# Patient Record
Sex: MALE | Race: WHITE | NOT HISPANIC OR LATINO | Employment: OTHER | URBAN - METROPOLITAN AREA
[De-identification: names, ages, dates, MRNs, and addresses within clinical notes are randomized per-mention and may not be internally consistent; named-entity substitution may affect disease eponyms.]

---

## 2017-01-03 ENCOUNTER — APPOINTMENT (OUTPATIENT)
Dept: LAB | Facility: CLINIC | Age: 72
End: 2017-01-03
Payer: MEDICARE

## 2017-01-03 DIAGNOSIS — Z79.01 LONG TERM (CURRENT) USE OF ANTICOAGULANTS: ICD-10-CM

## 2017-01-03 LAB
INR PPP: 2.4 (ref 0.86–1.16)
PROTHROMBIN TIME: 25.8 SECONDS (ref 12–14.3)

## 2017-01-03 PROCEDURE — 85610 PROTHROMBIN TIME: CPT

## 2017-01-03 PROCEDURE — 36415 COLL VENOUS BLD VENIPUNCTURE: CPT

## 2017-01-17 ENCOUNTER — APPOINTMENT (OUTPATIENT)
Dept: LAB | Facility: CLINIC | Age: 72
End: 2017-01-17
Payer: MEDICARE

## 2017-01-17 DIAGNOSIS — Z79.01 LONG TERM (CURRENT) USE OF ANTICOAGULANTS: ICD-10-CM

## 2017-01-17 LAB
INR PPP: 2.13 (ref 0.86–1.16)
PROTHROMBIN TIME: 23.6 SECONDS (ref 12–14.3)

## 2017-01-17 PROCEDURE — 36415 COLL VENOUS BLD VENIPUNCTURE: CPT

## 2017-01-17 PROCEDURE — 85610 PROTHROMBIN TIME: CPT

## 2017-01-31 ENCOUNTER — APPOINTMENT (OUTPATIENT)
Dept: LAB | Facility: CLINIC | Age: 72
End: 2017-01-31
Payer: MEDICARE

## 2017-01-31 DIAGNOSIS — Z79.01 LONG TERM (CURRENT) USE OF ANTICOAGULANTS: ICD-10-CM

## 2017-01-31 LAB
INR PPP: 1.91 (ref 0.86–1.16)
PROTHROMBIN TIME: 21.7 SECONDS (ref 12–14.3)

## 2017-01-31 PROCEDURE — 85610 PROTHROMBIN TIME: CPT

## 2017-01-31 PROCEDURE — 36415 COLL VENOUS BLD VENIPUNCTURE: CPT

## 2017-02-07 ENCOUNTER — APPOINTMENT (OUTPATIENT)
Dept: LAB | Facility: CLINIC | Age: 72
End: 2017-02-07
Payer: MEDICARE

## 2017-02-07 DIAGNOSIS — Z79.01 LONG TERM (CURRENT) USE OF ANTICOAGULANTS: ICD-10-CM

## 2017-02-07 LAB
INR PPP: 2.12 (ref 0.86–1.16)
PROTHROMBIN TIME: 23.5 SECONDS (ref 12–14.3)

## 2017-02-07 PROCEDURE — 36415 COLL VENOUS BLD VENIPUNCTURE: CPT

## 2017-02-07 PROCEDURE — 85610 PROTHROMBIN TIME: CPT

## 2017-02-14 ENCOUNTER — APPOINTMENT (OUTPATIENT)
Dept: LAB | Facility: CLINIC | Age: 72
End: 2017-02-14
Payer: MEDICARE

## 2017-02-14 ENCOUNTER — TRANSCRIBE ORDERS (OUTPATIENT)
Dept: LAB | Facility: CLINIC | Age: 72
End: 2017-02-14

## 2017-02-14 DIAGNOSIS — Z79.01 LONG TERM (CURRENT) USE OF ANTICOAGULANTS: ICD-10-CM

## 2017-02-14 LAB
INR PPP: 2.03 (ref 0.86–1.16)
PROTHROMBIN TIME: 22.7 SECONDS (ref 12–14.3)

## 2017-02-14 PROCEDURE — 36415 COLL VENOUS BLD VENIPUNCTURE: CPT

## 2017-02-14 PROCEDURE — 85610 PROTHROMBIN TIME: CPT

## 2017-02-28 ENCOUNTER — APPOINTMENT (OUTPATIENT)
Dept: LAB | Facility: CLINIC | Age: 72
End: 2017-02-28
Payer: MEDICARE

## 2017-02-28 DIAGNOSIS — Z79.01 LONG TERM (CURRENT) USE OF ANTICOAGULANTS: ICD-10-CM

## 2017-02-28 LAB
INR PPP: 1.86 (ref 0.86–1.16)
PROTHROMBIN TIME: 21.3 SECONDS (ref 12–14.3)

## 2017-02-28 PROCEDURE — 85610 PROTHROMBIN TIME: CPT

## 2017-02-28 PROCEDURE — 36415 COLL VENOUS BLD VENIPUNCTURE: CPT

## 2017-03-07 ENCOUNTER — APPOINTMENT (OUTPATIENT)
Dept: LAB | Facility: CLINIC | Age: 72
End: 2017-03-07
Payer: MEDICARE

## 2017-03-07 DIAGNOSIS — Z79.01 LONG TERM (CURRENT) USE OF ANTICOAGULANTS: ICD-10-CM

## 2017-03-07 LAB
INR PPP: 2.4 (ref 0.86–1.16)
PROTHROMBIN TIME: 25.8 SECONDS (ref 12–14.3)

## 2017-03-07 PROCEDURE — 85610 PROTHROMBIN TIME: CPT

## 2017-03-07 PROCEDURE — 36415 COLL VENOUS BLD VENIPUNCTURE: CPT

## 2017-03-09 ENCOUNTER — ALLSCRIPTS OFFICE VISIT (OUTPATIENT)
Dept: OTHER | Facility: OTHER | Age: 72
End: 2017-03-09

## 2017-03-09 DIAGNOSIS — Z12.5 ENCOUNTER FOR SCREENING FOR MALIGNANT NEOPLASM OF PROSTATE: ICD-10-CM

## 2017-03-09 DIAGNOSIS — R05.9 COUGH: ICD-10-CM

## 2017-03-09 DIAGNOSIS — Z79.01 LONG TERM CURRENT USE OF ANTICOAGULANT: ICD-10-CM

## 2017-03-09 DIAGNOSIS — Z79.899 OTHER LONG TERM (CURRENT) DRUG THERAPY: ICD-10-CM

## 2017-03-09 DIAGNOSIS — R35.1 NOCTURIA: ICD-10-CM

## 2017-03-09 DIAGNOSIS — R07.89 OTHER CHEST PAIN: ICD-10-CM

## 2017-03-09 DIAGNOSIS — I82.409 ACUTE EMBOLISM AND THROMBOSIS OF DEEP VEIN OF LOWER EXTREMITY (HCC): ICD-10-CM

## 2017-03-09 DIAGNOSIS — I10 ESSENTIAL (PRIMARY) HYPERTENSION: ICD-10-CM

## 2017-03-21 ENCOUNTER — TRANSCRIBE ORDERS (OUTPATIENT)
Dept: ADMINISTRATIVE | Facility: HOSPITAL | Age: 72
End: 2017-03-21

## 2017-03-21 ENCOUNTER — APPOINTMENT (OUTPATIENT)
Dept: LAB | Facility: CLINIC | Age: 72
End: 2017-03-21
Payer: MEDICARE

## 2017-03-21 ENCOUNTER — HOSPITAL ENCOUNTER (OUTPATIENT)
Dept: RADIOLOGY | Facility: HOSPITAL | Age: 72
Discharge: HOME | End: 2017-03-21
Payer: MEDICARE

## 2017-03-21 ENCOUNTER — GENERIC CONVERSION - ENCOUNTER (OUTPATIENT)
Dept: OTHER | Facility: OTHER | Age: 72
End: 2017-03-21

## 2017-03-21 DIAGNOSIS — R05.9 COUGH: ICD-10-CM

## 2017-03-21 DIAGNOSIS — R35.1 NOCTURIA: ICD-10-CM

## 2017-03-21 DIAGNOSIS — R07.89 OTHER CHEST PAIN: ICD-10-CM

## 2017-03-21 DIAGNOSIS — Z12.5 ENCOUNTER FOR SCREENING FOR MALIGNANT NEOPLASM OF PROSTATE: ICD-10-CM

## 2017-03-21 DIAGNOSIS — Z79.899 OTHER LONG TERM (CURRENT) DRUG THERAPY: ICD-10-CM

## 2017-03-21 DIAGNOSIS — I10 ESSENTIAL (PRIMARY) HYPERTENSION: ICD-10-CM

## 2017-03-21 DIAGNOSIS — Z79.01 LONG TERM (CURRENT) USE OF ANTICOAGULANTS: ICD-10-CM

## 2017-03-21 LAB
ALBUMIN SERPL BCP-MCNC: 3.5 G/DL (ref 3.5–5)
ALP SERPL-CCNC: 77 U/L (ref 46–116)
ALT SERPL W P-5'-P-CCNC: 25 U/L (ref 12–78)
ANION GAP SERPL CALCULATED.3IONS-SCNC: 8 MMOL/L (ref 4–13)
AST SERPL W P-5'-P-CCNC: 19 U/L (ref 5–45)
BILIRUB SERPL-MCNC: 1.04 MG/DL (ref 0.2–1)
BUN SERPL-MCNC: 10 MG/DL (ref 5–25)
CALCIUM SERPL-MCNC: 8.9 MG/DL (ref 8.3–10.1)
CHLORIDE SERPL-SCNC: 107 MMOL/L (ref 100–108)
CHOLEST SERPL-MCNC: 214 MG/DL (ref 50–200)
CO2 SERPL-SCNC: 25 MMOL/L (ref 21–32)
CREAT SERPL-MCNC: 0.95 MG/DL (ref 0.6–1.3)
EST. AVERAGE GLUCOSE BLD GHB EST-MCNC: 111 MG/DL
GFR SERPL CREATININE-BSD FRML MDRD: >60 ML/MIN/1.73SQ M
GLUCOSE P FAST SERPL-MCNC: 93 MG/DL (ref 65–99)
HBA1C MFR BLD: 5.5 % (ref 4.2–6.3)
HDLC SERPL-MCNC: 42 MG/DL (ref 40–60)
INR PPP: 2.7 (ref 0.86–1.16)
LDLC SERPL CALC-MCNC: 120 MG/DL (ref 0–100)
POTASSIUM SERPL-SCNC: 3.9 MMOL/L (ref 3.5–5.3)
PROT SERPL-MCNC: 6.9 G/DL (ref 6.4–8.2)
PROTHROMBIN TIME: 28.2 SECONDS (ref 12–14.3)
PSA SERPL-MCNC: 1.9 NG/ML (ref 0–4)
SODIUM SERPL-SCNC: 140 MMOL/L (ref 136–145)
TRIGL SERPL-MCNC: 262 MG/DL

## 2017-03-21 PROCEDURE — 85610 PROTHROMBIN TIME: CPT

## 2017-03-21 PROCEDURE — 36415 COLL VENOUS BLD VENIPUNCTURE: CPT

## 2017-03-21 PROCEDURE — 80061 LIPID PANEL: CPT

## 2017-03-21 PROCEDURE — 71110 X-RAY EXAM RIBS BIL 3 VIEWS: CPT

## 2017-03-21 PROCEDURE — G0103 PSA SCREENING: HCPCS

## 2017-03-21 PROCEDURE — 80053 COMPREHEN METABOLIC PANEL: CPT

## 2017-03-21 PROCEDURE — 71020 HB CHEST X-RAY 2VW FRONTAL&LATL: CPT

## 2017-03-21 PROCEDURE — 83036 HEMOGLOBIN GLYCOSYLATED A1C: CPT

## 2017-03-22 ENCOUNTER — ALLSCRIPTS OFFICE VISIT (OUTPATIENT)
Dept: OTHER | Facility: OTHER | Age: 72
End: 2017-03-22

## 2017-03-23 ENCOUNTER — GENERIC CONVERSION - ENCOUNTER (OUTPATIENT)
Dept: OTHER | Facility: OTHER | Age: 72
End: 2017-03-23

## 2017-04-01 ENCOUNTER — APPOINTMENT (EMERGENCY)
Dept: RADIOLOGY | Facility: HOSPITAL | Age: 72
DRG: 683 | End: 2017-04-01
Payer: MEDICARE

## 2017-04-01 ENCOUNTER — HOSPITAL ENCOUNTER (INPATIENT)
Facility: HOSPITAL | Age: 72
LOS: 6 days | Discharge: NURSING, REHAB OR LONG-TERM CARE | DRG: 683 | End: 2017-04-07
Attending: EMERGENCY MEDICINE | Admitting: INTERNAL MEDICINE
Payer: MEDICARE

## 2017-04-01 ENCOUNTER — APPOINTMENT (EMERGENCY)
Dept: CT IMAGING | Facility: HOSPITAL | Age: 72
DRG: 683 | End: 2017-04-01
Payer: MEDICARE

## 2017-04-01 DIAGNOSIS — R11.2 NAUSEA AND VOMITING: Primary | ICD-10-CM

## 2017-04-01 DIAGNOSIS — R10.9 ABDOMINAL PAIN: ICD-10-CM

## 2017-04-01 DIAGNOSIS — F32.A DEPRESSION: Chronic | ICD-10-CM

## 2017-04-01 DIAGNOSIS — E87.2 LACTIC ACIDOSIS: ICD-10-CM

## 2017-04-01 DIAGNOSIS — R11.2 NAUSEA AND VOMITING: ICD-10-CM

## 2017-04-01 DIAGNOSIS — R33.8 ACUTE URINARY RETENTION: ICD-10-CM

## 2017-04-01 PROBLEM — J30.9 ALLERGIC RHINITIS: Status: ACTIVE | Noted: 2017-04-01

## 2017-04-01 PROBLEM — I10 HTN (HYPERTENSION): Chronic | Status: ACTIVE | Noted: 2017-04-01

## 2017-04-01 PROBLEM — K50.90 CROHN'S DISEASE (HCC): Chronic | Status: ACTIVE | Noted: 2017-04-01

## 2017-04-01 PROBLEM — Z86.718 HISTORY OF DVT (DEEP VEIN THROMBOSIS): Chronic | Status: ACTIVE | Noted: 2017-04-01

## 2017-04-01 PROBLEM — D72.829 LEUCOCYTOSIS: Status: ACTIVE | Noted: 2017-04-01

## 2017-04-01 PROBLEM — E87.20 LACTIC ACIDOSIS: Status: ACTIVE | Noted: 2017-04-01

## 2017-04-01 PROBLEM — F41.9 ANXIETY: Status: ACTIVE | Noted: 2017-04-01

## 2017-04-01 LAB
ALBUMIN SERPL BCP-MCNC: 3.9 G/DL (ref 3.5–5)
ALP SERPL-CCNC: 94 U/L (ref 46–116)
ALT SERPL W P-5'-P-CCNC: 25 U/L (ref 12–78)
ANION GAP SERPL CALCULATED.3IONS-SCNC: 15 MMOL/L (ref 4–13)
AST SERPL W P-5'-P-CCNC: 23 U/L (ref 5–45)
BASOPHILS # BLD MANUAL: 0 THOUSAND/UL (ref 0–0.1)
BASOPHILS NFR MAR MANUAL: 0 % (ref 0–1)
BILIRUB SERPL-MCNC: 1.5 MG/DL (ref 0.2–1)
BUN SERPL-MCNC: 12 MG/DL (ref 5–25)
CALCIUM SERPL-MCNC: 8.8 MG/DL (ref 8.3–10.1)
CHLORIDE SERPL-SCNC: 102 MMOL/L (ref 100–108)
CK SERPL-CCNC: 35 U/L (ref 39–308)
CLARITY, POC: CLEAR
CO2 SERPL-SCNC: 23 MMOL/L (ref 21–32)
COLOR, POC: YELLOW
CREAT SERPL-MCNC: 0.99 MG/DL (ref 0.6–1.3)
EOSINOPHIL # BLD MANUAL: 0 THOUSAND/UL (ref 0–0.4)
EOSINOPHIL NFR BLD MANUAL: 0 % (ref 0–6)
ERYTHROCYTE [DISTWIDTH] IN BLOOD BY AUTOMATED COUNT: 13.3 % (ref 11.6–15.1)
EXT BILIRUBIN, UA: NORMAL
EXT BLOOD URINE: NORMAL
EXT GLUCOSE, UA: NORMAL
EXT KETONES: NORMAL
EXT NITRITE, UA: NORMAL
EXT PH, UA: 5
EXT PROTEIN, UA: NORMAL
EXT SPECIFIC GRAVITY, UA: 1.01
EXT UROBILINOGEN: 0.2
FLUAV AG SPEC QL IA: NEGATIVE
FLUBV AG SPEC QL IA: NEGATIVE
GFR SERPL CREATININE-BSD FRML MDRD: >60 ML/MIN/1.73SQ M
GLUCOSE SERPL-MCNC: 163 MG/DL (ref 65–140)
HCT VFR BLD AUTO: 47.4 % (ref 36.5–49.3)
HGB BLD-MCNC: 16.6 G/DL (ref 12–17)
HOLD SPECIMEN: NORMAL
HOLD SPECIMEN: NORMAL
INR PPP: 3.14 (ref 0.86–1.16)
LACTATE SERPL-SCNC: 1.6 MMOL/L (ref 0.5–2)
LACTATE SERPL-SCNC: 3.5 MMOL/L (ref 0.5–2)
LACTATE SERPL-SCNC: 3.7 MMOL/L (ref 0.5–2)
LIPASE SERPL-CCNC: 86 U/L (ref 73–393)
LYMPHOCYTES # BLD AUTO: 0.27 THOUSAND/UL (ref 0.6–4.47)
LYMPHOCYTES # BLD AUTO: 2 % (ref 14–44)
MAGNESIUM SERPL-MCNC: 1.7 MG/DL (ref 1.6–2.6)
MCH RBC QN AUTO: 29.2 PG (ref 26.8–34.3)
MCHC RBC AUTO-ENTMCNC: 35 G/DL (ref 31.4–37.4)
MCV RBC AUTO: 83 FL (ref 82–98)
MONOCYTES # BLD AUTO: 0.69 THOUSAND/UL (ref 0–1.22)
MONOCYTES NFR BLD: 5 % (ref 4–12)
NEUTROPHILS # BLD MANUAL: 12.33 THOUSAND/UL (ref 1.85–7.62)
NEUTS SEG NFR BLD AUTO: 90 % (ref 43–75)
NT-PROBNP SERPL-MCNC: 212 PG/ML
PLATELET # BLD AUTO: 222 THOUSANDS/UL (ref 149–390)
PLATELET BLD QL SMEAR: ADEQUATE
PMV BLD AUTO: 9.8 FL (ref 8.9–12.7)
POTASSIUM SERPL-SCNC: 3.3 MMOL/L (ref 3.5–5.3)
PROT SERPL-MCNC: 7.5 G/DL (ref 6.4–8.2)
PROTHROMBIN TIME: 30.9 SECONDS (ref 12–14.3)
RBC # BLD AUTO: 5.69 MILLION/UL (ref 3.88–5.62)
SODIUM SERPL-SCNC: 140 MMOL/L (ref 136–145)
TOTAL CELLS COUNTED SPEC: 100
TROPONIN I SERPL-MCNC: <0.02 NG/ML
TROPONIN I SERPL-MCNC: <0.02 NG/ML
VARIANT LYMPHS # BLD AUTO: 3 %
WBC # BLD AUTO: 13.7 THOUSAND/UL (ref 4.31–10.16)
WBC # BLD EST: NORMAL 10*3/UL

## 2017-04-01 PROCEDURE — 96365 THER/PROPH/DIAG IV INF INIT: CPT

## 2017-04-01 PROCEDURE — 96374 THER/PROPH/DIAG INJ IV PUSH: CPT

## 2017-04-01 PROCEDURE — 87040 BLOOD CULTURE FOR BACTERIA: CPT | Performed by: PHYSICIAN ASSISTANT

## 2017-04-01 PROCEDURE — 96361 HYDRATE IV INFUSION ADD-ON: CPT

## 2017-04-01 PROCEDURE — 80053 COMPREHEN METABOLIC PANEL: CPT | Performed by: PHYSICIAN ASSISTANT

## 2017-04-01 PROCEDURE — 87798 DETECT AGENT NOS DNA AMP: CPT | Performed by: PHYSICIAN ASSISTANT

## 2017-04-01 PROCEDURE — 87400 INFLUENZA A/B EACH AG IA: CPT | Performed by: PHYSICIAN ASSISTANT

## 2017-04-01 PROCEDURE — 74177 CT ABD & PELVIS W/CONTRAST: CPT

## 2017-04-01 PROCEDURE — 96376 TX/PRO/DX INJ SAME DRUG ADON: CPT

## 2017-04-01 PROCEDURE — 84484 ASSAY OF TROPONIN QUANT: CPT | Performed by: PHYSICIAN ASSISTANT

## 2017-04-01 PROCEDURE — 83605 ASSAY OF LACTIC ACID: CPT | Performed by: PHYSICIAN ASSISTANT

## 2017-04-01 PROCEDURE — 71020 HB CHEST X-RAY 2VW FRONTAL&LATL: CPT

## 2017-04-01 PROCEDURE — 36415 COLL VENOUS BLD VENIPUNCTURE: CPT | Performed by: PHYSICIAN ASSISTANT

## 2017-04-01 PROCEDURE — 85610 PROTHROMBIN TIME: CPT | Performed by: PHYSICIAN ASSISTANT

## 2017-04-01 PROCEDURE — 81002 URINALYSIS NONAUTO W/O SCOPE: CPT | Performed by: PHYSICIAN ASSISTANT

## 2017-04-01 PROCEDURE — 99285 EMERGENCY DEPT VISIT HI MDM: CPT

## 2017-04-01 PROCEDURE — 83605 ASSAY OF LACTIC ACID: CPT | Performed by: INTERNAL MEDICINE

## 2017-04-01 PROCEDURE — 83880 ASSAY OF NATRIURETIC PEPTIDE: CPT | Performed by: PHYSICIAN ASSISTANT

## 2017-04-01 PROCEDURE — 83735 ASSAY OF MAGNESIUM: CPT | Performed by: PHYSICIAN ASSISTANT

## 2017-04-01 PROCEDURE — 96375 TX/PRO/DX INJ NEW DRUG ADDON: CPT

## 2017-04-01 PROCEDURE — 93005 ELECTROCARDIOGRAM TRACING: CPT | Performed by: PHYSICIAN ASSISTANT

## 2017-04-01 PROCEDURE — 85027 COMPLETE CBC AUTOMATED: CPT | Performed by: PHYSICIAN ASSISTANT

## 2017-04-01 PROCEDURE — 85007 BL SMEAR W/DIFF WBC COUNT: CPT | Performed by: PHYSICIAN ASSISTANT

## 2017-04-01 PROCEDURE — 83690 ASSAY OF LIPASE: CPT | Performed by: PHYSICIAN ASSISTANT

## 2017-04-01 PROCEDURE — 82550 ASSAY OF CK (CPK): CPT | Performed by: PHYSICIAN ASSISTANT

## 2017-04-01 RX ORDER — FLUTICASONE PROPIONATE 50 MCG
1 SPRAY, SUSPENSION (ML) NASAL DAILY
Status: ON HOLD | COMMUNITY
End: 2018-04-09

## 2017-04-01 RX ORDER — MAGNESIUM SULFATE HEPTAHYDRATE 40 MG/ML
2 INJECTION, SOLUTION INTRAVENOUS ONCE
Status: COMPLETED | OUTPATIENT
Start: 2017-04-01 | End: 2017-04-02

## 2017-04-01 RX ORDER — DEXTROSE AND SODIUM CHLORIDE 5; .9 G/100ML; G/100ML
125 INJECTION, SOLUTION INTRAVENOUS CONTINUOUS
Status: DISCONTINUED | OUTPATIENT
Start: 2017-04-01 | End: 2017-04-04

## 2017-04-01 RX ORDER — WARFARIN SODIUM 2 MG/1
2 TABLET ORAL
COMMUNITY
End: 2018-06-01 | Stop reason: SDUPTHER

## 2017-04-01 RX ORDER — ONDANSETRON 2 MG/ML
4 INJECTION INTRAMUSCULAR; INTRAVENOUS ONCE
Status: COMPLETED | OUTPATIENT
Start: 2017-04-01 | End: 2017-04-01

## 2017-04-01 RX ORDER — METOCLOPRAMIDE HYDROCHLORIDE 5 MG/ML
10 INJECTION INTRAMUSCULAR; INTRAVENOUS ONCE
Status: COMPLETED | OUTPATIENT
Start: 2017-04-01 | End: 2017-04-01

## 2017-04-01 RX ORDER — SODIUM CHLORIDE 9 MG/ML
125 INJECTION, SOLUTION INTRAVENOUS CONTINUOUS
Status: DISCONTINUED | OUTPATIENT
Start: 2017-04-01 | End: 2017-04-04

## 2017-04-01 RX ORDER — LORAZEPAM 2 MG/ML
1 INJECTION INTRAMUSCULAR ONCE
Status: COMPLETED | OUTPATIENT
Start: 2017-04-01 | End: 2017-04-01

## 2017-04-01 RX ORDER — AMLODIPINE BESYLATE 5 MG/1
5 TABLET ORAL DAILY
COMMUNITY
End: 2018-11-20 | Stop reason: SDUPTHER

## 2017-04-01 RX ORDER — POTASSIUM CHLORIDE 20 MEQ/1
20 TABLET, EXTENDED RELEASE ORAL ONCE
Status: COMPLETED | OUTPATIENT
Start: 2017-04-01 | End: 2017-04-01

## 2017-04-01 RX ORDER — WARFARIN SODIUM 2 MG/1
2 TABLET ORAL
Status: DISCONTINUED | OUTPATIENT
Start: 2017-04-01 | End: 2017-04-02

## 2017-04-01 RX ORDER — ONDANSETRON 2 MG/ML
4 INJECTION INTRAMUSCULAR; INTRAVENOUS EVERY 6 HOURS PRN
Status: DISCONTINUED | OUTPATIENT
Start: 2017-04-01 | End: 2017-04-07 | Stop reason: HOSPADM

## 2017-04-01 RX ORDER — ACETAMINOPHEN 325 MG/1
650 TABLET ORAL EVERY 6 HOURS PRN
Status: DISCONTINUED | OUTPATIENT
Start: 2017-04-01 | End: 2017-04-07 | Stop reason: HOSPADM

## 2017-04-01 RX ORDER — FLUTICASONE PROPIONATE 50 MCG
1 SPRAY, SUSPENSION (ML) NASAL DAILY
Status: DISCONTINUED | OUTPATIENT
Start: 2017-04-02 | End: 2017-04-07 | Stop reason: HOSPADM

## 2017-04-01 RX ORDER — MORPHINE SULFATE 4 MG/ML
4 INJECTION, SOLUTION INTRAMUSCULAR; INTRAVENOUS ONCE
Status: COMPLETED | OUTPATIENT
Start: 2017-04-01 | End: 2017-04-01

## 2017-04-01 RX ORDER — AMLODIPINE BESYLATE 5 MG/1
5 TABLET ORAL DAILY
Status: DISCONTINUED | OUTPATIENT
Start: 2017-04-01 | End: 2017-04-07 | Stop reason: HOSPADM

## 2017-04-01 RX ADMIN — SODIUM CHLORIDE 1000 ML: 0.9 INJECTION, SOLUTION INTRAVENOUS at 18:55

## 2017-04-01 RX ADMIN — SODIUM CHLORIDE 1000 ML: 0.9 INJECTION, SOLUTION INTRAVENOUS at 22:16

## 2017-04-01 RX ADMIN — ONDANSETRON 4 MG: 2 INJECTION INTRAMUSCULAR; INTRAVENOUS at 16:28

## 2017-04-01 RX ADMIN — SODIUM CHLORIDE 1000 ML: 0.9 INJECTION, SOLUTION INTRAVENOUS at 15:20

## 2017-04-01 RX ADMIN — METOCLOPRAMIDE 10 MG: 5 INJECTION, SOLUTION INTRAMUSCULAR; INTRAVENOUS at 17:47

## 2017-04-01 RX ADMIN — ONDANSETRON 4 MG: 2 INJECTION INTRAMUSCULAR; INTRAVENOUS at 15:30

## 2017-04-01 RX ADMIN — DEXTROSE AND SODIUM CHLORIDE 125 ML/HR: 5; .9 INJECTION, SOLUTION INTRAVENOUS at 20:26

## 2017-04-01 RX ADMIN — IOHEXOL 100 ML: 350 INJECTION, SOLUTION INTRAVENOUS at 17:00

## 2017-04-01 RX ADMIN — WARFARIN SODIUM 2 MG: 2 TABLET ORAL at 20:23

## 2017-04-01 RX ADMIN — CEFEPIME 2000 MG: 2 INJECTION, POWDER, FOR SOLUTION INTRAMUSCULAR; INTRAVENOUS at 17:23

## 2017-04-01 RX ADMIN — MORPHINE SULFATE 4 MG: 4 INJECTION, SOLUTION INTRAMUSCULAR; INTRAVENOUS at 15:43

## 2017-04-01 RX ADMIN — LORAZEPAM 1 MG: 2 INJECTION, SOLUTION INTRAMUSCULAR; INTRAVENOUS at 19:04

## 2017-04-01 RX ADMIN — POTASSIUM CHLORIDE 20 MEQ: 1500 TABLET, EXTENDED RELEASE ORAL at 20:23

## 2017-04-01 RX ADMIN — AMLODIPINE BESYLATE 5 MG: 5 TABLET ORAL at 20:23

## 2017-04-02 LAB
ANION GAP SERPL CALCULATED.3IONS-SCNC: 4 MMOL/L (ref 4–13)
ANION GAP SERPL CALCULATED.3IONS-SCNC: 8 MMOL/L (ref 4–13)
ATRIAL RATE: 113 BPM
BASOPHILS # BLD AUTO: 0 THOUSANDS/ΜL (ref 0–0.1)
BASOPHILS NFR BLD AUTO: 0 % (ref 0–1)
BUN SERPL-MCNC: 8 MG/DL (ref 5–25)
BUN SERPL-MCNC: 8 MG/DL (ref 5–25)
CALCIUM SERPL-MCNC: 7.5 MG/DL (ref 8.3–10.1)
CALCIUM SERPL-MCNC: 7.6 MG/DL (ref 8.3–10.1)
CHLORIDE SERPL-SCNC: 108 MMOL/L (ref 100–108)
CHLORIDE SERPL-SCNC: 109 MMOL/L (ref 100–108)
CO2 SERPL-SCNC: 23 MMOL/L (ref 21–32)
CO2 SERPL-SCNC: 28 MMOL/L (ref 21–32)
CREAT SERPL-MCNC: 0.7 MG/DL (ref 0.6–1.3)
CREAT SERPL-MCNC: 0.75 MG/DL (ref 0.6–1.3)
CRP SERPL QL: 4.7 MG/L
EOSINOPHIL # BLD AUTO: 0 THOUSAND/ΜL (ref 0–0.61)
EOSINOPHIL NFR BLD AUTO: 0 % (ref 0–6)
ERYTHROCYTE [DISTWIDTH] IN BLOOD BY AUTOMATED COUNT: 13.6 % (ref 11.6–15.1)
ERYTHROCYTE [SEDIMENTATION RATE] IN BLOOD: 6 MM/HOUR (ref 0–10)
FLUAV AG SPEC QL: NORMAL
FLUBV AG SPEC QL: NORMAL
GFR SERPL CREATININE-BSD FRML MDRD: >60 ML/MIN/1.73SQ M
GFR SERPL CREATININE-BSD FRML MDRD: >60 ML/MIN/1.73SQ M
GLUCOSE SERPL-MCNC: 108 MG/DL (ref 65–140)
GLUCOSE SERPL-MCNC: 121 MG/DL (ref 65–140)
GLUCOSE SERPL-MCNC: 128 MG/DL (ref 65–140)
GLUCOSE SERPL-MCNC: 144 MG/DL (ref 65–140)
HCT VFR BLD AUTO: 40.5 % (ref 36.5–49.3)
HGB BLD-MCNC: 14.1 G/DL (ref 12–17)
INR PPP: 4.02 (ref 0.86–1.16)
LACTATE SERPL-SCNC: 1.8 MMOL/L (ref 0.5–2)
LYMPHOCYTES # BLD AUTO: 1.15 THOUSANDS/ΜL (ref 0.6–4.47)
LYMPHOCYTES NFR BLD AUTO: 9 % (ref 14–44)
MAGNESIUM SERPL-MCNC: 2.3 MG/DL (ref 1.6–2.6)
MCH RBC QN AUTO: 29.9 PG (ref 26.8–34.3)
MCHC RBC AUTO-ENTMCNC: 34.8 G/DL (ref 31.4–37.4)
MCV RBC AUTO: 86 FL (ref 82–98)
MONOCYTES # BLD AUTO: 0.97 THOUSAND/ΜL (ref 0.17–1.22)
MONOCYTES NFR BLD AUTO: 7 % (ref 4–12)
NEUTROPHILS # BLD AUTO: 11.42 THOUSANDS/ΜL (ref 1.85–7.62)
NEUTS SEG NFR BLD AUTO: 84 % (ref 43–75)
P AXIS: 45 DEGREES
PLATELET # BLD AUTO: 202 THOUSANDS/UL (ref 149–390)
PMV BLD AUTO: 9.5 FL (ref 8.9–12.7)
POTASSIUM SERPL-SCNC: 4.1 MMOL/L (ref 3.5–5.3)
POTASSIUM SERPL-SCNC: 4.1 MMOL/L (ref 3.5–5.3)
PR INTERVAL: 160 MS
PROTHROMBIN TIME: 37.3 SECONDS (ref 12–14.3)
QRS AXIS: 16 DEGREES
QRSD INTERVAL: 102 MS
QT INTERVAL: 342 MS
QTC INTERVAL: 469 MS
RBC # BLD AUTO: 4.71 MILLION/UL (ref 3.88–5.62)
RSV B RNA SPEC QL NAA+PROBE: NORMAL
SODIUM SERPL-SCNC: 140 MMOL/L (ref 136–145)
SODIUM SERPL-SCNC: 140 MMOL/L (ref 136–145)
T WAVE AXIS: 34 DEGREES
VENTRICULAR RATE: 113 BPM
WBC # BLD AUTO: 13.54 THOUSAND/UL (ref 4.31–10.16)

## 2017-04-02 PROCEDURE — 80048 BASIC METABOLIC PNL TOTAL CA: CPT | Performed by: INTERNAL MEDICINE

## 2017-04-02 PROCEDURE — 85610 PROTHROMBIN TIME: CPT | Performed by: INTERNAL MEDICINE

## 2017-04-02 PROCEDURE — 85652 RBC SED RATE AUTOMATED: CPT | Performed by: INTERNAL MEDICINE

## 2017-04-02 PROCEDURE — 82948 REAGENT STRIP/BLOOD GLUCOSE: CPT

## 2017-04-02 PROCEDURE — 85025 COMPLETE CBC W/AUTO DIFF WBC: CPT | Performed by: INTERNAL MEDICINE

## 2017-04-02 PROCEDURE — 86140 C-REACTIVE PROTEIN: CPT | Performed by: INTERNAL MEDICINE

## 2017-04-02 PROCEDURE — 83735 ASSAY OF MAGNESIUM: CPT | Performed by: INTERNAL MEDICINE

## 2017-04-02 PROCEDURE — 83605 ASSAY OF LACTIC ACID: CPT | Performed by: INTERNAL MEDICINE

## 2017-04-02 RX ORDER — PROMETHAZINE HYDROCHLORIDE 25 MG/ML
25 INJECTION, SOLUTION INTRAMUSCULAR; INTRAVENOUS EVERY 6 HOURS PRN
Status: DISCONTINUED | OUTPATIENT
Start: 2017-04-02 | End: 2017-04-07 | Stop reason: HOSPADM

## 2017-04-02 RX ORDER — MORPHINE SULFATE 4 MG/ML
4 INJECTION, SOLUTION INTRAMUSCULAR; INTRAVENOUS EVERY 6 HOURS PRN
Status: DISCONTINUED | OUTPATIENT
Start: 2017-04-02 | End: 2017-04-02

## 2017-04-02 RX ORDER — LORAZEPAM 2 MG/ML
0.5 INJECTION INTRAMUSCULAR EVERY 4 HOURS PRN
Status: DISCONTINUED | OUTPATIENT
Start: 2017-04-02 | End: 2017-04-06

## 2017-04-02 RX ORDER — LORAZEPAM 2 MG/ML
1 INJECTION INTRAMUSCULAR ONCE
Status: DISCONTINUED | OUTPATIENT
Start: 2017-04-02 | End: 2017-04-07 | Stop reason: HOSPADM

## 2017-04-02 RX ORDER — LORAZEPAM 2 MG/ML
INJECTION INTRAMUSCULAR
Status: COMPLETED
Start: 2017-04-02 | End: 2017-04-02

## 2017-04-02 RX ADMIN — MAGNESIUM SULFATE HEPTAHYDRATE 2 G: 40 INJECTION, SOLUTION INTRAVENOUS at 00:06

## 2017-04-02 RX ADMIN — DEXTROSE AND SODIUM CHLORIDE 125 ML/HR: 5; .9 INJECTION, SOLUTION INTRAVENOUS at 04:26

## 2017-04-02 RX ADMIN — MORPHINE SULFATE 4 MG: 4 INJECTION, SOLUTION INTRAMUSCULAR; INTRAVENOUS at 08:44

## 2017-04-02 RX ADMIN — ACETAMINOPHEN 650 MG: 325 TABLET ORAL at 04:26

## 2017-04-02 RX ADMIN — ONDANSETRON 4 MG: 2 INJECTION INTRAMUSCULAR; INTRAVENOUS at 08:35

## 2017-04-02 RX ADMIN — LORAZEPAM 1 MG: 2 INJECTION, SOLUTION INTRAMUSCULAR; INTRAVENOUS at 12:42

## 2017-04-02 RX ADMIN — LORAZEPAM 0.5 MG: 2 INJECTION, SOLUTION INTRAMUSCULAR; INTRAVENOUS at 17:19

## 2017-04-02 RX ADMIN — ONDANSETRON 4 MG: 2 INJECTION INTRAMUSCULAR; INTRAVENOUS at 04:26

## 2017-04-02 RX ADMIN — MORPHINE SULFATE 4 MG: 4 INJECTION, SOLUTION INTRAMUSCULAR; INTRAVENOUS at 04:04

## 2017-04-02 RX ADMIN — DEXTROSE AND SODIUM CHLORIDE 125 ML/HR: 5; .9 INJECTION, SOLUTION INTRAVENOUS at 12:39

## 2017-04-02 RX ADMIN — AMLODIPINE BESYLATE 5 MG: 5 TABLET ORAL at 09:00

## 2017-04-02 RX ADMIN — LORAZEPAM 0.5 MG: 2 INJECTION, SOLUTION INTRAMUSCULAR; INTRAVENOUS at 23:39

## 2017-04-03 ENCOUNTER — APPOINTMENT (INPATIENT)
Dept: CT IMAGING | Facility: HOSPITAL | Age: 72
DRG: 683 | End: 2017-04-03
Payer: MEDICARE

## 2017-04-03 ENCOUNTER — APPOINTMENT (INPATIENT)
Dept: RADIOLOGY | Facility: HOSPITAL | Age: 72
DRG: 683 | End: 2017-04-03
Payer: MEDICARE

## 2017-04-03 LAB
ALBUMIN SERPL BCP-MCNC: 3 G/DL (ref 3.5–5)
ALP SERPL-CCNC: 64 U/L (ref 46–116)
ALT SERPL W P-5'-P-CCNC: 20 U/L (ref 12–78)
ANION GAP SERPL CALCULATED.3IONS-SCNC: 5 MMOL/L (ref 4–13)
AST SERPL W P-5'-P-CCNC: 20 U/L (ref 5–45)
BILIRUB SERPL-MCNC: 1 MG/DL (ref 0.2–1)
BUN SERPL-MCNC: 6 MG/DL (ref 5–25)
CALCIUM SERPL-MCNC: 7.9 MG/DL (ref 8.3–10.1)
CHLORIDE SERPL-SCNC: 109 MMOL/L (ref 100–108)
CO2 SERPL-SCNC: 29 MMOL/L (ref 21–32)
CREAT SERPL-MCNC: 0.67 MG/DL (ref 0.6–1.3)
ERYTHROCYTE [DISTWIDTH] IN BLOOD BY AUTOMATED COUNT: 13.7 % (ref 11.6–15.1)
GFR SERPL CREATININE-BSD FRML MDRD: >60 ML/MIN/1.73SQ M
GLUCOSE SERPL-MCNC: 107 MG/DL (ref 65–140)
HCT VFR BLD AUTO: 41.5 % (ref 36.5–49.3)
HGB BLD-MCNC: 14 G/DL (ref 12–17)
INR PPP: 4.53 (ref 0.86–1.16)
MCH RBC QN AUTO: 29.7 PG (ref 26.8–34.3)
MCHC RBC AUTO-ENTMCNC: 33.7 G/DL (ref 31.4–37.4)
MCV RBC AUTO: 88 FL (ref 82–98)
PLATELET # BLD AUTO: 187 THOUSANDS/UL (ref 149–390)
PMV BLD AUTO: 9.7 FL (ref 8.9–12.7)
POTASSIUM SERPL-SCNC: 3.5 MMOL/L (ref 3.5–5.3)
PROT SERPL-MCNC: 6.1 G/DL (ref 6.4–8.2)
PROTHROMBIN TIME: 40.8 SECONDS (ref 12–14.3)
RBC # BLD AUTO: 4.71 MILLION/UL (ref 3.88–5.62)
SODIUM SERPL-SCNC: 143 MMOL/L (ref 136–145)
WBC # BLD AUTO: 9.41 THOUSAND/UL (ref 4.31–10.16)

## 2017-04-03 PROCEDURE — 71010 HB CHEST X-RAY 1 VIEW FRONTAL (PORTABLE): CPT

## 2017-04-03 PROCEDURE — 80053 COMPREHEN METABOLIC PANEL: CPT | Performed by: INTERNAL MEDICINE

## 2017-04-03 PROCEDURE — 87040 BLOOD CULTURE FOR BACTERIA: CPT | Performed by: INTERNAL MEDICINE

## 2017-04-03 PROCEDURE — 85610 PROTHROMBIN TIME: CPT | Performed by: INTERNAL MEDICINE

## 2017-04-03 PROCEDURE — 85027 COMPLETE CBC AUTOMATED: CPT | Performed by: INTERNAL MEDICINE

## 2017-04-03 PROCEDURE — 0T9B70Z DRAINAGE OF BLADDER WITH DRAINAGE DEVICE, VIA NATURAL OR ARTIFICIAL OPENING: ICD-10-PCS | Performed by: INTERNAL MEDICINE

## 2017-04-03 PROCEDURE — 74177 CT ABD & PELVIS W/CONTRAST: CPT

## 2017-04-03 PROCEDURE — 70450 CT HEAD/BRAIN W/O DYE: CPT

## 2017-04-03 RX ORDER — MIRTAZAPINE 15 MG/1
15 TABLET, FILM COATED ORAL
Status: DISCONTINUED | OUTPATIENT
Start: 2017-04-03 | End: 2017-04-07 | Stop reason: HOSPADM

## 2017-04-03 RX ORDER — TAMSULOSIN HYDROCHLORIDE 0.4 MG/1
0.4 CAPSULE ORAL
Status: DISCONTINUED | OUTPATIENT
Start: 2017-04-03 | End: 2017-04-07 | Stop reason: HOSPADM

## 2017-04-03 RX ORDER — PANTOPRAZOLE SODIUM 40 MG/1
40 TABLET, DELAYED RELEASE ORAL
Status: DISCONTINUED | OUTPATIENT
Start: 2017-04-03 | End: 2017-04-07 | Stop reason: HOSPADM

## 2017-04-03 RX ORDER — SUCRALFATE ORAL 1 G/10ML
1000 SUSPENSION ORAL EVERY 6 HOURS SCHEDULED
Status: DISCONTINUED | OUTPATIENT
Start: 2017-04-03 | End: 2017-04-07 | Stop reason: HOSPADM

## 2017-04-03 RX ADMIN — FLUTICASONE PROPIONATE 1 SPRAY: 50 SPRAY, METERED NASAL at 10:00

## 2017-04-03 RX ADMIN — DEXTROSE AND SODIUM CHLORIDE 125 ML/HR: 5; .9 INJECTION, SOLUTION INTRAVENOUS at 23:15

## 2017-04-03 RX ADMIN — MIRTAZAPINE 15 MG: 15 TABLET, FILM COATED ORAL at 03:00

## 2017-04-03 RX ADMIN — LORAZEPAM 0.5 MG: 2 INJECTION, SOLUTION INTRAMUSCULAR; INTRAVENOUS at 13:50

## 2017-04-03 RX ADMIN — PANTOPRAZOLE SODIUM 40 MG: 40 TABLET, DELAYED RELEASE ORAL at 16:21

## 2017-04-03 RX ADMIN — SUCRALFATE 1000 MG: 1 SUSPENSION ORAL at 23:04

## 2017-04-03 RX ADMIN — AMLODIPINE BESYLATE 5 MG: 5 TABLET ORAL at 13:28

## 2017-04-03 RX ADMIN — TAMSULOSIN HYDROCHLORIDE 0.4 MG: 0.4 CAPSULE ORAL at 16:21

## 2017-04-03 RX ADMIN — LORAZEPAM 0.5 MG: 2 INJECTION, SOLUTION INTRAMUSCULAR; INTRAVENOUS at 23:05

## 2017-04-03 RX ADMIN — IOHEXOL 100 ML: 350 INJECTION, SOLUTION INTRAVENOUS at 13:20

## 2017-04-03 RX ADMIN — SUCRALFATE 1000 MG: 1 SUSPENSION ORAL at 17:56

## 2017-04-03 RX ADMIN — MIRTAZAPINE 15 MG: 15 TABLET, FILM COATED ORAL at 21:35

## 2017-04-04 ENCOUNTER — APPOINTMENT (INPATIENT)
Dept: ULTRASOUND IMAGING | Facility: HOSPITAL | Age: 72
DRG: 683 | End: 2017-04-04
Payer: MEDICARE

## 2017-04-04 LAB
ANION GAP SERPL CALCULATED.3IONS-SCNC: 7 MMOL/L (ref 4–13)
BUN SERPL-MCNC: 4 MG/DL (ref 5–25)
CALCIUM SERPL-MCNC: 7.4 MG/DL (ref 8.3–10.1)
CHLORIDE SERPL-SCNC: 108 MMOL/L (ref 100–108)
CO2 SERPL-SCNC: 30 MMOL/L (ref 21–32)
CREAT SERPL-MCNC: 0.67 MG/DL (ref 0.6–1.3)
ERYTHROCYTE [DISTWIDTH] IN BLOOD BY AUTOMATED COUNT: 13.1 % (ref 11.6–15.1)
GFR SERPL CREATININE-BSD FRML MDRD: >60 ML/MIN/1.73SQ M
GLUCOSE SERPL-MCNC: 149 MG/DL (ref 65–140)
HCT VFR BLD AUTO: 38.6 % (ref 36.5–49.3)
HGB BLD-MCNC: 13.1 G/DL (ref 12–17)
INR PPP: 3.99 (ref 0.86–1.16)
MCH RBC QN AUTO: 29.6 PG (ref 26.8–34.3)
MCHC RBC AUTO-ENTMCNC: 33.9 G/DL (ref 31.4–37.4)
MCV RBC AUTO: 87 FL (ref 82–98)
PLATELET # BLD AUTO: 161 THOUSANDS/UL (ref 149–390)
PMV BLD AUTO: 9 FL (ref 8.9–12.7)
POTASSIUM SERPL-SCNC: 3.1 MMOL/L (ref 3.5–5.3)
PROTHROMBIN TIME: 37.1 SECONDS (ref 12–14.3)
RBC # BLD AUTO: 4.42 MILLION/UL (ref 3.88–5.62)
SODIUM SERPL-SCNC: 145 MMOL/L (ref 136–145)
WBC # BLD AUTO: 7.14 THOUSAND/UL (ref 4.31–10.16)

## 2017-04-04 PROCEDURE — 76705 ECHO EXAM OF ABDOMEN: CPT

## 2017-04-04 PROCEDURE — 80048 BASIC METABOLIC PNL TOTAL CA: CPT | Performed by: INTERNAL MEDICINE

## 2017-04-04 PROCEDURE — 85027 COMPLETE CBC AUTOMATED: CPT | Performed by: INTERNAL MEDICINE

## 2017-04-04 PROCEDURE — 85610 PROTHROMBIN TIME: CPT | Performed by: INTERNAL MEDICINE

## 2017-04-04 RX ORDER — SODIUM CHLORIDE AND POTASSIUM CHLORIDE .9; .15 G/100ML; G/100ML
75 SOLUTION INTRAVENOUS CONTINUOUS
Status: DISCONTINUED | OUTPATIENT
Start: 2017-04-04 | End: 2017-04-07 | Stop reason: HOSPADM

## 2017-04-04 RX ORDER — POTASSIUM CHLORIDE 20 MEQ/1
40 TABLET, EXTENDED RELEASE ORAL ONCE
Status: COMPLETED | OUTPATIENT
Start: 2017-04-04 | End: 2017-04-04

## 2017-04-04 RX ADMIN — SUCRALFATE 1000 MG: 1 SUSPENSION ORAL at 12:58

## 2017-04-04 RX ADMIN — SUCRALFATE 1000 MG: 1 SUSPENSION ORAL at 17:09

## 2017-04-04 RX ADMIN — SUCRALFATE 1000 MG: 1 SUSPENSION ORAL at 05:48

## 2017-04-04 RX ADMIN — MIRTAZAPINE 15 MG: 15 TABLET, FILM COATED ORAL at 21:25

## 2017-04-04 RX ADMIN — LORAZEPAM 0.5 MG: 2 INJECTION, SOLUTION INTRAMUSCULAR; INTRAVENOUS at 18:05

## 2017-04-04 RX ADMIN — PANTOPRAZOLE SODIUM 40 MG: 40 TABLET, DELAYED RELEASE ORAL at 17:09

## 2017-04-04 RX ADMIN — LORAZEPAM 0.5 MG: 2 INJECTION, SOLUTION INTRAMUSCULAR; INTRAVENOUS at 22:57

## 2017-04-04 RX ADMIN — TAMSULOSIN HYDROCHLORIDE 0.4 MG: 0.4 CAPSULE ORAL at 17:09

## 2017-04-04 RX ADMIN — SODIUM CHLORIDE AND POTASSIUM CHLORIDE 75 ML/HR: .9; .15 SOLUTION INTRAVENOUS at 21:33

## 2017-04-04 RX ADMIN — AMLODIPINE BESYLATE 5 MG: 5 TABLET ORAL at 08:28

## 2017-04-04 RX ADMIN — PANTOPRAZOLE SODIUM 40 MG: 40 TABLET, DELAYED RELEASE ORAL at 07:18

## 2017-04-04 RX ADMIN — SODIUM CHLORIDE AND POTASSIUM CHLORIDE 75 ML/HR: .9; .15 SOLUTION INTRAVENOUS at 08:31

## 2017-04-04 RX ADMIN — POTASSIUM CHLORIDE 40 MEQ: 1500 TABLET, EXTENDED RELEASE ORAL at 05:48

## 2017-04-04 RX ADMIN — FLUTICASONE PROPIONATE 1 SPRAY: 50 SPRAY, METERED NASAL at 08:25

## 2017-04-05 ENCOUNTER — APPOINTMENT (INPATIENT)
Dept: OCCUPATIONAL THERAPY | Facility: HOSPITAL | Age: 72
DRG: 683 | End: 2017-04-05
Payer: MEDICARE

## 2017-04-05 ENCOUNTER — GENERIC CONVERSION - ENCOUNTER (OUTPATIENT)
Dept: OTHER | Facility: OTHER | Age: 72
End: 2017-04-05

## 2017-04-05 ENCOUNTER — ANESTHESIA EVENT (INPATIENT)
Dept: GASTROENTEROLOGY | Facility: HOSPITAL | Age: 72
DRG: 683 | End: 2017-04-05
Payer: MEDICARE

## 2017-04-05 ENCOUNTER — APPOINTMENT (INPATIENT)
Dept: PHYSICAL THERAPY | Facility: HOSPITAL | Age: 72
DRG: 683 | End: 2017-04-05
Payer: MEDICARE

## 2017-04-05 ENCOUNTER — ANESTHESIA (INPATIENT)
Dept: GASTROENTEROLOGY | Facility: HOSPITAL | Age: 72
DRG: 683 | End: 2017-04-05
Payer: MEDICARE

## 2017-04-05 LAB
INR PPP: 2.71 (ref 0.86–1.16)
PROTHROMBIN TIME: 27.7 SECONDS (ref 12–14.3)

## 2017-04-05 PROCEDURE — 0DB68ZX EXCISION OF STOMACH, VIA NATURAL OR ARTIFICIAL OPENING ENDOSCOPIC, DIAGNOSTIC: ICD-10-PCS | Performed by: INTERNAL MEDICINE

## 2017-04-05 PROCEDURE — G8987 SELF CARE CURRENT STATUS: HCPCS

## 2017-04-05 PROCEDURE — 88342 IMHCHEM/IMCYTCHM 1ST ANTB: CPT | Performed by: INTERNAL MEDICINE

## 2017-04-05 PROCEDURE — 88305 TISSUE EXAM BY PATHOLOGIST: CPT | Performed by: INTERNAL MEDICINE

## 2017-04-05 PROCEDURE — 85610 PROTHROMBIN TIME: CPT | Performed by: INTERNAL MEDICINE

## 2017-04-05 PROCEDURE — G8988 SELF CARE GOAL STATUS: HCPCS

## 2017-04-05 PROCEDURE — 97167 OT EVAL HIGH COMPLEX 60 MIN: CPT

## 2017-04-05 RX ORDER — PROPOFOL 10 MG/ML
INJECTION, EMULSION INTRAVENOUS AS NEEDED
Status: DISCONTINUED | OUTPATIENT
Start: 2017-04-05 | End: 2017-04-05 | Stop reason: SURG

## 2017-04-05 RX ORDER — SODIUM CHLORIDE, SODIUM LACTATE, POTASSIUM CHLORIDE, CALCIUM CHLORIDE 600; 310; 30; 20 MG/100ML; MG/100ML; MG/100ML; MG/100ML
INJECTION, SOLUTION INTRAVENOUS CONTINUOUS PRN
Status: DISCONTINUED | OUTPATIENT
Start: 2017-04-05 | End: 2017-04-05 | Stop reason: SURG

## 2017-04-05 RX ADMIN — SUCRALFATE 1000 MG: 1 SUSPENSION ORAL at 00:00

## 2017-04-05 RX ADMIN — TAMSULOSIN HYDROCHLORIDE 0.4 MG: 0.4 CAPSULE ORAL at 17:07

## 2017-04-05 RX ADMIN — PROPOFOL 50 MG: 10 INJECTION, EMULSION INTRAVENOUS at 11:47

## 2017-04-05 RX ADMIN — SODIUM CHLORIDE, SODIUM LACTATE, POTASSIUM CHLORIDE, AND CALCIUM CHLORIDE: .6; .31; .03; .02 INJECTION, SOLUTION INTRAVENOUS at 11:44

## 2017-04-05 RX ADMIN — AMLODIPINE BESYLATE 5 MG: 5 TABLET ORAL at 08:14

## 2017-04-05 RX ADMIN — SUCRALFATE 1000 MG: 1 SUSPENSION ORAL at 17:08

## 2017-04-05 RX ADMIN — MIRTAZAPINE 15 MG: 15 TABLET, FILM COATED ORAL at 21:05

## 2017-04-05 RX ADMIN — PROPOFOL 100 MG: 10 INJECTION, EMULSION INTRAVENOUS at 11:46

## 2017-04-05 RX ADMIN — SUCRALFATE 1000 MG: 1 SUSPENSION ORAL at 06:13

## 2017-04-05 RX ADMIN — SODIUM CHLORIDE AND POTASSIUM CHLORIDE 75 ML/HR: .9; .15 SOLUTION INTRAVENOUS at 17:10

## 2017-04-05 RX ADMIN — FLUTICASONE PROPIONATE 1 SPRAY: 50 SPRAY, METERED NASAL at 08:16

## 2017-04-05 RX ADMIN — PANTOPRAZOLE SODIUM 40 MG: 40 TABLET, DELAYED RELEASE ORAL at 17:08

## 2017-04-05 RX ADMIN — PANTOPRAZOLE SODIUM 40 MG: 40 TABLET, DELAYED RELEASE ORAL at 06:13

## 2017-04-06 ENCOUNTER — APPOINTMENT (INPATIENT)
Dept: OCCUPATIONAL THERAPY | Facility: HOSPITAL | Age: 72
DRG: 683 | End: 2017-04-06
Payer: MEDICARE

## 2017-04-06 LAB
INR PPP: 2.16 (ref 0.86–1.16)
PROTHROMBIN TIME: 23.3 SECONDS (ref 12–14.3)

## 2017-04-06 PROCEDURE — G8978 MOBILITY CURRENT STATUS: HCPCS

## 2017-04-06 PROCEDURE — 97163 PT EVAL HIGH COMPLEX 45 MIN: CPT

## 2017-04-06 PROCEDURE — 85610 PROTHROMBIN TIME: CPT | Performed by: INTERNAL MEDICINE

## 2017-04-06 PROCEDURE — G8979 MOBILITY GOAL STATUS: HCPCS

## 2017-04-06 RX ORDER — TEMAZEPAM 15 MG/1
15 CAPSULE ORAL
Status: DISCONTINUED | OUTPATIENT
Start: 2017-04-06 | End: 2017-04-07 | Stop reason: HOSPADM

## 2017-04-06 RX ORDER — WARFARIN SODIUM 5 MG/1
5 TABLET ORAL
Status: DISCONTINUED | OUTPATIENT
Start: 2017-04-06 | End: 2017-04-07 | Stop reason: HOSPADM

## 2017-04-06 RX ADMIN — SUCRALFATE 1000 MG: 1 SUSPENSION ORAL at 08:33

## 2017-04-06 RX ADMIN — PANTOPRAZOLE SODIUM 40 MG: 40 TABLET, DELAYED RELEASE ORAL at 08:33

## 2017-04-06 RX ADMIN — LORAZEPAM 0.5 MG: 2 INJECTION, SOLUTION INTRAMUSCULAR; INTRAVENOUS at 04:07

## 2017-04-06 RX ADMIN — TAMSULOSIN HYDROCHLORIDE 0.4 MG: 0.4 CAPSULE ORAL at 17:27

## 2017-04-06 RX ADMIN — SUCRALFATE 1000 MG: 1 SUSPENSION ORAL at 13:23

## 2017-04-06 RX ADMIN — AMLODIPINE BESYLATE 5 MG: 5 TABLET ORAL at 08:33

## 2017-04-06 RX ADMIN — WARFARIN SODIUM 5 MG: 5 TABLET ORAL at 17:27

## 2017-04-06 RX ADMIN — SUCRALFATE 1000 MG: 1 SUSPENSION ORAL at 17:27

## 2017-04-06 RX ADMIN — PANTOPRAZOLE SODIUM 40 MG: 40 TABLET, DELAYED RELEASE ORAL at 17:27

## 2017-04-06 RX ADMIN — PROMETHAZINE HYDROCHLORIDE 25 MG: 25 INJECTION INTRAMUSCULAR; INTRAVENOUS at 04:07

## 2017-04-06 RX ADMIN — SUCRALFATE 1000 MG: 1 SUSPENSION ORAL at 00:13

## 2017-04-06 RX ADMIN — SODIUM CHLORIDE AND POTASSIUM CHLORIDE 75 ML/HR: .9; .15 SOLUTION INTRAVENOUS at 08:33

## 2017-04-06 RX ADMIN — TEMAZEPAM 15 MG: 15 CAPSULE ORAL at 21:22

## 2017-04-06 RX ADMIN — MIRTAZAPINE 15 MG: 15 TABLET, FILM COATED ORAL at 21:22

## 2017-04-07 VITALS
BODY MASS INDEX: 35.04 KG/M2 | HEIGHT: 65 IN | DIASTOLIC BLOOD PRESSURE: 96 MMHG | RESPIRATION RATE: 15 BRPM | TEMPERATURE: 98.2 F | WEIGHT: 210.32 LBS | SYSTOLIC BLOOD PRESSURE: 153 MMHG | HEART RATE: 74 BPM | OXYGEN SATURATION: 94 %

## 2017-04-07 PROBLEM — E87.20 LACTIC ACIDOSIS: Status: RESOLVED | Noted: 2017-04-01 | Resolved: 2017-04-07

## 2017-04-07 PROBLEM — R11.2 NAUSEA AND VOMITING: Status: RESOLVED | Noted: 2017-04-01 | Resolved: 2017-04-07

## 2017-04-07 PROBLEM — R10.9 ABDOMINAL PAIN: Status: RESOLVED | Noted: 2017-04-01 | Resolved: 2017-04-07

## 2017-04-07 PROBLEM — E87.2 LACTIC ACIDOSIS: Status: RESOLVED | Noted: 2017-04-01 | Resolved: 2017-04-07

## 2017-04-07 LAB
BACTERIA BLD CULT: NORMAL
BACTERIA BLD CULT: NORMAL
INR PPP: 2.18 (ref 0.86–1.16)
PROTHROMBIN TIME: 23.5 SECONDS (ref 12–14.3)

## 2017-04-07 PROCEDURE — 85610 PROTHROMBIN TIME: CPT | Performed by: INTERNAL MEDICINE

## 2017-04-07 PROCEDURE — 97530 THERAPEUTIC ACTIVITIES: CPT

## 2017-04-07 PROCEDURE — 97535 SELF CARE MNGMENT TRAINING: CPT

## 2017-04-07 RX ORDER — MIRTAZAPINE 15 MG/1
15 TABLET, FILM COATED ORAL
Qty: 30 TABLET | Refills: 0 | Status: SHIPPED | OUTPATIENT
Start: 2017-04-07 | End: 2018-04-05 | Stop reason: SDUPTHER

## 2017-04-07 RX ORDER — TAMSULOSIN HYDROCHLORIDE 0.4 MG/1
0.4 CAPSULE ORAL
Qty: 30 CAPSULE | Refills: 0 | Status: ON HOLD | OUTPATIENT
Start: 2017-04-07 | End: 2018-04-09

## 2017-04-07 RX ORDER — TEMAZEPAM 15 MG/1
15 CAPSULE ORAL
Qty: 10 CAPSULE | Refills: 0 | Status: SHIPPED | OUTPATIENT
Start: 2017-04-07 | End: 2018-01-26 | Stop reason: ALTCHOICE

## 2017-04-07 RX ADMIN — AMLODIPINE BESYLATE 5 MG: 5 TABLET ORAL at 08:12

## 2017-04-07 RX ADMIN — SODIUM CHLORIDE AND POTASSIUM CHLORIDE 75 ML/HR: .9; .15 SOLUTION INTRAVENOUS at 09:47

## 2017-04-07 RX ADMIN — PANTOPRAZOLE SODIUM 40 MG: 40 TABLET, DELAYED RELEASE ORAL at 06:04

## 2017-04-07 RX ADMIN — SUCRALFATE 1000 MG: 1 SUSPENSION ORAL at 06:05

## 2017-04-07 RX ADMIN — SUCRALFATE 1000 MG: 1 SUSPENSION ORAL at 13:21

## 2017-04-08 LAB
BACTERIA BLD CULT: NORMAL
BACTERIA BLD CULT: NORMAL

## 2017-04-09 ENCOUNTER — GENERIC CONVERSION - ENCOUNTER (OUTPATIENT)
Dept: OTHER | Facility: OTHER | Age: 72
End: 2017-04-09

## 2017-04-10 ENCOUNTER — GENERIC CONVERSION - ENCOUNTER (OUTPATIENT)
Dept: OTHER | Facility: OTHER | Age: 72
End: 2017-04-10

## 2017-04-14 ENCOUNTER — GENERIC CONVERSION - ENCOUNTER (OUTPATIENT)
Dept: OTHER | Facility: OTHER | Age: 72
End: 2017-04-14

## 2017-04-17 ENCOUNTER — ALLSCRIPTS OFFICE VISIT (OUTPATIENT)
Dept: OTHER | Facility: OTHER | Age: 72
End: 2017-04-17

## 2017-04-20 ENCOUNTER — APPOINTMENT (OUTPATIENT)
Dept: LAB | Facility: CLINIC | Age: 72
End: 2017-04-20
Payer: MEDICARE

## 2017-04-20 DIAGNOSIS — Z79.01 LONG TERM (CURRENT) USE OF ANTICOAGULANTS: ICD-10-CM

## 2017-04-20 LAB
INR PPP: 3.09 (ref 0.86–1.16)
PROTHROMBIN TIME: 31.3 SECONDS (ref 12–14.3)

## 2017-04-20 PROCEDURE — 36415 COLL VENOUS BLD VENIPUNCTURE: CPT

## 2017-04-20 PROCEDURE — 85610 PROTHROMBIN TIME: CPT

## 2017-04-24 ENCOUNTER — APPOINTMENT (OUTPATIENT)
Dept: LAB | Facility: CLINIC | Age: 72
End: 2017-04-24
Payer: MEDICARE

## 2017-04-24 DIAGNOSIS — Z79.01 LONG TERM (CURRENT) USE OF ANTICOAGULANTS: ICD-10-CM

## 2017-04-24 LAB
INR PPP: 3.6 (ref 0.86–1.16)
PROTHROMBIN TIME: 35.1 SECONDS (ref 12–14.3)

## 2017-04-24 PROCEDURE — 36415 COLL VENOUS BLD VENIPUNCTURE: CPT

## 2017-04-24 PROCEDURE — 85610 PROTHROMBIN TIME: CPT

## 2017-04-28 ENCOUNTER — APPOINTMENT (OUTPATIENT)
Dept: LAB | Facility: CLINIC | Age: 72
End: 2017-04-28
Payer: MEDICARE

## 2017-04-28 DIAGNOSIS — Z79.01 LONG TERM CURRENT USE OF ANTICOAGULANT: ICD-10-CM

## 2017-04-28 LAB
INR PPP: 2.6 (ref 0.86–1.16)
PROTHROMBIN TIME: 28.2 SECONDS (ref 12.1–14.4)

## 2017-04-28 PROCEDURE — 85610 PROTHROMBIN TIME: CPT

## 2017-04-28 PROCEDURE — 36415 COLL VENOUS BLD VENIPUNCTURE: CPT

## 2017-05-05 ENCOUNTER — APPOINTMENT (OUTPATIENT)
Dept: LAB | Facility: CLINIC | Age: 72
End: 2017-05-05
Payer: MEDICARE

## 2017-05-05 DIAGNOSIS — I82.409 ACUTE EMBOLISM AND THROMBOSIS OF DEEP VEIN OF LOWER EXTREMITY (HCC): ICD-10-CM

## 2017-05-05 LAB
INR PPP: 2.27 (ref 0.86–1.16)
PROTHROMBIN TIME: 25.3 SECONDS (ref 12.1–14.4)

## 2017-05-05 PROCEDURE — 36415 COLL VENOUS BLD VENIPUNCTURE: CPT

## 2017-05-05 PROCEDURE — 85610 PROTHROMBIN TIME: CPT

## 2017-05-09 ENCOUNTER — ALLSCRIPTS OFFICE VISIT (OUTPATIENT)
Dept: OTHER | Facility: OTHER | Age: 72
End: 2017-05-09

## 2017-05-19 ENCOUNTER — APPOINTMENT (OUTPATIENT)
Dept: LAB | Facility: CLINIC | Age: 72
End: 2017-05-19
Payer: MEDICARE

## 2017-05-19 ENCOUNTER — TRANSCRIBE ORDERS (OUTPATIENT)
Dept: LAB | Facility: CLINIC | Age: 72
End: 2017-05-19

## 2017-05-19 DIAGNOSIS — Z79.01 LONG TERM (CURRENT) USE OF ANTICOAGULANTS: Primary | ICD-10-CM

## 2017-05-19 LAB
INR PPP: 2.3 (ref 0.86–1.16)
PROTHROMBIN TIME: 25.6 SECONDS (ref 12.1–14.4)

## 2017-05-19 PROCEDURE — 36415 COLL VENOUS BLD VENIPUNCTURE: CPT

## 2017-05-19 PROCEDURE — 85610 PROTHROMBIN TIME: CPT

## 2017-05-23 ENCOUNTER — ALLSCRIPTS OFFICE VISIT (OUTPATIENT)
Dept: OTHER | Facility: OTHER | Age: 72
End: 2017-05-23

## 2017-06-16 ENCOUNTER — TRANSCRIBE ORDERS (OUTPATIENT)
Dept: LAB | Facility: CLINIC | Age: 72
End: 2017-06-16

## 2017-06-16 ENCOUNTER — APPOINTMENT (OUTPATIENT)
Dept: LAB | Facility: CLINIC | Age: 72
End: 2017-06-16
Payer: MEDICARE

## 2017-06-16 DIAGNOSIS — Z79.01 LONG TERM (CURRENT) USE OF ANTICOAGULANTS: Primary | ICD-10-CM

## 2017-06-16 DIAGNOSIS — I82.409 ACUTE EMBOLISM AND THROMBOSIS OF DEEP VEIN OF LOWER EXTREMITY (HCC): ICD-10-CM

## 2017-06-16 LAB
INR PPP: 2.07 (ref 0.86–1.16)
PROTHROMBIN TIME: 23.5 SECONDS (ref 12.1–14.4)

## 2017-06-16 PROCEDURE — 85610 PROTHROMBIN TIME: CPT

## 2017-06-16 PROCEDURE — 36415 COLL VENOUS BLD VENIPUNCTURE: CPT

## 2017-06-19 DIAGNOSIS — K50.90 CROHN'S DISEASE WITHOUT COMPLICATION (HCC): ICD-10-CM

## 2017-06-30 ENCOUNTER — APPOINTMENT (OUTPATIENT)
Dept: LAB | Facility: CLINIC | Age: 72
End: 2017-06-30
Payer: MEDICARE

## 2017-06-30 DIAGNOSIS — I82.409 ACUTE EMBOLISM AND THROMBOSIS OF DEEP VEIN OF LOWER EXTREMITY (HCC): ICD-10-CM

## 2017-06-30 DIAGNOSIS — K50.90 CROHN'S DISEASE WITHOUT COMPLICATION (HCC): ICD-10-CM

## 2017-06-30 LAB
ALBUMIN SERPL BCP-MCNC: 3.7 G/DL (ref 3.5–5)
ALP SERPL-CCNC: 76 U/L (ref 46–116)
ALT SERPL W P-5'-P-CCNC: 21 U/L (ref 12–78)
ANION GAP SERPL CALCULATED.3IONS-SCNC: 5 MMOL/L (ref 4–13)
AST SERPL W P-5'-P-CCNC: 19 U/L (ref 5–45)
BASOPHILS # BLD AUTO: 0.02 THOUSANDS/ΜL (ref 0–0.1)
BASOPHILS NFR BLD AUTO: 1 % (ref 0–1)
BILIRUB SERPL-MCNC: 0.96 MG/DL (ref 0.2–1)
BUN SERPL-MCNC: 13 MG/DL (ref 5–25)
CALCIUM SERPL-MCNC: 8.8 MG/DL (ref 8.3–10.1)
CHLORIDE SERPL-SCNC: 109 MMOL/L (ref 100–108)
CO2 SERPL-SCNC: 29 MMOL/L (ref 21–32)
CREAT SERPL-MCNC: 0.87 MG/DL (ref 0.6–1.3)
CRP SERPL QL: <3 MG/L
EOSINOPHIL # BLD AUTO: 0.16 THOUSAND/ΜL (ref 0–0.61)
EOSINOPHIL NFR BLD AUTO: 4 % (ref 0–6)
ERYTHROCYTE [DISTWIDTH] IN BLOOD BY AUTOMATED COUNT: 14 % (ref 11.6–15.1)
ERYTHROCYTE [SEDIMENTATION RATE] IN BLOOD: 6 MM/HOUR (ref 0–10)
GFR SERPL CREATININE-BSD FRML MDRD: >60 ML/MIN/1.73SQ M
GLUCOSE P FAST SERPL-MCNC: 98 MG/DL (ref 65–99)
HCT VFR BLD AUTO: 41 % (ref 36.5–49.3)
HGB BLD-MCNC: 14.3 G/DL (ref 12–17)
INR PPP: 2.02 (ref 0.86–1.16)
LYMPHOCYTES # BLD AUTO: 1.22 THOUSANDS/ΜL (ref 0.6–4.47)
LYMPHOCYTES NFR BLD AUTO: 30 % (ref 14–44)
MCH RBC QN AUTO: 30.6 PG (ref 26.8–34.3)
MCHC RBC AUTO-ENTMCNC: 34.9 G/DL (ref 31.4–37.4)
MCV RBC AUTO: 88 FL (ref 82–98)
MONOCYTES # BLD AUTO: 0.47 THOUSAND/ΜL (ref 0.17–1.22)
MONOCYTES NFR BLD AUTO: 12 % (ref 4–12)
NEUTROPHILS # BLD AUTO: 2.18 THOUSANDS/ΜL (ref 1.85–7.62)
NEUTS SEG NFR BLD AUTO: 53 % (ref 43–75)
NRBC BLD AUTO-RTO: 0 /100 WBCS
PLATELET # BLD AUTO: 163 THOUSANDS/UL (ref 149–390)
PMV BLD AUTO: 11.3 FL (ref 8.9–12.7)
POTASSIUM SERPL-SCNC: 3.8 MMOL/L (ref 3.5–5.3)
PROT SERPL-MCNC: 6.7 G/DL (ref 6.4–8.2)
PROTHROMBIN TIME: 23.1 SECONDS (ref 12.1–14.4)
RBC # BLD AUTO: 4.68 MILLION/UL (ref 3.88–5.62)
SODIUM SERPL-SCNC: 143 MMOL/L (ref 136–145)
WBC # BLD AUTO: 4.05 THOUSAND/UL (ref 4.31–10.16)

## 2017-06-30 PROCEDURE — 85610 PROTHROMBIN TIME: CPT

## 2017-06-30 PROCEDURE — 86140 C-REACTIVE PROTEIN: CPT

## 2017-06-30 PROCEDURE — 85025 COMPLETE CBC W/AUTO DIFF WBC: CPT

## 2017-06-30 PROCEDURE — 80053 COMPREHEN METABOLIC PANEL: CPT

## 2017-06-30 PROCEDURE — 85652 RBC SED RATE AUTOMATED: CPT

## 2017-07-07 ENCOUNTER — GENERIC CONVERSION - ENCOUNTER (OUTPATIENT)
Dept: OTHER | Facility: OTHER | Age: 72
End: 2017-07-07

## 2017-07-21 ENCOUNTER — TRANSCRIBE ORDERS (OUTPATIENT)
Dept: LAB | Facility: CLINIC | Age: 72
End: 2017-07-21

## 2017-07-21 ENCOUNTER — APPOINTMENT (OUTPATIENT)
Dept: LAB | Facility: CLINIC | Age: 72
End: 2017-07-21
Payer: MEDICARE

## 2017-07-21 DIAGNOSIS — O22.30 DEEP PHLEBOTHROMBOSIS, ANTEPARTUM, WITH DELIVERY (HCC): Primary | ICD-10-CM

## 2017-07-21 DIAGNOSIS — I82.409 DEEP PHLEBOTHROMBOSIS, ANTEPARTUM, WITH DELIVERY (HCC): Primary | ICD-10-CM

## 2017-07-21 LAB
INR PPP: 1.96 (ref 0.86–1.16)
PROTHROMBIN TIME: 22.5 SECONDS (ref 12.1–14.4)

## 2017-07-21 PROCEDURE — 36415 COLL VENOUS BLD VENIPUNCTURE: CPT

## 2017-07-21 PROCEDURE — 85610 PROTHROMBIN TIME: CPT

## 2017-07-28 ENCOUNTER — APPOINTMENT (OUTPATIENT)
Dept: LAB | Facility: CLINIC | Age: 72
End: 2017-07-28
Payer: MEDICARE

## 2017-07-28 LAB
INR PPP: 1.71 (ref 0.86–1.16)
PROTHROMBIN TIME: 20.2 SECONDS (ref 12.1–14.4)

## 2017-07-28 PROCEDURE — 85610 PROTHROMBIN TIME: CPT

## 2017-07-28 PROCEDURE — 36415 COLL VENOUS BLD VENIPUNCTURE: CPT

## 2017-07-29 ENCOUNTER — GENERIC CONVERSION - ENCOUNTER (OUTPATIENT)
Dept: OTHER | Facility: OTHER | Age: 72
End: 2017-07-29

## 2017-08-02 ENCOUNTER — TRANSCRIBE ORDERS (OUTPATIENT)
Dept: ADMINISTRATIVE | Facility: HOSPITAL | Age: 72
End: 2017-08-02

## 2017-08-02 ENCOUNTER — ALLSCRIPTS OFFICE VISIT (OUTPATIENT)
Dept: OTHER | Facility: OTHER | Age: 72
End: 2017-08-02

## 2017-08-02 DIAGNOSIS — K86.89 OTHER SPECIFIED DISEASES OF PANCREAS: ICD-10-CM

## 2017-08-02 DIAGNOSIS — K86.89 SUBCUTANEOUS NODULAR FAT NECROSIS IN PANCREATITIS: ICD-10-CM

## 2017-08-02 DIAGNOSIS — I82.90 DEEP VEIN THROMBOSIS (DVT) OF NON-EXTREMITY VEIN, UNSPECIFIED CHRONICITY: ICD-10-CM

## 2017-08-02 DIAGNOSIS — I82.499 DEEP VEIN THROMBOSIS (DVT) OF OTHER VEIN OF LOWER EXTREMITY: Primary | ICD-10-CM

## 2017-08-04 ENCOUNTER — APPOINTMENT (OUTPATIENT)
Dept: LAB | Facility: CLINIC | Age: 72
End: 2017-08-04
Payer: MEDICARE

## 2017-08-04 LAB
INR PPP: 2.41 (ref 0.86–1.16)
PROTHROMBIN TIME: 26.5 SECONDS (ref 12.1–14.4)

## 2017-08-04 PROCEDURE — 85610 PROTHROMBIN TIME: CPT | Performed by: INTERNAL MEDICINE

## 2017-08-04 PROCEDURE — 36415 COLL VENOUS BLD VENIPUNCTURE: CPT | Performed by: INTERNAL MEDICINE

## 2017-08-11 ENCOUNTER — APPOINTMENT (OUTPATIENT)
Dept: LAB | Facility: CLINIC | Age: 72
End: 2017-08-11
Payer: MEDICARE

## 2017-08-11 LAB
INR PPP: 2.53 (ref 0.86–1.16)
PROTHROMBIN TIME: 27.6 SECONDS (ref 12.1–14.4)

## 2017-08-11 PROCEDURE — 85610 PROTHROMBIN TIME: CPT | Performed by: INTERNAL MEDICINE

## 2017-08-11 PROCEDURE — 36415 COLL VENOUS BLD VENIPUNCTURE: CPT | Performed by: INTERNAL MEDICINE

## 2017-08-17 ENCOUNTER — HOSPITAL ENCOUNTER (OUTPATIENT)
Dept: RADIOLOGY | Facility: HOSPITAL | Age: 72
Discharge: HOME | End: 2017-08-17
Attending: INTERNAL MEDICINE
Payer: MEDICARE

## 2017-08-17 DIAGNOSIS — K86.89 SUBCUTANEOUS NODULAR FAT NECROSIS IN PANCREATITIS: ICD-10-CM

## 2017-08-17 PROCEDURE — 74183 MRI ABD W/O CNTR FLWD CNTR: CPT

## 2017-08-17 PROCEDURE — 76376 3D RENDER W/INTRP POSTPROCES: CPT

## 2017-08-17 PROCEDURE — A9585 GADOBUTROL INJECTION: HCPCS | Performed by: INTERNAL MEDICINE

## 2017-08-17 RX ADMIN — GADOBUTROL 7 ML: 604.72 INJECTION INTRAVENOUS at 20:40

## 2017-08-25 ENCOUNTER — TRANSCRIBE ORDERS (OUTPATIENT)
Dept: LAB | Facility: CLINIC | Age: 72
End: 2017-08-25

## 2017-08-25 ENCOUNTER — APPOINTMENT (OUTPATIENT)
Dept: LAB | Facility: CLINIC | Age: 72
End: 2017-08-25
Payer: MEDICARE

## 2017-08-25 DIAGNOSIS — O22.30 DEEP PHLEBOTHROMBOSIS, ANTEPARTUM, WITH DELIVERY (HCC): ICD-10-CM

## 2017-08-25 DIAGNOSIS — O22.30 DEEP PHLEBOTHROMBOSIS, ANTEPARTUM, WITH DELIVERY (HCC): Primary | ICD-10-CM

## 2017-08-25 DIAGNOSIS — I82.409 DEEP PHLEBOTHROMBOSIS, ANTEPARTUM, WITH DELIVERY (HCC): Primary | ICD-10-CM

## 2017-08-25 DIAGNOSIS — I82.409 DEEP PHLEBOTHROMBOSIS, ANTEPARTUM, WITH DELIVERY (HCC): ICD-10-CM

## 2017-08-25 LAB
INR PPP: 1.85 (ref 0.86–1.16)
PROTHROMBIN TIME: 21.5 SECONDS (ref 12.1–14.4)

## 2017-08-25 PROCEDURE — 85610 PROTHROMBIN TIME: CPT

## 2017-08-25 PROCEDURE — 36415 COLL VENOUS BLD VENIPUNCTURE: CPT

## 2017-08-31 ENCOUNTER — GENERIC CONVERSION - ENCOUNTER (OUTPATIENT)
Dept: OTHER | Facility: OTHER | Age: 72
End: 2017-08-31

## 2017-09-01 ENCOUNTER — APPOINTMENT (OUTPATIENT)
Dept: LAB | Facility: CLINIC | Age: 72
End: 2017-09-01
Payer: MEDICARE

## 2017-09-01 DIAGNOSIS — I82.409 DEEP PHLEBOTHROMBOSIS, ANTEPARTUM, WITH DELIVERY (HCC): ICD-10-CM

## 2017-09-01 DIAGNOSIS — O22.30 DEEP PHLEBOTHROMBOSIS, ANTEPARTUM, WITH DELIVERY (HCC): ICD-10-CM

## 2017-09-01 LAB
INR PPP: 2.27 (ref 0.86–1.16)
PROTHROMBIN TIME: 25.3 SECONDS (ref 12.1–14.4)

## 2017-09-01 PROCEDURE — 85610 PROTHROMBIN TIME: CPT

## 2017-09-01 PROCEDURE — 36415 COLL VENOUS BLD VENIPUNCTURE: CPT

## 2017-09-08 ENCOUNTER — APPOINTMENT (OUTPATIENT)
Dept: LAB | Facility: CLINIC | Age: 72
End: 2017-09-08
Payer: MEDICARE

## 2017-09-08 DIAGNOSIS — I82.409 DEEP PHLEBOTHROMBOSIS, ANTEPARTUM, WITH DELIVERY (HCC): ICD-10-CM

## 2017-09-08 DIAGNOSIS — O22.30 DEEP PHLEBOTHROMBOSIS, ANTEPARTUM, WITH DELIVERY (HCC): ICD-10-CM

## 2017-09-08 LAB
INR PPP: 1.95 (ref 0.86–1.16)
PROTHROMBIN TIME: 22.9 SECONDS (ref 12.1–14.4)

## 2017-09-08 PROCEDURE — 36415 COLL VENOUS BLD VENIPUNCTURE: CPT

## 2017-09-08 PROCEDURE — 85610 PROTHROMBIN TIME: CPT

## 2017-09-12 ENCOUNTER — APPOINTMENT (OUTPATIENT)
Dept: LAB | Facility: CLINIC | Age: 72
End: 2017-09-12
Payer: MEDICARE

## 2017-09-12 DIAGNOSIS — I82.409 ACUTE EMBOLISM AND THROMBOSIS OF DEEP VEIN OF LOWER EXTREMITY (HCC): ICD-10-CM

## 2017-09-12 DIAGNOSIS — O22.30 DEEP PHLEBOTHROMBOSIS, ANTEPARTUM, WITH DELIVERY (HCC): ICD-10-CM

## 2017-09-12 DIAGNOSIS — I82.409 DEEP PHLEBOTHROMBOSIS, ANTEPARTUM, WITH DELIVERY (HCC): ICD-10-CM

## 2017-09-12 LAB
INR PPP: 2.15 (ref 0.86–1.16)
PROTHROMBIN TIME: 24.2 SECONDS (ref 12.1–14.4)

## 2017-09-12 PROCEDURE — 85610 PROTHROMBIN TIME: CPT

## 2017-09-12 PROCEDURE — 36415 COLL VENOUS BLD VENIPUNCTURE: CPT

## 2017-09-18 ENCOUNTER — APPOINTMENT (OUTPATIENT)
Dept: LAB | Facility: CLINIC | Age: 72
End: 2017-09-18
Payer: MEDICARE

## 2017-09-18 DIAGNOSIS — I82.409 DEEP PHLEBOTHROMBOSIS, ANTEPARTUM, WITH DELIVERY (HCC): ICD-10-CM

## 2017-09-18 DIAGNOSIS — O22.30 DEEP PHLEBOTHROMBOSIS, ANTEPARTUM, WITH DELIVERY (HCC): ICD-10-CM

## 2017-09-18 LAB
INR PPP: 2.57 (ref 0.86–1.16)
PROTHROMBIN TIME: 27.9 SECONDS (ref 12.1–14.4)

## 2017-09-18 PROCEDURE — 85610 PROTHROMBIN TIME: CPT

## 2017-09-18 PROCEDURE — 36415 COLL VENOUS BLD VENIPUNCTURE: CPT

## 2017-10-02 ENCOUNTER — APPOINTMENT (OUTPATIENT)
Dept: LAB | Facility: CLINIC | Age: 72
End: 2017-10-02
Payer: MEDICARE

## 2017-10-02 ENCOUNTER — TRANSCRIBE ORDERS (OUTPATIENT)
Dept: LAB | Facility: CLINIC | Age: 72
End: 2017-10-02

## 2017-10-02 DIAGNOSIS — I82.409 DEEP PHLEBOTHROMBOSIS, ANTEPARTUM, WITH DELIVERY (HCC): ICD-10-CM

## 2017-10-02 DIAGNOSIS — O22.30 DEEP PHLEBOTHROMBOSIS, ANTEPARTUM, WITH DELIVERY (HCC): ICD-10-CM

## 2017-10-02 LAB
INR PPP: 2.41 (ref 0.86–1.16)
PROTHROMBIN TIME: 27.1 SECONDS (ref 12.1–14.4)

## 2017-10-02 PROCEDURE — 85610 PROTHROMBIN TIME: CPT

## 2017-10-02 PROCEDURE — 36415 COLL VENOUS BLD VENIPUNCTURE: CPT

## 2017-10-13 ENCOUNTER — APPOINTMENT (OUTPATIENT)
Dept: LAB | Facility: CLINIC | Age: 72
End: 2017-10-13
Payer: MEDICARE

## 2017-10-13 DIAGNOSIS — O22.30 DEEP PHLEBOTHROMBOSIS, ANTEPARTUM, WITH DELIVERY (HCC): ICD-10-CM

## 2017-10-13 DIAGNOSIS — I82.409 DEEP PHLEBOTHROMBOSIS, ANTEPARTUM, WITH DELIVERY (HCC): ICD-10-CM

## 2017-10-13 LAB
INR PPP: 3.08 (ref 0.86–1.16)
PROTHROMBIN TIME: 33 SECONDS (ref 12.1–14.4)

## 2017-10-13 PROCEDURE — 36415 COLL VENOUS BLD VENIPUNCTURE: CPT

## 2017-10-13 PROCEDURE — 85610 PROTHROMBIN TIME: CPT

## 2017-10-20 ENCOUNTER — APPOINTMENT (OUTPATIENT)
Dept: LAB | Facility: CLINIC | Age: 72
End: 2017-10-20
Payer: MEDICARE

## 2017-10-20 DIAGNOSIS — I82.409 ACUTE EMBOLISM AND THROMBOSIS OF DEEP VEIN OF LOWER EXTREMITY (HCC): ICD-10-CM

## 2017-10-20 LAB
INR PPP: 3.43 (ref 0.86–1.16)
PROTHROMBIN TIME: 35.1 SECONDS (ref 12.1–14.4)

## 2017-10-20 PROCEDURE — 85610 PROTHROMBIN TIME: CPT

## 2017-10-20 PROCEDURE — 36415 COLL VENOUS BLD VENIPUNCTURE: CPT

## 2017-10-24 ENCOUNTER — APPOINTMENT (OUTPATIENT)
Dept: LAB | Facility: CLINIC | Age: 72
End: 2017-10-24
Payer: MEDICARE

## 2017-10-24 DIAGNOSIS — O22.30 DEEP PHLEBOTHROMBOSIS, ANTEPARTUM, WITH DELIVERY (HCC): ICD-10-CM

## 2017-10-24 DIAGNOSIS — I82.409 DEEP PHLEBOTHROMBOSIS, ANTEPARTUM, WITH DELIVERY (HCC): ICD-10-CM

## 2017-10-24 LAB
INR PPP: 2 (ref 0.86–1.16)
PROTHROMBIN TIME: 22.9 SECONDS (ref 12.1–14.4)

## 2017-10-24 PROCEDURE — 85610 PROTHROMBIN TIME: CPT

## 2017-10-24 PROCEDURE — 36415 COLL VENOUS BLD VENIPUNCTURE: CPT

## 2017-10-30 ENCOUNTER — APPOINTMENT (OUTPATIENT)
Dept: LAB | Facility: CLINIC | Age: 72
End: 2017-10-30
Payer: MEDICARE

## 2017-10-30 DIAGNOSIS — I82.409 DEEP PHLEBOTHROMBOSIS, ANTEPARTUM, WITH DELIVERY (HCC): ICD-10-CM

## 2017-10-30 DIAGNOSIS — O22.30 DEEP PHLEBOTHROMBOSIS, ANTEPARTUM, WITH DELIVERY (HCC): ICD-10-CM

## 2017-10-30 LAB
INR PPP: 1.78 (ref 0.86–1.16)
PROTHROMBIN TIME: 20.9 SECONDS (ref 12.1–14.4)

## 2017-10-30 PROCEDURE — 36415 COLL VENOUS BLD VENIPUNCTURE: CPT

## 2017-10-30 PROCEDURE — 85610 PROTHROMBIN TIME: CPT

## 2017-11-07 ENCOUNTER — APPOINTMENT (OUTPATIENT)
Dept: LAB | Facility: CLINIC | Age: 72
End: 2017-11-07
Payer: MEDICARE

## 2017-11-07 DIAGNOSIS — O22.30 DEEP PHLEBOTHROMBOSIS, ANTEPARTUM, WITH DELIVERY (HCC): ICD-10-CM

## 2017-11-07 DIAGNOSIS — I82.409 DEEP PHLEBOTHROMBOSIS, ANTEPARTUM, WITH DELIVERY (HCC): ICD-10-CM

## 2017-11-07 LAB
INR PPP: 1.74 (ref 0.86–1.16)
PROTHROMBIN TIME: 20.5 SECONDS (ref 12.1–14.4)

## 2017-11-07 PROCEDURE — 36415 COLL VENOUS BLD VENIPUNCTURE: CPT

## 2017-11-07 PROCEDURE — 85610 PROTHROMBIN TIME: CPT

## 2017-11-13 ENCOUNTER — APPOINTMENT (OUTPATIENT)
Dept: LAB | Facility: CLINIC | Age: 72
End: 2017-11-13
Payer: MEDICARE

## 2017-11-13 DIAGNOSIS — O22.30 DEEP PHLEBOTHROMBOSIS, ANTEPARTUM, WITH DELIVERY (HCC): ICD-10-CM

## 2017-11-13 DIAGNOSIS — I82.409 DEEP PHLEBOTHROMBOSIS, ANTEPARTUM, WITH DELIVERY (HCC): ICD-10-CM

## 2017-11-13 LAB
INR PPP: 2.6 (ref 0.86–1.16)
PROTHROMBIN TIME: 28.2 SECONDS (ref 12.1–14.4)

## 2017-11-13 PROCEDURE — 36415 COLL VENOUS BLD VENIPUNCTURE: CPT

## 2017-11-13 PROCEDURE — 85610 PROTHROMBIN TIME: CPT

## 2017-11-21 ENCOUNTER — APPOINTMENT (OUTPATIENT)
Dept: LAB | Facility: CLINIC | Age: 72
End: 2017-11-21
Payer: MEDICARE

## 2017-11-21 DIAGNOSIS — I10 ESSENTIAL (PRIMARY) HYPERTENSION: ICD-10-CM

## 2017-11-21 DIAGNOSIS — O22.30 DEEP PHLEBOTHROMBOSIS, ANTEPARTUM, WITH DELIVERY (HCC): ICD-10-CM

## 2017-11-21 DIAGNOSIS — I82.409 DEEP PHLEBOTHROMBOSIS, ANTEPARTUM, WITH DELIVERY (HCC): ICD-10-CM

## 2017-11-21 LAB
ALBUMIN SERPL BCP-MCNC: 3.6 G/DL (ref 3.5–5)
ALP SERPL-CCNC: 81 U/L (ref 46–116)
ALT SERPL W P-5'-P-CCNC: 27 U/L (ref 12–78)
ANION GAP SERPL CALCULATED.3IONS-SCNC: 6 MMOL/L (ref 4–13)
AST SERPL W P-5'-P-CCNC: 21 U/L (ref 5–45)
BILIRUB SERPL-MCNC: 0.78 MG/DL (ref 0.2–1)
BUN SERPL-MCNC: 12 MG/DL (ref 5–25)
CALCIUM SERPL-MCNC: 8.6 MG/DL (ref 8.3–10.1)
CHLORIDE SERPL-SCNC: 105 MMOL/L (ref 100–108)
CHOLEST SERPL-MCNC: 158 MG/DL (ref 50–200)
CO2 SERPL-SCNC: 30 MMOL/L (ref 21–32)
CREAT SERPL-MCNC: 0.78 MG/DL (ref 0.6–1.3)
GFR SERPL CREATININE-BSD FRML MDRD: 91 ML/MIN/1.73SQ M
GLUCOSE P FAST SERPL-MCNC: 86 MG/DL (ref 65–99)
HDLC SERPL-MCNC: 51 MG/DL (ref 40–60)
INR PPP: 2.41 (ref 0.86–1.16)
LDLC SERPL CALC-MCNC: 79 MG/DL (ref 0–100)
POTASSIUM SERPL-SCNC: 4.2 MMOL/L (ref 3.5–5.3)
PROT SERPL-MCNC: 7 G/DL (ref 6.4–8.2)
PROTHROMBIN TIME: 26.5 SECONDS (ref 12.1–14.4)
PSA SERPL-MCNC: 1.3 NG/ML (ref 0–4)
SODIUM SERPL-SCNC: 141 MMOL/L (ref 136–145)
TRIGL SERPL-MCNC: 138 MG/DL

## 2017-11-21 PROCEDURE — G0103 PSA SCREENING: HCPCS

## 2017-11-21 PROCEDURE — 85610 PROTHROMBIN TIME: CPT

## 2017-11-21 PROCEDURE — 80061 LIPID PANEL: CPT

## 2017-11-21 PROCEDURE — 80053 COMPREHEN METABOLIC PANEL: CPT

## 2017-11-21 PROCEDURE — 36415 COLL VENOUS BLD VENIPUNCTURE: CPT

## 2017-11-23 DIAGNOSIS — I10 ESSENTIAL (PRIMARY) HYPERTENSION: ICD-10-CM

## 2017-11-27 ENCOUNTER — ALLSCRIPTS OFFICE VISIT (OUTPATIENT)
Dept: OTHER | Facility: OTHER | Age: 72
End: 2017-11-27

## 2017-11-28 NOTE — PROGRESS NOTES
Assessment    1  Medicare annual wellness visit, subsequent (V70 0) (Z00 00)   2  Benign essential hypertension (401 1) (I10)   3  DVT (deep venous thrombosis) (453 40) (I82 409)   4  Hyperlipidemia (272 4) (E78 5)   5  Anxiety (300 00) (F41 9)   6  Depression (311) (F32 9)   7  Atypical mole (216 9) (D22 9)   8  History of Decreased hearing (389 9) (H91 90)   9  Decreased hearing (389 9) (H91 90)  Assessment and plan 1  Benign essential hypertension continue with the amlodipine 5 mg once a day I have counselled the pt to follow a healthy and balanced diet ,and recommend routine exercise  Will continue monitor the progress 2  DVT currently stable continue with Coumadin which is currently stable INR goal 2-3  3  Hyperlipidemia controlled continue with current medical regiment recommend a low-cholesterol diet and recommend routine exercise we will continue to monitor the progress  4   Anxiety/depression no suicidal ideation  he declines counseling he will continue with the Remeron and exercise 5  Atypical mole will have the patient will have the patient see Plastic surgery return to office 6  months  call if any problems   Plan  Advance directive discussed with patient    · We recommend that you create an advance directive ; Status:Complete;   Done: [id number]  Atypical mole    · 1 - Traci JASSO, [facility]  *  Status: Active  Requestedfor: 04FOD6212  Care Summary provided  : Yes  Benign essential hypertension    · AmLODIPine Besylate 5 MG Oral Tablet; TAKE 1 TABLET DAILY  Chronic lower back pain    · Gabapentin 100 MG Oral Capsule (Neurontin); TAKE 1 CAPSULE AT BEDTIME  Decreased hearing    · 1 Mannie Seals MD, [facility]  *  Status: Active  Requested for:97Oyl1400  Care Summary provided  : Yes  Care Summary provided  : Yes   · 1 Mannie Seals MD, [facility]  *  Status: Active  Requested for:27Nov2017  Care Summary provided   Melissa Boyd Yes  Care Summary provided  : Yes  Depression    · Mirtazapine 15 MG Oral Tablet; TAKE 1 TABLET AT BEDTIME  Hyperlipidemia    · (1) COMPREHENSIVE METABOLIC PANEL; Status:Active; Requested for:85Rlm8277;    · (1) LIPID PANEL, FASTING; Status:Active; Requested for:13Bro1393;     Chief Complaint  Chief Complaint Chronic Condition [place] Lacks: Patient is here today for follow up of chronic conditions described in HPI  History of Present Illness  HPI: [age]-year old male coming in for a follow up visit regarding anxiety, depression, DVT, hyperlipidemia, hypertriglyceridemia and hypertension; The patient reports me compliant taking medications without untoward side effects the  The patient is here to review his medical condition, update me on the medical condition and the patient reports me no hospitalizations and no ER visits  Review of laboratories  The patient does reports me symptoms of anxiety and depression no suicidal ideation patient declines counseling at this point time he does feel the Remeron is somewhat helpful  He is happy his INR is now stabilized he is exercising routinely which is helpful for the depression he exercises twice per day he reports me that his diet still needs improvement he is here to review his laboratories and also his annual wellness examination  The patient does report me a skin lesion that he continues to feel off and it continues to grow back  The patient also reports me decreased hearing      Review of Systems  Complete-Male:  Constitutional: No fever or chills, feels well, no tiredness, no recent weight gain or weight loss  Cardiovascular: No complaints of slow heart rate, no fast heart rate, no chest pain, no palpitations, no leg claudication, no lower extremity  Respiratory: No complaints of shortness of breath, no wheezing, no cough, no SOB on exertion, no orthopnea or PND    Gastrointestinal: No complaints of abdominal pain, no constipation, no nausea or vomiting, no diarrhea or bloody stools  Genitourinary: No complaints of dysuria, no incontinence, no hesitancy, no nocturia, no genital lesion, no testicular pain  ROS Reviewed:   ROS reviewed  Active Problems    1  Abdominal pain (789 00) (R10 9)   2  Acute gastritis (535 00) (K29 00)   3  Allergic rhinitis (477 9) (J30 9)   4  Anticoagulant long-term use (V58 61) (Z79 01)   5  Anxiety (300 00) (F41 9)   6  Atypical chest pain (786 59) (R07 89)   7  Benign essential hypertension (401 1) (I10)   8  Chest wall pain (786 52) (R07 89)   9  Chronic cough (786 2) (R05)   10  Chronic lower back pain (724 2,338 29) (M54 5,G89 29)   11  Crohn disease (555 9) (K50 90)   12  Depression (311) (F32 9)   13  Dilated pancreatic duct (577 8) (K86 89)   14  DVT (deep venous thrombosis) (453 40) (I82 409)   15  History of DVT, lower extremity, 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)   16  Encounter for screening colonoscopy (V76 51) (Z12 11)   17  Hyperlipidemia (272 4) (E78 5)   18  Hypertriglyceridemia (272 1) (E78 1)   19  Insomnia (780 52) (G47 00)   20  Intervertebral disc disorders with radiculopathy, lumbar region (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 4) (M51 16)   21  Joint pain, knee (719 46) (M25 569)   22  Left shoulder pain (719 41) (M25 512)   23  Long term use of drug (V58 69) (Z79 899)   24  Need for prophylactic vaccination against Streptococcus pneumoniae (pneumococcus) (V03 82)  (Z23)   25  Need for prophylactic vaccination and inoculation against influenza (V04 81) (Z23)   26  Nocturia (788 43) (R35 1)   27  On warfarin therapy (V58 61) (Z79 01)   28  Primary localized osteoarthritis of right knee (715 16) (M17 11)   29  Right cataract (366 9) (H26 9)   30  Right knee pain (719 46) (M25 561)   31  Right low back pain (724 2) (M54 5)   32  Sacroiliitis (720 2) (M46 1)   33  Screening for genitourinary condition (V81 6) (Z13 89)   34  Special screening examination for neoplasm of prostate (V76 44) (Z12 5)   35  Subclinical hypothyroidism (244 8) (E03 9)   36  History of Ulcerative colitis without complications (638 9) (G20 51)    Past Medical History  1  History of Anxiety (300 00) (F41 9)   2  History of Bleeding disorder (287 9) (D68 9)   3  History of Decreased hearing (389 9) (H91 90)   4  History of DVT, lower extremity, 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)   5  History of arthritis (V13 4) (Z87 39)   6  History of Neck nodule (784 2) (R22 1)   7  Need for prophylactic vaccination and inoculation against influenza (V04 81) (Z23)   8  History of Ulcerative colitis without complications (632 0) (Z19 42)  Active Problems And Past Medical History Reviewed: The active problems and past medical history were reviewed and updated today  Surgical History  1  History of Partial Colectomy With Colostomy   2  History of Venous Ligation With Stripping Saphenous Vein  Surgical History Reviewed: The surgical history was reviewed and updated today  Family History  Brother    1  Family history of myocardial infarction (V17 3) (Z82 49)  Unknown    2  Family history of Back disorder  Family History    3  Family history of cardiac disorder (V17 49) (Z82 49)   4  Family history of hypertension (V17 49) (Z82 49)  Family History Reviewed: The family history was reviewed and updated today  Social History     · Denied: History of Drug Use   · Former smoker (V15 82) (S19 408)   · No alcohol use  Social History Reviewed: The social history was reviewed and updated today  The social history was reviewed and is unchanged  Current Meds   1  AmLODIPine Besylate 5 MG Oral Tablet; TAKE 1 TABLET DAILY; Therapy: 91BDT1879 to (Khari Longoria)  Requested for: 09DIW2567; Last Rx:09Mar2017 Ordered   2  Claritin 10 MG Oral Capsule; Therapy: (Recorded:86Xcb2143) to Recorded   3  Fluticasone Propionate 50 MCG/ACT Nasal Suspension; Use 1 to 2 sprays to both nostrils daily during allergy season  Avoid spraying the nasal septum;  Therapy: 60ZBT9468 to (Evaluate:88Gzg4485)  Requested for: 38VIM2784 Recorded   4  Gabapentin 100 MG Oral Capsule; TAKE 1 CAPSULE AT BEDTIME; Therapy: 85Mia4255 to (Evaluate:31Dec2017)  Requested for: 13ZVN1312; Last Rx:02Oct2017 Ordered   5  Mirtazapine 15 MG Oral Tablet; TAKE 1 TABLET AT BEDTIME; Therapy: 15Lhl3975 to (Evaluate:02Jan2018)  Requested for: 65GGF8528; Last Rx:04Oct2017 Ordered   6  Multi For Him 50+ Oral Tablet; TAKE 1 TABLET DAILY; Therapy: 47Aun2987 to Recorded   7  Omeprazole 20 MG Oral Capsule Delayed Release; TAKE 1 CAPSULE DAILY EVERY MORNING BEFORE BREAKFAST; Therapy: 17Qaa8433 to (Evaluate:31Oct2017)  Requested for: 90Gde6854; Last Rx:02Aug2017 Ordered   8  Warfarin Sodium 2 MG Oral Tablet; TAKE ONE TABLET BY MOUTH ONCE DAILY AS DIRECTED; Therapy: 21Feb2012 to (Soledad Joshi)  Requested for: 73XZD8764; Last Rx:09Mar2017 Ordered  Medication List Reviewed: The medication list was reviewed and updated today  Allergies  1  Cymbalta CPEP  2  Seasonal    Vitals  Vital Signs    Recorded: 64MQB6504 01:08PM   Heart Rate 68   Respiration 16   Systolic 106, LUE, Sitting   Diastolic 80, LUE, Sitting   Height 5 ft 5 in   Weight 165 lb 0 4 oz   BMI Calculated 27 46   BSA Calculated 1 82   O2 Saturation 96       Physical Exam  Scab like lesion  Constitutional  General appearance: No acute distress, well appearing and well nourished  Eyes  Conjunctiva and lids: No swelling, erythema, or discharge  Pupils and irises: Equal, round and reactive to light  Ears, Nose, Mouth, and Throat  External inspection of ears and nose: Normal    Otoscopic examination: Tympanic membrance translucent with normal light reflex  Canals patent without erythema  Nasal mucosa, septum, and turbinates: Normal without edema or erythema  Oropharynx: Normal with no erythema, edema, exudate or lesions  Pulmonary  Respiratory effort: No increased work of breathing or signs of respiratory distress     Auscultation of lungs: Clear to auscultation, equal breath sounds bilaterally, no wheezes, no rales, no rhonci  Cardiovascular  Auscultation of heart: Normal rate and rhythm, normal S1 and S2, without murmurs  Abdomen  Abdomen: Non-tender, no masses  Liver and spleen: No hepatomegaly or splenomegaly  Psychiatric  Mood and affect: Abnormal   Mood and Affect: anxious-- and-- irritable  Results/Data  Falls Risk Assessment (Dx Z13 89 Screen for Neurologic Disorder) 26IXU7937 01:13PM User, [employer] [facility] [employer]     Test Name Result Flag Reference   Falls Risk      No falls in the past year     PHQ-9 Adult Depression Screening 27Nov2017 01:13PM User, [employer] [facility] [employer]     Test Name Result Flag Reference   PHQ-9 Adult Depression Score 0       Over the last two weeks, how often have you been bothered by any of the following problems? Little interest or pleasure in doing things: Not at all - 0 Feeling down, depressed, or hopeless: Not at all - 0 Trouble falling or staying asleep, or sleeping too much: Not at all - 0 Feeling tired or having little energy: Not at all - 0 Poor appetite or over eating: Not at all - 0 Feeling bad about yourself - or that you are a failure or have let yourself or your family down: Not at all - 0 Trouble concentrating on things, such as reading the newspaper or watching television: Not at all - 0 Moving or speaking so slowly that other people could have noticed   Or the opposite -  being so fidgety or restless that you have been moving around a lot more than usual: Not at all - 0 Thoughts that you would be better off dead, or of hurting yourself in some way: Not at all - 0   PHQ-9 Adult Depression Screening Negative     PHQ-9 Difficulty Level Not difficult at all     PHQ-9 Severity No Depression       (1) COMPREHENSIVE METABOLIC PANEL [id number] 69:78RC [place] Order Number: [id number]     Test Name Result Flag Reference   SODIUM 141 mmol/L  136-145   POTASSIUM 4 2 mmol/L  3 5-5 3   CHLORIDE 105 mmol/L  100-108   CARBON DIOXIDE 30 mmol/L  21-32   ANION GAP (CALC) 6 mmol/L  4-13   BLOOD UREA NITROGEN 12 mg/dL  5-25   CREATININE 0 78 mg/dL  0 60-1 30   Standardized to IDMS reference method   CALCIUM 8 6 mg/dL  8 3-10 1   BILI, TOTAL 0 78 mg/dL  0 20-1 00   ALK PHOSPHATAS 81 U/L     ALT (SGPT) 27 U/L  12-78   Specimen collection should occur prior to Sulfasalazine and/or Sulfapyridine administration due to the potential for falsely depressed results  AST(SGOT) 21 U/L  5-45   Specimen collection should occur prior to Sulfasalazine administration due to the potential for falsely depressed results  ALBUMIN 3 6 g/dL  3 5-5 0   TOTAL PROTEIN 7 0 g/dL  6 4-8 2   eGFR 91 ml/min/1 73sq m       National Kidney Disease Education Program recommendations are as follows: GFR calculation is accurate only with a steady state creatinine Chronic Kidney disease less than 60 ml/min/1 73 sq  meters Kidney failure less than 15 ml/min/1 73 sq  meters  GLUCOSE FASTING 86 mg/dL  65-99   Specimen collection should occur prior to Sulfasalazine administration due to the potential for falsely depressed results  Specimen collection should occur prior to Sulfapyridine administration due to the potential for falsely elevated results  (1) LIPID PANEL, FASTING 21Nov2017 07:08AM [facility] Order Number: [id number]     Test Name Result Flag Reference   CHOLESTEROL 158 mg/dL     HDL,DIRECT 51 mg/dL  40-60   Specimen collection should occur prior to Metamizole administration due to the potential for falsley depressed results  LDL CHOLESTEROL CALCULATED 79 mg/dL  0-100     Triglyceride:       Normal <150 mg/dl  Borderline High 150-199 mg/dl  High 200-499 mg/dl  Very High >499 mg/dl   Cholesterol:      Desirable <200 mg/dl   Borderline High 200-239 mg/dl   High >239 mg/dl   HDL Cholesterol:      High>59 mg/dL   Low <41 mg/dL   This screening LDL is a calculated result    It does not have the accuracy of the Direct Measured LDL in the monitoring of patients with hyperlipidemia and/or statin therapy  Direct Measure LDL (TGI659) must be ordered separately in these patients  TRIGLYCERIDES 138 mg/dL  <=150   Specimen collection should occur prior to N-Acetylcysteine or Metamizole administration due to the potential for falsely depressed results  (1) PSA (SCREEN) (Dx V76 44 Screen for Prostate Cancer) 21Nov2017 07:08AM Hermes Alisha Order Number: [id number]     Test Name Result Flag Reference   PROSTATE SPECIFIC ANTIGEN 1 3 ng/mL  0 0-4 0   American Urological Association Guidelines define biochemical recurrence of prostate cancer as a detectable or rising PSA value post-radical prostatectomy that is greater than or equal to 0 2 ng/mL with a second confirmatory level of greater than or equal to 0 2 ng/mL       Future Appointments    Date/Time Provider Specialty Site   06/07/2018 01:00 PM Shawn Asencio DO [facility] [address] [place] [address]       Signatures   Electronically signed by : Sandy Vazquez DO; Nov 27 2017  8:49PM EST                       (Author)

## 2017-12-05 ENCOUNTER — APPOINTMENT (OUTPATIENT)
Dept: LAB | Facility: CLINIC | Age: 72
End: 2017-12-05
Payer: MEDICARE

## 2017-12-05 ENCOUNTER — TRANSCRIBE ORDERS (OUTPATIENT)
Dept: LAB | Facility: CLINIC | Age: 72
End: 2017-12-05

## 2017-12-05 ENCOUNTER — LAB CONVERSION - ENCOUNTER (OUTPATIENT)
Dept: OTHER | Facility: OTHER | Age: 72
End: 2017-12-05

## 2017-12-05 DIAGNOSIS — O22.30 DEEP PHLEBOTHROMBOSIS, ANTEPARTUM, WITH DELIVERY (HCC): ICD-10-CM

## 2017-12-05 DIAGNOSIS — I82.409 DEEP PHLEBOTHROMBOSIS, ANTEPARTUM, WITH DELIVERY (HCC): ICD-10-CM

## 2017-12-05 LAB
INR PPP: 2.72 (ref 0.86–1.16)
PROTHROMBIN TIME: 29.9 SECONDS (ref 12.1–14.4)

## 2017-12-05 PROCEDURE — 36415 COLL VENOUS BLD VENIPUNCTURE: CPT

## 2017-12-05 PROCEDURE — 85610 PROTHROMBIN TIME: CPT

## 2017-12-19 ENCOUNTER — APPOINTMENT (OUTPATIENT)
Dept: LAB | Facility: CLINIC | Age: 72
End: 2017-12-19
Payer: MEDICARE

## 2017-12-19 ENCOUNTER — LAB CONVERSION - ENCOUNTER (OUTPATIENT)
Dept: OTHER | Facility: OTHER | Age: 72
End: 2017-12-19

## 2017-12-19 DIAGNOSIS — I82.409 DEEP PHLEBOTHROMBOSIS, ANTEPARTUM, WITH DELIVERY (HCC): ICD-10-CM

## 2017-12-19 DIAGNOSIS — O22.30 DEEP PHLEBOTHROMBOSIS, ANTEPARTUM, WITH DELIVERY (HCC): ICD-10-CM

## 2017-12-19 LAB
INR PPP: 2.33 (ref 0.86–1.16)
PROTHROMBIN TIME: 25.8 SECONDS (ref 12.1–14.4)

## 2017-12-19 PROCEDURE — 36415 COLL VENOUS BLD VENIPUNCTURE: CPT

## 2017-12-19 PROCEDURE — 85610 PROTHROMBIN TIME: CPT

## 2017-12-26 ENCOUNTER — GENERIC CONVERSION - ENCOUNTER (OUTPATIENT)
Dept: OTHER | Facility: OTHER | Age: 72
End: 2017-12-26

## 2017-12-28 ENCOUNTER — TRANSCRIBE ORDERS (OUTPATIENT)
Dept: LAB | Facility: CLINIC | Age: 72
End: 2017-12-28

## 2018-01-02 ENCOUNTER — APPOINTMENT (OUTPATIENT)
Dept: LAB | Facility: CLINIC | Age: 73
End: 2018-01-02
Payer: MEDICARE

## 2018-01-02 DIAGNOSIS — I82.409 DEEP PHLEBOTHROMBOSIS, ANTEPARTUM, WITH DELIVERY (HCC): ICD-10-CM

## 2018-01-02 DIAGNOSIS — O22.30 DEEP PHLEBOTHROMBOSIS, ANTEPARTUM, WITH DELIVERY (HCC): ICD-10-CM

## 2018-01-02 LAB
INR PPP: 2.58 (ref 0.86–1.16)
PROTHROMBIN TIME: 28 SECONDS (ref 12.1–14.4)

## 2018-01-02 PROCEDURE — 36415 COLL VENOUS BLD VENIPUNCTURE: CPT

## 2018-01-02 PROCEDURE — 85610 PROTHROMBIN TIME: CPT

## 2018-01-03 ENCOUNTER — GENERIC CONVERSION - ENCOUNTER (OUTPATIENT)
Dept: OTHER | Facility: OTHER | Age: 73
End: 2018-01-03

## 2018-01-03 LAB — INR PPP: 2.58 (ref 2–3)

## 2018-01-10 NOTE — RESULT NOTES
Verified Results  (1) PT WITH INR [id number] 11:20AM Ane Stack     Test Name Result Flag Reference   INR 2 86 H 0 86-1 16   PT 27 6 seconds H 11 8-14 1

## 2018-01-10 NOTE — RESULT NOTES
Message   #1  Please place the patient's PT/INR on his yellow Coumadin flow sheet for me to address       Verified Results  (1) PT WITH INR 83Rye1699 04:08PM Denisse Liner     Test Name Result Flag Reference   INR 2 67 H 0 86-1 16   PT 26 2 seconds H 11 8-14 1

## 2018-01-10 NOTE — MISCELLANEOUS
Message  Message Free Text Note Form: Ramesh Navarro,    This is a note to update his care after his recent stay at a nursing facility for rehabilitation after a stay at an acute care hospital from April 1, 2017 until April 7, 2017 for abdominal pain with nausea  #1  Abdominal pain/nausea-evaluation at the hospital revealed mild gastritis/esophagitis on EGD, normal abdominal ultrasound, and an unremarkable abdomen/pelvic CT scan  Symptoms were felt secondary to a side effect from Cymbalta and/or gastroenteritis  Because of continued symptoms, I started him on Protonix 40 mg daily and Zofran when necessary  His symptoms were improving at the time of discharge  He does have a office visit scheduled with GI on May 9, 2017  #2  Hypertension-his blood pressure is stable  He was continued on amlodipine 5 mg tablets one daily  #3  Depression-his Cymbalta was discontinued as a possible cause of his GI upset and complaints  He was restarted on Remeron 15 mg tablets one daily at his request  He will continue with observation  #4  Anxiety-his wife inquired regarding possibility of having as needed Ativan or Xanax  I advised her to discuss this with his PCP at his follow-up office visit on April 17, 2017  #5  Recurrent DVT with hypercoagulable state- Stable  His INR was 3 2 on April 10, 2017  I advised him to hold his Coumadin on April 14, 2017 and to take 2 mg daily on April 15 and April 16  He was advised to check a PT/INR on April 17, 2017  #6  Impaired fasting glucose-he will continue with TLC and laboratory surveillance  #7  BPH with urinary retention requiring a Young catheter during his hospitalization  He reports urinating well with the initiation of Flomax  He will continue with the same medication and follow-up with his PCP  #8  Mild elevation of direct and total bilirubin with normal alkaline phosphatase level-he will follow-up with his PCP    #9  Generalized weakness secondary to acute illness and hospital stay-he did well with therapy during his stay at the nursing facility  He was ready to be discharged on April 14, 2017  He will follow-up with his PCP regarding any further needed therapy  Please call with questions  Bill  Plan    1  Mirtazapine 15 MG Oral Tablet; TAKE 1 TABLET AT BEDTIME    Signatures   Electronically signed by :  Sayda Matos MD; Apr 14 2017 11:50AM EST                       (Author)

## 2018-01-11 ENCOUNTER — GENERIC CONVERSION - ENCOUNTER (OUTPATIENT)
Dept: OTHER | Facility: OTHER | Age: 73
End: 2018-01-11

## 2018-01-11 ENCOUNTER — ALLSCRIPTS OFFICE VISIT (OUTPATIENT)
Dept: OTHER | Facility: OTHER | Age: 73
End: 2018-01-11

## 2018-01-11 PROCEDURE — 88305 TISSUE EXAM BY PATHOLOGIST: CPT | Performed by: SURGERY

## 2018-01-11 PROCEDURE — 88342 IMHCHEM/IMCYTCHM 1ST ANTB: CPT | Performed by: SURGERY

## 2018-01-11 NOTE — RESULT NOTES
Message   Notified the patient normal comprehensive metabolic panel follow up as scheduled        Verified Results  (1) COMPREHENSIVE METABOLIC PANEL [id number] 37:96GT Harry Alonzo Order Number: [id number]     Test Name Result Flag Reference   SODIUM 140 mmol/L  136-145   POTASSIUM 3 9 mmol/L  3 5-5 3   CHLORIDE 107 mmol/L  100-108   CARBON DIOXIDE 25 mmol/L  21-32   ANION GAP (CALC) 8 mmol/L  4-13   BLOOD UREA NITROGEN 10 mg/dL  5-25   CREATININE 0 95 mg/dL  0 60-1 30   Standardized to IDMS reference method   CALCIUM 8 9 mg/dL  8 3-10 1   BILI, TOTAL 1 04 mg/dL H 0 20-1 00   ALK PHOSPHATAS 77 U/L     ALT (SGPT) 25 U/L  12-78   AST(SGOT) 19 U/L  5-45   ALBUMIN 3 5 g/dL  3 5-5 0   TOTAL PROTEIN 6 9 g/dL  6 4-8 2   eGFR Non-African American      >60 0 ml/min/1 73sq m   - Patient Instructions: This is a fasting blood test  Water,black tea or black  coffee only after 9:00pm the night before test Drink 2 glasses of water the morning of test   National Kidney Disease Education Program recommendations are as follows:  GFR calculation is accurate only with a steady state creatinine  Chronic Kidney disease less than 60 ml/min/1 73 sq  meters  Kidney failure less than 15 ml/min/1 73 sq  meters     GLUCOSE FASTING 93 mg/dL  65-99       Signatures   Electronically signed by : Stacey Penaloza DO; Mar 21 2017  5:35PM EST                       (Author)

## 2018-01-11 NOTE — RESULT NOTES
Verified Results  (1) COMPREHENSIVE METABOLIC PANEL [id number] 54:93KV Sujata Treviño Order Number: [id number]     Test Name Result Flag Reference   GLUCOSE,RANDM 86 mg/dL     If the patient is fasting, the ADA then defines impaired fasting glucose as > 100 mg/dL and diabetes as > or equal to 123 mg/dL  SODIUM 140 mmol/L  136-145   POTASSIUM 3 9 mmol/L  3 5-5 3   CHLORIDE 107 mmol/L  100-108   CARBON DIOXIDE 24 mmol/L  21-32   ANION GAP (CALC) 9 mmol/L  4-13   BLOOD UREA NITROGEN 8 mg/dL  5-25   CREATININE 0 84 mg/dL  0 60-1 30   Standardized to IDMS reference method   CALCIUM 8 7 mg/dL  8 3-10 1   BILI, TOTAL 1 02 mg/dL H 0 20-1 00   ALK PHOSPHATAS 87 U/L     ALT (SGPT) 22 U/L  12-78   AST(SGOT) 18 U/L  5-45   ALBUMIN 3 5 g/dL  3 5-5 0   TOTAL PROTEIN 6 5 g/dL  6 4-8 2   eGFR Non-African American      >60 0 ml/min/1 73sq m   - Patient Instructions: This is a fasting blood test  Water, black tea or black coffee only after 9:00pm the night before test Drink 2 glasses of water the morning of test   National Kidney Disease Education Program recommendations are as follows:  GFR calculation is accurate only with a steady state creatinine  Chronic Kidney disease less than 60 ml/min/1 73 sq  meters  Kidney failure less than 15 ml/min/1 73 sq  meters  (1) LIPID PANEL FASTING W DIRECT LDL REFLEX [id number] 11:35AM Sujata Treviño Order Number: [id number]     Test Name Result Flag Reference   CHOLESTEROL 182 mg/dL     LDL CHOLESTEROL CALCULATED 94 mg/dL  0-100   - Patient Instructions: This is a fasting blood test  Water, black tea or black coffee only after 9:00pm the night before test   Drink 2 glasses of water the morning of test     - Patient Instructions:  This is a fasting blood test  Water, black tea or black coffee only after 9:00pm the night before test Drink 2 glasses of water the morning of test   Triglyceride:         Normal              <150 mg/dl Borderline High    150-199 mg/dl       High               200-499 mg/dl       Very High          >499 mg/dl  Cholesterol:         Desirable        <200 mg/dl      Borderline High  200-239 mg/dl      High             >239 mg/dl  HDL Cholesterol:        High    >59 mg/dL      Low     <41 mg/dL  LDL Cholesterol:        Optimal          <100 mg/dl        Near Optimal     100-129 mg/dl        Above Optimal          Borderline High   130-159 mg/dl          High              160-189 mg/dl          Very High        >189 mg/dl  LDL CALCULATED:    This screening LDL is a calculated result  It does not have the accuracy of the Direct Measured LDL in the monitoring of patients with hyperlipidemia and/or statin therapy  Direct Measure LDL (VPM152) must be ordered separately in these patients  TRIGLYCERIDES 247 mg/dL H <=150   Specimen collection should occur prior to N-Acetylcysteine or Metamizole administration due to the potential for falsely depressed results  HDL,DIRECT 39 mg/dL L 40-60   Specimen collection should occur prior to Metamizole administration due to the potential for falsely depressed results  (1) TSH WITH FT4 REFLEX 14Sep2016 11:35AM "[facility]" Order Number: [id number]     Test Name Result Flag Reference   TSH 2 930 uIU/mL  0 358-3 740   - Patient Instructions: This is a fasting blood test  Water, black tea or black coffee only after 9:00pm the night before test Drink 2 glasses of water the morning of test   Patients undergoing fluorescein dye angiography may retain small amounts of fluorescein in the body for 48-72 hours post procedure  Samples containing fluorescein can produce falsely depressed TSH values  If the patient had this procedure,a specimen should be resubmitted post fluorescein clearance       (1) PSA (SCREEN) (Dx V76 44 Screen for Prostate Cancer) 14Sep2016 11:35AM "[facility]" Order Number: [id number]     Test Name Result Flag Reference PROSTATE SPECIFIC ANTIGEN 2 4 ng/mL  0 0-4 0   - Patient Instructions: This test is non-fasting  Please drink two glasses of water morning of bloodwork  - Patient Instructions: This test is non-fasting  Please drink two glasses of water morning of bloodwork       (1) CBC/PLT/DIFF [id number] 11:34AM Freida Howe Order Number: [id number]     Test Name Result Flag Reference   WBC COUNT 5 95 Thousand/uL  4 31-10 16   RBC COUNT 5 09 Million/uL  3 88-5 62   HEMOGLOBIN 15 1 g/dL  12 0-17 0   HEMATOCRIT 43 9 %  36 5-49 3   MCV 86 fL  82-98   MCH 29 7 pg  26 8-34 3   MCHC 34 4 g/dL  31 4-37 4   RDW 13 2 %  11 6-15 1   MPV 10 4 fL  8 9-12 7   PLATELET COUNT 903 Thousands/uL  149-390   nRBC AUTOMATED 0 /100 WBCs     NEUTROPHILS RELATIVE PERCENT 63 %  43-75   LYMPHOCYTES RELATIVE PERCENT 24 %  14-44   MONOCYTES RELATIVE PERCENT 10 %  4-12   EOSINOPHILS RELATIVE PERCENT 3 %  0-6   BASOPHILS RELATIVE PERCENT 0 %  0-1   NEUTROPHILS ABSOLUTE COUNT 3 74 Thousands/?L  1 85-7 62   LYMPHOCYTES ABSOLUTE COUNT 1 42 Thousands/?L  0 60-4 47   MONOCYTES ABSOLUTE COUNT 0 57 Thousand/?L  0 17-1 22   EOSINOPHILS ABSOLUTE COUNT 0 18 Thousand/?L  0 00-0 61   BASOPHILS ABSOLUTE COUNT 0 02 Thousands/?L  0 00-0 10   - Patient Instructions: FAST for 8 to 10 hours prior to doing the lab test     - Patient Instructions: FAST for 8 to 10 hours prior to doing the lab test

## 2018-01-11 NOTE — RESULT NOTES
Message   #1  Please record the patient's PT/INR on his yellow Coumadin flow sheet for me to address       Verified Results  (1) PT WITH INR [id number] 10:43AM Carole Mckeon     Test Name Result Flag Reference   INR 2 26 H 0 86-1 16   PT 24 7 seconds H 12 0-14 3

## 2018-01-11 NOTE — RESULT NOTES
Message   Notify the pt normal chest x-ray no acute cardiopulmonary disease follow up as scheduled        Verified Results  * XR CHEST PA & LATERAL 21Mar2017 10:51AM Malcolmabel Warren Order Number: [id number]     Test Name Result Flag Reference   XR CHEST PA & LATERAL (Report)     CHEST - DUAL ENERGY     INDICATION: 73-year-old male, chest wall pain   COMPARISON: None     VIEWS: PA (including soft tissue/bone algorithms) and lateral projections; 4 images     FINDINGS:     The lungs are clear  No pleural effusions  The cardiomediastinal silhouette is unremarkable  Bony thorax is unremarkable         IMPRESSION:     No acute cardiopulmonary disease       Workstation performed: [id number]     Signed by:   Angus Bass MD   3/23/17       Signatures   Electronically signed by : Zhanna Sandoval DO; Mar 23 2017  7:29PM EST                       (Author)

## 2018-01-11 NOTE — PSYCH
History of Present Illness  Psychotherapy Provided  Luke: Individual Psychotherapy 30minutes minutes provided today  Goals addressed in session:   Patient accompanied by his wife  Had been stable from depression standpoint for many years on Remeron  Over last several weeks, has felt more depressed and having more difficulty sleeping  Wife corroborates same  Has been experiencing increased pain issues from physical health issues  These issues contributing to depression  Patient tearful at times during session  Patient verbal and cooperative  HPI - Psych: Patient had been stable for many years on Remeron  At present, struggling with depressed mood, lack of energy and motivation and an increase in irritability  Sleep has been disrupted a well  Has good support system via his wife and family  Frustrated by fact medication has stopped working  Had seen Gaby Reyes, psychiatrist, in past  None recently  Denies any SI   Note   Note:   Provided supportive therapy  Reviewed coping strategies for mood issues  Will consult with PCP regarding his depression regarding possible med changes  Provided my contact information to patient  Assessment    1   Depression (311) (F32 9)    Signatures   Electronically signed by : Wendy Ramirez LCSW; Mar 22 2017  3:31PM EST                       (Author)

## 2018-01-12 ENCOUNTER — LAB REQUISITION (OUTPATIENT)
Dept: LAB | Facility: HOSPITAL | Age: 73
End: 2018-01-12
Payer: MEDICARE

## 2018-01-12 VITALS
WEIGHT: 190.4 LBS | SYSTOLIC BLOOD PRESSURE: 128 MMHG | DIASTOLIC BLOOD PRESSURE: 68 MMHG | HEART RATE: 86 BPM | BODY MASS INDEX: 31.72 KG/M2 | RESPIRATION RATE: 16 BRPM | TEMPERATURE: 97.8 F | HEIGHT: 65 IN | OXYGEN SATURATION: 98 %

## 2018-01-12 VITALS
OXYGEN SATURATION: 98 % | HEART RATE: 66 BPM | SYSTOLIC BLOOD PRESSURE: 126 MMHG | HEIGHT: 65 IN | WEIGHT: 168.13 LBS | BODY MASS INDEX: 28.01 KG/M2 | DIASTOLIC BLOOD PRESSURE: 82 MMHG

## 2018-01-12 DIAGNOSIS — D22.9 MELANOCYTIC NEVUS: ICD-10-CM

## 2018-01-12 NOTE — RESULT NOTES
Message   Notify the patient PSA is stable 1 9 follow up as scheduled        Verified Results  (1) COMPREHENSIVE METABOLIC PANEL [id number] 71:27VR [facility] Order Number: [id number]     Test Name Result Flag Reference   SODIUM 140 mmol/L  136-145   POTASSIUM 3 9 mmol/L  3 5-5 3   CHLORIDE 107 mmol/L  100-108   CARBON DIOXIDE 25 mmol/L  21-32   ANION GAP (CALC) 8 mmol/L  4-13   BLOOD UREA NITROGEN 10 mg/dL  5-25   CREATININE 0 95 mg/dL  0 60-1 30   Standardized to IDMS reference method   CALCIUM 8 9 mg/dL  8 3-10 1   BILI, TOTAL 1 04 mg/dL H 0 20-1 00   ALK PHOSPHATAS 77 U/L     ALT (SGPT) 25 U/L  12-78   AST(SGOT) 19 U/L  5-45   ALBUMIN 3 5 g/dL  3 5-5 0   TOTAL PROTEIN 6 9 g/dL  6 4-8 2   eGFR Non-African American      >60 0 ml/min/1 73sq m   - Patient Instructions: This is a fasting blood test  Water,black tea or black  coffee only after 9:00pm the night before test Drink 2 glasses of water the morning of test   National Kidney Disease Education Program recommendations are as follows:  GFR calculation is accurate only with a steady state creatinine  Chronic Kidney disease less than 60 ml/min/1 73 sq  meters  Kidney failure less than 15 ml/min/1 73 sq  meters  GLUCOSE FASTING 93 mg/dL  65-99     (1) HEMOGLOBIN A1C 21Mar2017 10:47AM [facility] Order Number: [id number]     Test Name Result Flag Reference   HEMOGLOBIN A1C 5 5 %  4 2-6 3   EST  AVG  GLUCOSE 111 mg/dl       (1) LIPID PANEL, FASTING 21Mar2017 10:47AM Odalys Arango    Order Number: [id number]     Test Name Result Flag Reference   CHOLESTEROL 214 mg/dL H    HDL,DIRECT 42 mg/dL  40-60   Specimen collection should occur prior to Metamizole administration due to the potential for falsely depressed results  LDL CHOLESTEROL CALCULATED 120 mg/dL H 0-100   - Patient Instructions:  This is a fasting blood test  Water,black tea or black  coffee only after 9:00pm the night before test   Drink 2 glasses of water the morning of test     - Patient Instructions: This is a fasting blood test  Water,black tea or black  coffee only after 9:00pm the night before test Drink 2 glasses of water the morning of test   Triglyceride:         Normal              <150 mg/dl       Borderline High    150-199 mg/dl       High               200-499 mg/dl       Very High          >499 mg/dl  Cholesterol:         Desirable        <200 mg/dl      Borderline High  200-239 mg/dl      High             >239 mg/dl  HDL Cholesterol:        High    >59 mg/dL      Low     <41 mg/dL  LDL CALCULATED:    This screening LDL is a calculated result  It does not have the accuracy of the Direct Measured LDL in the monitoring of patients with hyperlipidemia and/or statin therapy  Direct Measure LDL (BVM672) must be ordered separately in these patients  TRIGLYCERIDES 262 mg/dL H <=150   Specimen collection should occur prior to N-Acetylcysteine or Metamizole administration due to the potential for falsely depressed results  (1) PSA (SCREEN) (Dx V76 44 Screen for Prostate Cancer) 21Mar2017 10:47AM Louis Lord Order Number: [id number]     Test Name Result Flag Reference   PROSTATE SPECIFIC ANTIGEN 1 9 ng/mL  0 0-4 0   American U[employer] Association Guidelines define biochemical recurrence of prostate cancer as a detectable or rising PSA value post-radical prostatectomy that is greater than or equal to 0 2 ng/mL with a second confirmatory level of greater than or equal to 0 2 ng/mL  - Patient Instructions: This test is non-fasting  Please drink two glasses of water morning of bloodwork  - Patient Instructions: This test is non-fasting  Please drink two glasses of water morning of bloodwork         Signatures   Electronically signed by : Deric Simpson DO; Mar 21 2017  5:36PM EST                       (Author)

## 2018-01-12 NOTE — PROGRESS NOTES
Assessment    1  Medicare annual wellness visit, subsequent (V70 0) (Z00 00)   2  Benign essential hypertension (401 1) (I10)   3  DVT (deep venous thrombosis) (453 40) (I82 409)   4  Hyperlipidemia (272 4) (E78 5)   5  Anxiety (300 00) (F41 9)   6  Depression (311) (F32 9)   7  Atypical mole (216 9) (D22 9)    Assessment and plan 1  Medicare annual wellness examination completed for the patient I did review the Medicare wellness form with the patient and I have counselled the pt to follow a healthy and balanced diet ,and recommend routine exercise  , he is up-to-date on his colonoscopy  I refilled his medications and I have given him an order request for laboratories  We do recommend the patient obtain a living well  Return to office  6 months  call if any problems     Plan  Advance directive discussed with patient    · We recommend that you create an advance directive ; Status:Complete;   Done:  35ZCN7527  Atypical mole    · 1 - Traci JASSO, [facility]  *  Status: Active   Requested for: 05XUQ5147  Care Summary provided  : Yes  Benign essential hypertension    · AmLODIPine Besylate 5 MG Oral Tablet; TAKE 1 TABLET DAILY  Chronic lower back pain    · Gabapentin 100 MG Oral Capsule (Neurontin); TAKE 1 CAPSULE AT BEDTIME  Decreased hearing    · 1 Black Sanchez MD, [facility] Co-Management  *  Status: Active  Requested for:  03KKI8314  Care Summary provided  : Yes  Care Summary provided  : Yes   · 1 Black Sanchez MD, [facility] Co-Management  *  Status: Active  Requested for:  46LNR8702  Care Summary provided  : Yes  Care Summary provided  : Yes  Depression    · Mirtazapine 15 MG Oral Tablet; TAKE 1 TABLET AT BEDTIME  Hyperlipidemia    · (1) COMPREHENSIVE METABOLIC PANEL; Status:Active; Requested for:16Fci5310;    · (1) LIPID PANEL, FASTING; Status:Active; Requested for:25Jap4839; History of Present Illness  Welcome to Medicare and Wellness Visits:  The patient is being seen for the subsequent annual wellness visit  Medicare Screening and Risk Factors   Hospitalizations: he has been previously hospitalizied  Medicare Screening Tests Risk Questions   Abdominal aortic aneurysm risk assessment: none indicated  Osteoporosis risk assessment: none indicated  HIV risk assessment: none indicated  (6 months)   Drug and Alcohol Use: The patient is a former cigarette smoker and quit 45 yrs ago 15 pack yrs  The patient reports rare alcohol use  He has never used illicit drugs  Diet and Physical Activity: Current diet includes 0 servings of fruit per day and 1 servings of vegetables per day  He exercises infrequently and exercises 7 times per week  Exercise: 3hours per day minutes per day  (too much pretzels)   Mood Disorder and Cognitive Impairment Screening: Geriatric Depression Scale He denies feeling down, depressed, or hopeless over the past two weeks  He denies feeling little interest or pleasure in doing things over the past two weeks  Further Evaluation: no si no hi  Cognitive impairment screening: denies difficulty learning/retaining new information, denies difficulty handling complex tasks, denies difficulty with reasoning, denies difficulty with spatial ability and orientation, denies difficulty with language, denies difficulty with behavior and forgets why he goes to the kitchen  Functional Ability/Level of Safety: Hearing is normal in the right ear and slightly decreased in the left ear  He reports hearing difficulties  He does not use a hearing aid  Activities of daily living details: does not need help using the phone, no transportation help needed, does not need help shopping, no meal preparation help needed, does not need help doing housework, does not need help doing laundry, does not need help managing medications and does not need help managing money  Fall risk factors: The patient fell 0 times in the past 12 months     Home safety risk factors:  no unfamiliar surroundings, no loose rugs, no poor household lighting, no uneven floors, no household clutter, grab bars in the bathroom and handrails on the stairs  Advance Directives: Advance directives: no living will, no durable power of  for health care directives and no advance directives  Co-Managers and Medical Equipment/Suppliers: See Patient Care Team      Patient Care Team    Care Team Member Role Specialty Office Number   Amber Coppola MD Specialist Colon and Rectal Surgery [phone]   Landy OLIVEIRA  Specialist Orthopedic Surgery [phone]   Tram Musa MD  Pain Management [phone]   Kay Cervantes MD Specialist Gastroenterology Adult [phone]     Review of Systems    Psychiatric: irritability and anxiety, but no insomnia  Active Problems    1  Abdominal pain (789 00) (R10 9)   2  Acute gastritis (535 00) (K29 00)   3  Allergic rhinitis (477 9) (J30 9)   4  Anticoagulant long-term use (V58 61) (Z79 01)   5  Anxiety (300 00) (F41 9)   6  Atypical chest pain (786 59) (R07 89)   7  Benign essential hypertension (401 1) (I10)   8  Chest wall pain (786 52) (R07 89)   9  Chronic cough (786 2) (R05)   10  Chronic lower back pain (724 2,338 29) (M54 5,G89 29)   11  Crohn disease (555 9) (K50 90)   12  Decreased hearing (389 9) (H91 90)   13  Depression (311) (F32 9)   14  Dilated pancreatic duct (577 8) (K86 89)   15  DVT (deep venous thrombosis) (453 40) (I82 409)   16  History of DVT, lower extremity, 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)   17  Encounter for screening colonoscopy (V76 51) (Z12 11)   18  Hyperlipidemia (272 4) (E78 5)   19  Hypertriglyceridemia (272 1) (E78 1)   20  Insomnia (780 52) (G47 00)   21  Intervertebral disc disorders with radiculopathy, lumbar region (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 4) (M51 16)   22  Joint pain, knee (719 46) (M25 569)   23  Left shoulder pain (719 41) (M25 512)   24  Long term use of drug (V58 69) (Z79 899)   58   Need for prophylactic vaccination against Streptococcus pneumoniae (pneumococcus)    (V03 82) (Z23)   26  Need for prophylactic vaccination and inoculation against influenza (V04 81) (Z23)   27  Nocturia (788 43) (R35 1)   28  On warfarin therapy (V58 61) (Z79 01)   29  Primary localized osteoarthritis of right knee (715 16) (M17 11)   30  Right cataract (366 9) (H26 9)   31  Right knee pain (719 46) (M25 561)   32  Right low back pain (724 2) (M54 5)   33  Sacroiliitis (720 2) (M46 1)   34  Screening for genitourinary condition (V81 6) (Z13 89)   35  Special screening examination for neoplasm of prostate (V76 44) (Z12 5)   36  Subclinical hypothyroidism (244 8) (E03 9)   37  History of Ulcerative colitis without complications (606 9) (R53 87)    Past Medical History    · History of Anxiety (300 00) (F41 9)   · History of Bleeding disorder (287 9) (D68 9)   · History of DVT, lower extremity, 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)   · History of arthritis (V13 4) (Z87 39)   · History of Neck nodule (784 2) (R22 1)   · Need for prophylactic vaccination and inoculation against influenza (V04 81) (Z23)   · History of Ulcerative colitis without complications (835 1) (J51 93)    The active problems and past medical history were reviewed and updated today  Surgical History    · History of Partial Colectomy With Colostomy   · History of Venous Ligation With Stripping Saphenous Vein    The surgical history was reviewed and updated today  Family History  Brother    · Family history of myocardial infarction (V17 3) (Z82 49)  Unknown    · Family history of Back disorder  Family History    · Family history of cardiac disorder (V17 49) (Z82 49)   · Family history of hypertension (V17 49) (Z82 49)    The family history was reviewed and updated today  Social History    · Denied: History of Drug Use   · Former smoker (V15 82) (C03 347)   · 1 ppd - Quit at the age of 23 - noted 2013     · No alcohol use   · He quit all alcoholic beverages in August 2008  The social history was reviewed and updated today  The social history was reviewed and is unchanged  Current Meds   1  AmLODIPine Besylate 5 MG Oral Tablet; TAKE 1 TABLET DAILY; Therapy: 55PLI7069 to (Claudia Rather)  Requested for: 99ZBI2892; Last   Rx:09Mar2017 Ordered   2  Claritin 10 MG Oral Capsule; Therapy: (Recorded:65Pek4293) to Recorded   3  Fluticasone Propionate 50 MCG/ACT Nasal Suspension; Use 1 to 2 sprays to both   nostrils daily during allergy season  Avoid spraying the nasal septum; Therapy: 24MOJ8512 to (Evaluate:40Omd3258)  Requested for: 27YHX6902 Recorded   4  Gabapentin 100 MG Oral Capsule; TAKE 1 CAPSULE AT BEDTIME; Therapy: 79Yfz3493 to (Evaluate:50Wph2730)  Requested for: 81IHA3572; Last   Rx:02Oct2017 Ordered   5  Mirtazapine 15 MG Oral Tablet; TAKE 1 TABLET AT BEDTIME; Therapy: 71Sme6244 to (Evaluate:02Jan2018)  Requested for: 07TNM6439; Last   Rx:04Oct2017 Ordered   6  Multi For Him 50+ Oral Tablet; TAKE 1 TABLET DAILY; Therapy: 14Nnt6766 to Recorded   7  Omeprazole 20 MG Oral Capsule Delayed Release; TAKE 1 CAPSULE DAILY EVERY   MORNING BEFORE BREAKFAST; Therapy: 36Ikc3351 to (Evaluate:31Oct2017)  Requested for: 26Jll2516; Last   Rx:84Bbk2534 Ordered   8  Warfarin Sodium 2 MG Oral Tablet; TAKE ONE TABLET BY MOUTH ONCE DAILY AS   DIRECTED; Therapy: 73Jfb2900 to (Claudia Rather)  Requested for: 06CWL4330; Last   Rx:09Mar2017 Ordered    The medication list was reviewed and updated today  Allergies    1  Cymbalta CPEP    2  Seasonal    Immunizations   ** Printed in Appendix #1 below  Vitals  Signs    Heart Rate: 68  Respiration: 16  Systolic: 058, LUE, Sitting  Diastolic: 80, LUE, Sitting  Height: 5 ft 5 in  Weight: 165 lb 0 4 oz  BMI Calculated: 27 46  BSA Calculated: 1 82  O2 Saturation: 96    Physical Exam    Constitutional   General appearance: No acute distress, well appearing and well nourished      Head and Face   Head and face: Normal     Eyes   Conjunctiva and lids: No erythema, swelling or discharge  Pupils and irises: Equal, round, reactive to light  Ears, Nose, Mouth, and Throat   External inspection of ears and nose: Normal     Otoscopic examination: Tympanic membranes translucent with normal light reflex  Canals patent without erythema  Hearing: Abnormal     Lips, teeth, and gums: Normal, good dentition  Oropharynx: Normal with no erythema, edema, exudate or lesions  Neck   Neck: Supple, symmetric, trachea midline, no masses  Cardiovascular   Auscultation of heart: Normal rate and rhythm, normal S1 and S2, no murmurs  Examination of extremities for edema and/or varicosities: Normal     Abdomen   Abdomen: Non-tender, no masses  Liver and spleen: No hepatomegaly or splenomegaly  Lymphatic   Palpation of lymph nodes in neck: No lymphadenopathy  Psychiatric   Mood and affect: Normal   Mood and Affect: anxious and not depressed  Results/Data  Falls Risk Assessment (Dx Z13 89 Screen for Neurologic Disorder) 03MNX2168 01:13PM User, [email or web address]     Test Name Result Flag Reference   Falls Risk      No falls in the past year     PHQ-9 Adult Depression Screening 46Zrl3560 01:13PM User, [email or web address]     Test Name Result Flag Reference   PHQ-9 Adult Depression Score 0     Over the last two weeks, how often have you been bothered by any of the following problems? Little interest or pleasure in doing things: Not at all - 0  Feeling down, depressed, or hopeless: Not at all - 0  Trouble falling or staying asleep, or sleeping too much: Not at all - 0  Feeling tired or having little energy: Not at all - 0  Poor appetite or over eating: Not at all - 0  Feeling bad about yourself - or that you are a failure or have let yourself or your family down: Not at all - 0  Trouble concentrating on things, such as reading the newspaper or watching television: Not at all - 0  Moving or speaking so slowly that other people could have noticed   Or the opposite -  being so fidgety or restless that you have been moving around a lot more than usual: Not at all - 0  Thoughts that you would be better off dead, or of hurting yourself in some way: Not at all - 0   PHQ-9 Adult Depression Screening Negative     PHQ-9 Difficulty Level Not difficult at all     PHQ-9 Severity No Depression         Health Management  Health Maintenance   Medicare Annual Wellness Visit; every 1 year; Next Due: 37PND1120;  Overdue    Future Appointments    Date/Time Provider Specialty Site   2018 01:00 PM Bebeto Romero DO Internal Medicine [facility]     Signatures   Electronically signed by : Herson Vallecillo DO; 2017  8:39PM EST                       (Author)    Appendix #1     Patient: Whitney Lagos ; : 1945; MRN: [id number]      1 2 3 4 5 6    Influenza  28-Nov-2012 27-Nov-2013 02-Dec-2014 22-Oct-2015 08-Oct-2016 30-Oct-2017    PCV  22-Oct-2015         PPSV  2007

## 2018-01-13 VITALS
SYSTOLIC BLOOD PRESSURE: 138 MMHG | HEIGHT: 65 IN | HEART RATE: 82 BPM | RESPIRATION RATE: 16 BRPM | WEIGHT: 188.01 LBS | DIASTOLIC BLOOD PRESSURE: 78 MMHG | BODY MASS INDEX: 31.32 KG/M2

## 2018-01-13 NOTE — MISCELLANEOUS
Assessment    1  Anxiety (300 00) (F41 9)   2  Acute gastritis (535 00) (K29 00)   3  Chronic lower back pain (724 2,338 29) (M54 5,G89 29)    Assessment and plan #1 anxiety/depression/no suicidal ideation; the patient is coming in for a transition care management visit today he was recently hospitalized at [facility] for a possible side effect secondary to Cymbalta he has been seen by psychiatry and now back on his usual dose of Remeron  During the hospitalization he did undergo upper endoscopy showing a gastritis, he underwent CAT scan that was they have  He does report to me he is feeling better but not completely improved he does not want to go back to psychiatry; he should and his wife are requesting to try Neurontin 100 mg 1 by mouth daily at bedtime which would help with both his sleep and also his chronic pain issues i e  chronic lower back pain and knee pain it has been recommended in the past they try it by pain management but he never took the medication  I've explained to him the possible side effects and medication and if any problem whatsoever he is to stop the medicine B no #2 acute gastritis patient to follow-up with GI patient would like to DC Protonix and try Zantac 150 mg once per day recommend ulcer free diet ; I did explain to him if his symptoms are not improving he may increase his Zantac to 300 mg once a day  He will be following up with GI  RTO in 2 months call if any problems  Plan  Chronic lower back pain    · Gabapentin 100 MG Oral Capsule (Neurontin); TAKE 1 CAPSULE AT BEDTIME   Rx By: Tiera Friends; Dispense: 30 Days ; #:30 Capsule; Refill: 2; For: Chronic lower back pain; BASSAM = N; Verified Transmission to [facility] 0776; Last Updated By: System, [employer]; 4/17/2017 2:50:46 PM    Discussion/Summary  Medication SE Review and Pt Understands Tx: Possible side effects of new medications were reviewed with the patient/guardian today   The treatment plan was reviewed with the patient/guardian  The patient/guardian understands and agrees with the treatment plan      Chief Complaint  Chief Complaint Free Text Note Form: DAVID      History of Present Illness  TCM Communication [facility]: The patient is being contacted for follow-up after hospitalization  Hospital records were not available  He was hospitalized at [facility]  The date of admission: 04/07/2017, date of discharge: 04/14/2017, 81-year-old male coming in for a transition care management visit Recent hospitalization admission date 4/1/2017 Re: Abdominal pain, nausea and vomiting  Patient reports any started after stop stopping Remeron recently starting Cymbalta  He was found to the and lactic acidosis  Noted to have acute renal failure  He was subsequently admitted to the stepdown unit for close monitoring  He was placed on IV hydration Protonix and his symptoms gradually improved  He was failed by psychiatry Remeron was restarted  He seen by GI and the endoscopy did not show any acute findings CAT scan did not show any acute findings  He was discharged skilled care facility and discharged to home he will follow-up with psychiatry and with GI  The patient does report to me symptoms of anxiety and depression suicidal ideation; the patient does not want to see a psychiatrist  In the past it was recommended to the patient to consider try Neurontin by his pain management doctor but he never filled this prescription at this point in time he is interested in trying it to help him with sleep and also his chronic lower back pain and knee pain he is requesting a refill of this medication  Diagnosis: Nausea, vomitting, abdominal pain  He was discharged to home  Medications were not reviewed today  He scheduled a follow up appointment     Symptoms: weakness, dizziness, fatigue, cough and nausea, but no fever, no headache, no upper abdominal pain, no middle abdominal pain, no lower abdominal pain, no anorexia, no vomiting, no loose stools and no constipation  The patient is currently asymptomatic  rhinorrhea , pnd , cough from tickle on the throat, muscle pull left lateral abdoman from physical therap; 1 meal per day Counseling was provided to patient's caretaker  K[facility] from [facility]  Topics counseled included diagnostic results and instructions for management  Communication performed and completed by [facility]      Review of Systems  Complete-Male:   Psychiatric: anxiety and depression, but not suicidal       Active Problems    1  Allergic rhinitis (477 9) (J30 9)   2  Anticoagulant long-term use (V58 61) (Z79 01)   3  Atypical chest pain (786 59) (R07 89)   4  Benign essential hypertension (401 1) (I10)   5  Chest wall pain (786 52) (R07 89)   6  Chronic cough (786 2) (R05)   7  Crohn disease (555 9) (K50 90)   8  Decreased hearing (389 9) (H91 90)   9  Depression (311) (F32 9)   10  History of DVT, lower extremity, 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)   11  Hyperlipidemia (272 4) (E78 5)   12  Insomnia (780 52) (G47 00)   13  Intervertebral disc disorders with radiculopathy, lumbar region (724 4) (M51 16)   14  Joint pain, knee (719 46) (M25 569)   15  Left shoulder pain (719 41) (M25 512)   16  Long term use of drug (V58 69) (Z79 899)   17  Need for prophylactic vaccination against Streptococcus pneumoniae (pneumococcus)    (V03 82) (Z23)   18  Need for prophylactic vaccination and inoculation against influenza (V04 81) (Z23)   19  Nocturia (788 43) (R35 1)   20  On warfarin therapy (V58 61) (Z79 01)   21  Primary localized osteoarthritis of right knee (715 16) (M17 11)   22  Right cataract (366 9) (H26 9)   23  Right knee pain (719 46) (M25 561)   24  Right low back pain (724 2) (M54 5)   25  Sacroiliitis (720 2) (M46 1)   26  Screening for genitourinary condition (V81 6) (Z13 89)   27  Special screening examination for neoplasm of prostate (V76 44) (Z12 5)   28  Subclinical hypothyroidism (244 8) (E03 9)   29   History of Ulcerative colitis without complications (085 3) (D16 46)    Past Medical History    1  History of Anxiety (300 00) (F41 9)   2  History of Bleeding disorder (287 9) (D68 9)   3  History of DVT, lower extremity, 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)   4  History of arthritis (V13 4) (Z87 39)   5  History of Neck nodule (784 2) (R22 1)   6  Need for prophylactic vaccination and inoculation against influenza (V04 81) (Z23)   7  History of Ulcerative colitis without complications (845 2) (A67 22)    Surgical History    1  History of Partial Colectomy With Colostomy   2  History of Venous Ligation With Stripping Saphenous Vein  Surgical History Reviewed: The surgical history was reviewed and updated today  Family History  Brother    1  Family history of myocardial infarction (V17 3) (Z82 49)  Unknown    2  Family history of Back disorder  Family History    3  Family history of cardiac disorder (V17 49) (Z82 49)   4  Family history of hypertension (V17 49) (Z82 49)  Family History Reviewed: The family history was reviewed and updated today  Social History    · Denied: History of Drug Use   · Former smoker (V15 82) (C74 595)   · No alcohol use  Social History Reviewed: The social history was reviewed and updated today  The social history was reviewed and is unchanged  Current Meds   1  AmLODIPine Besylate 5 MG Oral Tablet; TAKE 1 TABLET DAILY; Therapy: 88DXM5120 to (Catarino Rubinstein)  Requested for: 93CYA1288; Last   Rx:09Mar2017 Ordered   2  Mirtazapine 15 MG Oral Tablet; TAKE 1 TABLET AT BEDTIME; Therapy: 63Syq2799 to (Evaluate:63Ciq0969)  Requested for: 96Dir2682; Last   Rx:08Sep2016 Ordered   3  Multi For Him 50+ Oral Tablet; TAKE 1 TABLET DAILY; Therapy: 37Jjz1284 to Recorded   4  Warfarin Sodium 2 MG Oral Tablet; TAKE ONE TABLET BY MOUTH ONCE DAILY AS   DIRECTED; Therapy: 24Rzx9764 to (Catarino Rubinstein)  Requested for: 59HJE1607; Last   Rx:09Mar2017 Ordered   5   Zantac 150 MG Oral Tablet (RaNITidine HCl); TAKE 1 TABLET DAILY AS NEEDED; Therapy: 77Nzq2899 to (Evaluate:86Win7132) Recorded  Medication List Reviewed: The medication list was reviewed and updated today  Allergies    1  No Known Drug Allergies    2  Seasonal    Physical Exam    Constitutional   General appearance: No acute distress, well appearing and well nourished  Eyes   Conjunctiva and lids: No swelling, erythema, or discharge  Pupils and irises: Equal, round and reactive to light  Ears, Nose, Mouth, and Throat   External inspection of ears and nose: Normal     Otoscopic examination: Tympanic membrance translucent with normal light reflex  Canals patent without erythema  Nasal mucosa, septum, and turbinates: Normal without edema or erythema  Oropharynx: Normal with no erythema, edema, exudate or lesions  post nasal drip  Pulmonary   Respiratory effort: No increased work of breathing or signs of respiratory distress  Auscultation of lungs: Clear to auscultation, equal breath sounds bilaterally, no wheezes, no rales, no rhonci  Cardiovascular   Auscultation of heart: Normal rate and rhythm, normal S1 and S2, without murmurs  Abdomen   Abdomen: Non-tender, no masses  Liver and spleen: No hepatomegaly or splenomegaly  Psychiatric   Mood and affect: Abnormal   Mood and Affect: anxious and irritable  Health Management  Health Maintenance   Medicare Annual Wellness Visit; every 1 year; Next Due: 74EEO5771;  Overdue    Future Appointments    Date/Time Provider Specialty Site   05/23/2017 01:30 PM Louis Yusuf DO Internal Medicine [facility]   05/09/2017 10:00 AM Joie Mason MD Gastroenterology Adult [address]   04/20/2017 09:00 AM Bertin Bal Urology [facility] [address]     Signatures   Electronically signed by : Lisandra Verma, ; Apr 14 2017 10:03AM EST                       (Author)    Electronically signed by : Gunjan Lima DO;  Apr 17 2017  2:55PM EST                       (Author)

## 2018-01-13 NOTE — RESULT NOTES
Message   I spoke to the patient regarding the INR 1 71- I have recommended that the patient increase the Coumadin to 2 mg alternating with 1 mg every other day he will repeat the PT and INR in one week; Zandra Shields please update the flowsheet and yellow sheet        Verified Results  (1) PT WITH INR [id number] 10:42AM Fina Benítez     Test Name Result Flag Reference   INR 1 71 H 0 86-1 16   PT 20 2 seconds H 12 1-14 4       Signatures   Electronically signed by : Syeda Ramirez DO; Jul 29 2017  7:54PM EST                       (Author)

## 2018-01-14 VITALS
HEART RATE: 87 BPM | HEIGHT: 64 IN | BODY MASS INDEX: 33.98 KG/M2 | DIASTOLIC BLOOD PRESSURE: 78 MMHG | WEIGHT: 199.01 LBS | OXYGEN SATURATION: 95 % | SYSTOLIC BLOOD PRESSURE: 118 MMHG | RESPIRATION RATE: 18 BRPM

## 2018-01-14 VITALS
DIASTOLIC BLOOD PRESSURE: 80 MMHG | RESPIRATION RATE: 16 BRPM | SYSTOLIC BLOOD PRESSURE: 142 MMHG | WEIGHT: 165.03 LBS | BODY MASS INDEX: 27.5 KG/M2 | OXYGEN SATURATION: 96 % | HEART RATE: 68 BPM | HEIGHT: 65 IN

## 2018-01-14 NOTE — RESULT NOTES
Message   Notify the patient the x-rays of ribs no evidence of fracture and no acute cardiopulmonary disease follow up as scheduled        Verified Results  * XR CHEST PA & LATERAL 21Mar2017 10:51AM Ada Askew Order Number: [id number]     Test Name Result Flag Reference   XR CHEST PA & LATERAL (Report)     CHEST - DUAL ENERGY     INDICATION: 25-year-old male, chest wall pain   COMPARISON: None     VIEWS: PA (including soft tissue/bone algorithms) and lateral projections; 4 images     FINDINGS:     The lungs are clear  No pleural effusions  The cardiomediastinal silhouette is unremarkable  Bony thorax is unremarkable  IMPRESSION:     No acute cardiopulmonary disease       Workstation performed: [id number]     Signed by:   Elyse Tian MD   3/23/17     XR RIBS BILATERAL 3 VIEW 21Mar2017 10:51AM Ada Askew Order Number: [id number]     Test Name Result Flag Reference   XR RIBS BILATERAL 3 VW (Report)     BILATERAL RIBS      INDICATION: 25-year-old male, chest wall pain   COMPARISON: None     VIEWS: 3 coned down views of each hemithorax     IMAGES: 5     FINDINGS:     The cardiomediastinal silhouette is unremarkable  The lungs are clear  No pleural effusions  There is no pneumothorax  No rib fractures are identified  IMPRESSION:   No acute cardiopulmonary disease   No evidence of rib fractures         Workstation performed: [id number]     Signed by:   Elyse Tian MD   3/23/17       Signatures   Electronically signed by : Kenney Roberts DO; Mar 23 2017  7:30PM EST                       (Author)

## 2018-01-14 NOTE — RESULT NOTES
Message   Notify the patient elevation of the triglyceride level please have the patient reduce carbohydrates and sweets in the diet follow up as scheduled        Verified Results  (1) COMPREHENSIVE METABOLIC PANEL [id number] 33:18SK Verenice DANIEL Order Number: [id number]     Test Name Result Flag Reference   SODIUM 140 mmol/L  136-145   POTASSIUM 3 9 mmol/L  3 5-5 3   CHLORIDE 107 mmol/L  100-108   CARBON DIOXIDE 25 mmol/L  21-32   ANION GAP (CALC) 8 mmol/L  4-13   BLOOD UREA NITROGEN 10 mg/dL  5-25   CREATININE 0 95 mg/dL  0 60-1 30   Standardized to IDMS reference method   CALCIUM 8 9 mg/dL  8 3-10 1   BILI, TOTAL 1 04 mg/dL H 0 20-1 00   ALK PHOSPHATAS 77 U/L     ALT (SGPT) 25 U/L  12-78   AST(SGOT) 19 U/L  5-45   ALBUMIN 3 5 g/dL  3 5-5 0   TOTAL PROTEIN 6 9 g/dL  6 4-8 2   eGFR Non-African American      >60 0 ml/min/1 73sq m   - Patient Instructions: This is a fasting blood test  Water,black tea or black  coffee only after 9:00pm the night before test Drink 2 glasses of water the morning of test   National Kidney Disease Education Program recommendations are as follows:  GFR calculation is accurate only with a steady state creatinine  Chronic Kidney disease less than 60 ml/min/1 73 sq  meters  Kidney failure less than 15 ml/min/1 73 sq  meters  GLUCOSE FASTING 93 mg/dL  65-99     (1) HEMOGLOBIN A1C 21Mar2017 10:47AM Louis Lord Order Number: [id number]     Test Name Result Flag Reference   HEMOGLOBIN A1C 5 5 %  4 2-6 3   EST  AVG  GLUCOSE 111 mg/dl       (1) LIPID PANEL, FASTING 21Mar2017 10:47AM Verenice Voss    Order Number: [id number]     Test Name Result Flag Reference   CHOLESTEROL 214 mg/dL H    HDL,DIRECT 42 mg/dL  40-60   Specimen collection should occur prior to Metamizole administration due to the potential for falsely depressed results  LDL CHOLESTEROL CALCULATED 120 mg/dL H 0-100   - Patient Instructions:  This is a fasting blood test  Water,black tea or black  coffee only after 9:00pm the night before test   Drink 2 glasses of water the morning of test     - Patient Instructions: This is a fasting blood test  Water,black tea or black  coffee only after 9:00pm the night before test Drink 2 glasses of water the morning of test   Triglyceride:         Normal              <150 mg/dl       Borderline High    150-199 mg/dl       High               200-499 mg/dl       Very High          >499 mg/dl  Cholesterol:         Desirable        <200 mg/dl      Borderline High  200-239 mg/dl      High             >239 mg/dl  HDL Cholesterol:        High    >59 mg/dL      Low     <41 mg/dL  LDL CALCULATED:    This screening LDL is a calculated result  It does not have the accuracy of the Direct Measured LDL in the monitoring of patients with hyperlipidemia and/or statin therapy  Direct Measure LDL (OXQ896) must be ordered separately in these patients  TRIGLYCERIDES 262 mg/dL H <=150   Specimen collection should occur prior to N-Acetylcysteine or Metamizole administration due to the potential for falsely depressed results         Signatures   Electronically signed by : Stiven Miller DO; Mar 21 2017  5:36PM EST                       (Author)

## 2018-01-15 NOTE — MISCELLANEOUS
Message      Recorded as Task   Date: 06/16/2016 01:42 PM, Created By: Lisa Pedroza   Task Name: Care Coordination   Assigned To: Juanita Christina   Regarding Patient: Aditya Gabriel, Status: Active   Comment:    Lisa Pedroza - 16 Jun 2016 1:42 PM     TASK CREATED  Caller: [address]; Care Coordination  Mariusz from [address] [employer] left vm today at 12:51 that she would like to confirm the order for radiology testing that pt wants to have done there  Asking to have script faxed to her at [phone]  If any question c/b # [id number]  S/W Mariusz told her that Dr Clarita Zuniga had orders for B/L Hip xray, lumbar spine xray and MRI Lumbar Spine WO contrast     Mariusz said she believed the pt only called to sched the MRI but I could fax the xray orders and she'll call pt & ask he he wanted those done there also  Upon faxing MRI Lumbar Spine RX to Mariusz I found that pt has already had the xrays done within the [address] [place] [address]'s network  I included a note about that to Mariusz in the comment section when I faxed the MRI order to her  Lumbar MRI order faxed to Mariusz at [phone]  Active Problems    1  Acute medial meniscus tear of right knee, initial encounter (836 0) (S83 241A)   2  Allergic rhinitis (477 9) (J30 9)   3  Anticoagulant long-term use (V58 61) (Z79 01)   4  Benign essential hypertension (401 1) (I10)   5  Decreased hearing (389 9) (H91 90)   6  Depression (311) (F32 9)   7  DVT, lower extremity, 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)   8  Hyperlipidemia (272 4) (E78 5)   9  Insomnia (780 52) (G47 00)   10  Intervertebral disc disorders with radiculopathy, lumbar region (724 4) (M51 16)   11  Joint pain, knee (719 46) (M25 569)   12  Long term use of drug (V58 69) (Z79 899)   13  Neck nodule (784 2) (R22 1)   14  Need for prophylactic vaccination against Streptococcus pneumoniae (pneumococcus)    (V03 82) (Z23)   15   Need for prophylactic vaccination and inoculation against influenza (V04 81) (Z23)   16  On warfarin therapy (V58 61) (Z79 01)   17  Pre-operative cardiovascular examination (V72 81) (Z01 810)   18  Primary localized osteoarthritis of right knee (715 16) (M17 11)   19  Right cataract (366 9) (H26 9)   20  Right knee pain (719 46) (M25 561)   21  Right low back pain (724 2) (M54 5)   22  Sacroiliitis (720 2) (M46 1)   23  Screening for genitourinary condition (V81 6) (Z13 89)   24  Soft tissue mass (729 90) (M79 9)   25  Special screening examination for neoplasm of prostate (V76 44) (Z12 5)   26  Subclinical hypothyroidism (244 8) (E03 9)   27  Ulcerative colitis without complications (008 2) (R30 67)    Current Meds   1  AmLODIPine Besylate 5 MG Oral Tablet; TAKE 1 TABLET DAILY; Therapy: 70CDD0096 to (Evaluate:21Apr2017)  Requested for: 26Apr2016; Last   Rx:26Apr2016 Ordered   2  Gabapentin 300 MG Oral Capsule; TAKE 1 CAPSULE AT BEDTIME NIGHTLY; Therapy: 57VFH4619 to (Evaluate:70Jvl7350)  Requested for: 03ETO0201; Last   Rx:84Nzt4970 Ordered   3  Hydrocodone-Acetaminophen 5-325 MG Oral Tablet; Take 1 to 2 tablets every 8 hours as   needed for pain  No alcohol nor driving with medication use  Do not exceed 6 tablets   in 24 hour period; Therapy: 83Adc6420 to (Evaluate:56Dui0033); Last Rx:70Yxc0282 Ordered   4  MethylPREDNISolone 4 MG Oral Tablet Therapy Pack; Take as directed on packaging   with food; Therapy: 64YFX5119 to (Last Rx:26Apr2016)  Requested for: 28Xkg0382 Ordered   5  Mirtazapine 15 MG Oral Tablet; TAKE 1 TABLET AT BEDTIME; Therapy: 87Xyf9175 to (Evaluate:23Oct2016)  Requested for: 26Apr2016; Last   Rx:66Mug5716 Ordered   6  Multi For Him 50+ Oral Tablet; TAKE 1 TABLET DAILY; Therapy: 57Ero4018 to Recorded   7  Qnasl 80 MCG/ACT Nasal Aerosol Solution; 2 sprays  in each nostril daily; Therapy: 08Hpm7095 to (Last Rx:26Apr2016) Ordered   8  Warfarin Sodium 2 MG Oral Tablet; TAKE ONE TABLET BY MOUTH ONCE DAILY AS   DIRECTED;    Therapy: 52Dzp8577 to (Evaluate:21Apr2017)  Requested for: 26Apr2016; Last   Rx:26Apr2016 Ordered    Allergies    1  No Known Drug Allergies    2   Seasonal    Signatures   Electronically signed by : Svetlana Brumfield, ; Jun 16 2016  1:43PM EST                       (Author)

## 2018-01-15 NOTE — RESULT NOTES
Message   Notify the patient normal hemoglobin A1c follow up as scheduled        Verified Results  (1) COMPREHENSIVE METABOLIC PANEL 74RFR3837 01:20GR Sandy DANIEL Order Number: [id number]     Test Name Result Flag Reference   SODIUM 140 mmol/L  136-145   POTASSIUM 3 9 mmol/L  3 5-5 3   CHLORIDE 107 mmol/L  100-108   CARBON DIOXIDE 25 mmol/L  21-32   ANION GAP (CALC) 8 mmol/L  4-13   BLOOD UREA NITROGEN 10 mg/dL  5-25   CREATININE 0 95 mg/dL  0 60-1 30   Standardized to IDMS reference method   CALCIUM 8 9 mg/dL  8 3-10 1   BILI, TOTAL 1 04 mg/dL H 0 20-1 00   ALK PHOSPHATAS 77 U/L     ALT (SGPT) 25 U/L  12-78   AST(SGOT) 19 U/L  5-45   ALBUMIN 3 5 g/dL  3 5-5 0   TOTAL PROTEIN 6 9 g/dL  6 4-8 2   eGFR Non-African American      >60 0 ml/min/1 73sq m   - Patient Instructions: This is a fasting blood test  Water,black tea or black  coffee only after 9:00pm the night before test Drink 2 glasses of water the morning of test   National Kidney Disease Education Program recommendations are as follows:  GFR calculation is accurate only with a steady state creatinine  Chronic Kidney disease less than 60 ml/min/1 73 sq  meters  Kidney failure less than 15 ml/min/1 73 sq  meters  GLUCOSE FASTING 93 mg/dL  65-99     (1) HEMOGLOBIN A1C 21Mar2017 10:47AM Maru Danielle Order Number: [id number]     Test Name Result Flag Reference   HEMOGLOBIN A1C 5 5 %  4 2-6 3   EST  AVG   GLUCOSE 111 mg/dl         Signatures   Electronically signed by : Sendy Noel DO; Mar 21 2017  5:35PM EST                       (Author)

## 2018-01-15 NOTE — MISCELLANEOUS
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)   11  Hyperlipidemia (272 4) (E78 5)   12  Insomnia (780 52) (G47 00)   13  Intervertebral disc disorders with radiculopathy, lumbar region (724 4) (M51 16)   14  Joint pain, knee (719 46) (M25 569)   15  Left shoulder pain (719 41) (M25 512)   16  Long term use of drug (V58 69) (Z79 899)   17  Need for prophylactic vaccination against Streptococcus pneumoniae (pneumococcus)    (V03 82) (Z23)   18  Need for prophylactic vaccination and inoculation against influenza (V04 81) (Z23)   19  Nocturia (788 43) (R35 1)   20  On warfarin therapy (V58 61) (Z79 01)   21  Primary localized osteoarthritis of right knee (715 16) (M17 11)   22  Right cataract (366 9) (H26 9)   23  Right knee pain (719 46) (M25 561)   24  Right low back pain (724 2) (M54 5)   25  Sacroiliitis (720 2) (M46 1)   26  Screening for genitourinary condition (V81 6) (Z13 89)   27  Special screening examination for neoplasm of prostate (V76 44) (Z12 5)   28  Subclinical hypothyroidism (244 8) (E03 9)   29  History of Ulcerative colitis without complications (208 9) (B37 16)    Past Medical History    1  History of Anxiety (300 00) (F41 9)   2  History of Bleeding disorder (287 9) (D68 9)   3  History of DVT, lower extremity, 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)   4  History of arthritis (V13 4) (Z87 39)   5  History of Neck nodule (784 2) (R22 1)   6   Need for prophylactic vaccination and inoculation against influenza (V04 81) (Z23)   7  History of Ulcerative colitis without complications (230 9) (N56 73)    Surgical History    1  History of Partial Colectomy With Colostomy   2  History of Venous Ligation With Stripping Saphenous Vein    Family History  Brother    1  Family history of myocardial infarction (V17 3) (Z82 49)  Unknown    2  Family history of Back disorder  Family History    3  Family history of cardiac disorder (V17 49) (Z82 49)   4  Family history of hypertension (V17 49) (Z82 49)    Social History    · Denied: History of Drug Use   · Former smoker (V15 82) (I87 989)   · No alcohol use    Current Meds   1  AmLODIPine Besylate 5 MG Oral Tablet; TAKE 1 TABLET DAILY; Therapy: 35BAL8154 to (Macel Razor)  Requested for: 66IBS2865; Last   Rx:09Mar2017 Ordered   2  DULoxetine HCl - 20 MG Oral Capsule Delayed Release Particles; TAKE 1 CAPSULE    DAILY; Therapy: 17SXY2940 to (Evaluate:54Gou1017)  Requested for: 75SZU9034; Last   Rx:24Mar2017 Ordered   3  Mirtazapine 15 MG Oral Tablet; TAKE 1 TABLET AT BEDTIME; Therapy: 49Uic5882 to (Evaluate:26Sqe1904)  Requested for: 43Cit1359; Last   Rx:92Qih5493 Ordered   4  Multi For Him 50+ Oral Tablet; TAKE 1 TABLET DAILY; Therapy: 91Zco0456 to Recorded   5  Warfarin Sodium 2 MG Oral Tablet; TAKE ONE TABLET BY MOUTH ONCE DAILY AS   DIRECTED; Therapy: 66Hkh2056 to (Evaluate:04Mar2018)  Requested for: 64NJK9856; Last   Rx:09Mar2017 Ordered    Allergies    1  No Known Drug Allergies    2  Seasonal    Health Management  Health Maintenance   Medicare Annual Wellness Visit; every 1 year; Next Due: 79IKD9338;  Overdue    Future Appointments    Date/Time Provider Specialty Site   05/23/2017 01:30 PM Mercedez Chopra DO Internal [facility]   05/09/2017 10:00 AM Rajesh Bowman MD Gastroenterology Adult [facility] [address]     Signatures   Electronically signed by : Eugenia Reddy, ; Apr 10 2017  9:33AM EST                       (Author)    Electronically signed by : Stiven Milelr DO;  Apr 11 2017  8:09PM EST                       (Author)

## 2018-01-16 NOTE — RESULT NOTES
Verified Results  (1) PT WITH INR 04Bno7704 08:20AM Caryl Avery   TW Order Number: [id number]  TW Order Number: [id number]  TW Order Number: [id number] Order Number: [id number]     Test Name Result Flag Reference   INR 2 36 H 0 86-1 16   PT 25 5 seconds H 12 0-14 3       Plan  DVT, lower extremity, recurrent    · Coumadin Flow Sheet; Status:Complete;   Done: 21WHU4663 12:00AM

## 2018-01-16 NOTE — RESULT NOTES
Verified Results  * MRI ABDOMEN W WO CONTRAST AND MRCP 35Tgh1802 07:38PM Thelma Guerrero     Test Name Result Flag Reference   MRI ABDOMEN W WO CONTRAST AND MRCP (Report)     MRI OF THE ABDOMEN (PANCREAS) WITH AND WITHOUT CONTRAST WITH MRCP     INDICATION: History of Pancreatic duct dilatation  COMPARISON: CT dated April 3, 2017     TECHNIQUE: The following pulse sequences were obtained on a 1 5 T scanner: Coronal and axial T2 with TE of 90 and 180 respectively, axial T2 with fat saturation, axial FIESTA fat-sat, axial T1-weighted in-and-out-of phase, axial DWI/ADC, pre-contrast    axial T1 with fat saturation, post-contrast dynamic axial T1 with fat saturation at 20, 70, and 180 seconds, followed by coronal T1 with fat saturation and 7-minute delayed axial T1 with fat saturation  3D MRCP images were obtained with radial thick    slabs and projections  3D rendering was performed from the acquisition scanner  IV Contrast: 7 mL of Gadobutrol injection (SINGLE-DOSE)      FINDINGS:     PANCREAS:    General: Mildly atrophic pancreatic parenchyma diffusely  Lesions: No cystic or solid pancreatic mass identified  Duct: ] Duct measures approximately 3 mm  No dilatation is seen  BILARY DUCTS:    No intra-hepatic biliary ductal dilatation  Common bile duct is normal in caliber  No evidence of bile duct stricture or choledocholithiasis  No mass identified  LIVER: Normal       GALLBLADDER: Tiny stones or sludge layering dependently  ADRENAL GLANDS: Normal      SPLEEN: Normal      KIDNEYS: Right upper pole renal cyst measuring 1 2 cm  ABDOMINAL CAVITY: No lymphadenopathy or mass  No Ascites  BOWEL: Unremarkable MRI appearance  OSSEOUS STRUCTURES: Bone hemangiomata T11, L3, and L4  VASCULAR STRUCTURES: Visualized vasculature is normal      ABDOMINAL WALL: Normal      LOWER CHEST: Unremarkable MRI appearance  IMPRESSION:   1  No pancreatic mass identified     2  Normal intrahepatic and extrahepatic biliary ducts  Normal caliber pancreatic duct  Could not confirm IPMN   3  Tiny gallbladder stones or sludge  4  Right side renal cyst    5  Bone hemangiomata  Workstation performed: [id number]     Signed by:    Jenny Paris DO   8/18/17

## 2018-01-16 NOTE — RESULT NOTES
Verified Results  (1) PT WITH INR [id number] 10:19AM Chelsea Tejada     Test Name Result Flag Reference   INR 2 41 H 0 86-1 16   PT 25 9 seconds H 12 0-14 3

## 2018-01-17 NOTE — RESULT NOTES
Message   Recorded as Task   Date: 05/31/2016 03:28 PM, Created By: Crystal Simon   Task Name: Follow Up   Assigned To: Meera Felicianor   Regarding Patient: Graham Parker, Status: Active   Comment:    Lona Gentile - 31 May 2016 3:28 PM     TASK CREATED  Caller: Self; Other; [phone] (Home)  Received a TC from Mrs Johnny Quinn today stating he was unable to complete the MRI today because of clausterphobia  She is going to talk to MRI and schedule an open MRI as soon as she is able  Just JOSELIN     [place] - 93 May 2016 3:30 PM     TASK REPLIED TO: Previously Assigned To BING Nava md aware        Signatures   Electronically signed by : Lencho Castillo, ; May 31 2016  3:57PM EST                       (Author)

## 2018-01-17 NOTE — RESULT NOTES
Verified Results  (1) PT WITH INR [id number] 12:29PM Tahir Conteh     Test Name Result Flag Reference   INR 2 58 H 0 86-1 16   PT 26 4 seconds H 11 8-14 1

## 2018-01-17 NOTE — RESULT NOTES
Verified Results  (1) C-REACTIVE PROTEIN 30Jun2017 09:47AM Koko Morales    Order Number: [id number]     Test Name Result Flag Reference   C-REACT PROTEIN <3 0 mg/L  <3 0     (1) SED RATE [id number] 09:47AM Koko Morales    Order Number: [id number]     Test Name Result Flag Reference   SED RATE 6 mm/hour  0-10     (1) CBC/PLT/DIFF [id number] 09:47AM Koko Morales    Order Number: [id number]     Test Name Result Flag Reference   WBC COUNT 4 05 Thousand/uL L 4 31-10 16   RBC COUNT 4 68 Million/uL  3 88-5 62   HEMOGLOBIN 14 3 g/dL  12 0-17 0   HEMATOCRIT 41 0 %  36 5-49 3   MCV 88 fL  82-98   MCH 30 6 pg  26 8-34 3   MCHC 34 9 g/dL  31 4-37 4   RDW 14 0 %  11 6-15 1   MPV 11 3 fL  8 9-12 7   PLATELET COUNT 700 Thousands/uL  149-390   nRBC AUTOMATED 0 /100 WBCs     NEUTROPHILS RELATIVE PERCENT 53 %  43-75   LYMPHOCYTES RELATIVE PERCENT 30 %  14-44   MONOCYTES RELATIVE PERCENT 12 %  4-12   EOSINOPHILS RELATIVE PERCENT 4 %  0-6   BASOPHILS RELATIVE PERCENT 1 %  0-1   NEUTROPHILS ABSOLUTE COUNT 2 18 Thousands/? ??L  1 85-7 62   LYMPHOCYTES ABSOLUTE COUNT 1 22 Thousands/? ??L  0 60-4 47   MONOCYTES ABSOLUTE COUNT 0 47 Thousand/? ??L  0 17-1 22   EOSINOPHILS ABSOLUTE COUNT 0 16 Thousand/? ??L  0 00-0 61   BASOPHILS ABSOLUTE COUNT 0 02 Thousands/? ??L  0 00-0 10     (1) COMPREHENSIVE METABOLIC PANEL [id number] 20:23OP Koko Morales    Order Number: [id number]     Test Name Result Flag Reference   SODIUM 143 mmol/L  136-145   POTASSIUM 3 8 mmol/L  3 5-5 3   CHLORIDE 109 mmol/L H 100-108   CARBON DIOXIDE 29 mmol/L  21-32   ANION GAP (CALC) 5 mmol/L  4-13   BLOOD UREA NITROGEN 13 mg/dL  5-25   CREATININE 0 87 mg/dL  0 60-1 30   Standardized to IDMS reference method   CALCIUM 8 8 mg/dL  8 3-10 1   BILI, TOTAL 0 96 mg/dL  0 20-1 00   ALK PHOSPHATAS 76 U/L     ALT (SGPT) 21 U/L  12-78   AST(SGOT) 19 U/L  5-45   ALBUMIN 3 7 g/dL  3 5-5 0   TOTAL PROTEIN 6 7 g/dL  6 4-8 2   eGFR Non- American      >60 0 ml/min/1 73sq [facility] [employer] Disease Education Program recommendations are as follows:  GFR calculation is accurate only with a steady state creatinine  Chronic Kidney disease less than 60 ml/min/1 73 sq  meters  Kidney failure less than 15 ml/min/1 73 sq  meters     GLUCOSE FASTING 98 mg/dL  65-99

## 2018-01-17 NOTE — RESULT NOTES
Verified Results  (1) PT WITH INR [id number] 08:42AM Rosy Rise     Test Name Result Flag Reference   INR 3 04 H 0 86-1 16   PT 30 9 seconds H 12 0-14 3       Plan  DVT, lower extremity, recurrent    · (1) PT WITH INR; Status:Active;  Requested UGB:14DMB0041;

## 2018-01-18 NOTE — MISCELLANEOUS
Message   Recorded as Task   Date: 06/28/2016 12:44 PM, Created By: Vivien Dominguez   Task Name: Follow Up   Assigned To: Viviana Kelley   Regarding Patient: Alyssa Estes, Status: In Progress   Radha Interiano - 28 Jun 2016 12:44 PM     TASK CREATED  left VM to go over MRI results   Anisha Rocha - 28 Jun 2016 2:14 PM     TASK REPLIED TO: Previously Assigned To [facility]  Received a c/b from pt's wife, Jeremias Yeboah  Maria Victoria Macias states that pt  received a message from Dr Sharron Lyn to go over MRI results  Maria Victoria Macias was advised that I will notify Dr Sharron Lyn, so he can c/b w/ the results  Maria Victoria Macias states pt  should be home the rest of the evening, and that Dr Sharron Lyn can reach pt  at [phone]  Vivien Dominguez - 28 Jun 2016 2:58 PM     TASK REPLIED TO: Previously Assigned To Vivien Dominguez  discussed MRI findings    please schedule right SIJ injection   Anisha Rocha - 28 Jun 2016 3:27 PM     TASK REPLIED TO: Previously Assigned To [facility]  S/w pt  and advised of above  Pt  verbalized understanding  Pt  states he does take Coumadin, but denies ASA or NSAIDs  Pt  was advised for this procedure, he does not need to hold Coumadin  Pt  verbalized understanding  Pt  was scheduled for R SIJ INJ w/ Dr Sharron Lyn on 7/19/16 at 215 pm w/ pt  arriving at 2 pm  Pt  then asked me to review everything else w/ his wife  S/w pt's wife, Maria Victoria Macias, and advised of above  Pre-procedure instructions reviewed w/ Maria Victoria Macias: NPO 1 hr prior, needs , if pt  develops infection or is put on abx call our office to reschedule  Maria Victoria Macias verbalized understanding  Checked w/ Dr Sharron Lyn regarding PT/INR, per Dr Sharron Lyn no PT/INR needed prior to SIJ inj, and pt  should not stop Coumadin  Maria Victoria Macias was advised of the same  Maria Victoria Macias verbalized understanding and was appreciative  Anisha Rocha - 28 Jun 2016 3:27 PM     TASK IN PROGRESS        Active Problems    1   Acute medial meniscus tear of right knee, initial encounter (836 0) (S83 241A)   2  Allergic rhinitis (477 9) (J30 9)   3  Anticoagulant long-term use (V58 61) (Z79 01)   4  Benign essential hypertension (401 1) (I10)   5  Decreased hearing (389 9) (H91 90)   6  Depression (311) (F32 9)   7  DVT, lower extremity, 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)   8  Hyperlipidemia (272 4) (E78 5)   9  Insomnia (780 52) (G47 00)   10  Intervertebral disc disorders with radiculopathy, lumbar region (724 4) (M51 16)   11  Joint pain, knee (719 46) (M25 569)   12  Long term use of drug (V58 69) (Z79 899)   13  Neck nodule (784 2) (R22 1)   14  Need for prophylactic vaccination against Streptococcus pneumoniae (pneumococcus)    (V03 82) (Z23)   15  Need for prophylactic vaccination and inoculation against influenza (V04 81) (Z23)   16  On warfarin therapy (V58 61) (Z79 01)   17  Pre-operative cardiovascular examination (V72 81) (Z01 810)   18  Primary localized osteoarthritis of right knee (715 16) (M17 11)   19  Right cataract (366 9) (H26 9)   20  Right knee pain (719 46) (M25 561)   21  Right low back pain (724 2) (M54 5)   22  Sacroiliitis (720 2) (M46 1)   23  Screening for genitourinary condition (V81 6) (Z13 89)   24  Soft tissue mass (729 90) (M79 9)   25  Special screening examination for neoplasm of prostate (V76 44) (Z12 5)   26  Subclinical hypothyroidism (244 8) (E03 9)   27  Ulcerative colitis without complications (554 1) (N75 86)    Current Meds   1  AmLODIPine Besylate 5 MG Oral Tablet; TAKE 1 TABLET DAILY; Therapy: 98NZQ8768 to (Evaluate:54Gec3270)  Requested for: 14Pks0100; Last   Rx:13Oxc7457 Ordered   2  Gabapentin 300 MG Oral Capsule; TAKE 1 CAPSULE AT BEDTIME NIGHTLY; Therapy: 70UGD9443 to (Evaluate:08Rdy5426)  Requested for: 30LJW8761; Last   Rx:38Fbu6110 Ordered   3  Hydrocodone-Acetaminophen 5-325 MG Oral Tablet; Take 1 to 2 tablets every 8 hours as   needed for pain  No alcohol nor driving with medication use   Do not exceed 6 tablets   in 24 hour period; Therapy: 26Apr2016 to (Evaluate:52Txq8526); Last Rx:26Apr2016 Ordered   4  MethylPREDNISolone 4 MG Oral Tablet Therapy Pack; Take as directed on packaging   with food; Therapy: 25PAU2629 to (Last Rx:26Apr2016)  Requested for: 26Apr2016 Ordered   5  Mirtazapine 15 MG Oral Tablet; TAKE 1 TABLET AT BEDTIME; Therapy: 35Ljd4000 to (Evaluate:23Oct2016)  Requested for: 26Apr2016; Last   Rx:26Apr2016 Ordered   6  Multi For Him 50+ Oral Tablet; TAKE 1 TABLET DAILY; Therapy: 90Kff5747 to Recorded   7  Qnasl 80 MCG/ACT Nasal Aerosol Solution; 2 sprays  in each nostril daily; Therapy: 26Apr2016 to (Last Rx:26Apr2016) Ordered   8  Warfarin Sodium 2 MG Oral Tablet; TAKE ONE TABLET BY MOUTH ONCE DAILY AS   DIRECTED; Therapy: 55Kfk1234 to (Evaluate:21Apr2017)  Requested for: 26Apr2016; Last   Rx:26Apr2016 Ordered    Allergies    1  No Known Drug Allergies    2   Seasonal    Signatures   Electronically signed by : Tere Dugan RN; Jun 28 2016  3:27PM EST                       (Author)

## 2018-01-18 NOTE — RESULT NOTES
Verified Results  (1) PT WITH INR 64Coq0579 11:36AM Bita Chan     Test Name Result Flag Reference   INR 2 32 H 0 86-1 16   PT 25 2 seconds H 12 0-14 3

## 2018-01-22 ENCOUNTER — GENERIC CONVERSION - ENCOUNTER (OUTPATIENT)
Dept: OTHER | Facility: OTHER | Age: 73
End: 2018-01-22

## 2018-01-22 ENCOUNTER — ANTICOAG VISIT (OUTPATIENT)
Dept: INTERNAL MEDICINE CLINIC | Facility: CLINIC | Age: 73
End: 2018-01-22

## 2018-01-22 VITALS
HEIGHT: 64 IN | TEMPERATURE: 97.8 F | RESPIRATION RATE: 18 BRPM | HEART RATE: 107 BPM | WEIGHT: 213 LBS | SYSTOLIC BLOOD PRESSURE: 154 MMHG | BODY MASS INDEX: 36.37 KG/M2 | DIASTOLIC BLOOD PRESSURE: 96 MMHG | OXYGEN SATURATION: 95 %

## 2018-01-23 NOTE — PROGRESS NOTES
Assessment   1  Depression (311) (F32 9)  2  Allergic rhinitis (477 9) (J30 9)  3  Anticoagulant long-term use (V58 61) (Z79 01)  4  Benign essential hypertension (401 1) (I10)  5  Crohn disease (555 9) (K50 90)  6  Nocturia (788 43) (R35 1)  7  Chronic cough (786 2) (R05)  8  Chest wall pain (786 52) (R07 89)  9  Atypical chest pain (786 59) (R07 89)     Assessment and plan #1 depression/anxiety the patient does report to me symptoms of both irritability, frustration and impulsivity he has been on Remeron for many years and feels it is not working as well as it had in the past no suicidal ideation I will have the patient; he does have chronic back pain and also knee pain that he has seen orthopedics in the past for; I think he may be a simple candidate for Cymbalta which would also help with anxiety depression and chronic pain I would like to get the opinion of counsellor Estela Mcwilliams and make a decision next visit if to change this medication  #2 allergic rhinitis I recommended patient use Flonase 2 sprays each nostril once a day as needed #3 benign essential hypertension with the patient start monitoring his blood pressure routinely reduce his weight reduce sodium and reduce stress we will continue to monitor #4 Crohn's disease status post total colectomy will have the patient see GI admitting #5 nocturia we'll check hemoglobin A1c, fasting blood sugar and PSA and #6 and a cough/anterior rib cage pain check x-ray of the chest and also rib #7 atypical chest pain likely musculoskeletal or check EKG sinus rhythm no acute changes and we'll check a stress test;The patient does have multiple risk factors family history, hypertension, hyperlipidemia RTO in 2 months call if any problems          Plan  Atypical chest pain    · ECHO STRESS TEST W CONTRAST IF INDICATED; Status:Hold For - Scheduling; Requested  for:09Mar2017;   Benign essential hypertension    · (1) COMPREHENSIVE METABOLIC PANEL; Status:Active; Requested for:09Mar2017;    · (1) LIPID PANEL, FASTING; Status:Active; Requested for:09Mar2017;   Benign essential hypertension, Long term use of drug    · (1) HEMOGLOBIN A1C; Status:Active; Requested for:09Mar2017;   Chest wall pain    · XR RIBS BILATERAL 3 VIEW; Status:Active; Requested for:09Mar2017;   Chronic cough    · * XR CHEST PA & LATERAL; Status:Active; Requested for:09Mar2017;   Crohn disease    · 1 - Paris Calderon MD  (Gastroenterology) Physician Referral  Consult Only: the expectation is that the  referring provider will communicate back to the patient on treatment options  Evaluation and  Treatment: the expectation is that the referred to provider will communicate back to the patient  on treatment options  Status: Active  Requested for: 02KKU0709  () Care Summary provided  : Yes  Depression    · 1 - Sung Hardin (Licensed Social Worker) Physician Referral  Consult Only: the expectation  is that the referring provider will communicate back to the patient on treatment options  Evaluation  and Treatment: the expectation is that the referred to provider will communicate back to the patient  on treatment options  Status: Hold For - Scheduling  Requested for: 24CHS0963  () Care Summary provided  : Yes  Nocturia, Special screening examination for neoplasm of prostate    · (1) PSA (SCREEN) (Dx V76 44 Screen for Prostate Cancer); Status:Active; Requested  SEE:86KAX9709;     Chief Complaint  Chief Complaint Free Text Note Form: The patient has insomnia and ongoing back pain  Chief Complaint Chronic Condition [facility]: Patient is here today for follow up of chronic conditions described in HPI        History of Present Illness  HPI: 73-year-old male coming in for a follow-up examination benign essential hypertension, depression, hyperlipidemia, subclinical hypothyroidism; he is here to review his laboratories and updated on his medical condition; reports to me that the pt report allergie rhinorrhe , with the winter months as reported to me in the past he was prescribed Flonase and this was very helpful; the pt is on remeron was seeing Dr Jay Burk; bad temper , cough deep in lungs , was a walker , , varicose veins he had a shot in the back but no relief   eats junk , sweets joanne; because of the back and knee pain chronic , on disibility 1997 no etoh      Review of Systems  Complete-Male:   Constitutional: No fever or chills, feels well, no tiredness, no recent weight gain or weight loss  Cardiovascular: chest pain and continuous; with deep breath, 4-6 month, but the heart rate was not slow, no intermittent leg claudication, the heart rate was not fast, no palpitations and no extremity edema  Respiratory: cough, but no shortness of breath, no wheezing and no shortness of breath during exertion  Gastrointestinal: No complaints of abdominal pain, no constipation, no nausea or vomiting, no diarrhea or bloody stools  Genitourinary: nocturia  Psychiatric: anxiety, sleep disturbances and depression, but not suicidal    ROS Reviewed:   ROS reviewed  Active Problems   1  Allergic rhinitis (477 9) (J30 9)  2  Anticoagulant long-term use (V58 61) (Z79 01)  3  Benign essential hypertension (401 1) (I10)  4  Decreased hearing (389 9) (H91 90)  5  Depression (311) (F32 9)  6  History of DVT, lower extremity, 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)  7  Hyperlipidemia (272 4) (E78 5)  8  Insomnia (780 52) (G47 00)  9  Intervertebral disc disorders with radiculopathy, lumbar region (724 4) (M51 16)  10  Joint pain, knee (719 46) (M25 569)  11  Long term use of drug (V58 69) (Z79 899)  12  Need for prophylactic vaccination against Streptococcus pneumoniae (pneumococcus) (V03 82)    (Z23)  13  Need for prophylactic vaccination and inoculation against influenza (V04 81) (Z23)  14  On warfarin therapy (V58 61) (Z79 01)  15  Primary localized osteoarthritis of right knee (715 16) (M17 11)  16  Right cataract (366 9) (H26 9)  17  Right knee pain (719 46) (M25 561)  18  Right low back pain (724 2) (M54 5)  19  Sacroiliitis (720 2) (M46 1)  20  Screening for genitourinary condition (V81 6) (Z13 89)  21  Special screening examination for neoplasm of prostate (V76 44) (Z12 5)  22  Subclinical hypothyroidism (244 8) (E03 9)  23  History of Ulcerative colitis without complications (121 9) (E94 09)    Past Medical History   1  History of Anxiety (300 00) (F41 9)  2  History of Bleeding disorder (287 9) (D68 9)  3  History of DVT, lower extremity, 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)  4  History of arthritis (V13 4) (Z87 39)  5  History of Neck nodule (784 2) (R22 1)  6  Need for prophylactic vaccination and inoculation against influenza (V04 81) (Z23)  7  History of Ulcerative colitis without complications (990 3) (M30 12)  Active Problems And Past Medical History Reviewed: The active problems and past medical history were reviewed and updated today  Surgical History   1  History of Partial Colectomy With Colostomy  2  History of Venous Ligation With Stripping Saphenous Vein  Surgical History Reviewed: The surgical history was reviewed and updated today  Family History  Brother   1  Family history of myocardial infarction (V17 3) (Z82 49)  Unknown   2  Family history of Back disorder  Family History   3  Family history of cardiac disorder (V17 49) (Z82 49)  4  Family history of hypertension (V17 49) (Z82 49)  Family History Reviewed: The family history was reviewed and updated today  Social History    · Denied: History of Drug Use   · Former smoker (V15 82) (B97 928)   · No alcohol use  Social History Reviewed: The social history was reviewed and updated today  The social history was reviewed and is unchanged  Current Meds  1  AmLODIPine Besylate 5 MG Oral Tablet; TAKE 1 TABLET DAILY; Therapy: 09LFL2187 to (Evaluate:83Aau4617)  Requested for: 08Sep2016; Last Rx:41Rnq7222   Ordered  2   Mirtazapine 15 MG Oral Tablet; TAKE 1 TABLET AT BEDTIME; Therapy: 51Olo4863 to (Evaluate:14Jkf7085)  Requested for: 70Vfo0133; Last Rx:93Xrn6610   Ordered  3  Multi For Him 50+ Oral Tablet; TAKE 1 TABLET DAILY; Therapy: 96Yah5115 to Recorded  4  Warfarin Sodium 2 MG Oral Tablet; TAKE ONE TABLET BY MOUTH ONCE DAILY AS DIRECTED; Therapy: 01Ozr8517 to (Evaluate:63Lua1935)  Requested for: 78Ygm5152; Last Rx:87Lga7312   Ordered  Medication List Reviewed: The medication list was reviewed and updated today  Allergies   1  No Known Drug Allergies   2  Seasonal    Vitals  Vital Signs    Recorded: 81HFS0171 01:35PM   Temperature 97 8 F   Heart Rate 107   Respiration 18   Systolic 997, RUE, Sitting   Diastolic 96, RUE, Sitting   BP CUFF SIZE Large   Height 5 ft 4 in   Weight 213 lb    BMI Calculated 36 56   BSA Calculated 2 01   O2 Saturation 95     Physical Exam    Constitutional   General appearance: No acute distress, well appearing and well nourished  Eyes   Conjunctiva and lids: No swelling, erythema, or discharge  Pupils and irises: Equal, round and reactive to light  Ears, Nose, Mouth, and Throat   External inspection of ears and nose: Normal     Otoscopic examination: Tympanic membrance translucent with normal light reflex  Canals patent without erythema  Nasal mucosa, septum, and turbinates: Normal without edema or erythema  Oropharynx: Normal with no erythema, edema, exudate or lesions  post nasal drip  Pulmonary   Respiratory effort: No increased work of breathing or signs of respiratory distress  Auscultation of lungs: Clear to auscultation, equal breath sounds bilaterally, no wheezes, no rales, no rhonci  Cardiovascular   Auscultation of heart: Normal rate and rhythm, normal S1 and S2, without murmurs  Abdomen   Abdomen: Non-tender, no masses  Liver and spleen: No hepatomegaly or splenomegaly  Psychiatric   Mood and affect: Abnormal   Mood and Affect: anxious and irritable          Results/Data  ECG: A 12 lead ECG was performed and was normal 1   No acute ischemia1   Rhythm and rate:1  normal sinus rhythm1   P-waves:1  the P wave is normal1   QRS:1  the QRS is normal1   ST segment:1  the ST segments are normal1         1 Amended By: Becka Mann; Mar 09 2017 2:43 PM EST    Health Management  Health Maintenance   Medicare Annual Wellness Visit; every 1 year; Next Due: 57OVG2275;  Overdue    Signatures   Electronically signed by : Nikki Rice DO; Mar  9 2017  2:27PM EST                       (Author)    Electronically signed by : Nikki Rice DO; Mar  9 2017  2:41PM EST                       (Author)    Electronically signed by : Nikki Rice DO; Mar  9 2017  2:44PM EST                       (Author)

## 2018-01-23 NOTE — MISCELLANEOUS
Message  GI Reminder Recall [facility]:   Date: 12/26/2017   Dear Kwesi Juarez:     Review of our records shows you are due for the following: Follow Up Visit  Please call the following office to schedule your appointment:   [address] [place] [address], [address] [place] [address] [phone]  We look forward to hearing from you! Sincerely,     [facility]      Signatures   Electronically signed by :  Danette Smallwood, ; Dec 26 2017  1:17PM EST                       (Author)

## 2018-01-24 VITALS — WEIGHT: 160 LBS | BODY MASS INDEX: 26.66 KG/M2 | HEIGHT: 65 IN

## 2018-01-24 NOTE — CONSULTS
History of Present Illness  61-year-old male with a suspicious lesion of his chest which presented 2 months ago  He states that it has grown in size slightly  There is concern for basal cell carcinoma  He presents today for evaluation  Discussion/Summary    61-year-old male with a suspicious lesion of his chest and a right neck mass  I discussed with him the possibility that the chest lesion could be a basal cell carcinoma  I obtained a 3 mm punch biopsy and will sent to pathology for further evaluation  We should have that result in the next 10-14 days  He will follow up around that time to discuss further treatment options  Regarding the right neck mass  This has been fine needle aspirated by ENT physician the past and found to be benign   I recommended further follow up with the Ear Nose and Throat specialist to discuss removal       Signatures   Electronically signed by : MATHEW Shah ; Jan 21 2018  2:21PM EST                       (Author)

## 2018-01-26 ENCOUNTER — OFFICE VISIT (OUTPATIENT)
Dept: PLASTIC SURGERY | Facility: CLINIC | Age: 73
End: 2018-01-26

## 2018-01-26 VITALS — HEIGHT: 65 IN | WEIGHT: 158 LBS | BODY MASS INDEX: 26.33 KG/M2

## 2018-01-26 DIAGNOSIS — L82.1 SEBORRHEIC KERATOSIS: ICD-10-CM

## 2018-01-26 DIAGNOSIS — L81.4 SOLAR LENTIGO: Primary | ICD-10-CM

## 2018-01-26 PROCEDURE — 99024 POSTOP FOLLOW-UP VISIT: CPT | Performed by: SURGERY

## 2018-01-26 RX ORDER — LORATADINE 10 MG/1
CAPSULE, LIQUID FILLED ORAL DAILY PRN
COMMUNITY
End: 2020-02-04 | Stop reason: ALTCHOICE

## 2018-01-26 RX ORDER — PHENOL 1.4 %
AEROSOL, SPRAY (ML) MUCOUS MEMBRANE
COMMUNITY
End: 2022-03-28 | Stop reason: HOSPADM

## 2018-01-26 RX ORDER — OMEPRAZOLE 20 MG/1
1 CAPSULE, DELAYED RELEASE ORAL DAILY
Status: ON HOLD | COMMUNITY
Start: 2017-08-02 | End: 2018-04-09

## 2018-01-26 RX ORDER — GABAPENTIN 100 MG/1
CAPSULE ORAL
COMMUNITY
Start: 2018-01-08 | End: 2018-04-05 | Stop reason: SDUPTHER

## 2018-01-26 RX ORDER — MIRTAZAPINE 15 MG/1
TABLET, FILM COATED ORAL
COMMUNITY
Start: 2017-12-19 | End: 2018-03-09

## 2018-01-26 NOTE — ASSESSMENT & PLAN NOTE
60-year-old male status post excisional biopsy of right chest wall lesion  Pathology revealed a solar lentigo/seborrheic keratosis  No further surgical intervention is necessary  Sutures were removed  His incision is clean dry and intact  He will follow up as needed

## 2018-01-26 NOTE — PROGRESS NOTES
Solar lentigo  76-year-old male status post excisional biopsy of right chest wall lesion  Pathology revealed a solar lentigo/seborrheic keratosis  No further surgical intervention is necessary  Sutures were removed  His incision is clean dry and intact  He will follow up as needed

## 2018-02-02 ENCOUNTER — APPOINTMENT (OUTPATIENT)
Dept: LAB | Facility: CLINIC | Age: 73
End: 2018-02-02
Payer: MEDICARE

## 2018-02-02 LAB — INR PPP: 2.33 (ref 0.86–1.16)

## 2018-02-02 PROCEDURE — 85610 PROTHROMBIN TIME: CPT

## 2018-02-02 PROCEDURE — 36415 COLL VENOUS BLD VENIPUNCTURE: CPT

## 2018-02-05 ENCOUNTER — ANTICOAG VISIT (OUTPATIENT)
Dept: INTERNAL MEDICINE CLINIC | Facility: CLINIC | Age: 73
End: 2018-02-05

## 2018-02-19 DIAGNOSIS — I82.90 DEEP VEIN THROMBOSIS (DVT) OF NON-EXTREMITY VEIN, UNSPECIFIED CHRONICITY: Primary | ICD-10-CM

## 2018-03-01 ENCOUNTER — APPOINTMENT (OUTPATIENT)
Dept: LAB | Facility: CLINIC | Age: 73
End: 2018-03-01
Payer: MEDICARE

## 2018-03-01 ENCOUNTER — ANTICOAG VISIT (OUTPATIENT)
Dept: INTERNAL MEDICINE CLINIC | Facility: CLINIC | Age: 73
End: 2018-03-01

## 2018-03-01 DIAGNOSIS — O22.30 DEEP PHLEBOTHROMBOSIS, ANTEPARTUM, WITH DELIVERY (HCC): ICD-10-CM

## 2018-03-01 DIAGNOSIS — I82.409 DEEP PHLEBOTHROMBOSIS, ANTEPARTUM, WITH DELIVERY (HCC): ICD-10-CM

## 2018-03-01 LAB
INR PPP: 1.84 (ref 0.86–1.16)
INR PPP: 1.84 (ref 0.86–1.16)
PROTHROMBIN TIME: 21.4 SECONDS (ref 12.1–14.4)

## 2018-03-01 PROCEDURE — 36415 COLL VENOUS BLD VENIPUNCTURE: CPT

## 2018-03-01 PROCEDURE — 85610 PROTHROMBIN TIME: CPT

## 2018-03-01 NOTE — PROGRESS NOTES
Increase to 2mg, 2mg, 2mg, 1mg and recheck in 1 week  Spoke to patients wife and advised instructions

## 2018-03-08 ENCOUNTER — TELEPHONE (OUTPATIENT)
Dept: INTERNAL MEDICINE CLINIC | Facility: CLINIC | Age: 73
End: 2018-03-08

## 2018-03-08 ENCOUNTER — ANTICOAG VISIT (OUTPATIENT)
Dept: INTERNAL MEDICINE CLINIC | Facility: CLINIC | Age: 73
End: 2018-03-08

## 2018-03-08 ENCOUNTER — APPOINTMENT (OUTPATIENT)
Dept: LAB | Facility: CLINIC | Age: 73
End: 2018-03-08
Payer: MEDICARE

## 2018-03-08 DIAGNOSIS — I82.4Z9 DEEP VEIN THROMBOSIS (DVT) OF DISTAL VEIN OF LOWER EXTREMITY, UNSPECIFIED CHRONICITY, UNSPECIFIED LATERALITY (HCC): Primary | ICD-10-CM

## 2018-03-08 DIAGNOSIS — O22.30 DEEP PHLEBOTHROMBOSIS, ANTEPARTUM, WITH DELIVERY (HCC): ICD-10-CM

## 2018-03-08 DIAGNOSIS — I82.409 DEEP PHLEBOTHROMBOSIS, ANTEPARTUM, WITH DELIVERY (HCC): ICD-10-CM

## 2018-03-08 LAB
INR PPP: 2.02 (ref 0.86–1.16)
PROTHROMBIN TIME: 23.1 SECONDS (ref 12.1–14.4)

## 2018-03-08 PROCEDURE — 36415 COLL VENOUS BLD VENIPUNCTURE: CPT

## 2018-03-08 PROCEDURE — 85610 PROTHROMBIN TIME: CPT

## 2018-03-09 ENCOUNTER — OFFICE VISIT (OUTPATIENT)
Dept: INTERNAL MEDICINE CLINIC | Facility: CLINIC | Age: 73
End: 2018-03-09
Payer: MEDICARE

## 2018-03-09 ENCOUNTER — ANTICOAG VISIT (OUTPATIENT)
Dept: INTERNAL MEDICINE CLINIC | Facility: CLINIC | Age: 73
End: 2018-03-09

## 2018-03-09 VITALS
HEART RATE: 79 BPM | DIASTOLIC BLOOD PRESSURE: 78 MMHG | BODY MASS INDEX: 27.26 KG/M2 | OXYGEN SATURATION: 96 % | RESPIRATION RATE: 16 BRPM | WEIGHT: 163.6 LBS | SYSTOLIC BLOOD PRESSURE: 126 MMHG | HEIGHT: 65 IN

## 2018-03-09 DIAGNOSIS — R22.42 ANKLE MASS, LEFT: Primary | ICD-10-CM

## 2018-03-09 DIAGNOSIS — F33.1 MODERATE EPISODE OF RECURRENT MAJOR DEPRESSIVE DISORDER (HCC): Chronic | ICD-10-CM

## 2018-03-09 DIAGNOSIS — I82.4Y9 DEEP VEIN THROMBOSIS (DVT) OF PROXIMAL LOWER EXTREMITY, UNSPECIFIED CHRONICITY, UNSPECIFIED LATERALITY (HCC): ICD-10-CM

## 2018-03-09 DIAGNOSIS — F41.9 ANXIETY: ICD-10-CM

## 2018-03-09 DIAGNOSIS — R42 VERTIGO: ICD-10-CM

## 2018-03-09 PROCEDURE — 99214 OFFICE O/P EST MOD 30 MIN: CPT | Performed by: NURSE PRACTITIONER

## 2018-03-09 RX ORDER — MECLIZINE HYDROCHLORIDE 25 MG/1
25 TABLET ORAL 3 TIMES DAILY PRN
Qty: 30 TABLET | Refills: 2 | Status: SHIPPED | OUTPATIENT
Start: 2018-03-09 | End: 2019-12-17 | Stop reason: ALTCHOICE

## 2018-03-09 NOTE — PROGRESS NOTES
Assessment/Plan:       Diagnoses and all orders for this visit:    Ankle mass, left  -     VAS lower limb venous duplex study, unilateral/limited; Future    Vertigo  -     meclizine (ANTIVERT) 25 mg tablet; Take 1 tablet (25 mg total) by mouth 3 (three) times a day as needed for dizziness    Deep vein thrombosis (DVT) of proximal lower extremity, unspecified chronicity, unspecified laterality ([facility] )  -     Cancel: Protime-INR; Standing  -     Protime-INR    Moderate episode of recurrent major depressive disorder ([facility] )  Comments:  recommended psychiatry and therapy, pt declined    Anxiety        Hydrate well and eat more vegetables and fruits  Doppler of left leg    Subjective:      Patient ID: Deja Tijerina is a 71 y o  male  Patient is here for a mass on his left leg for 1 week    He has hx of multiple dvt before he was put on coumadin  Concerned about clot  No pain no warmth    Co depression, had multiple deaths in the family      Dizziness   This is a chronic problem  The current episode started more than 1 month ago  The problem occurs 2 to 4 times per day  The problem has been unchanged  It is a spinning sensation, patient has problems with ears, had hx of ear infections    The following portions of the patient's history were reviewed and updated as appropriate: allergies, current medications, past family history, past medical history, past social history, past surgical history and problem list     Review of Systems   Constitutional: Negative  HENT: Negative  Eyes: Negative  Respiratory: Negative  Cardiovascular: Negative  Gastrointestinal: Negative  Musculoskeletal: Negative  Neurological: Positive for dizziness           Objective:      /78 (BP Location: Left arm, Patient Position: Sitting, Cuff Size: Standard)   Pulse 79   Resp 16   Ht 5' 5" (1 651 m)   Wt 74 2 kg (163 lb 9 6 oz)   SpO2 96%   BMI 27 22 kg/m²          Physical Exam   Constitutional: He is oriented to person, place, and time  He appears well-developed and well-nourished  HENT:   Head: Normocephalic and atraumatic  Eyes: Conjunctivae are normal  Pupils are equal, round, and reactive to light  Neck: Normal range of motion  Neck supple  Cardiovascular: Normal rate and regular rhythm  Pulmonary/Chest: Effort normal and breath sounds normal    Abdominal: Soft  Bowel sounds are normal    Musculoskeletal: Normal range of motion  Neurological: He is alert and oriented to person, place, and time  Skin: Skin is warm and dry

## 2018-03-09 NOTE — PATIENT INSTRUCTIONS
Vitamin K in Foods   WHAT YOU NEED TO KNOW:   What do I need to know about warfarin and vitamin K?   · Warfarin is a type of medicine called a blood thinner  It makes your blood clot more slowly  This can help prevent dangerous problems, such as a stroke (a blood clot in the brain)  Vitamin K helps your blood to clot (thicken to stop bleeding)  Warfarin works by making it harder for your body to use vitamin K to clot blood  Changes in the amount of vitamin K that you normally eat can affect how warfarin works  · Your healthcare provider can tell how well warfarin is working from a blood test that you will have regularly  This test is called an international normalized ratio (INR)  It shows how quickly your blood clots  To keep your INR at a healthy level, you need to manage how much vitamin K you eat  How much vitamin K should I eat while I take warfarin? Eat the same amount of vitamin K each day  Do not change the amount of vitamin K you normally have from foods or supplements  This helps keep your INR at the same healthy level  · A big increase in vitamin K can lower your INR  This can cause dangerous clotting in your blood  · A big decrease in vitamin K can raise your INR  This can make it harder for your blood to clot  It can cause you to bleed too much  Do not avoid foods that contain vitamin K  Which foods have vitamin K? Dark green leafy vegetables have the highest amounts of vitamin K   Foods that contain vitamin K include the following:  · Foods with more than 100 mcg per serving:      ¨ ½ cup of cooked kale (531 mcg)    ¨ ½ cup of cooked spinach (444 mcg)    ¨ ½ cup of cooked sandy greens (418 mcg)    ¨ 1 cup of cooked broccoli (220 mcg)    ¨ 1 cup of cooked brussels sprouts (219 mcg)    ¨ 1 cup of raw sandy greens (184 mcg)    ¨ 1 cup of raw spinach (145 mcg)    ¨ 1 cup of raw endive (116 mcg)    · Foods with 50 to 100 mcg per serving:      ¨ 1 cup of raw broccoli (89 mcg)    ¨ ½ cup of cooked cabbage (82 mcg)    ¨ 1 cup of green leaf lettuce (71 mcg)    ¨ 1 cup of mary lettuce (57 mcg)    · Foods with 15 to 50 mcg per serving:      ¨ 4 anton of asparagus (48 mcg)    ¨ 1 medium kiwi fruit (31 mcg)    ¨ 1 cup of raw blackberries or blueberries (29 mcg)    ¨ 1 cup of red or green grapes (23 mcg)    ¨ ½ cup of cooked peas (19 mcg)  What are other sources of vitamin K? Multivitamins and other supplements may contain 10 to 80 mcg of vitamin K  These amounts can cause changes in your INR  Read the labels of any supplements you take  Do not take more than 1 supplement that contains vitamin K  Talk to your healthcare provider about the supplements you are taking  When should I contact my healthcare provider? · You have a poor appetite  · You have an upset stomach  · You have hair loss  · You bruise more easily than normal      · You have questions about your medicines, supplements, or the amount of vitamin K you eat  When should I seek immediate care? · You cough up blood  · You have red or black bowel movements  · Your urine is red or dark brown  · You have bleeding from your gums or nose  · You have heavy bleeding from a wound that does not stop, or unusually heavy monthly periods  · You have a severe headache  CARE AGREEMENT:   You have the right to help plan your care  Learn about your health condition and how it may be treated  Discuss treatment options with your caregivers to decide what care you want to receive  You always have the right to refuse treatment  The above information is an  only  It is not intended as medical advice for individual conditions or treatments  Talk to your doctor, nurse or pharmacist before following any medical regimen to see if it is safe and effective for you  © 2017 [employer] Information is for End User's use only and may not be sold, redistributed or otherwise used for commercial purposes  All illustrations and images included in CareNotes® are the copyrighted property of A D A M , Inc  or Obed Mckeon  Vegetables Low in Vitamin K  Food Serving Size Vitamin K Content   Turnips (Raw or Cooked) 1 cup 0 2?g (0% DV)   Beets (Raw or Cooked) 1 cup 0 3?g (0% DV)   Sweet Corn (Raw or Canned) 1 cup 0 5?g (1% DV)   Onions (Raw or Cooked) 1 medium (331g) 1?g (1% DV)   Rutabagas (Raw or Cooked) 1 cup 0 5?g (1% DV)   Pumpkin (Cooked) 1 cup 2?g (2% DV)   Winter Squash, [place]/Spaghetti (Cooked) 1 cup 2?g (2% DV)   Summer Squash (Cooked) 1 cup 3?g (4% DV)   Potatoes (Cooked) 1 cup 3?g (4% DV)   Sweet Potatoes (Cooked) 1 cup 7?g (9% DV)   Eggplants (Cooked) 1 cup 3?g (4% DV)   Bamboo shoots (Raw or Canned) 1 cup 0?g (0% DV)   Portabella Mushrooms (Raw or Cooked) 1 cup 0?g (0% DV)   White Mushrooms (Raw or Cooked) 1 cup 0?g (0% DV)   Shiitake Mushrooms (Cooked) 1 cup 0?g (0% DV)   Tomatoes (Raw) 1 cup 14?g (18% DV)   Tomatoes (Cooked) 1 cup 7?g (8% DV)   Cucumbers (Raw) 1 cup 17?g (21% DV)   Iceberg Lettuce (Raw) 1 cup shredded 17 4?g (22% DV)   Artichokes 1 medium 17 8?g (22% DV)   For more foods please use the nutrient ranking tool, or check the nutriton facts for individual foods  Low vitamin K does not necessarily guarantee safe administration of Warfarin (Coumadin)  Fruits Low in Vitamin K  Food Serving Size Vitamin K Content   Oranges 1 medium (140g) 0?g (0% DV)   Watermelon 1 cup diced 0 2?g (0% DV)   Litchis 1 cup 0 8?g (1% DV)   Bananas 1 medium (105g) 0 6?g (1% DV)   Pineapple 1 cup 1 2?g (1% DV)   Apples 1 medium (182g) 4?g (5% DV)   Nectarines 1 medium (142g) 3?g (4% DV)   Strawberries  1 cup 3?g (4% DV)   Peaches 1 medium (150g) 4?g (5% DV)   Avoid grapefruit and cranberries  For more foods please use the nutrient ranking tool, or check the nutriton facts for individual foods  Low vitamin K does not necessarily guarantee safe administration of Warfarin (Coumadin)  Grains/Starches Low in Vitamin K  All grain products are low in vitamin K   Here are some examples:   Food Serving Size Vitamin K Content   White Rice 1 cup 0?g (0% DV)   Brown Rice 1 cup 1 2?g (1% DV)   Couscous 1 cup 0 2?g (0% DV)   Cornmeal (White or Yellow) 1 cup 0 4?g (0% DV)   Bulgur 1 cup 0 9?g (1% DV)   Pearled Barley 1 cup 1 3?g (2% DV)   Pasta (Plain) 1 cup 0?g (0% DV)   Whole Wheat Bread 1 Slice 0?g (0% DV)   Buckwheat 1 cup 3 2?g (4% DV)   Millet 1 cup 0 5?g (1% DV)   Quinoa 1 cup 0 0?g (0% DV)

## 2018-03-12 ENCOUNTER — HOSPITAL ENCOUNTER (OUTPATIENT)
Dept: NON INVASIVE DIAGNOSTICS | Facility: CLINIC | Age: 73
Discharge: HOME | End: 2018-03-12
Payer: MEDICARE

## 2018-03-12 DIAGNOSIS — R22.42 ANKLE MASS, LEFT: ICD-10-CM

## 2018-03-12 DIAGNOSIS — I80.02 SUPERFICIAL PHLEBITIS AND THROMBOPHLEBITIS OF LEFT LEG: Primary | ICD-10-CM

## 2018-03-12 PROCEDURE — 93970 EXTREMITY STUDY: CPT | Performed by: SURGERY

## 2018-03-12 PROCEDURE — 93970 EXTREMITY STUDY: CPT

## 2018-03-23 ENCOUNTER — APPOINTMENT (OUTPATIENT)
Dept: LAB | Facility: CLINIC | Age: 73
End: 2018-03-23
Payer: MEDICARE

## 2018-03-23 ENCOUNTER — ANTICOAG VISIT (OUTPATIENT)
Dept: INTERNAL MEDICINE CLINIC | Facility: CLINIC | Age: 73
End: 2018-03-23

## 2018-03-23 DIAGNOSIS — O22.30 DEEP PHLEBOTHROMBOSIS, ANTEPARTUM, WITH DELIVERY (HCC): ICD-10-CM

## 2018-03-23 DIAGNOSIS — I82.409 DEEP PHLEBOTHROMBOSIS, ANTEPARTUM, WITH DELIVERY (HCC): ICD-10-CM

## 2018-03-23 LAB
INR PPP: 2.36 (ref 0.86–1.16)
PROTHROMBIN TIME: 26.1 SECONDS (ref 12.1–14.4)

## 2018-03-23 PROCEDURE — 85610 PROTHROMBIN TIME: CPT

## 2018-03-23 PROCEDURE — 36415 COLL VENOUS BLD VENIPUNCTURE: CPT

## 2018-03-26 ENCOUNTER — APPOINTMENT (OUTPATIENT)
Dept: LAB | Facility: CLINIC | Age: 73
End: 2018-03-26
Payer: MEDICARE

## 2018-03-26 ENCOUNTER — OFFICE VISIT (OUTPATIENT)
Dept: GASTROENTEROLOGY | Facility: CLINIC | Age: 73
End: 2018-03-26
Payer: MEDICARE

## 2018-03-26 VITALS
TEMPERATURE: 98.5 F | WEIGHT: 161.8 LBS | HEIGHT: 65 IN | SYSTOLIC BLOOD PRESSURE: 125 MMHG | DIASTOLIC BLOOD PRESSURE: 81 MMHG | BODY MASS INDEX: 26.96 KG/M2 | HEART RATE: 73 BPM

## 2018-03-26 DIAGNOSIS — R63.4 WEIGHT LOSS: ICD-10-CM

## 2018-03-26 DIAGNOSIS — K86.89 DILATED PANCREATIC DUCT: ICD-10-CM

## 2018-03-26 DIAGNOSIS — R11.0 NAUSEA: ICD-10-CM

## 2018-03-26 DIAGNOSIS — R63.4 WEIGHT LOSS: Primary | ICD-10-CM

## 2018-03-26 DIAGNOSIS — K50.119 CROHN'S DISEASE OF LARGE INTESTINE WITH COMPLICATION (HCC): Chronic | ICD-10-CM

## 2018-03-26 LAB
ALBUMIN SERPL BCP-MCNC: 4 G/DL (ref 3.5–5)
ALP SERPL-CCNC: 70 U/L (ref 46–116)
ALT SERPL W P-5'-P-CCNC: 26 U/L (ref 12–78)
ANION GAP SERPL CALCULATED.3IONS-SCNC: 6 MMOL/L (ref 4–13)
AST SERPL W P-5'-P-CCNC: 23 U/L (ref 5–45)
BASOPHILS # BLD AUTO: 0.01 THOUSANDS/ΜL (ref 0–0.1)
BASOPHILS NFR BLD AUTO: 0 % (ref 0–1)
BILIRUB SERPL-MCNC: 1.2 MG/DL (ref 0.2–1)
BUN SERPL-MCNC: 12 MG/DL (ref 5–25)
CALCIUM SERPL-MCNC: 9.4 MG/DL (ref 8.3–10.1)
CHLORIDE SERPL-SCNC: 106 MMOL/L (ref 100–108)
CO2 SERPL-SCNC: 30 MMOL/L (ref 21–32)
CREAT SERPL-MCNC: 0.92 MG/DL (ref 0.6–1.3)
EOSINOPHIL # BLD AUTO: 0.04 THOUSAND/ΜL (ref 0–0.61)
EOSINOPHIL NFR BLD AUTO: 1 % (ref 0–6)
ERYTHROCYTE [DISTWIDTH] IN BLOOD BY AUTOMATED COUNT: 13.3 % (ref 11.6–15.1)
GFR SERPL CREATININE-BSD FRML MDRD: 83 ML/MIN/1.73SQ M
GLUCOSE SERPL-MCNC: 90 MG/DL (ref 65–140)
HCT VFR BLD AUTO: 42.8 % (ref 36.5–49.3)
HGB BLD-MCNC: 14.6 G/DL (ref 12–17)
LIPASE SERPL-CCNC: 91 U/L (ref 73–393)
LYMPHOCYTES # BLD AUTO: 1.19 THOUSANDS/ΜL (ref 0.6–4.47)
LYMPHOCYTES NFR BLD AUTO: 15 % (ref 14–44)
MCH RBC QN AUTO: 30 PG (ref 26.8–34.3)
MCHC RBC AUTO-ENTMCNC: 34.1 G/DL (ref 31.4–37.4)
MCV RBC AUTO: 88 FL (ref 82–98)
MONOCYTES # BLD AUTO: 0.4 THOUSAND/ΜL (ref 0.17–1.22)
MONOCYTES NFR BLD AUTO: 5 % (ref 4–12)
NEUTROPHILS # BLD AUTO: 6.24 THOUSANDS/ΜL (ref 1.85–7.62)
NEUTS SEG NFR BLD AUTO: 79 % (ref 43–75)
PLATELET # BLD AUTO: 171 THOUSANDS/UL (ref 149–390)
PMV BLD AUTO: 10.4 FL (ref 8.9–12.7)
POTASSIUM SERPL-SCNC: 4.8 MMOL/L (ref 3.5–5.3)
PROT SERPL-MCNC: 6.9 G/DL (ref 6.4–8.2)
RBC # BLD AUTO: 4.86 MILLION/UL (ref 3.88–5.62)
SODIUM SERPL-SCNC: 142 MMOL/L (ref 136–145)
WBC # BLD AUTO: 7.88 THOUSAND/UL (ref 4.31–10.16)

## 2018-03-26 PROCEDURE — 80053 COMPREHEN METABOLIC PANEL: CPT | Performed by: PHYSICIAN ASSISTANT

## 2018-03-26 PROCEDURE — 85025 COMPLETE CBC W/AUTO DIFF WBC: CPT | Performed by: PHYSICIAN ASSISTANT

## 2018-03-26 PROCEDURE — 99213 OFFICE O/P EST LOW 20 MIN: CPT | Performed by: PHYSICIAN ASSISTANT

## 2018-03-26 PROCEDURE — 83690 ASSAY OF LIPASE: CPT

## 2018-03-26 PROCEDURE — 36415 COLL VENOUS BLD VENIPUNCTURE: CPT | Performed by: PHYSICIAN ASSISTANT

## 2018-03-26 RX ORDER — ONDANSETRON 4 MG/1
4 TABLET, FILM COATED ORAL EVERY 8 HOURS PRN
Qty: 20 TABLET | Refills: 0 | Status: SHIPPED | OUTPATIENT
Start: 2018-03-26 | End: 2019-12-17 | Stop reason: ALTCHOICE

## 2018-03-26 NOTE — LETTER
March 26, 2018     Reece Kirkland  [address] [place] [address] [phone]    Patient: Uche Acevedo   YOB: 1945   Date of Visit: 3/26/2018       Dear Dr Danielle Holloway: Thank you for referring Franco Burton to me for evaluation  Below are my notes for this consultation  If you have questions, please do not hesitate to call me  I look forward to following your patient along with you           Sincerely,        Andie Lloyd PA-C        CC: No Recipients

## 2018-03-26 NOTE — PATIENT INSTRUCTIONS
EUS scheduled with Dr Jaffe at [place] on 4/9/18  Walt Peters gave pt verbal instructions/paper work given  Medication clearance faxed to [facility]

## 2018-03-26 NOTE — PROGRESS NOTES
[facility] - Outpatient Follow-up Note  Karime France 71 y o  male MRN: [id number]  Encounter: [id number]          ASSESSMENT AND PLAN:      1  Weight loss and nausea with hx of Dilated pancreatic duct: CT in 4/1/2017 with 7mm dilation of the pancreatic duct associated with intentional weight loss  Repeat CT 4/3/2017 revealed resolution of his dilated pancreatic duct  MRI 8/2017 without pancreatic mass, normal intrahepatic and extrahepatic biliary ducts and normal caliber pancreatic duct, could not confirm IPMN  He states that for the past 2 months he has not felt himself and has been experiencing nausea and abdominal discomfort  He has also had 8lb weight loss since last visit in 8/2017  Given his continued nausea, weight loss and abdominal discomfort with hx of dilated pancreatic duct, will plan for EUS for further evaluation    -zofran for nausea   -check cbc,cmp, lipase  -cardiology/pcp clearance to stop warfarin  - Case request operating room: LINEAR ENDOSCOPIC U/S    2  GERD/gastritis: EGD 4/2017 revealed gastritis and esophagitis, he was taking omeprazole 20mg daily but states he is asymptomatic and has discontinued this medication  -he may take zantac OTC as needed if symptoms return     ______________________________________________________________________    SUBJECTIVE:  Ranjeet Carter is a 70 yo male with pmh crohn's disease s/p total colectomy with ileostomy in 1999  He previously had subtotal colectomy with ileocecal anastomosis but due to ongoing symptoms, he had to have total colectomy  He has been seen in the past for GERD, abdominal pain, dilated pancreatic duct and an increased output from his ileostomy  CT abdomen in April 2017 revealed increased distention of the pancreatic duct measuring up to 7 mm, without obstructing lesions seen  He had repeat CT scan 4/3/2017 that showed resolution in this ductal dilatation    MRI with MRCP in August 2017 revealed no pancreatic mass, and was normal intra hepatic and extrahepatic biliary ducts with normal caliber pancreatic duct, could not confirm IPMN  Today he complains of nausea and "just not feeling right" for the past 2 months  He states he is seeing his PCP for dizziness and just does not feel himself  He admits to nausea without vomiting and denies abdominal pain, increased ileostomy output, melena, hematochezia, jaundice or scleral icterus  He has lost another 8 lbs since his last visit in 8/2017  He admits he is working out twice a day and lifting weights  REVIEW OF SYSTEMS IS OTHERWISE NEGATIVE  Historical Information   Past Medical History:   Diagnosis Date    [facility]     [facility]     Hypertension     Psychiatric disorder     depression, anger     Past Surgical History:   Procedure Laterality Date    COLON SURGERY      Partial colectomy and colostomy    ESOPHAGOGASTRODUODENOSCOPY N/A 4/5/2017    Procedure: ESOPHAGOGASTRODUODENOSCOPY (EGD); Surgeon: Herson Serrano MD;  [facility];   Service:     EYE SURGERY      Cataract    KNEE SURGERY      VEIN LIGATION AND STRIPPING       Social History   History   Alcohol Use No     Comment: hx etoh abuse pt states he quit 9 years ago     History   Drug Use No     History   Smoking Status    Former Smoker    Packs/day: 1 00    Years: 10 00    Quit date: 6/9/1981   Smokeless Tobacco    Never Used     Comment: 50 years ago     Family History   Problem Relation Age of Onset    Heart disease Brother     Diverticulitis Mother        Meds/Allergies       Current Outpatient Prescriptions:     amLODIPine (NORVASC) 5 mg tablet    fluticasone (FLONASE) 50 mcg/act nasal spray    gabapentin (NEURONTIN) 100 mg capsule    Loratadine (CLARITIN) 10 MG CAPS    meclizine (ANTIVERT) 25 mg tablet    Melatonin 10 MG TABS    Multiple Vitamins-Minerals (MULTI FOR HIM 50+ PO)    omeprazole (PriLOSEC) 20 mg delayed release capsule    warfarin (COUMADIN) 2 mg tablet    mirtazapine (REMERON) 15 mg tablet    ondansetron (ZOFRAN) 4 mg tablet    tamsulosin (FLOMAX) 0 4 mg    Allergies   Allergen Reactions    Duloxetine     Other      Seasonal           Objective     Blood pressure 125/81, pulse 73, temperature 98 5 °F (36 9 °C), temperature source Tympanic, height 5' 5" (1 651 m), weight 73 4 kg (161 lb 12 8 oz)  PHYSICAL EXAM:      General Appearance:   Alert, cooperative, no distress   HEENT:   Normocephalic, atraumatic, anicteric      Neck:  Supple, symmetrical, trachea midline   Lungs:   Clear to auscultation bilaterally; no rales, rhonchi or wheezing; respirations unlabored    Heart[de-identified]   Regular rate and rhythm; no murmur, rub, or gallop  Abdomen:   Soft, epigastric tenderness, non-distended; normal bowel sounds; no masses, no organomegaly    Genitalia:   Deferred    Rectal:   Deferred    Extremities:  No cyanosis, clubbing or edema        Skin:  No jaundice, rashes, or lesions    Lymph nodes:  No palpable cervical lymphadenopathy        Lab Results:   No visits with results within 1 Day(s) from this visit  Latest known visit with results is:   Appointment on 03/23/2018   Component Date Value    Protime 03/23/2018 26 1*    INR 03/23/2018 2 36*         Radiology Results:   Vas Lower Limb Venous Duplex Study, Complete Bilateral    Result Date: 3/12/2018  Narrative:  [facility]: Indications: Patient presents in his left ankle region where there is a healed venous stasis ulceration and pain in his right mid-thigh  Patient is on coumadin  History of bilateral great saphenous vein stripping and deep vein thrombosis  FINDINGS:  Segment    Right   Left                  Valve   Impression                           Valve   CFV                                                     Reflux  FV Prox                                                 Reflux  Popliteal          E1  Non Occlusive Thrombus (Chronic) Reflux  Peroneal   Reflux                                                  CONCLUSION:  Impression: RIGHT LOWER LIMB: No evidence of acute or chronic deep vein thrombosis  Acute superficial thrombophlebitis noted in a varicose vein at the knee  Doppler evaluation shows deep venous incompetence  Popliteal, posterior tibial and anterior tibial arterial Doppler waveforms are triphasic  LEFT LOWER LIMB: Chronic deep vein thrombosis is noted in the popliteal vein  No evidence of superficial thrombophlebitis noted  Doppler evaluation shows deep venous incompetence  Popliteal, posterior tibial and anterior tibial arterial Doppler waveforms are triphasic  Technical findings were given to Raven

## 2018-04-02 ENCOUNTER — TELEPHONE (OUTPATIENT)
Dept: INTERNAL MEDICINE CLINIC | Facility: CLINIC | Age: 73
End: 2018-04-02

## 2018-04-04 NOTE — TELEPHONE ENCOUNTER
Spoke to Karina Lira and advised  They would like us to send this authorization via fax so I will write up a letter with directions but just to clarify we are going to have him hold his coumadin for 5 days prior to the procedure and then once surgeon gives the ok to resume the coumadin, he is to check his PT/INR 3 days later? Please advise  Thank you

## 2018-04-05 DIAGNOSIS — G89.29 CHRONIC LOW BACK PAIN, UNSPECIFIED BACK PAIN LATERALITY, WITH SCIATICA PRESENCE UNSPECIFIED: Primary | ICD-10-CM

## 2018-04-05 DIAGNOSIS — M54.5 CHRONIC LOW BACK PAIN, UNSPECIFIED BACK PAIN LATERALITY, WITH SCIATICA PRESENCE UNSPECIFIED: Primary | ICD-10-CM

## 2018-04-05 DIAGNOSIS — F32.A DEPRESSION, UNSPECIFIED DEPRESSION TYPE: ICD-10-CM

## 2018-04-05 RX ORDER — GABAPENTIN 100 MG/1
100 CAPSULE ORAL DAILY
Qty: 30 CAPSULE | Refills: 1 | Status: SHIPPED | OUTPATIENT
Start: 2018-04-05 | End: 2018-06-04 | Stop reason: SDUPTHER

## 2018-04-05 RX ORDER — MIRTAZAPINE 15 MG/1
15 TABLET, FILM COATED ORAL
Qty: 90 TABLET | Refills: 1 | Status: SHIPPED | OUTPATIENT
Start: 2018-04-05 | End: 2018-04-06 | Stop reason: SDUPTHER

## 2018-04-06 ENCOUNTER — ANTICOAG VISIT (OUTPATIENT)
Dept: INTERNAL MEDICINE CLINIC | Facility: CLINIC | Age: 73
End: 2018-04-06

## 2018-04-06 DIAGNOSIS — F32.A DEPRESSION, UNSPECIFIED DEPRESSION TYPE: ICD-10-CM

## 2018-04-08 RX ORDER — MIRTAZAPINE 15 MG/1
TABLET, FILM COATED ORAL
Qty: 90 TABLET | Refills: 0 | Status: SHIPPED | OUTPATIENT
Start: 2018-04-08 | End: 2018-06-30 | Stop reason: SDUPTHER

## 2018-04-09 ENCOUNTER — ANESTHESIA EVENT (OUTPATIENT)
Dept: GASTROENTEROLOGY | Facility: HOSPITAL | Age: 73
End: 2018-04-09
Payer: MEDICARE

## 2018-04-09 ENCOUNTER — HOSPITAL ENCOUNTER (OUTPATIENT)
Facility: HOSPITAL | Age: 73
Setting detail: OUTPATIENT SURGERY
Discharge: HOME | End: 2018-04-09
Attending: INTERNAL MEDICINE | Admitting: INTERNAL MEDICINE
Payer: MEDICARE

## 2018-04-09 ENCOUNTER — ANESTHESIA (OUTPATIENT)
Dept: GASTROENTEROLOGY | Facility: HOSPITAL | Age: 73
End: 2018-04-09
Payer: MEDICARE

## 2018-04-09 VITALS
SYSTOLIC BLOOD PRESSURE: 127 MMHG | RESPIRATION RATE: 20 BRPM | BODY MASS INDEX: 25.83 KG/M2 | TEMPERATURE: 100 F | HEIGHT: 65 IN | HEART RATE: 74 BPM | DIASTOLIC BLOOD PRESSURE: 75 MMHG | WEIGHT: 155 LBS | OXYGEN SATURATION: 98 %

## 2018-04-09 PROCEDURE — 43238 EGD US FINE NEEDLE BX/ASPIR: CPT | Performed by: INTERNAL MEDICINE

## 2018-04-09 RX ORDER — PROPOFOL 10 MG/ML
INJECTION, EMULSION INTRAVENOUS CONTINUOUS PRN
Status: DISCONTINUED | OUTPATIENT
Start: 2018-04-09 | End: 2018-04-09 | Stop reason: SURG

## 2018-04-09 RX ORDER — SODIUM CHLORIDE 9 MG/ML
100 INJECTION, SOLUTION INTRAVENOUS CONTINUOUS
Status: CANCELLED | OUTPATIENT
Start: 2018-04-09

## 2018-04-09 RX ORDER — SODIUM CHLORIDE 9 MG/ML
INJECTION, SOLUTION INTRAVENOUS CONTINUOUS PRN
Status: DISCONTINUED | OUTPATIENT
Start: 2018-04-09 | End: 2018-04-09 | Stop reason: SURG

## 2018-04-09 RX ORDER — PROPOFOL 10 MG/ML
INJECTION, EMULSION INTRAVENOUS AS NEEDED
Status: DISCONTINUED | OUTPATIENT
Start: 2018-04-09 | End: 2018-04-09 | Stop reason: SURG

## 2018-04-09 RX ADMIN — PROPOFOL 30 MG: 10 INJECTION, EMULSION INTRAVENOUS at 13:33

## 2018-04-09 RX ADMIN — PROPOFOL 150 MG: 10 INJECTION, EMULSION INTRAVENOUS at 13:30

## 2018-04-09 RX ADMIN — SODIUM CHLORIDE: 0.9 INJECTION, SOLUTION INTRAVENOUS at 13:23

## 2018-04-09 RX ADMIN — PROPOFOL 120 MCG/KG/MIN: 10 INJECTION, EMULSION INTRAVENOUS at 13:30

## 2018-04-09 NOTE — ANESTHESIA POSTPROCEDURE EVALUATION
Post-Op Assessment Note      CV Status:  Stable    Mental Status:  Lethargic    Hydration Status:  Stable    PONV Controlled:  None    Airway Patency:  Patent    Post Op Vitals Reviewed: Yes          Staff: Anesthesiologist, CRNA           BP   158/84   Temp      Pulse  80   Resp   16   SpO2   94

## 2018-04-09 NOTE — H&P (VIEW-ONLY)
[facility] - Outpatient Follow-up Note  Gera Scott 71 y o  male MRN: [id number]  Encounter: [id number]          ASSESSMENT AND PLAN:      1  Weight loss and nausea with hx of Dilated pancreatic duct: CT in 4/1/2017 with 7mm dilation of the pancreatic duct associated with intentional weight loss  Repeat CT 4/3/2017 revealed resolution of his dilated pancreatic duct  MRI 8/2017 without pancreatic mass, normal intrahepatic and extrahepatic biliary ducts and normal caliber pancreatic duct, could not confirm IPMN  He states that for the past 2 months he has not felt himself and has been experiencing nausea and abdominal discomfort  He has also had 8lb weight loss since last visit in 8/2017  Given his continued nausea, weight loss and abdominal discomfort with hx of dilated pancreatic duct, will plan for EUS for further evaluation    -zofran for nausea   -check cbc,cmp, lipase  -cardiology/pcp clearance to stop warfarin  - Case request operating room: LINEAR ENDOSCOPIC U/S    2  GERD/gastritis: EGD 4/2017 revealed gastritis and esophagitis, he was taking omeprazole 20mg daily but states he is asymptomatic and has discontinued this medication  -he may take zantac OTC as needed if symptoms return     ______________________________________________________________________    SUBJECTIVE:  Job Cherry is a 70 yo male with pmh crohn's disease s/p total colectomy with ileostomy in 1999  He previously had subtotal colectomy with ileocecal anastomosis but due to ongoing symptoms, he had to have total colectomy  He has been seen in the past for GERD, abdominal pain, dilated pancreatic duct and an increased output from his ileostomy  CT abdomen in April 2017 revealed increased distention of the pancreatic duct measuring up to 7 mm, without obstructing lesions seen  He had repeat CT scan 4/3/2017 that showed resolution in this ductal dilatation    MRI with MRCP in August 2017 revealed no pancreatic mass, and was normal intra hepatic and extrahepatic biliary ducts with normal caliber pancreatic duct, could not confirm IPMN  Today he complains of nausea and "just not feeling right" for the past 2 months  He states he is seeing his PCP for dizziness and just does not feel himself  He admits to nausea without vomiting and denies abdominal pain, increased ileostomy output, melena, hematochezia, jaundice or scleral icterus  He has lost another 8 lbs since his last visit in 8/2017  He admits he is working out twice a day and lifting weights  REVIEW OF SYSTEMS IS OTHERWISE NEGATIVE  Historical Information   Past Medical History:   Diagnosis Date    [facility] disease ([facility] [address] [facility] )     [facility]     Hypertension     Psychiatric disorder     depression, anger     Past Surgical History:   Procedure Laterality Date    COLON SURGERY      Partial colectomy and colostomy    ESOPHAGOGASTRODUODENOSCOPY N/A 4/5/2017    Procedure: ESOPHAGOGASTRODUODENOSCOPY (EGD); Surgeon: Fran Vázquez MD;  [facility];   Service:     EYE SURGERY      Cataract    KNEE SURGERY      VEIN LIGATION AND STRIPPING       Social History   History   Alcohol Use No     Comment: hx etoh abuse pt states he quit 9 years ago     History   Drug Use No     History   Smoking Status    Former Smoker    Packs/day: 1 00    Years: 10 00    Quit date: 6/9/1981   Smokeless Tobacco    Never Used     Comment: 50 years ago     Family History   Problem Relation Age of Onset    Heart disease Brother     Diverticulitis Mother        Meds/Allergies       Current Outpatient Prescriptions:     amLODIPine (NORVASC) 5 mg tablet    fluticasone (FLONASE) 50 mcg/act nasal spray    gabapentin (NEURONTIN) 100 mg capsule    Loratadine (CLARITIN) 10 MG CAPS    meclizine (ANTIVERT) 25 mg tablet    Melatonin 10 MG TABS    Multiple Vitamins-Minerals (MULTI FOR HIM 50+ PO)    omeprazole (PriLOSEC) 20 mg delayed release capsule    warfarin (COUMADIN) 2 mg tablet    mirtazapine (REMERON) 15 mg tablet    ondansetron (ZOFRAN) 4 mg tablet    tamsulosin (FLOMAX) 0 4 mg    Allergies   Allergen Reactions    Duloxetine     Other      Seasonal           Objective     Blood pressure 125/81, pulse 73, temperature 98 5 °F (36 9 °C), temperature source Tympanic, height 5' 5" (1 651 m), weight 73 4 kg (161 lb 12 8 oz)  PHYSICAL EXAM:      General Appearance:   Alert, cooperative, no distress   HEENT:   Normocephalic, atraumatic, anicteric      Neck:  Supple, symmetrical, trachea midline   Lungs:   Clear to auscultation bilaterally; no rales, rhonchi or wheezing; respirations unlabored    Heart[de-identified]   Regular rate and rhythm; no murmur, rub, or gallop  Abdomen:   Soft, epigastric tenderness, non-distended; normal bowel sounds; no masses, no organomegaly    Genitalia:   Deferred    Rectal:   Deferred    Extremities:  No cyanosis, clubbing or edema        Skin:  No jaundice, rashes, or lesions    Lymph nodes:  No palpable cervical lymphadenopathy        Lab Results:   No visits with results within 1 Day(s) from this visit  Latest known visit with results is:   Appointment on 03/23/2018   Component Date Value    Protime 03/23/2018 26 1*    INR 03/23/2018 2 36*         Radiology Results:   Vas Lower Limb Venous Duplex Study, Complete Bilateral    Result Date: 3/12/2018  Narrative:  [facility]PORT [facility]IN[facility]: Indications: Patient presents in his left ankle region where there is a healed venous stasis ulceration and pain in his right mid-thigh  Patient is on coumadin  History of bilateral great saphenous vein stripping and deep vein thrombosis  FINDINGS:  Segment    Right   Left                  Valve   Impression                           Valve   CFV                                                     Reflux  FV Prox                                                 Reflux  Popliteal          E1  Non Occlusive Thrombus (Chronic) Reflux  Peroneal   Reflux                                                  CONCLUSION:  Impression: RIGHT LOWER LIMB: No evidence of acute or chronic deep vein thrombosis  Acute superficial thrombophlebitis noted in a varicose vein at the knee  Doppler evaluation shows deep venous incompetence  Popliteal, posterior tibial and anterior tibial arterial Doppler waveforms are triphasic  LEFT LOWER LIMB: Chronic deep vein thrombosis is noted in the popliteal vein  No evidence of superficial thrombophlebitis noted  Doppler evaluation shows deep venous incompetence  Popliteal, posterior tibial and anterior tibial arterial Doppler waveforms are triphasic  Technical findings were given to Raven

## 2018-04-09 NOTE — ANESTHESIA PREPROCEDURE EVALUATION
Review of Systems/Medical History  Patient summary reviewed        Cardiovascular  Hypertension , DVT   Pulmonary       GI/Hepatic      Comment: Crohns    Dilated pancreatic duct          Endo/Other     GYN       Hematology   Musculoskeletal       Neurology   Psychology                Anesthesia Plan  ASA Score- 2     Anesthesia Type- IV sedation with anesthesia with ASA Monitors  Additional Monitors:   Airway Plan:         Plan Factors-    Induction- intravenous  Postoperative Plan-     Informed Consent- Anesthetic plan and risks discussed with patient

## 2018-04-09 NOTE — OP NOTE
**** GI/ENDOSCOPY REPORT ****     PATIENT NAME: Darryl Covert - VISIT ID:  Patient ID: [id number]   YOB: 1945     INTRODUCTION: Upper Endoscopic [ULTRASOUND] - A 71 male patient presents   for an outpatient Upper Endoscopic [ULTRASOUND] at [facility]  INDICATIONS: Dilated PD in past but most recent PD showed normal PD  He   also has nausea and weight loss  CONSENT: The benefits, risks, and alternatives to the procedure were   discussed and informed consent was obtained from the patient  PREPARATION:  EKG, pulse, pulse oximetry and blood pressure were monitored   throughout the procedure  ASA Classification: Class 3 - Patient has severe   systemic disturbance that may or may not be related to the disorder   requiring surgery  MEDICATIONS: Anesthesia-check records     PROCEDURE:  The ultrasound gastroscope was passed with ease through the   mouth under direct visualization and advanced to the 3rd portion of the   duodenum  The scope was withdrawn and the mucosa was carefully examined  The views were good  The patient's toleration of the procedure was good  FINDINGS: Visual Exam: Limited endoscopic exam with oblique viewing   echoendoscope was notable for a hiatal hernia, but was otherwise   unremarkable  EUS EXAM: There was some heterogeneity of the pancreatic   parenchyma in the head of the pancreas, but no discrete mass lesion  The   CBD and PD were not dilated  There were no stones in the gallbladder and   no lesions in the examined part of the liver  There were no enlarged nodes   in the celiac area  COMPLICATIONS: There were no complications  IMPRESSIONS: PD not dilated on this exam  Heterogenous parenchyma in the   head of the pancreas but no discrete mass lesion  RECOMMENDATIONS: MRI with and without contrast and MRCP in 6 months to   assess for change in the pancreas or recurrent PD dilation   Follow-up   appointment with endoscopist in 2-3 months for further evaluation and   management of his nausea and weight loss  ESTIMATED BLOOD LOSS:     PATHOLOGY SPECIMENS:     PROCEDURE CODES:     ICD-9 Codes:     ICD-10 Codes:     PERFORMED BY: MATHEW Bryan  on 04/09/2018  Version 1, electronically signed by MATHEW Hogan  on 04/09/2018   at 15:04

## 2018-04-12 ENCOUNTER — APPOINTMENT (OUTPATIENT)
Dept: LAB | Facility: CLINIC | Age: 73
End: 2018-04-12
Payer: MEDICARE

## 2018-04-12 ENCOUNTER — ANTICOAG VISIT (OUTPATIENT)
Dept: INTERNAL MEDICINE CLINIC | Facility: CLINIC | Age: 73
End: 2018-04-12

## 2018-04-12 DIAGNOSIS — I82.4Z9 DEEP VEIN THROMBOSIS (DVT) OF DISTAL VEIN OF LOWER EXTREMITY, UNSPECIFIED CHRONICITY, UNSPECIFIED LATERALITY (HCC): ICD-10-CM

## 2018-04-12 LAB
INR PPP: 1.38 (ref 0.86–1.16)
PROTHROMBIN TIME: 17 SECONDS (ref 12.1–14.4)

## 2018-04-12 PROCEDURE — 36415 COLL VENOUS BLD VENIPUNCTURE: CPT

## 2018-04-12 PROCEDURE — 85610 PROTHROMBIN TIME: CPT

## 2018-04-16 ENCOUNTER — APPOINTMENT (OUTPATIENT)
Dept: LAB | Facility: CLINIC | Age: 73
End: 2018-04-16
Payer: MEDICARE

## 2018-04-16 ENCOUNTER — TRANSCRIBE ORDERS (OUTPATIENT)
Dept: LAB | Facility: CLINIC | Age: 73
End: 2018-04-16

## 2018-04-16 DIAGNOSIS — I82.4Z9 ACUTE VENOUS EMBOLISM AND THROMBOSIS OF DEEP VESSELS OF DISTAL LOWER EXTREMITY, UNSPECIFIED LATERALITY (HCC): Primary | ICD-10-CM

## 2018-04-16 LAB
INR PPP: 1.8 (ref 0.86–1.16)
PROTHROMBIN TIME: 21 SECONDS (ref 12.1–14.4)

## 2018-04-16 PROCEDURE — 36415 COLL VENOUS BLD VENIPUNCTURE: CPT

## 2018-04-16 PROCEDURE — 85610 PROTHROMBIN TIME: CPT

## 2018-04-17 ENCOUNTER — ANTICOAG VISIT (OUTPATIENT)
Dept: INTERNAL MEDICINE CLINIC | Facility: CLINIC | Age: 73
End: 2018-04-17

## 2018-04-20 ENCOUNTER — TRANSCRIBE ORDERS (OUTPATIENT)
Dept: LAB | Facility: CLINIC | Age: 73
End: 2018-04-20

## 2018-04-20 ENCOUNTER — APPOINTMENT (OUTPATIENT)
Dept: LAB | Facility: CLINIC | Age: 73
End: 2018-04-20
Payer: MEDICARE

## 2018-04-20 ENCOUNTER — ANTICOAG VISIT (OUTPATIENT)
Dept: INTERNAL MEDICINE CLINIC | Facility: CLINIC | Age: 73
End: 2018-04-20

## 2018-04-20 DIAGNOSIS — I82.4Z9 ACUTE VENOUS EMBOLISM AND THROMBOSIS OF DEEP VESSELS OF DISTAL LOWER EXTREMITY, UNSPECIFIED LATERALITY (HCC): Primary | ICD-10-CM

## 2018-04-20 LAB
INR PPP: 2.19 (ref 0.86–1.16)
PROTHROMBIN TIME: 24.6 SECONDS (ref 12.1–14.4)

## 2018-04-20 PROCEDURE — 36415 COLL VENOUS BLD VENIPUNCTURE: CPT

## 2018-04-20 PROCEDURE — 85610 PROTHROMBIN TIME: CPT

## 2018-04-26 ENCOUNTER — ANTICOAG VISIT (OUTPATIENT)
Dept: INTERNAL MEDICINE CLINIC | Facility: CLINIC | Age: 73
End: 2018-04-26

## 2018-04-26 ENCOUNTER — APPOINTMENT (OUTPATIENT)
Dept: LAB | Facility: CLINIC | Age: 73
End: 2018-04-26
Payer: MEDICARE

## 2018-04-26 ENCOUNTER — TRANSCRIBE ORDERS (OUTPATIENT)
Dept: LAB | Facility: CLINIC | Age: 73
End: 2018-04-26

## 2018-04-26 DIAGNOSIS — I82.4Z9 ACUTE VENOUS EMBOLISM AND THROMBOSIS OF DEEP VESSELS OF DISTAL LOWER EXTREMITY, UNSPECIFIED LATERALITY (HCC): ICD-10-CM

## 2018-04-26 DIAGNOSIS — I82.4Z9 ACUTE VENOUS EMBOLISM AND THROMBOSIS OF DEEP VESSELS OF DISTAL LOWER EXTREMITY, UNSPECIFIED LATERALITY (HCC): Primary | ICD-10-CM

## 2018-04-26 DIAGNOSIS — I82.409 DEEP VEIN THROMBOSIS (DVT) OF LOWER EXTREMITY, UNSPECIFIED CHRONICITY, UNSPECIFIED LATERALITY, UNSPECIFIED VEIN (HCC): Primary | ICD-10-CM

## 2018-04-26 LAB
INR PPP: 2.25 (ref 0.86–1.16)
PROTHROMBIN TIME: 25.7 SECONDS (ref 12.1–14.4)

## 2018-04-26 PROCEDURE — 36415 COLL VENOUS BLD VENIPUNCTURE: CPT

## 2018-04-26 PROCEDURE — 85610 PROTHROMBIN TIME: CPT

## 2018-05-03 ENCOUNTER — ANTICOAG VISIT (OUTPATIENT)
Dept: INTERNAL MEDICINE CLINIC | Facility: CLINIC | Age: 73
End: 2018-05-03

## 2018-05-04 ENCOUNTER — ANTICOAG VISIT (OUTPATIENT)
Dept: INTERNAL MEDICINE CLINIC | Facility: CLINIC | Age: 73
End: 2018-05-04

## 2018-05-04 ENCOUNTER — APPOINTMENT (OUTPATIENT)
Dept: LAB | Facility: CLINIC | Age: 73
End: 2018-05-04
Payer: MEDICARE

## 2018-05-04 DIAGNOSIS — I82.4Z9 ACUTE VENOUS EMBOLISM AND THROMBOSIS OF DEEP VESSELS OF DISTAL LOWER EXTREMITY, UNSPECIFIED LATERALITY (HCC): ICD-10-CM

## 2018-05-09 DIAGNOSIS — I82.4Z9 ACUTE VENOUS EMBOLISM AND THROMBOSIS OF DEEP VESSELS OF DISTAL LOWER EXTREMITY, UNSPECIFIED LATERALITY (HCC): Primary | ICD-10-CM

## 2018-05-18 ENCOUNTER — APPOINTMENT (OUTPATIENT)
Dept: LAB | Facility: CLINIC | Age: 73
End: 2018-05-18
Payer: MEDICARE

## 2018-05-18 ENCOUNTER — ANTICOAG VISIT (OUTPATIENT)
Dept: INTERNAL MEDICINE CLINIC | Facility: CLINIC | Age: 73
End: 2018-05-18

## 2018-05-18 DIAGNOSIS — E78.5 HYPERLIPIDEMIA: ICD-10-CM

## 2018-05-18 LAB
ALBUMIN SERPL BCP-MCNC: 3.8 G/DL (ref 3.5–5)
ALP SERPL-CCNC: 68 U/L (ref 46–116)
ALT SERPL W P-5'-P-CCNC: 28 U/L (ref 12–78)
ANION GAP SERPL CALCULATED.3IONS-SCNC: 7 MMOL/L (ref 4–13)
AST SERPL W P-5'-P-CCNC: 21 U/L (ref 5–45)
BILIRUB SERPL-MCNC: 1 MG/DL (ref 0.2–1)
BUN SERPL-MCNC: 10 MG/DL (ref 5–25)
CALCIUM SERPL-MCNC: 8.7 MG/DL (ref 8.3–10.1)
CHLORIDE SERPL-SCNC: 105 MMOL/L (ref 100–108)
CHOLEST SERPL-MCNC: 139 MG/DL (ref 50–200)
CO2 SERPL-SCNC: 30 MMOL/L (ref 21–32)
CREAT SERPL-MCNC: 0.86 MG/DL (ref 0.6–1.3)
GFR SERPL CREATININE-BSD FRML MDRD: 87 ML/MIN/1.73SQ M
GLUCOSE P FAST SERPL-MCNC: 96 MG/DL (ref 65–99)
HDLC SERPL-MCNC: 53 MG/DL (ref 40–60)
LDLC SERPL CALC-MCNC: 71 MG/DL (ref 0–100)
NONHDLC SERPL-MCNC: 86 MG/DL
POTASSIUM SERPL-SCNC: 4.2 MMOL/L (ref 3.5–5.3)
PROT SERPL-MCNC: 6.6 G/DL (ref 6.4–8.2)
SODIUM SERPL-SCNC: 142 MMOL/L (ref 136–145)
TRIGL SERPL-MCNC: 77 MG/DL

## 2018-05-18 PROCEDURE — 80061 LIPID PANEL: CPT

## 2018-05-18 PROCEDURE — 80053 COMPREHEN METABOLIC PANEL: CPT

## 2018-05-27 DIAGNOSIS — E78.5 HYPERLIPIDEMIA: ICD-10-CM

## 2018-05-31 ENCOUNTER — ANTICOAG VISIT (OUTPATIENT)
Dept: INTERNAL MEDICINE CLINIC | Facility: CLINIC | Age: 73
End: 2018-05-31

## 2018-05-31 ENCOUNTER — APPOINTMENT (OUTPATIENT)
Dept: LAB | Facility: CLINIC | Age: 73
End: 2018-05-31
Payer: MEDICARE

## 2018-06-01 DIAGNOSIS — O22.30 DVT (DEEP VEIN THROMBOSIS) IN PREGNANCY: Primary | ICD-10-CM

## 2018-06-01 RX ORDER — WARFARIN SODIUM 2 MG/1
TABLET ORAL
Qty: 90 TABLET | Refills: 3 | Status: SHIPPED | OUTPATIENT
Start: 2018-06-01 | End: 2019-05-05 | Stop reason: SDUPTHER

## 2018-06-04 ENCOUNTER — APPOINTMENT (OUTPATIENT)
Dept: LAB | Facility: CLINIC | Age: 73
End: 2018-06-04
Payer: MEDICARE

## 2018-06-04 ENCOUNTER — ANTICOAG VISIT (OUTPATIENT)
Dept: INTERNAL MEDICINE CLINIC | Facility: CLINIC | Age: 73
End: 2018-06-04

## 2018-06-04 DIAGNOSIS — M54.5 CHRONIC LOW BACK PAIN, UNSPECIFIED BACK PAIN LATERALITY, WITH SCIATICA PRESENCE UNSPECIFIED: ICD-10-CM

## 2018-06-04 DIAGNOSIS — G89.29 CHRONIC LOW BACK PAIN, UNSPECIFIED BACK PAIN LATERALITY, WITH SCIATICA PRESENCE UNSPECIFIED: ICD-10-CM

## 2018-06-04 RX ORDER — GABAPENTIN 100 MG/1
CAPSULE ORAL
Qty: 30 CAPSULE | Refills: 1 | Status: SHIPPED | OUTPATIENT
Start: 2018-06-04 | End: 2018-10-11 | Stop reason: SDUPTHER

## 2018-06-07 ENCOUNTER — OFFICE VISIT (OUTPATIENT)
Dept: INTERNAL MEDICINE CLINIC | Facility: CLINIC | Age: 73
End: 2018-06-07
Payer: MEDICARE

## 2018-06-07 VITALS
HEART RATE: 81 BPM | HEIGHT: 65 IN | WEIGHT: 154 LBS | DIASTOLIC BLOOD PRESSURE: 84 MMHG | SYSTOLIC BLOOD PRESSURE: 110 MMHG | OXYGEN SATURATION: 97 % | BODY MASS INDEX: 25.66 KG/M2

## 2018-06-07 DIAGNOSIS — Z12.11 SCREENING FOR COLON CANCER: ICD-10-CM

## 2018-06-07 DIAGNOSIS — R22.1 NECK MASS: ICD-10-CM

## 2018-06-07 DIAGNOSIS — J30.89 ALLERGIC RHINITIS DUE TO OTHER ALLERGIC TRIGGER, UNSPECIFIED SEASONALITY: ICD-10-CM

## 2018-06-07 DIAGNOSIS — F41.9 ANXIETY: ICD-10-CM

## 2018-06-07 DIAGNOSIS — Q84.6 NAIL ANOMALY: ICD-10-CM

## 2018-06-07 DIAGNOSIS — F33.1 MODERATE EPISODE OF RECURRENT MAJOR DEPRESSIVE DISORDER (HCC): Chronic | ICD-10-CM

## 2018-06-07 DIAGNOSIS — R42 VERTIGO: ICD-10-CM

## 2018-06-07 DIAGNOSIS — Z13.6 SCREENING FOR AAA (ABDOMINAL AORTIC ANEURYSM): ICD-10-CM

## 2018-06-07 DIAGNOSIS — K50.119 CROHN'S DISEASE OF LARGE INTESTINE WITH COMPLICATION (HCC): Chronic | ICD-10-CM

## 2018-06-07 DIAGNOSIS — I10 ESSENTIAL HYPERTENSION: Chronic | ICD-10-CM

## 2018-06-07 DIAGNOSIS — Z86.718 HISTORY OF DVT (DEEP VEIN THROMBOSIS): Chronic | ICD-10-CM

## 2018-06-07 DIAGNOSIS — Z13.5 SCREENING FOR GLAUCOMA: ICD-10-CM

## 2018-06-07 DIAGNOSIS — Z11.59 ENCOUNTER FOR HEPATITIS C SCREENING TEST FOR LOW RISK PATIENT: Primary | ICD-10-CM

## 2018-06-07 PROCEDURE — 99214 OFFICE O/P EST MOD 30 MIN: CPT | Performed by: INTERNAL MEDICINE

## 2018-06-07 NOTE — PROGRESS NOTES
Assessment/Plan:           Problem List Items Addressed This Visit     Crohn's disease (Chronic)     Currently stable doing well continue with current medical regiment will continue monitor patient does have colostomy         Allergic rhinitis    Depression (Chronic)     Currently stable, no suicidal ideation will continue with current dose of Remeron will continue monitor  History of DVT (deep vein thrombosis) (Chronic)     Currently stable continue with Coumadin his INR 7 stable will continue monitor         Anxiety    HTN (hypertension) (Chronic)     Hypertension - controlled, I have counseled patient following healthy balance diet, I would like the patient reduce sodium, exercise routinely, I would like the patient continued the med current medical regiment and we will continue to monitor  Relevant Orders    Comprehensive metabolic panel    Lipid Panel with Direct LDL reflex    Vertigo    Relevant Orders    Ambulatory referral to Physical Therapy    Nail anomaly     Will have the patient see Dermatology for further evaluate         Relevant Orders    Ambulatory referral to Dermatology    Neck mass     I will have the patient see ENT for evaluation         Relevant Orders    Ambulatory Referral to Otolaryngology      Other Visit Diagnoses     Encounter for hepatitis C screening test for low risk patient    -  Primary    Relevant Orders    Hepatitis C antibody    Screening for colon cancer        Screening for AAA (abdominal aortic aneurysm)        Relevant Orders    US abdominal aorta screening aaa    Screening for glaucoma        Relevant Orders    Ambulatory referral to Ophthalmology          Return to office 6  months  call if any problems  Subjective:      Patient ID: Raven Landers is a 71 y o  male  HPI [age]-year old male coming in for a follow up visit regarding room, essential hypertension DVT  has, nail abnormality, anxiety and depression;  The patient reports me compliant taking medications without untoward side effects the  The patient is here to review his medical condition, update me on the medical condition and the patient reports me no hospitalizations and no ER visits  The patient does report me intermittent episodes of vertigo  Also patient has noticed a dark spot of the toenail several months ago which is not progressing and there was no injury prior to the development of this dark area  Does report me chronic depression no suicidal ideation currently on Remeron he does not want counseling he is currently stable  The following portions of the patient's history were reviewed and updated as appropriate: allergies, current medications, past medical history, past social history, past surgical history and problem list   Right tonail dark lesion  Review of Systems   Constitutional: Negative for activity change, appetite change and unexpected weight change  HENT: Negative for dental problem and postnasal drip  Eyes: Negative for visual disturbance  Respiratory: Negative for cough and shortness of breath  Cardiovascular: Negative for chest pain  Gastrointestinal: Negative for abdominal pain, diarrhea, nausea and vomiting  Neurological: Positive for dizziness  Negative for light-headedness and headaches  Hematological: Negative for adenopathy  Psychiatric/Behavioral: Negative for sleep disturbance and suicidal ideas  The patient is nervous/anxious  Objective:                    No Follow-up on file        Allergies   Allergen Reactions    Duloxetine     Other      Seasonal       Past Medical History:   Diagnosis Date    [facility]     [facility] venous thrombosis) ([facility])     Hypertension     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[facility] )     Psychiatric disorder     depression, anger     Past Surgical History:   Procedure Laterality Date    COLON SURGERY      Partial colectomy and colostomy    ESOPHAGOGASTRODUODENOSCOPY N/A 4/5/2017    Procedure: ESOPHAGOGASTRODUODENOSCOPY (EGD); Surgeon: Shefali Machado MD;  [facility]; Service:     EYE SURGERY      Cataract    KNEE SURGERY      NE EDG US EXAM SURGICAL ALTER STOM DUODENUM/JEJUNUM N/A 4/9/2018    Procedure: LINEAR ENDOSCOPIC U/S;  Surgeon: Richard Asif MD;  [facility]; Service: Gastroenterology    VEIN LIGATION AND STRIPPING       Current Outpatient Prescriptions on File Prior to Visit   Medication Sig Dispense Refill    amLODIPine (NORVASC) 5 mg tablet Take 5 mg by mouth daily      gabapentin (NEURONTIN) 100 mg capsule TAKE 1 CAPSULE BY MOUTH ONCE DAILY 30 capsule 1    Loratadine (CLARITIN) 10 MG CAPS Take by mouth daily as needed        meclizine (ANTIVERT) 25 mg tablet Take 1 tablet (25 mg total) by mouth 3 (three) times a day as needed for dizziness 30 tablet 2    Melatonin 10 MG TABS Take by mouth      mirtazapine (REMERON) 15 mg tablet TAKE ONE TABLET BY MOUTH AT BEDTIME 90 tablet 0    Multiple Vitamins-Minerals (MULTI FOR HIM 50+ PO) Take 1 tablet by mouth daily      ondansetron (ZOFRAN) 4 mg tablet Take 1 tablet (4 mg total) by mouth every 8 (eight) hours as needed for nausea or vomiting 20 tablet 0    warfarin (COUMADIN) 2 mg tablet TAKE ONE TABLET BY MOUTH ONCE DAILY 90 tablet 3     No current facility-administered medications on file prior to visit  Family History   Problem Relation Age of Onset    Heart disease Brother     Diverticulitis Mother      Social History     Social History    Marital status: /Civil Union     Spouse name: N/A    Number of children: N/A    Years of education: N/A     Occupational History    Not on file       Social History Main Topics    Smoking status: Former Smoker     Packs/day: 1 00     Years: 10 00     Quit date: 6/9/1981    Smokeless tobacco: Never Used      Comment: 50 years ago    Alcohol use No      Comment: hx etoh abuse pt states he quit 9 years ago    Drug use: No    Sexual activity: Not on file     Other Topics Concern    Not on file     Social History Narrative    No narrative on file     Vitals:    06/07/18 1249   BP: 110/84   BP Location: Right arm   Patient Position: Sitting   Cuff Size: Adult   Pulse: 81   SpO2: 97%   Weight: 69 9 kg (154 lb)   Height: 5' 5" (1 651 m)     Results for orders placed or performed in visit on 06/01/18   Protime-INR   Result Value Ref Range    Protime 26 4 (H) 11 8 - 14 2 seconds    INR 2 52 (H) 0 86 - 1 17     Weight (last 2 days)     None        Body mass index is 25 63 kg/m²  BP      Temp      Pulse     Resp      SpO2        Vitals:    06/07/18 1249   Weight: 69 9 kg (154 lb)     Vitals:    06/07/18 1249   Weight: 69 9 kg (154 lb)       /84 (BP Location: Right arm, Patient Position: Sitting, Cuff Size: Adult)   Pulse 81   Ht 5' 5" (1 651 m)   Wt 69 9 kg (154 lb)   SpO2 97%   BMI 25 63 kg/m²       Brown spot on toenail no injury to explain that   Physical Exam   Constitutional: He appears well-developed and well-nourished  No distress  HENT:   Head: Normocephalic and atraumatic  Right Ear: External ear normal    Left Ear: External ear normal    Mouth/Throat: Oropharynx is clear and moist    Eyes: Conjunctivae are normal  Pupils are equal, round, and reactive to light  Right eye exhibits no discharge  Left eye exhibits no discharge  No scleral icterus  Neck: Neck supple  Cardiovascular: Normal rate, regular rhythm and normal heart sounds  Exam reveals no gallop and no friction rub  No murmur heard  Pulmonary/Chest: No respiratory distress  He has no wheezes  He has no rales  Abdominal: Soft  Bowel sounds are normal  He exhibits no distension and no mass  There is no tenderness  There is no rebound and no guarding  Musculoskeletal: He exhibits no edema or deformity  Lymphadenopathy:     He has no cervical adenopathy  Neurological: He is alert  Skin: He is not diaphoretic  Psychiatric: He has a normal mood and affect

## 2018-06-10 PROBLEM — Q84.6 NAIL ANOMALY: Status: ACTIVE | Noted: 2018-06-10

## 2018-06-10 PROBLEM — R42 VERTIGO: Status: ACTIVE | Noted: 2018-06-10

## 2018-06-10 PROBLEM — R22.1 NECK MASS: Status: ACTIVE | Noted: 2018-06-10

## 2018-06-10 NOTE — ASSESSMENT & PLAN NOTE
Currently stable doing well continue with current medical regiment will continue monitor patient does have colostomy

## 2018-06-10 NOTE — ASSESSMENT & PLAN NOTE
Currently stable, no suicidal ideation will continue with current dose of Remeron will continue monitor

## 2018-06-11 ENCOUNTER — ANTICOAG VISIT (OUTPATIENT)
Dept: INTERNAL MEDICINE CLINIC | Facility: CLINIC | Age: 73
End: 2018-06-11

## 2018-06-18 ENCOUNTER — TRANSCRIBE ORDERS (OUTPATIENT)
Dept: LAB | Facility: CLINIC | Age: 73
End: 2018-06-18

## 2018-06-18 ENCOUNTER — ANTICOAG VISIT (OUTPATIENT)
Dept: INTERNAL MEDICINE CLINIC | Facility: CLINIC | Age: 73
End: 2018-06-18

## 2018-06-18 ENCOUNTER — APPOINTMENT (OUTPATIENT)
Dept: LAB | Facility: CLINIC | Age: 73
End: 2018-06-18
Payer: MEDICARE

## 2018-06-20 ENCOUNTER — EVALUATION (OUTPATIENT)
Dept: PHYSICAL THERAPY | Facility: CLINIC | Age: 73
End: 2018-06-20
Payer: MEDICARE

## 2018-06-20 VITALS — DIASTOLIC BLOOD PRESSURE: 79 MMHG | SYSTOLIC BLOOD PRESSURE: 131 MMHG | HEART RATE: 90 BPM

## 2018-06-20 DIAGNOSIS — R42 VERTIGO: ICD-10-CM

## 2018-06-20 PROCEDURE — 97162 PT EVAL MOD COMPLEX 30 MIN: CPT | Performed by: PHYSICAL THERAPIST

## 2018-06-20 PROCEDURE — G8979 MOBILITY GOAL STATUS: HCPCS | Performed by: PHYSICAL THERAPIST

## 2018-06-20 PROCEDURE — G8978 MOBILITY CURRENT STATUS: HCPCS | Performed by: PHYSICAL THERAPIST

## 2018-06-20 NOTE — PROGRESS NOTES
PT Evaluation     Today's date: 2018  Patient name: Ebony Carmichael  : 1945  MRN: [id number]  Referring provider: Alexandra Vazquez DO  Dx:   Encounter Diagnosis     ICD-10-CM    1  Vertigo R42 Ambulatory referral to Physical Therapy                  Assessment  Impairments: abnormal gait, impaired balance and safety issue  Other impairment: increased dizziness    Assessment details: Pt is a 71year old male referred for vertigo  Tested negative today for BPPV, however will continue to assess  Pt presented today impairments in decreased standing balance specifically with eyes closed, possible abnormal smooth pursuits (limited by increased dizziness/nausea), increased dizziness/nausea and slowed movements w ith VOMs, (+) fall risk per DGI all which limit him functionally with gait and day to day activities  Pt also with hippussing during positional testing, however no nystagmus noted and no increased symptoms  Pt advised to follow-up with physician for possible brain imaging  Pt will benefit from PT plan below needed to address above impairments reducing fall risk and reducing symptoms of dizziness and nausea  Understanding of Dx/Px/POC: good   Prognosis: good    Goals  ST  Pt will improve DGI score by at least 3 points within 4 weeks reducing fall risk  2  Pt will maintain balance on firm surface with eyes closed for 30 sec within 4 weeks showing improved balance  3  Pt will demonstrate independence with HEP with 4 weeks  LT  Pt will score at least a 23/24 on DGI within 8 weeks reducing fall risk  2  Pt will maintain balance on noncompliant surface with eyes closed for 30 sec within 8 weeks showing improved balance  3  Pt will deny dizziness and nausea with oculomotor testing within 8 weeks  4  Pt will report returning to his normal walking and driving within 8 weeks  5  Pt will demonstrate independence with HEP within 8 weeks      Plan  Patient would benefit from: skilled physical therapy  Planned therapy interventions: balance, home exercise program, therapeutic exercise, patient education, neuromuscular re-education and canalith repositioning  Frequency: 2x week  Duration in weeks: 8  Treatment plan discussed with: patient  Plan details: Certification period from today 6/20/18 through 8/15/18  Subjective Evaluation    History of Present Illness  Mechanism of injury: Stands up and get dizzy  Started about 6 months ago  Exercises twice a day, cardio and lifting, started 2 times a day about 6 months ago  Lost 68 pounds in 6 months  Gets nauseous, doesn't feel good when he's dizzy  Has swimmer's ear and ear infections in L ear, has inner ear issues, has ruptured ear drum  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  Comes in waves, unsure what causes it, not constant  Current dizziness 6/10, at worst 10/10, at best 0/10  Walking is affected, afraid to drive, doesn't bother him when he's exercising    Not a recurrent problem   Pain  No pain reported  Progression: worsening    Social Support  Steps to enter house: yes  Stairs in house: yes   Lives in: multiple-level home  Lives with: spouse      Diagnostic Tests  No diagnostic tests performed        Objective  Neuro Exam :     Functional Outcome Measures   DHI:  54  eyes open firm surface:  30  eyes closed firm surface:  8  eyes open foam sirface:  30  eyes closed foam surface:  1  VOMS (Vestibular/Ocular Motor Screen)--no symptoms unless noted      Smooth pursuit Abnormal, pt unable to keep eyes on target, increased dizziness and   nausea, unable to fully complete test     Saccades (horizontal) Normal, slowed with increased dizziness     Saccades (vertical)  Normal, slowed with increased dizziness     Convergence (near point)--NT     VOR (horizontal) Normal, slowed with increased dizziness     VOR (vertical) Normal, slowed with increased dizziness     Visual Motion Sensitivity Normal, slowed with increased dizziness  *after VOMs testing pt with increased twitching and c/o not feeling well, mostly dizziness, some nausea; vertical movements worse with symptoms    Gaze Holding room light: Normal    Ligament Laxity Testing:    Alar Ligament: normal   Transverse Ligament: normal  Cervical ROM: active, normal    Modified VBI: normal    Positional testing: Right Left   [place] pike negative negative   Roll test: negative negative       Neurologic Exam  Flowsheet Rows      Most Recent Value   Dynamic Gait Index   Gait level surface   2   Change in gait speed  3   Gait with horizontal head turns   1   Gait with vertical head turns   2   Gait and pivot turn  3   Step over obstacle  2   Step around obstacle  3   Steps  3   Total score   19        Gait speed:  67 m/s without AD    Precautions: twitches (pt states is chronic); HTN; anxiety    Specialty Daily Treatment Diary     Manual                                                     Exercise Diary                                                                                                                                                                             Modalities

## 2018-06-25 ENCOUNTER — TELEPHONE (OUTPATIENT)
Dept: INTERNAL MEDICINE CLINIC | Facility: CLINIC | Age: 73
End: 2018-06-25

## 2018-06-25 NOTE — TELEPHONE ENCOUNTER
LM with office for [facility] to [facility] regarding patient

## 2018-06-27 ENCOUNTER — OFFICE VISIT (OUTPATIENT)
Dept: PHYSICAL THERAPY | Facility: CLINIC | Age: 73
End: 2018-06-27
Payer: MEDICARE

## 2018-06-27 DIAGNOSIS — R42 VERTIGO: Primary | ICD-10-CM

## 2018-06-27 PROCEDURE — 97112 NEUROMUSCULAR REEDUCATION: CPT | Performed by: PHYSICAL THERAPIST

## 2018-06-27 NOTE — PROGRESS NOTES
Daily Note     Today's date: 2018  Patient name: Joel Thao  : 1945  MRN: [id number]  Referring provider: Sandy Monson DO  Dx:   Encounter Diagnosis     ICD-10-CM    1  Vertigo R42                   Subjective: Saw ENT on Monday, cleaned out his ears which has helped with dizziness  Getting lump in neck removed on Monday  Didn't say anything about therapy  Thinks he's getting a head CT tomorrow  Dizziness upon arrival about a 1/10, at worst dizziness a 4-5/10 during session  No dizziness post-session  Objective: See treatment diary below  BP: (right arm in sitting after exercise) 148/80 HR: 108  Precautions: twitches (pt states is chronic); HTN; anxiety     Specialty Daily Treatment Diary      Manual                                                                                          Exercise Diary   18           VORx1, H/V standing Plain  2x30s ea           Saccades, H/V  Standing Plain  2x30s ea           VORcx, H/V  Standing Plain  2x30s ea           Foam, arms crossed EC FT  3x30s           Foam twists, R/L and up/down  EO 30s ea  EC 30s ea           Ball toss hand to hand Standing  1 min           Walking with HTs, H/V FWD in killian  2x40' ea           Tandem gait, arms crossed EC In killian  2x40'           Walking with 360 turns In killian EO           Rockerboard, M/L and A/P EO  1 min ea                                                                                                                                                          Assessment: Tolerated treatment well as seen by minimal breaks and ability to complete exercises  Had most dizziness with VOR and saccades but with rest subsided  Required occasional Geovanna with foam and tandem gait  Issued and reviewed HEP and pt communicated understanding  Patient would benefit from continued PT      Plan: Increase times with oculomotor exercises as pt able

## 2018-06-28 ENCOUNTER — OFFICE VISIT (OUTPATIENT)
Dept: GASTROENTEROLOGY | Facility: CLINIC | Age: 73
End: 2018-06-28
Payer: MEDICARE

## 2018-06-28 VITALS
SYSTOLIC BLOOD PRESSURE: 118 MMHG | DIASTOLIC BLOOD PRESSURE: 71 MMHG | TEMPERATURE: 97.3 F | HEIGHT: 65 IN | BODY MASS INDEX: 25.06 KG/M2 | WEIGHT: 150.4 LBS | HEART RATE: 68 BPM

## 2018-06-28 DIAGNOSIS — K50.119 CROHN'S DISEASE OF LARGE INTESTINE WITH COMPLICATION (HCC): Chronic | ICD-10-CM

## 2018-06-28 DIAGNOSIS — K86.89 DILATED PANCREATIC DUCT: Primary | ICD-10-CM

## 2018-06-28 DIAGNOSIS — R63.4 WEIGHT LOSS: ICD-10-CM

## 2018-06-28 PROCEDURE — 99214 OFFICE O/P EST MOD 30 MIN: CPT | Performed by: INTERNAL MEDICINE

## 2018-06-28 NOTE — LETTER
June 28, 2018     [address] [facility] [address]    Patient: Gera Scott   YOB: 1945   Date of Visit: 6/28/2018       Dear Dr Bettina Carr: Thank you for referring Job Cherry to me for evaluation  Below are my notes for this consultation  If you have questions, please do not hesitate to call me  I look forward to following your patient along with you  Sincerely,        Clark Box MD        CC: No Recipients  Clark Box MD  6/28/2018 11:56 AM  Sign at close encounter  [facility] - Outpatient Follow-up Note  Gera Scott 71 y o  male MRN: [id number]  Encounter: [id number]          ASSESSMENT AND PLAN:      1  Dilated pancreatic duct    The etiology of his dilated pancreatic duct was unclear and fortunately appears to have resolved on his last endoscopic ultrasound and MRI  He should have a repeat MRI with and without contrast in a few months which should be about one year after his last one  I encouraged him to have surgery for his neck mass and management of his vertigo prior to the repeat MRI and will have a follow-up in the office with me in about three months  2  Crohn's disease of large intestine with complication ([address] )    He has a history of Crohn's disease of his colon and was treated with total colectomy  He has not had any change in the output from his ileostomy  3  Weight loss    His weight loss appears to be intentional   We will continue to monitor his weight and have encouraged him to eat a healthy diet and to continue to exercise well and restrict his caloric intake in a healthy manner     ______________________________________________________________________    SUBJECTIVE:  He presents for follow-up of his history of a dilated pancreatic duct and his history of Crohn's disease and his history of weight loss    His endoscopic ultrasound revealed resolution of his dilated pancreatic duct and some heterogeneity in the pancreas but no discrete mass  His MRI also did not reveal any evidence of dilated pancreatic duct or pancreatic mass in August 2017  He previously had nausea but feels that has resolved  He denies any abdominal pain, vomiting, reflux, difficulty swallowing, change in bowel habits, or bleeding  He has continued to lose weight but he feels it is intentional as he is dramatically decreased his intake and increase his exercise  REVIEW OF SYSTEMS IS OTHERWISE NEGATIVE  Historical Information   Past Medical History:   Diagnosis Date    [facility] disease ([facility] )     DVT (deep venous thrombosis) ([place] [address] )     Hypertension     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[place] [address] )     Psychiatric disorder     depression, anger     Past Surgical History:   Procedure Laterality Date    COLON SURGERY      Partial colectomy and colostomy    ESOPHAGOGASTRODUODENOSCOPY N/A 4/5/2017    Procedure: ESOPHAGOGASTRODUODENOSCOPY (EGD); Surgeon: Mara Sainz MD;  [facility]; Service:     EYE SURGERY      Cataract    KNEE SURGERY      SC EDG US EXAM SURGICAL ALTER STOM DUODENUM/JEJUNUM N/A 4/9/2018    Procedure: LINEAR ENDOSCOPIC U/S;  Surgeon: Kishor Ramirez MD;  [facility];   Service: Gastroenterology    VEIN LIGATION AND STRIPPING       Social History   History   Alcohol Use No     Comment: hx etoh abuse pt states he quit 9 years ago     History   Drug Use No     History   Smoking Status    Former Smoker    Packs/day: 1 00    Years: 10 00    Quit date: 6/9/1981   Smokeless Tobacco    Never Used     Comment: 50 years ago     Family History   Problem Relation Age of Onset    Heart disease Brother     Diverticulitis Mother        Meds/Allergies       Current Outpatient Prescriptions:     amLODIPine (NORVASC) 5 mg tablet    gabapentin (NEURONTIN) 100 mg capsule    Loratadine (CLARITIN) 10 MG CAPS    meclizine (ANTIVERT) 25 mg tablet    Melatonin 10 MG TABS    mirtazapine (REMERON) 15 mg tablet    Multiple Vitamins-Minerals (MULTI FOR HIM 50+ PO)    ondansetron (ZOFRAN) 4 mg tablet    warfarin (COUMADIN) 2 mg tablet    Allergies   Allergen Reactions    Duloxetine     Other      Seasonal           Objective     Blood pressure 118/71, pulse 68, temperature (!) 97 3 °F (36 3 °C), temperature source Tympanic, height 5' 5" (1 651 m), weight 68 2 kg (150 lb 6 4 oz)  Body mass index is 25 03 kg/m²  PHYSICAL EXAM:      General Appearance:   Alert, cooperative, no distress   HEENT:   Normocephalic, atraumatic, anicteric, but has decreased hearing      Neck:  Supple, symmetrical, trachea midline   Lungs:   Clear to auscultation bilaterally; no rales, rhonchi or wheezing; respirations unlabored    Heart[de-identified]   Regular rate and rhythm; no murmur, rub, or gallop  Abdomen:   Soft, mild epigastric tenderness, non-distended; normal bowel sounds; no masses, no organomegaly    Genitalia:   Deferred    Rectal:   Deferred    Extremities:  No cyanosis, clubbing or edema    Pulses:  2+ and symmetric    Skin:  No jaundice, rashes, or lesions    Lymph nodes:  No palpable cervical lymphadenopathy        Lab Results:   No visits with results within 1 Day(s) from this visit  Latest known visit with results is: Ancillary Orders on 06/15/2018   Component Date Value    Protime 06/18/2018 30 1*    INR 06/18/2018 2 98*         Radiology Results:   No results found

## 2018-06-28 NOTE — PROGRESS NOTES
[facility] - Outpatient Follow-up Note  Raven Landers 71 y o  male MRN: [id number]  Encounter: [id number]          ASSESSMENT AND PLAN:      1  Dilated pancreatic duct    The etiology of his dilated pancreatic duct was unclear and fortunately appears to have resolved on his last endoscopic ultrasound and MRI  He should have a repeat MRI with and without contrast in a few months which should be about one year after his last one  I encouraged him to have surgery for his neck mass and management of his vertigo prior to the repeat MRI and will have a follow-up in the office with me in about three months  2  Crohn's disease of large intestine with complication ([address] )    He has a history of Crohn's disease of his colon and was treated with total colectomy  He has not had any change in the output from his ileostomy  3  Weight loss    His weight loss appears to be intentional   We will continue to monitor his weight and have encouraged him to eat a healthy diet and to continue to exercise well and restrict his caloric intake in a healthy manner     ______________________________________________________________________    SUBJECTIVE:  He presents for follow-up of his history of a dilated pancreatic duct and his history of Crohn's disease and his history of weight loss  His endoscopic ultrasound revealed resolution of his dilated pancreatic duct and some heterogeneity in the pancreas but no discrete mass  His MRI also did not reveal any evidence of dilated pancreatic duct or pancreatic mass in August 2017  He previously had nausea but feels that has resolved  He denies any abdominal pain, vomiting, reflux, difficulty swallowing, change in bowel habits, or bleeding  He has continued to lose weight but he feels it is intentional as he is dramatically decreased his intake and increase his exercise  REVIEW OF SYSTEMS IS OTHERWISE NEGATIVE        Historical Information   Past Medical History:   Diagnosis Date    [facility]     [facility]     Hypertension     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[facility] )     Psychiatric disorder     depression, anger     Past Surgical History:   Procedure Laterality Date    COLON SURGERY      Partial colectomy and colostomy    ESOPHAGOGASTRODUODENOSCOPY N/A 4/5/2017    Procedure: ESOPHAGOGASTRODUODENOSCOPY (EGD); Surgeon: Blanca Darby MD;  Location: [facility]; Service:     EYE SURGERY      Cataract    KNEE SURGERY      ME EDG US EXAM SURGICAL ALTER STOM DUODENUM/JEJUNUM N/A 4/9/2018    Procedure: LINEAR ENDOSCOPIC U/S;  Surgeon: Lino Bland MD;  Location: [facility]; Service: Gastroenterology    VEIN LIGATION AND STRIPPING       Social History   History   Alcohol Use No     Comment: hx etoh abuse pt states he quit 9 years ago     History   Drug Use No     History   Smoking Status    Former Smoker    Packs/day: 1 00    Years: 10 00    Quit date: 6/9/1981   Smokeless Tobacco    Never Used     Comment: 50 years ago     Family History   Problem Relation Age of Onset    Heart disease Brother     Diverticulitis Mother        Meds/Allergies       Current Outpatient Prescriptions:     amLODIPine (NORVASC) 5 mg tablet    gabapentin (NEURONTIN) 100 mg capsule    Loratadine (CLARITIN) 10 MG CAPS    meclizine (ANTIVERT) 25 mg tablet    Melatonin 10 MG TABS    mirtazapine (REMERON) 15 mg tablet    Multiple Vitamins-Minerals (MULTI FOR HIM 50+ PO)    ondansetron (ZOFRAN) 4 mg tablet    warfarin (COUMADIN) 2 mg tablet    Allergies   Allergen Reactions    Duloxetine     Other      Seasonal           Objective     Blood pressure 118/71, pulse 68, temperature (!) 97 3 °F (36 3 °C), temperature source Tympanic, height 5' 5" (1 651 m), weight 68 2 kg (150 lb 6 4 oz)  Body mass index is 25 03 kg/m²        PHYSICAL EXAM:      General Appearance:   Alert, cooperative, no distress   HEENT:   Normocephalic, atraumatic, anicteric, but has decreased hearing      Neck:  Supple, symmetrical, trachea midline   Lungs:   Clear to auscultation bilaterally; no rales, rhonchi or wheezing; respirations unlabored    Heart[de-identified]   Regular rate and rhythm; no murmur, rub, or gallop  Abdomen:   Soft, mild epigastric tenderness, non-distended; normal bowel sounds; no masses, no organomegaly    Genitalia:   Deferred    Rectal:   Deferred    Extremities:  No cyanosis, clubbing or edema    Pulses:  2+ and symmetric    Skin:  No jaundice, rashes, or lesions    Lymph nodes:  No palpable cervical lymphadenopathy        Lab Results:   No visits with results within 1 Day(s) from this visit  Latest known visit with results is: Ancillary Orders on 06/15/2018   Component Date Value    Protime 06/18/2018 30 1*    INR 06/18/2018 2 98*         Radiology Results:   No results found

## 2018-06-30 DIAGNOSIS — F32.A DEPRESSION, UNSPECIFIED DEPRESSION TYPE: ICD-10-CM

## 2018-07-01 RX ORDER — MIRTAZAPINE 15 MG/1
TABLET, FILM COATED ORAL
Qty: 90 TABLET | Refills: 0 | Status: SHIPPED | OUTPATIENT
Start: 2018-07-01 | End: 2018-07-27

## 2018-07-02 ENCOUNTER — ANTICOAG VISIT (OUTPATIENT)
Dept: INTERNAL MEDICINE CLINIC | Facility: CLINIC | Age: 73
End: 2018-07-02

## 2018-07-02 ENCOUNTER — OFFICE VISIT (OUTPATIENT)
Dept: PHYSICAL THERAPY | Facility: CLINIC | Age: 73
End: 2018-07-02
Payer: MEDICARE

## 2018-07-02 ENCOUNTER — APPOINTMENT (OUTPATIENT)
Dept: LAB | Facility: CLINIC | Age: 73
End: 2018-07-02
Payer: MEDICARE

## 2018-07-02 DIAGNOSIS — R42 VERTIGO: Primary | ICD-10-CM

## 2018-07-02 PROCEDURE — 97112 NEUROMUSCULAR REEDUCATION: CPT | Performed by: PHYSICAL THERAPIST

## 2018-07-02 NOTE — PROGRESS NOTES
Daily Note     Today's date: 2018  Patient name: Tara Ritchie  : 1945  MRN: [id number]  Referring provider: Mitchell Nathan DO  Dx:   Encounter Diagnosis     ICD-10-CM    1  Vertigo R42                   Subjective: Dizziness 8/10 upon arrival   No HA  Dizziness worse today, not sure why  Does have CT of head scheduled per pt  Has surgery on Monday for lump in his throat  Objective: See treatment diary below  BP: (right arm in sitting after exercise) 148/80 HR: 108  Precautions: twitches (pt states is chronic); HTN; anxiety     Specialty Daily Treatment Diary      Manual                                                                                          Exercise Diary   18         VORx1, H/V standing Plain  2x30s ea  Plain  2x30s ea         Saccades, H/V  Standing Plain  2x30s ea  Plain  2x30s ea         VORcx, H/V  Standing Plain  2x30s ea  Plain  2x30s ea         Foam, arms crossed EC FT  3x30s  FT  3x30s         Foam twists, R/L and up/down  EO 30s ea  EC 30s ea  EO  2x30s ea         Ball toss hand to hand Standing  1 min  Standing  1 min         Walking with HTs, H/V FWD in killian  2x40' ea  FWD in killian  2x40' ea         Tandem gait, arms crossed EC In killian  2x40'  In killian  2x40'         Walking with 360 turns In killian EO  deferred         Rockerboard, M/L and A/P EO  1 min ea  EO  1 min ea                                                                                                                                                        Assessment: Re-assessed BPPV today due to pt's increased dizziness, however negative for [employer] and Roll tests  Limited slightly due to pt's lack of cervical extension  Pt limited during session today due to increased dizziness  Walking with 360 turns deferred due to increased dizziness  Patient would benefit from continued PT      Plan: Resume walking with 360 as pt able

## 2018-07-05 ENCOUNTER — OFFICE VISIT (OUTPATIENT)
Dept: PHYSICAL THERAPY | Facility: CLINIC | Age: 73
End: 2018-07-05
Payer: MEDICARE

## 2018-07-05 DIAGNOSIS — R42 VERTIGO: Primary | ICD-10-CM

## 2018-07-05 PROCEDURE — 97112 NEUROMUSCULAR REEDUCATION: CPT | Performed by: PHYSICAL THERAPIST

## 2018-07-09 ENCOUNTER — ANTICOAG VISIT (OUTPATIENT)
Dept: INTERNAL MEDICINE CLINIC | Facility: CLINIC | Age: 73
End: 2018-07-09

## 2018-07-10 ENCOUNTER — TRANSCRIBE ORDERS (OUTPATIENT)
Dept: LAB | Facility: HOSPITAL | Age: 73
End: 2018-07-10
Payer: MEDICARE

## 2018-07-10 ENCOUNTER — APPOINTMENT (OUTPATIENT)
Dept: LAB | Facility: HOSPITAL | Age: 73
End: 2018-07-10
Attending: OTOLARYNGOLOGY
Payer: MEDICARE

## 2018-07-10 DIAGNOSIS — D37.030 NEOPLASM OF UNCERTAIN BEHAVIOR OF PAROTID GLAND: Primary | ICD-10-CM

## 2018-07-10 PROCEDURE — 88173 CYTOPATH EVAL FNA REPORT: CPT | Performed by: PATHOLOGY

## 2018-07-10 PROCEDURE — 88305 TISSUE EXAM BY PATHOLOGIST: CPT | Performed by: PATHOLOGY

## 2018-07-10 PROCEDURE — 88172 CYTP DX EVAL FNA 1ST EA SITE: CPT | Performed by: PATHOLOGY

## 2018-07-11 ENCOUNTER — OFFICE VISIT (OUTPATIENT)
Dept: PHYSICAL THERAPY | Facility: CLINIC | Age: 73
End: 2018-07-11
Payer: MEDICARE

## 2018-07-11 DIAGNOSIS — R42 VERTIGO: Primary | ICD-10-CM

## 2018-07-11 PROCEDURE — 97112 NEUROMUSCULAR REEDUCATION: CPT | Performed by: PHYSICAL THERAPIST

## 2018-07-11 NOTE — PROGRESS NOTES
Daily Note     Today's date: 2018  Patient name: Howard Wheatley  : 1945  MRN: [id number]  Referring provider: Raymond Maldonado DO  Dx:   Encounter Diagnosis     ICD-10-CM    1  Vertigo R42                   Subjective: Increased dizziness upon arrival, unsure why  Had biopsy on Monday that hurt  Not sure of results yet  Pt needs more appts  Objective: See treatment diary below    Precautions: twitches (pt states is chronic); HTN; anxiety     Specialty Daily Treatment Diary      Manual                                                                                          Exercise Diary   18     VORx1, H/V standing Plain  2x30s ea  Plain  2x30s ea   Plain  2x45s ea    Plain  2x45s ea     Saccades, H/V  Standing Plain  2x30s ea  Plain  2x30s ea   Plain  2x45s ea    Plain  2x45s ea     VORcx, H/V  Standing Plain  2x30s ea  Plain  2x30s ea   Plain  2x45s ea    Plain  2x45s ea     Foam, arms crossed EC FT  3x30s  FT  3x30s semitandem  4x30s  semitandem  4x30s     Foam twists, R/L and up/down  EO 30s ea  EC 30s ea  EO  2x30s ea  EC  2x30s ea  EC  2x30s ea     Ball toss hand to hand Standing  1 min  Standing  1 min  Standing  1 min  walking  2x40'     Walking with HTs, H/V FWD in killian  2x40' ea  FWD in killian  2x40' ea  FWD in killian  2x40' ea  FWD in killian  2x40' ea     Tandem gait, arms crossed [facility] In killian  2x40'  In killian  2x40'   In killian  2x40'   In killian  2x40'     Walking with 360 turns In killian EO  2x40'  deferred  in killian EO  2x40'   in killian EO  2x40'  EC 2x40'     Rockerboard, M/L and A/P EO  1 min ea  EO  1 min ea  EO  1 min ea  EO  1 min ea     Foam beams      side with HTs 3 laps  Tandem EO 3 laps   side with HTs 3 laps  Tandem EO 3 laps                                                                                                                                      Assessment:  Issued HEP with walking balance exercises, reviewed with pt and he communicated understanding  Continues with difficulty with oculomotor exercises as seen by increased dizziness  Patient would benefit from continued PT      Plan: Progress to busy background as pt able in next few sessions

## 2018-07-12 ENCOUNTER — ANTICOAG VISIT (OUTPATIENT)
Dept: INTERNAL MEDICINE CLINIC | Facility: CLINIC | Age: 73
End: 2018-07-12

## 2018-07-12 ENCOUNTER — APPOINTMENT (OUTPATIENT)
Dept: LAB | Facility: CLINIC | Age: 73
End: 2018-07-12
Payer: MEDICARE

## 2018-07-16 ENCOUNTER — APPOINTMENT (OUTPATIENT)
Dept: LAB | Facility: CLINIC | Age: 73
End: 2018-07-16
Payer: MEDICARE

## 2018-07-16 ENCOUNTER — ANTICOAG VISIT (OUTPATIENT)
Dept: INTERNAL MEDICINE CLINIC | Facility: CLINIC | Age: 73
End: 2018-07-16

## 2018-07-18 ENCOUNTER — OFFICE VISIT (OUTPATIENT)
Dept: PHYSICAL THERAPY | Facility: CLINIC | Age: 73
End: 2018-07-18
Payer: MEDICARE

## 2018-07-18 DIAGNOSIS — R42 VERTIGO: Primary | ICD-10-CM

## 2018-07-18 PROCEDURE — 97112 NEUROMUSCULAR REEDUCATION: CPT | Performed by: PHYSICAL THERAPIST

## 2018-07-18 NOTE — PROGRESS NOTES
Daily Note     Today's date: 2018  Patient name: Maggi Smith  : 1945  MRN: [id number]  Referring provider: Mina Chisholm DO  Dx:   Encounter Diagnosis     ICD-10-CM    1  Vertigo R42                   Subjective: With increased dizziness today, unsure why  Not consistently compliant with exercises at home  States he's "been lazy "  Unsure about head imaging, wife may know  Has biopsy results on Friday        Objective: See treatment diary below    Precautions: twitches (pt states is chronic); HTN; anxiety     Specialty Daily Treatment Diary      Manual                                                                                          Exercise Diary   18   VORx1, H/V standing Plain  2x30s ea  Plain  2x30s ea   Plain  2x45s ea    Plain  2x45s ea   Plain  2x45s ea   Saccades, H/V  Standing Plain  2x30s ea  Plain  2x30s ea   Plain  2x45s ea    Plain  2x45s ea   Plain  2x45s ea   VORcx, H/V  Standing Plain  2x30s ea  Plain  2x30s ea   Plain  2x45s ea    Plain  2x45s ea  with gait  2x40'   Foam, arms crossed EC FT  3x30s  FT  3x30s semitandem  4x30s  semitandem  4x30s  semitandem  4x30s   Foam twists, R/L and up/down  EO 30s ea  EC 30s ea  EO  2x30s ea  EC  2x30s ea  EC  2x30s ea  EC  1 min ea   Ball toss hand to hand Standing  1 min  Standing  1 min  Standing  1 min  walking  2x40'  walking  2x40'   Walking with HTs, H/V FWD in killian  2x40' ea  FWD in killian  2x40' ea  FWD in killian  2x40' ea  FWD in killian  2x40' ea   FWD in killian  2x40' ea   Tandem gait, arms crossed [facility] In killian  2x40'  In killian  2x40'   In killian  2x40'   In killian  2x40'    In killian  2x40'   Walking with 360 turns In killian EO  2x40'  deferred  in killian EO  2x40'   in killian EO  2x40'  EC 2x40'  in killian EO  2x40'  EC 2x40'   Rockerboard, M/L and A/P EO  1 min ea  EO  1 min ea  EO  1 min ea  EO  1 min ea  EO  1 min ea   Foam beams      side with HTs 3 laps  Tandem EO 3 laps   side with HTs 3 laps  Tandem EO 3 laps  side with HTs 3 laps  Tandem EO 3 laps                                                                                                                                    Assessment:  Discussed importance of HEP at home and following up with imaging of the head  Continues to be limited by dizziness and decreased balance during session requiring UE support of Geovanna occasionally to maintain balance  Patient would benefit from continued PT      Plan: Progress update NV

## 2018-07-20 ENCOUNTER — APPOINTMENT (OUTPATIENT)
Dept: LAB | Facility: CLINIC | Age: 73
End: 2018-07-20
Payer: MEDICARE

## 2018-07-20 ENCOUNTER — TRANSCRIBE ORDERS (OUTPATIENT)
Dept: ADMINISTRATIVE | Facility: HOSPITAL | Age: 73
End: 2018-07-20

## 2018-07-20 DIAGNOSIS — K11.9 DISEASE OF SALIVARY GLAND: Primary | ICD-10-CM

## 2018-07-20 DIAGNOSIS — K11.9 DISEASE OF SALIVARY GLAND: ICD-10-CM

## 2018-07-20 LAB
BUN SERPL-MCNC: 15 MG/DL (ref 5–25)
CREAT SERPL-MCNC: 0.94 MG/DL (ref 0.6–1.3)
GFR SERPL CREATININE-BSD FRML MDRD: 81 ML/MIN/1.73SQ M

## 2018-07-20 PROCEDURE — 82565 ASSAY OF CREATININE: CPT

## 2018-07-20 PROCEDURE — 36415 COLL VENOUS BLD VENIPUNCTURE: CPT

## 2018-07-20 PROCEDURE — 84520 ASSAY OF UREA NITROGEN: CPT

## 2018-07-23 ENCOUNTER — EVALUATION (OUTPATIENT)
Dept: PHYSICAL THERAPY | Facility: CLINIC | Age: 73
End: 2018-07-23
Payer: MEDICARE

## 2018-07-23 ENCOUNTER — APPOINTMENT (OUTPATIENT)
Dept: LAB | Facility: CLINIC | Age: 73
End: 2018-07-23
Payer: MEDICARE

## 2018-07-23 ENCOUNTER — TRANSCRIBE ORDERS (OUTPATIENT)
Dept: LAB | Facility: CLINIC | Age: 73
End: 2018-07-23

## 2018-07-23 ENCOUNTER — ANTICOAG VISIT (OUTPATIENT)
Dept: INTERNAL MEDICINE CLINIC | Facility: CLINIC | Age: 73
End: 2018-07-23

## 2018-07-23 DIAGNOSIS — R42 VERTIGO: Primary | ICD-10-CM

## 2018-07-23 PROCEDURE — 97112 NEUROMUSCULAR REEDUCATION: CPT | Performed by: PHYSICAL THERAPIST

## 2018-07-23 PROCEDURE — G8978 MOBILITY CURRENT STATUS: HCPCS | Performed by: PHYSICAL THERAPIST

## 2018-07-23 PROCEDURE — G8979 MOBILITY GOAL STATUS: HCPCS | Performed by: PHYSICAL THERAPIST

## 2018-07-23 NOTE — PROGRESS NOTES
Progress update Note     Today's date: 2018  Patient name: Valdo Nichols  : 1945  MRN: [id number]  Referring provider: Mellisa Dai DO  Dx:   Encounter Diagnosis     ICD-10-CM    1  Vertigo R42                   Subjective: Dizziness at best 7/10, at worst 9/10  Today is really dizzy  Got results of biopsy on Friday, has cancer  Getting CT of head and chest later this week  Dizziness has been bad  Pt very depressed about his diagnosis of CA, increased anxiety and irritation today  Discussed progress update  Discussed d/c with 30 day hold until he gets all his CA testing and surgery sorted out  Reports noncompliance with HEP, has had a lot on his mind      Objective: See treatment diary below    FT EC Foam: 30s (meeting goal)  DHI 70/100  DGI:     Precautions: twitches (pt states is chronic); HTN; anxiety     Specialty Daily Treatment Diary      Manual                                                                                          Exercise Diary     VORx1, H/V standing Plain  2x30s ea      Plain  2x45s ea   Plain  2x45s ea   Saccades, H/V  Standing Plain  2x30s ea      Plain  2x45s ea   Plain  2x45s ea   VORcx, H/V  Standing With gait  2x40'      Plain  2x45s ea  with gait  2x40'   Foam, arms crossed EC X30s    semitandem  4x30s  semitandem  4x30s   Foam twists, R/L and up/down EC  x1 min ea    EC  2x30s ea  EC  1 min ea   Ball toss hand to hand     walking  2x40'  walking  2x40'   Walking with HTs, H/V BWD  2x40' ea    FWD in killian  2x40' ea   FWD in killian  2x40' ea   Tandem gait, arms crossed EC 2x40'     In killian  2x40'    In killian  2x40'   Walking with 360 turns EO x40'  EC x40'     in killian EO  2x40'  EC 2x40'  in killian EO  2x40'  EC 2x40'   Rockerboard, M/L and A/P EO  1 min ea    EO  1 min ea  EO  1 min ea   Foam beams Pt declined     side with HTs 3 laps  Tandem EO 3 laps  side with HTs 3 laps  Tandem EO 3 laps                                                                                                                                  Assessment:  Progress update completed  Pt with some progress with balance as seen on foam, however no significant progress on DGI  Dizziness appears to be getting worse per DHI and subjective reports  Pt with new diagnosis of CA per his neck lump biopsy  Will be taking a break from therapy as he goes through treatment for that  Plan: Discharge with 30 day hold  Goals  ST  Pt will improve DGI score by at least 3 points within 4 weeks reducing fall risk  - not met  2  Pt will maintain balance on firm surface with eyes closed for 30 sec within 4 weeks showing improved balance  - met  3  Pt will demonstrate independence with HEP with 4 weeks  - partially met, not consistent  LT  Pt will score at least a 23/24 on DGI within 8 weeks reducing fall risk  2  Pt will maintain balance on noncompliant surface with eyes closed for 30 sec within 8 weeks showing improved balance  3  Pt will deny dizziness and nausea with oculomotor testing within 8 weeks  4  Pt will report returning to his normal walking and driving within 8 weeks  5  Pt will demonstrate independence with HEP within 8 weeks

## 2018-07-24 ENCOUNTER — APPOINTMENT (OUTPATIENT)
Dept: PHYSICAL THERAPY | Facility: CLINIC | Age: 73
End: 2018-07-24
Payer: MEDICARE

## 2018-07-24 RX ORDER — FLUTICASONE PROPIONATE 50 MCG
1 SPRAY, SUSPENSION (ML) NASAL DAILY
COMMUNITY
End: 2020-02-04 | Stop reason: ALTCHOICE

## 2018-07-24 NOTE — PRE-PROCEDURE INSTRUCTIONS
Pre-Surgery Instructions:   Medication Instructions    acetaminophen (TYLENOL) 100 mg/mL solution Instructed patient per Anesthesia Guidelines   amLODIPine (NORVASC) 5 mg tablet Instructed patient per Anesthesia Guidelines   fluticasone (FLONASE) 50 mcg/act nasal spray Instructed patient per Anesthesia Guidelines   gabapentin (NEURONTIN) 100 mg capsule Instructed patient per Anesthesia Guidelines   Loratadine (CLARITIN) 10 MG CAPS Instructed patient per Anesthesia Guidelines   meclizine (ANTIVERT) 25 mg tablet Instructed patient per Anesthesia Guidelines   Melatonin 10 MG TABS Instructed patient per Anesthesia Guidelines   mirtazapine (REMERON) 15 mg tablet Instructed patient per Anesthesia Guidelines   Multiple Vitamins-Minerals (MULTI FOR HIM 50+ PO) Instructed patient per Anesthesia Guidelines   ondansetron (ZOFRAN) 4 mg tablet Instructed patient per Anesthesia Guidelines   warfarin (COUMADIN) 2 mg tablet Patient was instructed to contact Physician for medication instruction  Pre-op and bathing instructions given  Patient has hibiclens

## 2018-07-25 ENCOUNTER — OFFICE VISIT (OUTPATIENT)
Dept: INTERNAL MEDICINE CLINIC | Facility: CLINIC | Age: 73
End: 2018-07-25
Payer: MEDICARE

## 2018-07-25 VITALS
RESPIRATION RATE: 16 BRPM | OXYGEN SATURATION: 98 % | BODY MASS INDEX: 24.59 KG/M2 | SYSTOLIC BLOOD PRESSURE: 122 MMHG | HEART RATE: 76 BPM | DIASTOLIC BLOOD PRESSURE: 72 MMHG | WEIGHT: 147.6 LBS | HEIGHT: 65 IN

## 2018-07-25 DIAGNOSIS — C07 PRIMARY MALIGNANT NEOPLASM OF PAROTID GLAND (HCC): ICD-10-CM

## 2018-07-25 DIAGNOSIS — R42 VERTIGO: Primary | ICD-10-CM

## 2018-07-25 DIAGNOSIS — F41.9 ANXIETY: ICD-10-CM

## 2018-07-25 DIAGNOSIS — Z01.818 PREOP EXAMINATION: ICD-10-CM

## 2018-07-25 PROCEDURE — 99214 OFFICE O/P EST MOD 30 MIN: CPT | Performed by: INTERNAL MEDICINE

## 2018-07-25 NOTE — PROGRESS NOTES
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 and a history of protein S deficiency; current any he is clinically stable and doing well in fact he is in very good functional status because the amount time he has been to the gym  I am recommending the patient be admitted to the hospital 1 day prior to his surgery for heparin bridging  I will put a call in to the ENT  The patient is currently low risk and cleared from a cardiac standpoint  His EKG does show normal sinus rhythm no acute changes  I have given the patient preop labs including CBC, CMP, PT/APTT            Other    Anxiety     Secondary to his upcoming surgery no suicidal ideation I have recommended that he increase the Remeron he will consider and let me know  Vertigo - Primary     And difficulty with balance, the patient has recently been diagnosed with a parotid cancer we will check MRI of the brain to rule out metastatic disease         Relevant Orders    MRI brain wo contrast    Preop examination    Relevant Orders    Comprehensive metabolic panel    CBC (Includes Diff/Plt) (Refl)    Protime-INR    APTT          Return to office 3  months  call if any problems  Subjective:      Patient ID: Gera Scott is a 71 y o  male  HPI  67-year-old male who is accompanied with his wife coming in for a preop evaluation the patient is scheduled for parotidectomy and possible lymph node dissection he has recently had  Fine-needle aspiration biopsy showing neoplasm; he reports me he had noticed it about a year ago    The patient has seen ear nose and throat doctor alana; does not report chest pain short of breath palpitations no exertional chest pain or short of breath   /Blood clot was in [address] job no previous pulmonary embolism; has a known history of protein S deficiency  He is not a blood clot in 20 years  He has been managed well on Coumadin  Does have a history of approximately 4 blood clots in the past   Is planning to have this operation at the [facility]  No allergic reactions to anesthetics  No asthma no lung conditions  No history of sleep apnea  Patient does report me ongoing vertigo/dizziness/imbalance   The following portions of the patient's history were reviewed and updated as appropriate: allergies, current medications, past family history, past medical history, past social history, past surgical history and problem list     Review of Systems   Constitutional: Negative for activity change, appetite change and unexpected weight change  HENT: Negative for congestion and postnasal drip  Eyes: Negative for visual disturbance  Respiratory: Negative for cough and shortness of breath  Cardiovascular: Negative for chest pain  Gastrointestinal: Negative for abdominal pain, diarrhea, nausea and vomiting  Musculoskeletal: Negative for myalgias  Neurological: Positive for dizziness  Negative for light-headedness and headaches  Difficulty with balance   Hematological: Negative for adenopathy  Psychiatric/Behavioral: Negative for suicidal ideas  The patient is nervous/anxious  Objective:                    Return in about 3 months (around 10/25/2018)        Allergies   Allergen Reactions    Duloxetine Other (See Comments)     Panic attacks    Other      Seasonal       Past Medical History:   Diagnosis Date    Anxiety     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[facility])     Crohn's disease ([facility] [address] )     Depression     [facility]     [place] (hard of hearing)     no hearing aids    Hypertension     Mass in neck     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HCC)     Psychiatric disorder     depression, anger     Past Surgical History:   Procedure Laterality Date    COLON SURGERY      Partial colectomy and colostomy    ESOPHAGOGASTRODUODENOSCOPY N/A 4/5/2017    Procedure: ESOPHAGOGASTRODUODENOSCOPY (EGD); Surgeon: Ingrid García MD;  Location: [facility]; Service:     EYE SURGERY Right     Cataract    KNEE SURGERY      ND EDG US EXAM SURGICAL ALTER STOM DUODENUM/JEJUNUM N/A 4/9/2018    Procedure: LINEAR ENDOSCOPIC U/S;  Surgeon: Blanquita Abdi MD;  Location: [facility]; Service: Gastroenterology    VEIN LIGATION AND STRIPPING       Current Outpatient Prescriptions on File Prior to Visit   Medication Sig Dispense Refill    acetaminophen (TYLENOL) 100 mg/mL solution Take 10 mg/kg by mouth every 4 (four) hours as needed for fever      amLODIPine (NORVASC) 5 mg tablet Take 5 mg by mouth daily      fluticasone (FLONASE) 50 mcg/act nasal spray 1 spray into each nostril daily      gabapentin (NEURONTIN) 100 mg capsule TAKE 1 CAPSULE BY MOUTH ONCE DAILY 30 capsule 1    Loratadine (CLARITIN) 10 MG CAPS Take by mouth daily as needed        meclizine (ANTIVERT) 25 mg tablet Take 1 tablet (25 mg total) by mouth 3 (three) times a day as needed for dizziness 30 tablet 2    Melatonin 10 MG TABS Take by mouth      mirtazapine (REMERON) 15 mg tablet TAKE 1 TABLET BY MOUTH AT BEDTIME 90 tablet 0    Multiple Vitamins-Minerals (MULTI FOR HIM 50+ PO) Take 1 tablet by mouth daily      ondansetron (ZOFRAN) 4 mg tablet Take 1 tablet (4 mg total) by mouth every 8 (eight) hours as needed for nausea or vomiting 20 tablet 0    warfarin (COUMADIN) 2 mg tablet TAKE ONE TABLET BY MOUTH ONCE DAILY 90 tablet 3     No current facility-administered medications on file prior to visit  Family History   Problem Relation Age of Onset    Heart disease Brother     Diverticulitis Mother      Social History     Social History    Marital status: /Civil Union     Spouse name: N/A    Number of children: N/A    Years of education: N/A     Occupational History    Not on file       Social History Main Topics    Smoking status: Former Smoker     Packs/day: 1 00     Years: 10 00     Quit date: 6/9/1981    Smokeless tobacco: Never Used      Comment: 50 years ago    Alcohol use No      Comment: hx etoh abuse pt states he quit 9 years ago    Drug use: No    Sexual activity: No     Other Topics Concern    Not on file     Social History Narrative    No narrative on file     Vitals:    07/25/18 1602   BP: 122/72   BP Location: Right arm   Patient Position: Sitting   Cuff Size: Standard   Pulse: 76   Resp: 16   SpO2: 98%   Weight: 67 kg (147 lb 9 6 oz)   Height: 5' 5" (1 651 m)     Results for orders placed or performed in visit on 07/20/18   BUN   Result Value Ref Range    BUN 15 5 - 25 mg/dL   Creatinine, serum   Result Value Ref Range    Creatinine 0 94 0 60 - 1 30 mg/dL    eGFR 81 ml/min/1 73sq m     Weight (last 2 days)     Date/Time   Weight    07/25/18 1602  67 (147 6)            Body mass index is 24 56 kg/m²  BP      Temp      Pulse     Resp      SpO2        Vitals:    07/25/18 1602   Weight: 67 kg (147 lb 9 6 oz)     Vitals:    07/25/18 1602   Weight: 67 kg (147 lb 9 6 oz)       /72 (BP Location: Right arm, Patient Position: Sitting, Cuff Size: Standard)   Pulse 76   Resp 16   Ht 5' 5" (1 651 m)   Wt 67 kg (147 lb 9 6 oz)   SpO2 98%   BMI 24 56 kg/m²       Difficulty with walking heel to toe on a straight line   Physical Exam   Constitutional: He appears well-developed and well-nourished  No distress  HENT:   Head: Normocephalic and atraumatic  Right Ear: External ear normal    Left Ear: External ear normal    Mouth/Throat: Oropharynx is clear and moist    Eyes: Conjunctivae are normal  Pupils are equal, round, and reactive to light  Right eye exhibits no discharge  Left eye exhibits no discharge  No scleral icterus  Neck: Neck supple  Cardiovascular: Normal rate, regular rhythm and normal heart sounds  Exam reveals no gallop and no friction rub      No murmur heard   Pulmonary/Chest: No respiratory distress  He has no wheezes  He has no rales  Abdominal: Soft  Bowel sounds are normal  He exhibits no distension and no mass  There is no tenderness  There is no rebound and no guarding  Musculoskeletal: He exhibits no edema or deformity  Lymphadenopathy:     He has no cervical adenopathy  Neurological: He is alert  Skin: He is not diaphoretic  Psychiatric: He has a normal mood and affect

## 2018-07-26 ENCOUNTER — HOSPITAL ENCOUNTER (OUTPATIENT)
Dept: CT IMAGING | Facility: HOSPITAL | Age: 73
Discharge: HOME | End: 2018-07-26
Attending: OTOLARYNGOLOGY
Payer: MEDICARE

## 2018-07-26 ENCOUNTER — TELEPHONE (OUTPATIENT)
Dept: INTERNAL MEDICINE CLINIC | Facility: CLINIC | Age: 73
End: 2018-07-26

## 2018-07-26 DIAGNOSIS — K11.9 DISEASE OF SALIVARY GLAND: ICD-10-CM

## 2018-07-26 PROCEDURE — 70491 CT SOFT TISSUE NECK W/DYE: CPT

## 2018-07-26 PROCEDURE — 71260 CT THORAX DX C+: CPT

## 2018-07-26 RX ADMIN — IOHEXOL 85 ML: 350 INJECTION, SOLUTION INTRAVENOUS at 19:59

## 2018-07-26 NOTE — ASSESSMENT & PLAN NOTE
Secondary to his upcoming surgery no suicidal ideation I have recommended that he increase the Remeron he will consider and let me know

## 2018-07-26 NOTE — ASSESSMENT & PLAN NOTE
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

## 2018-07-26 NOTE — ASSESSMENT & PLAN NOTE
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

## 2018-07-27 ENCOUNTER — ANTICOAG VISIT (OUTPATIENT)
Dept: INTERNAL MEDICINE CLINIC | Facility: CLINIC | Age: 73
End: 2018-07-27

## 2018-07-27 ENCOUNTER — APPOINTMENT (OUTPATIENT)
Dept: LAB | Facility: CLINIC | Age: 73
End: 2018-07-27
Payer: MEDICARE

## 2018-07-27 DIAGNOSIS — Z01.818 PREOP EXAMINATION: ICD-10-CM

## 2018-07-27 DIAGNOSIS — I10 ESSENTIAL HYPERTENSION: Chronic | ICD-10-CM

## 2018-07-27 LAB
ALBUMIN SERPL BCP-MCNC: 3.6 G/DL (ref 3.5–5)
ALP SERPL-CCNC: 69 U/L (ref 46–116)
ALT SERPL W P-5'-P-CCNC: 29 U/L (ref 12–78)
ANION GAP SERPL CALCULATED.3IONS-SCNC: 7 MMOL/L (ref 4–13)
APTT PPP: 38 SECONDS (ref 24–36)
AST SERPL W P-5'-P-CCNC: 21 U/L (ref 5–45)
BASOPHILS # BLD AUTO: 0.01 THOUSANDS/ΜL (ref 0–0.1)
BASOPHILS NFR BLD AUTO: 0 % (ref 0–1)
BILIRUB SERPL-MCNC: 1 MG/DL (ref 0.2–1)
BUN SERPL-MCNC: 14 MG/DL (ref 5–25)
CALCIUM SERPL-MCNC: 8.6 MG/DL (ref 8.3–10.1)
CHLORIDE SERPL-SCNC: 108 MMOL/L (ref 100–108)
CO2 SERPL-SCNC: 28 MMOL/L (ref 21–32)
CREAT SERPL-MCNC: 0.92 MG/DL (ref 0.6–1.3)
EOSINOPHIL # BLD AUTO: 0.11 THOUSAND/ΜL (ref 0–0.61)
EOSINOPHIL NFR BLD AUTO: 3 % (ref 0–6)
ERYTHROCYTE [DISTWIDTH] IN BLOOD BY AUTOMATED COUNT: 13.4 % (ref 11.6–15.1)
GFR SERPL CREATININE-BSD FRML MDRD: 83 ML/MIN/1.73SQ M
GLUCOSE P FAST SERPL-MCNC: 92 MG/DL (ref 65–99)
HCT VFR BLD AUTO: 41.8 % (ref 36.5–49.3)
HGB BLD-MCNC: 14.4 G/DL (ref 12–17)
INR PPP: 2.67 (ref 0.86–1.17)
LYMPHOCYTES # BLD AUTO: 1.14 THOUSANDS/ΜL (ref 0.6–4.47)
LYMPHOCYTES NFR BLD AUTO: 26 % (ref 14–44)
MCH RBC QN AUTO: 30.8 PG (ref 26.8–34.3)
MCHC RBC AUTO-ENTMCNC: 34.4 G/DL (ref 31.4–37.4)
MCV RBC AUTO: 89 FL (ref 82–98)
MONOCYTES # BLD AUTO: 0.34 THOUSAND/ΜL (ref 0.17–1.22)
MONOCYTES NFR BLD AUTO: 8 % (ref 4–12)
NEUTROPHILS # BLD AUTO: 2.84 THOUSANDS/ΜL (ref 1.85–7.62)
NEUTS SEG NFR BLD AUTO: 64 % (ref 43–75)
PLATELET # BLD AUTO: 154 THOUSANDS/UL (ref 149–390)
PMV BLD AUTO: 10.4 FL (ref 8.9–12.7)
POTASSIUM SERPL-SCNC: 4.1 MMOL/L (ref 3.5–5.3)
PROT SERPL-MCNC: 6.6 G/DL (ref 6.4–8.2)
PROTHROMBIN TIME: 27.6 SECONDS (ref 11.8–14.2)
RBC # BLD AUTO: 4.68 MILLION/UL (ref 3.88–5.62)
SODIUM SERPL-SCNC: 143 MMOL/L (ref 136–145)
WBC # BLD AUTO: 4.44 THOUSAND/UL (ref 4.31–10.16)

## 2018-07-27 PROCEDURE — 85025 COMPLETE CBC W/AUTO DIFF WBC: CPT

## 2018-07-27 PROCEDURE — 85730 THROMBOPLASTIN TIME PARTIAL: CPT

## 2018-07-27 PROCEDURE — 85610 PROTHROMBIN TIME: CPT

## 2018-07-27 PROCEDURE — 36415 COLL VENOUS BLD VENIPUNCTURE: CPT

## 2018-07-27 PROCEDURE — 80053 COMPREHEN METABOLIC PANEL: CPT

## 2018-07-27 NOTE — TELEPHONE ENCOUNTER
Dr Paula Woods spoke with Dr Drummond   Per Dr Paula Woods the patient is to stop the Coumadin the day before the surgery then give himself a shot of Lovenox 70 mg   I will notify Dr Kenia Wolff of the update

## 2018-07-28 NOTE — TELEPHONE ENCOUNTER
Jazmine Espinosa check with the patient if he is currently on Remeron SOL TAB  OR IT IS REGULAR REMERON; please correct and send to me

## 2018-07-30 ENCOUNTER — ANTICOAG VISIT (OUTPATIENT)
Dept: INTERNAL MEDICINE CLINIC | Facility: CLINIC | Age: 73
End: 2018-07-30

## 2018-07-30 DIAGNOSIS — K08.89 TOOTH ACHE: Primary | ICD-10-CM

## 2018-07-30 DIAGNOSIS — F32.A DEPRESSION, UNSPECIFIED DEPRESSION TYPE: Primary | ICD-10-CM

## 2018-07-30 NOTE — TELEPHONE ENCOUNTER
Chris Sheppard I also need a prescription for the Lovenox, and I believe the Remeron is to be for 30 mg 1 tablet per day please adjust this prescription

## 2018-07-31 DIAGNOSIS — I82.4Y9 DEEP VEIN THROMBOSIS (DVT) OF PROXIMAL LOWER EXTREMITY, UNSPECIFIED CHRONICITY, UNSPECIFIED LATERALITY (HCC): Primary | ICD-10-CM

## 2018-07-31 RX ORDER — MIRTAZAPINE 30 MG/1
30 TABLET, FILM COATED ORAL
Qty: 30 TABLET | Refills: 5 | Status: SHIPPED | OUTPATIENT
Start: 2018-07-31 | End: 2019-03-04 | Stop reason: SDUPTHER

## 2018-07-31 RX ORDER — MIRTAZAPINE 30 MG/1
30 TABLET, FILM COATED ORAL
Qty: 30 TABLET | Refills: 5 | Status: CANCELLED | OUTPATIENT
Start: 2018-07-31

## 2018-07-31 NOTE — TELEPHONE ENCOUNTER
I sent to you for authorization however I did not know what dose of the Lovenox you are prescribing for the patient since this was discussed with Beba Donohue  Please advise and correct dose if needed  Thank you

## 2018-08-01 RX ORDER — MIRTAZAPINE 30 MG/1
30 TABLET, FILM COATED ORAL
Qty: 30 TABLET | Refills: 3 | OUTPATIENT
Start: 2018-08-01

## 2018-08-03 ENCOUNTER — ANTICOAG VISIT (OUTPATIENT)
Dept: INTERNAL MEDICINE CLINIC | Facility: CLINIC | Age: 73
End: 2018-08-03

## 2018-08-03 ENCOUNTER — LAB (OUTPATIENT)
Dept: LAB | Facility: CLINIC | Age: 73
End: 2018-08-03
Payer: MEDICARE

## 2018-08-08 ENCOUNTER — HOSPITAL ENCOUNTER (OUTPATIENT)
Facility: HOSPITAL | Age: 73
Setting detail: OUTPATIENT SURGERY
Discharge: HOME | End: 2018-08-09
Attending: OTOLARYNGOLOGY | Admitting: OTOLARYNGOLOGY
Payer: MEDICARE

## 2018-08-08 ENCOUNTER — ANESTHESIA (OUTPATIENT)
Dept: PERIOP | Facility: HOSPITAL | Age: 73
End: 2018-08-08
Payer: MEDICARE

## 2018-08-08 ENCOUNTER — ANESTHESIA EVENT (OUTPATIENT)
Dept: PERIOP | Facility: HOSPITAL | Age: 73
End: 2018-08-08
Payer: MEDICARE

## 2018-08-08 DIAGNOSIS — Z86.718 HISTORY OF DVT (DEEP VEIN THROMBOSIS): Primary | Chronic | ICD-10-CM

## 2018-08-08 DIAGNOSIS — I82.4Y9 DEEP VEIN THROMBOSIS (DVT) OF PROXIMAL LOWER EXTREMITY, UNSPECIFIED CHRONICITY, UNSPECIFIED LATERALITY (HCC): ICD-10-CM

## 2018-08-08 DIAGNOSIS — K11.8 PAROTID MASS: ICD-10-CM

## 2018-08-08 DIAGNOSIS — C07 PRIMARY MALIGNANT NEOPLASM OF PAROTID GLAND (HCC): ICD-10-CM

## 2018-08-08 LAB
ABO GROUP BLD: NORMAL
APTT PPP: 42 SECONDS (ref 24–36)
ATRIAL RATE: 74 BPM
BLD GP AB SCN SERPL QL: NEGATIVE
INR PPP: 2.05 (ref 0.86–1.17)
INR PPP: 2.71 (ref 0.86–1.17)
PLATELET # BLD AUTO: 112 THOUSANDS/UL (ref 149–390)
PMV BLD AUTO: 11.5 FL (ref 8.9–12.7)
PROTHROMBIN TIME: 22.5 SECONDS (ref 11.8–14.2)
PROTHROMBIN TIME: 27.9 SECONDS (ref 11.8–14.2)
QRS AXIS: 65 DEGREES
QRSD INTERVAL: 118 MS
QT INTERVAL: 378 MS
QTC INTERVAL: 411 MS
RH BLD: POSITIVE
SPECIMEN EXPIRATION DATE: NORMAL
T WAVE AXIS: 70 DEGREES
VENTRICULAR RATE: 71 BPM

## 2018-08-08 PROCEDURE — 88341 IMHCHEM/IMCYTCHM EA ADD ANTB: CPT | Performed by: PATHOLOGY

## 2018-08-08 PROCEDURE — 88331 PATH CONSLTJ SURG 1 BLK 1SPC: CPT | Performed by: PATHOLOGY

## 2018-08-08 PROCEDURE — 88307 TISSUE EXAM BY PATHOLOGIST: CPT | Performed by: PATHOLOGY

## 2018-08-08 PROCEDURE — 88332 PATH CONSLTJ SURG EA ADD BLK: CPT | Performed by: PATHOLOGY

## 2018-08-08 PROCEDURE — 86900 BLOOD TYPING SEROLOGIC ABO: CPT | Performed by: OTOLARYNGOLOGY

## 2018-08-08 PROCEDURE — 88342 IMHCHEM/IMCYTCHM 1ST ANTB: CPT | Performed by: PATHOLOGY

## 2018-08-08 PROCEDURE — 85610 PROTHROMBIN TIME: CPT | Performed by: OTOLARYNGOLOGY

## 2018-08-08 PROCEDURE — 86901 BLOOD TYPING SEROLOGIC RH(D): CPT | Performed by: OTOLARYNGOLOGY

## 2018-08-08 PROCEDURE — 93010 ELECTROCARDIOGRAM REPORT: CPT | Performed by: INTERNAL MEDICINE

## 2018-08-08 PROCEDURE — 93005 ELECTROCARDIOGRAM TRACING: CPT

## 2018-08-08 PROCEDURE — 85610 PROTHROMBIN TIME: CPT | Performed by: STUDENT IN AN ORGANIZED HEALTH CARE EDUCATION/TRAINING PROGRAM

## 2018-08-08 PROCEDURE — 88333 PATH CONSLTJ SURG CYTO XM 1: CPT | Performed by: PATHOLOGY

## 2018-08-08 PROCEDURE — P9017 PLASMA 1 DONOR FRZ W/IN 8 HR: HCPCS

## 2018-08-08 PROCEDURE — 86850 RBC ANTIBODY SCREEN: CPT | Performed by: OTOLARYNGOLOGY

## 2018-08-08 PROCEDURE — 88305 TISSUE EXAM BY PATHOLOGIST: CPT | Performed by: PATHOLOGY

## 2018-08-08 PROCEDURE — 85049 AUTOMATED PLATELET COUNT: CPT | Performed by: OTOLARYNGOLOGY

## 2018-08-08 PROCEDURE — 85730 THROMBOPLASTIN TIME PARTIAL: CPT | Performed by: STUDENT IN AN ORGANIZED HEALTH CARE EDUCATION/TRAINING PROGRAM

## 2018-08-08 PROCEDURE — C1765 ADHESION BARRIER: HCPCS | Performed by: OTOLARYNGOLOGY

## 2018-08-08 RX ORDER — MAGNESIUM HYDROXIDE 1200 MG/15ML
LIQUID ORAL AS NEEDED
Status: DISCONTINUED | OUTPATIENT
Start: 2018-08-08 | End: 2018-08-08 | Stop reason: HOSPADM

## 2018-08-08 RX ORDER — MIRTAZAPINE 15 MG/1
30 TABLET, FILM COATED ORAL
Status: DISCONTINUED | OUTPATIENT
Start: 2018-08-08 | End: 2018-08-09 | Stop reason: HOSPADM

## 2018-08-08 RX ORDER — LABETALOL HYDROCHLORIDE 5 MG/ML
INJECTION, SOLUTION INTRAVENOUS AS NEEDED
Status: DISCONTINUED | OUTPATIENT
Start: 2018-08-08 | End: 2018-08-08 | Stop reason: SURG

## 2018-08-08 RX ORDER — PROPOFOL 10 MG/ML
INJECTION, EMULSION INTRAVENOUS AS NEEDED
Status: DISCONTINUED | OUTPATIENT
Start: 2018-08-08 | End: 2018-08-08 | Stop reason: SURG

## 2018-08-08 RX ORDER — FENTANYL CITRATE/PF 50 MCG/ML
50 SYRINGE (ML) INJECTION
Status: DISCONTINUED | OUTPATIENT
Start: 2018-08-08 | End: 2018-08-08 | Stop reason: HOSPADM

## 2018-08-08 RX ORDER — AMLODIPINE BESYLATE 5 MG/1
5 TABLET ORAL DAILY
Status: DISCONTINUED | OUTPATIENT
Start: 2018-08-08 | End: 2018-08-09 | Stop reason: HOSPADM

## 2018-08-08 RX ORDER — ALBUMIN, HUMAN INJ 5% 5 %
12.5 SOLUTION INTRAVENOUS ONCE
Status: COMPLETED | OUTPATIENT
Start: 2018-08-08 | End: 2018-08-08

## 2018-08-08 RX ORDER — SODIUM CHLORIDE 9 MG/ML
INJECTION, SOLUTION INTRAVENOUS CONTINUOUS PRN
Status: DISCONTINUED | OUTPATIENT
Start: 2018-08-08 | End: 2018-08-08 | Stop reason: SURG

## 2018-08-08 RX ORDER — LIDOCAINE HYDROCHLORIDE AND EPINEPHRINE 10; 10 MG/ML; UG/ML
INJECTION, SOLUTION INFILTRATION; PERINEURAL AS NEEDED
Status: DISCONTINUED | OUTPATIENT
Start: 2018-08-08 | End: 2018-08-08 | Stop reason: HOSPADM

## 2018-08-08 RX ORDER — FLUTICASONE PROPIONATE 50 MCG
1 SPRAY, SUSPENSION (ML) NASAL DAILY
Status: DISCONTINUED | OUTPATIENT
Start: 2018-08-08 | End: 2018-08-09 | Stop reason: HOSPADM

## 2018-08-08 RX ORDER — CALCIUM CARBONATE 200(500)MG
1000 TABLET,CHEWABLE ORAL DAILY PRN
Status: DISCONTINUED | OUTPATIENT
Start: 2018-08-08 | End: 2018-08-09 | Stop reason: HOSPADM

## 2018-08-08 RX ORDER — CEFAZOLIN SODIUM 1 G/3ML
INJECTION, POWDER, FOR SOLUTION INTRAMUSCULAR; INTRAVENOUS AS NEEDED
Status: DISCONTINUED | OUTPATIENT
Start: 2018-08-08 | End: 2018-08-08 | Stop reason: SURG

## 2018-08-08 RX ORDER — HYDROCODONE BITARTRATE AND ACETAMINOPHEN 5; 325 MG/1; MG/1
1 TABLET ORAL EVERY 6 HOURS PRN
Status: DISCONTINUED | OUTPATIENT
Start: 2018-08-08 | End: 2018-08-09 | Stop reason: HOSPADM

## 2018-08-08 RX ORDER — GINSENG 100 MG
CAPSULE ORAL AS NEEDED
Status: DISCONTINUED | OUTPATIENT
Start: 2018-08-08 | End: 2018-08-08 | Stop reason: HOSPADM

## 2018-08-08 RX ORDER — MIDAZOLAM HYDROCHLORIDE 1 MG/ML
INJECTION INTRAMUSCULAR; INTRAVENOUS
Status: DISPENSED
Start: 2018-08-08 | End: 2018-08-08

## 2018-08-08 RX ORDER — GINSENG 100 MG
1 CAPSULE ORAL 2 TIMES DAILY
Status: DISCONTINUED | OUTPATIENT
Start: 2018-08-08 | End: 2018-08-09 | Stop reason: HOSPADM

## 2018-08-08 RX ORDER — LORATADINE 10 MG/1
10 TABLET ORAL DAILY
Status: DISCONTINUED | OUTPATIENT
Start: 2018-08-08 | End: 2018-08-09 | Stop reason: HOSPADM

## 2018-08-08 RX ORDER — SUCCINYLCHOLINE CHLORIDE 20 MG/ML
INJECTION INTRAMUSCULAR; INTRAVENOUS AS NEEDED
Status: DISCONTINUED | OUTPATIENT
Start: 2018-08-08 | End: 2018-08-08 | Stop reason: SURG

## 2018-08-08 RX ORDER — IBUPROFEN 600 MG/1
600 TABLET ORAL EVERY 6 HOURS PRN
Status: DISCONTINUED | OUTPATIENT
Start: 2018-08-08 | End: 2018-08-09 | Stop reason: HOSPADM

## 2018-08-08 RX ORDER — ONDANSETRON 2 MG/ML
4 INJECTION INTRAMUSCULAR; INTRAVENOUS ONCE AS NEEDED
Status: DISCONTINUED | OUTPATIENT
Start: 2018-08-08 | End: 2018-08-08 | Stop reason: HOSPADM

## 2018-08-08 RX ORDER — GABAPENTIN 100 MG/1
100 CAPSULE ORAL DAILY
Status: DISCONTINUED | OUTPATIENT
Start: 2018-08-08 | End: 2018-08-09 | Stop reason: HOSPADM

## 2018-08-08 RX ORDER — ONDANSETRON 2 MG/ML
INJECTION INTRAMUSCULAR; INTRAVENOUS AS NEEDED
Status: DISCONTINUED | OUTPATIENT
Start: 2018-08-08 | End: 2018-08-08 | Stop reason: SURG

## 2018-08-08 RX ORDER — FENTANYL CITRATE 50 UG/ML
INJECTION, SOLUTION INTRAMUSCULAR; INTRAVENOUS AS NEEDED
Status: DISCONTINUED | OUTPATIENT
Start: 2018-08-08 | End: 2018-08-08 | Stop reason: SURG

## 2018-08-08 RX ORDER — EPHEDRINE SULFATE 50 MG/ML
INJECTION, SOLUTION INTRAVENOUS AS NEEDED
Status: DISCONTINUED | OUTPATIENT
Start: 2018-08-08 | End: 2018-08-08 | Stop reason: SURG

## 2018-08-08 RX ORDER — ONDANSETRON 2 MG/ML
4 INJECTION INTRAMUSCULAR; INTRAVENOUS EVERY 6 HOURS PRN
Status: DISCONTINUED | OUTPATIENT
Start: 2018-08-08 | End: 2018-08-09 | Stop reason: HOSPADM

## 2018-08-08 RX ORDER — MECLIZINE HYDROCHLORIDE 25 MG/1
25 TABLET ORAL 3 TIMES DAILY PRN
Status: DISCONTINUED | OUTPATIENT
Start: 2018-08-08 | End: 2018-08-09 | Stop reason: HOSPADM

## 2018-08-08 RX ADMIN — MIRTAZAPINE 30 MG: 15 TABLET, FILM COATED ORAL at 21:41

## 2018-08-08 RX ADMIN — PROPOFOL 150 MG: 10 INJECTION, EMULSION INTRAVENOUS at 12:16

## 2018-08-08 RX ADMIN — GABAPENTIN 100 MG: 100 CAPSULE ORAL at 18:21

## 2018-08-08 RX ADMIN — ALBUMIN HUMAN 12.5 G: 0.05 INJECTION, SOLUTION INTRAVENOUS at 16:21

## 2018-08-08 RX ADMIN — EPHEDRINE SULFATE 10 MG: 50 INJECTION, SOLUTION INTRAMUSCULAR; INTRAVENOUS; SUBCUTANEOUS at 12:50

## 2018-08-08 RX ADMIN — ONDANSETRON 4 MG: 2 INJECTION INTRAMUSCULAR; INTRAVENOUS at 14:37

## 2018-08-08 RX ADMIN — SODIUM CHLORIDE: 0.9 INJECTION, SOLUTION INTRAVENOUS at 12:12

## 2018-08-08 RX ADMIN — SUCCINYLCHOLINE CHLORIDE 100 MG: 20 INJECTION, SOLUTION INTRAMUSCULAR; INTRAVENOUS at 12:16

## 2018-08-08 RX ADMIN — EPHEDRINE SULFATE 10 MG: 50 INJECTION, SOLUTION INTRAMUSCULAR; INTRAVENOUS; SUBCUTANEOUS at 12:55

## 2018-08-08 RX ADMIN — LABETALOL HYDROCHLORIDE 5 MG: 5 INJECTION, SOLUTION INTRAVENOUS at 13:32

## 2018-08-08 RX ADMIN — HYDROMORPHONE HYDROCHLORIDE 0.5 MG: 1 INJECTION, SOLUTION INTRAMUSCULAR; INTRAVENOUS; SUBCUTANEOUS at 16:00

## 2018-08-08 RX ADMIN — FENTANYL CITRATE 50 MCG: 50 INJECTION, SOLUTION INTRAMUSCULAR; INTRAVENOUS at 15:43

## 2018-08-08 RX ADMIN — HYDROMORPHONE HYDROCHLORIDE 0.5 MG: 1 INJECTION, SOLUTION INTRAMUSCULAR; INTRAVENOUS; SUBCUTANEOUS at 15:50

## 2018-08-08 RX ADMIN — Medication 1 TABLET: at 18:21

## 2018-08-08 RX ADMIN — DEXAMETHASONE SODIUM PHOSPHATE 8 MG: 10 INJECTION INTRAMUSCULAR; INTRAVENOUS at 12:16

## 2018-08-08 RX ADMIN — CEFAZOLIN SODIUM 1000 MG: 1 INJECTION, POWDER, FOR SOLUTION INTRAMUSCULAR; INTRAVENOUS at 12:23

## 2018-08-08 RX ADMIN — BACITRACIN ZINC 1 LARGE APPLICATION: 500 OINTMENT TOPICAL at 18:22

## 2018-08-08 RX ADMIN — FENTANYL CITRATE 50 MCG: 50 INJECTION, SOLUTION INTRAMUSCULAR; INTRAVENOUS at 15:27

## 2018-08-08 RX ADMIN — FENTANYL CITRATE 100 MCG: 50 INJECTION INTRAMUSCULAR; INTRAVENOUS at 12:39

## 2018-08-08 RX ADMIN — FENTANYL CITRATE 100 MCG: 50 INJECTION INTRAMUSCULAR; INTRAVENOUS at 13:26

## 2018-08-08 RX ADMIN — IBUPROFEN 600 MG: 600 TABLET ORAL at 18:21

## 2018-08-08 RX ADMIN — AMLODIPINE BESYLATE 5 MG: 5 TABLET ORAL at 18:21

## 2018-08-08 RX ADMIN — HYDROMORPHONE HYDROCHLORIDE 0.5 MG: 1 INJECTION, SOLUTION INTRAMUSCULAR; INTRAVENOUS; SUBCUTANEOUS at 16:06

## 2018-08-08 RX ADMIN — SODIUM CHLORIDE: 0.9 INJECTION, SOLUTION INTRAVENOUS at 13:35

## 2018-08-08 RX ADMIN — LORATADINE 10 MG: 10 TABLET ORAL at 18:21

## 2018-08-08 RX ADMIN — PROPOFOL 200 MG: 10 INJECTION, EMULSION INTRAVENOUS at 12:16

## 2018-08-08 RX ADMIN — HYDROMORPHONE HYDROCHLORIDE 0.5 MG: 1 INJECTION, SOLUTION INTRAMUSCULAR; INTRAVENOUS; SUBCUTANEOUS at 16:12

## 2018-08-08 RX ADMIN — ALBUMIN HUMAN 12.5 G: 0.05 INJECTION, SOLUTION INTRAVENOUS at 15:48

## 2018-08-08 NOTE — INTERVAL H&P NOTE
H&P reviewed  After examining the patient I find no changes in the patients condition since the H&P had been written  Plan for R superficial parotid, frozen section of periparotid/level II lymph nodes, possible R SND    Discussed risks of nerve injury - CN 7, 11, 12, 10, lingual

## 2018-08-08 NOTE — INTERVAL H&P NOTE
H&P reviewed  After examining the patient I find no changes in the patients condition since the H&P had been written      Plan: Surgical Extraction of Teeth 15,30 under GA    Primo Dillon DDS

## 2018-08-08 NOTE — OP NOTE
OPERATIVE REPORT  PATIENT NAME: Kel Dillon    :  1945  MRN: [id number]  Pt Location: [facility] OR ROOM 01    SURGERY DATE: 2018    Surgeon(s) and Role:  Panel 1:     * Sherri Dye MD - Primary    Panel 2:     * Delmy Conner DDS - Primary    Preop Diagnosis:  Parotid mass [K11 9]    Post-Op Diagnosis Codes:     * Parotid mass [K11 9]     * Dental caries [521 0]    Procedure(s) (LRB):  PAROTIDECTOMY WITH FACIAL NERVE MONITOR AND FROZEN SECTION (Right)  SELECTIVE NECK DISSECTION (N/A)  EXTRACTION TEETH #15 and #30 (N/A)    Specimen(s):  ID Type Source Tests Collected by Time Destination   1 : Right parotid Tissue Parotid gland TISSUE EXAM Sherri Dye MD 2018 1312    2 : Level 2 lymph node Tissue Lymph Node TISSUE EXAM Sherri Dye MD 2018 1405        Estimated Blood Loss:   Minimal    Drains:  Closed/Suction Drain Right Neck Bulb 15 Fr  (Active)   Number of days: 0       Colostomy (Active)   Number of days:        Anesthesia Type:   General    Operative Indications:  Parotid mass [K11 9]  Dental caries extending into pulp    Operative Findings:  Dental caries extending into pulp    Complications:   None    Procedure and Technique:  Mr Melanie Padilla was greeted at the bedside in the prep and hold area  I reviewed previously signed informed consent  All questions regarding extraction of teeth #15,30 were answered  The patient was brought in the operating room and placed in a supine position on the operating room table  A time out was performed with surgical, nursing, and anesthesia staff verifying patient procedure and laterality  Anesthesia placed appropriate monitors and intubated patient without issue  The patient was draped in the usual sterile fashion  A throat pack was moistened and placed in the oropharynx  5 cc of 1% lidocaine 1:100,000 epinephrine was used to anesthetize the right inferior alveolar nerve, lingual nerve, buccal nerve, left superior alveolar nerve, greater palatine nerve  Attention was first made to the right mandible  The gingival cuff of tooth #30 was reflected  An elevator was used to luxate the tooth and it was extracted with a forceps  Copious normal saline irrigation of the socket was performed  Gel foam was placed and figure of eight 3-0 chromic gut sutures  Positive hemostasis was achieved  Attention was then made to the left maxilla  The gingival cuff of tooth #15 was reflected  An elevator was used to luxate the tooth and it was extracted with a forceps  Copious normal saline irrigation of the socket was performed  Gel foam was placed and figure of eight 3-0 chromic gut sutures  Positive hemostasis was achieved  The throat pack was removed and the oral cavity suctioned  Sterile drapes were removed and the face cleansed with normal saline and dried  All sponge counts were correct with nursing staff  No complications encountered  I was present for the entire procedure    Otolaryngology, Dr Samreen Lama performed right parotidectomy  Please see separate dictation  Patient Disposition:  PACU  and extubated and stable after right parotidectomy procedure      SIGNATURE: Tito Huston DDS  DATE: August 8, 2018  TIME: 3:16 PM

## 2018-08-08 NOTE — ANESTHESIA PREPROCEDURE EVALUATION
Review of Systems/Medical History  Patient summary reviewed  Chart reviewed  No history of anesthetic complications     Cardiovascular  Hypertension , DVT   Pulmonary  Negative pulmonary ROS        GI/Hepatic            Endo/Other    Comment: Parotid Mass    GYN       Hematology      Comment: Protein S Deficiency Musculoskeletal       Neurology   Psychology   Psychiatric history, Anxiety, Depression ,              Physical Exam    Airway    Mallampati score: II  TM Distance: >3 FB  Neck ROM: full     Dental       Cardiovascular      Pulmonary      Other Findings        Anesthesia Plan  ASA Score- 2     Anesthesia Type- general with ASA Monitors  Additional Monitors:   Airway Plan: ETT  Plan Factors-    Induction- intravenous  Postoperative Plan-     Informed Consent- Anesthetic plan and risks discussed with patient  I personally reviewed this patient with the CRNA  Discussed and agreed on the Anesthesia Plan with the CRNA  Lucius Perry

## 2018-08-08 NOTE — OP NOTE
OPERATIVE REPORT  PATIENT NAME: Marva Serrano    :  1945  MRN: [id number]  Pt Location: [facility] ROOM 01    SURGERY DATE: 2018    Surgeon(s) and Role:  Panel 1:     * Brandy Payton MD - Primary    Panel 2:     * Mingo Navarro DDS - Primary    Preop Diagnosis:  Parotid mass [K11 9]    Post-Op Diagnosis Codes:     * Parotid mass [K11 9]     * Dental caries [521 0]    Procedure(s) (LRB):  PAROTIDECTOMY WITH FACIAL NERVE MONITOR AND FROZEN SECTION (Right)  SELECTIVE NECK DISSECTION (N/A)  EXTRACTION TEETH #15 and #30 (N/A)    Specimen(s):  ID Type Source Tests Collected by Time Destination   1 : Right parotid Tissue Parotid gland TISSUE EXAM Brandy Payton MD 2018 1312    2 : Level 2 lymph node Tissue Lymph Node TISSUE EXAM Brandy Payton MD 2018 1405        Estimated Blood Loss:   100 mL    Drains:  Closed/Suction Drain Right Neck Bulb 15 Fr  (Active)   Number of days: 0       Colostomy (Active)   Number of days:        Anesthesia Type:   General    Operative Indications:  Parotid mass [K11 9]  This is a 69-year-old male with a right parotid neoplasm  Fine-needle aspiration was concerning for malignancy  Preoperative radiographic workup including CT neck and chest was unremarkable with the exception of the right parotid neoplasm  Risks benefits alternatives of surgery were discussed in detail including injuries to cranial nerve 7, 10, 11, 12, lingual, numbness of the ear, Freys syndrome, bleeding, hematoma, need for further surgery, cosmetic defect  Operative Findings:  Intact cranial nerve 7 all branches-stimulating at 0 5 milliamps post dissection  Benign level 2 lymph nodes  Well encapsulated right inferior anterior parotid neoplasm    Complications:   None    Procedure and Technique:  The patient was positively identified and transferred onto the operating table in the supine position   Appropriate monitoring devices were put in place, anesthesia was induced and the patient was intubated without difficulty  Dr Krishan Austin then proceeded with the dental extraction  Her portion will be dictated separately  The operating room table was turned 90 degrees, and the patients right neck was examined  Palpation confirmed the presence of a large tumor in the inferior aspect of the parotid gland  An appropriate site for skin incision was demarcated anterior to the right ear, curving beneath and behind the lobule of the ear, down into the neck in curvilinear fashion  Before proceeding further, the timeout process was completed  Local anesthesia in the form of 1% lidocaine with 1/100,000 epinephrine was then injected to the marked site  Electrodes for facial nerve monitoring were put in place, and monitoring was noted to be functioning appropriately  The patients neck was then prepped and draped in the usual sterile fashion  Hash marks were created along the skin to aid in closure  Skin incision was then made at the marked site using a 15 blade, which was used to continue dissection through subcutaneous tissues  A superficial skin flap was elevated using Bovie cautery, and this flap was held in a retracted position using a 2-0 silk stitch  Dissection then continued down to the sternocleidomastoid muscle posteriorly using Bovie cautery  Superiorly dissection continued anterior to auricular cartilage down to the depth of the tympanomastoid suture line using Bovie cautery  The tail of the parotid gland was grasped using Allis forceps, and dissection continued by elevating the gland off the sternocleidomastoid muscle using the bovie electrocautery  A large encapsulated tumor was noted to be present within the superficial lobe of the parotid gland  The parotid was  off the tragal cartilage    Dissection continued down to the digastric muscle, and using the depth of the muscle as a landmark, the tympanomastoid suture line was palpated, dissection continued through parotid tissue using mosquito forceps and bipolar cautery, and the main trunk of the facial nerve was identified  Dissection continued distally along the main trunk and branches using mosquito forceps and the Ligasure device  Dissection continued using mosquito forceps and bipolar cautery, allowing mobilization of inferior facial nerve branches superiorly off the tumor  Dissection continued through parotid tissue using mosquito forceps and the Ligasure device, and the superficial lobe of the parotid gland, with attached tumor from within the superficial lobe and portion of the deep lobe of the parotid gland was removed, and sent to pathology for permanent section evaluation  The surgical site was irrigated with saline, which was suctioned free, and hemomstatis was accomplished using bipolar cautery  The main trunk of the nerve was stimulated and all branches were functioning at 0 5 mA  The posterior belly of the digastric was then skeletonized  The anterior medial border of the sternocleidomastoid muscle was also skeletonized  The accessory nerve was then identified and the overlying fiber fatty tissue divided up to the digastric muscle  The internal jugular vein was then identified high in the neck and skeletonized to level 3  The lymph nodes within level IIa within removed and sent for frozen section analysis which was unremarkable for malignancy  The surgical site was irrigated with saline, and hemostasis was ensured using Bovie and Bipolar cautery  Celeste was placed within the wound  A  15mm Corrinne Burly drain was place through a separate stab incision  The surgical site was then closed in multiple layers  Platysma and tissue at the level of the platysma was reapproximated using 3-0 Vicryl stitches in an interrupted fashion  Skin itself was closed using a 4-0 nylon stitch running in multiple segments, and the same stitch was used to secure the J-P drain  Bacitracin ointment was then applied       Anesthesia was then reversed, and drapes were removed  The patient was awakened, extubated and taken to the recovery room in stable condition  All counts were correct at the end of the case, and no complications were encountered       I was present for the entire procedure    Patient Disposition:  PACU     SIGNATURE: Timoteo Muller MD  DATE: August 8, 2018  TIME: 3:28 PM

## 2018-08-08 NOTE — PERIOPERATIVE NURSING NOTE
Patient complaining headache right temporal area  Dr Jordon Echevarria aware     NO changes in neuro exam

## 2018-08-08 NOTE — PROGRESS NOTES
Oral and Maxillofacial Surgery Note:    72 y/o M POD #0 s/p Surgical Extraction of Teeth #15,30  Hemostatic  No complications  Gel foam and chromic gut sutures placed at each socket  Recs:  -No further OMFS Surgical Intervention Planned   -HOB 30 degrees  -Diet: dental soft  -Moistened gauze with biting pressure, change every 30-45 min prn bleeding  -Warm salt water rinses start day after extractions  -Maintain good oral hygiene, brush teeth BID start day after extractions  -No spitting, no straws, no suction  -Follow up OMS prn  ?   Primo Haas DDS

## 2018-08-09 VITALS
BODY MASS INDEX: 21.99 KG/M2 | DIASTOLIC BLOOD PRESSURE: 58 MMHG | WEIGHT: 132 LBS | HEIGHT: 65 IN | RESPIRATION RATE: 18 BRPM | HEART RATE: 70 BPM | TEMPERATURE: 97.7 F | SYSTOLIC BLOOD PRESSURE: 108 MMHG | OXYGEN SATURATION: 97 %

## 2018-08-09 DIAGNOSIS — M54.5 CHRONIC LOW BACK PAIN, UNSPECIFIED BACK PAIN LATERALITY, WITH SCIATICA PRESENCE UNSPECIFIED: ICD-10-CM

## 2018-08-09 DIAGNOSIS — G89.29 CHRONIC LOW BACK PAIN, UNSPECIFIED BACK PAIN LATERALITY, WITH SCIATICA PRESENCE UNSPECIFIED: ICD-10-CM

## 2018-08-09 LAB
ABO GROUP BLD BPU: NORMAL
ABO GROUP BLD BPU: NORMAL
BPU ID: NORMAL
BPU ID: NORMAL
INR PPP: 2.1 (ref 0.86–1.17)
PROTHROMBIN TIME: 22.9 SECONDS (ref 11.8–14.2)
UNIT DISPENSE STATUS: NORMAL
UNIT DISPENSE STATUS: NORMAL
UNIT PRODUCT CODE: NORMAL
UNIT PRODUCT CODE: NORMAL
UNIT RH: NORMAL
UNIT RH: NORMAL

## 2018-08-09 PROCEDURE — 85610 PROTHROMBIN TIME: CPT | Performed by: PHYSICIAN ASSISTANT

## 2018-08-09 RX ORDER — ALPRAZOLAM 0.5 MG/1
0.5 TABLET ORAL 2 TIMES DAILY PRN
Status: DISCONTINUED | OUTPATIENT
Start: 2018-08-09 | End: 2018-08-09 | Stop reason: HOSPADM

## 2018-08-09 RX ORDER — TAMSULOSIN HYDROCHLORIDE 0.4 MG/1
0.4 CAPSULE ORAL DAILY
Status: DISCONTINUED | OUTPATIENT
Start: 2018-08-09 | End: 2018-08-09 | Stop reason: HOSPADM

## 2018-08-09 RX ORDER — OXYCODONE HYDROCHLORIDE AND ACETAMINOPHEN 5; 325 MG/1; MG/1
1-2 TABLET ORAL EVERY 4 HOURS PRN
Qty: 25 TABLET | Refills: 0 | Status: SHIPPED | OUTPATIENT
Start: 2018-08-09 | End: 2018-08-19

## 2018-08-09 RX ORDER — IBUPROFEN 400 MG/1
TABLET ORAL
Status: DISPENSED
Start: 2018-08-09 | End: 2018-08-09

## 2018-08-09 RX ORDER — GABAPENTIN 100 MG/1
CAPSULE ORAL
Qty: 30 CAPSULE | Refills: 1 | OUTPATIENT
Start: 2018-08-09 | End: 2018-08-09 | Stop reason: HOSPADM

## 2018-08-09 RX ORDER — TAMSULOSIN HYDROCHLORIDE 0.4 MG/1
0.4 CAPSULE ORAL
Status: DISCONTINUED | OUTPATIENT
Start: 2018-08-09 | End: 2018-08-09

## 2018-08-09 RX ADMIN — LORATADINE 10 MG: 10 TABLET ORAL at 08:59

## 2018-08-09 RX ADMIN — Medication 1 TABLET: at 09:00

## 2018-08-09 RX ADMIN — TAMSULOSIN HYDROCHLORIDE 0.4 MG: 0.4 CAPSULE ORAL at 09:04

## 2018-08-09 RX ADMIN — BACITRACIN ZINC 1 LARGE APPLICATION: 500 OINTMENT TOPICAL at 09:01

## 2018-08-09 RX ADMIN — HYDROCODONE BITARTRATE AND ACETAMINOPHEN 1 TABLET: 5; 325 TABLET ORAL at 14:37

## 2018-08-09 RX ADMIN — HYDROCODONE BITARTRATE AND ACETAMINOPHEN 1 TABLET: 5; 325 TABLET ORAL at 02:43

## 2018-08-09 RX ADMIN — ALPRAZOLAM 0.5 MG: 0.5 TABLET ORAL at 09:00

## 2018-08-09 RX ADMIN — FLUTICASONE PROPIONATE 1 SPRAY: 50 SPRAY, METERED NASAL at 09:01

## 2018-08-09 RX ADMIN — IBUPROFEN 600 MG: 600 TABLET ORAL at 01:01

## 2018-08-09 RX ADMIN — GABAPENTIN 100 MG: 100 CAPSULE ORAL at 09:00

## 2018-08-09 RX ADMIN — AMLODIPINE BESYLATE 5 MG: 5 TABLET ORAL at 09:00

## 2018-08-09 NOTE — PLAN OF CARE
Problem: DISCHARGE PLANNING - CARE MANAGEMENT  Goal: Discharge to post-acute care or home with appropriate resources  INTERVENTIONS:  - Conduct assessment to determine patient/family and health care team treatment goals, and need for post-acute services based on payer coverage, community resources, and patient preferences, and barriers to discharge  - Address psychosocial, clinical, and financial barriers to discharge as identified in assessment in conjunction with the patient/family and health care team  - Arrange appropriate level of post-acute services according to patients   needs and preference and payer coverage in collaboration with the physician and health care team  - Communicate with and update the patient/family, physician, and health care team regarding progress on the discharge plan  - Arrange appropriate transportation to post-acute venues  Outcome: Progressing  CM met with Pt at bedside to discuss [facility] for drain care and possible [place]  Pt is agreeable and agreeable to a referral being made to [facility]  CM sent referral  CM spoke with Pt's wife via phone and she is agreeable to [facility] as well

## 2018-08-09 NOTE — PHYSICIAN ADVISOR
Current patient class: Outpatient Surgery  The patient is currently on Hospital Day: 2 at [facility]      The patient was admitted to the hospital at N/A on N/A for the following diagnosis:  Parotid mass [K11 9]     Rationale is as follows: The patient is a 71 yrs old Male who presented to the ED at 8/8/2018  9:57 AM with a chief complaint of elective parotidectomy  The patient underwent an outpatient procedure without complication and without unexpected recovery time  As such the patient was appropriate for outpatient status  The patients vitals on arrival were ED Triage Vitals   Temperature Pulse Respirations Blood Pressure SpO2   08/08/18 1019 08/08/18 1019 08/08/18 1019 08/08/18 1019 08/08/18 1019   98 2 °F (36 8 °C) 72 18 151/74 97 %      Temp Source Heart Rate Source Patient Position - Orthostatic VS BP Location FiO2 (%)   08/08/18 1019 08/08/18 1019 08/08/18 1710 08/08/18 1710 --   Temporal Monitor Lying Left arm       Pain Score       08/08/18 1523       6           Past Medical History:   Diagnosis Date    Anxiety     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[facility])     Crohn's disease ([facility] )     Depression     DVT [facility]deep venous thrombosis) ([facility])     [place] (hard of hearing[place]     no hearing aids    Hypertension     Mass in neck     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[facility] )     Psychiatric disorder     depression, anger     Past Surgical History:   Procedure Laterality Date    COLON SURGERY      Partial colectomy and colostomy    ESOPHAGOGASTRODUODENOSCOPY N/A 4/5/2017    Procedure: ESOPHAGOGASTRODUODENOSCOPY (EGD); Surgeon: Darryl Hearn MD;  [facility]: [facility]; Service:     EYE SURGERY Right     Cataract    KNEE SURGERY      MN EDG US EXAM SURGICAL ALTER STOM DUODENUM/JEJUNUM N/A 4/9/2018    Procedure: LINEAR ENDOSCOPIC U/S;  Surgeon: Tony Harrell MD;  [facility]: [facility];   Service: Gastroenterology    MN EXC PAROTD,LAT LOBE,DISSECT 5TH NERV Right 8/8/2018    Procedure: PAROTIDECTOMY WITH FACIAL NERVE MONITOR AND FROZEN SECTION;  Surgeon: Vinny Kearney MD;  [facility];  Service: ENT    CO IMPACT TOOTH [address] COMP BONY N/A 8/8/2018    Procedure: EXTRACTION TEETH #15 and #30;  Surgeon: Rasta Riojas DDS;  [facility];  Service: Maxillofacial    RADICAL NECK DISSECTION N/A 8/8/2018    Procedure: SELECTIVE NECK DISSECTION;  Surgeon: Vinny Kearney MD;  [facility];  Service: ENT   [address] have been placed to:   None    Vitals:    08/09/18 0235 08/09/18 0732 08/09/18 1100 08/09/18 1516   BP: 145/75 145/74 107/58 108/58   BP Location:  Left arm Right arm Right arm   Pulse: 77 79 71 70   Resp: 18 18 18    Temp: 97 6 °F (36 4 °C) 98 5 °F (36 9 °C) 98 °F (36 7 °C) 97 7 °F (36 5 °C)   TempSrc: Oral Oral Oral Oral   SpO2: 99% 98% 97% 97%   Weight:       Height:           Most recent labs:    Recent Labs      08/08/18   1733   08/09/18   1413   PLT  112*   --    --    INR   --    < >  2 10*    < > = values in this interval not displayed         Scheduled Meds:  Current Facility-Administered Medications:  ALPRAZolam 0 5 mg Oral BID PRN Gerardo Villalpando PA-C   amLODIPine 5 mg Oral Daily Vinny Kearney MD   bacitracin 1 large application Topical BID Vinny Kearney MD   calcium carbonate 1,000 mg Oral Daily PRN Vinny Kearney MD   enoxaparin 60 mg Subcutaneous Once Vinny Kearney MD   fluticasone 1 spray Nasal Daily Vinny Kearney MD   gabapentin 100 mg Oral Daily Vinny Kearney MD   HYDROcodone-acetaminophen 1 tablet Oral Q6H PRN Vinny Kearney MD   HYDROmorphone 0 5 mg Intravenous Q3H PRN Vinny Kearney MD   ibuprofen 600 mg Oral Q6H PRN Vinny Kearney MD   loratadine 10 mg Oral Daily Vinny Kearney MD   meclizine 25 mg Oral TID PRN Vinny Kearney MD   mirtazapine 30 mg Oral HS Vinny Kearney MD   multivitamin-minerals 1 tablet Oral Daily Vinny Kearney MD   ondansetron 4 mg Intravenous Q6H PRN Vinny Kearney MD   tamsulosin 0 4 mg Oral Daily Lesvia Cole MD     Continuous Infusions:   PRN Meds:   ALPRAZolam    calcium carbonate    HYDROcodone-acetaminophen    HYDROmorphone    ibuprofen    meclizine    ondansetron    Surgical procedures (if appropriate):  Procedure(s):  PAROTIDECTOMY WITH FACIAL NERVE MONITOR AND FROZEN SECTION  SELECTIVE NECK DISSECTION  EXTRACTION TEETH #15 and #30

## 2018-08-09 NOTE — PLAN OF CARE
DISCHARGE PLANNING     Discharge to home or other facility with appropriate resources Adequate for Discharge        DISCHARGE PLANNING - CARE MANAGEMENT     Discharge to post-acute care or home with appropriate resources Adequate for Discharge        GENITOURINARY - ADULT     Absence of urinary retention Adequate for Discharge        INFECTION - ADULT     Absence or prevention of progression during hospitalization Adequate for Discharge        Knowledge Deficit     Patient/family/caregiver demonstrates understanding of disease process, treatment plan, medications, and discharge instructions Adequate for Discharge        PAIN - ADULT     Verbalizes/displays adequate comfort level or baseline comfort level Adequate for Discharge        Potential for Falls     Patient will remain free of falls Adequate for Discharge        SKIN/TISSUE INTEGRITY - ADULT     Incision(s), wounds(s) or drain site(s) healing without S/S of infection Adequate for Discharge

## 2018-08-09 NOTE — CASE MANAGEMENT
Initial Clinical Review    Age/Sex: 71 y o  male    Surgery Date: 8/8/2018    Procedure: PAROTIDECTOMY WITH FACIAL NERVE MONITOR AND FROZEN SECTION (Right)  SELECTIVE NECK DISSECTION (N/A)  EXTRACTION TEETH #15 and #30 (N/A)    Anesthesia: general     Admission Orders: Date/Time/Statement:   8/8/2018  1558 Outpatient no charge bed  Start   Ordered   08/08/18 1558  Outpatient No Charge Bed Once     Transfer Service: ENT       Question: Admitting Physician Answer: [place]    08/08/18 1557         Vital Signs: /75   Pulse 77   Temp 97 6 °F (36 4 °C) (Oral)   Resp 18   Ht 5' 5" (1 651 m)   Wt 59 9 kg (132 lb)   SpO2 99%   BMI 21 97 kg/m²     Diet:        Diet Orders            Start     Ordered    08/08/18 462 [facility]  Room Service  Once     Question:  Type of Service  Answer:  Room Service - Appropriate with Assistance    08/08/18 1802    08/08/18 1642  Diet Regular; Regular House  Diet effective now     Question Answer Comment   Diet Type Regular    Regular Regular House    RD to adjust diet per protocol? Yes        08/08/18 1641          Mobility: up as tolerated  DVT Prophylaxis: lovenox 60 mg sq   scds    Pain Control: norco - used  X 1  Motrin - used x 2  Pain Medications             gabapentin (NEURONTIN) 100 mg capsule TAKE 1 CAPSULE BY MOUTH ONCE DAILY    mirtazapine (REMERON) 30 mg tablet Take 1 tablet (30 mg total) by mouth daily at bedtime    acetaminophen (TYLENOL) 100 mg/mL solution Take 10 mg/kg by mouth every 4 (four) hours as needed for fever        Po medication:  Mirtazapine  Gabapentin  Fluticasone  Amlodipine  Loratadine  MVI  Bacitracin topical  Albumin 12 5 g IV - x 2    ALEJANDRA drain to high wall suction

## 2018-08-09 NOTE — SOCIAL WORK
Pt will have drain pulled tomorrow and will not need a ellsworth to go home  CM cancelled referral to [facility]  Pt and wife aware

## 2018-08-09 NOTE — CASE MANAGEMENT
Continued Stay Review    Date/POD#: 8/9/2018  POD #1    CC: Cannot urinate  Straight cath x 2  Some incisional jovanny    Vital Signs: /58 (BP Location: Right arm)   Pulse 71   Temp 98 °F (36 7 °C) (Oral)   Resp 18   Ht 5' 5" (1 651 m)   Wt 59 9 kg (132 lb)   SpO2 97%   BMI 21 97 kg/m²     Medication:   Scheduled Meds:   Current Facility-Administered Medications:  ALPRAZolam 0 5 mg Oral BID PRN   amLODIPine 5 mg Oral Daily   bacitracin 1 large application Topical BID   calcium carbonate 1,000 mg Oral Daily PRN   enoxaparin 60 mg Subcutaneous Once   fluticasone 1 spray Nasal Daily   gabapentin 100 mg Oral Daily   HYDROcodone-acetaminophen 1 tablet Oral Q6H PRN   HYDROmorphone 0 5 mg Intravenous Q3H PRN   ibuprofen 600 mg Oral Q6H PRN   loratadine 10 mg Oral Daily   meclizine 25 mg Oral TID PRN   mirtazapine 30 mg Oral HS   multivitamin-minerals 1 tablet Oral Daily   ondansetron 4 mg Intravenous Q6H PRN   tamsulosin 0 4 mg Oral Daily     Continuous Infusions:    PRN Meds:     HYDROcodone-acetaminophen - used x 2      Ibuprofen - used x 1  Abnormal Labs/Diagnostic Results:   On 8/8-  INR 2 05  Platelets 192       Age/Sex: 71 y o  male     Assessment/Plan: 71year old male s/p Procedure(s):  PAROTIDECTOMY WITH FACIAL NERVE MONITOR AND FROZEN SECTION  SELECTIVE NECK DISSECTION  EXTRACTION TEETH #15 and #30  Plan:  -Flomax  -xanax  -continue ALEJANDRA to suction, monitor output  - if patient fails voiding trails later he will be sent home with ellsworth and urology follow up  -pain control    Discharge Plan: [facility]

## 2018-08-09 NOTE — PLAN OF CARE
GENITOURINARY - ADULT     Absence of urinary retention Not Progressing          DISCHARGE PLANNING     Discharge to home or other facility with appropriate resources Progressing        INFECTION - ADULT     Absence or prevention of progression during hospitalization Progressing        Knowledge Deficit     Patient/family/caregiver demonstrates understanding of disease process, treatment plan, medications, and discharge instructions Progressing        PAIN - ADULT     Verbalizes/displays adequate comfort level or baseline comfort level Progressing        Potential for Falls     Patient will remain free of falls Progressing        SKIN/TISSUE INTEGRITY - ADULT     Incision(s), wounds(s) or drain site(s) healing without S/S of infection Progressing

## 2018-08-09 NOTE — DISCHARGE INSTRUCTIONS
Resume home coumadin tomorrow morning  Follow up with Dr Carmela Westbrook 8/10 to have drain pulled  Do not drive while taking pain medication  Bacitracin to incision 2x a day  Hold off on showering until drain is removed

## 2018-08-09 NOTE — SOCIAL WORK
CM met with Pt at bedside to discuss [facility] for drain care and possible ellsworth  Pt is agreeable and agreeable to a referral being made to [facility]  CM sent referral  CM spoke with Pt's wife via phone and she is agreeable to [facility] as well

## 2018-08-09 NOTE — PROGRESS NOTES
Progress Note -Surgery HAIR Wheatley 71 y o  male MRN: [id number]  Unit/Bed#: -01 Encounter: [id number]      Assessment:  71year old male s/p Procedure(s):  PAROTIDECTOMY WITH FACIAL NERVE MONITOR AND FROZEN SECTION  SELECTIVE NECK DISSECTION  EXTRACTION TEETH #15 and #30  Plan:  -Flomax  -xanax  -continue ALEJANDRA to suction, monitor output  - if patient fails voiding trails later he will be sent home with ellsworth and urology follow up  -pain control        Subjective/Objective     Subjective: Cannot urinate  Straight cath x 2  Some incisional pain  Objective:     /74 (BP Location: Left arm)   Pulse 79   Temp 98 5 °F (36 9 °C) (Oral)   Resp 18   Ht 5' 5" (1 651 m)   Wt 59 9 kg (132 lb)   SpO2 98%   BMI 21 97 kg/m²   I/O last 24 hours: In: 9629 [I V :1000; Blood:328]  Out: 3128 [VSTZT:9640; Drains:97; Blood:100]        Intake/Output Summary (Last 24 hours) at 08/09/18 0822  Last data filed at 08/09/18 0748   Gross per 24 hour   Intake             1328 ml   Output             3204 ml   Net            -1876 ml       Invasive Devices     Peripheral Intravenous Line            Peripheral IV 08/08/18 Left Hand less than 1 day    Peripheral IV 08/08/18 Right Forearm less than 1 day          Drain            Colostomy -- days    Closed/Suction Drain Right Neck Bulb 15 Fr  less than 1 day                Physical Exam:  /74 (BP Location: Left arm)   Pulse 79   Temp 98 5 °F (36 9 °C) (Oral)   Resp 18   Ht 5' 5" (1 651 m)   Wt 59 9 kg (132 lb)   SpO2 98%   BMI 21 97 kg/m²   General appearance: alert and oriented, in no acute distress and alert  Neck: incision healing well  Drain in place and fuctioning, Slight hematoma on zygomatic bone on right side        Current Facility-Administered Medications:     ALPRAZolam (XANAX) tablet 0 5 mg, 0 5 mg, Oral, BID PRN, James Gonzalez PA-C    amLODIPine (NORVASC) tablet 5 mg, 5 mg, Oral, Daily, Jeannette Motley MD, 5 mg at 08/08/18 2446   bacitracin topical ointment 1 large application, 1 large application, Topical, BID, Jeovany Aburto MD, 1 large application at 28/67/20 1822    calcium carbonate (TUMS) chewable tablet 1,000 mg, 1,000 mg, Oral, Daily PRN, Jeovany Aburto MD    enoxaparin (LOVENOX) subcutaneous injection 60 mg, 60 mg, Subcutaneous, Once, Jeovany Aburto MD    fluticasone [facility]) 50 mcg/act nasal spray 1 spray, 1 spray, Nasal, Daily, Jeovany Aburto MD    gabapentin (NEURONTIN) capsule 100 mg, 100 mg, Oral, Daily, Jeovany Aburto MD, 100 mg at 08/08/18 1821    HYDROcodone-acetaminophen ([facility]) 5-325 mg per tablet 1 tablet, 1 tablet, Oral, Q6H PRN, Jeovany Aburto MD, 1 tablet at 08/09/18 0243    HYDROmorphone (DILAUDID) injection 0 5 mg, 0 5 mg, Intravenous, Q3H PRN, Jeovany Aburto MD    ibuprofen (MOTRIN) 400 mg tablet **AcuDose Override Pull**, , , ,     ibuprofen (MOTRIN) tablet 600 mg, 600 mg, Oral, Q6H PRN, Jeovany Aburto MD, 600 mg at 08/09/18 0101    loratadine (CLARITIN) tablet 10 mg, 10 mg, Oral, Daily, Jeovany Aburto MD, 10 mg at 08/08/18 1821    meclizine (ANTIVERT) tablet 25 mg, 25 mg, Oral, TID PRN, Jeovany Aburto MD    mirtazapine (REMERON) tablet 30 mg, 30 mg, Oral, HS, Jeovany Aburto MD, 30 mg at 08/08/18 2141    multivitamin-minerals (CENTRUM) tablet 1 tablet, 1 tablet, Oral, Daily, Jeovany Aburto MD, 1 tablet at 08/08/18 1821    [facility]ron [facility] injection 4 mg, 4 mg, Intravenous, Q6H PRN, Jeovany Aburto MD           Lab, Imaging and other studies:  CBC:   Lab Results   Component Value Date     (L) 08/08/2018    MPV 11 5 08/08/2018   , CMP: No results found for: NA, K, CL, CO2, ANIONGAP, BUN, CREATININE, GLUCOSE, CALCIUM, AST, ALT, ALKPHOS, PROT, ALBUMIN, BILITOT, EGFR      VTE Pharmacologic Prophylaxis: Sequential compression device (Venodyne)   VTE Mechanical Prophylaxis: sequential compression device    Rounds performed with nursing

## 2018-08-17 ENCOUNTER — ANTICOAG VISIT (OUTPATIENT)
Dept: INTERNAL MEDICINE CLINIC | Facility: CLINIC | Age: 73
End: 2018-08-17

## 2018-08-17 ENCOUNTER — APPOINTMENT (OUTPATIENT)
Dept: LAB | Facility: CLINIC | Age: 73
End: 2018-08-17
Payer: MEDICARE

## 2018-08-24 ENCOUNTER — APPOINTMENT (OUTPATIENT)
Dept: LAB | Facility: CLINIC | Age: 73
End: 2018-08-24
Payer: MEDICARE

## 2018-08-24 ENCOUNTER — ANTICOAG VISIT (OUTPATIENT)
Dept: INTERNAL MEDICINE CLINIC | Facility: CLINIC | Age: 73
End: 2018-08-24

## 2018-08-31 ENCOUNTER — APPOINTMENT (OUTPATIENT)
Dept: LAB | Facility: CLINIC | Age: 73
End: 2018-08-31
Payer: MEDICARE

## 2018-09-04 ENCOUNTER — ANTICOAG VISIT (OUTPATIENT)
Dept: INTERNAL MEDICINE CLINIC | Facility: CLINIC | Age: 73
End: 2018-09-04

## 2018-09-07 ENCOUNTER — ANTICOAG VISIT (OUTPATIENT)
Dept: INTERNAL MEDICINE CLINIC | Facility: CLINIC | Age: 73
End: 2018-09-07

## 2018-09-07 ENCOUNTER — APPOINTMENT (OUTPATIENT)
Dept: LAB | Facility: CLINIC | Age: 73
End: 2018-09-07
Payer: MEDICARE

## 2018-09-10 NOTE — PROGRESS NOTES
Pt has not followed up with PT since last visit  Has been longer than 30 days  See last note for outcomes

## 2018-09-21 ENCOUNTER — ANTICOAG VISIT (OUTPATIENT)
Dept: INTERNAL MEDICINE CLINIC | Facility: CLINIC | Age: 73
End: 2018-09-21

## 2018-09-21 ENCOUNTER — APPOINTMENT (OUTPATIENT)
Dept: LAB | Facility: CLINIC | Age: 73
End: 2018-09-21
Payer: MEDICARE

## 2018-09-21 LAB
INR PPP: 2.19 (ref 0.86–1.17)
PROTHROMBIN TIME: 23.7 SECONDS (ref 11.8–14.2)

## 2018-09-21 PROCEDURE — 85610 PROTHROMBIN TIME: CPT | Performed by: NURSE PRACTITIONER

## 2018-09-21 PROCEDURE — 36415 COLL VENOUS BLD VENIPUNCTURE: CPT | Performed by: NURSE PRACTITIONER

## 2018-10-05 ENCOUNTER — APPOINTMENT (OUTPATIENT)
Dept: LAB | Facility: CLINIC | Age: 73
DRG: 880 | End: 2018-10-05
Payer: MEDICARE

## 2018-10-06 ENCOUNTER — APPOINTMENT (EMERGENCY)
Dept: CT IMAGING | Facility: HOSPITAL | Age: 73
DRG: 880 | End: 2018-10-06
Payer: MEDICARE

## 2018-10-06 ENCOUNTER — HOSPITAL ENCOUNTER (INPATIENT)
Facility: HOSPITAL | Age: 73
LOS: 3 days | Discharge: HOME | DRG: 880 | End: 2018-10-09
Attending: EMERGENCY MEDICINE | Admitting: INTERNAL MEDICINE
Payer: MEDICARE

## 2018-10-06 ENCOUNTER — APPOINTMENT (EMERGENCY)
Dept: RADIOLOGY | Facility: HOSPITAL | Age: 73
DRG: 880 | End: 2018-10-06
Payer: MEDICARE

## 2018-10-06 DIAGNOSIS — R42 DIZZINESS: ICD-10-CM

## 2018-10-06 DIAGNOSIS — G93.40 ACUTE ENCEPHALOPATHY: Primary | ICD-10-CM

## 2018-10-06 DIAGNOSIS — F41.9 ANXIETY DISORDER: ICD-10-CM

## 2018-10-06 DIAGNOSIS — F41.9 ANXIETY: ICD-10-CM

## 2018-10-06 LAB
ALBUMIN SERPL BCP-MCNC: 3.5 G/DL (ref 3.5–5)
ALP SERPL-CCNC: 66 U/L (ref 46–116)
ALT SERPL W P-5'-P-CCNC: 26 U/L (ref 12–78)
ANION GAP SERPL CALCULATED.3IONS-SCNC: 9 MMOL/L (ref 4–13)
APTT PPP: 31 SECONDS (ref 24–36)
AST SERPL W P-5'-P-CCNC: 26 U/L (ref 5–45)
BACTERIA UR QL AUTO: ABNORMAL /HPF
BASOPHILS # BLD AUTO: 0.02 THOUSANDS/ΜL (ref 0–0.1)
BASOPHILS NFR BLD AUTO: 0 % (ref 0–1)
BILIRUB SERPL-MCNC: 0.7 MG/DL (ref 0.2–1)
BILIRUB UR QL STRIP: NEGATIVE
BUN SERPL-MCNC: 14 MG/DL (ref 5–25)
CALCIUM SERPL-MCNC: 8.5 MG/DL (ref 8.3–10.1)
CHLORIDE SERPL-SCNC: 105 MMOL/L (ref 100–108)
CLARITY UR: CLEAR
CO2 SERPL-SCNC: 27 MMOL/L (ref 21–32)
COLOR UR: YELLOW
CREAT SERPL-MCNC: 0.8 MG/DL (ref 0.6–1.3)
EOSINOPHIL # BLD AUTO: 0.06 THOUSAND/ΜL (ref 0–0.61)
EOSINOPHIL NFR BLD AUTO: 1 % (ref 0–6)
ERYTHROCYTE [DISTWIDTH] IN BLOOD BY AUTOMATED COUNT: 12.8 % (ref 11.6–15.1)
GFR SERPL CREATININE-BSD FRML MDRD: 89 ML/MIN/1.73SQ M
GLUCOSE SERPL-MCNC: 110 MG/DL (ref 65–140)
GLUCOSE UR STRIP-MCNC: NEGATIVE MG/DL
HCT VFR BLD AUTO: 39.4 % (ref 36.5–49.3)
HGB BLD-MCNC: 13.3 G/DL (ref 12–17)
HGB UR QL STRIP.AUTO: ABNORMAL
IMM GRANULOCYTES # BLD AUTO: 0.01 THOUSAND/UL (ref 0–0.2)
IMM GRANULOCYTES NFR BLD AUTO: 0 % (ref 0–2)
INR PPP: 1.94 (ref 0.86–1.17)
KETONES UR STRIP-MCNC: NEGATIVE MG/DL
LEUKOCYTE ESTERASE UR QL STRIP: NEGATIVE
LYMPHOCYTES # BLD AUTO: 1.02 THOUSANDS/ΜL (ref 0.6–4.47)
LYMPHOCYTES NFR BLD AUTO: 22 % (ref 14–44)
MCH RBC QN AUTO: 30.6 PG (ref 26.8–34.3)
MCHC RBC AUTO-ENTMCNC: 33.8 G/DL (ref 31.4–37.4)
MCV RBC AUTO: 91 FL (ref 82–98)
MONOCYTES # BLD AUTO: 0.32 THOUSAND/ΜL (ref 0.17–1.22)
MONOCYTES NFR BLD AUTO: 7 % (ref 4–12)
NEUTROPHILS # BLD AUTO: 3.12 THOUSANDS/ΜL (ref 1.85–7.62)
NEUTS SEG NFR BLD AUTO: 70 % (ref 43–75)
NITRITE UR QL STRIP: NEGATIVE
NON-SQ EPI CELLS URNS QL MICRO: ABNORMAL /HPF
NRBC BLD AUTO-RTO: 0 /100 WBCS
PH UR STRIP.AUTO: 8.5 [PH] (ref 4.5–8)
PLATELET # BLD AUTO: 148 THOUSANDS/UL (ref 149–390)
PMV BLD AUTO: 10.6 FL (ref 8.9–12.7)
POTASSIUM SERPL-SCNC: 3.8 MMOL/L (ref 3.5–5.3)
PROT SERPL-MCNC: 6.7 G/DL (ref 6.4–8.2)
PROT UR STRIP-MCNC: NEGATIVE MG/DL
PROTHROMBIN TIME: 21.6 SECONDS (ref 11.8–14.2)
RBC # BLD AUTO: 4.35 MILLION/UL (ref 3.88–5.62)
RBC #/AREA URNS AUTO: ABNORMAL /HPF
SODIUM SERPL-SCNC: 141 MMOL/L (ref 136–145)
SP GR UR STRIP.AUTO: 1.01 (ref 1–1.03)
TROPONIN I SERPL-MCNC: <0.02 NG/ML
UROBILINOGEN UR QL STRIP.AUTO: 0.2 E.U./DL
WBC # BLD AUTO: 4.55 THOUSAND/UL (ref 4.31–10.16)
WBC #/AREA URNS AUTO: ABNORMAL /HPF

## 2018-10-06 PROCEDURE — 81001 URINALYSIS AUTO W/SCOPE: CPT | Performed by: EMERGENCY MEDICINE

## 2018-10-06 PROCEDURE — 96374 THER/PROPH/DIAG INJ IV PUSH: CPT

## 2018-10-06 PROCEDURE — 70450 CT HEAD/BRAIN W/O DYE: CPT

## 2018-10-06 PROCEDURE — 99223 1ST HOSP IP/OBS HIGH 75: CPT | Performed by: INTERNAL MEDICINE

## 2018-10-06 PROCEDURE — 85025 COMPLETE CBC W/AUTO DIFF WBC: CPT | Performed by: EMERGENCY MEDICINE

## 2018-10-06 PROCEDURE — 36415 COLL VENOUS BLD VENIPUNCTURE: CPT | Performed by: EMERGENCY MEDICINE

## 2018-10-06 PROCEDURE — 93005 ELECTROCARDIOGRAM TRACING: CPT

## 2018-10-06 PROCEDURE — 85610 PROTHROMBIN TIME: CPT | Performed by: EMERGENCY MEDICINE

## 2018-10-06 PROCEDURE — 71046 X-RAY EXAM CHEST 2 VIEWS: CPT

## 2018-10-06 PROCEDURE — 99285 EMERGENCY DEPT VISIT HI MDM: CPT

## 2018-10-06 PROCEDURE — 90662 IIV NO PRSV INCREASED AG IM: CPT | Performed by: INTERNAL MEDICINE

## 2018-10-06 PROCEDURE — 84484 ASSAY OF TROPONIN QUANT: CPT | Performed by: EMERGENCY MEDICINE

## 2018-10-06 PROCEDURE — 80053 COMPREHEN METABOLIC PANEL: CPT | Performed by: EMERGENCY MEDICINE

## 2018-10-06 PROCEDURE — 1124F ACP DISCUSS-NO DSCNMKR DOCD: CPT | Performed by: INTERNAL MEDICINE

## 2018-10-06 PROCEDURE — G0008 ADMIN INFLUENZA VIRUS VAC: HCPCS | Performed by: INTERNAL MEDICINE

## 2018-10-06 PROCEDURE — 85730 THROMBOPLASTIN TIME PARTIAL: CPT | Performed by: EMERGENCY MEDICINE

## 2018-10-06 RX ORDER — AMLODIPINE BESYLATE 5 MG/1
5 TABLET ORAL DAILY
Status: DISCONTINUED | OUTPATIENT
Start: 2018-10-07 | End: 2018-10-09 | Stop reason: HOSPADM

## 2018-10-06 RX ORDER — SENNOSIDES 8.6 MG
1 TABLET ORAL DAILY
Status: DISCONTINUED | OUTPATIENT
Start: 2018-10-07 | End: 2018-10-09 | Stop reason: HOSPADM

## 2018-10-06 RX ORDER — WARFARIN SODIUM 2 MG/1
2 TABLET ORAL DAILY
Status: DISCONTINUED | OUTPATIENT
Start: 2018-10-07 | End: 2018-10-09 | Stop reason: HOSPADM

## 2018-10-06 RX ORDER — LORAZEPAM 2 MG/ML
0.5 INJECTION INTRAMUSCULAR ONCE
Status: COMPLETED | OUTPATIENT
Start: 2018-10-06 | End: 2018-10-06

## 2018-10-06 RX ORDER — CALCIUM CARBONATE 200(500)MG
1000 TABLET,CHEWABLE ORAL DAILY PRN
Status: DISCONTINUED | OUTPATIENT
Start: 2018-10-06 | End: 2018-10-09 | Stop reason: HOSPADM

## 2018-10-06 RX ORDER — ONDANSETRON 2 MG/ML
4 INJECTION INTRAMUSCULAR; INTRAVENOUS EVERY 6 HOURS PRN
Status: DISCONTINUED | OUTPATIENT
Start: 2018-10-06 | End: 2018-10-09 | Stop reason: HOSPADM

## 2018-10-06 RX ORDER — FLUTICASONE PROPIONATE 50 MCG
1 SPRAY, SUSPENSION (ML) NASAL DAILY
Status: DISCONTINUED | OUTPATIENT
Start: 2018-10-07 | End: 2018-10-09 | Stop reason: HOSPADM

## 2018-10-06 RX ORDER — MIRTAZAPINE 15 MG/1
30 TABLET, FILM COATED ORAL
Status: DISCONTINUED | OUTPATIENT
Start: 2018-10-06 | End: 2018-10-09 | Stop reason: HOSPADM

## 2018-10-06 RX ORDER — LANOLIN ALCOHOL/MO/W.PET/CERES
9 CREAM (GRAM) TOPICAL
Status: DISCONTINUED | OUTPATIENT
Start: 2018-10-06 | End: 2018-10-09 | Stop reason: HOSPADM

## 2018-10-06 RX ORDER — LORATADINE 10 MG/1
10 TABLET ORAL DAILY
Status: DISCONTINUED | OUTPATIENT
Start: 2018-10-07 | End: 2018-10-09 | Stop reason: HOSPADM

## 2018-10-06 RX ORDER — GABAPENTIN 100 MG/1
100 CAPSULE ORAL DAILY
Status: DISCONTINUED | OUTPATIENT
Start: 2018-10-07 | End: 2018-10-09 | Stop reason: HOSPADM

## 2018-10-06 RX ADMIN — INFLUENZA A VIRUS A/MICHIGAN/45/2015 X-275 (H1N1) ANTIGEN (FORMALDEHYDE INACTIVATED), INFLUENZA A VIRUS A/SINGAPORE/INFIMH-16-0019/2016 IVR-186 (H3N2) ANTIGEN (FORMALDEHYDE INACTIVATED), AND INFLUENZA B VIRUS B/MARYLAND/15/2016 BX-69A (A B/COLORADO/6/2017-LIKE VIRUS) ANTIGEN (FORMALDEHYDE INACTIVATED) 0.5 ML: 60; 60; 60 INJECTION, SUSPENSION INTRAMUSCULAR at 21:38

## 2018-10-06 RX ADMIN — LORAZEPAM 0.5 MG: 2 INJECTION INTRAMUSCULAR; INTRAVENOUS at 13:25

## 2018-10-06 RX ADMIN — MELATONIN TAB 3 MG 9 MG: 3 TAB at 21:38

## 2018-10-06 RX ADMIN — MIRTAZAPINE 30 MG: 15 TABLET, FILM COATED ORAL at 21:38

## 2018-10-06 NOTE — ED PROVIDER NOTES
History  Chief Complaint   Patient presents with    Dizziness     sudden onset of dizzy and lightheadedness this am since around 1030, + nausea, hyperventilating, and twitching, which occurs when he gets upset according to his wife but worse     80-year-old male presents the emergency department for evaluation of dizziness and lightheadedness  Patient's history is supplemented by his wife is at the bedside  She states the patient does suffer from severe anxiety and when he gets upset that he often twitches and hyperventilates  Upon my initial assessment the patient is very anxious  He is disoriented  He is hyperventilating  Patient's wife states that despite his anxiety he seemed to be acting abnormal   He is more confused than he normally is with his anxiety episodes  The patient does not normally complain of lightheadedness and did tell his wife that he felt like he was going to pass out  She denies recent fevers or chills  No falls or head injuries  No new medications  No reported cough  No nausea, vomiting, diarrhea  Patient's wife states that they recently had several close friends passed away and that the patient has been very fixated on his health, he believes that he has cancer somewhere in his body  History provided by:  Patient and spouse  Dizziness   Quality:  Lightheadedness  Severity:  Severe  Onset quality:  Unable to specify  Timing:  Unable to specify  Progression:  Unable to specify  Chronicity:  New  Context: standing up    Relieved by:  Nothing  Worsened by:  Nothing  Ineffective treatments:  None tried  Associated symptoms: weakness    Associated symptoms: no diarrhea, no headaches, no hearing loss, no shortness of breath, no syncope, no vision changes and no vomiting    Risk factors: no new medications        Prior to Admission Medications   Prescriptions Last Dose Informant Patient Reported? Taking?    Loratadine (CLARITIN) 10 MG CAPS  Self Yes Yes   Sig: Take by mouth daily as needed     Melatonin 10 MG TABS  Self Yes Yes   Sig: Take by mouth   Multiple Vitamins-Minerals (MULTI FOR HIM 50+ PO)  Self Yes Yes   Sig: Take 1 tablet by mouth daily   amLODIPine (NORVASC) 5 mg tablet  Self Yes Yes   Sig: Take 5 mg by mouth daily   fluticasone (FLONASE) 50 mcg/act nasal spray   Yes Yes   Si spray into each nostril daily   meclizine (ANTIVERT) 25 mg tablet  Self No Yes   Sig: Take 1 tablet (25 mg total) by mouth 3 (three) times a day as needed for dizziness   mirtazapine (REMERON) 30 mg tablet   No Yes   Sig: Take 1 tablet (30 mg total) by mouth daily at bedtime   ondansetron (ZOFRAN) 4 mg tablet  Self No Yes   Sig: Take 1 tablet (4 mg total) by mouth every 8 (eight) hours as needed for nausea or vomiting   warfarin (COUMADIN) 2 mg tablet  Self No Yes   Sig: TAKE ONE TABLET BY MOUTH ONCE DAILY      Facility-Administered Medications: None       Past Medical History:   Diagnosis Date    Anxiety     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)     [facility] [address]     Depression     D[facility]deep venous thrombosis) [facility] [address]     Mi'kmaq (hard of hearing)     no hearing aids    Hypertension     Mass in neck     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     Psychiatric disorder     depression, anger       Past Surgical History:   Procedure Laterality Date    COLON SURGERY      Partial colectomy and colostomy    ESOPHAGOGASTRODUODENOSCOPY N/A 2017    Procedure: ESOPHAGOGASTRODUODENOSCOPY (EGD); Surgeon: Jabier Obando MD;  [facility]; Service:     EYE SURGERY Right     Cataract    KNEE SURGERY      AK EDG US EXAM SURGICAL ALTER STOM DUODENUM/JEJUNUM N/A 2018    Procedure: LINEAR ENDOSCOPIC U/S;  Surgeon: Cinthia Wilcox MD;  [facility];   Service: Gastroenterology    AK EXC PAROTD,LAT LOBE,DISSECT 5TH NERV Right 2018    Procedure: PAROTIDECTOMY WITH FACIAL NERVE MONITOR AND FROZEN SECTION;  Surgeon: Juan Daniel Busby MD;  [facility];  Service: ENT    AK IMPACT TOOTH REMOV COMP BONY N/A 8/8/2018    Procedure: EXTRACTION TEETH #15 and #30;  Surgeon: Heena Lux DDS;  [facility];  Service: Maxillofacial    RADICAL NECK DISSECTION N/A 8/8/2018    Procedure: SELECTIVE NECK DISSECTION;  Surgeon: Jacek Cadet MD;  [facility];  Service: ENT    VEIN LIGATION AND STRIPPING         Family History   Problem Relation Age of Onset    Heart disease Brother     Diverticulitis Mother      I have reviewed and agree with the history as documented  Social History   Substance Use Topics    Smoking status: Former Smoker     Packs/day: 1 00     Years: 10 00     Quit date: 6/9/1981    Smokeless tobacco: Never Used      Comment: 50 years ago    Alcohol use No      Comment: hx etoh abuse pt states he quit 9 years ago        Review of Systems   Unable to perform ROS: Other   HENT: Negative for hearing loss  Respiratory: Negative for shortness of breath  Cardiovascular: Negative for syncope  Gastrointestinal: Negative for diarrhea and vomiting  Neurological: Positive for dizziness and weakness  Negative for headaches  Physical Exam  Physical Exam   Constitutional: He is oriented to person, place, and time  Vital signs are normal  He appears well-developed and well-nourished  HENT:   Head: Normocephalic and atraumatic  Eyes: Pupils are equal, round, and reactive to light  Conjunctivae and EOM are normal    Neck: Normal range of motion  Neck supple  Cardiovascular: Regular rhythm, normal heart sounds and intact distal pulses  Tachycardia present  Pulmonary/Chest: Effort normal and breath sounds normal  No accessory muscle usage  Tachypnea noted  No respiratory distress  He exhibits no tenderness  Hyperventilating   Abdominal: Soft  Normal appearance and bowel sounds are normal  He exhibits no distension  There is no tenderness  There is no rebound and no guarding  Musculoskeletal: Normal range of motion  He exhibits no edema, tenderness or deformity  Lymphadenopathy:     He has no cervical adenopathy  Neurological: He is alert and oriented to person, place, and time  He has normal strength and normal reflexes  He exhibits normal muscle tone  He displays no seizure activity  Coordination normal    Occasional jerking/twitching of the upper extremities   Skin: Skin is warm, dry and intact  No rash noted  He is not diaphoretic  Psychiatric: His behavior is normal  Judgment and thought content normal  His mood appears anxious  Nursing note and vitals reviewed        Vital Signs  ED Triage Vitals   Temperature Pulse Respirations Blood Pressure SpO2   10/06/18 1200 10/06/18 1200 10/06/18 1200 10/06/18 1200 10/06/18 1200   98 5 °F (36 9 °C) (!) 133 (!) 32 166/78 99 %      Temp Source Heart Rate Source Patient Position - Orthostatic VS BP Location FiO2 (%)   10/06/18 1200 10/06/18 1411 10/06/18 1411 10/06/18 1411 --   Oral Monitor Lying Right arm       Pain Score       10/07/18 1900       No Pain           Vitals:    10/08/18 1414 10/08/18 1630 10/08/18 2214 10/09/18 0700   BP: 126/72 133/68 142/77 143/89   Pulse: 102 85 71 80   Patient Position - Orthostatic VS:  Sitting Lying Lying       Visual Acuity  Visual Acuity      Most Recent Value   L Pupil Size (mm)  3   R Pupil Size (mm)  3          ED Medications  Medications   LORazepam (ATIVAN) 2 mg/mL injection 0 5 mg (0 5 mg Intravenous Given 10/6/18 1325)   influenza vaccine, high-dose (FLUZONE HIGH-DOSE) IM injection MATT 0 5 mL (0 5 mL Intramuscular Given 10/6/18 2138)   LORazepam (ATIVAN) 2 mg/mL injection 1 mg (1 mg Intravenous Given 10/8/18 1438)   gadobutrol injection (MULTI-DOSE) SOLN 6 mL (6 mL Intravenous Given 10/8/18 1552)       Diagnostic Studies  Results Reviewed     Procedure Component Value Units Date/Time    Urine Microscopic [[id number]]  (Abnormal) Collected:  10/06/18 1440    Lab Status:  Final result Specimen:  Urine from Urine, Clean Catch Updated:  10/06/18 1458     RBC, UA 2-4 (A) /hpf WBC, UA None Seen /hpf      Epithelial Cells None Seen /hpf      Bacteria, UA None Seen /hpf     UA w Reflex to Microscopic w Reflex to Culture [[id number]]  (Abnormal) Collected:  10/06/18 1440    Lab Status:  Final result Specimen:  Urine from Urine, Clean Catch Updated:  10/06/18 1446     Color, UA Yellow     Clarity, UA Clear     Specific Gravity, UA 1 010     pH, UA 8 5 (H)     Leukocytes, UA Negative     Nitrite, UA Negative     Protein, UA Negative mg/dl      Glucose, UA Negative mg/dl      Ketones, UA Negative mg/dl      Urobilinogen, UA 0 2 E U /dl      Bilirubin, UA Negative     Blood, UA Small (A)    Comprehensive metabolic panel [[id number]] Collected:  10/06/18 1301    Lab Status:  Final result Specimen:  Blood from Arm, Right Updated:  10/06/18 1329     Sodium 141 mmol/L      Potassium 3 8 mmol/L      Chloride 105 mmol/L      CO2 27 mmol/L      ANION GAP 9 mmol/L      BUN 14 mg/dL      Creatinine 0 80 mg/dL      Glucose 110 mg/dL      Calcium 8 5 mg/dL      AST 26 U/L      ALT 26 U/L      Alkaline Phosphatase 66 U/L      Total Protein 6 7 g/dL      Albumin 3 5 g/dL      Total Bilirubin 0 70 mg/dL      eGFR 89 ml/min/1 73sq m     Narrative:         National Kidney Disease Education Program recommendations are as follows:  GFR calculation is accurate only with a steady state creatinine  Chronic Kidney disease less than 60 ml/min/1 73 sq  meters  Kidney failure less than 15 ml/min/1 73 sq  meters      Troponin I [[id number]]  (Normal) Collected:  10/06/18 1301    Lab Status:  Final result Specimen:  Blood from Arm, Right Updated:  10/06/18 1327     Troponin I <0 02 ng/mL     Protime-INR [[id number]]  (Abnormal) Collected:  10/06/18 1301    Lab Status:  Final result Specimen:  Blood from Arm, Right Updated:  10/06/18 1322     Protime 21 6 (H) seconds      INR 1 94 (H)    APTT [[id number]]  (Normal) Collected:  10/06/18 1301    Lab Status:  Final result Specimen:  Blood from Arm, Right Updated:  10/06/18 1322     PTT 31 seconds     CBC and differential [[id number]]  (Abnormal) Collected:  10/06/18 1301    Lab Status:  Final result Specimen:  Blood from Arm, Right Updated:  10/06/18 1307     WBC 4 55 Thousand/uL      RBC 4 35 Million/uL      Hemoglobin 13 3 g/dL      Hematocrit 39 4 %      MCV 91 fL      MCH 30 6 pg      MCHC 33 8 g/dL      RDW 12 8 %      MPV 10 6 fL      Platelets 801 (L) Thousands/uL      nRBC 0 /100 WBCs      Neutrophils Relative 70 %      Immat GRANS % 0 %      Lymphocytes Relative 22 %      Monocytes Relative 7 %      Eosinophils Relative 1 %      Basophils Relative 0 %      Neutrophils Absolute 3 12 Thousands/µL      Immature Grans Absolute 0 01 Thousand/uL      Lymphocytes Absolute 1 02 Thousands/µL      Monocytes Absolute 0 32 Thousand/µL      Eosinophils Absolute 0 06 Thousand/µL      Basophils Absolute 0 02 Thousands/µL                  MRI brain w wo contrast   Final Result by Lionel Richardson DO (10/08 1629)      No acute intracranial pathology  A few punctate white matter lesions are noted suggestive of mild chronic microangiopathy  Bilateral mastoid effusions, right slightly greater than left  Workstation performed: [id number]         X-ray chest 2 views   ED Interpretation by Blu Ríos DO (10/06 1519)   No acute disease      Final Result by Shar Garcia MD (10/06 1525)      No acute cardiopulmonary disease  Workstation performed: [id number]         CT head without contrast   Final Result by Nicholas Braun MD (10/06 1403)      No acute intracranial abnormality  Workstation performed: [id number]                    Procedures  ECG 12 Lead Documentation  Date/Time: 10/6/2018 1:56 PM  Performed by: Pavan Burciaga  Authorized by: CANDIS BLANCO     Indications / Diagnosis:   Anxiety, confusion  ECG reviewed by me, the ED Provider: yes    Patient location:  ED  Previous ECG:     Previous ECG:  Compared to current    Comparison ECG info:  August 8, 2018    Similarity: Changes noted  Interpretation:     Interpretation: non-specific    Rate:     ECG rate:  88    ECG rate assessment: normal    Rhythm:     Rhythm: sinus rhythm    Ectopy:     Ectopy: PVCs      PVCs:  Infrequent  QRS:     QRS axis:  Normal  Conduction:     Conduction: normal    T waves:     T waves: non-specific             Phone Contacts  ED Phone Contact    ED Course                               MDM  Number of Diagnoses or Management Options  Acute encephalopathy: new and requires workup  Anxiety disorder: new and requires workup  Dizziness: new and requires workup     Amount and/or Complexity of Data Reviewed  Clinical lab tests: ordered and reviewed  Tests in the radiology section of CPT®: ordered and reviewed  Tests in the medicine section of CPT®: ordered and reviewed  Decide to obtain previous medical records or to obtain history from someone other than the patient: yes  Discuss the patient with other providers: yes  Independent visualization of images, tracings, or specimens: yes    Patient Progress  Patient progress: stable    CritCare Time    Disposition  Final diagnoses:   Acute encephalopathy   Anxiety disorder   Dizziness     Time reflects when diagnosis was documented in both MDM as applicable and the Disposition within this note     Time User Action Codes Description Comment    10/6/2018  3:17 PM Lilian York Add [G93 40] Acute encephalopathy     10/6/2018  3:17 PM Lilian York Add [F41 9] Anxiety disorder     10/6/2018  3:20 PM Lilian York Add [R42] Dizziness     10/9/2018 12:11 PM Anebarrera KING Add [F41 9] Anxiety       ED Disposition     ED Disposition Condition Comment    Admit  Case was discussed with Dr Negar Cope and the patient's admission status was agreed to be Admission Status: inpatient status to the service of Dr Negar Cope           Follow-up Information     Follow up With Specialties Details Why Contact Info    Amie Fisher MD Neurology Follow up Please call this week after discharge to schedule a 1 month appointment to see our movement disorder specialists at either our [facility] or [facility] offices   [address] [facility] Schedule an appointment as soon as possible for a visit in 1 week(s)  [id number]  [phone]            Discharge Medication List as of 10/9/2018  1:30 PM      START taking these medications    Details   LORazepam (ATIVAN) 0 5 mg tablet Take 0 5 tablets (0 25 mg total) by mouth every 12 (twelve) hours as needed for anxiety, Starting Tue 10/9/2018, Print         CONTINUE these medications which have NOT CHANGED    Details   amLODIPine (NORVASC) 5 mg tablet Take 5 mg by mouth daily, Historical Med      fluticasone (FLONASE) 50 mcg/act nasal spray 1 spray into each nostril daily, Historical Med      Loratadine (CLARITIN) 10 MG CAPS Take by mouth daily as needed  , Historical Med      meclizine (ANTIVERT) 25 mg tablet Take 1 tablet (25 mg total) by mouth 3 (three) times a day as needed for dizziness, Starting Fri 3/9/2018, Normal      Melatonin 10 MG TABS Take by mouth, Historical Med      mirtazapine (REMERON) 30 mg tablet Take 1 tablet (30 mg total) by mouth daily at bedtime, Starting Tue 7/31/2018, Normal      Multiple Vitamins-Minerals (MULTI FOR HIM 50+ PO) Take 1 tablet by mouth daily, Starting Tue 8/21/2012, Historical Med      ondansetron (ZOFRAN) 4 mg tablet Take 1 tablet (4 mg total) by mouth every 8 (eight) hours as needed for nausea or vomiting, Starting Mon 3/26/2018, Normal      warfarin (COUMADIN) 2 mg tablet TAKE ONE TABLET BY MOUTH ONCE DAILY, Normal      gabapentin (NEURONTIN) 100 mg capsule TAKE 1 CAPSULE BY MOUTH ONCE DAILY, Normal             Outpatient Discharge Orders  Discharge Diet     Activity as tolerated         ED Provider  Electronically Signed by           Maureen Ramos DO  10/12/18 8310

## 2018-10-06 NOTE — H&P
H&P- Ivana Porter 1945, 68 y o  male MRN: [id number]    Unit/Bed#: -01 Encounter: [id number]    Primary Care Provider: Petrona Arcos DO   Date and time admitted to hospital: 10/6/2018 11:52 AM        HTN (hypertension)   Assessment & Plan    · BP controlled  · Continue home medications     History of DVT (deep vein thrombosis)   Assessment & Plan    · 2/2 Protein S deficiency   · INR toda 1 94  · Continue home coumadin dose  · Repeat INR in AM     Depression   Assessment & Plan    · Wife reports long-standing hx of this  She states that recently he has been more anxious and angry and has been having more twitching   · Takes remeron  · Appreciate behavioral health input     Crohn's disease   Assessment & Plan    · S/p colostomy in the 90s  · Continue colostomy care     * Encephalopathy acute   Assessment & Plan    · POA  Patient's wife reports he has been very confused today-- repeating himself and asking where he was and why certain family members weren't with them  Also with increased twitching and dizziness  · Lab work is unremarkable thus far  No acute findings on CXR and CT head  · B12, folate, TSH are pending   · Wife reports patient has problems with anxiety/anger/depression  She states that there have been multiple family members who have passed recently  She also states that these twitching episodes have been evaluate by EEG and they are not epileptiform in etiology   · Will consult behavioral health-- appreciate their input          VTE Prophylaxis: Warfarin (Coumadin)  / sequential compression device   Code Status: Level 1-- Full Code  POLST: There is no POLST form on file for this patient (pre-hospital)  Discussion with family: wife at bedside    Anticipated Length of Stay:  Patient will be admitted on an Inpatient basis with an anticipated length of stay of  > 2 midnights     Justification for Hospital Stay: evaluation of encephalopathy     Total Time for Visit, including Counseling / Coordination of Care: 30 minutes  Greater than 50% of this total time spent on direct patient counseling and coordination of care  Chief Complaint:   dizziness    History of Present Illness:    Ty Apodaca is a 68 y o  male who presents with with multiple medical conditions including Crohn's disease, protein S deficiency, hypertension presents the ED for evaluation of dizziness that began at approximately 10:30 a m  Vane Robins Patient's wife reports he often gets dizzy and has twitching episodes when he is anxious or upset or angry  Today, they started at approximately 10:30 a m  However, was different about this time that the patient is very confused  Patient's wife reports he is often asking where he is repeating questions and answers  She states this is not normal for him  There been no fevers or chills at home  No abdominal pain no nausea no vomiting  No chest pains no shortness of breath  Wife reports that they have been many death in the family recently  She also states that the patient does not eat well, stating he often eats dry cereal and Kaleb crackers  She does state he works out very frequently, often 2 times a day  Patient's family reports that his Remeron was changed approximately 3 months ago, but has not had any significant medical changes since then  Review of Systems:    Review of Systems   Constitutional: Negative  HENT: Negative  Eyes: Negative  Respiratory: Negative  Cardiovascular: Negative  Gastrointestinal: Negative  Genitourinary: Negative  Musculoskeletal: Negative  Skin: Negative  Neurological: Positive for dizziness  Psychiatric/Behavioral: Positive for agitation and confusion  The patient is nervous/anxious          Past Medical and Surgical History:     Past Medical History:   Diagnosis Date    Anxiety     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[facility])     [facility]hn's disease ([facility] )     Depression     DVT (deep venous thrombosis) ([facility] )     [place] (hard of hearing)     no hearing aids    Hypertension     Mass in neck     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     Psychiatric disorder     depression, anger       Past Surgical History:   Procedure Laterality Date    COLON SURGERY      Partial colectomy and colostomy    ESOPHAGOGASTRODUODENOSCOPY N/A 4/5/2017    Procedure: ESOPHAGOGASTRODUODENOSCOPY (EGD); Surgeon: Isabell Yung MD;  [facility]; Service:     EYE SURGERY Right     Cataract    KNEE SURGERY      MN EDG US EXAM SURGICAL ALTER STOM DUODENUM/JEJUNUM N/A 4/9/2018    Procedure: LINEAR ENDOSCOPIC U/S;  Surgeon: Guy Sibley MD;  Location: [facility]; Service: Gastroenterology    MN EXC PAROTD,LAT LOBE,DISSECT 5TH NERV Right 8/8/2018    Procedure: PAROTIDECTOMY WITH FACIAL NERVE MONITOR AND FROZEN SECTION;  Surgeon: Aspen Watt MD;  Location[facility];  Service: ENT    MN IMPACT TOOTH [address] COMP BONY N/A 8/8/2018    Procedure: EXTRACTION TEETH #15 and #30;  Surgeon: Elliot Nieto DDS;  [facility];  Service: Maxillofacial    RADICAL NECK DISSECTION N/A 8/8/2018    Procedure: SELECTIVE NECK DISSECTION;  Surgeon: Aspen Watt MD;  [facility];  Service: ENT    VEIN LIGATION AND STRIPPING         Meds/Allergies:    Prior to Admission medications    Medication Sig Start Date End Date Taking?  Authorizing Provider   amLODIPine (NORVASC) 5 mg tablet Take 5 mg by mouth daily   Yes Historical Provider, MD   fluticasone (FLONASE) 50 mcg/act nasal spray 1 spray into each nostril daily   Yes Historical Provider, MD   gabapentin (NEURONTIN) 100 mg capsule TAKE 1 CAPSULE BY MOUTH ONCE DAILY 6/4/18  Yes Elena Saleem DO   Loratadine (CLARITIN) 10 MG CAPS Take by mouth daily as needed     Yes Historical Provider, MD   meclizine (ANTIVERT) 25 mg tablet Take 1 tablet (25 mg total) by mouth 3 (three) times a day as needed for dizziness 3/9/18  Yes BREA Colindres   Melatonin 10 MG TABS Take by mouth   Yes Historical Provider, MD mirtazapine (REMERON) 30 mg tablet Take 1 tablet (30 mg total) by mouth daily at bedtime 7/31/18  Yes Nikkie Santiago DO   Multiple Vitamins-Minerals (MULTI FOR HIM 50+ PO) Take 1 tablet by mouth daily 8/21/12  Yes Historical Provider, MD   ondansetron (ZOFRAN) 4 mg tablet Take 1 tablet (4 mg total) by mouth every 8 (eight) hours as needed for nausea or vomiting 3/26/18  Yes Eulice Ganser, PA-C   warfarin (COUMADIN) 2 mg tablet TAKE ONE TABLET BY MOUTH ONCE DAILY 6/1/18  Yes Nikkie Santiago DO     I have reviewed home medications with patient personally  Allergies: Allergies   Allergen Reactions    Duloxetine Other (See Comments)     Panic attacks    Other      Seasonal       Social History:     Marital Status: /Civil Union   Occupation: retired  Patient Pre-hospital Living Situation: home with family  Patient Pre-hospital Level of Mobility: independent  Patient Pre-hospital Diet Restrictions: none  Substance Use History:   History   Alcohol Use No     Comment: hx etoh abuse pt states he quit 9 years ago     History   Smoking Status    Former Smoker    Packs/day: 1 00    Years: 10 00    Quit date: 6/9/1981   Smokeless Tobacco    Never Used     Comment: 50 years ago     History   Drug Use No       Family History:    non-contributory    Physical Exam:     Vitals:   Blood Pressure: 141/77 (10/06/18 1550)  Pulse: 85 (10/06/18 1550)  Temperature: 98 °F (36 7 °C) (10/06/18 1550)  Temp Source: Oral (10/06/18 1550)  Respirations: 16 (10/06/18 1550)  Height: 5' 5" (165 1 cm) (10/06/18 1550)  Weight - Scale: 67 3 kg (148 lb 4 8 oz) (10/06/18 1550)  SpO2: 95 % (10/06/18 1550)    Physical Exam   Constitutional: He is oriented to person, place, and time  He appears well-developed and well-nourished  No distress  HENT:   Head: Normocephalic and atraumatic  Mouth/Throat: No oropharyngeal exudate  Eyes: Pupils are equal, round, and reactive to light   Conjunctivae and EOM are normal  No scleral icterus  Neck: No JVD present  Cardiovascular: Normal rate and regular rhythm  Exam reveals no gallop and no friction rub  No murmur heard  Pulmonary/Chest: Effort normal and breath sounds normal  No respiratory distress  He has no wheezes  He has no rales  Abdominal: Soft  Bowel sounds are normal  He exhibits no distension  There is no tenderness  There is no rebound  Musculoskeletal: He exhibits no edema or tenderness  Neurological: He is alert and oriented to person, place, and time  He displays normal reflexes  No cranial nerve deficit  He exhibits normal muscle tone  Patient had repetition of thoughts; twitching noted twice during physical exam with each episode lasting about 5 seconds   Skin: Skin is warm and dry  No rash noted  He is not diaphoretic  No erythema  Psychiatric:   Speech is pressured       Additional Data:     Lab Results: I have personally reviewed pertinent reports  Results from last 7 days  Lab Units 10/06/18  1301   WBC Thousand/uL 4 55   HEMOGLOBIN g/dL 13 3   HEMATOCRIT % 39 4   PLATELETS Thousands/uL 148*   NEUTROS ABS Thousands/µL 3 12   NEUTROS PCT % 70   LYMPHS PCT % 22   MONOS PCT % 7   EOS PCT % 1       Results from last 7 days  Lab Units 10/06/18  1301   SODIUM mmol/L 141   POTASSIUM mmol/L 3 8   CHLORIDE mmol/L 105   CO2 mmol/L 27   BUN mg/dL 14   CREATININE mg/dL 0 80   ANION GAP mmol/L 9   CALCIUM mg/dL 8 5   ALBUMIN g/dL 3 5   TOTAL BILIRUBIN mg/dL 0 70   ALK PHOS U/L 66   ALT U/L 26   AST U/L 26       Results from last 7 days  Lab Units 10/06/18  1301   INR  1 94*                   Imaging: I have personally reviewed pertinent reports  X-ray chest 2 views   ED Interpretation by Kandace Christiansen DO (10/06 1519)   No acute disease      Final Result by Reyes Hugh, MD (10/06 1525)      No acute cardiopulmonary disease              Workstation performed: [id number]         CT head without contrast   Final Result by Josh Whitt MD ([id number]/61 [id number]) No acute intracranial abnormality  Workstation performed: [id number]             EKG, Pathology, and Other Studies Reviewed on Admission:   · EKG: sinus tach    Allscripts / Epic Records Reviewed: Yes     ** Please Note: This note has been constructed using a voice recognition system   **

## 2018-10-06 NOTE — ED NOTES
Patient transported to [address] [place] [address] Lukas Morales, [facility]  10/06/18 5791

## 2018-10-07 PROBLEM — D69.6 THROMBOCYTOPENIA (HCC): Status: ACTIVE | Noted: 2018-10-07

## 2018-10-07 LAB
ANION GAP SERPL CALCULATED.3IONS-SCNC: 4 MMOL/L (ref 4–13)
BUN SERPL-MCNC: 11 MG/DL (ref 5–25)
CALCIUM SERPL-MCNC: 8.5 MG/DL (ref 8.3–10.1)
CHLORIDE SERPL-SCNC: 107 MMOL/L (ref 100–108)
CO2 SERPL-SCNC: 30 MMOL/L (ref 21–32)
CREAT SERPL-MCNC: 0.76 MG/DL (ref 0.6–1.3)
ERYTHROCYTE [DISTWIDTH] IN BLOOD BY AUTOMATED COUNT: 13.2 % (ref 11.6–15.1)
FOLATE SERPL-MCNC: >20 NG/ML (ref 3.1–17.5)
GFR SERPL CREATININE-BSD FRML MDRD: 91 ML/MIN/1.73SQ M
GLUCOSE SERPL-MCNC: 88 MG/DL (ref 65–140)
HCT VFR BLD AUTO: 38.8 % (ref 36.5–49.3)
HGB BLD-MCNC: 13 G/DL (ref 12–17)
MAGNESIUM SERPL-MCNC: 2.1 MG/DL (ref 1.6–2.6)
MCH RBC QN AUTO: 30.4 PG (ref 26.8–34.3)
MCHC RBC AUTO-ENTMCNC: 33.5 G/DL (ref 31.4–37.4)
MCV RBC AUTO: 91 FL (ref 82–98)
PLATELET # BLD AUTO: 129 THOUSANDS/UL (ref 149–390)
PMV BLD AUTO: 10.1 FL (ref 8.9–12.7)
POTASSIUM SERPL-SCNC: 4 MMOL/L (ref 3.5–5.3)
RBC # BLD AUTO: 4.27 MILLION/UL (ref 3.88–5.62)
SODIUM SERPL-SCNC: 141 MMOL/L (ref 136–145)
TSH SERPL DL<=0.05 MIU/L-ACNC: 2.11 UIU/ML (ref 0.36–3.74)
VIT B12 SERPL-MCNC: 491 PG/ML (ref 100–900)
WBC # BLD AUTO: 5.75 THOUSAND/UL (ref 4.31–10.16)

## 2018-10-07 PROCEDURE — 99221 1ST HOSP IP/OBS SF/LOW 40: CPT | Performed by: PSYCHIATRY & NEUROLOGY

## 2018-10-07 PROCEDURE — 82746 ASSAY OF FOLIC ACID SERUM: CPT | Performed by: PHYSICIAN ASSISTANT

## 2018-10-07 PROCEDURE — 80048 BASIC METABOLIC PNL TOTAL CA: CPT | Performed by: PHYSICIAN ASSISTANT

## 2018-10-07 PROCEDURE — 82607 VITAMIN B-12: CPT | Performed by: PHYSICIAN ASSISTANT

## 2018-10-07 PROCEDURE — 84443 ASSAY THYROID STIM HORMONE: CPT | Performed by: PHYSICIAN ASSISTANT

## 2018-10-07 PROCEDURE — 99223 1ST HOSP IP/OBS HIGH 75: CPT | Performed by: PSYCHIATRY & NEUROLOGY

## 2018-10-07 PROCEDURE — 85027 COMPLETE CBC AUTOMATED: CPT | Performed by: PHYSICIAN ASSISTANT

## 2018-10-07 PROCEDURE — 99232 SBSQ HOSP IP/OBS MODERATE 35: CPT | Performed by: INTERNAL MEDICINE

## 2018-10-07 PROCEDURE — 83735 ASSAY OF MAGNESIUM: CPT | Performed by: PHYSICIAN ASSISTANT

## 2018-10-07 RX ORDER — SODIUM CHLORIDE 9 MG/ML
100 INJECTION, SOLUTION INTRAVENOUS CONTINUOUS
Status: DISCONTINUED | OUTPATIENT
Start: 2018-10-07 | End: 2018-10-09

## 2018-10-07 RX ORDER — HALOPERIDOL 1 MG/1
0.5 TABLET ORAL 2 TIMES DAILY
Status: DISCONTINUED | OUTPATIENT
Start: 2018-10-07 | End: 2018-10-09

## 2018-10-07 RX ADMIN — Medication 1 TABLET: at 09:05

## 2018-10-07 RX ADMIN — SODIUM CHLORIDE 100 ML/HR: 0.9 INJECTION, SOLUTION INTRAVENOUS at 16:40

## 2018-10-07 RX ADMIN — AMLODIPINE BESYLATE 5 MG: 5 TABLET ORAL at 09:06

## 2018-10-07 RX ADMIN — HALOPERIDOL 0.5 MG: 1 TABLET ORAL at 20:45

## 2018-10-07 RX ADMIN — GABAPENTIN 100 MG: 100 CAPSULE ORAL at 09:06

## 2018-10-07 RX ADMIN — WARFARIN SODIUM 2 MG: 2 TABLET ORAL at 09:06

## 2018-10-07 RX ADMIN — MIRTAZAPINE 30 MG: 15 TABLET, FILM COATED ORAL at 22:07

## 2018-10-07 RX ADMIN — SENNOSIDES 8.6 MG: 8.6 TABLET, FILM COATED ORAL at 09:06

## 2018-10-07 RX ADMIN — LORATADINE 10 MG: 10 TABLET ORAL at 09:05

## 2018-10-07 NOTE — CASE MANAGEMENT
Initial Clinical Review    Admission: Date/Time/Statement: 10/6/18 @ 1520     Orders Placed This Encounter   Procedures    Inpatient Admission (expected length of stay for this patient is greater than two midnights)     Standing Status:   Standing     Number of Occurrences:   1     Order Specific Question:   Admitting Physician     Answer:   Joselin Wahl [[id number]]     Order Specific Question:   Level of Care     Answer:   Med Surg [[id number]]     Order Specific Question:   Estimated length of stay     Answer:   More than 2 Midnights     Order Specific Question:   Certification     Answer:   I certify that inpatient services are medically necessary for this patient for a duration of greater than two midnights  See H&P and MD Progress Notes for additional information about the patient's course of treatment  ED: Date/Time/Mode of Arrival:   ED Arrival Information     Expected Arrival Acuity Means of Arrival Escorted By Service Admission Type    - 10/6/2018 11:49 Urgent Walk-In Family Member General Medicine Urgent    Arrival Complaint    dizziness, memory problems (family concerned of stroke)          Chief Complaint:   Chief Complaint   Patient presents with    Dizziness     sudden onset of dizzy and lightheadedness this am since around 1030, + nausea, hyperventilating, and twitching, which occurs when he gets upset according to his wife but worse       History of Illness: 68 y o  male who presents with with multiple medical conditions including Crohn's disease, protein S deficiency, hypertension presents the ED for evaluation of dizziness that began at approximately 10:30 a m  Osorio Ervin Patient's wife reports he often gets dizzy and has twitching episodes when he is anxious or upset or angry  Today, they started at approximately 10:30 a m  However, was different about this time that the patient is very confused  Patient's wife reports he is often asking where he is repeating questions and answers    She states this is not normal for him  Wife reports that they have been many death in the family recently  She also states that the patient does not eat well, stating he often eats dry cereal and Kaleb crackers  She does state he works out very frequently, often 2 times a day  Patient's family reports that his Remeron was changed approximately 3 months ago, but has not had any significant medical changes since then  ED Vital Signs:   ED Triage Vitals   Temperature Pulse Respirations Blood Pressure SpO2   10/06/18 1200 10/06/18 1200 10/06/18 1200 10/06/18 1200 10/06/18 1200   98 5 °F (36 9 °C) (!) 133 (!) 32 166/78 99 %      Temp Source Heart Rate Source Patient Position - Orthostatic VS BP Location FiO2 (%)   10/06/18 1200 10/06/18 1411 10/06/18 1411 10/06/18 1411 --   Oral Monitor Lying Right arm       Pain Score       --               Wt Readings from Last 1 Encounters:   10/06/18 67 3 kg (148 lb 4 8 oz)       Vital Signs (abnormal): none since ED    Abnormal Labs/Diagnostic Test Results:   UA PH 8 5  Small blood  INR 1 94  Platelets 235    CT head - No acute intracranial abnormality      ED Treatment:   Medication Administration from 10/06/2018 1149 to 10/06/2018 1539       Date/Time Order Dose Route Action Comments     10/06/2018 1325 LORazepam (ATIVAN) 2 mg/mL injection 0 5 mg 0 5 mg Intravenous Given           Past Medical/Surgical History:   Past Medical History:   Diagnosis Date    Anxiety     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[facility] )     Crohn's disease ([facility] )     Depression     [facility]     [place] (hard of hearing)     Hypertension     Mass in neck     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     Psychiatric disorder        Admitting Diagnosis: Dizziness [R42]  Anxiety disorder [F41 9]  Acute encephalopathy [G93 40]    Age/Sex: 68 y o  male    Assessment/Plan:   HTN (hypertension)   Assessment & Plan     · BP controlled  · Continue home medications      History of DVT (deep vein thrombosis)   Assessment & Plan   · 2/2 Protein S deficiency   · INR toda 1 94  · Continue home coumadin dose  · Repeat INR in AM      Depression   Assessment & Plan     · Wife reports long-standing hx of this  She states that recently he has been more anxious and angry and has been having more twitching   · Takes remeron  · Appreciate behavioral health input      Crohn's disease   Assessment & Plan     · S/p colostomy in the 90s  · Continue colostomy care      * Encephalopathy acute   Assessment & Plan     · POA  Patient's wife reports he has been very confused today-- repeating himself and asking where he was and why certain family members weren't with them  Also with increased twitching and dizziness  · Lab work is unremarkable thus far  No acute findings on CXR and CT head  · B12, folate, TSH are pending   · Wife reports patient has problems with anxiety/anger/depression  She states that there have been multiple family members who have passed recently   She also states that these twitching episodes have been evaluate by EEG and they are not epileptiform in etiology   · Will consult behavioral health-- appreciate their input             Admission Orders:  10/6/2018  1520 INPATIENT   Scheduled Meds:   Current Facility-Administered Medications:  amLODIPine 5 mg Oral Daily    calcium carbonate 1,000 mg Oral Daily PRN    fluticasone 1 spray Nasal Daily    gabapentin 100 mg Oral Daily    loratadine 10 mg Oral Daily    melatonin 9 mg Oral HS PRN    mirtazapine 30 mg Oral HS    multivitamin-minerals 1 tablet Oral Daily    ondansetron 4 mg Intravenous Q6H PRN    senna 1 tablet Oral Daily    sodium chloride 100 mL/hr Intravenous Continuous Last Rate: 100 mL/hr (10/07/18 1640)   warfarin 2 mg Oral Daily      Continuous Infusions:   sodium chloride 100 mL/hr Last Rate: 100 mL/hr (10/07/18 1640)     PRN Meds:     Melatonin - used x 1      scds  MRI brain   Consult neurology; psyche  EEG  PT/OT

## 2018-10-07 NOTE — PROGRESS NOTES
Progress Note - Alton Lim 1945, 68 y o  male MRN: [id number]    Unit/Bed#: -01 Encounter: [id number]    Primary Care Provider: Elena Saleem DO   Date and time admitted to hospital: 10/6/2018 11:52 AM        * Encephalopathy acute   Assessment & Plan    · POA  Patient's wife reports he has been very confused today-- repeating himself and asking where he was and why certain family members weren't with them  Also with increased twitching and dizziness  · Lab work is unremarkable thus far  No acute findings on CXR and CT head  · Normal B12, and elevated folate; TSH is normal   · Wife reports patient has problems with anxiety/anger/depression  She states that there have been multiple family members who have passed recently  She also states that these twitching episodes have been evaluated by EEG in the past and they are not epileptiform in etiology   · Will consult behavioral health-- appreciate their input   · Nephrology on board:  Obtain EEG  MRI of the brain with and without contrast   Patient's vertigo likely peripheral, but check MRI for central cause of patient's vertigo  From my discussion with the neurologist, from his assessment, patient's symptoms likely psychogenic, but will do the tests to rule out organic cause 1st   Outpatient movement Disorder doctor follow-up  Thrombocytopenia (HCC)   Assessment & Plan    · Monitor  · For further workup and management if this worsens significantly     HTN (hypertension)   Assessment & Plan    · BP controlled  · Continue home medications     History of DVT (deep vein thrombosis)   Assessment & Plan    · 2/2 Protein S deficiency   · INR 10/6 was 1 94  · Continue home coumadin dose  · Repeat INR in AM     Depression   Assessment & Plan    · Wife reports long-standing hx of this   She states that recently he has been more anxious and angry and has been having more twitching   · Takes remeron  · Appreciate behavioral health input     Crohn's disease   Assessment & Plan    · S/p colostomy in the 90s  · Continue colostomy care         VTE Pharmacologic Prophylaxis:   Pharmacologic: Warfarin (Coumadin)  Mechanical VTE Prophylaxis in Place: Yes    Patient Centered Rounds: I have performed bedside rounds with nursing staff today  Discussions with Specialists or Other Care Team Provider:  Case management  Neurologist     Education and Discussions with Family / Patient:  Patient  I called patient's wife and left a voicemail message for call back  Time Spent for Care: 30 minutes  More than 50% of total time spent on counseling and coordination of care as described above  Current Length of Stay: 1 day(s)    Current Patient Status: Inpatient   Certification Statement: The patient will continue to require additional inpatient hospital stay due to Above findings and plans  Discharge Plan:  None yet  Code Status: Level 1 - Full Code      Subjective: When I saw the patient earlier today, patient was having some twitching every now and then of his head and neck area  Patient also was seen having tremors on his right upper extremity  Patient was confused/disoriented to place and time  Patient denies any pains  No shortness of breath  No dizziness  No other complaints      Objective:     Vitals:   Temp (24hrs), Av 1 °F (36 7 °C), Min:97 7 °F (36 5 °C), Max:98 7 °F (37 1 °C)    HR:  [75-87] 87  Resp:  [16-18] 18  BP: (130-141)/(77-84) 141/84  SpO2:  [95 %-99 %] 99 %  Body mass index is 24 68 kg/m²  Input and Output Summary (last 24 hours):     No intake or output data in the 24 hours ending 10/07/18 1529    Physical Exam:     Physical Exam   Constitutional: No distress  HENT:   Head: Normocephalic and atraumatic  Eyes: Right eye exhibits no discharge  Left eye exhibits no discharge  No scleral icterus  Neck: No JVD present  No tracheal deviation present  Cardiovascular: Normal rate, regular rhythm and normal heart sounds    Exam reveals no gallop and no friction rub  No murmur heard  Pulmonary/Chest: Effort normal and breath sounds normal  No stridor  No respiratory distress  He has no wheezes  He has no rales  Abdominal: Soft  Bowel sounds are normal  He exhibits no distension  There is no tenderness  There is no rebound and no guarding  Musculoskeletal: He exhibits no edema, tenderness or deformity  Neurological: He is alert  No cranial nerve deficit  Oriented to person  Patient knows his full name and knows his wife and son  But does not know the date or where he is right now  He knows he lives in [place]  Several times, patient had twitching of his head and neck area  Patient was also noted to have occasional tremors of the right upper extremity  Skin: Skin is warm  No rash noted  He is not diaphoretic  No erythema  No pallor  Psychiatric:   Anxious  Vitals reviewed  Additional Data:     Labs:      Results from last 7 days  Lab Units 10/07/18  0603 10/06/18  1301   WBC Thousand/uL 5 75 4 55   HEMOGLOBIN g/dL 13 0 13 3   HEMATOCRIT % 38 8 39 4   PLATELETS Thousands/uL 129* 148*   NEUTROS PCT %  --  70   LYMPHS PCT %  --  22   MONOS PCT %  --  7   EOS PCT %  --  1       Results from last 7 days  Lab Units 10/07/18  0603 10/06/18  1301   SODIUM mmol/L 141 141   POTASSIUM mmol/L 4 0 3 8   CHLORIDE mmol/L 107 105   CO2 mmol/L 30 27   BUN mg/dL 11 14   CREATININE mg/dL 0 76 0 80   CALCIUM mg/dL 8 5 8 5   ALK PHOS U/L  --  66   ALT U/L  --  26   AST U/L  --  26       Results from last 7 days  Lab Units 10/06/18  1301   INR  1 94*       * I Have Reviewed All Lab Data Listed Above  * Additional Pertinent Lab Tests Reviewed: [facility] Admission Reviewed    Imaging:    Imaging Reports Reviewed Today Include:  Diagnostic imaging studies that were done on this admission  Imaging Personally Reviewed by Myself Includes:  None      Recent Cultures (last 7 days):           Last 24 Hours Medication List:     Current Facility-Administered Medications:  amLODIPine 5 mg Oral Daily Sofia Tran, BRYSON   calcium carbonate 1,000 mg Oral Daily PRN Sofia Tran, BRYSON   fluticasone 1 spray Nasal Daily Sofia Tran, BRYSON   gabapentin 100 mg Oral Daily Sofia Tran, BRYSON   loratadine 10 mg Oral Daily Sofia Tran, BRYSON   melatonin 9 mg Oral HS PRN Sofia Tran, BRYSON   mirtazapine 30 mg Oral HS Sofia Tran, BRYSON   multivitamin-minerals 1 tablet Oral Daily Sofia Tran, BRYSON   ondansetron 4 mg Intravenous Q6H PRN Sofia Tran, BRYSON   senna 1 tablet Oral Daily Sofia Tran, BRYSON   warfarin 2 mg Oral Daily Sofia Tran, BRYSON        Today, Patient Was Seen By: Courtney Pereira MD    ** Please Note: Dragon 360 Dictation voice to text software may have been used in the creation of this document   **

## 2018-10-07 NOTE — PROGRESS NOTES
PATIENT NAME: Daren Llanes  YOB: 1945   MEDICAL RECORD NUMBER: [id number]  Chief Complaint/Reason for Consult: acute encephalopathy, twitching    HPI: Daren Llnaes is a 68 y o  male with history of Chron's diease, protein S deficiency, DVT on coumadin, with long standing complaints of dizziness and twitchign when he is upseto r angry who came in with significant confusion, repetitiveness  He forgot his wife's names, his address, what he's been up to  He has no other new symptoms  He has a complex psychiatric history  He gets myoclonic like jerking for many years (since 20's at least) and in the past he would have spells which sound like psychogenic spells with eyes rolling up when he was upset and lots of jerking when he had to make decisions or in difficult situations  This is per his wife  The past few days he doesn't recall what he's been doing, has been repetitive, has had RUE tremor which may be new  He denied focal weakness, numbness, diplopia/dysphagia  He had neck surgery a few months ago but the confusion was only yesterday  Has also had vertiginous diziness for almost a year  PAST MEDICAL HISTORY  Past Medical History:   Diagnosis Date    Anxiety     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[facility])     Crohn's disease ([facility] )     Depression     [facility]     [place] (hard of hearing)     no hearing aids    Hypertension     Mass in neck     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[facility] )     Psychiatric disorder     depression, anger        PAST SURGICAL HISTORY  Past Surgical History:   Procedure Laterality Date    COLON SURGERY      Partial colectomy and colostomy    ESOPHAGOGASTRODUODENOSCOPY N/A 4/5/2017    Procedure: ESOPHAGOGASTRODUODENOSCOPY (EGD); Surgeon: Riya Aranda MD;  [facility];   Service:     EYE SURGERY Right     Cataract    KNEE SURGERY      AK EDG US EXAM SURGICAL ALTER STOM DUODENUM/JEJUNUM N/A 4/9/2018    Procedure: LINEAR ENDOSCOPIC U/S;  Surgeon: Lewis Osullivan Aba Steinberg MD;  [facility]; Service: Gastroenterology    SC EXC PAROTD,LAT LOBE,DISSECT 5TH NERV Right 8/8/2018    Procedure: PAROTIDECTOMY WITH FACIAL NERVE MONITOR AND FROZEN SECTION;  Surgeon: Jacek Cadet MD;  [facility];  Service: ENT    SC IMPACT TOOTH [address] COMP BONY N/A 8/8/2018    Procedure: EXTRACTION TEETH #15 and #30;  Surgeon: Heena Lux DDS;  [facility];  Service: Maxillofacial    RADICAL NECK DISSECTION N/A 8/8/2018    Procedure: SELECTIVE NECK DISSECTION;  Surgeon: Jacek Cadet MD;  [facility];  Service: ENT    VEIN LIGATION AND STRIPPING          ALLERGIES: Duloxetine and Other     CURRENT MEDICATIONS  Scheduled Meds:  Current Facility-Administered Medications:  amLODIPine 5 mg Oral Daily Sofia A Marc, PA-C   calcium carbonate 1,000 mg Oral Daily PRN Sofia A Marc, PA-C   fluticasone 1 spray Nasal Daily Sofia A Marc, PA-C   gabapentin 100 mg Oral Daily Sofia A Marc, PA-C   loratadine 10 mg Oral Daily Sofia A Marc, PA-C   melatonin 9 mg Oral HS PRN Sofia A Marc, PA-C   mirtazapine 30 mg Oral HS Sofia A Marc, PA-C   multivitamin-minerals 1 tablet Oral Daily Sofia A Marc, PA-C   ondansetron 4 mg Intravenous Q6H PRN Sofia A Marc, PA-C   senna 1 tablet Oral Daily Sofia A Marc, PA-C   warfarin 2 mg Oral Daily Sofia A Marc, PA-C     Continuous Infusions:   PRN Meds: calcium carbonate    melatonin    ondansetron     SOCIAL HISTORY   reports that he quit smoking about 37 years ago  He has a 10 00 pack-year smoking history  He has never used smokeless tobacco  He reports that he does not drink alcohol or use drugs  FAMILY HISTORY  Family History   Problem Relation Age of Onset    Heart disease Brother     Diverticulitis Mother         REVIEW OF SYSTEMS   Extensive 12 point ROS conducted, negative except for above        PHYSICAL EXAMINATION  Temp:  [97 7 °F (36 5 °C)-98 7 °F (37 1 °C)] 97 7 °F (36 5 °C)  HR:  [75-87] 87  Resp:  [16-18] 18  BP: (130-147)/(72-84) 141/84   General Examination: Extremely anxious  RUE tremor at rest, distractable  Will have body shaking back and forth in the lateral plane at times  Will have jerking  Eyes will roll back at times  No alteration of consciousness at all  Says he "hates when that happens"  Will hyperventilate at times, very upset  HEENT: Normocephalic, Atraumatic  Moist mucus membranes  Anicteric  PPC    CVS: Regular rate and rhythm  S1 S2 noted  No audible murmurs  No carotids bruits  Peripheral pulses palpable throughout   Lungs: Clear to auscultation bilaterally  No rales, rhonchi, wheezing  Abdomen: Bowel sounds positive  Non- tender  Non-distended  No organomegaly  Ext: No edema   Psych: Thought content - No VH/AH  No delusions  Though Process - tangential    Skin - No rash    Neurological Examination:   Mental Status: The patient was awake, alert, attentive, oriented to person, place  Year was 2010  Could     Cranial Nerves:   I: smell Not tested   II: visual fields Full to confrontation  Pupils equal, round, reactive to light with normal accomodation  Fundus:unable to visualize  III,IV,VI: extraocular muscles EOMI, no nystagmus   V: masseter and pterygoid strength full  Sensation in the V1 through V3 distributions intact to pinprick and light touch bilaterally  VII: Face is symmetric with no weakness noted  VIII: Audition intact to finger rub bilaterally  IX/X: Uvula midline  Soft palate elevation symmetric  XI: Trapezius and SCM strength 5/5 B/L  XII: Tongue midline with no atrophy or fasciculations with appropriate movement  Motor Examination:   RUE tremor, suppresses with distraction  Tone in RUE increased initially, feels like he is volitionally not allowing movement, not a cogwheeling or PD like tone  but becomes much more normal when distracting on the left   He does body shaking/movements when doing left open/close manuevers of the hand and the right sided tremor will go away  Reflexes:    1+ throughout  Coordination: Able to perform finger to nose and foot taping  Sensory: Normal sensation to light touch, pin prick and vibratory sensation throughout  Gait:deferred       Labs:  Recent Results (from the past 24 hour(s))   Comprehensive metabolic panel    Collection Time: 10/06/18  1:01 PM   Result Value Ref Range    Sodium 141 136 - 145 mmol/L    Potassium 3 8 3 5 - 5 3 mmol/L    Chloride 105 100 - 108 mmol/L    CO2 27 21 - 32 mmol/L    ANION GAP 9 4 - 13 mmol/L    BUN 14 5 - 25 mg/dL    Creatinine 0 80 0 60 - 1 30 mg/dL    Glucose 110 65 - 140 mg/dL    Calcium 8 5 8 3 - 10 1 mg/dL    AST 26 5 - 45 U/L    ALT 26 12 - 78 U/L    Alkaline Phosphatase 66 46 - 116 U/L    Total Protein 6 7 6 4 - 8 2 g/dL    Albumin 3 5 3 5 - 5 0 g/dL    Total Bilirubin 0 70 0 20 - 1 00 mg/dL    eGFR 89 ml/min/1 73sq m   CBC and differential    Collection Time: 10/06/18  1:01 PM   Result Value Ref Range    WBC 4 55 4 31 - 10 16 Thousand/uL    RBC 4 35 3 88 - 5 62 Million/uL    Hemoglobin 13 3 12 0 - 17 0 g/dL    Hematocrit 39 4 36 5 - 49 3 %    MCV 91 82 - 98 fL    MCH 30 6 26 8 - 34 3 pg    MCHC 33 8 31 4 - 37 4 g/dL    RDW 12 8 11 6 - 15 1 %    MPV 10 6 8 9 - 12 7 fL    Platelets 649 (L) 999 - 390 Thousands/uL    nRBC 0 /100 WBCs    Neutrophils Relative 70 43 - 75 %    Immat GRANS % 0 0 - 2 %    Lymphocytes Relative 22 14 - 44 %    Monocytes Relative 7 4 - 12 %    Eosinophils Relative 1 0 - 6 %    Basophils Relative 0 0 - 1 %    Neutrophils Absolute 3 12 1 85 - 7 62 Thousands/µL    Immature Grans Absolute 0 01 0 00 - 0 20 Thousand/uL    Lymphocytes Absolute 1 02 0 60 - 4 47 Thousands/µL    Monocytes Absolute 0 32 0 17 - 1 22 Thousand/µL    Eosinophils Absolute 0 06 0 00 - 0 61 Thousand/µL    Basophils Absolute 0 02 0 00 - 0 10 Thousands/µL   Troponin I    Collection Time: 10/06/18  1:01 PM   Result Value Ref Range Troponin I <0 02 <=0 04 ng/mL   Protime-INR    Collection Time: 10/06/18  1:01 PM   Result Value Ref Range    Protime 21 6 (H) 11 8 - 14 2 seconds    INR 1 94 (H) 0 86 - 1 17   APTT    Collection Time: 10/06/18  1:01 PM   Result Value Ref Range    PTT 31 24 - 36 seconds   UA w Reflex to Microscopic w Reflex to Culture    Collection Time: 10/06/18  2:40 PM   Result Value Ref Range    Color, UA Yellow     Clarity, UA Clear     Specific Drain, UA 1 010 1 003 - 1 030    pH, UA 8 5 (H) 4 5 - 8 0    Leukocytes, UA Negative Negative    Nitrite, UA Negative Negative    Protein, UA Negative Negative mg/dl    Glucose, UA Negative Negative mg/dl    Ketones, UA Negative Negative mg/dl    Urobilinogen, UA 0 2 0 2, 1 0 E U /dl E U /dl    Bilirubin, UA Negative Negative    Blood, UA Small (A) Negative   Urine Microscopic    Collection Time: 10/06/18  2:40 PM   Result Value Ref Range    RBC, UA 2-4 (A) None Seen, 0-5 /hpf    WBC, UA None Seen None Seen, 0-5, 5-55, 5-65 /hpf    Epithelial Cells None Seen None Seen, Occasional /hpf    Bacteria, UA None Seen None Seen, Occasional /hpf   Vitamin B12    Collection Time: 10/07/18  6:03 AM   Result Value Ref Range    Vitamin B-12 491 100 - 900 pg/mL   Folate    Collection Time: 10/07/18  6:03 AM   Result Value Ref Range    Folate >20 0 (H) 3 1 - 17 5 ng/mL   TSH, 3rd generation    Collection Time: 10/07/18  6:03 AM   Result Value Ref Range    TSH 3RD GENERATON 2 107 0 358 - 3 740 uIU/mL   CBC    Collection Time: 10/07/18  6:03 AM   Result Value Ref Range    WBC 5 75 4 31 - 10 16 Thousand/uL    RBC 4 27 3 88 - 5 62 Million/uL    Hemoglobin 13 0 12 0 - 17 0 g/dL    Hematocrit 38 8 36 5 - 49 3 %    MCV 91 82 - 98 fL    MCH 30 4 26 8 - 34 3 pg    MCHC 33 5 31 4 - 37 4 g/dL    RDW 13 2 11 6 - 15 1 %    Platelets 374 (L) 512 - 390 Thousands/uL    MPV 10 1 8 9 - 12 7 fL   Basic metabolic panel    Collection Time: 10/07/18  6:03 AM   Result Value Ref Range    Sodium 141 136 - 145 mmol/L Potassium 4 0 3 5 - 5 3 mmol/L    Chloride 107 100 - 108 mmol/L    CO2 30 21 - 32 mmol/L    ANION GAP 4 4 - 13 mmol/L    BUN 11 5 - 25 mg/dL    Creatinine 0 76 0 60 - 1 30 mg/dL    Glucose 88 65 - 140 mg/dL    Calcium 8 5 8 3 - 10 1 mg/dL    eGFR 91 ml/min/1 73sq m   Magnesium    Collection Time: 10/07/18  6:03 AM   Result Value Ref Range    Magnesium 2 1 1 6 - 2 6 mg/dL            panel  Results from last 7 days  Lab Units 10/07/18  0603   SODIUM mmol/L 141   POTASSIUM mmol/L 4 0   CHLORIDE mmol/L 107   BUN mg/dL 11   CREATININE mg/dL 0 76      Results from last 7 days  Lab Units 10/07/18  0603 10/06/18  1440   HEMATOCRIT % 38 8  --    HEMOGLOBIN g/dL 13 0  --    MCH pg 30 4  --    MCHC g/dL 33 5  --    MCV fL 91  --    MPV fL 10 1  --    PLATELETS Thousands/uL 129*  --    RDW % 13 2  --    WBC UA /hpf  --  None Seen   WBC Thousand/uL 5 75  --       Results from last 7 days  Lab Units 10/06/18  1301   INR  1 94*   PTT seconds 31      Results from last 7 days  Lab Units 10/07/18  0603   CO2 mmol/L 30   BUN mg/dL 11   CREATININE mg/dL 0 76    Lab Results   Component Value Date    ALT 26 10/06/2018    AST 26 10/06/2018    ALKPHOS 66 10/06/2018    TBILI 0 70 10/06/2018          Invalid input(s): TRIGLYCERIDE Lab Results   Component Value Date    HGBA1C 5 5 03/21/2017    TSH i: No results found for: TSH Imaging: CT head         ASSESSMENT/PLAN  67 yo male with likely quasi neurological symptoms in setting of very long standing psych history with 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

## 2018-10-07 NOTE — PLAN OF CARE
Problem: Potential for Falls  Goal: Patient will remain free of falls  INTERVENTIONS:  - Assess patient frequently for physical needs  -  Identify cognitive and physical deficits and behaviors that affect risk of falls    -  [place] fall precautions as indicated by assessment   - Educate patient/family on patient safety including physical limitations  - Instruct patient to call for assistance with activity based on assessment  - Modify environment to reduce risk of injury  - Consider OT/PT consult to assist with strengthening/mobility    Outcome: Progressing      Problem: PAIN - ADULT  Goal: Verbalizes/displays adequate comfort level or baseline comfort level  Interventions:  - Encourage patient to monitor pain and request assistance  - Assess pain using appropriate pain scale  - Administer analgesics based on type and severity of pain and evaluate response  - Implement non-pharmacological measures as appropriate and evaluate response  - Consider cultural and social influences on pain and pain management  - Notify physician/advanced practitioner if interventions unsuccessful or patient reports new pain   Outcome: Progressing      Problem: INFECTION - ADULT  Goal: Absence or prevention of progression during hospitalization  INTERVENTIONS:  - Assess and monitor for signs and symptoms of infection  - Monitor lab/diagnostic results  - Monitor all insertion sites, i e  indwelling lines, tubes, and drains  - Monitor endotracheal (as able) and nasal secretions for changes in amount and color  - [place] appropriate cooling/warming therapies per order  - Administer medications as ordered  - Instruct and encourage patient and family to use good hand hygiene technique  - Identify and instruct in appropriate isolation precautions for identified infection/condition   Outcome: Progressing    Goal: Absence of fever/infection during neutropenic period  INTERVENTIONS:  - Monitor WBC  - Implement neutropenic guidelines   Outcome: Progressing      Problem: SAFETY ADULT  Goal: Maintain or return to baseline ADL function  INTERVENTIONS:  -  Assess patient's ability to carry out ADLs; assess patient's baseline for ADL function and identify physical deficits which impact ability to perform ADLs (bathing, care of mouth/teeth, toileting, grooming, dressing, etc )  - Assess/evaluate cause of self-care deficits   - Assess range of motion  - Assess patient's mobility; develop plan if impaired  - Assess patient's need for assistive devices and provide as appropriate  - Encourage maximum independence but intervene and supervise when necessary  ¯ Involve family in performance of ADLs  ¯ Assess for home care needs following discharge   ¯ Request OT consult to assist with ADL evaluation and planning for discharge  ¯ Provide patient education as appropriate   Outcome: Progressing    Goal: Maintain or return mobility status to optimal level  INTERVENTIONS:  - Assess patient's baseline mobility status (ambulation, transfers, stairs, etc )    - Identify cognitive and physical deficits and behaviors that affect mobility  - Identify mobility aids required to assist with transfers and/or ambulation (gait belt, sit-to-stand, lift, walker, cane, etc )  - [place] fall precautions as indicated by assessment  - Record patient progress and toleration of activity level on Mobility SBAR; progress patient to next Phase/Stage  - Instruct patient to call for assistance with activity based on assessment  - Request Rehabilitation consult to assist with strengthening/weightbearing, etc    Outcome: Progressing    Goal: Patient will remain free of falls  INTERVENTIONS:  - Assess patient frequently for physical needs  -  Identify cognitive and physical deficits and behaviors that affect risk of falls    -  [place] fall precautions as indicated by assessment   - Educate patient/family on patient safety including physical limitations  - Instruct patient to call for assistance with activity based on assessment  - Modify environment to reduce risk of injury  - Consider OT/PT consult to assist with strengthening/mobility    Outcome: Progressing      Problem: DISCHARGE PLANNING  Goal: Discharge to home or other facility with appropriate resources  INTERVENTIONS:  - Identify barriers to discharge w/patient and caregiver  - Arrange for needed discharge resources and transportation as appropriate  - Identify discharge learning needs (meds, wound care, etc )  - Arrange for interpretive services to assist at discharge as needed  - Refer to Case Management Department for coordinating discharge planning if the patient needs post-hospital services based on physician/advanced practitioner order or complex needs related to functional status, cognitive ability, or social support system   Outcome: Progressing      Problem: Knowledge Deficit  Goal: Patient/family/caregiver demonstrates understanding of disease process, treatment plan, medications, and discharge instructions  Complete learning assessment and assess knowledge base  Interventions:  - Provide teaching at level of understanding  - Provide teaching via preferred learning methods   Outcome: Progressing      Problem: Nutrition/Hydration-ADULT  Goal: Nutrient/Hydration intake appropriate for improving, restoring or maintaining nutritional needs  Monitor and assess patient's nutrition/hydration status for malnutrition (ex- brittle hair, bruises, dry skin, pale skin and conjunctiva, muscle wasting, smooth red tongue, and disorientation)  Collaborate with interdisciplinary team and initiate plan and interventions as ordered  Monitor patient's weight and dietary intake as ordered or per policy  Utilize nutrition screening tool and intervene per policy  Determine patient's food preferences and provide high-protein, high-caloric foods as appropriate       INTERVENTIONS:  - Monitor oral intake, urinary output, labs, and treatment plans  - Assess nutrition and hydration status and recommend course of action  - Evaluate amount of meals eaten  - Assist patient with eating if necessary   - Allow adequate time for meals  - Recommend/ encourage appropriate diets, oral nutritional supplements, and vitamin/mineral supplements  - Order, calculate, and assess calorie counts as needed  - Recommend, monitor, and adjust tube feedings and TPN/PPN based on assessed needs  - Assess need for intravenous fluids  - Provide specific nutrition/hydration education as appropriate  - Include patient/family/caregiver in decisions related to nutrition   Outcome: Progressing      Problem: CONFUSION/THOUGHT DISTURBANCE  Goal: Thought disturbances (confusion, delirium, depression, dementia or psychosis) are managed to maintain or return to baseline mental status and functional level  INTERVENTIONS:  - Assess for possible contributors to  thought disturbance, including but not limited to medications, infection, impaired vision or hearing, underlying metabolic abnormalities, dehydration, respiratory compromise,  psychiatric diagnoses and notify attending PHYSICAN/AP  - Monitor and intervene to maintain adequate nutrition, hydration, elimination, sleep and activity  - Decrease environmental stimuli, including noise as appropriate  - Provide frequent contacts to provide refocusing, direction and reassurance as needed  Approach patient calmly with eye contact and at their level    - [place] high risk fall precautions, aspiration precautions and other safety measures, as indicated  - If delirium suspected, notify physician/AP of change in condition and request immediate in-person evaluation  - Pursue consults as appropriate including Geriatric (campus dependent), OT for cognitive evaluation/activity planning, psychiatric, pastoral care, etc    Outcome: Progressing

## 2018-10-07 NOTE — CONSULTS
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  F95 1 chronic motor tic disorder    PLAN:   1) started patient Haldol 0 5 mg twice daily  Patient would best benefit from hPimozide but it is not available on formulary  2) complete follow-up workup as recommended by neurology  3) complete neuropsychiatric evaluation  4) Communicated with patients' medical team       Chief Complaint: "I have anxiety   "    History of Present Illness: This is a went to a  male with history of Crohn's disease, protein S deficiency, DVT, and long-standing complaint of dizziness and twitching when he is upset and angry  Patient is never diagnosed with a motor take  Patient is currently very anxious and has having severe jerking movement of his face and right shoulder  His wife was also present during time of interview she said that he has had this problem for a long time but was never treated for any take disorder  However he has severe anxiety for that he takes Remeron  Recently he is also facing several losses including his brother and nephew  His sister was diagnosed with cancer  Patient also has trouble sleeping  She also says that patient recently has severe memory deficits in and for last to date he is completely appearing confused and unable to understand or answer any questions straight  When this writer talked to patient patient says that he is anxious and he spontaneously attempting to answer questions  However he is unable to complete a mini-mental State examination he was unable to tell this writer why he is in the hospital   He was unable to tell this writer simple questions like what is the date  Which hospital he was in    This writer feels that current presentation could be because of a vascular dementia or because of pseudo dementia secondary to major depression  PAST PSYCH HISTORY:    Previous history of inpatient psychiatric hospitalization or no previous history of suicide attempts  Patient is treated for anxiety with Remeron  Substance Abuse History:    Not drug or alcohol use  Family Psychiatric History:   No family history  Social History:  Education:  High school  Learning Disabilities: None  Marital history:   50 years has one adult son  Living arrangement, social support:  Patient lives at home with his wife and son  Occupational History:  Patient retired [employer]  Functioning Relationships:  Good support system  Other Pertinent History: None      Traumatic History:   Abuse: None  Multiple recent losses his brother and nephew recently   His sister recently was diagnosed with a cancer  He recently had a surgery and neck surgery and he was told that the mass this history was not malignant however he still feels that this could be a cancer  Past Medical History:   Diagnosis Date    Anxiety     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[facility])     [facility] ([facility] [address] )     Depression     DVT (deep venous thrombosis) ([facility] )     [place] (hard of hearing)     no hearing aids    Hypertension     Mass in neck     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HCC)     Psychiatric disorder     depression, anger       Medical Review Of Systems:  All 12 point review of system is normal except for what is mention in medical hisotry      Scheduled Meds:  Current Facility-Administered Medications:  amLODIPine 5 mg Oral Daily Sofia Tran, PA-C    calcium carbonate 1,000 mg Oral Daily PRN Sofia Tran, PA-C    fluticasone 1 spray Nasal Daily Sofia Tran, PA-C    gabapentin 100 mg Oral Daily Sofia Tran, PA-C    loratadine 10 mg Oral Daily Sofia Tran, PA-C    melatonin 9 mg Oral HS PRN HAIR Khan-C    mirtazapine 30 mg Oral HS Sofia A Marc, PA-C    multivitamin-minerals 1 tablet Oral Daily Sofia A BRYSON Tran    ondansetron 4 mg Intravenous Q6H PRN Sofia Tran PA-C    senna 1 tablet Oral Daily Sofia Tran PA-C    sodium chloride 100 mL/hr Intravenous Continuous Daria Vitale MD Last Rate: 100 mL/hr (10/07/18 1640)   warfarin 2 mg Oral Daily Sofia Tran PA-C      Continuous Infusions:  sodium chloride 100 mL/hr Last Rate: 100 mL/hr (10/07/18 1640)     PRN Meds: calcium carbonate    melatonin    ondansetron     Allergies   Allergen Reactions    Duloxetine Other (See Comments)     Panic attacks    Other      Seasonal       Vitals:    10/07/18 1532   BP: 133/84   Pulse: 78   Resp: 18   Temp: 98 °F (36 7 °C)   SpO2: 97%             Mental Status Evaluation:  Appearance:   appeared of age and had good hygiene , he has visible motor tics  Behavior:  Attempt to cooperate with the interview but could not complete a mini-mental examination  Speech:  Speech is spontaneous and attempting to answer the questions but he is unable to answer beyond many sentences  Mood:  Depressed   Affect Anxious, sad  Language: naming objects and Goal directed  Thought Process:  Illogical doubt   Thought Content:  Confused  Perceptual Disturbances:  denying any auditory and visual hallucinations  Risk Potential: Not suicidal homicidal however unable to take care of himself  Sensorium:  Only oriented to person and place x1 P   Cognition:  Poor cognition poor recent remote memory  Consciousness:  Alert and awake  Attention: The distracted   Intellect: Not assessed   Fund of Knowledge: Unable to answer simple general knowledge questions   Insight:  Poor insight  Judgment: Impaired judgment  Muscle Strength and Tone: Normal    Gait/Station:  gait was not assessed    Motor Activity: Tomas motor tics were noted on jerky movements of her right shoulder and takes on the face  Lab Results: I have personally reviewed pertinent lab results          NOTE:  Total of 40 minutes were spent in talking to patient completing this medical record reviewing medical chart medical decision making    Jordan Russell MD

## 2018-10-08 ENCOUNTER — ANTICOAG VISIT (OUTPATIENT)
Dept: INTERNAL MEDICINE CLINIC | Facility: CLINIC | Age: 73
End: 2018-10-08

## 2018-10-08 ENCOUNTER — APPOINTMENT (INPATIENT)
Dept: NEUROLOGY | Facility: HOSPITAL | Age: 73
DRG: 880 | End: 2018-10-08
Attending: PSYCHIATRY & NEUROLOGY
Payer: MEDICARE

## 2018-10-08 ENCOUNTER — APPOINTMENT (INPATIENT)
Dept: MRI IMAGING | Facility: HOSPITAL | Age: 73
DRG: 880 | End: 2018-10-08
Payer: MEDICARE

## 2018-10-08 LAB
ANION GAP SERPL CALCULATED.3IONS-SCNC: 6 MMOL/L (ref 4–13)
ARTERIAL PATENCY WRIST A: YES
BASE EXCESS BLDA CALC-SCNC: 0.3 MMOL/L
BUN SERPL-MCNC: 10 MG/DL (ref 5–25)
CALCIUM SERPL-MCNC: 8.2 MG/DL (ref 8.3–10.1)
CHLORIDE SERPL-SCNC: 108 MMOL/L (ref 100–108)
CO2 SERPL-SCNC: 28 MMOL/L (ref 21–32)
CREAT SERPL-MCNC: 0.79 MG/DL (ref 0.6–1.3)
GFR SERPL CREATININE-BSD FRML MDRD: 90 ML/MIN/1.73SQ M
GLUCOSE SERPL-MCNC: 82 MG/DL (ref 65–140)
HCO3 BLDA-SCNC: 22.6 MMOL/L (ref 22–28)
INR PPP: 2.3 (ref 0.86–1.17)
NON VENT ROOM AIR: 21 %
O2 CT BLDA-SCNC: 18 ML/DL (ref 16–23)
OXYHGB MFR BLDA: 96.2 % (ref 94–97)
PCO2 BLDA: 29.6 MM HG (ref 36–44)
PH BLDA: 7.5 [PH] (ref 7.35–7.45)
PLATELET # BLD AUTO: 122 THOUSANDS/UL (ref 149–390)
PMV BLD AUTO: 10.1 FL (ref 8.9–12.7)
PO2 BLDA: 101.6 MM HG (ref 75–129)
POTASSIUM SERPL-SCNC: 4.5 MMOL/L (ref 3.5–5.3)
PROTHROMBIN TIME: 24.6 SECONDS (ref 11.8–14.2)
SODIUM SERPL-SCNC: 142 MMOL/L (ref 136–145)
SPECIMEN SOURCE: ABNORMAL

## 2018-10-08 PROCEDURE — 99232 SBSQ HOSP IP/OBS MODERATE 35: CPT | Performed by: PSYCHIATRY & NEUROLOGY

## 2018-10-08 PROCEDURE — 99232 SBSQ HOSP IP/OBS MODERATE 35: CPT | Performed by: INTERNAL MEDICINE

## 2018-10-08 PROCEDURE — 95816 EEG AWAKE AND DROWSY: CPT | Performed by: PSYCHIATRY & NEUROLOGY

## 2018-10-08 PROCEDURE — 85049 AUTOMATED PLATELET COUNT: CPT | Performed by: INTERNAL MEDICINE

## 2018-10-08 PROCEDURE — A9585 GADOBUTROL INJECTION: HCPCS | Performed by: INTERNAL MEDICINE

## 2018-10-08 PROCEDURE — 36600 WITHDRAWAL OF ARTERIAL BLOOD: CPT

## 2018-10-08 PROCEDURE — 99356 PR PROLONGED SVC I/P OR OBS SETTING 1ST HOUR: CPT | Performed by: PSYCHIATRY & NEUROLOGY

## 2018-10-08 PROCEDURE — 80048 BASIC METABOLIC PNL TOTAL CA: CPT | Performed by: INTERNAL MEDICINE

## 2018-10-08 PROCEDURE — 95816 EEG AWAKE AND DROWSY: CPT

## 2018-10-08 PROCEDURE — 82805 BLOOD GASES W/O2 SATURATION: CPT | Performed by: NURSE PRACTITIONER

## 2018-10-08 PROCEDURE — 85610 PROTHROMBIN TIME: CPT | Performed by: INTERNAL MEDICINE

## 2018-10-08 PROCEDURE — 70553 MRI BRAIN STEM W/O & W/DYE: CPT

## 2018-10-08 RX ORDER — LORAZEPAM 2 MG/ML
1 INJECTION INTRAMUSCULAR EVERY 6 HOURS PRN
Status: DISCONTINUED | OUTPATIENT
Start: 2018-10-08 | End: 2018-10-09 | Stop reason: HOSPADM

## 2018-10-08 RX ORDER — LORAZEPAM 2 MG/ML
1 INJECTION INTRAMUSCULAR ONCE
Status: COMPLETED | OUTPATIENT
Start: 2018-10-08 | End: 2018-10-08

## 2018-10-08 RX ADMIN — HALOPERIDOL 0.5 MG: 1 TABLET ORAL at 17:28

## 2018-10-08 RX ADMIN — MIRTAZAPINE 30 MG: 15 TABLET, FILM COATED ORAL at 21:04

## 2018-10-08 RX ADMIN — AMLODIPINE BESYLATE 5 MG: 5 TABLET ORAL at 09:48

## 2018-10-08 RX ADMIN — LORATADINE 10 MG: 10 TABLET ORAL at 09:48

## 2018-10-08 RX ADMIN — GADOBUTROL 6 ML: 604.72 INJECTION INTRAVENOUS at 15:52

## 2018-10-08 RX ADMIN — WARFARIN SODIUM 2 MG: 2 TABLET ORAL at 09:48

## 2018-10-08 RX ADMIN — HALOPERIDOL 0.5 MG: 1 TABLET ORAL at 09:48

## 2018-10-08 RX ADMIN — FLUTICASONE PROPIONATE 1 SPRAY: 50 SPRAY, METERED NASAL at 09:48

## 2018-10-08 RX ADMIN — SODIUM CHLORIDE 100 ML/HR: 0.9 INJECTION, SOLUTION INTRAVENOUS at 17:38

## 2018-10-08 RX ADMIN — SENNOSIDES 8.6 MG: 8.6 TABLET, FILM COATED ORAL at 09:48

## 2018-10-08 RX ADMIN — GABAPENTIN 100 MG: 100 CAPSULE ORAL at 09:47

## 2018-10-08 RX ADMIN — LORAZEPAM 1 MG: 2 INJECTION INTRAMUSCULAR; INTRAVENOUS at 14:38

## 2018-10-08 RX ADMIN — Medication 1 TABLET: at 09:48

## 2018-10-08 NOTE — OCCUPATIONAL THERAPY NOTE
Occupational Therapy Cancellation Note        Patient Name: Dilcia Orozco  DSLPV'N Date: 10/8/2018    OT orders received and chart review completed  Pt currently w/ Shahriar Jennings from Neurology   Will continue to follow pt to complete eval as appropriate and schedule allows    [facility], OT

## 2018-10-08 NOTE — PHYSICIAN ADVISOR
Current patient class: Inpatient  The patient is currently on Hospital Day: 2 at [facility]      The patient was admitted to the hospital at [phone] on 10/6/18 for the following diagnosis:  Dizziness [R42]  Anxiety disorder [F41 9]  Acute encephalopathy [G93 40]       There is documentation in the medical record of an expected length of stay of at least 2 midnights  The patient is therefore expected to satisfy the 2 midnight benchmark and given the 2 midnight presumption is appropriate for INPATIENT ADMISSION  Given this expectation of a satisfying stay, CMS instructs us that the patient is most often appropriate for inpatient admission under part A provided medical necessity is documented in the chart  After review of the relevant documentation, labs, vital signs and test results, the patient is appropriate for INPATIENT ADMISSION  Admission to the hospital as an inpatient is a complex decision making process which requires the practitioner to consider the patients presenting complaint, history and physical examination and all relevant testing  With this in mind, in this case, the patient was deemed appropriate for INPATIENT ADMISSION  After review of the documentation and testing available at the time of the admission I concur with this clinical determination of medical necessity  Rationale is as follows: The patient is a 68 yrs old Male who presented to the ED at 10/6/2018 11:52 AM with a chief complaint of Dizziness (sudden onset of dizzy and lightheadedness this am since around 1030, + nausea, hyperventilating, and twitching, which occurs when he gets upset according to his wife but worse)     Given the need for further hospitalization, and along with the documentation of medical necessity present in the chart, the patient is appropriate for inpatient admission  The patient is expected to satisfy the 2 midnight benchmark, and will require further acute medical care  The patient does have comorbid conditions which increases the risk for significant adverse outcome  Given this the patient is appropriate for inpatient admission  The patients vitals on arrival were ED Triage Vitals   Temperature Pulse Respirations Blood Pressure SpO2   10/06/18 1200 10/06/18 1200 10/06/18 1200 10/06/18 1200 10/06/18 1200   98 5 °F (36 9 °C) (!) 133 (!) 32 166/78 99 %      Temp Source Heart Rate Source Patient Position - Orthostatic VS BP Location FiO2 (%)   10/06/18 1200 10/06/18 1411 10/06/18 1411 10/06/18 1411 --   Oral Monitor Lying Right arm       Pain Score       10/07/18 1900       No Pain           Past Medical History:   Diagnosis Date    Anxiety     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[facility])     Crohn's disease ([facility] [address] )     Depression     D[facility])     [place] (hard of hearing)     no hearing aids    Hypertension     Mass in neck     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[facility] )     Psychiatric disorder     depression, anger     Past Surgical History:   Procedure Laterality Date    COLON SURGERY      Partial colectomy and colostomy    ESOPHAGOGASTRODUODENOSCOPY N/A 4/5/2017    Procedure: ESOPHAGOGASTRODUODENOSCOPY (EGD); Surgeon: Anthony Wolff MD;  [facility]; Service:     EYE SURGERY Right     Cataract    KNEE SURGERY      PA EDG US EXAM SURGICAL ALTER STOM DUODENUM/JEJUNUM N/A 4/9/2018    Procedure: LINEAR ENDOSCOPIC U/S;  Surgeon: Brock Quintana MD;  [facility];   Service: Gastroenterology    PA EXC PAROTD,LAT LOBE,DISSECT 5TH NERV Right 8/8/2018    Procedure: PAROTIDECTOMY WITH FACIAL NERVE MONITOR AND FROZEN SECTION;  Surgeon: Mimi Wright MD;  [facility];  Service: ENT    PA IMPACT TOOTH [address] COMP BONY N/A 8/8/2018    Procedure: EXTRACTION TEETH #15 and #30;  Surgeon: Ingrid Garrison DDS;  [facility];  Service: Maxillofacial    RADICAL NECK DISSECTION N/A 8/8/2018    Procedure: SELECTIVE NECK DISSECTION;  Surgeon: Mimi Wright MD; [facility];  Service: ENT    VEIN LIGATION AND STRIPPING             Consults have been placed to:   IP CONSULT TO PSYCHIATRY  IP CONSULT TO NEUROLOGY    Vitals:    10/06/18 1550 10/06/18 2223 10/07/18 0500 10/07/18 1532   BP: 141/77 130/80 141/84 133/84   BP Location: Left arm Right arm Right arm Right arm   Pulse: 85 75 87 78   Resp: 16 16 18 18   Temp: 98 °F (36 7 °C) 98 7 °F (37 1 °C) 97 7 °F (36 5 °C) 98 °F (36 7 °C)   TempSrc: Oral Oral Oral Oral   SpO2: 95% 96% 99% 97%   Weight: 67 3 kg (148 lb 4 8 oz)      Height: 5' 5" (1 651 m)          Most recent labs:    Recent Labs      10/06/18   1301  10/07/18   0603   WBC  4 55  5 75   HGB  13 3  13 0   HCT  39 4  38 8   PLT  148*  129*   K  3 8  4 0   NA  141  141   CALCIUM  8 5  8 5   BUN  14  11   CREATININE  0 80  0 76   INR  1 94*   --    TROPONINI  <0 02   --    AST  26   --    ALT  26   --    ALKPHOS  66   --        Scheduled Meds:  Current Facility-Administered Medications:  amLODIPine 5 mg Oral Daily Sofia Tran PA-C    calcium carbonate 1,000 mg Oral Daily PRN Sofia Tran PA-C    fluticasone 1 spray Nasal Daily Sfoia Tran PA-C    gabapentin 100 mg Oral Daily Sofia Tran PA-C    haloperidol 0 5 mg Oral BID Louis Haynes MD    loratadine 10 mg Oral Daily Sofia Tran PA-C    melatonin 9 mg Oral HS PRN Sofia Tran PA-C    mirtazapine 30 mg Oral HS Sofia Tran PA-C    multivitamin-minerals 1 tablet Oral Daily Soifa Tran PA-C    ondansetron 4 mg Intravenous Q6H PRN Sofia Tran PA-C    senna 1 tablet Oral Daily Sofia Tran PA-C    sodium chloride 100 mL/hr Intravenous Continuous Daria Vitale MD Last Rate: 100 mL/hr (10/07/18 1640)   warfarin 2 mg Oral Daily Sofia Tran PA-C      Continuous Infusions:  sodium chloride 100 mL/hr Last Rate: 100 mL/hr (10/07/18 1640)     PRN Meds: calcium carbonate    melatonin    ondansetron    Surgical procedures (if appropriate):

## 2018-10-08 NOTE — PLAN OF CARE
Problem: DISCHARGE PLANNING  Goal: Discharge to home or other facility with appropriate resources  INTERVENTIONS:  - Identify barriers to discharge w/patient and caregiver  - Arrange for needed discharge resources and transportation as appropriate  - Identify discharge learning needs (meds, wound care, etc )  - Arrange for interpretive services to assist at discharge as needed  - Refer to Case Management Department for coordinating discharge planning if the patient needs post-hospital services based on physician/advanced practitioner order or complex needs related to functional status, cognitive ability, or social support system   Outcome: Progressing  LOS 2 DAYS  PATIENT IS NOT A BUNDLE  PATIENT IS NOT A READMIT  CM met with patient at bedside  Patient lives with his wife and son in a 2 story house located in [place]  Patient has access to a walker but does not use them  Patient can complete ADLS by himself  Patient has no Hx of VNA but recently finishes his outpatient PT treatment in early September  Patient uses the [address] and has prescription coverage  Patient relies on his wife to drive  CM reviewed d/c planning process including the following: identifying help at home, patient preference for d/c planning needs, Discharge Lounge, [facility] Meds to Bed program, availability of treatment team to discuss questions or concerns patient and/or family may have regarding understanding medications and recognizing signs and symptoms once discharged  CM also encouraged patient to follow up with all recommended appointments after discharge  Patient advised of importance for patient and family to participate in managing patients medical well being  CM dept will continue to follow until d/c

## 2018-10-08 NOTE — PHYSICAL THERAPY NOTE
Physical Therapy Cancellation Note        Per RN, pt is not currently appropriate for evaluation as he was just given Ativan after feeling extremely anxious  Will continue to follow and evaluate as appropriate

## 2018-10-08 NOTE — SOCIAL WORK
LOS 2 DAYS  PATIENT IS NOT A BUNDLE  PATIENT IS NOT A READMIT  CM met with patient at bedside  Patient lives with his wife and son in a 2 story house located in [place]  Patient has access to a walker but does not use them  Patient can complete ADLS by himself  Patient has no Hx of VNA but recently finishes his outpatient PT treatment in early September  Patient uses the [address] and has prescription coverage  Patient relies on his wife to drive  CM reviewed d/c planning process including the following: identifying help at home, patient preference for d/c planning needs, Discharge Lounge, [facility] Meds to Bed program, availability of treatment team to discuss questions or concerns patient and/or family may have regarding understanding medications and recognizing signs and symptoms once discharged  CM also encouraged patient to follow up with all recommended appointments after discharge  Patient advised of importance for patient and family to participate in managing patients medical well being  CM dept will continue to follow until d/c

## 2018-10-08 NOTE — PROGRESS NOTES
Patient's nurse called me that patient is presently confused and having more twitching movements  Thus, I called the advance practitioner for Neurology to evaluate the patient  We saw the patient  Patient indeed was confused, with more twitching movements  Presently, the neurology advance practitioner is evaluating the patient  Vital signs were stable  EEG was done already  Patient still for MRI  I spoke to the patient's wife at bedside

## 2018-10-08 NOTE — PLAN OF CARE
CONFUSION/THOUGHT DISTURBANCE     Thought disturbances (confusion, delirium, depression, dementia or psychosis) are managed to maintain or return to baseline mental status and functional level [address] Discharge to home or other facility with appropriate resources Progressing        INFECTION - ADULT     Absence or prevention of progression during hospitalization Progressing     Absence of fever/infection during neutropenic period Progressing        Knowledge Deficit     Patient/family/caregiver demonstrates understanding of disease process, treatment plan, medications, and discharge instructions Progressing        Nutrition/Hydration-ADULT     Nutrient/Hydration intake appropriate for improving, restoring or maintaining nutritional needs Progressing        PAIN - ADULT     Verbalizes/displays adequate comfort level or baseline comfort level Progressing        Potential for Falls     Patient will remain free of falls Progressing        SAFETY ADULT     Maintain or return to baseline ADL function Progressing     Maintain or return mobility status to optimal level Progressing     Patient will remain free of falls Progressing

## 2018-10-08 NOTE — PROGRESS NOTES
Progress Note - Rahul Perry 1945, 68 y o  male MRN: [id number]    Unit/Bed#: -01 Encounter: [id number]    Primary Care Provider: Anuradha Mccoy DO   Date and time admitted to hospital: 10/6/2018 11:52 AM        * Acute encephalopathy   Assessment & Plan    · POA  Patient's wife reported on admission that patient he had been very confused-- repeating himself and asking where he was and why certain family members weren't with them  Also with increased twitching and dizziness  · Lab work is unremarkable thus far  No acute findings on CXR and CT head  · Normal B12, and elevated folate; TSH is normal   · Wife reports patient has problems with anxiety/anger/depression  She states that there have been multiple family members who have passed away recently  She also states that these twitching episodes have been evaluated by EEG in the past and they are not epileptiform in etiology   · Consulted behavioral health-- appreciate their input   · As per psychiatrist, she prescribe the patient with haloperidol 0 5 mg twice a day, particularly for patient's chronic motor tic disorder  According to her, patient would best benefit from Pimozide comma but it is not available on our formulary  · Neurology on board:  Obtain EEG  MRI of the brain with and without contrast   Patient's vertigo likely peripheral, but check MRI for central cause of patient's vertigo  From my discussion with the neurologist, from his assessment, patient's symptoms likely psychogenic, but will do the tests to rule out organic cause 1st   Outpatient movement Disorder doctor follow-up  Thrombocytopenia (HCC)   Assessment & Plan    · Monitor    · For further workup and management if this worsens significantly     HTN (hypertension)   Assessment & Plan    · BP controlled  · Continue home medications     History of DVT (deep vein thrombosis)   Assessment & Plan    · 2/2 Protein S deficiency   · INR now therapeutic at 2 3   · Continue home coumadin dose  · Monitor INR  Depression   Assessment & Plan    · Wife reports long-standing hx of this  She states that recently he has been more anxious and angry and has been having more twitching   · Takes remeron  · Appreciate behavioral health input  · Outpatient follow-up with psychiatrist     Crohn's disease   Assessment & Plan    · S/p colostomy in the 90s  · Continue colostomy care           VTE Pharmacologic Prophylaxis:   Pharmacologic: Warfarin (Coumadin)  Mechanical VTE Prophylaxis in Place: Yes    Patient Centered Rounds: I have performed bedside rounds with nursing staff today  Discussions with Specialists or Other Care Team Provider:  Case management  Neurologist     Education and Discussions with Family / Patient:  Patient  I called patient's wife, and left a voicemail message for call back  Time Spent for Care: 30 minutes  More than 50% of total time spent on counseling and coordination of care as described above  Current Length of Stay: 2 day(s)    Current Patient Status: Inpatient   Certification Statement: The patient will continue to require additional inpatient hospital stay due to Above findings and plans  Discharge Plan:  None yet  Possible tomorrow  Still for PT/OT evaluation  Still for EEG and MRI  Code Status: Level 1 - Full Code      Subjective:   Patient is presently doing fine  Patient was coherent and oriented when I and the neurologist saw him  However, again patient was noted to have occasional twitching that would start from patient's head and neck and goes to the right upper extremity and at times would be noticed also involving the right lower extremity and left upper and left lower extremities  No pains  No shortness of breath  No other complaints        Objective:     Vitals:   Temp (24hrs), Av 3 °F (36 8 °C), Min:98 °F (36 7 °C), Max:98 6 °F (37 °C)    HR:  [71-78] 71  Resp:  [18] 18  BP: (128-135)/(66-84) 135/75  SpO2:  [96 %-100 %] 100 %  Body mass index is 24 68 kg/m²  Input and Output Summary (last 24 hours): Intake/Output Summary (Last 24 hours) at 10/08/18 1227  Last data filed at 10/07/18 1823   Gross per 24 hour   Intake              560 ml   Output                0 ml   Net              560 ml       Physical Exam:     Physical Exam   Constitutional: No distress  HENT:   Head: Normocephalic and atraumatic  Eyes: Right eye exhibits no discharge  Left eye exhibits no discharge  No scleral icterus  Neck: No JVD present  No tracheal deviation present  Cardiovascular: Normal rate, regular rhythm and normal heart sounds  Exam reveals no gallop and no friction rub  No murmur heard  Pulmonary/Chest: Effort normal and breath sounds normal  No stridor  No respiratory distress  He has no wheezes  He has no rales  Abdominal: Soft  Bowel sounds are normal  He exhibits no distension  There is no tenderness  There is no rebound and no guarding  Musculoskeletal: He exhibits no edema, tenderness or deformity  Neurological: He is alert  No cranial nerve deficit  Patient was able to carry conversation with us  Patient was coherent  Patient was oriented  However, patient was noted again having twitching movements of his head and neck as well as tremors noted particularly on the right upper extremity  At times, patient would also have tremors on the other extremities (left upper, bilateral lower extremities)  Skin: Skin is warm  No rash noted  He is not diaphoretic  No erythema  No pallor  Psychiatric: He has a normal mood and affect  His behavior is normal  Thought content normal    Vitals reviewed             Additional Data:     Labs:      Results from last 7 days  Lab Units 10/08/18  0624 10/07/18  0603 10/06/18  1301   WBC Thousand/uL  --  5 75 4 55   HEMOGLOBIN g/dL  --  13 0 13 3   HEMATOCRIT %  --  38 8 39 4   PLATELETS Thousands/uL 122* 129* 148*   NEUTROS PCT %  --   --  70   LYMPHS PCT %  --   --  22   MONOS PCT % --   --  7   EOS PCT %  --   --  1       Results from last 7 days  Lab Units 10/08/18  0624  10/06/18  1301   SODIUM mmol/L 142  < > 141   POTASSIUM mmol/L 4 5  < > 3 8   CHLORIDE mmol/L 108  < > 105   CO2 mmol/L 28  < > 27   BUN mg/dL 10  < > 14   CREATININE mg/dL 0 79  < > 0 80   CALCIUM mg/dL 8 2*  < > 8 5   ALK PHOS U/L  --   --  66   ALT U/L  --   --  26   AST U/L  --   --  26   < > = values in this interval not displayed  Results from last 7 days  Lab Units 10/08/18  0624   INR  2 30*       * I Have Reviewed All Lab Data Listed Above  * Additional Pertinent Lab Tests Reviewed: [facility] Admission Reviewed    Imaging:    Imaging Reports Reviewed Today Include:  Diagnostic imaging studies that were done on this admission  Imaging Personally Reviewed by Myself Includes:  None      Recent Cultures (last 7 days):           Last 24 Hours Medication List:     Current Facility-Administered Medications:  amLODIPine 5 mg Oral Daily Sofia Tran PA-C    calcium carbonate 1,000 mg Oral Daily PRN Sofia Tran PA-C    fluticasone 1 spray Nasal Daily Sofia Tran PA-C    gabapentin 100 mg Oral Daily Sofia Tran PA-C    haloperidol 0 5 mg Oral BID Yareli Kinney MD    loratadine 10 mg Oral Daily Sofia Tran PA-C    LORazepam 1 mg Intravenous Once Daria Vitale MD    melatonin 9 mg Oral HS PRN Sofia Tran PA-C    mirtazapine 30 mg Oral HS HAIR Khan-DANNA    multivitamin-minerals 1 tablet Oral Daily Sofia Tran PA-C    ondansetron 4 mg Intravenous Q6H PRN Sofia Tran PA-C    senna 1 tablet Oral Daily Sofia Tran PA-C    sodium chloride 100 mL/hr Intravenous Continuous Daria Vitale MD Last Rate: 100 mL/hr (10/07/18 1640)   warfarin 2 mg Oral Daily Sofia Tran PA-C         Today, Patient Was Seen By: Buck Pan MD    ** Please Note: Dragon 360 Dictation voice to text software may have been used in the creation of this document   **

## 2018-10-08 NOTE — PROGRESS NOTES
Neurology Progress Note - Dilcia Winguse 1945, 68 y o  male   MRN: [id number] Unit/Bed#: -01 Encounter: [id number]        * Acute encephalopathy   Assessment & Plan    The episode seen this afternoon appears to have occurred without trigger  Anxiety   Assessment & Plan    He is clearly very anxious some at this time  He is somewhat unable to be reassured  His wife reports he has a longstanding history of anxiety  At this time I have given him 1 mg of Ativan IV and we will see whether not this helps settle what appears to be his anxiety as well as confusion and myoclonus  Subjective/Objective     Subjective:  I want to go home    ROS:  He seems unable to report any reliable information right now in terms of his current state of mind  Reports that he has to pee  The RN reports that he was some approximately an hour to an hour and half ago he was alert in bed speaking with his wife  He appeared comfortable without significant myoclonus or confusion  I was summoned to the room for confusion twitching and restlessness  The wife reports that these episodes have been occurring more often  She reports that clearly he does not move her twitch this much home  Current Facility-Administered Medications:  amLODIPine 5 mg Oral Daily   calcium carbonate 1,000 mg Oral Daily PRN   fluticasone 1 spray Nasal Daily   gabapentin 100 mg Oral Daily   haloperidol 0 5 mg Oral BID   loratadine 10 mg Oral Daily   LORazepam 1 mg Intravenous Q6H PRN   melatonin 9 mg Oral HS PRN   mirtazapine 30 mg Oral HS   multivitamin-minerals 1 tablet Oral Daily   ondansetron 4 mg Intravenous Q6H PRN   senna 1 tablet Oral Daily   sodium chloride 100 mL/hr Intravenous Continuous   warfarin 2 mg Oral Daily     calcium carbonate    LORazepam    melatonin    ondansetron    Vitals: Blood pressure 126/72, pulse 102, temperature 98 6 °F (37 °C), temperature source Oral, resp   rate 20, height 5' 5" (1 651 m), weight 67 3 kg (148 lb 4 8 oz), SpO2 100 %  ,Body mass index is 24 68 kg/m²  Physical Exam:     Nato Bautista seen in:  In bed side rails are up  General appearance:  Vigilant,   Neck, Lungs, Heart, & abdomen: WNL tachycardic  Extremities: atraumatic, no cyanosis or edema    Neurologic:   Mental status:  As noted above he is vigilant he appears to be staring randomly at other parts of the room and then his gaze will break and alternated make eye contact with the examiner  He is able to read and read my name badpranav during this episode  He was initially confused as to where he was but again read my name badpranav for orientation  He was able to follow commands but it generally took a repetition as well as cuing to pay attention  He was able to follow cross body command  He initially reported that it was September  , reports somewhat repeatedly that he wants to go home then he is able to be distracted and focus again  He was able to register 3 items on 2 attempts  He then was able to recall 1 and 1 item separately at 3 in approximately 7 min independently the other 2 items he lost 1 and needed a cue for the other  CN: exam appear to be nonlateralizing  EOM's I, Gaze conjugate  Non lateralizing sensory & motor exam, (PP not tested on face)  Reminder CNVIII-XII normal    Motor: full power age appropriate x 4 limbs to command  In between he is having periods of of thrashing with myoclonus and non rhythmic jerking  He seems to be able to be distracted from these episodes with a verbal command or tactile stimulus to the examiner  They are not rhythmic and repetitious were confined to 1 area of the body  Sensory: grossly intact x4 at this time  Cerebellar:  Was unable to assess at this time however there appears to be no gross cerebellar ataxia with basic movements  Gait:  He was not gaited at this time      DTR's:  +1 Age appropriate,  Plantars:  I was unable to assess at this time      He was given 1 mg of Ativan and there was some calming of his apparent myoclonus  He appeared to be less restless  Lab Results:   I have personally reviewed pertinent reports  , CBC:   Results from last 7 days  Lab Units 10/08/18  0624 10/07/18  0603 10/06/18  1301   WBC Thousand/uL  --  5 75 4 55   RBC Million/uL  --  4 27 4 35   HEMOGLOBIN g/dL  --  13 0 13 3   HEMATOCRIT %  --  38 8 39 4   MCV fL  --  91 91   PLATELETS Thousands/uL 122* 129* 148*   , BMP/CMP:   Results from last 7 days  Lab Units 10/08/18  0624 10/07/18  0603 10/06/18  1301   SODIUM mmol/L 142 141 141   POTASSIUM mmol/L 4 5 4 0 3 8   CHLORIDE mmol/L 108 107 105   CO2 mmol/L 28 30 27   BUN mg/dL 10 11 14   CREATININE mg/dL 0 79 0 76 0 80   CALCIUM mg/dL 8 2* 8 5 8 5   AST U/L  --   --  26   ALT U/L  --   --  26   ALK PHOS U/L  --   --  66   EGFR ml/min/1 73sq m 90 91 89   , Vitamin B12:   Results from last 7 days  Lab Units 10/07/18  0603   VITAMIN B 12 pg/mL 491   , HgBA1C:   , TSH:   Results from last 7 days  Lab Units 10/07/18  0603   TSH 3RD GENERATON uIU/mL 2  107   , Coagulation:   Results from last 7 days  Lab Units 10/08/18  0624   INR  2 30*   , Lipid Profile:   , Ammonia:   , Drug Screen:        Imaging Studies: I have personally reviewed pertinent films in PACS and note no acute pathology  His MRI is pending  EEG, Echo, Pathology, and Other Studies: His EEG is just recently been done and has not been read there is no report fill this time  Counseling / Coordination of Care  Total time spent today 45 minutes  Greater than 50% of total time was spent with the patient and / or family counseling and / or coordination of care  A description of the counseling / coordination of care: All of the above was discussed with the patient's wife at the bedside

## 2018-10-09 ENCOUNTER — TRANSITIONAL CARE MANAGEMENT (OUTPATIENT)
Dept: INTERNAL MEDICINE CLINIC | Facility: CLINIC | Age: 73
End: 2018-10-09

## 2018-10-09 VITALS
TEMPERATURE: 97.3 F | SYSTOLIC BLOOD PRESSURE: 143 MMHG | BODY MASS INDEX: 24.71 KG/M2 | HEART RATE: 80 BPM | OXYGEN SATURATION: 99 % | WEIGHT: 148.3 LBS | RESPIRATION RATE: 18 BRPM | DIASTOLIC BLOOD PRESSURE: 89 MMHG | HEIGHT: 65 IN

## 2018-10-09 PROBLEM — G93.40 ACUTE ENCEPHALOPATHY: Status: RESOLVED | Noted: 2018-10-06 | Resolved: 2018-10-09

## 2018-10-09 LAB
AMMONIA PLAS-SCNC: <10 UMOL/L (ref 11–35)
ATRIAL RATE: 88 BPM
P AXIS: 48 DEGREES
PR INTERVAL: 110 MS
QRS AXIS: 23 DEGREES
QRSD INTERVAL: 102 MS
QT INTERVAL: 388 MS
QTC INTERVAL: 469 MS
T WAVE AXIS: 52 DEGREES
VENTRICULAR RATE: 88 BPM

## 2018-10-09 PROCEDURE — 99239 HOSP IP/OBS DSCHRG MGMT >30: CPT | Performed by: INTERNAL MEDICINE

## 2018-10-09 PROCEDURE — 97163 PT EVAL HIGH COMPLEX 45 MIN: CPT

## 2018-10-09 PROCEDURE — G8978 MOBILITY CURRENT STATUS: HCPCS

## 2018-10-09 PROCEDURE — 82140 ASSAY OF AMMONIA: CPT | Performed by: NURSE PRACTITIONER

## 2018-10-09 PROCEDURE — G8979 MOBILITY GOAL STATUS: HCPCS

## 2018-10-09 PROCEDURE — 93010 ELECTROCARDIOGRAM REPORT: CPT | Performed by: INTERNAL MEDICINE

## 2018-10-09 PROCEDURE — 97530 THERAPEUTIC ACTIVITIES: CPT

## 2018-10-09 RX ORDER — LORAZEPAM 0.5 MG/1
0.25 TABLET ORAL EVERY 12 HOURS PRN
Qty: 30 TABLET | Refills: 0 | Status: SHIPPED | OUTPATIENT
Start: 2018-10-09 | End: 2018-12-06 | Stop reason: SDUPTHER

## 2018-10-09 RX ADMIN — AMLODIPINE BESYLATE 5 MG: 5 TABLET ORAL at 09:22

## 2018-10-09 RX ADMIN — HALOPERIDOL 0.5 MG: 1 TABLET ORAL at 09:22

## 2018-10-09 RX ADMIN — LORATADINE 10 MG: 10 TABLET ORAL at 09:22

## 2018-10-09 RX ADMIN — SENNOSIDES 8.6 MG: 8.6 TABLET, FILM COATED ORAL at 09:22

## 2018-10-09 RX ADMIN — WARFARIN SODIUM 2 MG: 2 TABLET ORAL at 09:22

## 2018-10-09 RX ADMIN — GABAPENTIN 100 MG: 100 CAPSULE ORAL at 09:22

## 2018-10-09 RX ADMIN — SODIUM CHLORIDE 100 ML/HR: 0.9 INJECTION, SOLUTION INTRAVENOUS at 00:32

## 2018-10-09 RX ADMIN — Medication 1 TABLET: at 09:22

## 2018-10-09 NOTE — PLAN OF CARE
Problem: PHYSICAL THERAPY ADULT  Goal: Performs mobility at highest level of function for planned discharge setting  See evaluation for individualized goals  Treatment/Interventions: Functional transfer training, Elevations, Patient/family training, Gait training, Compensatory technique education, Spoke to MD, Spoke to nursing, Spoke to case management  Equipment Recommended: Other (Comment) (None at this time)       See flowsheet documentation for full assessment, interventions and recommendations  Outcome: Progressing  Prognosis: Fair  Problem List: Decreased endurance, Impaired balance, Decreased coordination, Decreased cognition, Impaired judgement, Decreased safety awareness  Assessment: Trisha Hamm seen for first session post evaluation to progress ambulation quality to what has been reported by the floor staff  Unfortunately Trisha Hamm exhibited significant abnormal movement quality while  walking with concern patient was going to fall during gait bouts  BP monitored for orthostasis with none noted (resting BP: 120/68 post ambulation 138/70)  Recommend continued PT while an inpatient and f/u with home PT or rehab consult per family ability to provide the supervision that will be required due to his fluctuating cognitive and mobility status  Recommendation: Home with family support, Home PT, Rehab consult          See flowsheet documentation for full assessment

## 2018-10-09 NOTE — DISCHARGE INSTR - AVS FIRST PAGE
The non rhythmic movements and jerking, with head extension are associated with increased and market anxiety  With distraction such as changing the subject your anxiety levels are likely lowered and your movements improve  We discussed long-term and rather marked anxiety history  Your wife reports a longstanding history of anxiety  YOu report a prior history of Valium addiction  I suggested that he follow-up with Psychiatry and they report they were less happy with their previous psychiatrist who had used Haldol in excess in their opinion     At this time from a neurologic point of view you are stable for discharge  We have discussed that he can see our movement disorder specialist at our [place] office as an outpatient  Please call our office to make an appointment

## 2018-10-09 NOTE — PLAN OF CARE
Problem: DISCHARGE PLANNING - CARE MANAGEMENT  Goal: Discharge to post-acute care or home with appropriate resources  INTERVENTIONS:  - Conduct assessment to determine patient/family and health care team treatment goals, and need for post-acute services based on payer coverage, community resources, and patient preferences, and barriers to discharge  - Address psychosocial, clinical, and financial barriers to discharge as identified in assessment in conjunction with the patient/family and health care team  - Arrange appropriate level of post-acute services according to patients   needs and preference and payer coverage in collaboration with the physician and health care team  - Communicate with and update the patient/family, physician, and health care team regarding progress on the discharge plan  - Arrange appropriate transportation to post-acute venues  Outcome: Completed Date Met: 10/09/18  LOS 3  Not a bundle; Not a readmission  Cm met with pt and wife  CM reviewed discharge planning process including the following: identifying caregivers at home, preference for d/c planning needs, [facility] Meds to [facility] program, availability of treatment team to discuss questions or concerns patient and/or family may have regarding diagnosis, plan of care, old or new medications and discharge planning   CM will continue to follow for care coordination and update assessment as necessary  Cm asked if they felt pt needed therapy at home  Both pt and wife stated they do not believe that is necessary as pt rides his bike 2 x per day and lifts weights at home  Cm reminded him of the symptoms of his tremors and balance  Both wife and pt stated they would monitor symptoms  CM name and role reviewed  Discharge Checklist reviewed and CM will continue to monitor for progress toward discharge goals in nursing and provider rounds  Patient identified as High Risk for Readmission  HRR workflow initiated    Pt ands wife prefer not to have Cm make a PCP appointment mad for them  They stated they will make the appointment themselves when they get home

## 2018-10-09 NOTE — DISCHARGE SUMMARY
Discharge Summary - [address] [facility]    Patient Information: Jaylon Martínez 68 y o  male MRN: [id number]  Unit/Bed#: -01 Encounter: [id number]    Discharging Physician / Practitioner: Elda Mackay MD  PCP: Emory Hinson DO  Admission Date: 10/6/2018  Discharge Date: 10/09/18    Reason for Admission:  Altered mental status    Discharge Diagnoses:     Principal Problem:    Acute encephalopathy  Active Problems:    Crohn's disease    Depression    History of DVT (deep vein thrombosis)    Anxiety    HTN (hypertension)    Thrombocytopenia ([address] )      Consultations During Hospital Stay:  · Neurology  · Behavioral health    Procedures Performed:   · None    Significant findings:  · None    Hospital Course:   Jaylon Martínez is a 68 y o  male patient who originally presented to the hospital on 10/6/2018 due to altered mental status  The patient has a longstanding history of anxiety disorder and presented with dizziness  Over the course of his hospital stay, he was noted with myoclonic jerk like movements and was evaluated by the neurology team   He had MRI of the brain and EEG done both of which were unremarkable for any acute findings  Following evaluation, it was felt that 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  He was initially placed on Haldol b i d  Based on psychiatric I's recommendation, however due to worsening of symptoms, this medication was discontinued prior to discharge  He is recommended to follow up with the [facility] Disorder Clinic in approximately 1 month  He was given a prescription for low-dose Ativan b i d  P r n  To help with anxiety  Patient was cleared for discharge home on 10/9/18  Condition at Discharge: stable     Discharge Day Visit / Exam:     Subjective:  Patient reports anxiety  Noted with intermittent jerky motions of his arms    He is alert and appropriately responsive    Vitals: Blood Pressure: 143/89 (10/09/18 0700)  Pulse: 80 (10/09/18 0700)  Temperature: (!) 97 3 °F (36 3 °C) (10/09/18 0700)  Temp Source: Oral (10/09/18 0700)  Respirations: 18 (10/09/18 0700)  Height: 5' 5" (165 1 cm) (10/06/18 1550)  Weight - Scale: 67 3 kg (148 lb 4 8 oz) (10/06/18 1550)  SpO2: 99 % (10/09/18 0700)    General Appearance:    Alert, cooperative, no distress, appropriately responsive    Head:    Normocephalic, without obvious abnormality, atraumatic, mucous membranes moist    Eyes:    Conjunctiva/corneas clear, EOM's intact   Neck:   Supple   Lungs:     Clear to auscultation bilaterally, respirations unlabored, no crackles or wheeze     Heart:    Regular rate and rhythm, S1 and S2    Abdomen:     Soft, non-tender, bowel sounds active all four quadrants,     no masses, no organomegaly   Extremities:   Extremities normal, atraumatic, no cyanosis or edema   Neurologic:  Alert and oriented x3, moving all extremities, able to ambulate to the bathroom and back independently      Discharge instructions/Information to patient and family:   See after visit summary for information provided to patient and family  Provisions for Follow-Up Care:  See after visit summary for information related to follow-up care and any pertinent home health orders  Disposition: Home     Discussed with patient's spouse at the bedside  All questions answered    Discharge Statement:  I spent >30 minutes discharging the patient  This time was spent on the day of discharge  I had direct contact with the patient on the day of discharge  Greater than 50% of the total time was spent examining patient, answering all patient questions, arranging and discussing plan of care with patient as well as directly providing post-discharge instructions  Additional time then spent on discharge activities  Discharge Medications:  See after visit summary for reconciled discharge medications provided to patient and family        ** Please Note: Dragon 360 Dictation voice to text software may have been used in the creation of this document   **

## 2018-10-09 NOTE — SOCIAL WORK
LOS 3   Not a bundle; Not a readmission  Cm met with pt and wife  CM reviewed discharge planning process including the following: identifying caregivers at home, preference for d/c planning needs, [facility] Meds to [facility] program, availability of treatment team to discuss questions or concerns patient and/or family may have regarding diagnosis, plan of care, old or new medications and discharge planning   CM will continue to follow for care coordination and update assessment as necessary  Cm asked if they felt pt needed therapy at home  Both pt and wife stated they do not believe that is necessary as pt rides his bike 2 x per day and lifts weights at home  Cm reminded him of the symptoms of his tremors and balance  Both wife and pt stated they would monitor symptoms  CM name and role reviewed  Discharge Checklist reviewed and CM will continue to monitor for progress toward discharge goals in nursing and provider rounds  Patient identified as High Risk for Readmission  HRR workflow initiated  Pt ands wife prefer not to have Cm make a PCP appointment mad for them  They stated they will make the appointment themselves when they get home

## 2018-10-09 NOTE — PLAN OF CARE
Problem: PHYSICAL THERAPY ADULT  Goal: Performs mobility at highest level of function for planned discharge setting  See evaluation for individualized goals  Treatment/Interventions: Functional transfer training, Elevations, Patient/family training, Gait training, Compensatory technique education, Spoke to MD, Spoke to nursing, Spoke to case management  Equipment Recommended: Other (Comment) (None at this time)       See flowsheet documentation for full assessment, interventions and recommendations  Prognosis: Fair  Problem List: Decreased endurance, Impaired balance, Decreased coordination, Decreased cognition, Impaired judgement, Decreased safety awareness  Assessment: Pt is a 68 y o  male seen for PT evaluation s/p admit to [facility] on 10/6/2018 w/ Acute encephalopathy  And presented with with multiple medical conditions including Crohn's disease, protein S deficiency, hypertension and dizziness that began at approximately 10:30 a m on 10/6/18     Patient's wife reports he often gets dizzy and has twitching episodes when he is anxious or upset or angry  On the day of admission, they started at approximately 10:30 a m  However, was different about this time that the patient is very confused  Current workup not remarkable for any focal neurologic cause, 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  Order placed for PT  PT assessment reveals a fluctuating picture of function with reports he has been able to ambulate independently to the bathroom, but on PT assessment presented with significant abnormal  movement affecting the ability to safely ambulate beyond 20'  Wife present for ambulation trials and voiced the movement quality of "bouncing in the LE" was not a typical pattern for him additionally his  strength Right 2/5; Left 3-/5  Spoke with nursing and MD to make them aware of PT findings  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    Lauri Hopson has had therapy as an outpt as recently as 6/2018 with [address] [address], unable to attempt this test today  Prior to admission, pt was independent w/ all functional mobility w/ no device  Upon evaluation: Pt requires supervision assistance for bed mobility, transfers, and ambulation with no device  Pt's clinical presentation is currently unstable/unpredictable given the functional mobility deficits above, especially impaired coordination, gait deviations and decreased activity tolerance, coupled with fall risks including impaired coordination, impaired judgement, decreased safety awareness and decreased cognition, and combined with medical complications of hypertension  and anxiety with dizziness  Pt to benefit from continued skilled PT tx while in hospital and upon DC to address deficits as defined above and maximize level of functional mobility  From PT/mobility standpoint, recommendation at time of d/c would be inpatient rehab vs  Home PT and home with family support pending progress in order to maximize pt's functional independence and consistency w/ mobility in order to facilitate return to PLOF  Recommend gait training and high level balance interventions as well as elvations if gait is more stable             Recommendation: Home with family support, Home PT, Rehab consult          See flowsheet documentation for full assessment

## 2018-10-09 NOTE — PHYSICAL THERAPY NOTE
PHYSICAL THERAPY EVALUATION  NAME:  Tomasa Yuan  DATE: 10/09/18    AGE:   68 y o  Mrn:   [id number]  ADMIT DX:  Dizziness [R42]  Anxiety disorder [F41 9]  Acute encephalopathy [G93 40]    Past Medical History:   Diagnosis Date    Anxiety     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[facility])     Cro[facility]'s disease ([facility] )     Depression     DVT (deep venous thrombosis) ([facility] )     [place] (hard of hearing)     no hearing aids    Hypertension     Mass in neck     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[facility] )     Psychiatric disorder     depression, anger     Length Of Stay: 3  Performed at least 2 patient identifiers during session: Name and Birthday    PHYSICAL THERAPY EVALUATION :    10/09/18 0933   Note Type   Note type Eval/Treat   Pain Assessment   Pain Assessment No/denies pain   Pain Score No Pain   Home Living   Type of Home House  ([address])   Home Layout Multi-level;Stairs to enter without rails;Bed/bath upstairs; Other (Comment)  ((2STE, 13 steps to bed / bath))   Bathroom Shower/Tub Tub/shower unit   Bathroom Toilet Standard   Bathroom Equipment Grab bars in shower;Grab bars around toilet   [address] [facility]  (Wife utilizes)   Prior Function   Level of [place] Independent with ADLs and functional mobility; Needs assistance with IADLs   Lives With Spouse;Son  (Son is in his 42's)   [place] Help From Family  (not driving due to dizziness)   ADL Assistance Independent   IADLs Needs assistance   Falls in the last 6 months 0  (Near falls due to dizziness but not hitting ground)   Vocational Retired  (Pt reports retired [employer]))   Restrictions/Precautions   [place] Bearing Precautions Per Order No   General   Additional Pertinent History Recent course of OP PT for dizziness with no significant improvement   Family/Caregiver Present No   Cognition   Overall Cognitive Status Impaired   Arousal/Participation Alert   Orientation Level Oriented to person;Oriented to place;Oriented to situation  (FAST 4)   Memory Decreased short term memory;Decreased long term memory   Following Commands Follows multistep commands with increased time or repetition   Comments Narendra Jimenez displays high degree of anxiety, frequent sighs and fidgiting   RUE Assessment   RUE Assessment WFL   LUE Assessment   LUE Assessment WFL   RLE Assessment   RLE Assessment WFL   LLE Assessment   LLE Assessment WFL   Coordination   Movements are Fluid and Coordinated 0   Coordination and Movement Description movements are jules, and balistic at times in UE and trunk as well as cervical neck   Sensation WFL   Light Touch   RLE Light Touch Grossly intact   LLE Light Touch Grossly intact   Bed Mobility   Rolling R 5  Supervision   Rolling L 5  Supervision   Supine to Sit 5  Supervision   Sit to Supine 5  Supervision   Transfers   Sit to Stand 5  Supervision   Additional items Assist x 1;Other  (abnormal mvmt qulaity and  closing of eyes with controlled k)   Stand to Sit 5  Supervision   Ambulation/Elevation   Gait pattern R Knee Jahaira;L Knee Jahaira;Narrow RIANNA; Decreased foot clearance;Shuffling; Inconsistent antonina; Short stride; Ataxia   Gait Assistance 4  Minimal assist   Additional items Assist x 1   Assistive Device None   Distance 20'   Balance   Static Sitting Normal   Dynamic Sitting Normal   Static Standing Fair   Dynamic Standing [id number]  (Despite abnormal movement, no significnat LOB requiring  A)   Endurance Deficit   Endurance Deficit Yes   Endurance Deficit Description Dizziness score 5-6/10   Activity Tolerance   Activity Tolerance Patient limited by fatigue; Other (Comment)  (and dizziness)   Medical Staff Made Aware Dr Alize Donovan   Assessment   Prognosis Fair   Problem List Decreased endurance; Impaired balance;Decreased coordination;Decreased cognition; Impaired judgement;Decreased safety awareness   Goals   Patient Goals I would like ot go home, I have been here long enough     STG Expiration Date 10/19/18   Treatment Day 0   Plan Treatment/Interventions Functional transfer training;Elevations; Patient/family training;Gait training; Compensatory technique education;Spoke to MD;Spoke to nursing;Spoke to case management   PT Frequency 2-3x/wk   Recommendation   Recommendation Home with family support;Home PT;Rehab consult   Equipment Recommended Other (Comment)  (None at this time)   Barthel Index   Feeding 5   Bathing 0   Grooming Score 0   Dressing Score 5   Bladder Score 10   Bowels Score 10   Toilet Use Score 10   Transfers (Bed/Chair) Score 10   Mobility (Level Surface) Score 0   Stairs Score 0   Barthel Index Score 50       (Please find full objective findings from PT assessment regarding body systems outlined above)  Assessment: Pt is a 68 y o  male seen for PT evaluation s/p admit to [facility] on 10/6/2018 w/ Acute encephalopathy  And presented with with multiple medical conditions including Crohn's disease, protein S deficiency, hypertension and dizziness that began at approximately 10:30 a m on 10/6/18     Patient's wife reports he often gets dizzy and has twitching episodes when he is anxious or upset or angry  On the day of admission, they started at approximately 10:30 a m  However, was different about this time that the patient is very confused  Current workup not remarkable for any focal neurologic cause, 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  Order placed for PT  PT assessment reveals a fluctuating picture of function with reports he has been able to ambulate independently to the bathroom, but on PT assessment presented with significant abnormal  movement affecting the ability to safely ambulate beyond 20'  Wife present for ambulation trials and voiced the movement quality of "bouncing in the LE" was not a typical pattern for him additionally his  strength Right 2/5; Left 3-/5  Spoke with nursing and MD to make them aware of PT findings  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    Bee Rai has had therapy as an outpt as recently as 6/2018 with [address], unable to attempt this test today  Prior to admission, pt was independent w/ all functional mobility w/ no device  Upon evaluation: Pt requires supervision assistance for bed mobility, transfers, and ambulation with no device  Pt's clinical presentation is currently unstable/unpredictable given the functional mobility deficits above, especially impaired coordination, gait deviations and decreased activity tolerance, coupled with fall risks including impaired coordination, impaired judgement, decreased safety awareness and decreased cognition, and combined with medical complications of hypertension  and anxiety with dizziness  Pt to benefit from continued skilled PT tx while in hospital and upon DC to address deficits as defined above and maximize level of functional mobility  From PT/mobility standpoint, recommendation at time of d/c would be inpatient rehab vs  Home PT and home with family support pending progress in order to maximize pt's functional independence and consistency w/ mobility in order to facilitate return to PLOF  Recommend gait training and high level balance interventions as well as elvations if gait is more stable         Goals: Pt will:   Perform transfers to modified  to decrease risk for falls  Perform ambulation with no device for 150' to  independence  to decrease burden of care  Supervision on 13 steps with rail  Increase 30 second STS to 8  The following objective measures were performed on IE: Barthel Index 50/100; 30 second STS with use of UE was 3  Comorbidities affecting pt's physical performance at time of assessment include: HTN, limited cognition and cancer history and/or treatment  Personal factors affecting pt at time of IE include: anxiety, depression, multi-level environment, behavioral pattern, inability to navigate community distances and limited insight into impairments       Manolo Peterson, PT, GCS

## 2018-10-09 NOTE — PHYSICAL THERAPY NOTE
PHYSICAL THERAPY NOTE          Patient Name: Preethi Mccracken  GGFYD'B Date: 10/9/2018        10/09/18 1000   Pain Assessment   Pain Assessment No/denies pain   Pain Score No Pain   Restrictions/Precautions   Weight Bearing Precautions Per Order No   General   Chart Reviewed Yes   Response to Previous Treatment Patient with no complaints from previous session  Family/Caregiver Present No   Cognition   Overall Cognitive Status Impaired   Orientation Level Oriented to person;Oriented to place;Oriented to situation  (FAST 4)   Memory Decreased short term memory;Decreased long term memory   Following Commands Follows multistep commands with increased time or repetition   Subjective   Subjective When can I go home? Transfers   Sit to Stand 5  Supervision   Additional items Assist x 1;Other  (abnormal movement and response to standing)   Stand to Sit 5  Supervision   Additional items Assist x 1;Other  (missing the chair to grab to sit)   Ambulation/Elevation   Gait pattern R Knee Jahaira;L Knee Jahaira;Narrow RIANNA; Decreased foot clearance;Shuffling; Inconsistent antonina; Short stride; Ataxia   Gait Assistance 4  Minimal assist   Additional items Assist x 1   Assistive Device None   Distance 10' x1   Balance   Ambulatory Poor  (Posture weak and question low BP but stable at 138/70)   Endurance Deficit   Endurance Deficit Yes   Activity Tolerance   Activity Tolerance Patient limited by fatigue; Other (Comment)  (and dizziness)   Nurse Made Aware [facility]   Assessment   Prognosis Fair   Problem List Decreased endurance; Impaired balance;Decreased coordination;Decreased cognition; Impaired judgement;Decreased safety awareness   Assessment Nena Corey seen for first session post evaluation to progress ambulation quality to what has been reported by the floor staff   Unfortunately Nenafroilan Corey exhibited significant abnormal movement quality while  walking with concern patient was going to fall during gait bouts  BP monitored for orthostasis with none noted (resting BP: 120/68 post ambulation 138/70)  Recommend continued PT while an inpatient and f/u with home PT or rehab consult per family ability to provide the supervision that will be required due to his fluctuating cognitive and mobility status  Goals   Patient Goals I would like ot go home, I have been here long enough     STG Expiration Date 10/19/18   Plan   Treatment/Interventions Functional transfer training;Patient/family training;Gait training;Spoke to nursing   PT Frequency 2-3x/wk   Recommendation   Recommendation Home with family support;Home PT;Rehab consult     Ulyess Bloch, MPT, GCS

## 2018-10-10 ENCOUNTER — TELEPHONE (OUTPATIENT)
Dept: NEUROLOGY | Facility: CLINIC | Age: 73
End: 2018-10-10

## 2018-10-11 ENCOUNTER — OFFICE VISIT (OUTPATIENT)
Dept: INTERNAL MEDICINE CLINIC | Facility: CLINIC | Age: 73
End: 2018-10-11
Payer: MEDICARE

## 2018-10-11 VITALS
OXYGEN SATURATION: 98 % | DIASTOLIC BLOOD PRESSURE: 80 MMHG | BODY MASS INDEX: 25.12 KG/M2 | HEART RATE: 72 BPM | SYSTOLIC BLOOD PRESSURE: 138 MMHG | WEIGHT: 150.8 LBS | RESPIRATION RATE: 16 BRPM | HEIGHT: 65 IN

## 2018-10-11 DIAGNOSIS — R25.1 TREMOR: ICD-10-CM

## 2018-10-11 DIAGNOSIS — F41.9 ANXIETY: ICD-10-CM

## 2018-10-11 DIAGNOSIS — G89.29 CHRONIC LOW BACK PAIN, UNSPECIFIED BACK PAIN LATERALITY, WITH SCIATICA PRESENCE UNSPECIFIED: ICD-10-CM

## 2018-10-11 DIAGNOSIS — F33.1 MODERATE EPISODE OF RECURRENT MAJOR DEPRESSIVE DISORDER (HCC): Chronic | ICD-10-CM

## 2018-10-11 DIAGNOSIS — G93.40 ACUTE ENCEPHALOPATHY: Primary | ICD-10-CM

## 2018-10-11 DIAGNOSIS — M54.5 CHRONIC LOW BACK PAIN, UNSPECIFIED BACK PAIN LATERALITY, WITH SCIATICA PRESENCE UNSPECIFIED: ICD-10-CM

## 2018-10-11 PROCEDURE — 99496 TRANSJ CARE MGMT HIGH F2F 7D: CPT | Performed by: INTERNAL MEDICINE

## 2018-10-11 RX ORDER — GABAPENTIN 100 MG/1
100 CAPSULE ORAL DAILY
Qty: 90 CAPSULE | Refills: 1 | Status: SHIPPED | OUTPATIENT
Start: 2018-10-11 | End: 2019-04-05 | Stop reason: SDUPTHER

## 2018-10-11 NOTE — PROGRESS NOTES
Assessment/Plan:            Problem List Items Addressed This Visit        Nervous and Auditory    RESOLVED: Acute encephalopathy - Primary     Transition care management visit regarding recent hospitalization for acute confusion state fortunately no major etiology was found during the hospitalization the patient did have tremors he will be following up with the movement [address] [place] [address] Neurology  Other    Depression (Chronic)     No suicidal ideation he will continue with current dose Remeron , he declines counseling         Anxiety     No suicidal ideation the patient does reports me low-dose Ativan 0 5 mg 1 tablet every 12 hours as needed is very helpful he does not want to see Psychiatry he is tolerating the medication very well  Tremor     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 the patient will be seen at the neurology movement Disorder Clinic  He does report me the symptoms are improving somewhat           Other Visit Diagnoses     Chronic low back pain, unspecified back pain laterality, with sciatica presence unspecified        Relevant Medications    gabapentin (NEURONTIN) 100 mg capsule         Return to office 3  months  call if any problems  Today I did review the past, laboratories, discharge summary and hospitalization with the patient and his wife  Subjective:     Patient ID: Tory Boo is a 68 y o  male  HPI 68year old male coming in for a transition care management visit regarding recent hospitalization for acute confusion, tremor, depression; Patient had stuttering and was jumping on Haldol therefore it was discontinued then he was changed to low-dose Ativan 0 5 mg every 12 hr as needed and the stuttering has stopped in that has been helpful  But he continues to have myoclonic jerking will be seeing the neuromuscular specialist movement Disorder Clinic    Myoclonic were but which is of the upper part of the body and also neck are much worse while is in the hospital He is now walking with a cane and reports me that all still has some dizziness  Nausea, the jumping is driving me crazy,  Depressed since finding about the cancer  Always on edge  No si no hi    He reports me he is now able to sleep throughout the night using the Ativan  He also is more common for several hours after taking Ativan he is tolerating the medication very well  Review of Systems   Constitutional: Negative for activity change, appetite change and unexpected weight change  HENT: Negative for congestion  Eyes: Negative for visual disturbance  Respiratory: Negative for cough and shortness of breath  Cardiovascular: Negative for chest pain  Gastrointestinal: Negative for abdominal pain, diarrhea, nausea and vomiting  Musculoskeletal: Positive for gait problem (Using a cane)  Neurological: Positive for tremors  Negative for dizziness, light-headedness and headaches  Hematological: Negative for adenopathy  Psychiatric/Behavioral: Negative for suicidal ideas  Depression     anxiety    Objective:  Tremors of bilateral upper extremity and head and neck? 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   Physical Exam   Constitutional: He appears well-developed and well-nourished  No distress  HENT:   Head: Normocephalic and atraumatic  Right Ear: External ear normal    Left Ear: External ear normal    Mouth/Throat: Oropharynx is clear and moist    Eyes: Pupils are equal, round, and reactive to light  Conjunctivae are normal  Right eye exhibits no discharge  Left eye exhibits no discharge  No scleral icterus  Neck: Neck supple  Cardiovascular: Normal rate, regular rhythm and normal heart sounds  Exam reveals no gallop and no friction rub  No murmur heard  Pulmonary/Chest: No respiratory distress  He has no wheezes  He has no rales  Abdominal: Soft  Bowel sounds are normal  He exhibits no distension and no mass  There is no tenderness  There is no rebound and no guarding  Musculoskeletal: He exhibits no edema or deformity  Lymphadenopathy:     He has no cervical adenopathy  Neurological: He is alert  Skin: He is not diaphoretic  Psychiatric: His mood appears anxious  He exhibits a depressed mood  He expresses no suicidal ideation  No Follow-up on file  Allergies   Allergen Reactions    Duloxetine Other (See Comments)     Panic attacks    Other      Seasonal       Past Medical History:   Diagnosis Date    Anxiety     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[facility])     [facility]'s disease ([facility] )     Depression     DVT (deep venous thrombosis) ([place] [address] )     [place] (hard of hearing)     no hearing aids    Hypertension     Mass in neck     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[place] [address] )     Psychiatric disorder     depression, anger     Past Surgical History:   Procedure Laterality Date    COLON SURGERY      Partial colectomy and colostomy    ESOPHAGOGASTRODUODENOSCOPY N/A 4/5/2017    Procedure: ESOPHAGOGASTRODUODENOSCOPY (EGD); Surgeon: Isabell Yung MD;  [facility]; Service:     EYE SURGERY Right     Cataract    KNEE SURGERY      NJ EDG US EXAM SURGICAL ALTER STOM DUODENUM/JEJUNUM N/A 4/9/2018    Procedure: LINEAR ENDOSCOPIC U/S;  Surgeon: Guy Sibley MD;  [facility];   Service: Gastroenterology    NJ EXC PAROTD,LAT LOBE,DISSECT 5TH NERV Right 8/8/2018    Procedure: PAROTIDECTOMY WITH FACIAL NERVE MONITOR AND FROZEN SECTION;  Surgeon: Aspen Watt MD;  [facility];  Service: ENT    NJ IMPACT TOOTH [address] COMP BONY N/A 8/8/2018    Procedure: EXTRACTION TEETH #15 and #30;  Surgeon: Elliot Nieto DDS;  [facility];  Service: Maxillofacial    RADICAL NECK DISSECTION N/A 8/8/2018    Procedure: SELECTIVE NECK DISSECTION;  Surgeon: Aspen Watt MD;  [facility];  Service: ENT    VEIN LIGATION AND STRIPPING       Current Outpatient Prescriptions on File Prior to Visit   Medication Sig Dispense Refill    amLODIPine (NORVASC) 5 mg tablet Take 5 mg by mouth daily      fluticasone (FLONASE) 50 mcg/act nasal spray 1 spray into each nostril daily      Loratadine (CLARITIN) 10 MG CAPS Take by mouth daily as needed        LORazepam (ATIVAN) 0 5 mg tablet Take 0 5 tablets (0 25 mg total) by mouth every 12 (twelve) hours as needed for anxiety 30 tablet 0    meclizine (ANTIVERT) 25 mg tablet Take 1 tablet (25 mg total) by mouth 3 (three) times a day as needed for dizziness 30 tablet 2    Melatonin 10 MG TABS Take by mouth      mirtazapine (REMERON) 30 mg tablet Take 1 tablet (30 mg total) by mouth daily at bedtime 30 tablet 5    Multiple Vitamins-Minerals (MULTI FOR HIM 50+ PO) Take 1 tablet by mouth daily      ondansetron (ZOFRAN) 4 mg tablet Take 1 tablet (4 mg total) by mouth every 8 (eight) hours as needed for nausea or vomiting 20 tablet 0    warfarin (COUMADIN) 2 mg tablet TAKE ONE TABLET BY MOUTH ONCE DAILY 90 tablet 3    [DISCONTINUED] gabapentin (NEURONTIN) 100 mg capsule TAKE 1 CAPSULE BY MOUTH ONCE DAILY 30 capsule 1     No current facility-administered medications on file prior to visit  Family History   Problem Relation Age of Onset    Heart disease Brother     Diverticulitis Mother      Social History     Social History    Marital status: /Civil Union     Spouse name: N/A    Number of children: N/A    Years of education: N/A     Occupational History    Not on file       Social History Main Topics    Smoking status: Former Smoker     Packs/day: 1 00     Years: 10 00     Quit date: 6/9/1981    Smokeless tobacco: Never Used      Comment: 50 years ago    Alcohol use No      Comment: hx etoh abuse pt states he quit 9 years ago    Drug use: No    Sexual activity: No     Other Topics Concern    Not on file     Social History Narrative    No narrative on file     Vitals:    10/11/18 1126   BP: 138/80   BP Location: Right arm   Patient Position: Sitting   Cuff Size: Standard   Pulse: 72   Resp: 16   SpO2: 98%   Weight: 68 4 kg (150 lb 12 8 oz)   Height: 5' 5" (1 651 m)     Results for orders placed or performed during the hospital encounter of 10/06/18   Comprehensive metabolic panel   Result Value Ref Range    Sodium 141 136 - 145 mmol/L    Potassium 3 8 3 5 - 5 3 mmol/L    Chloride 105 100 - 108 mmol/L    CO2 27 21 - 32 mmol/L    ANION GAP 9 4 - 13 mmol/L    BUN 14 5 - 25 mg/dL    Creatinine 0 80 0 60 - 1 30 mg/dL    Glucose 110 65 - 140 mg/dL    Calcium 8 5 8 3 - 10 1 mg/dL    AST 26 5 - 45 U/L    ALT 26 12 - 78 U/L    Alkaline Phosphatase 66 46 - 116 U/L    Total Protein 6 7 6 4 - 8 2 g/dL    Albumin 3 5 3 5 - 5 0 g/dL    Total Bilirubin 0 70 0 20 - 1 00 mg/dL    eGFR 89 ml/min/1 73sq m   CBC and differential   Result Value Ref Range    WBC 4 55 4 31 - 10 16 Thousand/uL    RBC 4 35 3 88 - 5 62 Million/uL    Hemoglobin 13 3 12 0 - 17 0 g/dL    Hematocrit 39 4 36 5 - 49 3 %    MCV 91 82 - 98 fL    MCH 30 6 26 8 - 34 3 pg    MCHC 33 8 31 4 - 37 4 g/dL    RDW 12 8 11 6 - 15 1 %    MPV 10 6 8 9 - 12 7 fL    Platelets 641 (L) 341 - 390 Thousands/uL    nRBC 0 /100 WBCs    Neutrophils Relative 70 43 - 75 %    Immat GRANS % 0 0 - 2 %    Lymphocytes Relative 22 14 - 44 %    Monocytes Relative 7 4 - 12 %    Eosinophils Relative 1 0 - 6 %    Basophils Relative 0 0 - 1 %    Neutrophils Absolute 3 12 1 85 - 7 62 Thousands/µL    Immature Grans Absolute 0 01 0 00 - 0 20 Thousand/uL    Lymphocytes Absolute 1 02 0 60 - 4 47 Thousands/µL    Monocytes Absolute 0 32 0 17 - 1 22 Thousand/µL    Eosinophils Absolute 0 06 0 00 - 0 61 Thousand/µL    Basophils Absolute 0 02 0 00 - 0 10 Thousands/µL   Troponin I   Result Value Ref Range    Troponin I <0 02 <=0 04 ng/mL   Protime-INR   Result Value Ref Range    Protime 21 6 (H) 11 8 - 14 2 seconds    INR 1 94 (H) 0 86 - 1 17   APTT   Result Value Ref Range    PTT 31 24 - 36 seconds   UA w Reflex to Microscopic w Reflex to Culture   Result Value Ref Range    Color, UA Yellow     Clarity, UA Clear     Specific [place], UA 1 010 1 003 - 1 030    pH, UA 8 5 (H) 4 5 - 8 0    Leukocytes, UA Negative Negative    Nitrite, UA Negative Negative    Protein, UA Negative Negative mg/dl    Glucose, UA Negative Negative mg/dl    Ketones, UA Negative Negative mg/dl    Urobilinogen, UA 0 2 0 2, 1 0 E U /dl E U /dl    Bilirubin, UA Negative Negative    Blood, UA Small (A) Negative   Urine Microscopic   Result Value Ref Range    RBC, UA 2-4 (A) None Seen, 0-5 /hpf    WBC, UA None Seen None Seen, 0-5, 5-55, 5-65 /hpf    Epithelial Cells None Seen None Seen, Occasional /hpf    Bacteria, UA None Seen None Seen, Occasional /hpf   Vitamin B12   Result Value Ref Range    Vitamin B-12 491 100 - 900 pg/mL   Folate   Result Value Ref Range    Folate >20 0 (H) 3 1 - 17 5 ng/mL   TSH, 3rd generation   Result Value Ref Range    TSH 3RD GENERATON 2 107 0 358 - 3 740 uIU/mL   CBC   Result Value Ref Range    WBC 5 75 4 31 - 10 16 Thousand/uL    RBC 4 27 3 88 - 5 62 Million/uL    Hemoglobin 13 0 12 0 - 17 0 g/dL    Hematocrit 38 8 36 5 - 49 3 %    MCV 91 82 - 98 fL    MCH 30 4 26 8 - 34 3 pg    MCHC 33 5 31 4 - 37 4 g/dL    RDW 13 2 11 6 - 15 1 %    Platelets 825 (L) 968 - 390 Thousands/uL    MPV 10 1 8 9 - 12 7 fL   Basic metabolic panel   Result Value Ref Range    Sodium 141 136 - 145 mmol/L    Potassium 4 0 3 5 - 5 3 mmol/L    Chloride 107 100 - 108 mmol/L    CO2 30 21 - 32 mmol/L    ANION GAP 4 4 - 13 mmol/L    BUN 11 5 - 25 mg/dL    Creatinine 0 76 0 60 - 1 30 mg/dL    Glucose 88 65 - 140 mg/dL    Calcium 8 5 8 3 - 10 1 mg/dL    eGFR 91 ml/min/1 73sq m   Magnesium   Result Value Ref Range    Magnesium 2 1 1 6 - 2 6 mg/dL   Platelet count   Result Value Ref Range    Platelets 510 (L) 306 - 390 Thousands/uL    MPV 10 1 8 9 - 12 7 fL   Protime-INR   Result Value Ref Range    Protime 24 6 (H) 11 8 - 14 2 seconds    INR 2 30 (H) 0 86 - 2 09   Basic metabolic panel   Result Value Ref Range    Sodium 142 136 - 145 mmol/L    Potassium 4 5 3 5 - 5 3 mmol/L Chloride 108 100 - 108 mmol/L    CO2 28 21 - 32 mmol/L    ANION GAP 6 4 - 13 mmol/L    BUN 10 5 - 25 mg/dL    Creatinine 0 79 0 60 - 1 30 mg/dL    Glucose 82 65 - 140 mg/dL    Calcium 8 2 (L) 8 3 - 10 1 mg/dL    eGFR 90 ml/min/1 73sq m   Blood gas, arterial   Result Value Ref Range    pH, Arterial 7 500 (H) 7 350 - 7 450    pCO2, Arterial 29 6 (LL) 36 0 - 44 0 mm Hg    pO2, Arterial 101 6 75 0 - 129 0 mm Hg    HCO3, Arterial 22 6 22 0 - 28 0 mmol/L    Base Excess, Arterial 0 3 mmol/L    O2 Content, Arterial 18 0 16 0 - 23 0 mL/dL    O2 HGB,Arterial  96 2 94 0 - 97 0 %    SOURCE Radial, Left     NORAH TEST Yes     ROOM AIR FIO2 21 %   Ammonia   Result Value Ref Range    Ammonia <10 (L) 11 - 35 umol/L   ECG 12 lead   Result Value Ref Range    Ventricular Rate 88 BPM    Atrial Rate 88 BPM    HI Interval 110 ms    QRSD Interval 102 ms    QT Interval 388 ms    QTC Interval 469 ms    P Axis 48 degrees    QRS Axis 23 degrees    T Wave Axis 52 degrees     Weight (last 2 days)     Date/Time   Weight    10/11/18 1126  68 4 (150 8)            Body mass index is 25 09 kg/m²  BP      Temp      Pulse     Resp      SpO2        Vitals:    10/11/18 1126   Weight: 68 4 kg (150 lb 12 8 oz)     Vitals:    10/11/18 1126   Weight: 68 4 kg (150 lb 12 8 oz)     Vitals:    10/11/18 1126   BP: 138/80   BP Location: Right arm   Patient Position: Sitting   Cuff Size: Standard   Pulse: 72   Resp: 16   SpO2: 98%   Weight: 68 4 kg (150 lb 12 8 oz)   Height: 5' 5" (1 651 m)       Transitional Care Management Review:  Carlos Garcia is a 68 y o  male here for TCM follow up       During the TCM phone call patient stated:    Date and time hospital follow up call was made:  10/9/2018  2:38 PM  Hospital care reviewed:  Records reviewed  Patient was hopsitalized at:  [address] [place] [address]  Date of admission:  10/6/18  Date of discharge:  10/9/18  Diagnosis:  Acute encephalopathy  Disposition:  Home  Were the patients medicaitons reviewed and updated:  No  Current symptoms:  Dizziness, Weakness (Comment: Twitching)  Post hospital issues:  Reduced activity  Should patient be enrolled in anticoag monitoring?:  Yes  Scheduled for follow up?:  Yes  Patients specialists:  Neurologist  Neurologist's Name:  Kwadwo Parada  Did you obtain your prescribed medications:  Yes  Do you need help managing your perscriptions or medications:  Yes  I have advised the patient to call PCP with any new or worsening symptoms (please type in name along with any credentials):  Lewis Duke,   Are you recieving outpatient services:  No  Are you recieving home care services:  No  Are you using any community resources:  No  Have you fallen in the last 12 months:  Yes  How many times:  2  Interperter language line required?:  No  Counseling:  Family Petrona Arcos, DO

## 2018-10-12 NOTE — ASSESSMENT & PLAN NOTE
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

## 2018-10-12 NOTE — ASSESSMENT & PLAN NOTE
No suicidal ideation the patient does reports me low-dose Ativan 0 5 mg 1 tablet every 12 hours as needed is very helpful he does not want to see Psychiatry he is tolerating the medication very well

## 2018-10-12 NOTE — ASSESSMENT & PLAN NOTE
Transition care management visit regarding recent hospitalization for acute confusion state fortunately no major etiology was found during the hospitalization the patient did have tremors he will be following up with the movement [address] [place] [address] Neurology

## 2018-10-19 ENCOUNTER — APPOINTMENT (OUTPATIENT)
Dept: LAB | Facility: CLINIC | Age: 73
End: 2018-10-19
Payer: MEDICARE

## 2018-10-19 ENCOUNTER — ANTICOAG VISIT (OUTPATIENT)
Dept: INTERNAL MEDICINE CLINIC | Facility: CLINIC | Age: 73
End: 2018-10-19

## 2018-10-23 ENCOUNTER — ANTICOAG VISIT (OUTPATIENT)
Dept: INTERNAL MEDICINE CLINIC | Facility: CLINIC | Age: 73
End: 2018-10-23

## 2018-10-24 ENCOUNTER — ANTICOAG VISIT (OUTPATIENT)
Dept: INTERNAL MEDICINE CLINIC | Facility: CLINIC | Age: 73
End: 2018-10-24

## 2018-10-24 ENCOUNTER — APPOINTMENT (OUTPATIENT)
Dept: LAB | Facility: CLINIC | Age: 73
End: 2018-10-24
Payer: MEDICARE

## 2018-10-24 DIAGNOSIS — I82.4Z9 ACUTE VENOUS EMBOLISM AND THROMBOSIS OF DEEP VESSELS OF DISTAL LOWER EXTREMITY, UNSPECIFIED LATERALITY (HCC): Primary | ICD-10-CM

## 2018-10-31 ENCOUNTER — ANTICOAG VISIT (OUTPATIENT)
Dept: INTERNAL MEDICINE CLINIC | Facility: CLINIC | Age: 73
End: 2018-10-31

## 2018-10-31 ENCOUNTER — APPOINTMENT (OUTPATIENT)
Dept: LAB | Facility: CLINIC | Age: 73
End: 2018-10-31
Payer: MEDICARE

## 2018-10-31 LAB
INR PPP: 3.65 (ref 0.86–1.17)
PROTHROMBIN TIME: 35.2 SECONDS (ref 11.8–14.2)

## 2018-10-31 PROCEDURE — 85610 PROTHROMBIN TIME: CPT

## 2018-10-31 PROCEDURE — 36415 COLL VENOUS BLD VENIPUNCTURE: CPT

## 2018-11-07 ENCOUNTER — TRANSCRIBE ORDERS (OUTPATIENT)
Dept: LAB | Facility: CLINIC | Age: 73
End: 2018-11-07

## 2018-11-07 ENCOUNTER — APPOINTMENT (OUTPATIENT)
Dept: LAB | Facility: CLINIC | Age: 73
End: 2018-11-07
Payer: MEDICARE

## 2018-11-07 ENCOUNTER — ANTICOAG VISIT (OUTPATIENT)
Dept: INTERNAL MEDICINE CLINIC | Facility: CLINIC | Age: 73
End: 2018-11-07

## 2018-11-07 NOTE — PROGRESS NOTES
Per Dr Mira Longoria pt is to hold Warfarin tonight then 2 mg daily  Recheck PT/INR 11/12/18    I called the pt he takes his Warfarin in the AM   Will hold 11/8/18 then begin 2 mg daily  Recheck 11/12/18   Chela

## 2018-11-08 ENCOUNTER — OFFICE VISIT (OUTPATIENT)
Dept: NEUROLOGY | Facility: CLINIC | Age: 73
End: 2018-11-08
Payer: MEDICARE

## 2018-11-08 VITALS — BODY MASS INDEX: 25.09 KG/M2 | DIASTOLIC BLOOD PRESSURE: 80 MMHG | SYSTOLIC BLOOD PRESSURE: 126 MMHG | HEIGHT: 65 IN

## 2018-11-08 DIAGNOSIS — F44.4 FUNCTIONAL NEUROLOGICAL SYMPTOM DISORDER WITH ABNORMAL MOVEMENT: Primary | ICD-10-CM

## 2018-11-08 PROCEDURE — 99215 OFFICE O/P EST HI 40 MIN: CPT | Performed by: PSYCHIATRY & NEUROLOGY

## 2018-11-08 NOTE — LETTER
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  The patient is a 71-year-old man with past medical history significant for psychiatric disease who presents as a hospital follow-up for an episode of confusion and various abnormal movements  On examination the patient has a variable tremor on the right more so than the left which is completely extinguished by competing motor tasks in the opposite hand  When describing the diagnosis of functional neurologic disorder the patient developed other abnormal movements including myoclonic-like jerks of his trunk  The patient's gait is unremarkable when walking from the examination room to check out however during formal gait testing he staggers but always catches himself  The patient also appeared somewhat somnolent during our examination today for unclear reasons  I do not see any neuroleptics on his medication list or prior medicines to raise concern for a tardive syndrome  I gave the patient and his wife resources about functional neurologic disorders  He will continue follow-up with his primary care provider  There are no diagnoses linked to this encounter  Total time spent today 50 minutes  Greater than 50% of total time was spent with the patient and / or family counseling and / or coordination of care      Subjective:     Patient ID: Alton Lim is a 68 y o  male     I had the pleasure of seeing your patient, Kathy Moreau in the Movement Disorders Clinic at the [facility]  Kathy Moreau is a 68 y o  man with a history of Chron's diease, protein S deficiency, DVT on coumadin, and chronic dizziness who presented to the ED on 10/7 with significant confusion, repeating himself  He reportedly has a complex psychiatric history  He also has a history of possible myoclonic jerks dating back to his 25s and 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 when he gets upset  He underwent and MRI of the brain and EEG, neither of which were revealing  His symptoms were believed to be a functional neurologic disorder and he was scheduled for this visit for a second opinion  He was also seen by psychiatry while in house and started on Haldol BID which reportedly made his symptoms worse and was DCed  Today the history is provided primarily by the patient's wife  The patient appeared somnolent at times during our meeting and would describe his prior seizure-like episodes in response to questioning about unrelated symptoms  The patient's tremor appears to have started about 2 years ago  It is more obvious on the right than the left  It appears to be present when he is at rest and disappears when he is engaged in other activities  He has chronic dizziness  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, dating back to his 25s  Regarding motor symptoms:   Tremor: R>L  More at rest    Slowness: no   Stiffness: no   Changes in facial expression: some damage to CN7 on right  Changes in gait: no issues   Falls: no "I always catch myself"     Regarding non-motor symptoms:   Anosmia: no   Constipation: colostomy bag - Crohn's   Lightheadedness: yes sometimes  Changes in Mood: "terrible" poor temper  No neuroleptics  Trouble with sleep:     REM behavior Disorder: talking, myoclonic jerks  Alcohol: No, prior EtOH about  15-20 years ago  The following portions of the patient's history were reviewed and updated as appropriate: allergies, current medications, past family history, past medical history, past social history, past surgical history and problem list       Objective:      /80 (BP Location: Left arm, Patient Position: Sitting, Cuff Size: Standard)   Ht 5' 5" (1 651 m)   BMI 25 09 kg/m²          Physical Exam    Neurological Exam    GENERAL MEDICAL EXAMINATION:  General appearance:   Occasionally sleepy appearing  Eyes: Sclera are non-injected  Ears, nose, Mouth, Throat: Mucous membranes are moist    Resp: Breathing comfortably on RA   Musculoskeletal: No evidence of deformities  No contractures  No Edema  Skin: No visible rashes  Warm and well perfused  Psych: normal and appropriate affect     NEUROLOGICAL EXAMINATION:  Mental Status: Alert and oriented to person place and time  Able to relate history without difficulty  Attentive: able to name ALEX backward slowly  Language is fluent and appropriate with normal prosody  There were no paraphasic errors  Speech was not dysarthric  There was no pallilalia noted  Able to follow both midline and appendicular commands  Cranial Nerves:  II:  pupils equally round and briskly reactive to light, both directly and consensually  III-IV-VI: Full and conjugate, extra-ocular eye movements in all directions of gaze  Intact smooth pursuit  VII:   Reduced movement in the right face  IX-X: Palate elevates symmetrically  XII: No tongue deviation or fasciculations    Motor: Overall   normal spontaneous movements  Normal muscle bulk throughout  Normal axial and appendicular tone in the arms, paratonia in the legs  No hypomimia was noted  Speech was not hypophonic  There was no vocal tremor or head tremor  Rapid alternating movements revealed no bradykinesia, decrement or hesitations  There were no dyskinesias or dystonia noted  No pronator drift or rebound   No asterixis or myoclonus noted     Variable tremor is noted at rest, with posture and less so with action  When asked to copy various motor tasks with 1 hand the tremor in the opposite hand, both tested at rest and with sustained posture, completely resolves  The tremor in that hand will only reemerge when the patient pauses in the competing motor task  Strength:   Delt Bi Tri We FE FF    C5 C6 C7 C6 C7 C8/T1   R 5 5 5 5 5 5   L 5 5 5 5 5 5      IP Quad [place] TA    L2 L3 L4-S1 L4   R 5 5 5 5   L 5 5 5 5     Coordination: Finger to nose without dysmetria bilaterally  Gait: Able to rise from a chair without the use of arms  Posture was normal  Normal arm swing  During formal gait testing the patient's staggers back and forth across the killian but never falls  When walking to check out his gait is quite normal     Review of Systems   Constitutional: Positive for appetite change (does not eat much)  Negative for fever  HENT: Positive for hearing loss (Left ear)  Negative for tinnitus, trouble swallowing and voice change  Eyes: Positive for visual disturbance (Left eye)  Negative for photophobia and pain  Respiratory: Negative  Negative for shortness of breath  Cardiovascular: Negative  Negative for palpitations  Gastrointestinal: Positive for nausea (with anxiety attacks)  Negative for vomiting  Endocrine: Positive for cold intolerance (Always cold)  Negative for heat intolerance  Genitourinary: Negative  Negative for dysuria, frequency and urgency  Musculoskeletal: Positive for back pain and neck pain (numb on right side)  Negative for myalgias  Skin: Negative  Negative for rash  Neurological: Positive for dizziness, tremors, light-headedness and numbness (neck)  Negative for seizures, syncope, facial asymmetry, speech difficulty, weakness and headaches  Hematological: Bruises/bleeds easily  Psychiatric/Behavioral: Positive for confusion and sleep disturbance (Night terrors)  Negative for hallucinations  The patient is nervous/anxious           Falls

## 2018-11-08 NOTE — PROGRESS NOTES
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  The patient is a 52-year-old man with past medical history significant for psychiatric disease who presents as a hospital follow-up for an episode of confusion and various abnormal movements  On examination the patient has a variable tremor on the right more so than the left which is completely extinguished by competing motor tasks in the opposite hand  When describing the diagnosis of functional neurologic disorder the patient developed other abnormal movements including myoclonic-like jerks of his trunk  The patient's gait is unremarkable when walking from the examination room to check out however during formal gait testing he staggers but always catches himself  The patient also appeared somewhat somnolent during our examination today for unclear reasons  I do not see any neuroleptics on his medication list or prior medicines to raise concern for a tardive syndrome  I gave the patient and his wife resources about functional neurologic disorders  He will continue follow-up with his primary care provider  There are no diagnoses linked to this encounter  Total time spent today 50 minutes  Greater than 50% of total time was spent with the patient and / or family counseling and / or coordination of care      Subjective:     Patient ID: Carlos Garcia is a 68 y o  male  I had the pleasure of seeing your patient, Carlos Garcia in the [facility] at the [address] [place] [address],[address] for Neuroscience  Carlos Garcia is a 68 y o  man with a history of Chron's diease, protein S deficiency, DVT on coumadin, and chronic dizziness who presented to the ED on 10/7 with significant confusion, repeating himself  He reportedly has a complex psychiatric history  He also has a history of possible myoclonic jerks dating back to his 25s and 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 when he gets upset   He underwent and MRI of the brain and EEG, neither of which were revealing  His symptoms were believed to be a functional neurologic disorder and he was scheduled for this visit for a second opinion  He was also seen by psychiatry while in house and started on Haldol BID which reportedly made his symptoms worse and was DCed  Today the history is provided primarily by the patient's wife  The patient appeared somnolent at times during our meeting and would describe his prior seizure-like episodes in response to questioning about unrelated symptoms  The patient's tremor appears to have started about 2 years ago  It is more obvious on the right than the left  It appears to be present when he is at rest and disappears when he is engaged in other activities  He has chronic dizziness  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, dating back to his 25s  Regarding motor symptoms:   Tremor: R>L  More at rest    Slowness: no   Stiffness: no   Changes in facial expression: some damage to CN7 on right  Changes in gait: no issues   Falls: no "I always catch myself"     Regarding non-motor symptoms:   Anosmia: no   Constipation: colostomy bag - Crohn's   Lightheadedness: yes sometimes  Changes in Mood: "terrible" poor temper  No neuroleptics  Trouble with sleep:     REM behavior Disorder: talking, myoclonic jerks  Alcohol: No, prior EtOH about  15-20 years ago  The following portions of the patient's history were reviewed and updated as appropriate: allergies, current medications, past family history, past medical history, past social history, past surgical history and problem list       Objective:      /80 (BP Location: Left arm, Patient Position: Sitting, Cuff Size: Standard)   Ht 5' 5" (1 651 m)   BMI 25 09 kg/m²         Physical Exam    Neurological Exam    GENERAL MEDICAL EXAMINATION:  General appearance:   Occasionally sleepy appearing  Eyes: Sclera are non-injected     Ears, nose, Mouth, Throat: Mucous membranes are moist    Resp: Breathing comfortably on RA   Musculoskeletal: No evidence of deformities  No contractures  No Edema  Skin: No visible rashes  Warm and well perfused  Psych: normal and appropriate affect     NEUROLOGICAL EXAMINATION:  Mental Status: Alert and oriented to person place and time  Able to relate history without difficulty  Attentive: able to name ALEX backward slowly  Language is fluent and appropriate with normal prosody  There were no paraphasic errors  Speech was not dysarthric  There was no pallilalia noted  Able to follow both midline and appendicular commands  Cranial Nerves:  II:  pupils equally round and briskly reactive to light, both directly and consensually  III-IV-VI: Full and conjugate, extra-ocular eye movements in all directions of gaze  Intact smooth pursuit  VII:   Reduced movement in the right face  IX-X: Palate elevates symmetrically  XII: No tongue deviation or fasciculations    Motor: Overall   normal spontaneous movements  Normal muscle bulk throughout  Normal axial and appendicular tone in the arms, paratonia in the legs  No hypomimia was noted  Speech was not hypophonic  There was no vocal tremor or head tremor  Rapid alternating movements revealed no bradykinesia, decrement or hesitations  There were no dyskinesias or dystonia noted  No pronator drift or rebound  No asterixis or myoclonus noted  Variable tremor is noted at rest, with posture and less so with action  When asked to copy various motor tasks with 1 hand the tremor in the opposite hand, both tested at rest and with sustained posture, completely resolves  The tremor in that hand will only reemerge when the patient pauses in the competing motor task      Strength:   Delt Bi Tri We FE FF    C5 C6 C7 C6 C7 C8/T1   R 5 5 5 5 5 5   L 5 5 5 5 5 5      IP Quad Hamst TA    L2 L3 L4-S1 L4   R 5 5 5 5   L 5 5 5 5     Coordination: Finger to nose without dysmetria bilaterally  Gait: Able to rise from a chair without the use of arms  Posture was normal  Normal arm swing  During formal gait testing the patient's staggers back and forth across the killian but never falls  When walking to check out his gait is quite normal     Review of Systems   Constitutional: Positive for appetite change (does not eat much)  Negative for fever  HENT: Positive for hearing loss (Left ear)  Negative for tinnitus, trouble swallowing and voice change  Eyes: Positive for visual disturbance (Left eye)  Negative for photophobia and pain  Respiratory: Negative  Negative for shortness of breath  Cardiovascular: Negative  Negative for palpitations  Gastrointestinal: Positive for nausea (with anxiety attacks)  Negative for vomiting  Endocrine: Positive for cold intolerance (Always cold)  Negative for heat intolerance  Genitourinary: Negative  Negative for dysuria, frequency and urgency  Musculoskeletal: Positive for back pain and neck pain (numb on right side)  Negative for myalgias  Skin: Negative  Negative for rash  Neurological: Positive for dizziness, tremors, light-headedness and numbness (neck)  Negative for seizures, syncope, facial asymmetry, speech difficulty, weakness and headaches  Hematological: Bruises/bleeds easily  Psychiatric/Behavioral: Positive for confusion and sleep disturbance (Night terrors)  Negative for hallucinations  The patient is nervous/anxious           Falls

## 2018-11-08 NOTE — ASSESSMENT & PLAN NOTE
The patient is a 73-year-old man with past medical history significant for psychiatric disease who presents as a hospital follow-up for an episode of confusion and various abnormal movements  On examination the patient has a variable tremor on the right more so than the left which is completely extinguished by competing motor tasks in the opposite hand  When describing the diagnosis of functional neurologic disorder the patient developed other abnormal movements including myoclonic-like jerks of his trunk  The patient's gait is unremarkable when walking from the examination room to check out however during formal gait testing he staggers but always catches himself  The patient also appeared somewhat somnolent during our examination today for unclear reasons  I do not see any neuroleptics on his medication list or prior medicines to raise concern for a tardive syndrome  I gave the patient and his wife resources about functional neurologic disorders  He will continue follow-up with his primary care provider

## 2018-11-08 NOTE — PATIENT INSTRUCTIONS
Functional neurologic disorders (FND) can mimic symptoms of other neurologic diseases, but can be diagnosed based on a typical history and features on examination  When the FND symptoms are abnormal movements we call this a functional movement disorder (FMD)  FND symptoms can be variable over time and are often associated with additional symptoms such as weakness, numbness, fatigue, pain, poor concentration or memory difficulties  Diagnostic studies such as MRIs of the brain and spine, nerve conduction studies, EEGs and laboratory studies are typically normal in do not show brain lesions or other abnormal findings responsible for your disorder  Different parts of the nervous system (brain, spinal cord, nerve roots, peripheral nerves and muscles) are still in working order, but do not function well together  FND can have a chronic course and lead to disability, however it is possible to retrain the nervous system and restore normal function  The sooner the treatments starts after onset of symptoms, the better the prognosis, but even patients who had symptoms for many years have the potential to get better  For some people, psychological stress can manifest as physical symptoms (including an FND) instead of psychological symptoms, however FNDs are not always in the setting of increased stress  The main treatments for FNDs are physical therapy, occupational therapy, speech therapy and psychotherapy - specifically cognitive-behavioral therapy  Helpful websites:  www neurosymptoms  org  www fndhope  org    A helpful book:  Overcoming Functional Neurological Symptoms: A Five Areas Approach   by Susan Murrieta Debbe Lah, Gerardo Mccray     An article about a  with a functional movement disorder who made a full recovery:  His baffling illness solved, Gerardo Crock back on [facility] with 'No limitations' [email or web address]-back-[email or web address] sale to [email or web address] html    Here are a few websites on which you can find certified cognitive behavioral therapists:  Academyofct  org (Academy of cognitive therapists)  Elaina Hilliard (Anxiety and depression association of [facility])  Abct  org (associated for behavioral and cognitive therapy)    Mindfulness based stress reduction (MBSR)

## 2018-11-12 ENCOUNTER — APPOINTMENT (OUTPATIENT)
Dept: LAB | Facility: CLINIC | Age: 73
End: 2018-11-12
Payer: MEDICARE

## 2018-11-12 ENCOUNTER — ANTICOAG VISIT (OUTPATIENT)
Dept: INTERNAL MEDICINE CLINIC | Facility: CLINIC | Age: 73
End: 2018-11-12

## 2018-11-16 ENCOUNTER — ANTICOAG VISIT (OUTPATIENT)
Dept: INTERNAL MEDICINE CLINIC | Facility: CLINIC | Age: 73
End: 2018-11-16

## 2018-11-19 ENCOUNTER — ANTICOAG VISIT (OUTPATIENT)
Dept: INTERNAL MEDICINE CLINIC | Facility: CLINIC | Age: 73
End: 2018-11-19

## 2018-11-19 ENCOUNTER — APPOINTMENT (OUTPATIENT)
Dept: LAB | Facility: CLINIC | Age: 73
End: 2018-11-19
Payer: MEDICARE

## 2018-11-20 DIAGNOSIS — I10 ESSENTIAL HYPERTENSION: Primary | ICD-10-CM

## 2018-11-20 RX ORDER — AMLODIPINE BESYLATE 5 MG/1
TABLET ORAL
Qty: 90 TABLET | Refills: 3 | Status: SHIPPED | OUTPATIENT
Start: 2018-11-20 | End: 2019-11-17 | Stop reason: SDUPTHER

## 2018-11-23 ENCOUNTER — APPOINTMENT (OUTPATIENT)
Dept: LAB | Facility: CLINIC | Age: 73
End: 2018-11-23
Payer: MEDICARE

## 2018-11-23 ENCOUNTER — ANTICOAG VISIT (OUTPATIENT)
Dept: INTERNAL MEDICINE CLINIC | Facility: CLINIC | Age: 73
End: 2018-11-23

## 2018-11-23 LAB
ALBUMIN SERPL BCP-MCNC: 3.9 G/DL (ref 3.5–5)
ALP SERPL-CCNC: 69 U/L (ref 46–116)
ALT SERPL W P-5'-P-CCNC: 25 U/L (ref 12–78)
ANION GAP SERPL CALCULATED.3IONS-SCNC: 10 MMOL/L (ref 4–13)
AST SERPL W P-5'-P-CCNC: 18 U/L (ref 5–45)
BILIRUB SERPL-MCNC: 0.9 MG/DL (ref 0.2–1)
BUN SERPL-MCNC: 11 MG/DL (ref 5–25)
CALCIUM SERPL-MCNC: 9.1 MG/DL (ref 8.3–10.1)
CHLORIDE SERPL-SCNC: 107 MMOL/L (ref 100–108)
CHOLEST SERPL-MCNC: 161 MG/DL (ref 50–200)
CO2 SERPL-SCNC: 28 MMOL/L (ref 21–32)
CREAT SERPL-MCNC: 0.91 MG/DL (ref 0.6–1.3)
GFR SERPL CREATININE-BSD FRML MDRD: 84 ML/MIN/1.73SQ M
GLUCOSE P FAST SERPL-MCNC: 97 MG/DL (ref 65–99)
HDLC SERPL-MCNC: 62 MG/DL (ref 40–60)
LDLC SERPL CALC-MCNC: 81 MG/DL (ref 0–100)
POTASSIUM SERPL-SCNC: 4.5 MMOL/L (ref 3.5–5.3)
PROT SERPL-MCNC: 7.2 G/DL (ref 6.4–8.2)
SODIUM SERPL-SCNC: 145 MMOL/L (ref 136–145)
TRIGL SERPL-MCNC: 91 MG/DL

## 2018-11-23 PROCEDURE — 80061 LIPID PANEL: CPT

## 2018-11-23 PROCEDURE — 80053 COMPREHEN METABOLIC PANEL: CPT

## 2018-11-29 ENCOUNTER — APPOINTMENT (OUTPATIENT)
Dept: LAB | Facility: CLINIC | Age: 73
End: 2018-11-29
Payer: MEDICARE

## 2018-11-29 DIAGNOSIS — I82.4Z9 ACUTE VENOUS EMBOLISM AND THROMBOSIS OF DEEP VESSELS OF DISTAL LOWER EXTREMITY, UNSPECIFIED LATERALITY (HCC): ICD-10-CM

## 2018-11-29 LAB
INR PPP: 2.36 (ref 0.86–1.17)
PROTHROMBIN TIME: 25.1 SECONDS (ref 11.8–14.2)

## 2018-11-29 PROCEDURE — 85610 PROTHROMBIN TIME: CPT

## 2018-11-29 PROCEDURE — 36415 COLL VENOUS BLD VENIPUNCTURE: CPT

## 2018-11-30 ENCOUNTER — ANTICOAG VISIT (OUTPATIENT)
Dept: INTERNAL MEDICINE CLINIC | Facility: CLINIC | Age: 73
End: 2018-11-30

## 2018-12-06 ENCOUNTER — OFFICE VISIT (OUTPATIENT)
Dept: INTERNAL MEDICINE CLINIC | Facility: CLINIC | Age: 73
End: 2018-12-06
Payer: MEDICARE

## 2018-12-06 ENCOUNTER — ANTICOAG VISIT (OUTPATIENT)
Dept: INTERNAL MEDICINE CLINIC | Facility: CLINIC | Age: 73
End: 2018-12-06

## 2018-12-06 ENCOUNTER — APPOINTMENT (OUTPATIENT)
Dept: LAB | Facility: CLINIC | Age: 73
End: 2018-12-06
Payer: MEDICARE

## 2018-12-06 VITALS
TEMPERATURE: 97.7 F | OXYGEN SATURATION: 98 % | RESPIRATION RATE: 16 BRPM | HEART RATE: 66 BPM | DIASTOLIC BLOOD PRESSURE: 78 MMHG | SYSTOLIC BLOOD PRESSURE: 140 MMHG | WEIGHT: 153.6 LBS | BODY MASS INDEX: 25.56 KG/M2

## 2018-12-06 DIAGNOSIS — I82.4Z9 ACUTE VENOUS EMBOLISM AND THROMBOSIS OF DEEP VESSELS OF DISTAL LOWER EXTREMITY, UNSPECIFIED LATERALITY (HCC): ICD-10-CM

## 2018-12-06 DIAGNOSIS — Z11.59 ENCOUNTER FOR HEPATITIS C SCREENING TEST FOR LOW RISK PATIENT: ICD-10-CM

## 2018-12-06 DIAGNOSIS — I10 ESSENTIAL HYPERTENSION: Chronic | ICD-10-CM

## 2018-12-06 DIAGNOSIS — F41.9 ANXIETY: ICD-10-CM

## 2018-12-06 DIAGNOSIS — R25.1 TREMOR: ICD-10-CM

## 2018-12-06 DIAGNOSIS — Z23 NEED FOR PNEUMOCOCCAL VACCINATION: Primary | ICD-10-CM

## 2018-12-06 DIAGNOSIS — I82.4Y9 DEEP VEIN THROMBOSIS (DVT) OF PROXIMAL LOWER EXTREMITY, UNSPECIFIED CHRONICITY, UNSPECIFIED LATERALITY (HCC): ICD-10-CM

## 2018-12-06 LAB
INR PPP: 2.67 (ref 0.86–1.17)
PROTHROMBIN TIME: 27.6 SECONDS (ref 11.8–14.2)

## 2018-12-06 PROCEDURE — 36415 COLL VENOUS BLD VENIPUNCTURE: CPT

## 2018-12-06 PROCEDURE — 99214 OFFICE O/P EST MOD 30 MIN: CPT | Performed by: INTERNAL MEDICINE

## 2018-12-06 PROCEDURE — 90732 PPSV23 VACC 2 YRS+ SUBQ/IM: CPT

## 2018-12-06 PROCEDURE — 86803 HEPATITIS C AB TEST: CPT

## 2018-12-06 PROCEDURE — 85610 PROTHROMBIN TIME: CPT

## 2018-12-06 PROCEDURE — G0009 ADMIN PNEUMOCOCCAL VACCINE: HCPCS

## 2018-12-06 RX ORDER — LORAZEPAM 0.5 MG/1
0.25 TABLET ORAL EVERY 12 HOURS PRN
Qty: 30 TABLET | Refills: 0 | Status: SHIPPED | OUTPATIENT
Start: 2018-12-06 | End: 2019-06-20 | Stop reason: SDUPTHER

## 2018-12-07 LAB — HCV AB SER QL: NORMAL

## 2018-12-09 PROBLEM — I82.4Y9 DEEP VEIN THROMBOSIS (DVT) OF PROXIMAL LOWER EXTREMITY (HCC): Status: ACTIVE | Noted: 2018-12-09

## 2018-12-09 PROBLEM — Z11.59 ENCOUNTER FOR HEPATITIS C SCREENING TEST FOR LOW RISK PATIENT: Status: ACTIVE | Noted: 2018-12-09

## 2018-12-09 PROBLEM — I82.409 DVT (DEEP VENOUS THROMBOSIS) (HCC): Status: ACTIVE | Noted: 2018-12-09

## 2018-12-09 PROBLEM — Z23 NEED FOR PNEUMOCOCCAL VACCINATION: Status: ACTIVE | Noted: 2018-12-09

## 2018-12-09 NOTE — ASSESSMENT & PLAN NOTE
Patient does appear to be anxious today his blood pressure is relatively stable will continue with amlodipine 5 mg once daily

## 2018-12-09 NOTE — PROGRESS NOTES
Assessment/Plan:    HTN (hypertension)  Patient does appear to be anxious today his blood pressure is relatively stable will continue with amlodipine 5 mg once daily    Deep vein thrombosis (DVT) of proximal lower extremity (HCC)  Currently stable he remains on Coumadin anticoagulated with going for routine INRs currently stable    Anxiety  No suicidal ideation he currently is on Remeron 30 mg once daily he has recently been to [facility] and the tremors/body tremors were felt to be secondary to somatic manifestations of his anxiety  It is recommended that the patient undergo counseling regarding this at this point time the patient declines  He will consider and if he were to change his mind he can call back for a referral    Encounter for hepatitis C screening test for low risk patient  Counseled, will check hepatitis C antibody         Problem List Items Addressed This Visit        Cardiovascular and Mediastinum    HTN (hypertension) (Chronic)     Patient does appear to be anxious today his blood pressure is relatively stable will continue with amlodipine 5 mg once daily         Deep vein thrombosis (DVT) of proximal lower extremity (HCC)     Currently stable he remains on Coumadin anticoagulated with going for routine INRs currently stable            Other    Anxiety     No suicidal ideation he currently is on Remeron 30 mg once daily he has recently been to [facility] and the tremors/body tremors were felt to be secondary to somatic manifestations of his anxiety  It is recommended that the patient undergo counseling regarding this at this point time the patient declines    He will consider and if he were to change his mind he can call back for a referral         Relevant Medications    LORazepam (ATIVAN) 0 5 mg tablet    Tremor    Need for pneumococcal vaccination - Primary    Relevant Orders    Pneumococcal polysaccharide vaccine 23-valent greater than or equal to 1yo subcutaneous/IM (Completed)    Encounter for hepatitis C screening test for low risk patient     Counseled, will check hepatitis C antibody         Relevant Orders    Hepatitis C antibody (Completed)          Return to office 6  months  call if any problems  Subjective:      Patient ID: Prateek Xavier is a 68 y o  male  HPI 71-year old male coming in for a follow up visit regarding anxiety, essential hypertension, tremor, DVT; The patient reports me compliant taking medications without untoward side effects the  The patient is here to review his medical condition, update me on the medical condition and the patient reports me no hospitalizations and no ER visits  Patient has been to [facility] and found to have a tremor related to his anxiety  They did recommend that he go to a specialized counselor for anxiety/tremor the patient declines currently  No suicidal ideation  It is felt that his tremor is a some manic manifestation of his anxiety  Patient continues exercise routinely  The following portions of the patient's history were reviewed and updated as appropriate: allergies, current medications, past family history, past medical history, past social history, past surgical history and problem list     Review of Systems   Constitutional: Negative for activity change, appetite change and unexpected weight change  Eyes: Negative for visual disturbance  Respiratory: Negative for cough and shortness of breath  Cardiovascular: Negative for chest pain  Gastrointestinal: Negative for abdominal pain, diarrhea, nausea and vomiting  Neurological: Positive for tremors  Hematological: Negative for adenopathy  Psychiatric/Behavioral: Negative for suicidal ideas  The patient is nervous/anxious  Objective:                    No Follow-up on file        Allergies   Allergen Reactions    Duloxetine Other (See Comments)     Panic attacks    Other      Seasonal Past Medical History:   Diagnosis Date    Anxiety     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[facility])     [facility]'s disease ([facility] [address] )     Depression     DVT [facility]deep venous thrombosis) ([facility] [address] )     [place] (hard of hearing)     no hearing aids    Hypertension     Mass in neck     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[email or web address] )     Psychiatric disorder     depression, anger     Past Surgical History:   Procedure Laterality Date    COLON SURGERY      Partial colectomy and colostomy    ESOPHAGOGASTRODUODENOSCOPY N/A 4/5/2017    Procedure: ESOPHAGOGASTRODUODENOSCOPY (EGD); Surgeon: John Rios MD;  [facility]; Service:     EYE SURGERY Right     Cataract    KNEE SURGERY      RI EDG US EXAM SURGICAL ALTER STOM DUODENUM/JEJUNUM N/A 4/9/2018    Procedure: LINEAR ENDOSCOPIC U/S;  Surgeon: Skip Khoury MD;  [facility];   Service: Gastroenterology    RI EXC PAROTD,LAT LOBE,DISSECT 5TH NERV Right 8/8/2018    Procedure: PAROTIDECTOMY WITH FACIAL NERVE MONITOR AND FROZEN SECTION;  Surgeon: Claude Calkins, MD;  [facility];  Service: ENT    RI IMPACT TOOTH [address] COMP BONY N/A 8/8/2018    Procedure: EXTRACTION TEETH #15 and #30;  Surgeon: Hari South DDS;  [facility];  Service: Maxillofacial    RADICAL NECK DISSECTION N/A 8/8/2018    Procedure: SELECTIVE NECK DISSECTION;  Surgeon: Claude Calkins, MD;  [facility];  Service: ENT   [facility]       Current Outpatient Prescriptions on File Prior to Visit   Medication Sig Dispense Refill    amLODIPine (NORVASC) 5 mg tablet TAKE ONE TABLET BY MOUTH ONCE DAILY 90 tablet 3    fluticasone (FLONASE) 50 mcg/act nasal spray 1 spray into each nostril daily      gabapentin (NEURONTIN) 100 mg capsule Take 1 capsule (100 mg total) by mouth daily 90 capsule 1    Loratadine (CLARITIN) 10 MG CAPS Take by mouth daily as needed        meclizine (ANTIVERT) 25 mg tablet Take 1 tablet (25 mg total) by mouth 3 (three) times a day as needed for dizziness (Patient not taking: Reported on 11/8/2018 ) 30 tablet 2    Melatonin 10 MG TABS Take by mouth      mirtazapine (REMERON) 30 mg tablet Take 1 tablet (30 mg total) by mouth daily at bedtime 30 tablet 5    Multiple Vitamins-Minerals (MULTI FOR HIM 50+ PO) Take 1 tablet by mouth daily      ondansetron (ZOFRAN) 4 mg tablet Take 1 tablet (4 mg total) by mouth every 8 (eight) hours as needed for nausea or vomiting (Patient not taking: Reported on 11/8/2018 ) 20 tablet 0    warfarin (COUMADIN) 2 mg tablet TAKE ONE TABLET BY MOUTH ONCE DAILY 90 tablet 3     No current facility-administered medications on file prior to visit  Family History   Problem Relation Age of Onset    Heart disease Brother     Diverticulitis Mother      Social History     Social History    Marital status: /Civil Union     Spouse name: N/A    Number of children: N/A    Years of education: N/A     Occupational History    Not on file  Social History Main Topics    Smoking status: Former Smoker     Packs/day: 1 00     Years: 10 00     Quit date: 6/9/1981    Smokeless tobacco: Never Used      Comment: 50 years ago    Alcohol use No      Comment: hx etoh abuse pt states he quit 9 years ago    Drug use: No    Sexual activity: No     Other Topics Concern    Not on file     Social History Narrative    No narrative on file     Vitals:    12/06/18 1140   BP: 140/78   BP Location: Right arm   Patient Position: Sitting   Cuff Size: Standard   Pulse: 66   Resp: 16   Temp: 97 7 °F (36 5 °C)   TempSrc: Oral   SpO2: 98%   Weight: 69 7 kg (153 lb 9 6 oz)     Results for orders placed or performed in visit on 11/29/18   Protime-INR   Result Value Ref Range    Protime 25 1 (H) 11 8 - 14 2 seconds    INR 2 36 (H) 0 86 - 1 17     Weight (last 2 days)     None        Body mass index is 25 56 kg/m²    BP      Temp      Pulse     Resp      SpO2        Vitals:    12/06/18 1140   Weight: 69 7 kg (153 lb 9 6 oz)     Vitals:    12/06/18 1140   Weight: 69 7 kg (153 lb 9 6 oz)       /78 (BP Location: Right arm, Patient Position: Sitting, Cuff Size: Standard)   Pulse 66   Temp 97 7 °F (36 5 °C) (Oral)   Resp 16   Wt 69 7 kg (153 lb 9 6 oz)   SpO2 98%   BMI 25 56 kg/m²          Physical Exam   Constitutional: He appears well-developed and well-nourished  No distress  HENT:   Head: Normocephalic and atraumatic  Right Ear: External ear normal    Left Ear: External ear normal    Mouth/Throat: Oropharynx is clear and moist    Eyes: Pupils are equal, round, and reactive to light  Conjunctivae are normal  Right eye exhibits no discharge  Left eye exhibits no discharge  No scleral icterus  Neck: Neck supple  Cardiovascular: Normal rate, regular rhythm and normal heart sounds  Exam reveals no gallop and no friction rub  No murmur heard  Pulmonary/Chest: No respiratory distress  He has no wheezes  He has no rales  Abdominal: Soft  Bowel sounds are normal  He exhibits no distension and no mass  There is no tenderness  There is no rebound and no guarding  Musculoskeletal: He exhibits no edema or deformity  Lymphadenopathy:     He has no cervical adenopathy  Neurological: He is alert  Skin: He is not diaphoretic  Psychiatric: He has a normal mood and affect

## 2018-12-09 NOTE — ASSESSMENT & PLAN NOTE
No suicidal ideation he currently is on Remeron 30 mg once daily he has recently been to [facility] and the tremors/body tremors were felt to be secondary to somatic manifestations of his anxiety  It is recommended that the patient undergo counseling regarding this at this point time the patient declines    He will consider and if he were to change his mind he can call back for a referral

## 2018-12-19 ENCOUNTER — APPOINTMENT (OUTPATIENT)
Dept: LAB | Facility: CLINIC | Age: 73
End: 2018-12-19
Payer: MEDICARE

## 2018-12-19 ENCOUNTER — ANTICOAG VISIT (OUTPATIENT)
Dept: INTERNAL MEDICINE CLINIC | Facility: CLINIC | Age: 73
End: 2018-12-19

## 2018-12-27 ENCOUNTER — TRANSCRIBE ORDERS (OUTPATIENT)
Dept: LAB | Facility: HOSPITAL | Age: 73
End: 2018-12-27

## 2019-01-02 ENCOUNTER — ANTICOAG VISIT (OUTPATIENT)
Dept: INTERNAL MEDICINE CLINIC | Facility: CLINIC | Age: 74
End: 2019-01-02

## 2019-01-03 ENCOUNTER — ANTICOAG VISIT (OUTPATIENT)
Dept: INTERNAL MEDICINE CLINIC | Facility: CLINIC | Age: 74
End: 2019-01-03

## 2019-01-03 ENCOUNTER — APPOINTMENT (OUTPATIENT)
Dept: LAB | Facility: CLINIC | Age: 74
End: 2019-01-03
Payer: MEDICARE

## 2019-01-16 ENCOUNTER — ANTICOAG VISIT (OUTPATIENT)
Dept: INTERNAL MEDICINE CLINIC | Facility: CLINIC | Age: 74
End: 2019-01-16

## 2019-01-16 ENCOUNTER — APPOINTMENT (OUTPATIENT)
Dept: LAB | Facility: CLINIC | Age: 74
End: 2019-01-16
Payer: MEDICARE

## 2019-01-21 ENCOUNTER — ANTICOAG VISIT (OUTPATIENT)
Dept: INTERNAL MEDICINE CLINIC | Facility: CLINIC | Age: 74
End: 2019-01-21

## 2019-01-21 ENCOUNTER — APPOINTMENT (OUTPATIENT)
Dept: LAB | Facility: CLINIC | Age: 74
End: 2019-01-21
Payer: MEDICARE

## 2019-01-23 ENCOUNTER — ANTICOAG VISIT (OUTPATIENT)
Dept: INTERNAL MEDICINE CLINIC | Facility: CLINIC | Age: 74
End: 2019-01-23

## 2019-01-28 ENCOUNTER — APPOINTMENT (OUTPATIENT)
Dept: LAB | Facility: CLINIC | Age: 74
End: 2019-01-28
Payer: MEDICARE

## 2019-01-30 ENCOUNTER — ANTICOAG VISIT (OUTPATIENT)
Dept: INTERNAL MEDICINE CLINIC | Facility: CLINIC | Age: 74
End: 2019-01-30

## 2019-02-11 ENCOUNTER — ANTICOAG VISIT (OUTPATIENT)
Dept: INTERNAL MEDICINE CLINIC | Facility: CLINIC | Age: 74
End: 2019-02-11

## 2019-02-11 ENCOUNTER — APPOINTMENT (OUTPATIENT)
Dept: LAB | Facility: CLINIC | Age: 74
End: 2019-02-11
Payer: MEDICARE

## 2019-02-11 ENCOUNTER — TRANSCRIBE ORDERS (OUTPATIENT)
Dept: LAB | Facility: CLINIC | Age: 74
End: 2019-02-11

## 2019-02-25 ENCOUNTER — ANTICOAG VISIT (OUTPATIENT)
Dept: INTERNAL MEDICINE CLINIC | Facility: CLINIC | Age: 74
End: 2019-02-25

## 2019-02-25 ENCOUNTER — APPOINTMENT (OUTPATIENT)
Dept: LAB | Facility: CLINIC | Age: 74
End: 2019-02-25
Payer: MEDICARE

## 2019-03-04 DIAGNOSIS — F32.A DEPRESSION, UNSPECIFIED DEPRESSION TYPE: ICD-10-CM

## 2019-03-04 RX ORDER — MIRTAZAPINE 30 MG/1
TABLET, FILM COATED ORAL
Qty: 30 TABLET | Refills: 5 | Status: SHIPPED | OUTPATIENT
Start: 2019-03-04 | End: 2019-08-30 | Stop reason: SDUPTHER

## 2019-03-12 DIAGNOSIS — I82.4Z9 ACUTE VENOUS EMBOLISM AND THROMBOSIS OF DEEP VESSELS OF DISTAL LOWER EXTREMITY, UNSPECIFIED LATERALITY (HCC): Primary | ICD-10-CM

## 2019-03-18 ENCOUNTER — ANTICOAG VISIT (OUTPATIENT)
Dept: INTERNAL MEDICINE CLINIC | Facility: CLINIC | Age: 74
End: 2019-03-18

## 2019-03-18 ENCOUNTER — APPOINTMENT (OUTPATIENT)
Dept: LAB | Facility: CLINIC | Age: 74
End: 2019-03-18
Payer: MEDICARE

## 2019-03-18 ENCOUNTER — TRANSCRIBE ORDERS (OUTPATIENT)
Dept: LAB | Facility: CLINIC | Age: 74
End: 2019-03-18

## 2019-03-29 PROBLEM — D11.0 BENIGN PAROTID TUMOR: Status: ACTIVE | Noted: 2018-07-25

## 2019-04-05 DIAGNOSIS — G89.29 CHRONIC LOW BACK PAIN, UNSPECIFIED BACK PAIN LATERALITY, WITH SCIATICA PRESENCE UNSPECIFIED: ICD-10-CM

## 2019-04-05 DIAGNOSIS — M54.5 CHRONIC LOW BACK PAIN, UNSPECIFIED BACK PAIN LATERALITY, WITH SCIATICA PRESENCE UNSPECIFIED: ICD-10-CM

## 2019-04-05 RX ORDER — GABAPENTIN 100 MG/1
CAPSULE ORAL
Qty: 90 CAPSULE | Refills: 1 | Status: SHIPPED | OUTPATIENT
Start: 2019-04-05 | End: 2019-09-25

## 2019-04-15 ENCOUNTER — ANTICOAG VISIT (OUTPATIENT)
Dept: INTERNAL MEDICINE CLINIC | Facility: CLINIC | Age: 74
End: 2019-04-15

## 2019-04-15 ENCOUNTER — APPOINTMENT (OUTPATIENT)
Dept: LAB | Facility: CLINIC | Age: 74
End: 2019-04-15
Payer: MEDICARE

## 2019-04-17 ENCOUNTER — OFFICE VISIT (OUTPATIENT)
Dept: INTERNAL MEDICINE CLINIC | Facility: CLINIC | Age: 74
End: 2019-04-17
Payer: MEDICARE

## 2019-04-17 VITALS
SYSTOLIC BLOOD PRESSURE: 132 MMHG | RESPIRATION RATE: 16 BRPM | HEART RATE: 70 BPM | BODY MASS INDEX: 24.43 KG/M2 | WEIGHT: 146.6 LBS | HEIGHT: 65 IN | OXYGEN SATURATION: 97 % | DIASTOLIC BLOOD PRESSURE: 84 MMHG

## 2019-04-17 DIAGNOSIS — F33.1 MODERATE EPISODE OF RECURRENT MAJOR DEPRESSIVE DISORDER (HCC): ICD-10-CM

## 2019-04-17 DIAGNOSIS — F44.4 FUNCTIONAL NEUROLOGICAL SYMPTOM DISORDER WITH ABNORMAL MOVEMENT: ICD-10-CM

## 2019-04-17 DIAGNOSIS — Z13.1 SCREENING FOR DIABETES MELLITUS: ICD-10-CM

## 2019-04-17 DIAGNOSIS — F41.9 ANXIETY: ICD-10-CM

## 2019-04-17 DIAGNOSIS — R07.89 ATYPICAL CHEST PAIN: ICD-10-CM

## 2019-04-17 DIAGNOSIS — Z13.5 GLAUCOMA SCREENING: ICD-10-CM

## 2019-04-17 DIAGNOSIS — I10 ESSENTIAL HYPERTENSION: Primary | ICD-10-CM

## 2019-04-17 PROCEDURE — 1124F ACP DISCUSS-NO DSCNMKR DOCD: CPT | Performed by: INTERNAL MEDICINE

## 2019-04-17 PROCEDURE — 99214 OFFICE O/P EST MOD 30 MIN: CPT | Performed by: INTERNAL MEDICINE

## 2019-04-21 PROBLEM — Z13.5 GLAUCOMA SCREENING: Status: ACTIVE | Noted: 2019-04-21

## 2019-04-21 PROBLEM — R07.89 ATYPICAL CHEST PAIN: Status: ACTIVE | Noted: 2019-04-21

## 2019-04-21 PROBLEM — Z13.1 SCREENING FOR DIABETES MELLITUS: Status: ACTIVE | Noted: 2018-12-09

## 2019-04-29 ENCOUNTER — HOSPITAL ENCOUNTER (OUTPATIENT)
Dept: NON INVASIVE DIAGNOSTICS | Facility: CLINIC | Age: 74
Discharge: HOME | End: 2019-04-29
Payer: MEDICARE

## 2019-04-29 DIAGNOSIS — R07.89 ATYPICAL CHEST PAIN: ICD-10-CM

## 2019-04-29 PROCEDURE — 93018 CV STRESS TEST I&R ONLY: CPT | Performed by: INTERNAL MEDICINE

## 2019-04-29 PROCEDURE — 93016 CV STRESS TEST SUPVJ ONLY: CPT | Performed by: INTERNAL MEDICINE

## 2019-04-29 PROCEDURE — 93017 CV STRESS TEST TRACING ONLY: CPT

## 2019-05-05 DIAGNOSIS — O22.30 DVT (DEEP VEIN THROMBOSIS) IN PREGNANCY: ICD-10-CM

## 2019-05-06 RX ORDER — WARFARIN SODIUM 2 MG/1
TABLET ORAL
Qty: 90 TABLET | Refills: 3 | Status: SHIPPED | OUTPATIENT
Start: 2019-05-06 | End: 2020-04-20

## 2019-05-13 ENCOUNTER — APPOINTMENT (OUTPATIENT)
Dept: LAB | Facility: CLINIC | Age: 74
End: 2019-05-13
Payer: MEDICARE

## 2019-05-13 ENCOUNTER — TRANSCRIBE ORDERS (OUTPATIENT)
Dept: LAB | Facility: CLINIC | Age: 74
End: 2019-05-13

## 2019-05-13 ENCOUNTER — ANTICOAG VISIT (OUTPATIENT)
Dept: INTERNAL MEDICINE CLINIC | Facility: CLINIC | Age: 74
End: 2019-05-13

## 2019-06-10 ENCOUNTER — ANTICOAG VISIT (OUTPATIENT)
Dept: INTERNAL MEDICINE CLINIC | Facility: CLINIC | Age: 74
End: 2019-06-10

## 2019-06-10 ENCOUNTER — APPOINTMENT (OUTPATIENT)
Dept: LAB | Facility: CLINIC | Age: 74
End: 2019-06-10
Payer: MEDICARE

## 2019-06-20 DIAGNOSIS — F41.9 ANXIETY: ICD-10-CM

## 2019-06-24 RX ORDER — LORAZEPAM 0.5 MG/1
TABLET ORAL
Qty: 30 TABLET | Refills: 0 | Status: SHIPPED | OUTPATIENT
Start: 2019-06-24 | End: 2020-02-04 | Stop reason: SDUPTHER

## 2019-07-08 ENCOUNTER — APPOINTMENT (OUTPATIENT)
Dept: LAB | Facility: CLINIC | Age: 74
End: 2019-07-08
Payer: MEDICARE

## 2019-07-08 ENCOUNTER — ANTICOAG VISIT (OUTPATIENT)
Dept: INTERNAL MEDICINE CLINIC | Facility: CLINIC | Age: 74
End: 2019-07-08

## 2019-07-08 NOTE — PROGRESS NOTES
Per Dr Kiran Whittaker continue current dosage  I spoke with the patients wife and she will review with the patient

## 2019-08-05 ENCOUNTER — ANTICOAG VISIT (OUTPATIENT)
Dept: INTERNAL MEDICINE CLINIC | Facility: CLINIC | Age: 74
End: 2019-08-05

## 2019-08-05 ENCOUNTER — APPOINTMENT (OUTPATIENT)
Dept: LAB | Facility: CLINIC | Age: 74
End: 2019-08-05
Payer: MEDICARE

## 2019-08-05 ENCOUNTER — OFFICE VISIT (OUTPATIENT)
Dept: GASTROENTEROLOGY | Facility: CLINIC | Age: 74
End: 2019-08-05
Payer: MEDICARE

## 2019-08-05 VITALS
SYSTOLIC BLOOD PRESSURE: 129 MMHG | TEMPERATURE: 97.6 F | BODY MASS INDEX: 22.89 KG/M2 | WEIGHT: 137.4 LBS | HEIGHT: 65 IN | DIASTOLIC BLOOD PRESSURE: 71 MMHG | HEART RATE: 64 BPM

## 2019-08-05 DIAGNOSIS — K50.119 CROHN'S DISEASE OF LARGE INTESTINE WITH COMPLICATION (HCC): Chronic | ICD-10-CM

## 2019-08-05 DIAGNOSIS — R63.4 WEIGHT LOSS, ABNORMAL: Primary | ICD-10-CM

## 2019-08-05 DIAGNOSIS — I82.4Y9 DEEP VEIN THROMBOSIS (DVT) OF PROXIMAL LOWER EXTREMITY, UNSPECIFIED CHRONICITY, UNSPECIFIED LATERALITY (HCC): Primary | ICD-10-CM

## 2019-08-05 DIAGNOSIS — K86.89 DILATED PANCREATIC DUCT: ICD-10-CM

## 2019-08-05 PROCEDURE — 99214 OFFICE O/P EST MOD 30 MIN: CPT | Performed by: PHYSICIAN ASSISTANT

## 2019-08-05 NOTE — PROGRESS NOTES
[facility] - Outpatient Follow-up Note  Tera Call 68 y o  male MRN: [id number]  Encounter: [id number]          ASSESSMENT AND PLAN:      Diagnoses and all orders for this visit:    1  Weight loss, abnormal  -     Ambulatory referral to Nutrition Services; Future  2  Dilated pancreatic duct  -     MRI abdomen w wo contrast and mrcp; Future  3  Crohn's disease of large intestine with complication ([address] ) - s/p total colectomy with ileostomy - stable    Patient presents for annual follow up of the above  He is overdue for abdominal MRI to evaluated previously dilated PD  Will plan that today, particularly in the setting of anorexia and weight loss  Weight loss may be multifactorial and associated with excessive activity and poor diet  Discussed importance of dietary protein and advised referral to nutrition services for comprehensive support  His wife is very supportive of this plan  His ostomy output is unchanged and he feels well from a Crohn's standpoint  Will follow up in 3 months with GI physician unless needed sooner  ______________________________________________________________________    SUBJECTIVE:  Ceciliashane Chatman is a 71-year old male with a history of Crohn's disease, s/p total colectomy with ileostomy as well as previously noted pancreatic ductal dilatation of unclear etiology  He was last seen in this office in June 2018 and a repeat MRI was recommended at that time to follow the PD dilation, but this has not been done  He states he has been experiencing weight loss of approximately 20lbs in the past several months  He walks 15 miles daily (3 walks of 5 miles each) as he feels the walking helps to calm his anxiety  He eats a diet primarily of simple carbohydrates such as dry cereals, pretzels, and non-fat frozen yogurt  His wife states she has tried to get him to drink protein shakes but he does not do so  He states he does not have an appetite    He denies nausea/vomiting  He states his ostomy output is unchanged  He empties his bag 10x daily, but states he empties it anytime it has content  He does not let it get full  He denies bloody or black ostomy output  He denies fevers/chills or night sweats  He denies LE swelling aside from varicose veins which are chronic  REVIEW OF SYSTEMS IS significant for anxiety and intermittent tics/tremors which have been worked up and felt to be a somatic manifestation of his anxiety  The ROS is OTHERWISE NEGATIVE  Historical Information   Past Medical History:   Diagnosis Date    Anxiety     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)     [facility]     Depression     [facility]deep venous thrombosis) ([facility] )     [place]hard of hearing[place]     no hearing aids    Hypertension     Mass in neck     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[facility] )     Psychiatric disorder     depression, anger     Past Surgical History:   Procedure Laterality Date    COLON SURGERY      Partial colectomy and colostomy    COLONOSCOPY      ESOPHAGOGASTRODUODENOSCOPY N/A 4/5/2017    Procedure: ESOPHAGOGASTRODUODENOSCOPY (EGD); Surgeon: Carolin Finley MD;  [facility]; Service:     EYE SURGERY Right     Cataract    KNEE SURGERY      [place] EDG US EXAM SURGICAL ALTER STOM DUODENUM/JEJUNUM N/A 4/9/2018    Procedure: LINEAR ENDOSCOPIC U/S;  Surgeon: Lewis Marin MD;  [facility];   Service: Gastroenterology    [place] EXC PAROTD,LAT LOBE,DISSECT 5TH NERV Right 8/8/2018    Procedure: PAROTIDECTOMY WITH FACIAL NERVE MONITOR AND FROZEN SECTION;  Surgeon: Chong Quiros MD;  [facility];  Service: ENT    [place] IMPACT TOOTH [address] COMP BONY N/A 8/8/2018    Procedure: EXTRACTION TEETH #15 and #30;  Surgeon: Vincent Wilcox DDS;  [facility];  Service: Maxillofacial    RADICAL NECK DISSECTION N/A 8/8/2018    Procedure: SELECTIVE NECK DISSECTION;  Surgeon: Chong Quiros MD;  [facility];  Service: ENT    UPPER GASTROINTESTINAL ENDOSCOPY  VEIN LIGATION AND STRIPPING       Social History   Social History     Substance and Sexual Activity   Alcohol Use No    Comment: hx etoh abuse pt states he quit 9 years ago     Social History     Substance and Sexual Activity   Drug Use No     Social History     Tobacco Use   Smoking Status Former Smoker    Packs/day: 1 00    Years: 10 00    Pack years: 10     Last attempt to quit: 1981    Years since quittin 1   Smokeless Tobacco Never Used   Tobacco Comment    50 years ago     Family History   Problem Relation Age of Onset    Heart disease Brother     Diverticulitis Mother        Meds/Allergies       Current Outpatient Medications:     amLODIPine (NORVASC) 5 mg tablet    gabapentin (NEURONTIN) 100 mg capsule    LORazepam (ATIVAN) 0 5 mg tablet    mirtazapine (REMERON) 30 mg tablet    Multiple Vitamins-Minerals (MULTI FOR HIM 50+ PO)    warfarin (COUMADIN) 2 mg tablet    fluticasone (FLONASE) 50 mcg/act nasal spray    Loratadine (CLARITIN) 10 MG CAPS    meclizine (ANTIVERT) 25 mg tablet    Melatonin 10 MG TABS    ondansetron (ZOFRAN) 4 mg tablet    Allergies   Allergen Reactions    Duloxetine Other (See Comments)     Panic attacks    Other      Seasonal           Objective     Blood pressure 129/71, pulse 64, temperature 97 6 °F (36 4 °C), temperature source Tympanic, height 5' 5" (1 651 m), weight 62 3 kg (137 lb 6 4 oz)  Body mass index is 22 86 kg/m²  PHYSICAL EXAM:      General Appearance:   Alert, cooperative, no distress; mid-way through the visit he demonstrated tics and abnormal body movements which he relates to his anxiety   HEENT:   Normocephalic, atraumatic, sclera anicteric      Neck:  Supple, symmetrical, no lymphadenopathy, trachea midline   Lungs:   Clear to auscultation bilaterally; no rales, rhonchi or wheezing; respirations unlabored    Heart[de-identified]   Regular rate and rhythm; no murmur, rub, or gallop     Abdomen:   Scaphoid abdomen; ileostomy in RLQ; Soft, non-tender without rebound or guarding, non-distended; positive bowel sounds; no masses, no organomegaly    Genitalia:   Deferred    Rectal:   Deferred    Extremities:  No cyanosis, clubbing or edema; +varicosities B/L    Pulses:  2+ and symmetric    Skin:  No jaundice, rashes, or lesions          Lab Results:       Radiology Results:   No results found          Susanne Cruz PA-C

## 2019-08-12 ENCOUNTER — ANTICOAG VISIT (OUTPATIENT)
Dept: INTERNAL MEDICINE CLINIC | Facility: CLINIC | Age: 74
End: 2019-08-12

## 2019-08-14 ENCOUNTER — ANTICOAG VISIT (OUTPATIENT)
Dept: INTERNAL MEDICINE CLINIC | Facility: CLINIC | Age: 74
End: 2019-08-14

## 2019-08-20 ENCOUNTER — HOSPITAL ENCOUNTER (OUTPATIENT)
Dept: MRI IMAGING | Facility: HOSPITAL | Age: 74
Discharge: HOME | End: 2019-08-20
Payer: MEDICARE

## 2019-08-20 DIAGNOSIS — K86.89 DILATED PANCREATIC DUCT: ICD-10-CM

## 2019-08-20 PROCEDURE — 74183 MRI ABD W/O CNTR FLWD CNTR: CPT

## 2019-08-20 PROCEDURE — A9585 GADOBUTROL INJECTION: HCPCS | Performed by: RADIOLOGY

## 2019-08-20 RX ADMIN — GADOBUTROL 7 ML: 604.72 INJECTION INTRAVENOUS at 20:27

## 2019-08-21 ENCOUNTER — ANTICOAG VISIT (OUTPATIENT)
Dept: INTERNAL MEDICINE CLINIC | Facility: CLINIC | Age: 74
End: 2019-08-21

## 2019-08-26 ENCOUNTER — TELEPHONE (OUTPATIENT)
Dept: GASTROENTEROLOGY | Facility: CLINIC | Age: 74
End: 2019-08-26

## 2019-08-26 NOTE — TELEPHONE ENCOUNTER
Dr Perez Coop pt  [facility] called stating there are significant findings in pt's MRI ABD ready to be read in epic   also text

## 2019-08-28 ENCOUNTER — ANTICOAG VISIT (OUTPATIENT)
Dept: INTERNAL MEDICINE CLINIC | Facility: CLINIC | Age: 74
End: 2019-08-28

## 2019-08-30 DIAGNOSIS — F32.A DEPRESSION, UNSPECIFIED DEPRESSION TYPE: ICD-10-CM

## 2019-08-30 RX ORDER — MIRTAZAPINE 30 MG/1
TABLET, FILM COATED ORAL
Qty: 30 TABLET | Refills: 5 | Status: SHIPPED | OUTPATIENT
Start: 2019-08-30 | End: 2019-12-17 | Stop reason: ALTCHOICE

## 2019-09-03 NOTE — TELEPHONE ENCOUNTER
Patient's wife Reggie Spatz called requesting to speak with HAIR Fisher regarding MRI letter performed on 8-   Reggie Spatz can be reached at [phone]

## 2019-09-03 NOTE — TELEPHONE ENCOUNTER
Please let patient know his MRI shows a stable pancreatic cyst that is benign appearing, it shows no pancreatic duct dilation  This is good news  We can plan for a repeat MRI in 1 year

## 2019-09-04 ENCOUNTER — ANTICOAG VISIT (OUTPATIENT)
Dept: INTERNAL MEDICINE CLINIC | Facility: CLINIC | Age: 74
End: 2019-09-04

## 2019-09-09 ENCOUNTER — ANTICOAG VISIT (OUTPATIENT)
Dept: INTERNAL MEDICINE CLINIC | Facility: CLINIC | Age: 74
End: 2019-09-09

## 2019-09-09 ENCOUNTER — APPOINTMENT (OUTPATIENT)
Dept: LAB | Facility: CLINIC | Age: 74
End: 2019-09-09
Payer: MEDICARE

## 2019-09-21 ENCOUNTER — HOSPITAL ENCOUNTER (EMERGENCY)
Facility: HOSPITAL | Age: 74
Discharge: HOME | End: 2019-09-21
Attending: EMERGENCY MEDICINE | Admitting: EMERGENCY MEDICINE
Payer: MEDICARE

## 2019-09-21 VITALS
HEART RATE: 90 BPM | OXYGEN SATURATION: 97 % | DIASTOLIC BLOOD PRESSURE: 85 MMHG | BODY MASS INDEX: 23.08 KG/M2 | TEMPERATURE: 98.3 F | WEIGHT: 138.67 LBS | SYSTOLIC BLOOD PRESSURE: 155 MMHG | RESPIRATION RATE: 18 BRPM

## 2019-09-21 DIAGNOSIS — S50.811A ABRASION OF RIGHT FOREARM, INITIAL ENCOUNTER: Primary | ICD-10-CM

## 2019-09-21 PROCEDURE — 99282 EMERGENCY DEPT VISIT SF MDM: CPT

## 2019-09-21 PROCEDURE — 99283 EMERGENCY DEPT VISIT LOW MDM: CPT | Performed by: EMERGENCY MEDICINE

## 2019-09-21 RX ORDER — GINSENG 100 MG
1 CAPSULE ORAL ONCE
Status: COMPLETED | OUTPATIENT
Start: 2019-09-21 | End: 2019-09-21

## 2019-09-21 RX ADMIN — BACITRACIN ZINC 1 LARGE APPLICATION: 500 OINTMENT TOPICAL at 16:53

## 2019-09-21 NOTE — ED PROVIDER NOTES
History  Chief Complaint   Patient presents with    Skin Problem     PT STATES HE NOTED SOMETHING "BLACK" ON HIS ARM  HE PULLED IT OUT AND WASHED IT DOWN THE DRAIN  PT FEELS HE HAS A FIRM AREA TO HIS RIGHT FOREARM  PT IS ON COUMADIN  28-year-old male presents with a abrasion to his right forearm  , states he noticed a small black growth, hold it, it pulled out from under his skin, and fell down the sink and went down the drain  , subsequently developed an area that oozed blood for about 2-3 minutes stop with direct pressure  Patient is on Coumadin  , now there is a small open area of skin  Otherwise feels well , no previous history of this  No fevers no chills no nausea no vomiting no chest pain or shortness of breath no modifying or alleviating factors  Currently no pain      History provided by:  Patient and spouse      Prior to Admission Medications   Prescriptions Last Dose Informant Patient Reported? Taking?    LORazepam (ATIVAN) 0 5 mg tablet  Self No No   Sig: TAKE 1/2 (ONE-HALF) TABLET BY MOUTH EVERY 12 HOURS AS NEEDED FOR ANXIETY   Loratadine (CLARITIN) 10 MG CAPS  Self Yes No   Sig: Take by mouth daily as needed     Melatonin 10 MG TABS  Self Yes No   Sig: Take by mouth   Multiple Vitamins-Minerals (MULTI FOR HIM 50+ PO)  Self Yes No   Sig: Take 1 tablet by mouth daily   amLODIPine (NORVASC) 5 mg tablet  Self No No   Sig: TAKE ONE TABLET BY MOUTH ONCE DAILY   fluticasone (FLONASE) 50 mcg/act nasal spray  Self Yes No   Si spray into each nostril daily   gabapentin (NEURONTIN) 100 mg capsule  Self No No   Sig: TAKE 1 CAPSULE BY MOUTH ONCE DAILY   meclizine (ANTIVERT) 25 mg tablet  Self No No   Sig: Take 1 tablet (25 mg total) by mouth 3 (three) times a day as needed for dizziness   Patient not taking: Reported on 2019   mirtazapine (REMERON) 30 mg tablet   No No   Sig: TAKE 1 TABLET BY MOUTH EVERY DAY AT BEDTIME   ondansetron (ZOFRAN) 4 mg tablet  Self No No   Sig: Take 1 tablet (4 mg total) by mouth every 8 (eight) hours as needed for nausea or vomiting   Patient not taking: Reported on 8/5/2019   warfarin (COUMADIN) 2 mg tablet  Self No No   Sig: TAKE 1 TABLET BY MOUTH ONCE DAILY      Facility-Administered Medications: None       Past Medical History:   Diagnosis Date    Anxiety     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)     [facility]     Depression     [facility]) ([facility] )     [place] (hard of hearing)     no hearing aids    Hypertension     Mass in neck     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[facility] )     Psychiatric disorder     depression, anger    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       Past Surgical History:   Procedure Laterality Date    COLON SURGERY      Partial colectomy and colostomy    COLONOSCOPY      ESOPHAGOGASTRODUODENOSCOPY N/A 4/5/2017    Procedure: ESOPHAGOGASTRODUODENOSCOPY (EGD); Surgeon: Luca Winchesetr MD;  [facility]; Service:     EYE SURGERY Right     Cataract    KNEE SURGERY      [place] EDG US EXAM SURGICAL ALTER STOM DUODENUM/JEJUNUM N/A 4/9/2018    Procedure: LINEAR ENDOSCOPIC U/S;  Surgeon: Leny Garcia MD;  [facility]; Service: Gastroenterology    IN EXC PAROTD,LAT LOBE,DISSECT 5TH NERV Right 8/8/2018    Procedure: PAROTIDECTOMY WITH FACIAL NERVE MONITOR AND FROZEN SECTION;  Surgeon: Amie Miller MD;  [facility];  Service: ENT    IN IMPACT TOOTH [address] COMP BONY N/A 8/8/2018    Procedure: EXTRACTION TEETH #15 and #30;  Surgeon: Vish Saleh DDS;  [facility];  Service: Maxillofacial    RADICAL NECK DISSECTION N/A 8/8/2018    Procedure: SELECTIVE NECK DISSECTION;  Surgeon: Amie Miller MD;  [facility];  Service: ENT    UPPER GASTROINTESTINAL ENDOSCOPY      VEIN LIGATION AND STRIPPING         Family History   Problem Relation Age of Onset    Heart disease Brother     Diverticulitis Mother      I have reviewed and agree with the history as documented      Social History     Tobacco Use    Smoking status: Former Smoker     Packs/day: 1 00     Years: 10 00     Pack years: 10 00     Types: Cigarettes     Last attempt to quit: 1981     Years since quittin 3    Smokeless tobacco: Never Used    Tobacco comment: 50 years ago   Substance Use Topics    Alcohol use: No     Comment: hx etoh abuse pt states he quit 9 years ago    Drug use: No        Review of Systems   Constitutional: Negative for fever  Respiratory: Negative for chest tightness and shortness of breath  Cardiovascular: Negative for chest pain  Skin: Negative for rash  Neurological: Negative for dizziness, light-headedness and headaches  Physical Exam  Physical Exam   Constitutional: He appears well-developed  HENT:   Head: Atraumatic  Eyes: Conjunctivae are normal  Right eye exhibits no discharge  Left eye exhibits no discharge  No scleral icterus  Neck: Neck supple  No tracheal deviation present  Pulmonary/Chest: Effort normal  No stridor  No respiratory distress  Musculoskeletal: He exhibits no deformity  Neurological: He is alert  He exhibits normal muscle tone  Coordination normal    Skin: Skin is warm and dry  No rash noted  No erythema  No pallor  Right forearm, small abrasion/laceration of the skin, surrounding ecchymosis consistent with patient being on Coumadin no erythema to suggest infection no warmth   Psychiatric: He has a normal mood and affect  Vitals reviewed        Vital Signs  ED Triage Vitals [19 1624]   Temperature Pulse Respirations Blood Pressure SpO2   98 3 °F (36 8 °C) 90 18 155/85 97 %      Temp Source Heart Rate Source Patient Position - Orthostatic VS BP Location FiO2 (%)   Oral Monitor -- Left arm --      Pain Score       3           Vitals:    19 1624   BP: 155/85   Pulse: 90         Visual Acuity      ED Medications  Medications   bacitracin topical ointment 1 large application (1 large application Topical Given 19 1653)       Diagnostic Studies  Results Reviewed     None No orders to display              Procedures  Procedures       ED Course                               MDM  Number of Diagnoses or Management Options  Abrasion of right forearm, initial encounter: new and requires workup  Diagnosis management comments: Long conversation with patient his wife, his wife has a large concern of what this black object that he pulled from underneath his skin was  I explained to her that I have absolutely no idea as they did not bring it in and went down the sink, it is unclear from what they are describing to me, he states he is outside a lot, could have been a foreign body could have been a blood clot/scab that was overlying this abrasion, it is unclear to me, she eventually understood but was upset that I could not tell her what this object was , I see no evidence of infection she is concerned about developing infection, will give a tube of bacitracin cream for patient to apply  Amount and/or Complexity of Data Reviewed  Decide to obtain previous medical records or to obtain history from someone other than the patient: yes  Obtain history from someone other than the patient: yes  Review and summarize past medical records: yes        Disposition  Final diagnoses:   Abrasion of right forearm, initial encounter     Time reflects when diagnosis was documented in both MDM as applicable and the Disposition within this note     Time User Action Codes Description Comment    9/21/2019  4:45 PM Jewell [place] Add [[id number]] Abrasion of right forearm, initial encounter       ED Disposition     ED Disposition Condition Date/Time Comment    Discharge Stable Sat Sep 21, 2019  4:45 PM Birdie Else discharge to home/self care              Follow-up Information     Follow up With Specialties Details Why Contact Info Additional [address] Emergency Department Emergency Medicine Go to  If symptoms worsen [address] [facility] [address]  [phone] [facility], [address], [place], [place], [address]          Discharge Medication List as of 9/21/2019  4:46 PM      CONTINUE these medications which have NOT CHANGED    Details   amLODIPine (NORVASC) 5 mg tablet TAKE ONE TABLET BY MOUTH ONCE DAILY, Normal      fluticasone (FLONASE) 50 mcg/act nasal spray 1 spray into each nostril daily, Historical Med      gabapentin (NEURONTIN) 100 mg capsule TAKE 1 CAPSULE BY MOUTH ONCE DAILY, Normal      Loratadine (CLARITIN) 10 MG CAPS Take by mouth daily as needed  , Historical Med      LORazepam (ATIVAN) 0 5 mg tablet TAKE 1/2 (ONE-HALF) TABLET BY MOUTH EVERY 12 HOURS AS NEEDED FOR ANXIETY, Normal      meclizine (ANTIVERT) 25 mg tablet Take 1 tablet (25 mg total) by mouth 3 (three) times a day as needed for dizziness, Starting Fri 3/9/2018, Normal      Melatonin 10 MG TABS Take by mouth, Historical Med      mirtazapine (REMERON) 30 mg tablet TAKE 1 TABLET BY MOUTH EVERY DAY AT BEDTIME, Normal      Multiple Vitamins-Minerals (MULTI FOR HIM 50+ PO) Take 1 tablet by mouth daily, Starting Tue 8/21/2012, Historical Med      ondansetron (ZOFRAN) 4 mg tablet Take 1 tablet (4 mg total) by mouth every 8 (eight) hours as needed for nausea or vomiting, Starting Mon 3/26/2018, Normal      warfarin (COUMADIN) 2 mg tablet TAKE 1 TABLET BY MOUTH ONCE DAILY, Normal           No discharge procedures on file      ED Provider  Electronically Signed by           Lennox Howell DO  09/21/19 2441

## 2019-09-25 ENCOUNTER — OFFICE VISIT (OUTPATIENT)
Dept: INTERNAL MEDICINE CLINIC | Facility: CLINIC | Age: 74
End: 2019-09-25
Payer: MEDICARE

## 2019-09-25 VITALS
RESPIRATION RATE: 16 BRPM | HEIGHT: 65 IN | BODY MASS INDEX: 23.09 KG/M2 | SYSTOLIC BLOOD PRESSURE: 148 MMHG | HEART RATE: 78 BPM | WEIGHT: 138.6 LBS | OXYGEN SATURATION: 98 % | DIASTOLIC BLOOD PRESSURE: 78 MMHG

## 2019-09-25 DIAGNOSIS — Z23 INFLUENZA VACCINE NEEDED: ICD-10-CM

## 2019-09-25 DIAGNOSIS — R29.6 RECURRENT FALLS: ICD-10-CM

## 2019-09-25 DIAGNOSIS — F41.9 ANXIETY: ICD-10-CM

## 2019-09-25 DIAGNOSIS — I82.4Y9 DEEP VEIN THROMBOSIS (DVT) OF PROXIMAL LOWER EXTREMITY, UNSPECIFIED CHRONICITY, UNSPECIFIED LATERALITY (HCC): Primary | ICD-10-CM

## 2019-09-25 DIAGNOSIS — Z13.6 SCREENING FOR CARDIOVASCULAR CONDITION: ICD-10-CM

## 2019-09-25 DIAGNOSIS — R35.89 POLYURIA: ICD-10-CM

## 2019-09-25 DIAGNOSIS — Z13.1 SCREENING FOR DIABETES MELLITUS: ICD-10-CM

## 2019-09-25 DIAGNOSIS — H57.11 PAIN OF RIGHT EYE: ICD-10-CM

## 2019-09-25 PROCEDURE — 99214 OFFICE O/P EST MOD 30 MIN: CPT | Performed by: INTERNAL MEDICINE

## 2019-09-25 PROCEDURE — 90662 IIV NO PRSV INCREASED AG IM: CPT

## 2019-09-25 PROCEDURE — G0008 ADMIN INFLUENZA VIRUS VAC: HCPCS

## 2019-09-25 NOTE — PROGRESS NOTES
Assessment/Plan:    Deep vein thrombosis (DVT) of proximal lower extremity ([address] )  I have counselled the pt to follow a healthy and balanced diet ,and recommend routine exercise  Anxiety  No SI I will have the patient see Psychiatry, in the past he refused at this point time he is willing to go likely the anxiety is triggering his tremor  Influenza vaccine needed  Counseled, patient received the influenza vaccine    Pain of right eye  I will have the patient see Ophthalmology for full examination    Polyuria  Will check urinalysis and culture    Recurrent falls  I have given the patient a prescription for a walker and would like the patient to go for physical therapy    Screening for cardiovascular condition  I have counselled the pt to follow a healthy and balanced diet ,and recommend routine exercise  Screening for diabetes mellitus  Will check fasting labs         Problem List Items Addressed This Visit        Cardiovascular and Mediastinum    Deep vein thrombosis (DVT) of proximal lower extremity ([facility] ) - Primary     I have counselled the pt to follow a healthy and balanced diet ,and recommend routine exercise  Relevant Orders    Protime-INR       Other    Anxiety     No SI I will have the patient see Psychiatry, in the past he refused at this point time he is willing to go likely the anxiety is triggering his tremor           Relevant Orders    Ambulatory referral to Psychiatry    Screening for diabetes mellitus     Will check fasting labs         Relevant Orders    Hemoglobin A1C    Polyuria     Will check urinalysis and culture         Relevant Orders    Comprehensive metabolic panel    CBC (Includes Diff/Plt) (Refl)    Hemoglobin A1C    UA w Reflex to Microscopic w Reflex to Culture    Pain of right eye     I will have the patient see Ophthalmology for full examination         Relevant Orders    Ambulatory referral to Ophthalmology    Recurrent falls     I have given the patient a prescription for a walker and would like the patient to go for physical therapy         Relevant Orders    Ambulatory referral to Physical Therapy    Walker    CBC (Includes Diff/Plt) (Refl)    Vitamin B12    Influenza vaccine needed     Counseled, patient received the influenza vaccine         Relevant Orders    influenza vaccine, [id number], high-dose, PF 0 5 mL (FLUZONE HIGH-DOSE) (Completed)    Screening for cardiovascular condition     I have counselled the pt to follow a healthy and balanced diet ,and recommend routine exercise  Relevant Orders    Lipid Panel with Direct LDL reflex    TSH, 3rd generation          Return to office 3  months  call if any problems  Addendum patient will need a Rollator walker with seat and brakes  Subjective:      Patient ID: Ebony Carmichael is a 69 y o  male  HPI 70-year old male coming in for a follow up visit regarding DVT, polyuria, anxiety, recurrent falls, right eye pain; The patient reports me compliant taking medications without untoward side effects the  The patient is here to review his medical condition, update me on the medical condition and the patient reports me no hospitalizations and no ER visits  he does report me that he pulled out a black foreign material from under his skin of his right lateral forearm because of that he formed a bruise and went to ER they gave him bacitracin zinc ointment  Currently the area of skin is healing well at this point  Reports me that he has pain in the right eye is walking something might have gotten into a was walking yesterday out sign  A little bug may have gotten into his eye  Reports me something did come out start to get better  No loss of vision no blurry no double vision    Last eye examination over a year    Reports me increased falls he does have a cane    The following portions of the patient's history were reviewed and updated as appropriate: allergies, current medications, past family history, past medical history, past social history, past surgical history and problem list     Review of Systems   Constitutional: Negative for activity change, appetite change and unexpected weight change  HENT: Negative for congestion and postnasal drip  Eyes: Negative for visual disturbance  Respiratory: Negative for cough and shortness of breath  Cardiovascular: Negative for chest pain  Gastrointestinal: Negative for abdominal pain, diarrhea, nausea and vomiting  Musculoskeletal: Positive for gait problem  Skin: Positive for wound (Healing well)  Neurological: Positive for tremors  Negative for dizziness, light-headedness and headaches  Hematological: Negative for adenopathy  Psychiatric/Behavioral: Negative for suicidal ideas  The patient is nervous/anxious  Objective:    No follow-ups on file  No results found  Allergies   Allergen Reactions    Duloxetine Other (See Comments)     Panic attacks    Other      Seasonal       Past Medical History:   Diagnosis Date    Anxiety     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)     Crohn's disease ([facility] )     Depression     DVT [facility]deep venous thrombosis) ([facility] )     [place] (hard of hearing)     no hearing aids    Hypertension     Mass in neck     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[facility] )     Psychiatric disorder     depression, anger    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     Past Surgical History:   Procedure Laterality Date    COLON SURGERY      Partial colectomy and colostomy    COLONOSCOPY      ESOPHAGOGASTRODUODENOSCOPY N/A 4/5/2017    Procedure: ESOPHAGOGASTRODUODENOSCOPY (EGD); Surgeon: Eren Schultz MD;  [facility]; Service:     EYE SURGERY Right     Cataract    KNEE SURGERY      AK EDG US EXAM SURGICAL ALTER STOM DUODENUM/JEJUNUM N/A 4/9/2018    Procedure: LINEAR ENDOSCOPIC U/S;  Surgeon: Francis Chandler MD;  [facility];   Service: Gastroenterology    AK EXC PAROTD,LAT LOBE,DISSECT 5TH NERV Right 8/8/2018    Procedure: PAROTIDECTOMY WITH FACIAL NERVE MONITOR AND FROZEN SECTION;  Surgeon: Ginger Mckoy MD;  [facility];  Service: ENT    UT IMPACT TOOTH [address] COMP BONY N/A 8/8/2018    Procedure: EXTRACTION TEETH #15 and #30;  Surgeon: José Miguel Martinez DDS;  [facility];  Service: Maxillofacial    RADICAL NECK DISSECTION N/A 8/8/2018    Procedure: SELECTIVE NECK DISSECTION;  Surgeon: Ginger Mckoy MD;  [facility];  Service: ENT    UPPER GASTROINTESTINAL ENDOSCOPY      VEIN LIGATION AND STRIPPING       Current Outpatient Medications on File Prior to Visit   Medication Sig Dispense Refill    amLODIPine (NORVASC) 5 mg tablet TAKE ONE TABLET BY MOUTH ONCE DAILY 90 tablet 3    fluticasone (FLONASE) 50 mcg/act nasal spray 1 spray into each nostril daily      Loratadine (CLARITIN) 10 MG CAPS Take by mouth daily as needed        LORazepam (ATIVAN) 0 5 mg tablet TAKE 1/2 (ONE-HALF) TABLET BY MOUTH EVERY 12 HOURS AS NEEDED FOR ANXIETY 30 tablet 0    meclizine (ANTIVERT) 25 mg tablet Take 1 tablet (25 mg total) by mouth 3 (three) times a day as needed for dizziness 30 tablet 2    Melatonin 10 MG TABS Take by mouth      mirtazapine (REMERON) 30 mg tablet TAKE 1 TABLET BY MOUTH EVERY DAY AT BEDTIME 30 tablet 5    Multiple Vitamins-Minerals (MULTI FOR HIM 50+ PO) Take 1 tablet by mouth daily      ondansetron (ZOFRAN) 4 mg tablet Take 1 tablet (4 mg total) by mouth every 8 (eight) hours as needed for nausea or vomiting 20 tablet 0    warfarin (COUMADIN) 2 mg tablet TAKE 1 TABLET BY MOUTH ONCE DAILY 90 tablet 3     No current facility-administered medications on file prior to visit        Family History   Problem Relation Age of Onset    Heart disease Brother     Diverticulitis Mother      Social History     Socioeconomic History    Marital status: /Civil Union     Spouse name: Not on file    Number of children: Not on file    Years of education: Not on file    Highest education level: Not on file   Occupational History    Not on file   Social Needs    Financial resource strain: Not on file    Food insecurity:     Worry: Not on file     Inability: Not on file    Transportation needs:     Medical: Not on file     Non-medical: Not on file   Tobacco Use    Smoking status: Former Smoker     Packs/day: 1 00     Years: 10 00     Pack years: 10 00     Types: Cigarettes     Last attempt to quit: 1981     Years since quittin 3    Smokeless tobacco: Never Used    Tobacco comment: 50 years ago   Substance and Sexual Activity    Alcohol use: No     Comment: hx etoh abuse pt states he quit 9 years ago    Drug use: No    Sexual activity: Never   Lifestyle    Physical activity:     Days per week: Not on file     Minutes per session: Not on file    Stress: Not on file   Relationships    Social connections:     Talks on phone: Not on file     Gets together: Not on file     Attends Nondenominational service: Not on file     Active member of club or organization: Not on file     Attends meetings of clubs or organizations: Not on file     Relationship status: Not on file    Intimate partner violence:     Fear of current or ex partner: Not on file     Emotionally abused: Not on file     Physically abused: Not on file     Forced sexual activity: Not on file   Other Topics Concern    Not on file   Social History Narrative    Not on file     Vitals:    19   BP: 148/78   Pulse: 78   Resp: 16   SpO2: 98%   Weight: 62 9 kg (138 lb 9 6 oz)   Height: 5' 5" (1 651 m)     Results for orders placed or performed in visit on 19   Protime-INR   Result Value Ref Range    Protime 28 1 (H) 11 6 - 14 5 seconds    INR 2 75 (H) 0 84 - 1 19     Weight (last 2 days)     None        Body mass index is 23 06 kg/m²    BP      Temp      Pulse     Resp      SpO2        Vitals:    19   Weight: 62 9 kg (138 lb 9 6 oz)     Vitals:    19   Weight: 62 9 kg (138 lb 9 6 oz)       /78   Pulse 78   Resp 16   Ht 5' 5" (1 651 m)   Wt 62 9 kg (138 lb 9 6 oz)   SpO2 98%   BMI 23 06 kg/m²          Physical Exam   Constitutional: He appears well-developed and well-nourished  No distress  HENT:   Head: Normocephalic and atraumatic  Right Ear: External ear normal    Left Ear: External ear normal    Mouth/Throat: Oropharynx is clear and moist    Eyes: Pupils are equal, round, and reactive to light  Conjunctivae are normal  Right eye exhibits no discharge  Left eye exhibits no discharge  No scleral icterus  Neck: Neck supple  Cardiovascular: Normal rate, regular rhythm and normal heart sounds  Exam reveals no gallop and no friction rub  No murmur heard  Pulmonary/Chest: No respiratory distress  He has no wheezes  He has no rales  Abdominal: Soft  Bowel sounds are normal  He exhibits no distension and no mass  There is no tenderness  There is no rebound and no guarding  Musculoskeletal: He exhibits no edema or deformity  Lymphadenopathy:     He has no cervical adenopathy  Neurological: He is alert  Skin: He is not diaphoretic  Psychiatric: His mood appears anxious  He does not exhibit a depressed mood  He expresses no suicidal ideation  He is inattentive       healing abrasion right forearm no erythema no pus no abscess no streaks

## 2019-09-29 PROBLEM — Z23 INFLUENZA VACCINE NEEDED: Status: ACTIVE | Noted: 2019-09-29

## 2019-09-29 PROBLEM — Z13.6 SCREENING FOR CARDIOVASCULAR CONDITION: Status: ACTIVE | Noted: 2019-09-29

## 2019-09-29 PROBLEM — R35.89 POLYURIA: Status: ACTIVE | Noted: 2019-09-29

## 2019-09-29 PROBLEM — R29.6 RECURRENT FALLS: Status: ACTIVE | Noted: 2019-09-29

## 2019-09-29 PROBLEM — H57.11 PAIN OF RIGHT EYE: Status: ACTIVE | Noted: 2019-09-29

## 2019-09-29 NOTE — ASSESSMENT & PLAN NOTE
No SI I will have the patient see Psychiatry, in the past he refused at this point time he is willing to go likely the anxiety is triggering his tremor

## 2019-09-29 NOTE — ASSESSMENT & PLAN NOTE
I have given the patient a prescription for a walker and would like the patient to go for physical therapy

## 2019-09-30 ENCOUNTER — TELEPHONE (OUTPATIENT)
Dept: INTERNAL MEDICINE CLINIC | Facility: CLINIC | Age: 74
End: 2019-09-30

## 2019-09-30 NOTE — TELEPHONE ENCOUNTER
Please addend your note with the requested info below and then I will put the order in and send along for insurance

## 2019-09-30 NOTE — TELEPHONE ENCOUNTER
Patients wife came in today the order you gave them for the walker was incorrect  Patient needs a new order for the following:    Rollator - rolling walker with seat and breaks    They also need a copy of office notes face to face documenting need for mobility equipment and patients ambulatory issues      Please advise

## 2019-10-02 DIAGNOSIS — R29.6 RECURRENT FALLS: Primary | ICD-10-CM

## 2019-10-08 ENCOUNTER — ANTICOAG VISIT (OUTPATIENT)
Dept: INTERNAL MEDICINE CLINIC | Facility: CLINIC | Age: 74
End: 2019-10-08

## 2019-10-10 ENCOUNTER — TELEPHONE (OUTPATIENT)
Dept: PSYCHIATRY | Facility: CLINIC | Age: 74
End: 2019-10-10

## 2019-10-10 ENCOUNTER — TRANSCRIBE ORDERS (OUTPATIENT)
Dept: LAB | Facility: HOSPITAL | Age: 74
End: 2019-10-10

## 2019-10-10 ENCOUNTER — APPOINTMENT (OUTPATIENT)
Dept: LAB | Facility: CLINIC | Age: 74
End: 2019-10-10
Payer: MEDICARE

## 2019-10-10 DIAGNOSIS — I10 ESSENTIAL HYPERTENSION: ICD-10-CM

## 2019-10-10 DIAGNOSIS — I10 ESSENTIAL HYPERTENSION: Primary | ICD-10-CM

## 2019-10-10 DIAGNOSIS — R07.89 ATYPICAL CHEST PAIN: ICD-10-CM

## 2019-10-10 DIAGNOSIS — Z13.6 SCREENING FOR CARDIOVASCULAR CONDITION: ICD-10-CM

## 2019-10-10 DIAGNOSIS — R29.6 RECURRENT FALLS: ICD-10-CM

## 2019-10-10 DIAGNOSIS — Z13.1 SCREENING FOR DIABETES MELLITUS: ICD-10-CM

## 2019-10-10 DIAGNOSIS — R35.89 POLYURIA: ICD-10-CM

## 2019-10-10 DIAGNOSIS — R35.89 POLYURIA: Primary | ICD-10-CM

## 2019-10-10 LAB
ALBUMIN SERPL BCP-MCNC: 3.6 G/DL (ref 3.5–5)
ALP SERPL-CCNC: 67 U/L (ref 46–116)
ALT SERPL W P-5'-P-CCNC: 25 U/L (ref 12–78)
ANION GAP SERPL CALCULATED.3IONS-SCNC: 4 MMOL/L (ref 4–13)
AST SERPL W P-5'-P-CCNC: 21 U/L (ref 5–45)
BASOPHILS # BLD AUTO: 0.03 THOUSANDS/ΜL (ref 0–0.1)
BASOPHILS NFR BLD AUTO: 1 % (ref 0–1)
BILIRUB SERPL-MCNC: 0.9 MG/DL (ref 0.2–1)
BILIRUB UR QL STRIP: NEGATIVE
BUN SERPL-MCNC: 16 MG/DL (ref 5–25)
CALCIUM SERPL-MCNC: 8.7 MG/DL (ref 8.3–10.1)
CHLORIDE SERPL-SCNC: 108 MMOL/L (ref 100–108)
CHOLEST SERPL-MCNC: 164 MG/DL (ref 50–200)
CLARITY UR: CLEAR
CO2 SERPL-SCNC: 31 MMOL/L (ref 21–32)
COLOR UR: YELLOW
CREAT SERPL-MCNC: 0.85 MG/DL (ref 0.6–1.3)
EOSINOPHIL # BLD AUTO: 0.07 THOUSAND/ΜL (ref 0–0.61)
EOSINOPHIL NFR BLD AUTO: 2 % (ref 0–6)
ERYTHROCYTE [DISTWIDTH] IN BLOOD BY AUTOMATED COUNT: 12.7 % (ref 11.6–15.1)
EST. AVERAGE GLUCOSE BLD GHB EST-MCNC: 82 MG/DL
GFR SERPL CREATININE-BSD FRML MDRD: 86 ML/MIN/1.73SQ M
GLUCOSE P FAST SERPL-MCNC: 88 MG/DL (ref 65–99)
GLUCOSE UR STRIP-MCNC: NEGATIVE MG/DL
HBA1C MFR BLD: 4.5 % (ref 4.2–6.3)
HCT VFR BLD AUTO: 42.6 % (ref 36.5–49.3)
HDLC SERPL-MCNC: 60 MG/DL (ref 40–60)
HGB BLD-MCNC: 14 G/DL (ref 12–17)
HGB UR QL STRIP.AUTO: NEGATIVE
IMM GRANULOCYTES # BLD AUTO: 0.01 THOUSAND/UL (ref 0–0.2)
IMM GRANULOCYTES NFR BLD AUTO: 0 % (ref 0–2)
KETONES UR STRIP-MCNC: NEGATIVE MG/DL
LDLC SERPL CALC-MCNC: 89 MG/DL (ref 0–100)
LEUKOCYTE ESTERASE UR QL STRIP: NEGATIVE
LYMPHOCYTES # BLD AUTO: 0.88 THOUSANDS/ΜL (ref 0.6–4.47)
LYMPHOCYTES NFR BLD AUTO: 21 % (ref 14–44)
MCH RBC QN AUTO: 30.9 PG (ref 26.8–34.3)
MCHC RBC AUTO-ENTMCNC: 32.9 G/DL (ref 31.4–37.4)
MCV RBC AUTO: 94 FL (ref 82–98)
MONOCYTES # BLD AUTO: 0.31 THOUSAND/ΜL (ref 0.17–1.22)
MONOCYTES NFR BLD AUTO: 7 % (ref 4–12)
NEUTROPHILS # BLD AUTO: 2.99 THOUSANDS/ΜL (ref 1.85–7.62)
NEUTS SEG NFR BLD AUTO: 69 % (ref 43–75)
NITRITE UR QL STRIP: NEGATIVE
NRBC BLD AUTO-RTO: 0 /100 WBCS
PH UR STRIP.AUTO: 5.5 [PH]
PLATELET # BLD AUTO: 152 THOUSANDS/UL (ref 149–390)
PMV BLD AUTO: 9.7 FL (ref 8.9–12.7)
POTASSIUM SERPL-SCNC: 4.5 MMOL/L (ref 3.5–5.3)
PROT SERPL-MCNC: 6.7 G/DL (ref 6.4–8.2)
PROT UR STRIP-MCNC: NEGATIVE MG/DL
RBC # BLD AUTO: 4.53 MILLION/UL (ref 3.88–5.62)
SODIUM SERPL-SCNC: 143 MMOL/L (ref 136–145)
SP GR UR STRIP.AUTO: >=1.03 (ref 1–1.03)
TRIGL SERPL-MCNC: 76 MG/DL
TSH SERPL DL<=0.05 MIU/L-ACNC: 2.49 UIU/ML (ref 0.36–3.74)
UROBILINOGEN UR QL STRIP.AUTO: 0.2 E.U./DL
VIT B12 SERPL-MCNC: 486 PG/ML (ref 100–900)
WBC # BLD AUTO: 4.29 THOUSAND/UL (ref 4.31–10.16)

## 2019-10-10 PROCEDURE — 80053 COMPREHEN METABOLIC PANEL: CPT

## 2019-10-10 PROCEDURE — 82607 VITAMIN B-12: CPT

## 2019-10-10 PROCEDURE — 80061 LIPID PANEL: CPT

## 2019-10-10 PROCEDURE — 85025 COMPLETE CBC W/AUTO DIFF WBC: CPT

## 2019-10-10 PROCEDURE — 84443 ASSAY THYROID STIM HORMONE: CPT

## 2019-10-10 PROCEDURE — 81003 URINALYSIS AUTO W/O SCOPE: CPT

## 2019-10-10 PROCEDURE — 83036 HEMOGLOBIN GLYCOSYLATED A1C: CPT

## 2019-10-10 NOTE — TELEPHONE ENCOUNTER
Behavorial Health Outpatient Intake Questions    Referred by: Neurology    Please advised interviewee that they need to answer all questions truthfully to allow for best care and any misrepresentations of information may affect their ability to be seen at this clinic   => Was this discussed? Yes     Behavorial Health Outpatient Intake History -     Presenting Problem (in patient's words): med management, neuro-psychogenetic tics, anger management issues, depression, anxiety    Has the patient ever seen or is currently seeing a psychiatrist? Yes   If yes who/when?   30 years ago-unsure who  If seen as outpatient, have they been seen here (and by whom)? If not seen here, which provider(s) did the patient see and for how long? Has the patient ever seen or currently see a therapist? Yes If yes who/when? [facility]-30 years ago/unsure who  Has a member of the patient's family been in therapy here? Yes  If yes, with whom? Wife, 1 year ago-unsure who  Has the patient been hospitalized for mental health? No   If yes, how long ago was last hospitalization and where was it? Substance Abuse:No concerns of substance abuse are reported  Does the patient have ICM or CTT? No    Is the patient taking injectable psychiatric medications? No    => If yes, patient cannot be seen here  Communications  Are there any developmental disabilities? No    Does the patient have hearing impairment? No       History-    Has the patient served in the [place] [address]? No    If yes, have you had combat services? No    Was the patient activated into federal active duty as a member of the [employer] or reserve? No    Legal History-     Does the patient have any history of arrests, senior care/FDC time, or DUIs? No  If Yes-  1) What types of charges? 2) When were they last incarcerated? 3) Are they currently on parole or probation? Minor Child-    Who has custody of the child? Is there a custody agreement?      If there is a custody agreement remind parent that they must bring a copy to the first appt or they will not be seen  Intake Team, please check with provider before scheduling if flags come up such as:  - complex case  - legal history (other than DUI)  - communication barrier concerns are present  - if, in your judgment, this needs further review    ACCEPTED as a patient Yes  => Appointment Date: Tuesday, 2/4/20 @ 2pm w/ Dr Johny Newman? No    Name of Insurance Co: [employer] ID# [id number]; [id number]  Insurance Phone #  If ins is primary or secondary  If patient is a minor, parents information such as Name, D  O B of guarantor

## 2019-10-11 ENCOUNTER — ANTICOAG VISIT (OUTPATIENT)
Dept: INTERNAL MEDICINE CLINIC | Facility: CLINIC | Age: 74
End: 2019-10-11

## 2019-10-17 ENCOUNTER — OFFICE VISIT (OUTPATIENT)
Dept: INTERNAL MEDICINE CLINIC | Facility: CLINIC | Age: 74
End: 2019-10-17
Payer: MEDICARE

## 2019-10-17 ENCOUNTER — EVALUATION (OUTPATIENT)
Dept: PHYSICAL THERAPY | Facility: CLINIC | Age: 74
End: 2019-10-17
Payer: MEDICARE

## 2019-10-17 VITALS
DIASTOLIC BLOOD PRESSURE: 74 MMHG | SYSTOLIC BLOOD PRESSURE: 124 MMHG | HEART RATE: 69 BPM | RESPIRATION RATE: 16 BRPM | OXYGEN SATURATION: 97 %

## 2019-10-17 DIAGNOSIS — R29.6 RECURRENT FALLS: ICD-10-CM

## 2019-10-17 DIAGNOSIS — R25.1 TREMOR: ICD-10-CM

## 2019-10-17 DIAGNOSIS — I82.4Y9 DEEP VEIN THROMBOSIS (DVT) OF PROXIMAL LOWER EXTREMITY, UNSPECIFIED CHRONICITY, UNSPECIFIED LATERALITY (HCC): Primary | ICD-10-CM

## 2019-10-17 DIAGNOSIS — F41.9 ANXIETY: ICD-10-CM

## 2019-10-17 DIAGNOSIS — D72.819 LEUKOPENIA, UNSPECIFIED TYPE: ICD-10-CM

## 2019-10-17 DIAGNOSIS — R29.6 RECURRENT FALLS: Primary | ICD-10-CM

## 2019-10-17 DIAGNOSIS — I10 ESSENTIAL HYPERTENSION: Chronic | ICD-10-CM

## 2019-10-17 DIAGNOSIS — Z00.00 MEDICARE ANNUAL WELLNESS VISIT, SUBSEQUENT: ICD-10-CM

## 2019-10-17 DIAGNOSIS — R43.0 ANOSMIA: ICD-10-CM

## 2019-10-17 PROCEDURE — G0439 PPPS, SUBSEQ VISIT: HCPCS | Performed by: INTERNAL MEDICINE

## 2019-10-17 PROCEDURE — 99214 OFFICE O/P EST MOD 30 MIN: CPT | Performed by: INTERNAL MEDICINE

## 2019-10-17 PROCEDURE — 97161 PT EVAL LOW COMPLEX 20 MIN: CPT | Performed by: PHYSICAL THERAPIST

## 2019-10-17 RX ORDER — SERTRALINE HYDROCHLORIDE 25 MG/1
25 TABLET, FILM COATED ORAL DAILY
Qty: 30 TABLET | Refills: 5 | Status: SHIPPED | OUTPATIENT
Start: 2019-10-17 | End: 2019-11-18 | Stop reason: ALTCHOICE

## 2019-10-17 NOTE — PROGRESS NOTES
PT Evaluation     Today's date: 10/17/2019  Patient name: Whit Ibarra  : 1945  MRN: [id number]  Referring provider: Lucretia Bcekham DO  Dx:   Encounter Diagnosis     ICD-10-CM    1  Recurrent falls R29 6                   Assessment  Assessment details: Pt is a 69year old male referred for gait training with rollator  Pt's gait assessed without AD, with SPC, and with rollator  Pt is safest with use of rollator at this time  Pt declined further PT services at this time, thinks his deficits are psych related  Issued handout for CBT clinics in our area  Pt advised to walk with rollator at this time and with someone when in community to prevent future falls  Also educated on proper use of rollator and squeezing brakes to improve stability while walking  Pt and wife communicated understanding  Pt to follow-up with PT as needed  Impairments: abnormal gait and safety issue  Understanding of Dx/Px/POC: good  Plan  Duration in visits: 1  Plan of Care beginning date: 10/17/2019  Plan of Care expiration date: 10/17/2019  Treatment plan discussed with: family and patient        Subjective Evaluation    History of Present Illness  Mechanism of injury: Still having dizziness  Has been using a cane for several months  Having falls every day, thinks is due to his dizziness  Per his wife they think is psych related  Denies pain  Having more ticks recently  Per his doctor is psych related, nothing organically wrong with his brain  Unsure what PT can do for him at this point  Thinks he needs more psych services, however not seeing them until February    Pain  No pain reported    Patient Goals  Patient goal: none        Objective    Gait assessment:    Gait speed without AD:  63 m/s; deviations: multiple LOBs falling into wall of killian, required Geovanna from PT on multiple occasions to prevent a fall; shuffling gait, decreased step length    Gait speed with SPC:  72 m/s; deviations: does not properly utilize cane, holds it in the air more, LOBs into wall as well with PT assisting as above to prevent falls    Gait speed with rollator:  68 m/s; deviations: occasional sway, required Geovanna less frequently, only when he had a bad tick, when given cues to squeeze down on walker brakes when feeling dizziness or tick coming on sway reduced and pt did not require assist from PT for balance

## 2019-10-17 NOTE — PROGRESS NOTES
Assessment and Plan:     Problem List Items Addressed This Visit        Cardiovascular and Mediastinum    Deep vein thrombosis (DVT) of proximal lower extremity (HCC) - Primary       Other    Anxiety    Relevant Medications    sertraline (ZOLOFT) 25 mg tablet    Other Relevant Orders    Ambulatory referral to Psychiatry    Tremor    Recurrent falls    Medicare annual wellness visit, subsequent     Assessment and plan 1  Medicare subsequent annual wellness examination overall the patient is clinically stable and doing well, we encouraged the patient to follow a healthy and balanced diet  We recommend that the patient exercise routinely approximately 30 minutes 5 times per week   We have reviewed the patient's vaccines and have made recommendations for updates if necessary  recommend annual flu vaccine, consider the new shingles vaccine     We will be ordering screening laboratories which are age appropriate  Return to the office in  2 months    call if any problems  Other Visit Diagnoses     Anosmia        Relevant Orders    Ambulatory Referral to Otolaryngology    Leukopenia, unspecified type        Relevant Orders    CBC (Includes Diff/Plt) (Refl)          Falls Plan of Care: Balance, strength, and gait training instructions were provided  Preventive health issues were discussed with patient, and age appropriate screening tests were ordered as noted in patient's After Visit Summary  Personalized health advice and appropriate referrals for health education or preventive services given if needed, as noted in patient's After Visit Summary       History of Present Illness:     Patient presents for Medicare Annual Wellness visit    Patient Care Team:  Myles Baumann DO as PCP - Charmel Auer, MD Ivonne Hasty, MD Ruthie Bumpers, MD Flossie Cord, MD     Problem List:     Patient Active Problem List   Diagnosis    Crohn's disease    Allergic rhinitis    Depression    History of DVT (deep vein thrombosis)    Anxiety    HTN (hypertension)    Leucocytosis    Solar lentigo    Dilated pancreatic duct    Vertigo    Nail anomaly    Neck mass    Weight loss    Preop examination    Benign parotid tumor    Thrombocytopenia (HCC)    Tremor    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    Deep vein thrombosis (DVT) of proximal lower extremity (HCC)    Need for pneumococcal vaccination    Screening for diabetes mellitus    Atypical chest pain    Glaucoma screening    Polyuria    Pain of right eye    Recurrent falls    Influenza vaccine needed    Screening for cardiovascular condition    Medicare annual wellness visit, subsequent      Past Medical and Surgical History:     Past Medical History:   Diagnosis Date    Anxiety     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)     [facility]'s disease [facility] )     Depression     [facility]     [place] (hard of hearing)     no hearing aids    Hypertension     Mass in neck     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[facility] )     Psychiatric disorder     depression, anger    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     Past Surgical History:   Procedure Laterality Date    COLON SURGERY      Partial colectomy and colostomy    COLONOSCOPY      ESOPHAGOGASTRODUODENOSCOPY N/A 4/5/2017    Procedure: ESOPHAGOGASTRODUODENOSCOPY (EGD); Surgeon: Kings Wooten MD;  [facility]; Service:     EYE SURGERY Right     Cataract    KNEE SURGERY      VT EDG US EXAM SURGICAL ALTER STOM DUODENUM/JEJUNUM N/A 4/9/2018    Procedure: LINEAR ENDOSCOPIC U/S;  Surgeon: Daniel Tirado MD;  [facility];   Service: Gastroenterology    VT EXC PAROTD,LAT LOBE,DISSECT 5TH NERV Right 8/8/2018    Procedure: PAROTIDECTOMY WITH FACIAL NERVE MONITOR AND FROZEN SECTION;  Surgeon: Ramón Tapia MD;  [facility];  Service: ENT    VT IMPACT TOOTH [address] COMP BONY N/A 8/8/2018    Procedure: EXTRACTION TEETH #15 and #30;  Surgeon: Marlene Dawson DDS;  [facility];  Service: Maxillofacial    RADICAL NECK DISSECTION N/A 2018    Procedure: SELECTIVE NECK DISSECTION;  Surgeon: Amie Miller MD;  [facility];  Service: ENT    UPPER GASTROINTESTINAL ENDOSCOPY      VEIN LIGATION AND STRIPPING        Family History:     Family History   Problem Relation Age of Onset    Heart disease Brother     Diverticulitis Mother       Social History:     Social History     Socioeconomic History    Marital status: /Civil Union     Spouse name: None    Number of children: None    Years of education: None    Highest education level: None   Occupational History    None   Social Needs    Financial resource strain: None    Food insecurity:     Worry: None     Inability: None    Transportation needs:     Medical: None     Non-medical: None   Tobacco Use    Smoking status: Former Smoker     Packs/day: 1 00     Years: 10 00     Pack years: 10 00     Types: Cigarettes     Last attempt to quit: 1981     Years since quittin 3    Smokeless tobacco: Never Used    Tobacco comment: 50 years ago   Substance and Sexual Activity    Alcohol use: No     Comment: hx etoh abuse pt states he quit 9 years ago    Drug use: No    Sexual activity: Never   Lifestyle    Physical activity:     Days per week: None     Minutes per session: None    Stress: None   Relationships    Social connections:     Talks on phone: None     Gets together: None     Attends Jehovah's witness service: None     Active member of club or organization: None     Attends meetings of clubs or organizations: None     Relationship status: None    Intimate partner violence:     Fear of current or ex partner: None     Emotionally abused: None     Physically abused: None     Forced sexual activity: None   Other Topics Concern    None   Social History Narrative    None       Medications and Allergies:     Current Outpatient Medications   Medication Sig Dispense Refill    amLODIPine (NORVASC) 5 mg tablet TAKE ONE TABLET BY MOUTH ONCE DAILY 90 tablet 3    fluticasone (FLONASE) 50 mcg/act nasal spray 1 spray into each nostril daily      Loratadine (CLARITIN) 10 MG CAPS Take by mouth daily as needed        LORazepam (ATIVAN) 0 5 mg tablet TAKE 1/2 (ONE-HALF) TABLET BY MOUTH EVERY 12 HOURS AS NEEDED FOR ANXIETY 30 tablet 0    meclizine (ANTIVERT) 25 mg tablet Take 1 tablet (25 mg total) by mouth 3 (three) times a day as needed for dizziness 30 tablet 2    Melatonin 10 MG TABS Take by mouth      mirtazapine (REMERON) 30 mg tablet TAKE 1 TABLET BY MOUTH EVERY DAY AT BEDTIME 30 tablet 5    Multiple Vitamins-Minerals (MULTI FOR HIM 50+ PO) Take 1 tablet by mouth daily      ondansetron (ZOFRAN) 4 mg tablet Take 1 tablet (4 mg total) by mouth every 8 (eight) hours as needed for nausea or vomiting 20 tablet 0    warfarin (COUMADIN) 2 mg tablet TAKE 1 TABLET BY MOUTH ONCE DAILY 90 tablet 3    sertraline (ZOLOFT) 25 mg tablet Take 1 tablet (25 mg total) by mouth daily 30 tablet 5     No current facility-administered medications for this visit  Allergies   Allergen Reactions    Duloxetine Other (See Comments)     Panic attacks    Other      Seasonal      Immunizations:     Immunization History   Administered Date(s) Administered    INFLUENZA 10/06/2018    Influenza Split High Dose Preservative Free IM 11/27/2013, 12/02/2014, 10/22/2015, 10/08/2016, 10/30/2017    Influenza TIV (IM) 11/28/2012    Influenza, high dose seasonal 0 5 mL 10/06/2018, 09/25/2019    Pneumococcal Conjugate 13-Valent 12/02/2014, 10/22/2015    Pneumococcal Polysaccharide PPV23 01/16/2007, 12/06/2018      Health Maintenance:         Topic Date Due    Hepatitis C Screening  Completed     There are no preventive care reminders to display for this patient  Medicare Health Risk Assessment:     /74   Pulse 69   Resp 16   SpO2 97%      Amy Bello is here for his Subsequent Wellness visit       Health Risk Assessment:   Patient rates overall health as fair  Patient feels that their physical health rating is same  Eyesight was rated as same  Hearing was rated as same  Patient feels that their emotional and mental health rating is same  Pain experienced in the last 7 days has been none  Patient states that he has experienced no weight loss or gain in last 6 months  Fall Risk Screening: In the past year, patient has experienced: history of falling in past year    Number of falls: 2 or more    Home Safety:  Patient has trouble with stairs inside or outside of their home  Patient has working smoke alarms and has working carbon monoxide detector  Home safety hazards include: none  Nutrition:   Current diet is Regular, Unhealthy and Frequent junk food  Medications:   Patient is currently taking over-the-counter supplements  OTC medications include: see medication list  Patient is able to manage medications  Activities of Daily Living (ADLs)/Instrumental Activities of Daily Living (IADLs):   Walk and transfer into and out of bed and chair?: Yes  Dress and groom yourself?: Yes    Bathe or shower yourself?: Yes    Feed yourself? Yes  Do your laundry/housekeeping?: Yes  Manage your money, pay your bills and track your expenses?: Yes  Make your own meals?: Yes    Do your own shopping?: Yes    Previous Hospitalizations:   Any hospitalizations or ED visits within the last 12 months?: Yes    How many hospitalizations have you had in the last year?: 1-2    Advance Care Planning:   Living will: Yes    Durable POA for healthcare:  Yes    Advanced directive: Yes      PREVENTIVE SCREENINGS      Cardiovascular Screening:    General: Screening Current      Diabetes Screening:     General: Screening Current      Abdominal Aortic Aneurysm (AAA) Screening:    Risk factors include: age between 73-67 yo and tobacco use        Hepatitis C Screening:    General: Screening Current      Walda Gottron, DO

## 2019-10-17 NOTE — PROGRESS NOTES
Assessment/Plan:    Medicare annual wellness visit, subsequent  Assessment and plan 1  Medicare subsequent annual wellness examination overall the patient is clinically stable and doing well, we encouraged the patient to follow a healthy and balanced diet  We recommend that the patient exercise routinely approximately 30 minutes 5 times per week   We have reviewed the patient's vaccines and have made recommendations for updates if necessary  recommend annual flu vaccine, consider the new shingles vaccine     We will be ordering screening laboratories which are age appropriate  Return to the office in  2 months    call if any problems  Deep vein thrombosis (DVT) of proximal lower extremity (HCC)  Clinically stable and doing well continue the current medical regiment will continue monitor  HTN (hypertension)  Hypertension - controlled, I have counseled patient following healthy balance diet, I would like the patient reduce sodium, exercise routinely, I would like the patient continued the med current medical regiment and we will continue to monitor  Anosmia  I will have him see ENT for further evaluation    Anxiety  Significant anxiety no suicidal ideation he does not want the partial program will start him on Zoloft 25 mg once daily he will wean off the Remeron, we did discuss this with the pharmacist [facility] today and she will check on the patient in 1 week  I will have the patient meet with counselor Hilton Cassidy  I have reviewed the risks benefits and side effects of the medication patient reports me his tremors were secondary to anxiety which leads to his fall    Leukopenia  Currently he has medically stable will continue monitor his CBC recheck CBC in 1 month    Recurrent falls  He is using a walker, he does not want physical therapy I did recommend that he carries a cell phone at all times were get a medical alert safety device    He reports me that his falls are secondary to anxiety the triggers his tremor and by treating his anxiety history MR will improve and therefore reduce his risk of falling  Tremor  Psychogenic tremor as per Neurology which is worsened by anxiety         Problem List Items Addressed This Visit        Cardiovascular and Mediastinum    HTN (hypertension) (Chronic)     Hypertension - controlled, I have counseled patient following healthy balance diet, I would like the patient reduce sodium, exercise routinely, I would like the patient continued the med current medical regiment and we will continue to monitor  Deep vein thrombosis (DVT) of proximal lower extremity (HCC) - Primary     Clinically stable and doing well continue the current medical regiment will continue monitor  Other    Anxiety     Significant anxiety no suicidal ideation he does not want the partial program will start him on Zoloft 25 mg once daily he will wean off the Remeron, we did discuss this with the pharmacist [facility] today and she will check on the patient in 1 week  I will have the patient meet with counselor Raquel Larkin  I have reviewed the risks benefits and side effects of the medication patient reports me his tremors were secondary to anxiety which leads to his fall         Relevant Medications    sertraline (ZOLOFT) 25 mg tablet    Other Relevant Orders    Ambulatory referral to Psychiatry    Tremor     Psychogenic tremor as per Neurology which is worsened by anxiety         Recurrent falls     He is using a walker, he does not want physical therapy I did recommend that he carries a cell phone at all times were get a medical alert safety device  He reports me that his falls are secondary to anxiety the triggers his tremor and by treating his anxiety history MR will improve and therefore reduce his risk of falling  Medicare annual wellness visit, subsequent     Assessment and plan 1    Medicare subsequent annual wellness examination overall the patient is clinically stable and doing well, we encouraged the patient to follow a healthy and balanced diet  We recommend that the patient exercise routinely approximately 30 minutes 5 times per week   We have reviewed the patient's vaccines and have made recommendations for updates if necessary  recommend annual flu vaccine, consider the new shingles vaccine     We will be ordering screening laboratories which are age appropriate  Return to the office in  2 months    call if any problems  Anosmia     I will have him see ENT for further evaluation         Relevant Orders    Ambulatory Referral to Otolaryngology    Leukopenia     Currently he has medically stable will continue monitor his CBC recheck CBC in 1 month         Relevant Orders    CBC (Includes Diff/Plt) (Refl)          Return to office 2  months  call if any problems  He also does mention some mild withdrawal secondary to recent discontinuation of gabapentin? He may take gabapentin 100 mg 1 tablet every other day x2 weeks, every 3rd day x2 weeks and then discontinue  Is very possible the symptoms are not secondary to withdrawal and infact are secondary to anxiety  Subjective:      Patient ID: Amna Franco is a 69 y o  male  HPI  70-year old male coming in for a follow up visit regarding DVT, anxiety, recurrent falls, tremor, anosmia, leukopenia, hypertension; The patient reports me compliant taking medications without untoward side effects the  The patient is here to review his medical condition, update me on the medical condition and the patient reports me no hospitalizations and no ER visits  reports me weaned off the gabapentin about 2 weeks ago he reports me that his dizziness might have got worse although his anxiety has worsened which might be causing his symptoms  He does have an appointment with Psychiatry Dr Kulwant Begum in February of 2020     His wife reports me severe anxiety he reports me his symptoms are constant all the time; he is by himself he over things everything that 1 time in his life without thinking about the solutions he then starts to hyperventilate, running around the colo 0 my God only God only God he is hyperventilating at this point is wife needs to come him down his wife needs to talk him through it to slowly breathing and it improves after about a minute in these symptoms have been going on for about 2 years since his brother   He misses his brother he is walking 10-15 miles per day and having anxiety attacks with waking  Depression moderate  , only sleeps 2 hours per day,  No si   The following portions of the patient's history were reviewed and updated as appropriate: allergies, current medications, past family history, past medical history, past social history, past surgical history and problem list     Review of Systems   Constitutional: Negative for activity change, appetite change and unexpected weight change  HENT: Negative for congestion and postnasal drip  Eyes: Negative for visual disturbance  Respiratory: Negative for cough and shortness of breath  Cardiovascular: Negative for chest pain  Gastrointestinal: Negative for abdominal pain, diarrhea, nausea and vomiting  Musculoskeletal: Positive for gait problem (Uses a walker)  Neurological: Positive for tremors  Negative for dizziness, light-headedness and headaches  Hematological: Negative for adenopathy  Psychiatric/Behavioral: Negative for suicidal ideas  The patient is nervous/anxious  Objective:    No follow-ups on file  No results found        Allergies   Allergen Reactions    Duloxetine Other (See Comments)     Panic attacks    Other      Seasonal       Past Medical History:   Diagnosis Date    Anxiety     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[facility])     [facility]     Depression     [facility])     [place] (hard of hearing)     no hearing aids    Hypertension     Mass in neck     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[facility]     Psychiatric disorder     depression, anger    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     Past Surgical History:   Procedure Laterality Date    COLON SURGERY      Partial colectomy and colostomy    COLONOSCOPY      ESOPHAGOGASTRODUODENOSCOPY N/A 4/5/2017    Procedure: ESOPHAGOGASTRODUODENOSCOPY (EGD); Surgeon: Shereen Rivers MD;  [facility]; Service:     EYE SURGERY Right     Cataract    KNEE SURGERY      [place] EDG US EXAM SURGICAL ALTER STOM DUODENUM/JEJUNUM N/A 4/9/2018    Procedure: LINEAR ENDOSCOPIC U/S;  Surgeon: Zhane Baumann MD;  [facility];   Service: Gastroenterology    NH EXC PAROTD,LAT LOBE,DISSECT 5TH NERV Right 8/8/2018    Procedure: PAROTIDECTOMY WITH FACIAL NERVE MONITOR AND FROZEN SECTION;  Surgeon: Pb Brooke MD;  [facility];  Service: ENT    NH IMPACT TOOTH [address] COMP BONY N/A 8/8/2018    Procedure: EXTRACTION TEETH #15 and #30;  Surgeon: Layla Berger DDS;  [facility];  Service: Maxillofacial    RADICAL NECK DISSECTION N/A 8/8/2018    Procedure: SELECTIVE NECK DISSECTION;  Surgeon: Pb Brooke MD;  [facility];  Service: ENT    UPPER GASTROINTESTINAL ENDOSCOPY      VEIN LIGATION AND STRIPPING       Current Outpatient Medications on File Prior to Visit   Medication Sig Dispense Refill    amLODIPine (NORVASC) 5 mg tablet TAKE ONE TABLET BY MOUTH ONCE DAILY 90 tablet 3    fluticasone (FLONASE) 50 mcg/act nasal spray 1 spray into each nostril daily      Loratadine (CLARITIN) 10 MG CAPS Take by mouth daily as needed        LORazepam (ATIVAN) 0 5 mg tablet TAKE 1/2 (ONE-HALF) TABLET BY MOUTH EVERY 12 HOURS AS NEEDED FOR ANXIETY 30 tablet 0    meclizine (ANTIVERT) 25 mg tablet Take 1 tablet (25 mg total) by mouth 3 (three) times a day as needed for dizziness 30 tablet 2    Melatonin 10 MG TABS Take by mouth      mirtazapine (REMERON) 30 mg tablet TAKE 1 TABLET BY MOUTH EVERY DAY AT BEDTIME 30 tablet 5    Multiple Vitamins-Minerals (MULTI FOR HIM 50+ PO) Take 1 tablet by mouth daily      ondansetron (ZOFRAN) 4 mg tablet Take 1 tablet (4 mg total) by mouth every 8 (eight) hours as needed for nausea or vomiting 20 tablet 0    warfarin (COUMADIN) 2 mg tablet TAKE 1 TABLET BY MOUTH ONCE DAILY 90 tablet 3     No current facility-administered medications on file prior to visit        Family History   Problem Relation Age of Onset    Heart disease Brother     Diverticulitis Mother      Social History     Socioeconomic History    Marital status: /Civil Union     Spouse name: Not on file    Number of children: Not on file    Years of education: Not on file    Highest education level: Not on file   Occupational History    Not on file   Social Needs    Financial resource strain: Not on file    Food insecurity:     Worry: Not on file     Inability: Not on file    Transportation needs:     Medical: Not on file     Non-medical: Not on file   Tobacco Use    Smoking status: Former Smoker     Packs/day: 1 00     Years: 10 00     Pack years: 10 00     Types: Cigarettes     Last attempt to quit: 1981     Years since quittin 3    Smokeless tobacco: Never Used    Tobacco comment: 50 years ago   Substance and Sexual Activity    Alcohol use: No     Comment: hx etoh abuse pt states he quit 9 years ago    Drug use: No    Sexual activity: Never   Lifestyle    Physical activity:     Days per week: Not on file     Minutes per session: Not on file    Stress: Not on file   Relationships    Social connections:     Talks on phone: Not on file     Gets together: Not on file     Attends Zoroastrianism service: Not on file     Active member of club or organization: Not on file     Attends meetings of clubs or organizations: Not on file     Relationship status: Not on file    Intimate partner violence:     Fear of current or ex partner: Not on file     Emotionally abused: Not on file     Physically abused: Not on file     Forced sexual activity: Not on file   Other Topics Concern    Not on file   Social History Narrative    Not on file     Vitals:    10/17/19 1328   BP: 124/74   Pulse: 69   Resp: 16   SpO2: 97%     Results for orders placed or performed in visit on 10/10/19   UA w Reflex to Microscopic w Reflex to Culture   Result Value Ref Range    Color, UA Yellow     Clarity, UA Clear     Specific Gravity, UA >=1 030 1 003 - 1 030    pH, UA 5 5 4 5, 5 0, 5 5, 6 0, 6 5, 7 0, 7 5, 8 0    Leukocytes, UA Negative Negative    Nitrite, UA Negative Negative    Protein, UA Negative Negative mg/dl    Glucose, UA Negative Negative mg/dl    Ketones, UA Negative Negative mg/dl    Urobilinogen, UA 0 2 0 2, 1 0 E U /dl E U /dl    Bilirubin, UA Negative Negative    Blood, UA Negative Negative     Weight (last 2 days)     None        There is no height or weight on file to calculate BMI  BP      Temp      Pulse     Resp      SpO2      There were no vitals filed for this visit  There were no vitals filed for this visit  /74   Pulse 69   Resp 16   SpO2 97%          Physical Exam   Constitutional: He appears well-developed and well-nourished  No distress  HENT:   Head: Normocephalic and atraumatic  Right Ear: External ear normal    Left Ear: External ear normal    Mouth/Throat: Oropharynx is clear and moist    Eyes: Pupils are equal, round, and reactive to light  Conjunctivae are normal  Right eye exhibits no discharge  Left eye exhibits no discharge  No scleral icterus  Neck: Neck supple  Cardiovascular: Normal rate, regular rhythm and normal heart sounds  Exam reveals no gallop and no friction rub  No murmur heard  Pulmonary/Chest: No respiratory distress  He has no wheezes  He has no rales  Abdominal: Soft  Bowel sounds are normal  He exhibits no distension and no mass  There is no tenderness  There is no rebound and no guarding  Musculoskeletal: He exhibits no edema or deformity     Lymphadenopathy:     He has no cervical adenopathy  Neurological: He is alert  Skin: He is not diaphoretic  Psychiatric: His mood appears anxious  He does not exhibit a depressed mood  He expresses no suicidal ideation       tremor

## 2019-10-19 PROBLEM — D72.819 LEUKOPENIA: Status: ACTIVE | Noted: 2019-10-19

## 2019-10-19 PROBLEM — Z00.00 MEDICARE ANNUAL WELLNESS VISIT, SUBSEQUENT: Status: ACTIVE | Noted: 2019-10-19

## 2019-10-19 PROBLEM — R43.0 ANOSMIA: Status: ACTIVE | Noted: 2019-10-19

## 2019-10-19 NOTE — ASSESSMENT & PLAN NOTE
Assessment and plan 1  Medicare subsequent annual wellness examination overall the patient is clinically stable and doing well, we encouraged the patient to follow a healthy and balanced diet  We recommend that the patient exercise routinely approximately 30 minutes 5 times per week   We have reviewed the patient's vaccines and have made recommendations for updates if necessary  recommend annual flu vaccine, consider the new shingles vaccine     We will be ordering screening laboratories which are age appropriate  Return to the office in  2 months    call if any problems

## 2019-10-19 NOTE — ASSESSMENT & PLAN NOTE
He is using a walker, he does not want physical therapy I did recommend that he carries a cell phone at all times were get a medical alert safety device  He reports me that his falls are secondary to anxiety the triggers his tremor and by treating his anxiety history MR will improve and therefore reduce his risk of falling

## 2019-10-19 NOTE — ASSESSMENT & PLAN NOTE
Significant anxiety no suicidal ideation he does not want the partial program will start him on Zoloft 25 mg once daily he will wean off the Remeron, we did discuss this with the pharmacist [facility] today and she will check on the patient in 1 week  I will have the patient meet with counselor Nena Benitez    I have reviewed the risks benefits and side effects of the medication patient reports me his tremors were secondary to anxiety which leads to his fall

## 2019-10-23 ENCOUNTER — SOCIAL WORK (OUTPATIENT)
Dept: BEHAVIORAL/MENTAL HEALTH CLINIC | Facility: CLINIC | Age: 74
End: 2019-10-23
Payer: MEDICARE

## 2019-10-23 DIAGNOSIS — F41.9 ANXIETY: ICD-10-CM

## 2019-10-23 PROCEDURE — 90834 PSYTX W PT 45 MINUTES: CPT | Performed by: SOCIAL WORKER

## 2019-10-23 NOTE — PATIENT INSTRUCTIONS
Processed stressors and struggles with anxiety and anger during session- validation and support provided  Reviewed coping strategies for both  Reviewed appropriate outlets for anger to utilize  Provided my contact information and agrees to contact me next week to report on his status  Will communicate this to PCP  Wife in agreement with this plan as well

## 2019-10-23 NOTE — PSYCH
Assessment/Plan: Manage mood issues     Diagnoses and all orders for this visit:    Anxiety  -     Ambulatory referral to Psychiatry          Subjective: Reports ongoing issues with anxiety and mood lability  Anger a long-standing issue for him  Has been taking Sertraline and complains of some fatigue and mental "foginess" from same  Will monitor this to see if it dissipates longer he is taking Sertraline  Patient ID: Nicolás Woo is a 69 y o  male  Met with Terry Merida from 2:00PM-2:40PM  Struggling with anxiety and anger issues that admittedly is affecting his relationship with wife  Retiring, not being able to drive and having financil issues are contributing stressors  Denies any depressive symptoms but acknowledges anger as issue  No SI or HI  Review of Systems   Psychiatric/Behavioral: Positive for agitation  The patient is nervous/anxious  Objective: Presents as anxious and somewhat irritable  This faded as session progressed  As we discussed more of his struggles in depth, his tic seemed to start  He is agreeable to see me every couple weeks and to check in by phone in between to report on his status  Wife who accompanied him in agreement  Overall, he is verbal,cooperative and oriented during session       Physical Exam   Psychiatric: His behavior is normal  Judgment and thought content normal

## 2019-10-24 ENCOUNTER — TELEPHONE (OUTPATIENT)
Dept: INTERNAL MEDICINE CLINIC | Facility: CLINIC | Age: 74
End: 2019-10-24

## 2019-10-24 DIAGNOSIS — F41.9 ANXIETY: Primary | ICD-10-CM

## 2019-10-24 RX ORDER — MIRTAZAPINE 7.5 MG/1
7.5 TABLET, FILM COATED ORAL
Qty: 30 TABLET | Refills: 5 | Status: SHIPPED | OUTPATIENT
Start: 2019-10-24 | End: 2019-12-17 | Stop reason: ALTCHOICE

## 2019-10-24 NOTE — TELEPHONE ENCOUNTER
----- Message from Veda Loyd DO sent at 10/23/2019  8:40 PM EDT -----   Please set this up [facility]  ----- Message -----  From: Nadine Fregoso  Sent: 10/23/2019   3:58 PM EDT  To: Veda Loyd DO    Wife reports continuing to malcolm  off Remeron and trying to cut pills down to 7 5  Unable to cut them effectively and requesting an rx instead   Colie Drought

## 2019-10-31 ENCOUNTER — OFFICE VISIT (OUTPATIENT)
Dept: GASTROENTEROLOGY | Facility: AMBULARY SURGERY CENTER | Age: 74
End: 2019-10-31
Payer: MEDICARE

## 2019-10-31 VITALS
SYSTOLIC BLOOD PRESSURE: 122 MMHG | HEART RATE: 82 BPM | DIASTOLIC BLOOD PRESSURE: 72 MMHG | RESPIRATION RATE: 16 BRPM | HEIGHT: 65 IN | TEMPERATURE: 99.2 F | WEIGHT: 139 LBS | BODY MASS INDEX: 23.16 KG/M2

## 2019-10-31 DIAGNOSIS — R63.4 WEIGHT LOSS: ICD-10-CM

## 2019-10-31 DIAGNOSIS — K50.00 CROHN'S DISEASE OF SMALL INTESTINE WITHOUT COMPLICATION (HCC): Primary | Chronic | ICD-10-CM

## 2019-10-31 DIAGNOSIS — K86.89 DILATED PANCREATIC DUCT: ICD-10-CM

## 2019-10-31 PROCEDURE — 99213 OFFICE O/P EST LOW 20 MIN: CPT | Performed by: INTERNAL MEDICINE

## 2019-10-31 NOTE — PROGRESS NOTES
[facility]  aSlly Jensen 69 y o  male MRN: [id number]            Assessment & Plan:    Very pleasant 71-year-old gentleman with remote history of Crohn's disease status post total procto colectomy with end ileostomy, pancreatic cyst and abnormal MRI, weight loss  1  Pancreatic cyst:  Appears to be stable, 2 mm cyst with ectatic duct but nondilated  -repeat MRI in 1 year    2  History of Crohn's disease:  Status post total procto colectomy, no change in patient's ostomy output, recent laboratory studies were normal including albumin  -patient clinically appears to be in remission    3  Weight loss:  Secondary to severe anxiety, excessive exercising, poor dietary intake   -continued to encourage the patient to monitor his weight, monitor his calorie intake  -patient can follow up in 1 year with routine weight checks with PCP  There were asked to give us a call with any change or worsening symptoms              _____________________________________________________________        CC: Follow-up    HPI:  Sally Jensen is a 69 y o male who is here for follow-up  As you know this is a 71-year-old gentle with severe anxiety, we had seen in the past for dilated pancreatic duct, weight loss, history of Crohn's disease status post total procto colectomy  The patient recently had his MRI which demonstrated stable 2 mm cyst in his pancreas with mildly ectatic pancreatic duct but no dilation of the pancreatic duct  He was noted to have gallstones  Patient denies any abdominal pain, he has vague intermittent left-sided abdominal flank pain  Denies any nausea, vomiting  Reports that his ostomy output has been stable  He continues to maintain his weight but continues exercise many miles per day, now working out in his basement  He has a poor diet primarily of carbohydrates but it has been able to maintain his weight      Presents today with his wife, her greatest concerns with regards to change in medications  The patient was previously on Remeron has recently been switched to Zoloft  She had question if there is any changes or monitoring the need to be pursued  ROS:  The remainder of the ROS was negative except for the pertinent positives mentioned in HPI        Allergies: Duloxetine and Other    Medications:   Current Outpatient Medications:     amLODIPine (NORVASC) 5 mg tablet, TAKE ONE TABLET BY MOUTH ONCE DAILY, Disp: 90 tablet, Rfl: 3    LORazepam (ATIVAN) 0 5 mg tablet, TAKE 1/2 (ONE-HALF) TABLET BY MOUTH EVERY 12 HOURS AS NEEDED FOR ANXIETY, Disp: 30 tablet, Rfl: 0    Melatonin 10 MG TABS, Take by mouth, Disp: , Rfl:     mirtazapine (REMERON) 30 mg tablet, TAKE 1 TABLET BY MOUTH EVERY DAY AT BEDTIME, Disp: 30 tablet, Rfl: 5    mirtazapine (REMERON) 7 5 MG tablet, Take 1 tablet (7 5 mg total) by mouth daily at bedtime, Disp: 30 tablet, Rfl: 5    Multiple Vitamins-Minerals (MULTI FOR HIM 50+ PO), Take 1 tablet by mouth daily, Disp: , Rfl:     ondansetron (ZOFRAN) 4 mg tablet, Take 1 tablet (4 mg total) by mouth every 8 (eight) hours as needed for nausea or vomiting, Disp: 20 tablet, Rfl: 0    sertraline (ZOLOFT) 25 mg tablet, Take 1 tablet (25 mg total) by mouth daily, Disp: 30 tablet, Rfl: 5    warfarin (COUMADIN) 2 mg tablet, TAKE 1 TABLET BY MOUTH ONCE DAILY, Disp: 90 tablet, Rfl: 3    fluticasone (FLONASE) 50 mcg/act nasal spray, 1 spray into each nostril daily, Disp: , Rfl:     Loratadine (CLARITIN) 10 MG CAPS, Take by mouth daily as needed  , Disp: , Rfl:     meclizine (ANTIVERT) 25 mg tablet, Take 1 tablet (25 mg total) by mouth 3 (three) times a day as needed for dizziness (Patient not taking: Reported on 10/31/2019), Disp: 30 tablet, Rfl: 2    Past Medical History:   Diagnosis Date    Anxiety     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     [facility]     Depression     [facility]     [place] (hard of hearing)     no hearing aids    Hypertension  Mass in neck     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     Psychiatric disorder     depression, anger    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       Past Surgical History:   Procedure Laterality Date    COLON SURGERY      Partial colectomy and colostomy    COLONOSCOPY      ESOPHAGOGASTRODUODENOSCOPY N/A 4/5/2017    Procedure: ESOPHAGOGASTRODUODENOSCOPY (EGD); Surgeon: Jayson Waterman MD;  [facility]; Service:     EYE SURGERY Right     Cataract    KNEE SURGERY      CO EDG US EXAM SURGICAL ALTER STOM DUODENUM/JEJUNUM N/A 4/9/2018    Procedure: LINEAR ENDOSCOPIC U/S;  Surgeon: John Dick MD;  [facility]; Service: Gastroenterology    CO EXC PAROTD,LAT LOBE,DISSECT 5TH NERV Right 8/8/2018    Procedure: PAROTIDECTOMY WITH FACIAL NERVE MONITOR AND FROZEN SECTION;  Surgeon: Ken Martínez MD;  [facility];  Service: ENT    CO IMPACT TOOTH [address] COMP BONY N/A 8/8/2018    Procedure: EXTRACTION TEETH #15 and #30;  Surgeon: Brock Saab DDS;  [facility];  Service: Maxillofacial    RADICAL NECK DISSECTION N/A 8/8/2018    Procedure: SELECTIVE NECK DISSECTION;  Surgeon: Ken Martínez MD;  [facility];  Service: ENT    UPPER GASTROINTESTINAL ENDOSCOPY      VEIN LIGATION AND STRIPPING         Family History   Problem Relation Age of Onset    Heart disease Brother     Diverticulitis Mother         reports that he quit smoking about 38 years ago  His smoking use included cigarettes  He has a 10 00 pack-year smoking history  He has never used smokeless tobacco  He reports that he does not drink alcohol or use drugs        Physical Exam:    /72 (BP Location: Left arm, Patient Position: Sitting, Cuff Size: Standard)   Pulse 82   Temp 99 2 °F (37 3 °C) (Tympanic)   Resp 16   Ht 5' 5" (1 651 m)   Wt 63 kg (139 lb)   BMI 23 13 kg/m²     Gen: wn/wd, NAD, thin and anxious gentleman with prominent myoclonic jerking  HEENT: anicteric, MMM, no cervical LAD  CVS: RRR, no m/r/g  CHEST: CTA b/l  ABD: +BS, soft, right lower quadrant ostomy, thin abdomen nontender, no hepatosplenomegaly  EXT: no c/c/e  NEURO: aaox3  SKIN: NO rashes

## 2019-10-31 NOTE — LETTER
October 31, 2019     Reece Alexander  [address] [facility] [address]    Patient: Delphia Severin   YOB: 1945   Date of Visit: 10/31/2019       Dear Dr Taina Luna: Thank you for referring Eusebia Johnston to me for evaluation  Below are my notes for this consultation  If you have questions, please do not hesitate to call me  I look forward to following your patient along with you  Sincerely,        Phil Fields MD        CC: No Recipients  Phil Fields MD  10/31/2019  2:26 PM  Sign at close encounter  [address] [facility]  Delphia Severin 69 y o  male MRN: [id number]            Assessment & Plan:    Very pleasant 78-year-old gentleman with remote history of Crohn's disease status post total procto colectomy with end ileostomy, pancreatic cyst and abnormal MRI, weight loss  1  Pancreatic cyst:  Appears to be stable, 2 mm cyst with ectatic duct but nondilated  -repeat MRI in 1 year    2  History of Crohn's disease:  Status post total procto colectomy, no change in patient's ostomy output, recent laboratory studies were normal including albumin  -patient clinically appears to be in remission    3  Weight loss:  Secondary to severe anxiety, excessive exercising, poor dietary intake   -continued to encourage the patient to monitor his weight, monitor his calorie intake  -patient can follow up in 1 year with routine weight checks with PCP  There were asked to give us a call with any change or worsening symptoms              _____________________________________________________________        CC: Follow-up    HPI:  Delphia Severin is a 69 y o male who is here for follow-up  As you know this is a 78-year-old gentle with severe anxiety, we had seen in the past for dilated pancreatic duct, weight loss, history of Crohn's disease status post total procto colectomy    The patient recently had his MRI which demonstrated stable 2 mm cyst in his pancreas with mildly ectatic pancreatic duct but no dilation of the pancreatic duct  He was noted to have gallstones  Patient denies any abdominal pain, he has vague intermittent left-sided abdominal flank pain  Denies any nausea, vomiting  Reports that his ostomy output has been stable  He continues to maintain his weight but continues exercise many miles per day, now working out in his basement  He has a poor diet primarily of carbohydrates but it has been able to maintain his weight  Presents today with his wife, her greatest concerns with regards to change in medications  The patient was previously on Remeron has recently been switched to Zoloft  She had question if there is any changes or monitoring the need to be pursued  ROS:  The remainder of the ROS was negative except for the pertinent positives mentioned in HPI        Allergies: Duloxetine and Other    Medications:   Current Outpatient Medications:     amLODIPine (NORVASC) 5 mg tablet, TAKE ONE TABLET BY MOUTH ONCE DAILY, Disp: 90 tablet, Rfl: 3    LORazepam (ATIVAN) 0 5 mg tablet, TAKE 1/2 (ONE-HALF) TABLET BY MOUTH EVERY 12 HOURS AS NEEDED FOR ANXIETY, Disp: 30 tablet, Rfl: 0    Melatonin 10 MG TABS, Take by mouth, Disp: , Rfl:     mirtazapine (REMERON) 30 mg tablet, TAKE 1 TABLET BY MOUTH EVERY DAY AT BEDTIME, Disp: 30 tablet, Rfl: 5    mirtazapine (REMERON) 7 5 MG tablet, Take 1 tablet (7 5 mg total) by mouth daily at bedtime, Disp: 30 tablet, Rfl: 5    Multiple Vitamins-Minerals (MULTI FOR HIM 50+ PO), Take 1 tablet by mouth daily, Disp: , Rfl:     ondansetron (ZOFRAN) 4 mg tablet, Take 1 tablet (4 mg total) by mouth every 8 (eight) hours as needed for nausea or vomiting, Disp: 20 tablet, Rfl: 0    sertraline (ZOLOFT) 25 mg tablet, Take 1 tablet (25 mg total) by mouth daily, Disp: 30 tablet, Rfl: 5    warfarin (COUMADIN) 2 mg tablet, TAKE 1 TABLET BY MOUTH ONCE DAILY, Disp: 90 tablet, Rfl: 3    fluticasone (FLONASE) 50 mcg/act nasal spray, 1 spray into each nostril daily, Disp: , Rfl:     Loratadine (CLARITIN) 10 MG CAPS, Take by mouth daily as needed  , Disp: , Rfl:     meclizine (ANTIVERT) 25 mg tablet, Take 1 tablet (25 mg total) by mouth 3 (three) times a day as needed for dizziness (Patient not taking: Reported on 10/31/2019), Disp: 30 tablet, Rfl: 2    Past Medical History:   Diagnosis Date    Anxiety     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       Past Surgical History:   Procedure Laterality Date    COLON SURGERY      Partial colectomy and colostomy    COLONOSCOPY      ESOPHAGOGASTRODUODENOSCOPY N/A 4/5/2017    Procedure: ESOPHAGOGASTRODUODENOSCOPY (EGD); Surgeon: Amy Jackson MD;  [facility]; Service:     EYE SURGERY Right     Cataract    KNEE SURGERY      IN EDG US EXAM SURGICAL ALTER STOM DUODENUM/JEJUNUM N/A 4/9/2018    Procedure: LINEAR ENDOSCOPIC U/S;  Surgeon: Sherice Oden MD;  [facility];   Service: Gastroenterology    IN EXC PAROTD,LAT LOBE,DISSECT 5TH NERV Right 8/8/2018    Procedure: PAROTIDECTOMY WITH FACIAL NERVE MONITOR AND FROZEN SECTION;  Surgeon: Jack Lange MD;  [facility];  Service: ENT    IN IMPACT TOOTH [address] COMP BONY N/A 8/8/2018    Procedure: EXTRACTION TEETH #15 and #30;  Surgeon: Dionne Howard DDS;  [facility];  Service: Maxillofacial    RADICAL NECK DISSECTION N/A 8/8/2018    Procedure: SELECTIVE NECK DISSECTION;  Surgeon: Jack Lange MD;  [facility];  Service: ENT    UPPER GASTROINTESTINAL ENDOSCOPY      VEIN LIGATION AND STRIPPING         Family History   Problem Relation Age of Onset    Heart disease Brother     Diverticulitis Mother         reports that he quit smoking about 38 years ago  His smoking use included cigarettes  He has a 10 00 pack-year smoking history  He has never used smokeless tobacco  He reports that he does not drink alcohol or use drugs        Physical Exam:    /72 (BP Location: Left arm, Patient Position: Sitting, Cuff Size: Standard)   Pulse 82   Temp 99 2 °F (37 3 °C) (Tympanic)   Resp 16   Ht 5' 5" (1 651 m)   Wt 63 kg (139 lb)   BMI 23 13 kg/m²      Gen: wn/wd, NAD, thin and anxious gentleman with prominent myoclonic jerking  HEENT: anicteric, MMM, no cervical LAD  CVS: RRR, no m/r/g  CHEST: CTA b/l  ABD: +BS, soft, right lower quadrant ostomy, thin abdomen nontender, no hepatosplenomegaly  EXT: no c/c/e  NEURO: aaox3  SKIN: NO rashes

## 2019-11-06 ENCOUNTER — APPOINTMENT (OUTPATIENT)
Dept: LAB | Facility: CLINIC | Age: 74
End: 2019-11-06
Payer: MEDICARE

## 2019-11-06 ENCOUNTER — SOCIAL WORK (OUTPATIENT)
Dept: BEHAVIORAL/MENTAL HEALTH CLINIC | Facility: CLINIC | Age: 74
End: 2019-11-06
Payer: MEDICARE

## 2019-11-06 ENCOUNTER — TRANSCRIBE ORDERS (OUTPATIENT)
Dept: LAB | Facility: CLINIC | Age: 74
End: 2019-11-06

## 2019-11-06 DIAGNOSIS — F41.9 ANXIETY: Primary | ICD-10-CM

## 2019-11-06 PROCEDURE — 90834 PSYTX W PT 45 MINUTES: CPT | Performed by: SOCIAL WORKER

## 2019-11-06 NOTE — PATIENT INSTRUCTIONS
Continue to process his physical and emotional struggles during session- validation and supportive therapy provided  Reviewed stress mgmt strategies to utilize as well as productive outlets for his emotions and anger  Agrees to utilize same

## 2019-11-06 NOTE — PSYCH
Assessment/Plan: Manage anxiety     There are no diagnoses linked to this encounter  Subjective: Rigo Butler denies any acute anxiety issues but presents as restless and tremulous at points during session  Mood lability remains an issue but appears to be managing better  At times during session, tremors worsened and then subsided  Denies depressive symptoms  No SI  Patient ID: Elise Mclaughlin is a 69 y o  male  Met with Rigo Butler for 45 minutes from 2:00PM-2:45PM  Denies feeling anxious but acknowledges he is restless, has difficulty relaxing and continues to have some issues with mood lability  States he has been taking Sertraline but does not seem to notice any difference in how he feels  Review of Systems   Psychiatric/Behavioral: Positive for agitation  The patient is nervous/anxious  Objective: Rigo Butler presents as somewhat anxious and restless but this lessened throughout session  Acknowledged long-standing issues with anger  Affects environment at home  Rigo Butler presents as verbal, cooperative and oriented during session        Physical Exam   Psychiatric: His behavior is normal  Judgment and thought content normal

## 2019-11-07 ENCOUNTER — ANTICOAG VISIT (OUTPATIENT)
Dept: INTERNAL MEDICINE CLINIC | Facility: CLINIC | Age: 74
End: 2019-11-07

## 2019-11-11 ENCOUNTER — ANTICOAG VISIT (OUTPATIENT)
Dept: INTERNAL MEDICINE CLINIC | Facility: CLINIC | Age: 74
End: 2019-11-11

## 2019-11-11 ENCOUNTER — APPOINTMENT (OUTPATIENT)
Dept: LAB | Facility: CLINIC | Age: 74
End: 2019-11-11
Payer: MEDICARE

## 2019-11-11 DIAGNOSIS — I82.4Y9 DEEP VEIN THROMBOSIS (DVT) OF PROXIMAL LOWER EXTREMITY, UNSPECIFIED CHRONICITY, UNSPECIFIED LATERALITY (HCC): ICD-10-CM

## 2019-11-11 LAB
INR PPP: 2.42 (ref 0.84–1.19)
PROTHROMBIN TIME: 25.4 SECONDS (ref 11.6–14.5)

## 2019-11-11 PROCEDURE — 36415 COLL VENOUS BLD VENIPUNCTURE: CPT

## 2019-11-11 PROCEDURE — 85610 PROTHROMBIN TIME: CPT

## 2019-11-17 DIAGNOSIS — I10 ESSENTIAL HYPERTENSION: ICD-10-CM

## 2019-11-17 RX ORDER — AMLODIPINE BESYLATE 5 MG/1
TABLET ORAL
Qty: 90 TABLET | Refills: 3 | Status: SHIPPED | OUTPATIENT
Start: 2019-11-17 | End: 2020-11-10

## 2019-11-18 ENCOUNTER — ANTICOAG VISIT (OUTPATIENT)
Dept: INTERNAL MEDICINE CLINIC | Facility: CLINIC | Age: 74
End: 2019-11-18

## 2019-11-18 ENCOUNTER — APPOINTMENT (OUTPATIENT)
Dept: LAB | Facility: CLINIC | Age: 74
End: 2019-11-18
Payer: MEDICARE

## 2019-11-18 DIAGNOSIS — I82.4Y9 DEEP VEIN THROMBOSIS (DVT) OF PROXIMAL LOWER EXTREMITY, UNSPECIFIED CHRONICITY, UNSPECIFIED LATERALITY (HCC): ICD-10-CM

## 2019-11-18 DIAGNOSIS — F41.9 ANXIETY: Primary | ICD-10-CM

## 2019-11-18 LAB
INR PPP: 1.94 (ref 0.84–1.19)
PROTHROMBIN TIME: 21.3 SECONDS (ref 11.6–14.5)

## 2019-11-18 PROCEDURE — 36415 COLL VENOUS BLD VENIPUNCTURE: CPT

## 2019-11-18 PROCEDURE — 85610 PROTHROMBIN TIME: CPT

## 2019-11-25 ENCOUNTER — ANTICOAG VISIT (OUTPATIENT)
Dept: INTERNAL MEDICINE CLINIC | Facility: CLINIC | Age: 74
End: 2019-11-25

## 2019-11-25 ENCOUNTER — APPOINTMENT (OUTPATIENT)
Dept: LAB | Facility: CLINIC | Age: 74
End: 2019-11-25
Payer: MEDICARE

## 2019-11-25 DIAGNOSIS — I82.4Y9 DEEP VEIN THROMBOSIS (DVT) OF PROXIMAL LOWER EXTREMITY, UNSPECIFIED CHRONICITY, UNSPECIFIED LATERALITY (HCC): ICD-10-CM

## 2019-11-25 LAB
INR PPP: 1.97 (ref 0.84–1.19)
PROTHROMBIN TIME: 21.6 SECONDS (ref 11.6–14.5)

## 2019-11-25 PROCEDURE — 85610 PROTHROMBIN TIME: CPT

## 2019-11-25 PROCEDURE — 36415 COLL VENOUS BLD VENIPUNCTURE: CPT

## 2019-11-27 ENCOUNTER — TELEPHONE (OUTPATIENT)
Dept: INTERNAL MEDICINE CLINIC | Facility: CLINIC | Age: 74
End: 2019-11-27

## 2019-11-27 ENCOUNTER — DOCUMENTATION (OUTPATIENT)
Dept: INTERNAL MEDICINE CLINIC | Facility: CLINIC | Age: 74
End: 2019-11-27

## 2019-11-27 ENCOUNTER — SOCIAL WORK (OUTPATIENT)
Dept: BEHAVIORAL/MENTAL HEALTH CLINIC | Facility: CLINIC | Age: 74
End: 2019-11-27
Payer: MEDICARE

## 2019-11-27 DIAGNOSIS — F41.9 ANXIETY: Primary | ICD-10-CM

## 2019-11-27 PROCEDURE — 90834 PSYTX W PT 45 MINUTES: CPT | Performed by: SOCIAL WORKER

## 2019-11-27 NOTE — TELEPHONE ENCOUNTER
Patient has not been taking ativan, he has been taking it as needed  Should patient still have this as needed? He was transitioned on to Zoloft and he takes this once daily

## 2019-11-27 NOTE — PROGRESS NOTES
[address] [facility] [place], PharmD      The following is per review of patient's pertinent medical/medication history:    Reason for documentation: Alerted by MA patient was inquiring about need to continue lorazepam as anxiety s/sx are controlled on sertraline  Patient's insurance coverage: Payor: MEDICARE / Plan: MEDICARE A AND B / Product Type: Medicare A & B Fee for Service /     As rx is written PRN, patient would not need to take rx if he is not experiencing s/sx  Call placed to patient but unable to reach   left with information however will try to contact patient next week to further discuss information  No further action required at this time       Demographics  Interaction Method: Phone  Type of Intervention: Follow-Up    Topic(s) Addressed  Other    Intervention(s) Made      Non-Pharmacologic: Adherence Addressed    Tool(s) Used  Not Applicable    Time Spent in Direct Patient Care Activities and Care Coordination: 0-15 Minutes    Recommendation(s) Accepted by the Patient/Caregiver: Not Applicable

## 2019-11-27 NOTE — PATIENT INSTRUCTIONS
Continues to review stress mgmt strategies as well as coping strategies for anger to utilize  Has been responding well to supportive therapy that has focused on origins of anger issues throughout his lifespan and how to better manage

## 2019-11-27 NOTE — PSYCH
Assessment/Plan: Manage anxiety     There are no diagnoses linked to this encounter  Subjective: Reports continues issues with stress and anxiety related to physical health issues and marital issues  Acknowledges some is self-induced but reports wife contributes as well  Denies depressive symptoms  Seems to be less angry and better able to walk away from stressful situations  Sleep continues to be an issues  Exercises twice daily  Patient ID: Winifred Beavers is a 69 y o  male  Met with Segunkirt Atkins for 45 minutes from 1:00PM-1:45PM  Presents as anxious but to lesser degree than in previous sessions  Activity outside has diminished as he is afraindof falling  Sits outside and exercise twice daily to manage anxiety and anger issues  Has long history of mood lability/anger issues  Been treated psychiatrically in past       Review of Systems   Psychiatric/Behavioral: Positive for sleep disturbance  The patient is nervous/anxious  Objective: Jay Atkins continues to presents as somewhat anxious and overwhelmed but much more relaxes than previous sessions  Presents as pleasant, verbal, cooperative and oriented during sessions  Less tremors than in previous session but reports some stomach upset  Has had ulcers before- will discuss with PCP       Physical Exam   Psychiatric: His behavior is normal  Judgment and thought content normal

## 2019-12-02 ENCOUNTER — APPOINTMENT (OUTPATIENT)
Dept: LAB | Facility: CLINIC | Age: 74
End: 2019-12-02
Payer: MEDICARE

## 2019-12-02 ENCOUNTER — ANTICOAG VISIT (OUTPATIENT)
Dept: INTERNAL MEDICINE CLINIC | Facility: CLINIC | Age: 74
End: 2019-12-02

## 2019-12-02 DIAGNOSIS — I82.4Y9 DEEP VEIN THROMBOSIS (DVT) OF PROXIMAL LOWER EXTREMITY, UNSPECIFIED CHRONICITY, UNSPECIFIED LATERALITY (HCC): ICD-10-CM

## 2019-12-02 LAB
INR PPP: 2.2 (ref 0.84–1.19)
PROTHROMBIN TIME: 23.5 SECONDS (ref 11.6–14.5)

## 2019-12-02 PROCEDURE — 85610 PROTHROMBIN TIME: CPT

## 2019-12-02 PROCEDURE — 36415 COLL VENOUS BLD VENIPUNCTURE: CPT

## 2019-12-02 NOTE — PROGRESS NOTES
F/u call placed to patient's wife  Wife reports patient has been off mirtazepine for ~1-2x weeks, currently on sertraline 50mg daily but reports anxiety s/sx ~1-2x/week  During these periods the patient is slightly unsteady on his feet and will sometimes give patient lorazepam PRN as they still have supply remaining from previous rx  Wife is not interested in increased sertraline dose at this time as she would like to wait until PCP appt on 12/17  Wife will monitor for frequency/severity of anxiety s/sx and will give lorazepam PRN if she feels patient needs it  Agrees to contact practice if further questions/concerns       Demographics  Interaction Method: Phone    Topic(s) Addressed  Other    Intervention(s) Made  Pharmacologic: Medication Adjustment - Dose or Frequency    Tool(s) Used  Not Applicable    Time Spent in Direct Patient Care Activities and Care Coordination: 0-15 Minutes    Recommendation(s) Accepted by the Patient/Caregiver: Partially Accepted

## 2019-12-16 ENCOUNTER — ANTICOAG VISIT (OUTPATIENT)
Dept: INTERNAL MEDICINE CLINIC | Facility: CLINIC | Age: 74
End: 2019-12-16

## 2019-12-16 ENCOUNTER — TRANSCRIBE ORDERS (OUTPATIENT)
Dept: LAB | Facility: CLINIC | Age: 74
End: 2019-12-16

## 2019-12-16 ENCOUNTER — APPOINTMENT (OUTPATIENT)
Dept: LAB | Facility: CLINIC | Age: 74
End: 2019-12-16
Payer: MEDICARE

## 2019-12-16 DIAGNOSIS — I82.4Y9 DEEP VEIN THROMBOSIS (DVT) OF PROXIMAL LOWER EXTREMITY, UNSPECIFIED CHRONICITY, UNSPECIFIED LATERALITY (HCC): ICD-10-CM

## 2019-12-16 LAB
INR PPP: 1.84 (ref 0.84–1.19)
PROTHROMBIN TIME: 20.4 SECONDS (ref 11.6–14.5)

## 2019-12-16 PROCEDURE — 36415 COLL VENOUS BLD VENIPUNCTURE: CPT

## 2019-12-16 PROCEDURE — 85610 PROTHROMBIN TIME: CPT

## 2019-12-17 ENCOUNTER — OFFICE VISIT (OUTPATIENT)
Dept: INTERNAL MEDICINE CLINIC | Facility: CLINIC | Age: 74
End: 2019-12-17
Payer: MEDICARE

## 2019-12-17 VITALS
TEMPERATURE: 97.6 F | RESPIRATION RATE: 16 BRPM | BODY MASS INDEX: 22.02 KG/M2 | WEIGHT: 132.2 LBS | HEART RATE: 84 BPM | SYSTOLIC BLOOD PRESSURE: 128 MMHG | DIASTOLIC BLOOD PRESSURE: 78 MMHG | HEIGHT: 65 IN | OXYGEN SATURATION: 97 %

## 2019-12-17 DIAGNOSIS — R29.6 RECURRENT FALLS: ICD-10-CM

## 2019-12-17 DIAGNOSIS — F41.9 ANXIETY: ICD-10-CM

## 2019-12-17 DIAGNOSIS — R10.9 ABDOMINAL PAIN, UNSPECIFIED ABDOMINAL LOCATION: ICD-10-CM

## 2019-12-17 DIAGNOSIS — I10 ESSENTIAL HYPERTENSION: Primary | ICD-10-CM

## 2019-12-17 DIAGNOSIS — R25.1 TREMOR: ICD-10-CM

## 2019-12-17 LAB
SL AMB  POCT GLUCOSE, UA: NORMAL
SL AMB LEUKOCYTE ESTERASE,UA: NORMAL
SL AMB POCT BILIRUBIN,UA: NORMAL
SL AMB POCT BLOOD,UA: NORMAL
SL AMB POCT CLARITY,UA: NORMAL
SL AMB POCT COLOR,UA: NORMAL
SL AMB POCT KETONES,UA: 0.5
SL AMB POCT NITRITE,UA: NORMAL
SL AMB POCT PH,UA: 6
SL AMB POCT SPECIFIC GRAVITY,UA: 1.03
SL AMB POCT URINE PROTEIN: 0.15
SL AMB POCT UROBILINOGEN: 3.5

## 2019-12-17 PROCEDURE — 81002 URINALYSIS NONAUTO W/O SCOPE: CPT | Performed by: INTERNAL MEDICINE

## 2019-12-17 PROCEDURE — 99214 OFFICE O/P EST MOD 30 MIN: CPT | Performed by: INTERNAL MEDICINE

## 2019-12-17 NOTE — PROGRESS NOTES
Assessment/Plan:    HTN (hypertension)  Hypertension - controlled, I have counseled patient following healthy balance diet, I would like the patient reduce sodium, exercise routinely, I would like the patient continued the med current medical regiment and we will continue to monitor  Abdominal pain  Mild mid abdominal pain on examination he does not report abdominal pain subjectively will check ultrasound of the abdomen    Anxiety  No SI increase Zoloft to 75 mg once a day see Psychiatry and see counselor tomorrow will check genetic testing for optimal SSRI or antidepressant    Recurrent falls  Secondary to tremor/secondary to anxiety use walker, see Psychiatry the patient has been for physical therapy which was not helpful  Tremor  Secondary to anxiety the patient is working with Neurology         Problem List Items Addressed This Visit        Cardiovascular and Mediastinum    HTN (hypertension) - Primary (Chronic)     Hypertension - controlled, I have counseled patient following healthy balance diet, I would like the patient reduce sodium, exercise routinely, I would like the patient continued the med current medical regiment and we will continue to monitor  Other    Anxiety     No SI increase Zoloft to 75 mg once a day see Psychiatry and see counselor tomorrow will check genetic testing for optimal SSRI or antidepressant         Relevant Medications    sertraline (ZOLOFT) 50 mg tablet    Tremor     Secondary to anxiety the patient is working with Neurology         Recurrent falls     Secondary to tremor/secondary to anxiety use walker, see Psychiatry the patient has been for physical therapy which was not helpful           Abdominal pain     Mild mid abdominal pain on examination he does not report abdominal pain subjectively will check ultrasound of the abdomen         Relevant Orders    US abdomen complete    POCT urine dip (Completed)    UA w Reflex to Microscopic w Reflex to Culture Return to office 2  months  call if any problems  Subjective:      Patient ID: Mckayla Guzmán is a 69 y o  male  HPI 89year old male coming in for a follow up visit regarding essential hypertension, tremor, anxiety, abdominal pain intermittent, recurrent falls; The patient reports me compliant taking medications without untoward side effects the  The patient is here to review his medical condition, update me on the medical condition and the patient reports me no hospitalizations and no ER visits  No SI wife reports reports bad temper , flys off  The elena over everything  Wont sit still,needs to vacuum still exercizing; annevesary of brothers death coming up    The following portions of the patient's history were reviewed and updated as appropriate: allergies, current medications, past family history, past medical history, past social history, past surgical history and problem list   JOSÉ LUIS-7 Flowsheet Screening      Most Recent Value   Over the last two weeks, how often have you been bothered by the following problems? Feeling nervous, anxious, or on edge  3   Not being able to stop or control worrying  3   Worrying too much about different things  3   Trouble relaxing   3   Being so restless that it's hard to sit still  3   Becoming easily annoyed or irritable   3   Feeling afraid as if something awful might happen  0   How difficult have these problems made it for you to do your work, take care of things at home, or get along with other people? Very difficult   JOSÉ LUIS Score   18        Review of Systems   Constitutional: Negative for activity change, appetite change and unexpected weight change  Eyes: Negative for visual disturbance  Respiratory: Negative for cough and shortness of breath  Cardiovascular: Positive for chest pain (Chronic secondary to anxiety the patient has had a cardiac workup that was negative)  Gastrointestinal: Negative for abdominal pain, diarrhea, nausea and vomiting  Neurological: Positive for tremors  Negative for dizziness, light-headedness and headaches  Hematological: Negative for adenopathy  Psychiatric/Behavioral: Negative for suicidal ideas  The patient is nervous/anxious  Objective:    No follow-ups on file  No results found  Allergies   Allergen Reactions    Duloxetine Other (See Comments)     Panic attacks    Other      Seasonal       Past Medical History:   Diagnosis Date    Anxiety     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     Past Surgical History:   Procedure Laterality Date    COLON SURGERY      Partial colectomy and colostomy    COLONOSCOPY      ESOPHAGOGASTRODUODENOSCOPY N/A 4/5/2017    Procedure: ESOPHAGOGASTRODUODENOSCOPY (EGD); Surgeon: Brennen Balderrama MD;  [facility]; Service:     EYE SURGERY Right     Cataract    KNEE SURGERY      GA EDG US EXAM SURGICAL ALTER STOM DUODENUM/JEJUNUM N/A 4/9/2018    Procedure: LINEAR ENDOSCOPIC U/S;  Surgeon: Mitchell Frausto MD;  [facility];   Service: Gastroenterology    GA EXC PAROTD,LAT LOBE,DISSECT 5TH NERV Right 8/8/2018    Procedure: PAROTIDECTOMY WITH FACIAL NERVE MONITOR AND FROZEN SECTION;  Surgeon: Whit Goldberg MD;  [facility];  Service: ENT    GA IMPACT TOOTH [address] COMP BONY N/A 8/8/2018    Procedure: EXTRACTION TEETH #15 and #30;  Surgeon: Bee Denson DDS;  [facility];  Service: Maxillofacial    RADICAL NECK DISSECTION N/A 8/8/2018    Procedure: SELECTIVE NECK DISSECTION;  Surgeon: Whit Goldberg MD;  [facility];  Service: ENT    UPPER GASTROINTESTINAL ENDOSCOPY      VEIN LIGATION AND STRIPPING       Current Outpatient Medications on File Prior to Visit Medication Sig Dispense Refill    amLODIPine (NORVASC) 5 mg tablet TAKE 1 TABLET BY MOUTH ONCE DAILY 90 tablet 3    LORazepam (ATIVAN) 0 5 mg tablet TAKE 1/2 (ONE-HALF) TABLET BY MOUTH EVERY 12 HOURS AS NEEDED FOR ANXIETY 30 tablet 0    Multiple Vitamins-Minerals (MULTI FOR HIM 50+ PO) Take 1 tablet by mouth daily      warfarin (COUMADIN) 2 mg tablet TAKE 1 TABLET BY MOUTH ONCE DAILY 90 tablet 3    [DISCONTINUED] sertraline (ZOLOFT) 50 mg tablet Take 1 tablet (50 mg total) by mouth daily 90 tablet 3    fluticasone (FLONASE) 50 mcg/act nasal spray 1 spray into each nostril daily      ipratropium (ATROVENT) 0 03 % nasal spray 2 sprays into each nostril 3 (three) times a day as needed for rhinitis (Patient not taking: Reported on 12/17/2019) 30 mL 6    Loratadine (CLARITIN) 10 MG CAPS Take by mouth daily as needed        Melatonin 10 MG TABS Take by mouth      [DISCONTINUED] meclizine (ANTIVERT) 25 mg tablet Take 1 tablet (25 mg total) by mouth 3 (three) times a day as needed for dizziness (Patient not taking: Reported on 10/31/2019) 30 tablet 2    [DISCONTINUED] mirtazapine (REMERON) 30 mg tablet TAKE 1 TABLET BY MOUTH EVERY DAY AT BEDTIME 30 tablet 5    [DISCONTINUED] mirtazapine (REMERON) 7 5 MG tablet Take 1 tablet (7 5 mg total) by mouth daily at bedtime 30 tablet 5    [DISCONTINUED] ondansetron (ZOFRAN) 4 mg tablet Take 1 tablet (4 mg total) by mouth every 8 (eight) hours as needed for nausea or vomiting 20 tablet 0     No current facility-administered medications on file prior to visit        Family History   Problem Relation Age of Onset    Heart disease Brother     Diverticulitis Mother      Social History     Socioeconomic History    Marital status: /Civil Union     Spouse name: Not on file    Number of children: Not on file    Years of education: Not on file    Highest education level: Not on file   Occupational History    Not on file   Social Needs    Financial resource strain: Not on file    Food insecurity:     Worry: Not on file     Inability: Not on file    Transportation needs:     Medical: Not on file     Non-medical: Not on file   Tobacco Use    Smoking status: Former Smoker     Packs/day: 1 00     Years: 10 00     Pack years: 10 00     Types: Cigarettes     Last attempt to quit: 1981     Years since quittin 5    Smokeless tobacco: Never Used    Tobacco comment: 50 years ago   Substance and Sexual Activity    Alcohol use: No     Comment: hx etoh abuse pt states he quit 9 years ago    Drug use: No    Sexual activity: Never   Lifestyle    Physical activity:     Days per week: Not on file     Minutes per session: Not on file    Stress: Not on file   Relationships    Social connections:     Talks on phone: Not on file     Gets together: Not on file     Attends Latter day service: Not on file     Active member of club or organization: Not on file     Attends meetings of clubs or organizations: Not on file     Relationship status: Not on file    Intimate partner violence:     Fear of current or ex partner: Not on file     Emotionally abused: Not on file     Physically abused: Not on file     Forced sexual activity: Not on file   Other Topics Concern    Not on file   Social History Narrative    Not on file     Vitals:    19 1358   BP: 128/78   Pulse: 84   Resp: 16   Temp: 97 6 °F (36 4 °C)   TempSrc: Oral   SpO2: 97%   Weight: 60 kg (132 lb 3 2 oz)   Height: 5' 5" (1 651 m)     Results for orders placed or performed in visit on 19   POCT urine dip   Result Value Ref Range    LEUKOCYTE ESTERASE,UA neg     NITRITE,UA neg     SL AMB POCT UROBILINOGEN 3 5     POCT URINE PROTEIN 0 15      PH,UA 6 0     BLOOD,UA neg     SPECIFIC GRAVITY,UA 1 030     KETONES,UA 0 5     BILIRUBIN,UA neg     GLUCOSE, UA neg      COLOR,UA dark yellow     CLARITY,UA hazy      Weight (last 2 days)     Date/Time   Weight    19 1358   60 (132 2)            Body mass index is 22 kg/m²   BP      Temp      Pulse     Resp      SpO2        Vitals:    12/17/19 1358   Weight: 60 kg (132 lb 3 2 oz)     Vitals:    12/17/19 1358   Weight: 60 kg (132 lb 3 2 oz)       /78   Pulse 84   Temp 97 6 °F (36 4 °C) (Oral)   Resp 16   Ht 5' 5" (1 651 m)   Wt 60 kg (132 lb 3 2 oz)   SpO2 97%   BMI 22 00 kg/m²          Physical Exam   Constitutional: He appears well-developed and well-nourished  No distress  HENT:   Head: Normocephalic and atraumatic  Right Ear: External ear normal    Left Ear: External ear normal    Mouth/Throat: Oropharynx is clear and moist    Eyes: Pupils are equal, round, and reactive to light  Conjunctivae are normal  Right eye exhibits no discharge  Left eye exhibits no discharge  No scleral icterus  Neck: Neck supple  Cardiovascular: Normal rate, regular rhythm and normal heart sounds  Exam reveals no gallop and no friction rub  No murmur heard  Pulmonary/Chest: No respiratory distress  He has no wheezes  He has no rales  Abdominal: Soft  Bowel sounds are normal  He exhibits no distension and no mass  There is no tenderness  There is no rebound and no guarding  Musculoskeletal: He exhibits no edema or deformity  Lymphadenopathy:     He has no cervical adenopathy  Neurological: He is alert  Skin: He is not diaphoretic  Psychiatric: His mood appears anxious  He does not exhibit a depressed mood  He expresses no suicidal ideation       tremors worsened by anxiety

## 2019-12-18 ENCOUNTER — SOCIAL WORK (OUTPATIENT)
Dept: BEHAVIORAL/MENTAL HEALTH CLINIC | Facility: CLINIC | Age: 74
End: 2019-12-18
Payer: MEDICARE

## 2019-12-18 DIAGNOSIS — F41.9 ANXIETY: Primary | ICD-10-CM

## 2019-12-18 PROCEDURE — 90834 PSYTX W PT 45 MINUTES: CPT | Performed by: SOCIAL WORKER

## 2019-12-18 NOTE — ASSESSMENT & PLAN NOTE
Mild mid abdominal pain on examination he does not report abdominal pain subjectively will check ultrasound of the abdomen

## 2019-12-18 NOTE — ASSESSMENT & PLAN NOTE
Secondary to tremor/secondary to anxiety use walker, see Psychiatry the patient has been for physical therapy which was not helpful

## 2019-12-18 NOTE — PSYCH
Assessment/Plan: Manage anxiety     There are no diagnoses linked to this encounter  Subjective: Otoniel Aly reports continued issues with anxiety  Long-standing personality issues contributes as does his physical health and family stressors  Denies depressive symptoms  Patient ID: Elia Prater is a 69 y o  male  Met with Otoniel Aly for 40 minutes from 1:00PM-1:40PM  Continues to have periods of anxiety and some irritability  Cites not particular stressors but physical health issues and ongoing family issues contribute  No acute depressive symptoms  No SI or HI  Review of Systems   Psychiatric/Behavioral: The patient is nervous/anxious  Objective: Otoniel Aly continues to present as anxious and tremulous at times during session  Slept good last night with recent changes to medication  Presents as pleasant, verbal and cooperative during session       Physical Exam   Psychiatric: His behavior is normal  Judgment and thought content normal

## 2019-12-18 NOTE — ASSESSMENT & PLAN NOTE
No SI increase Zoloft to 75 mg once a day see Psychiatry and see counselor tomorrow will check genetic testing for optimal SSRI or antidepressant

## 2019-12-18 NOTE — PATIENT INSTRUCTIONS
Processed ongoing stressors as well as his own personality issues that can create tension- supportive therapy provided  Reviewed stress mgmt strategies to utilize at home and encouraged increased social activity separate from family to offset stress

## 2019-12-21 ENCOUNTER — HOSPITAL ENCOUNTER (OUTPATIENT)
Dept: ULTRASOUND IMAGING | Facility: HOSPITAL | Age: 74
Discharge: HOME | End: 2019-12-21
Payer: MEDICARE

## 2019-12-21 DIAGNOSIS — R10.9 ABDOMINAL PAIN, UNSPECIFIED ABDOMINAL LOCATION: ICD-10-CM

## 2019-12-21 PROCEDURE — 76700 US EXAM ABDOM COMPLETE: CPT

## 2019-12-23 ENCOUNTER — ANTICOAG VISIT (OUTPATIENT)
Dept: INTERNAL MEDICINE CLINIC | Facility: CLINIC | Age: 74
End: 2019-12-23

## 2019-12-23 ENCOUNTER — APPOINTMENT (OUTPATIENT)
Dept: LAB | Facility: CLINIC | Age: 74
End: 2019-12-23
Payer: MEDICARE

## 2019-12-23 DIAGNOSIS — I82.4Y9 DEEP VEIN THROMBOSIS (DVT) OF PROXIMAL LOWER EXTREMITY, UNSPECIFIED CHRONICITY, UNSPECIFIED LATERALITY (HCC): ICD-10-CM

## 2019-12-23 LAB
INR PPP: 3.05 (ref 0.84–1.19)
PROTHROMBIN TIME: 30.5 SECONDS (ref 11.6–14.5)

## 2019-12-23 PROCEDURE — 85610 PROTHROMBIN TIME: CPT

## 2019-12-23 PROCEDURE — 36415 COLL VENOUS BLD VENIPUNCTURE: CPT

## 2019-12-27 ENCOUNTER — ANTICOAG VISIT (OUTPATIENT)
Dept: INTERNAL MEDICINE CLINIC | Facility: CLINIC | Age: 74
End: 2019-12-27

## 2019-12-27 ENCOUNTER — APPOINTMENT (OUTPATIENT)
Dept: LAB | Facility: CLINIC | Age: 74
End: 2019-12-27
Payer: MEDICARE

## 2019-12-27 DIAGNOSIS — I82.4Y9 DEEP VEIN THROMBOSIS (DVT) OF PROXIMAL LOWER EXTREMITY, UNSPECIFIED CHRONICITY, UNSPECIFIED LATERALITY (HCC): ICD-10-CM

## 2019-12-30 DIAGNOSIS — K80.20 CALCULUS OF GALLBLADDER WITHOUT CHOLECYSTITIS WITHOUT OBSTRUCTION: Primary | ICD-10-CM

## 2020-01-01 DIAGNOSIS — K80.20 CALCULUS OF GALLBLADDER WITHOUT CHOLECYSTITIS WITHOUT OBSTRUCTION: Primary | ICD-10-CM

## 2020-01-03 ENCOUNTER — APPOINTMENT (OUTPATIENT)
Dept: LAB | Facility: CLINIC | Age: 75
End: 2020-01-03
Payer: MEDICARE

## 2020-01-03 DIAGNOSIS — I82.4Y9 DEEP VEIN THROMBOSIS (DVT) OF PROXIMAL LOWER EXTREMITY, UNSPECIFIED CHRONICITY, UNSPECIFIED LATERALITY (HCC): ICD-10-CM

## 2020-01-03 LAB
INR PPP: 2.24 (ref 0.84–1.19)
PROTHROMBIN TIME: 23.9 SECONDS (ref 11.6–14.5)

## 2020-01-03 PROCEDURE — 36415 COLL VENOUS BLD VENIPUNCTURE: CPT

## 2020-01-03 PROCEDURE — 85610 PROTHROMBIN TIME: CPT

## 2020-01-06 ENCOUNTER — ANTICOAG VISIT (OUTPATIENT)
Dept: INTERNAL MEDICINE CLINIC | Facility: CLINIC | Age: 75
End: 2020-01-06

## 2020-01-07 ENCOUNTER — HOSPITAL ENCOUNTER (INPATIENT)
Facility: HOSPITAL | Age: 75
LOS: 2 days | Discharge: HOME | DRG: 914 | End: 2020-01-09
Attending: SURGERY | Admitting: SURGERY
Payer: MEDICARE

## 2020-01-07 DIAGNOSIS — Z86.718 HISTORY OF DVT (DEEP VEIN THROMBOSIS): Chronic | ICD-10-CM

## 2020-01-07 DIAGNOSIS — K86.89 DILATED PANCREATIC DUCT: ICD-10-CM

## 2020-01-07 DIAGNOSIS — D68.59 PROTEIN S DEFICIENCY (HCC): ICD-10-CM

## 2020-01-07 DIAGNOSIS — F41.9 ANXIETY: ICD-10-CM

## 2020-01-07 DIAGNOSIS — R63.4 WEIGHT LOSS: ICD-10-CM

## 2020-01-07 DIAGNOSIS — R10.9 ABDOMINAL PAIN, UNSPECIFIED ABDOMINAL LOCATION: Primary | ICD-10-CM

## 2020-01-07 DIAGNOSIS — R10.13 EPIGASTRIC PAIN: ICD-10-CM

## 2020-01-07 DIAGNOSIS — I10 ESSENTIAL HYPERTENSION: Chronic | ICD-10-CM

## 2020-01-07 DIAGNOSIS — K50.00 CROHN'S DISEASE OF SMALL INTESTINE WITHOUT COMPLICATION (HCC): Chronic | ICD-10-CM

## 2020-01-07 PROBLEM — Z01.818 PREOP EXAMINATION: Status: RESOLVED | Noted: 2018-07-25 | Resolved: 2020-01-07

## 2020-01-07 LAB
ALBUMIN SERPL BCP-MCNC: 3.7 G/DL (ref 3.5–5)
ALP SERPL-CCNC: 66 U/L (ref 46–116)
ALT SERPL W P-5'-P-CCNC: 20 U/L (ref 12–78)
ANION GAP SERPL CALCULATED.3IONS-SCNC: 6 MMOL/L (ref 4–13)
APTT PPP: 35 SECONDS (ref 23–37)
AST SERPL W P-5'-P-CCNC: 18 U/L (ref 5–45)
BILIRUB SERPL-MCNC: 1.09 MG/DL (ref 0.2–1)
BUN SERPL-MCNC: 12 MG/DL (ref 5–25)
CALCIUM SERPL-MCNC: 9 MG/DL (ref 8.3–10.1)
CHLORIDE SERPL-SCNC: 104 MMOL/L (ref 100–108)
CO2 SERPL-SCNC: 30 MMOL/L (ref 21–32)
CREAT SERPL-MCNC: 0.92 MG/DL (ref 0.6–1.3)
ERYTHROCYTE [DISTWIDTH] IN BLOOD BY AUTOMATED COUNT: 12.9 % (ref 11.6–15.1)
GFR SERPL CREATININE-BSD FRML MDRD: 82 ML/MIN/1.73SQ M
GLUCOSE SERPL-MCNC: 88 MG/DL (ref 65–140)
HCT VFR BLD AUTO: 39.7 % (ref 36.5–49.3)
HGB BLD-MCNC: 13.5 G/DL (ref 12–17)
INR PPP: 2.47 (ref 0.84–1.19)
LIPASE SERPL-CCNC: 74 U/L (ref 73–393)
MCH RBC QN AUTO: 31.1 PG (ref 26.8–34.3)
MCHC RBC AUTO-ENTMCNC: 34 G/DL (ref 31.4–37.4)
MCV RBC AUTO: 92 FL (ref 82–98)
PLATELET # BLD AUTO: 148 THOUSANDS/UL (ref 149–390)
PMV BLD AUTO: 9.9 FL (ref 8.9–12.7)
POTASSIUM SERPL-SCNC: 4.5 MMOL/L (ref 3.5–5.3)
PROT SERPL-MCNC: 6.5 G/DL (ref 6.4–8.2)
PROTHROMBIN TIME: 25.8 SECONDS (ref 11.6–14.5)
RBC # BLD AUTO: 4.34 MILLION/UL (ref 3.88–5.62)
SODIUM SERPL-SCNC: 140 MMOL/L (ref 136–145)
WBC # BLD AUTO: 5.05 THOUSAND/UL (ref 4.31–10.16)

## 2020-01-07 PROCEDURE — 99221 1ST HOSP IP/OBS SF/LOW 40: CPT | Performed by: SURGERY

## 2020-01-07 PROCEDURE — 85730 THROMBOPLASTIN TIME PARTIAL: CPT | Performed by: PHYSICIAN ASSISTANT

## 2020-01-07 PROCEDURE — 80053 COMPREHEN METABOLIC PANEL: CPT | Performed by: PHYSICIAN ASSISTANT

## 2020-01-07 PROCEDURE — 1124F ACP DISCUSS-NO DSCNMKR DOCD: CPT | Performed by: PHYSICIAN ASSISTANT

## 2020-01-07 PROCEDURE — 85610 PROTHROMBIN TIME: CPT | Performed by: PHYSICIAN ASSISTANT

## 2020-01-07 PROCEDURE — 83690 ASSAY OF LIPASE: CPT | Performed by: SURGERY

## 2020-01-07 PROCEDURE — 85027 COMPLETE CBC AUTOMATED: CPT | Performed by: PHYSICIAN ASSISTANT

## 2020-01-07 RX ORDER — HYDROMORPHONE HCL/PF 1 MG/ML
1 SYRINGE (ML) INJECTION EVERY 4 HOURS PRN
Status: DISCONTINUED | OUTPATIENT
Start: 2020-01-07 | End: 2020-01-09 | Stop reason: HOSPADM

## 2020-01-07 RX ORDER — SODIUM CHLORIDE, SODIUM LACTATE, POTASSIUM CHLORIDE, CALCIUM CHLORIDE 600; 310; 30; 20 MG/100ML; MG/100ML; MG/100ML; MG/100ML
125 INJECTION, SOLUTION INTRAVENOUS CONTINUOUS
Status: DISCONTINUED | OUTPATIENT
Start: 2020-01-07 | End: 2020-01-09 | Stop reason: HOSPADM

## 2020-01-07 RX ORDER — ONDANSETRON 2 MG/ML
4 INJECTION INTRAMUSCULAR; INTRAVENOUS EVERY 6 HOURS PRN
Status: DISCONTINUED | OUTPATIENT
Start: 2020-01-07 | End: 2020-01-09 | Stop reason: HOSPADM

## 2020-01-07 RX ORDER — LORAZEPAM 0.5 MG/1
0.25 TABLET ORAL 2 TIMES DAILY PRN
Status: DISCONTINUED | OUTPATIENT
Start: 2020-01-07 | End: 2020-01-09 | Stop reason: HOSPADM

## 2020-01-07 RX ORDER — HYDROMORPHONE HCL/PF 1 MG/ML
0.5 SYRINGE (ML) INJECTION EVERY 4 HOURS PRN
Status: DISCONTINUED | OUTPATIENT
Start: 2020-01-07 | End: 2020-01-09 | Stop reason: HOSPADM

## 2020-01-07 RX ORDER — AMLODIPINE BESYLATE 5 MG/1
5 TABLET ORAL DAILY
Status: DISCONTINUED | OUTPATIENT
Start: 2020-01-08 | End: 2020-01-09 | Stop reason: HOSPADM

## 2020-01-07 RX ORDER — PANTOPRAZOLE SODIUM 40 MG/1
40 INJECTION, POWDER, FOR SOLUTION INTRAVENOUS
Status: DISCONTINUED | OUTPATIENT
Start: 2020-01-08 | End: 2020-01-09 | Stop reason: HOSPADM

## 2020-01-07 RX ADMIN — LORAZEPAM 0.25 MG: 0.5 TABLET ORAL at 23:58

## 2020-01-07 RX ADMIN — SODIUM CHLORIDE, SODIUM LACTATE, POTASSIUM CHLORIDE, AND CALCIUM CHLORIDE 125 ML/HR: .6; .31; .03; .02 INJECTION, SOLUTION INTRAVENOUS at 19:27

## 2020-01-07 NOTE — H&P
History and Physical -General Surgery  Amna Franco 69 y o  male MRN: [id number]  Unit/Bed#: S -51 Encounter: [id number]        Reason for Consult / Principal Problem: epigastric pain     HPI: Amna Franco is a 69y o  year old male with history of colostomy secondary to Crohns, anxiety, DVT, protein S deficiency on coumadin and pancreatitis 1 year ago admitted for evaluation of epigastric pain x 2 months associated with occasional nausea  Pain has increased in intensity and has been constant for 2 weeks  He mentions a poor appetite and a 50 pound weight loss over the past 8 months  Denies fever, chills, jaundice, change in bowel habits  Review of Systems   Constitutional: Positive for activity change, appetite change and unexpected weight change  Negative for fatigue  HENT: Negative  Eyes: Negative  Respiratory: Negative  Cardiovascular: Negative  Gastrointestinal: Positive for abdominal pain and nausea  Negative for vomiting  Endocrine: Negative  Genitourinary: Negative  Musculoskeletal: Negative  Skin: Negative  Allergic/Immunologic: Negative  Neurological: Negative  Hematological: Negative  Psychiatric/Behavioral: The patient is nervous/anxious  Historical Information   Past Medical History:   Diagnosis Date    Anxiety     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[facility])     [facility]'s disease ([facility] )     Depression     DVT [facility]deep venous thrombosis) ([facility] )     [place] (hard of hearing)     no hearing aids    Hypertension     Mass in neck     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[facility] )     Psychiatric disorder     depression, anger    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     Past Surgical History:   Procedure Laterality Date    COLON SURGERY      Partial colectomy and colostomy    COLONOSCOPY      ESOPHAGOGASTRODUODENOSCOPY N/A 4/5/2017    Procedure: ESOPHAGOGASTRODUODENOSCOPY (EGD); Surgeon: Pete Cole MD;  [facility];   Service:     EYE SURGERY Right     Cataract    KNEE SURGERY      OH EDG US EXAM SURGICAL ALTER STOM DUODENUM/JEJUNUM N/A 2018    Procedure: LINEAR ENDOSCOPIC U/S;  Surgeon: Meyer Peabody, MD;  [facility];   Service: Gastroenterology    OH EXC PAROTD,LAT LOBE,DISSECT 5TH NERV Right 2018    Procedure: PAROTIDECTOMY WITH FACIAL NERVE MONITOR AND FROZEN SECTION;  Surgeon: Gurvinder Beltrán MD;  [facility];  Service: ENT    OH IMPACT TOOTH [address] COMP BONY N/A 2018    Procedure: EXTRACTION TEETH #15 and #30;  Surgeon: Haroldo Steinberg DDS;  [facility];  Service: Maxillofacial    RADICAL NECK DISSECTION N/A 2018    Procedure: SELECTIVE NECK DISSECTION;  Surgeon: Gurvinder Beltrán MD;  [facility];  Service: ENT    UPPER GASTROINTESTINAL ENDOSCOPY      VEIN LIGATION AND STRIPPING       Social History   Social History     Substance and Sexual Activity   Alcohol Use No    Comment: hx etoh abuse pt states he quit 9 years ago     Social History     Substance and Sexual Activity   Drug Use No     Social History     Tobacco Use   Smoking Status Former Smoker    Packs/day: 1 00    Years: 10 00    Pack years: 10 00    Types: Cigarettes    Last attempt to quit: 1981    Years since quittin 6   Smokeless Tobacco Never Used   Tobacco Comment    50 years ago     Family History   Problem Relation Age of Onset    Heart disease Brother     Diverticulitis Mother        Meds/Allergies     Medications Prior to Admission   Medication    amLODIPine (NORVASC) 5 mg tablet    LORazepam (ATIVAN) 0 5 mg tablet    Melatonin 10 MG TABS    Multiple Vitamins-Minerals (MULTI FOR HIM 50+ PO)    sertraline (ZOLOFT) 50 mg tablet    warfarin (COUMADIN) 2 mg tablet    fluticasone (FLONASE) 50 mcg/act nasal spray    ipratropium (ATROVENT) 0 03 % nasal spray    Loratadine (CLARITIN) 10 MG CAPS     Current Facility-Administered Medications   Medication Dose Route Frequency    [START ON 2020] enoxaparin (LOVENOX) subcutaneous injection 40 mg  40 mg Subcutaneous Daily    HYDROmorphone (DILAUDID) injection 0 5 mg  0 5 mg Intravenous Q4H PRN    HYDROmorphone (DILAUDID) injection 0 5 mg  0 5 mg Intravenous Q4H PRN    HYDROmorphone (DILAUDID) injection 1 mg  1 mg Intravenous Q4H PRN    lactated ringers infusion  125 mL/hr Intravenous Continuous    ondansetron (ZOFRAN) injection 4 mg  4 mg Intravenous Q6H PRN    [START ON 1/8/2020] pantoprazole (PROTONIX) injection 40 mg  40 mg Intravenous Q24H [address]       Allergies   Allergen Reactions    Duloxetine Other (See Comments)     Panic attacks    Other      Seasonal       Objective     Blood pressure 157/75, pulse 73, temperature 97 8 °F (36 6 °C), temperature source Oral, resp  rate 18, SpO2 96 %  No intake or output data in the 24 hours ending 01/07/20 2101    PHYSICAL EXAM  General appearance: alert and oriented, in no acute distress  Skin: Skin color, texture, turgor normal  No rashes or lesions  Head: Normocephalic, without obvious abnormality  Heart: regular rate and rhythm, S1, S2 normal, no murmur, click, rub or gallop  Lungs: clear to auscultation bilaterally  Abdomen: flat and soft, +ostomy with stool in bag, tender epigastric region    Neurological: normal without focal findings    Lab Results:   Admission on 01/07/2020   Component Date Value    WBC 01/07/2020 5 05     RBC 01/07/2020 4 34     Hemoglobin 01/07/2020 13 5     Hematocrit 01/07/2020 39 7     MCV 01/07/2020 92     MCH 01/07/2020 31 1     MCHC 01/07/2020 34 0     RDW 01/07/2020 12 9     Platelets 31/01/0401 148*    MPV 01/07/2020 9 9     Sodium 01/07/2020 140     Potassium 01/07/2020 4 5     Chloride 01/07/2020 104     CO2 01/07/2020 30     ANION GAP 01/07/2020 6     BUN 01/07/2020 12     Creatinine 01/07/2020 0 92     Glucose 01/07/2020 88     Calcium 01/07/2020 9 0     AST 01/07/2020 18     ALT 01/07/2020 20     Alkaline Phosphatase 01/07/2020 66     Total Protein 01/07/2020 6 5     Albumin 01/07/2020 3 7     Total Bilirubin 01/07/2020 1 09*    eGFR 01/07/2020 82     Protime 01/07/2020 25 8*    INR 01/07/2020 2 47*    PTT 01/07/2020 35      Imaging Studies:  12/21- RUQ US- The gallbladder is normal in caliber  No wall thickening or pericholecystic fluid  Shadowing gallstone(s) identified  No sonographic Joe's sign  No intrahepatic biliary dilatation  CBD measures 2 mm  No choledocholithiasis  ASSESSMENT/PLAN:  Problem List Abdominal pain    Crohn's disease (Chronic)    Depression (Chronic)    History of DVT (deep vein thrombosis) (Chronic)    Anxiety    HTN (hypertension) (Chronic)    Dilated pancreatic duct    Weight loss    Thrombocytopenia (HCC)    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

## 2020-01-08 ENCOUNTER — APPOINTMENT (INPATIENT)
Dept: CT IMAGING | Facility: HOSPITAL | Age: 75
DRG: 914 | End: 2020-01-08
Payer: MEDICARE

## 2020-01-08 ENCOUNTER — APPOINTMENT (INPATIENT)
Dept: NUCLEAR MEDICINE | Facility: HOSPITAL | Age: 75
DRG: 914 | End: 2020-01-08
Payer: MEDICARE

## 2020-01-08 LAB — GLUCOSE SERPL-MCNC: 71 MG/DL (ref 65–140)

## 2020-01-08 PROCEDURE — 82948 REAGENT STRIP/BLOOD GLUCOSE: CPT

## 2020-01-08 PROCEDURE — C9113 INJ PANTOPRAZOLE SODIUM, VIA: HCPCS | Performed by: PHYSICIAN ASSISTANT

## 2020-01-08 PROCEDURE — 74177 CT ABD & PELVIS W/CONTRAST: CPT

## 2020-01-08 PROCEDURE — 99223 1ST HOSP IP/OBS HIGH 75: CPT | Performed by: INTERNAL MEDICINE

## 2020-01-08 PROCEDURE — A9537 TC99M MEBROFENIN: HCPCS

## 2020-01-08 PROCEDURE — 99222 1ST HOSP IP/OBS MODERATE 55: CPT | Performed by: PHYSICIAN ASSISTANT

## 2020-01-08 PROCEDURE — 78227 HEPATOBIL SYST IMAGE W/DRUG: CPT

## 2020-01-08 RX ORDER — LANOLIN ALCOHOL/MO/W.PET/CERES
3 CREAM (GRAM) TOPICAL
Status: DISCONTINUED | OUTPATIENT
Start: 2020-01-08 | End: 2020-01-09 | Stop reason: HOSPADM

## 2020-01-08 RX ADMIN — PANTOPRAZOLE SODIUM 40 MG: 40 INJECTION, POWDER, FOR SOLUTION INTRAVENOUS at 08:55

## 2020-01-08 RX ADMIN — SINCALIDE 1.1 MCG: 5 INJECTION, POWDER, LYOPHILIZED, FOR SOLUTION INTRAVENOUS at 10:44

## 2020-01-08 RX ADMIN — AMLODIPINE BESYLATE 5 MG: 5 TABLET ORAL at 08:55

## 2020-01-08 RX ADMIN — SERTRALINE HYDROCHLORIDE 75 MG: 50 TABLET ORAL at 08:55

## 2020-01-08 RX ADMIN — IOHEXOL 50 ML: 240 INJECTION, SOLUTION INTRATHECAL; INTRAVASCULAR; INTRAVENOUS; ORAL at 21:47

## 2020-01-08 RX ADMIN — SINCALIDE 1.1 MCG: 5 INJECTION, POWDER, LYOPHILIZED, FOR SOLUTION INTRAVENOUS at 13:08

## 2020-01-08 RX ADMIN — LORAZEPAM 0.25 MG: 0.5 TABLET ORAL at 11:41

## 2020-01-08 RX ADMIN — ENOXAPARIN SODIUM 40 MG: 40 INJECTION SUBCUTANEOUS at 08:55

## 2020-01-08 RX ADMIN — MELATONIN 3 MG: at 22:05

## 2020-01-08 RX ADMIN — IOHEXOL 100 ML: 350 INJECTION, SOLUTION INTRAVENOUS at 21:47

## 2020-01-08 RX ADMIN — SODIUM CHLORIDE, SODIUM LACTATE, POTASSIUM CHLORIDE, AND CALCIUM CHLORIDE 125 ML/HR: .6; .31; .03; .02 INJECTION, SOLUTION INTRAVENOUS at 03:32

## 2020-01-08 NOTE — CONSULTS
Consult- Vale Brennan 1945, 69 y o  male MRN: [id number]  Unit/Bed#: S -01 Encounter: [id number] DOS: 1/8/20  Primary Care Provider: Rosanne Mckinney DO   Date and time admitted to hospital: 1/7/2020  5:11 PM    Inpatient consult to Internal Medicine  Consult performed by: Bertha Roberson PA-C  Consult ordered by: Korin Priest PA-C        * Epigastric pain  Assessment & Plan  Patient admitted with 2 month history of epigastric pain associated with nausea, poor appetite and 50 lb weight loss over 8 months  LFTs normal, lipase normal - does have history of pancreatitis 1 year ago with pancreatic cyst, follows Gastroenterology with serial MRI  Continue with PPI  Gastroenterology consult  Surgery primary, plan for HIDA scan as recent outpatient abdominal ultrasound showed cholelithiasis    Deep vein thrombosis (DVT) of proximal lower extremity (HCC)  Assessment & Plan  Protein S deficiency on coumadin   Hx of DVTs, most recently in 1996   INR 2 47   Coumadin on hold per surgery  If INR < 2 will need bridge     HTN (hypertension)  Assessment & Plan  Continue norvasc     Anxiety  Assessment & Plan  Zoloft recently increased to 75 mg daily  Outpatient psychiatry follow-up  P r n  Ativan    Crohn's disease  Assessment & Plan  History of Crohn's disease status post total procto colectomy   Routine colostomy care  Albumin 3 7  Normal out put from ostomy    VTE Prophylaxis: RX contraindicated due to: potential surgery   / sequential compression device     Recommendations for Discharge:  · Will continue to follow and make recommendations  · GOAL INR 2-3   · Resume coumadin as soon as able   · Of plan for surgery, will need heparin drip given history     Counseling / Coordination of Care Time: 45 minutes  Greater than 50% of total time spent on patient counseling and coordination of care  Collaboration of Care:  Were Recommendations Directly Discussed with Primary Treatment Team? - Yes     History of Present Illness:    Carol Castro is a 69 y o  male who is originally admitted to the surgery service due to abdominal pain  We are consulted for medical management  Patient has past medical history significant for Crohn's disease with history of total procto colectomy, anxiety, history of multiple DVTs diagnosed with protein S deficiency on Coumadin, prior history of epilepsy  Patient was admitted disease 2 months history of epigastric abdominal pain associated with nausea  Patient's anxiety seems quite crippling and he feels best when he is able to exercise and admits to walking 15 miles a day on his treadmill followed by weight lifting  States he recently seen his PCP and his sertraline was recently increased and he was referred to a psychiatrist   States he has a lot of anxiety about his medical conditions  Notes history of epilepsy but is not currently on antiepileptic regimen and states he has a tremor and "mini seizures " States he feels nauseated currently has not had anything to eat  Notes normal stool output in colostomy and has had no issues managing this at home  States he keeps it clean and changes very frequently  Denies fevers or chills  Very poor appetite  Spoke with wife - had complete neuro work up about 1 years ago, follows Dr Evangelista Barber and 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 due to underlying anxiety and depress issues  Wife denies PE hx but multiple DVTs  Has been on coumadin for 30-40 years  Review of Systems:    Review of Systems   Constitutional: Positive for appetite change and unexpected weight change  Negative for activity change, chills and fever  HENT: Positive for hearing loss  Negative for congestion  Respiratory: Negative for shortness of breath  Cardiovascular: Negative for chest pain, palpitations and leg swelling  Gastrointestinal: Negative for abdominal pain  Genitourinary: Negative for dysuria  Musculoskeletal: Negative for back pain     Skin: Negative for rash  Neurological: Positive for tremors and seizures  Negative for dizziness and light-headedness  Psychiatric/Behavioral: Negative for confusion  Past Medical and Surgical History:     Past Medical History:   Diagnosis Date    Anxiety     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[facility])     [facility] [facility]     Depression     D[facility]deep venous thrombosis) [facility] [facility]     [place] (hard of hearing)     no hearing aids    Hypertension     Mass in neck     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     Psychiatric disorder     depression, anger    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       Past Surgical History:   Procedure Laterality Date    COLON SURGERY      Partial colectomy and colostomy    COLONOSCOPY      ESOPHAGOGASTRODUODENOSCOPY N/A 4/5/2017    Procedure: ESOPHAGOGASTRODUODENOSCOPY (EGD); Surgeon: Jayson Waterman MD;  [facility]; Service:     EYE SURGERY Right     Cataract    KNEE SURGERY      LA EDG US EXAM SURGICAL ALTER STOM DUODENUM/JEJUNUM N/A 4/9/2018    Procedure: LINEAR ENDOSCOPIC U/S;  Surgeon: John Dick MD;  [facility]; Service: Gastroenterology    LA EXC PAROTD,LAT LOBE,DISSECT 5TH NERV Right 8/8/2018    Procedure: PAROTIDECTOMY WITH FACIAL NERVE MONITOR AND FROZEN SECTION;  Surgeon: Ken Martínez MD;  [facility];  Service: ENT    LA IMPACT TOOTH [address] COMP BONY N/A 8/8/2018    Procedure: EXTRACTION TEETH #15 and #30;  Surgeon: Brock Saab DDS;  [facility];  Service: Maxillofacial    RADICAL NECK DISSECTION N/A 8/8/2018    Procedure: SELECTIVE NECK DISSECTION;  Surgeon: Ken Martínez MD;  [facility];  Service: ENT    UPPER GASTROINTESTINAL ENDOSCOPY      VEIN LIGATION AND STRIPPING         Meds/Allergies:    all medications and allergies reviewed    Allergies:    Allergies   Allergen Reactions    Duloxetine Other (See Comments)     Panic attacks    Other      Seasonal       Social History:     Marital Status: /Civil Union    Substance Use History:   Social History     Substance and Sexual Activity   Alcohol Use No    Comment: hx etoh abuse pt states he quit 9 years ago     Social History     Tobacco Use   Smoking Status Former Smoker    Packs/day: 1 00    Years: 10 00    Pack years: 10 00    Types: Cigarettes    Last attempt to quit: 1981    Years since quittin 6   Smokeless Tobacco Never Used   Tobacco Comment    50 years ago     Social History     Substance and Sexual Activity   Drug Use No       Family History:    Family History   Problem Relation Age of Onset    Heart disease Brother     Diverticulitis Mother        Physical Exam:     Vitals:   Blood Pressure: 144/71 (20)  Pulse: 85 (20)  Temperature: 97 6 °F (36 4 °C) (20)  Temp Source: Oral (20 8235)  Respirations: 20 (20)  SpO2: 97 % (20 6994)    Physical Exam   Constitutional: He is oriented to person, place, and time  He appears well-developed  Thin and frail-appearing   HENT:   Head: Normocephalic and atraumatic  Eyes: EOM are normal  No scleral icterus  Neck: Normal range of motion  Neck supple  Cardiovascular: Normal rate, regular rhythm and normal heart sounds  No murmur heard  Pulmonary/Chest: Effort normal and breath sounds normal    Abdominal: Soft  Bowel sounds are normal    Epigastric abdominal pain with light palpation  Of right lower quadrant colostomy in place  Musculoskeletal: Normal range of motion  He exhibits no edema  Neurological: He is alert and oriented to person, place, and time  Tremors noted of hands and face with occasional jerking like movements of legs and the head  Skin: Skin is warm and dry  Additional Data:     Lab Results: I have personally reviewed pertinent reports        Results from last 7 days   Lab Units 20  1858   WBC Thousand/uL 5 05   HEMOGLOBIN g/dL 13 5   HEMATOCRIT % 39 7   PLATELETS Thousands/uL 148*     Results from last 7 days   Lab Units 01/07/20  1858   SODIUM mmol/L 140   POTASSIUM mmol/L 4 5   CHLORIDE mmol/L 104   CO2 mmol/L 30   BUN mg/dL 12   CREATININE mg/dL 0 92   ANION GAP mmol/L 6   CALCIUM mg/dL 9 0   ALBUMIN g/dL 3 7   TOTAL BILIRUBIN mg/dL 1 09*   ALK PHOS U/L 66   ALT U/L 20   AST U/L 18   GLUCOSE RANDOM mg/dL 88     Results from last 7 days   Lab Units 01/07/20  1858   INR  2 47*         Lab Results   Component Value Date/Time    HGBA1C 4 5 10/10/2019 09:48 AM    HGBA1C 5 5 03/21/2017 10:47 AM               Imaging: I have personally reviewed pertinent reports  NM Hepatobiliary w RX    (Results Pending)       EKG, Pathology, and Other Studies Reviewed on Admission:   · EKG:     ** Please Note: This note has been constructed using a voice recognition system   **

## 2020-01-08 NOTE — ASSESSMENT & PLAN NOTE
History of Crohn's disease status post total procto colectomy   Routine colostomy care  Albumin 3 7  Normal out put from ostomy

## 2020-01-08 NOTE — PROGRESS NOTES
Progress Note - General Surgery   Sally Jensen 69 y o  male MRN: [id number]  Unit/Bed#: S -01 Encounter: [id number]    Assessment/Plan:  69 y o  male with abdominal pain     -f/u HIDA scan  -f/u GI recs  -symptomatic management prn  -DVT ppx    Subjective/Objective     Subjective: Patient is extremely nervous  Complains of the same ongoing epigastric pain he has had for a long time  Patient is extremely anxious about the testing to be done today      Objective:     Vitals: Temp:  [97 6 °F (36 4 °C)-97 8 °F (36 6 °C)] 97 6 °F (36 4 °C)  HR:  [70-85] 85  Resp:  [12-20] 20  BP: (143-157)/(71-78) 144/71  There is no height or weight on file to calculate BMI  I/O       01/06 0701 - 01/07 0700 01/07 0701 - 01/08 0700 01/08 0701 - 01/09 0700    P  O   0     I V   1406 3     Total Intake  1406 3     Net  +1406 3            Unmeasured Urine Occurrence  1 x           Physical Exam:  GEN: NAD  HEENT: MMM  CV: RRR  Lung: Normal effort  Ab: Soft, ND  Tender in epigastrium  Extrem: No CCE  Neuro: A+Ox3  Psych: Extremely anxious  Patient shaking when discussing testing    Lab, Imaging and other studies:   I have personally reviewed pertinent reports    , CBC with diff:   Lab Results   Component Value Date    WBC 5 05 01/07/2020    HGB 13 5 01/07/2020    HCT 39 7 01/07/2020    MCV 92 01/07/2020     (L) 01/07/2020    MCH 31 1 01/07/2020    MCHC 34 0 01/07/2020    RDW 12 9 01/07/2020    MPV 9 9 01/07/2020   , BMP/CMP:   Lab Results   Component Value Date    SODIUM 140 01/07/2020    K 4 5 01/07/2020     01/07/2020    CO2 30 01/07/2020    BUN 12 01/07/2020    CREATININE 0 92 01/07/2020    CALCIUM 9 0 01/07/2020    AST 18 01/07/2020    ALT 20 01/07/2020    ALKPHOS 66 01/07/2020    EGFR 82 01/07/2020   , Magnesium: No components found for: MAG  VTE Pharmacologic Prophylaxis: Enoxaparin (Lovenox)  VTE Mechanical Prophylaxis: sequential compression device

## 2020-01-08 NOTE — ASSESSMENT & PLAN NOTE
History of Crohn's disease status post total procto colectomy   Routine colostomy care  Albumin 3 7  Normal output from ostomy

## 2020-01-08 NOTE — ASSESSMENT & PLAN NOTE
Protein S deficiency on coumadin   Hx of DVTs, most recently in 1996   INR 2 47   Coumadin on hold per surgery  If INR < 2 will need bridge

## 2020-01-08 NOTE — PLAN OF CARE
Problem: Potential for Falls  Goal: Patient will remain free of falls  Description  INTERVENTIONS:  - Assess patient frequently for physical needs  -  Identify cognitive and physical deficits and behaviors that affect risk of falls  -  [place] fall precautions as indicated by assessment   - Educate patient/family on patient safety including physical limitations  - Instruct patient to call for assistance with activity based on assessment  - Modify environment to reduce risk of injury  - Consider OT/PT consult to assist with strengthening/mobility  Outcome: Progressing     Problem: Prexisting or High Potential for Compromised Skin Integrity  Goal: Skin integrity is maintained or improved  Description  INTERVENTIONS:  - Identify patients at risk for skin breakdown  - Assess and monitor skin integrity  - Assess and monitor nutrition and hydration status  - Monitor labs   - Assess for incontinence   - Turn and reposition patient  - Assist with mobility/ambulation  - Relieve pressure over bony prominences  - Avoid friction and shearing  - Provide appropriate hygiene as needed including keeping skin clean and dry  - Evaluate need for skin moisturizer/barrier cream  - Collaborate with interdisciplinary team   - Patient/family teaching  - Consider wound care consult   Outcome: Progressing     Problem: Nutrition/Hydration-ADULT  Goal: Nutrient/Hydration intake appropriate for improving, restoring or maintaining nutritional needs  Description  Monitor and assess patient's nutrition/hydration status for malnutrition  Collaborate with interdisciplinary team and initiate plan and interventions as ordered  Monitor patient's weight and dietary intake as ordered or per policy  Utilize nutrition screening tool and intervene as necessary  Determine patient's food preferences and provide high-protein, high-caloric foods as appropriate       INTERVENTIONS:  - Monitor oral intake, urinary output, labs, and treatment plans  - Assess nutrition and hydration status and recommend course of action  - Evaluate amount of meals eaten  - Assist patient with eating if necessary   - Allow adequate time for meals  - Recommend/ encourage appropriate diets, oral nutritional supplements, and vitamin/mineral supplements  - Order, calculate, and assess calorie counts as needed  - Recommend, monitor, and adjust tube feedings and TPN/PPN based on assessed needs  - Assess need for intravenous fluids  - Provide specific nutrition/hydration education as appropriate  - Include patient/family/caregiver in decisions related to nutrition  Outcome: Progressing

## 2020-01-08 NOTE — ASSESSMENT & PLAN NOTE
Patient admitted with 2 month history of epigastric pain associated with nausea, poor appetite and 50 lb weight loss over 8 months  LFTs normal, lipase normal - does have history of pancreatitis 1 year ago with pancreatic cyst, follows Gastroenterology with serial MRI  Continue with PPI  Gastroenterology consult appreciated, suspect musculoskeletal pain and etiology likely due to excessive physical activity   Consider EGD ? Pt NPO currently   CT a/p normal    Surgery primary, normal HIDA scan 1/8  Outpatient abdominal ultrasound showed cholelithiasis

## 2020-01-08 NOTE — ASSESSMENT & PLAN NOTE
Protein S deficiency on coumadin   Hx of DVTs, most recently in 1996   INR 3 03    Coumadin on hold per surgery - resume as soon as able   If INR < 2 will need bridge with lovenox or heparin drip

## 2020-01-08 NOTE — ASSESSMENT & PLAN NOTE
Zoloft recently increased to 75 mg daily  Outpatient psychiatry follow-up  Follow up with CBT   P r n   Ativan

## 2020-01-08 NOTE — ASSESSMENT & PLAN NOTE
Patient admitted with 2 month history of epigastric pain associated with nausea, poor appetite and 50 lb weight loss over 8 months  LFTs normal, lipase normal - does have history of pancreatitis 1 year ago with pancreatic cyst, follows Gastroenterology with serial MRI  Continue with PPI  Gastroenterology consult  Surgery primary, plan for HIDA scan as recent outpatient abdominal ultrasound showed cholelithiasis

## 2020-01-08 NOTE — CONSULTS
Consultation -  [facility]  Charlielinda Cook 69 y o  male MRN: [id number]  Unit/Bed#: S -01 Encounter: [id number]        Consults    Reason for Consult / Principal Problem: epigastric pain    ASSESSMENT and PLAN:    Principal Problem:    Epigastric pain  Active Problems:    Crohn's disease    Anxiety    HTN (hypertension)    Deep vein thrombosis (DVT) of proximal lower extremity ([address] )    #1  Epigastric pain, anorexia and weight loss: unclear etiology, appears this has been an ongoing problem but has worsening in last few months  Consider PUD, biliary etiology, malignancy although recent imaging negative for this, malabsorption, also consider nerve pain/muskuloskeletal as it is very superficial on exam  Last seen by us in October 2019, pain reportedly worsened shortly after that  Is not associated with N/V, eating  Is very superficial on exam  Has cholelithiasis on US  Recent MRI in august showing stable 2 mm cyst in pancreas  Patient noted to have hx of severe anxiety, excessive exercising, and poor oral intake    -follow up HIDA scan results  -pending HIDA scan, would recommend CT abdomen and pelvis with pain, anorexia, and weight loss as last abdominal imaging was in August 2019   -consider EGD if HIDA scan negative, will need reversal of coumadin, INR today was >2    -continue management of anxiety as this is playing a role as well  -PPI  -pain control as needed  -IVF hydration    #2  History of Crohn's s/p proctocolectomy with ostomy: output the same, no blood or black stool  Denies change to this  Appears to be in clinical remission    -can check inflammatory markers and fecal calprotectin this admission to rule out any small bowel inflammation that could be playing a role in his symptoms     -------------------------------------------------------------------------------------------------------------------    HPI:  This is a 50-year-old male with a history of severe anxiety, weight loss, Crohn's disease status post total colectomy and proctectomy, pancreatic cyst who presented to the emergency room due to worsening abdominal pain  He was last seen in our office in October of 2019  He reportedly has had issues with epigastric abdominal pain for over a year but is significantly worsened over the last few months  He also has been having issues with weight loss and anorexia for quite some time which was attributed to severe anxiety and excessive exercise without good oral intake  He denies any significant nausea or vomiting  He has an ostomy and reports that the output has been stable  Denies any black stool or blood in the stool  He saw surgery in the outpatient setting and they admitted him to the hospital   They are planning on performing HIDA scan today as he has a history of cholelithiasis  Of note at his appointment in March 2018 was 161 lb, at his appointment in October 2019 he weighed 139 lb  Today he weighs 112 lbs  He reports the pain is primarily in his epigastric region and right upper quadrant  He reports it is not associated with eating and does not worsen after eating  He reports that it gets worse when his anxiety is worse  His pain appears to be pretty superficial   He denies any significant NSAID use  Does have a history of peptic ulcer disease in the past   His last endoscopy was in endoscopic ultrasound in April of 2018 which did not show any significant issues with the side-viewing scope  He has not had any repeat endoscopy since that time  REVIEW OF SYSTEMS:    CONSTITUTIONAL: Denies any fever, chills, or rigors  Decreased appetite, and recent weight loss  HEENT: No earache or tinnitus  Denies hearing loss or visual disturbances  CARDIOVASCULAR: No chest pain or palpitations  RESPIRATORY: Denies any cough, hemoptysis, shortness of breath or dyspnea on exertion  GASTROINTESTINAL: As noted in the History of Present Illness     GENITOURINARY: No problems with urination  Denies any hematuria or dysuria  NEUROLOGIC: No dizziness or vertigo, denies headaches  MUSCULOSKELETAL: Denies any muscle or joint pain  SKIN: Denies skin rashes or itching  ENDOCRINE: Denies excessive thirst  Denies intolerance to heat or cold  PSYCHOSOCIAL: Denies depression  Denies any recent memory loss  Anxiety      Historical Information   Past Medical History:   Diagnosis Date    Anxiety     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[facility])     [facility]     Depression     DVT [facility]deep venous thrombosis) ([facility] )     [place] hearing[place]     no hearing aids    Hypertension     Mass in neck     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HCC)     Psychiatric disorder     depression, anger    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     Past Surgical History:   Procedure Laterality Date    COLON SURGERY      Partial colectomy and colostomy    COLONOSCOPY      ESOPHAGOGASTRODUODENOSCOPY N/A 4/5/2017    Procedure: ESOPHAGOGASTRODUODENOSCOPY (EGD); Surgeon: Agnes Bess MD;  [facility]; Service:     EYE SURGERY Right     Cataract    KNEE SURGERY      NH EDG US EXAM SURGICAL ALTER STOM DUODENUM/JEJUNUM N/A 4/9/2018    Procedure: LINEAR ENDOSCOPIC U/S;  Surgeon: Lizzie Ferrell MD;  [facility];   Service: Gastroenterology    NH EXC PAROTD,LAT LOBE,DISSECT 5TH NERV Right 8/8/2018    Procedure: PAROTIDECTOMY WITH FACIAL NERVE MONITOR AND FROZEN SECTION;  Surgeon: Donavan Freedman MD;  [facility];  Service: ENT    NH IMPACT TOOTH [address] COMP BONY N/A 8/8/2018    Procedure: EXTRACTION TEETH #15 and #30;  Surgeon: Berry Yuan DDS;  [facility];  Service: Maxillofacial    RADICAL NECK DISSECTION N/A 8/8/2018    Procedure: SELECTIVE NECK DISSECTION;  Surgeon: Donavan Freedman MD;  [facility];  Service: ENT    UPPER GASTROINTESTINAL ENDOSCOPY     [address] AND STRIPPING       Social History   Social History     Substance and Sexual Activity   Alcohol Use No Comment: hx etoh abuse pt states he quit 9 years ago     Social History     Substance and Sexual Activity   Drug Use No     Social History     Tobacco Use   Smoking Status Former Smoker    Packs/day: 1 00    Years: 10 00    Pack years: 10 00    Types: Cigarettes    Last attempt to quit: 1981    Years since quittin 6   Smokeless Tobacco Never Used   Tobacco Comment    50 years ago     Family History   Problem Relation Age of Onset    Heart disease Brother     Diverticulitis Mother        Meds/Allergies     Medications Prior to Admission   Medication    amLODIPine (NORVASC) 5 mg tablet    LORazepam (ATIVAN) 0 5 mg tablet    Melatonin 10 MG TABS    Multiple Vitamins-Minerals (MULTI FOR HIM 50+ PO)    sertraline (ZOLOFT) 50 mg tablet    warfarin (COUMADIN) 2 mg tablet    fluticasone (FLONASE) 50 mcg/act nasal spray    ipratropium (ATROVENT) 0 03 % nasal spray    Loratadine (CLARITIN) 10 MG CAPS     Current Facility-Administered Medications   Medication Dose Route Frequency    amLODIPine (NORVASC) tablet 5 mg  5 mg Oral Daily    enoxaparin (LOVENOX) subcutaneous injection 40 mg  40 mg Subcutaneous Daily    HYDROmorphone (DILAUDID) injection 0 5 mg  0 5 mg Intravenous Q4H PRN    HYDROmorphone (DILAUDID) injection 0 5 mg  0 5 mg Intravenous Q4H PRN    HYDROmorphone (DILAUDID) injection 1 mg  1 mg Intravenous Q4H PRN    lactated ringers infusion  125 mL/hr Intravenous Continuous    LORazepam (ATIVAN) tablet 0 25 mg  0 25 mg Oral BID PRN    melatonin tablet 3 mg  3 mg Oral HS    ondansetron (ZOFRAN) injection 4 mg  4 mg Intravenous Q6H PRN    pantoprazole (PROTONIX) injection 40 mg  40 mg Intravenous Q24H SHAILESH    sertraline (ZOLOFT) tablet 75 mg  75 mg Oral Daily       Allergies   Allergen Reactions    Duloxetine Other (See Comments)     Panic attacks    Other      Seasonal           Objective     Blood pressure 144/71, pulse 85, temperature 97 6 °F (36 4 °C), temperature source Oral, resp  rate 20, SpO2 97 %  Intake/Output Summary (Last 24 hours) at 1/8/2020 1148  Last data filed at 1/8/2020 1352  Gross per 24 hour   Intake 1406 25 ml   Output    Net 1406 25 ml         PHYSICAL EXAM:      General Appearance:   Alert, cooperative, no distress, appears stated age    HEENT:   Normocephalic, atraumatic, anicteric, no oropharyngeal thrush present      Neck:  Supple, symmetrical, trachea midline, no adenopathy;    thyroid: no enlargement/tenderness/nodules; no carotid  bruit or JVD    Lungs:   Clear to auscultation bilaterally; no rales, rhonchi or wheezing; respirations unlabored    Heart[de-identified]   S1 and S2 normal; regular rate and rhythm; no murmur, rub, or gallop  Abdomen:   Soft, significant tenderness in the RUQ and epigastrium with minimal palpation, non-distended; normal bowel sounds; no masses, no organomegaly; ostomy present     Genitalia:   Deferred    Rectal:   Deferred    Extremities:  No cyanosis, clubbing or edema    Pulses:  2+ and symmetric all extremities    Skin:  Skin color, texture, turgor normal, no rashes or lesions    Lymph nodes:  No palpable cervical, axillary or inguinal lymphadenopathy        Lab Results:   Results from last 7 days   Lab Units 01/07/20  1858   WBC Thousand/uL 5 05   HEMOGLOBIN g/dL 13 5   HEMATOCRIT % 39 7   PLATELETS Thousands/uL 148*     Results from last 7 days   Lab Units 01/07/20  1858   POTASSIUM mmol/L 4 5   CHLORIDE mmol/L 104   CO2 mmol/L 30   BUN mg/dL 12   CREATININE mg/dL 0 92   CALCIUM mg/dL 9 0   ALK PHOS U/L 66   ALT U/L 20   AST U/L 18     Results from last 7 days   Lab Units 01/07/20  1858   INR  2 47*     Results from last 7 days   Lab Units 01/07/20  1858   LIPASE u/L 74       Imaging Studies: I have personally reviewed pertinent imaging studies  No results found  Patient was seen and examined by Dr Fidel Godfrey  All [facility] medical decisions were made by Dr Fidel Godrfey   Thank you for allowing us to participate in the care of this present patient  We will follow-up with you closely

## 2020-01-09 ENCOUNTER — TRANSITIONAL CARE MANAGEMENT (OUTPATIENT)
Dept: INTERNAL MEDICINE CLINIC | Facility: CLINIC | Age: 75
End: 2020-01-09

## 2020-01-09 VITALS
SYSTOLIC BLOOD PRESSURE: 151 MMHG | HEART RATE: 77 BPM | OXYGEN SATURATION: 98 % | DIASTOLIC BLOOD PRESSURE: 69 MMHG | TEMPERATURE: 97.9 F | RESPIRATION RATE: 20 BRPM

## 2020-01-09 LAB
CRP SERPL QL: <3 MG/L
ERYTHROCYTE [SEDIMENTATION RATE] IN BLOOD: 2 MM/HOUR (ref 0–10)
INR PPP: 3.03 (ref 0.84–1.19)
PROTHROMBIN TIME: 30.4 SECONDS (ref 11.6–14.5)

## 2020-01-09 PROCEDURE — 86140 C-REACTIVE PROTEIN: CPT | Performed by: PHYSICIAN ASSISTANT

## 2020-01-09 PROCEDURE — 85610 PROTHROMBIN TIME: CPT | Performed by: PHYSICIAN ASSISTANT

## 2020-01-09 PROCEDURE — 99232 SBSQ HOSP IP/OBS MODERATE 35: CPT | Performed by: PHYSICIAN ASSISTANT

## 2020-01-09 PROCEDURE — 99238 HOSP IP/OBS DSCHRG MGMT 30/<: CPT | Performed by: SURGERY

## 2020-01-09 PROCEDURE — 85652 RBC SED RATE AUTOMATED: CPT | Performed by: PHYSICIAN ASSISTANT

## 2020-01-09 RX ADMIN — SODIUM CHLORIDE, SODIUM LACTATE, POTASSIUM CHLORIDE, AND CALCIUM CHLORIDE 125 ML/HR: .6; .31; .03; .02 INJECTION, SOLUTION INTRAVENOUS at 04:21

## 2020-01-09 RX ADMIN — AMLODIPINE BESYLATE 5 MG: 5 TABLET ORAL at 13:00

## 2020-01-09 RX ADMIN — SERTRALINE HYDROCHLORIDE 75 MG: 50 TABLET ORAL at 13:00

## 2020-01-09 NOTE — PROGRESS NOTES
Progress Note - General Surgery   Brent Barba 69 y o  male MRN: [id number]  Unit/Bed#: S -01 Encounter: [id number]    Assessment/Plan:  69 y o  male with abdominal pain     -HIDA scan negative  -GI recs EGD, can be completed as outpatient  Discussed with GI and patient  -symptomatic management prn  -DVT ppx  -Will send out on PPI pending outpatient GI eval    Subjective/Objective     Subjective: Patient feeling better  Agrees with outpatient eval  Feels well      Objective:     Vitals: Temp:  [97 7 °F (36 5 °C)-97 9 °F (36 6 °C)] 97 9 °F (36 6 °C)  HR:  [72-85] 77  Resp:  [18-20] 20  BP: (132-151)/(69-80) 151/69  There is no height or weight on file to calculate BMI  I/O       01/06 0701 - 01/07 0700 01/07 0701 - 01/08 0700 01/08 0701 - 01/09 0700    P  O   0     I V   1406 3     Total Intake  1406 3     Net  +1406 3            Unmeasured Urine Occurrence  1 x           Physical Exam:  GEN: NAD  HEENT: MMM  CV: RRR  Lung: Normal effort  Ab: Soft, ND  Tender in epigastrium  Extrem: No CCE  Neuro: A+Ox3  Psych: Less nervous now that he is planning to go home    Lab, Imaging and other studies:   I have personally reviewed pertinent reports    , CBC with diff:   No results found for: WBC, HGB, HCT, MCV, PLT, ADJUSTEDWBC, MCH, MCHC, RDW, MPV, NRBC, BMP/CMP:   No results found for: SODIUM, K, CL, CO2, ANIONGAP, BUN, CREATININE, GLUCOSE, CALCIUM, AST, ALT, ALKPHOS, PROT, BILITOT, EGFR, Magnesium: No components found for: MAG  VTE Pharmacologic Prophylaxis: Enoxaparin (Lovenox)  VTE Mechanical Prophylaxis: sequential compression device

## 2020-01-09 NOTE — PROGRESS NOTES
Progress Note - Adis Decker 69 y o  male MRN: [id number]    Unit/Bed#: S -64 Encounter: [id number]    Assessment and Plan:   Principal Problem:    Epigastric pain  Active Problems:    Crohn's disease    Anxiety    HTN (hypertension)    Deep vein thrombosis (DVT) of proximal lower extremity ([address] )    #1  Epigastric pain, anorexia and weight loss:  this is likely a combination of anxiety, anorexia nervosa as patient does not eat, excessive working out  Possible musculoskeletal in origin as he does upwards of 200 crunches a day  Also consider underlying peptic ulcer disease  Biliary workup has been negative  -originally plan for upper endoscopy inpatient today however patient's INR is elevated at 3  Would not be safe due to increased risk of bleeding  Discussed with surgical team as well as the patient and his wife  Will plan for discharge is the patient is doing well in these are more chronic issues  Will plan for outpatient endoscopy  We will check with the patient's primary doctor in regards to holding his Coumadin and possibly needing a Lovenox bridge  -PPI upon d/c  -non-ulcerogenic diet     #2  History of Crohn's s/p proctocolectomy with ostomy: output the same, no blood or black stool  Denies change to this  Appears to be in clinical remission  Inflammatory markers normal  -monitor    ----------------------------------------------------------------------------------------------------------------    Subjective:     Pain still present but feels better today  Objective:     Vitals: Blood pressure 151/69, pulse 77, temperature 97 9 °F (36 6 °C), temperature source Oral, resp  rate 20, SpO2 98 %  ,There is no height or weight on file to calculate BMI        Intake/Output Summary (Last 24 hours) at 1/9/2020 1033  Last data filed at 1/9/2020 0421  Gross per 24 hour   Intake 2706 25 ml   Output    Net 2706 25 ml       Physical Exam:     General Appearance: Alert, appears stated age and cooperative  Lungs: Clear to auscultation bilaterally, no rales or rhonchi, no labored breathing/accessory muscle use  Heart: Regular rate and rhythm, S1, S2 normal, no murmur, click, rub or gallop  Abdomen: Soft, epigastric tenderness to palpation, non-distended; bowel sounds normal; no masses or no organomegaly  Extremities: No cyanosis, clubbing, or edema    Invasive Devices     Peripheral Intravenous Line            Peripheral IV 01/07/20 Right Forearm 1 day          Drain            Colostomy -- days    Closed/Suction Drain Right Neck Bulb 15 Fr  518 days                Lab Results:  Results from last 7 days   Lab Units 01/07/20  1858   WBC Thousand/uL 5 05   HEMOGLOBIN g/dL 13 5   HEMATOCRIT % 39 7   PLATELETS Thousands/uL 148*     Results from last 7 days   Lab Units 01/07/20  1858   POTASSIUM mmol/L 4 5   CHLORIDE mmol/L 104   CO2 mmol/L 30   BUN mg/dL 12   CREATININE mg/dL 0 92   CALCIUM mg/dL 9 0   ALK PHOS U/L 66   ALT U/L 20   AST U/L 18     Results from last 7 days   Lab Units 01/09/20  0502   INR  3 03*     Results from last 7 days   Lab Units 01/07/20  1858   LIPASE u/L 74       Imaging Studies: I have personally reviewed pertinent imaging studies  Nm Hepatobiliary W Rx    Result Date: 1/8/2020  Impression: 1  Normal hepatobiliary study  2  Normal contractile response of the gallbladder to cholecystokinin infusion   (88%) Workstation performed: [id number]     Ct Abdomen Pelvis W Contrast    Result Date: 1/9/2020  Impression: No acute findings in the abdomen or pelvis   Workstation performed: [id number]

## 2020-01-09 NOTE — PROGRESS NOTES
Progress Note - Shelley Barrier 1945, 69 y o  male MRN: [id number]  Unit/Bed#: S -01 Encounter: [id number] DOS: 1/9/20  Primary Care Provider: Mace Gitelman, DO   Date and time admitted to hospital: 1/7/2020  5:11 PM    * Epigastric pain  Assessment & Plan  Patient admitted with 2 month history of epigastric pain associated with nausea, poor appetite and 50 lb weight loss over 8 months  LFTs normal, lipase normal - does have history of pancreatitis 1 year ago with pancreatic cyst, follows Gastroenterology with serial MRI  Continue with PPI  Gastroenterology consult appreciated, suspect musculoskeletal pain and etiology likely due to excessive physical activity   Consider EGD ? Pt NPO currently  CT a/p normal    Surgery primary, normal HIDA scan 1/8  Outpatient abdominal ultrasound showed cholelithiasis    Deep vein thrombosis (DVT) of proximal lower extremity (HCC)  Assessment & Plan  Protein S deficiency on coumadin   Hx of DVTs, most recently in 1996   INR 3 03    Coumadin on hold per surgery - resume as soon as able   If INR < 2 will need bridge with lovenox or heparin drip      HTN (hypertension)  Assessment & Plan  Continue norvasc     Anxiety  Assessment & Plan  Zoloft recently increased to 75 mg daily  Outpatient psychiatry follow-up  Follow up with CBT   P r n  Ativan    Crohn's disease  Assessment & Plan  History of Crohn's disease status post total procto colectomy   Routine colostomy care  Albumin 3 7  Normal output from ostomy    VTE Pharmacologic Prophylaxis:   Pharmacologic: Warfarin (Coumadin)  Mechanical VTE Prophylaxis in Place: Yes    Patient Centered Rounds: I have performed bedside rounds with nursing staff today  Discussions with Specialists or Other Care Team Provider: vanessa     Education and Discussions with Family / Patient: pt     Time Spent for Care: 30 minutes  More than 50% of total time spent on counseling and coordination of care as described above      Current Length of Stay: 2 day(s)    Current Patient Status: Inpatient   Certification Statement: The patient will continue to require additional inpatient hospital stay due to pending ongoing GI and surg work up     Discharge Plan: stable for d/c from medical stand point - recommend outpatient f/u therapist and psych     Code Status: Level 1 - Full Code    Subjective:   Pt seen and examined at bedside  Wants to go home  We discussed his excess exercising  D/w his wife also yesterday who said he does not walk 13 mi a day during winter  Objective:     Vitals:   Temp (24hrs), Av 8 °F (36 6 °C), Min:97 7 °F (36 5 °C), Max:97 9 °F (36 6 °C)    Temp:  [97 7 °F (36 5 °C)-97 9 °F (36 6 °C)] 97 9 °F (36 6 °C)  HR:  [72-85] 77  Resp:  [18-20] 20  BP: (132-151)/(69-80) 151/69  SpO2:  [96 %-98 %] 98 %  There is no height or weight on file to calculate BMI  Input and Output Summary (last 24 hours): Intake/Output Summary (Last 24 hours) at 2020 1005  Last data filed at 2020 0421  Gross per 24 hour   Intake 2706 25 ml   Output    Net 2706 25 ml       Physical Exam:     Physical Exam   Constitutional: He is oriented to person, place, and time  He appears well-developed and well-nourished  HENT:   Head: Normocephalic and atraumatic  Eyes: EOM are normal  No scleral icterus  Neck: Normal range of motion  Neck supple  Cardiovascular: Normal rate, regular rhythm and normal heart sounds  No murmur heard  Pulmonary/Chest: Effort normal and breath sounds normal    Abdominal: Soft  Bowel sounds are normal  There is no tenderness  Musculoskeletal: Normal range of motion  He exhibits no edema  Neurological: He is alert and oriented to person, place, and time  Skin: Skin is warm and dry  Psychiatric: He has a normal mood and affect     Anxious, tremors noted      Additional Data:     Labs:    Results from last 7 days   Lab Units 20  1858   WBC Thousand/uL 5 05   HEMOGLOBIN g/dL 13 5   HEMATOCRIT % 39 7 PLATELETS Thousands/uL 148*     Results from last 7 days   Lab Units 01/07/20  1858   SODIUM mmol/L 140   POTASSIUM mmol/L 4 5   CHLORIDE mmol/L 104   CO2 mmol/L 30   BUN mg/dL 12   CREATININE mg/dL 0 92   ANION GAP mmol/L 6   CALCIUM mg/dL 9 0   ALBUMIN g/dL 3 7   TOTAL BILIRUBIN mg/dL 1 09*   ALK PHOS U/L 66   ALT U/L 20   AST U/L 18   GLUCOSE RANDOM mg/dL 88     Results from last 7 days   Lab Units 01/09/20  0502   INR  3 03*     Results from last 7 days   Lab Units 01/08/20  2054   POC GLUCOSE mg/dl 71                   * I Have Reviewed All Lab Data Listed Above  * Additional Pertinent Lab Tests Reviewed: [address] Admission Reviewed    Imaging:    Imaging Reports Reviewed Today Include: all  Imaging Personally Reviewed by Myself Includes:  none    Recent Cultures (last 7 days):           Last 24 Hours Medication List:     Current Facility-Administered Medications:  amLODIPine 5 mg Oral Daily HAIR Ryan-C    enoxaparin 40 mg Subcutaneous Daily HAIR Ryan-C    HYDROmorphone 0 5 mg Intravenous Q4H PRN Qing Alyssa, PA-C    HYDROmorphone 0 5 mg Intravenous Q4H PRN Qing Alyssa, PA-C    HYDROmorphone 1 mg Intravenous Q4H PRN Qing Alyssa, PA-C    lactated ringers 125 mL/hr Intravenous Continuous HAIR Ryan-C Last Rate: 125 mL/hr (01/09/20 0421)   LORazepam 0 25 mg Oral BID PRN Qingkortney Shah, PA-C    melatonin 3 mg Oral HS Ash Moore PA-C    ondansetron 4 mg Intravenous Q6H PRN Qing Alyssa, PA-C    pantoprazole 40 mg Intravenous Q24H [address] Qingkortney Shah, PA-C    sertraline 75 mg Oral Daily Qing Shah PA-C         Today, Patient Was Seen By: Tesha Jean Baptiste PA-C    ** Please Note: Dictation voice to text software may have been used in the creation of this document   **

## 2020-01-09 NOTE — DISCHARGE SUMMARY
Discharge Summary - Darline Meléndez 69 y o  male MRN: [id number]    Unit/Bed#: S -60 Encounter: [id number]    Admission Date:   Admission Orders (From admission, onward)     Ordered        01/07/20 1823  Inpatient Admission  Once                     Admitting Diagnosis: Pain of upper abdomen [R10 10]    HPI: Darline Meléndez is a 69y o  year old male with history of colostomy secondary to Crohns, anxiety, DVT, protein S deficiency on coumadin and pancreatitis 1 year ago admitted for evaluation of epigastric pain x 2 months associated with occasional nausea  Pain has increased in intensity and has been constant for 2 weeks  He mentions a poor appetite and a 50 pound weight loss over the past 8 months  Denies fever, chills, jaundice, change in bowel habits  Procedures Performed: No orders of the defined types were placed in this encounter  Summary of Hospital Course: Patient was admitted for RUQ/Epigastric pain  US/HIDA without evidence of acute cholecystitis  GI consult obtained  They recommended EGD, but patient's INR is 3 today  He is feeling well and GI agrees that the patient can follow up in the office to set up EGD at a later time  The patient preferred this plan  He is amenable to discharge with outpatient follow up with GI at this time  Significant Findings, Care, Treatment and Services Provided: As above  Complications: None    Discharge Diagnosis: Abdominal pain    Resolved Problems  Date Reviewed: 1/9/2020    None          Condition at Discharge: good         Discharge instructions/Information to patient and family:   See after visit summary for information provided to patient and family  Provisions for Follow-Up Care:  See after visit summary for information related to follow-up care and any pertinent home health orders  PCP: Jacques Signs, DO    Disposition: See After Visit Summary for discharge disposition information      Planned Readmission: No      Discharge Statement   I spent 30 minutes discharging the patient  This time was spent on the day of discharge  I had direct contact with the patient on the day of discharge  Additional documentation is required if more than 30 minutes were spent on discharge  Discharge Medications:  See after visit summary for reconciled discharge medications provided to patient and family

## 2020-01-09 NOTE — DISCHARGE INSTRUCTIONS
Please hold coumadin today due to super theraputic level  Follow up with PCP tomorrow for INR check/med adjustment

## 2020-01-10 ENCOUNTER — TELEPHONE (OUTPATIENT)
Dept: GASTROENTEROLOGY | Facility: AMBULARY SURGERY CENTER | Age: 75
End: 2020-01-10

## 2020-01-10 LAB — SL AMB POCT INR: 3.03

## 2020-01-10 NOTE — TELEPHONE ENCOUNTER
Pt is scheduled for egd w/ Dr Sharlene Hall at [address]  [facility] 1/23/2020  I sent medication clearance (possible bridging w/ Lovenox) to Dr Weiner Aus   Will await his recommendation

## 2020-01-14 ENCOUNTER — OFFICE VISIT (OUTPATIENT)
Dept: INTERNAL MEDICINE CLINIC | Facility: CLINIC | Age: 75
End: 2020-01-14
Payer: MEDICARE

## 2020-01-14 VITALS
HEART RATE: 68 BPM | SYSTOLIC BLOOD PRESSURE: 140 MMHG | TEMPERATURE: 98.1 F | BODY MASS INDEX: 21.97 KG/M2 | WEIGHT: 130 LBS | DIASTOLIC BLOOD PRESSURE: 84 MMHG

## 2020-01-14 DIAGNOSIS — R10.13 DYSPEPSIA: ICD-10-CM

## 2020-01-14 DIAGNOSIS — F41.9 ANXIETY: ICD-10-CM

## 2020-01-14 DIAGNOSIS — I82.4Y9 DEEP VEIN THROMBOSIS (DVT) OF PROXIMAL LOWER EXTREMITY, UNSPECIFIED CHRONICITY, UNSPECIFIED LATERALITY (HCC): Primary | ICD-10-CM

## 2020-01-14 PROCEDURE — 1111F DSCHRG MED/CURRENT MED MERGE: CPT | Performed by: INTERNAL MEDICINE

## 2020-01-14 PROCEDURE — 99495 TRANSJ CARE MGMT MOD F2F 14D: CPT | Performed by: INTERNAL MEDICINE

## 2020-01-14 RX ORDER — PANTOPRAZOLE SODIUM 40 MG/1
40 TABLET, DELAYED RELEASE ORAL
Qty: 30 TABLET | Refills: 1 | Status: SHIPPED | OUTPATIENT
Start: 2020-01-14 | End: 2020-03-04

## 2020-01-14 NOTE — PROGRESS NOTES
Assessment/Plan:     Dyspepsia  Ultrasound show gallstones but CT scan and HIDA scan were negative he was hospitalized here for transition care management visit I did review the transition care management visit laboratories in testing next step he will see GI for upper endoscopy to rule out peptic ulcer disease I will start him on Protonix 40 mg once daily I would like him to follow ulcer free diet he is to call if any change, worsening of the symptoms they do not tess  RTO 4 weeks call if any problems  Deep vein thrombosis (DVT) of proximal lower extremity (HCC)  Clinically stable and doing well continue the current medical regiment will continue monitor  Continue Coumadin will need to stop the Coumadin 5 days prior to EGD his blood clot was many years ago he has not had a recent blood clot    Anxiety  No suicidal ideation will meet with counselor Charlene Knapp and be seeing Psychiatry and very near future he reports me the Ativan has been helpful  Reports me significant relationship problems with his wife no SI no HI I did ask him to talk about this more with Lucila Mortimer tomorrow       Diagnoses and all orders for this visit:    Deep vein thrombosis (DVT) of proximal lower extremity, unspecified chronicity, unspecified laterality (HCC)    Dyspepsia  -     pantoprazole (PROTONIX) 40 mg tablet; Take 1 tablet (40 mg total) by mouth daily before breakfast    Anxiety       Return to office 2  months  call if any problemsSubjective:     Patient ID: Sary Oden is a 69 y o  male  HPI 71-year-old male coming in for a transition care management visit he is accompanied with his wife; he was recently hospitalized at [facility] regarding epigastric pain, anorexia and weight loss unclear etiology  Ultrasound did show gallstones a CT scan did not show gallstones he has been working with General surgery  HIDA scan was negative    He will be undergoing GI evaluation and upper endoscopy in the near future to rule ulcer   The patient does report me being very anxious he reports me difficulty with his relationship with his wife he currently reports me they are not speaking no suicidal ideation no homicidal ideation he will be seeing counselor Elaina Francis tomorrow and seeing Psychiatry in the very near future  he is now using Ativan for anxiety which is helpful and uses melatonin for sleep  Sleeps 1-2 hours before bed, taking pepcid  Dr Adriana Granados regarding symptoms the patient did undergo a CT scan and also high scan that were unrevealing; patient reports me he is not a surgical candidate hand will be undergoing upper endoscopy the GI specialist Dr Sujit Segundo  Hi 1/23/2020 reports me epigastric pain  Review of Systems   Constitutional: Negative for activity change, appetite change and unexpected weight change  HENT: Negative for congestion and postnasal drip  Eyes: Negative for visual disturbance  Respiratory: Negative for cough and shortness of breath  Cardiovascular: Negative for chest pain  Gastrointestinal: Positive for abdominal pain  Negative for diarrhea, nausea and vomiting  Neurological: Positive for tremors  Negative for dizziness, light-headedness and headaches  Hematological: Negative for adenopathy  Psychiatric/Behavioral: Negative for suicidal ideas  The patient is nervous/anxious  No follow-ups on file        Allergies   Allergen Reactions    Duloxetine Other (See Comments)     Panic attacks    Other      Seasonal       Past Medical History:   Diagnosis Date    Anxiety     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[facility])     Crohn's disease ([facility] )     Depression     DVT (deep venous thrombosis) ([facility] )     [place] (hard of hearing)     no hearing aids    Hypertension     Mass in neck     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HCC)     Psychiatric disorder     depression, anger    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     Past Surgical History:   Procedure Laterality Date    COLON SURGERY      Partial colectomy and colostomy    COLONOSCOPY      ESOPHAGOGASTRODUODENOSCOPY N/A 4/5/2017    Procedure: ESOPHAGOGASTRODUODENOSCOPY (EGD); Surgeon: Florinda Marino MD;  [facility]; Service:     EYE SURGERY Right     Cataract    KNEE SURGERY      OR EDG US EXAM SURGICAL ALTER STOM DUODENUM/JEJUNUM N/A 4/9/2018    Procedure: LINEAR ENDOSCOPIC U/S;  Surgeon: Cristian Bell MD;  [facility];   Service: Gastroenterology    OR EXC PAROTD,LAT LOBE,DISSECT 5TH NERV Right 8/8/2018    Procedure: PAROTIDECTOMY WITH FACIAL NERVE MONITOR AND FROZEN SECTION;  Surgeon: Jo Hoang MD;  [facility];  Service: ENT    OR IMPACT TOOTH [address] COMP BONY N/A 8/8/2018    Procedure: EXTRACTION TEETH #15 and #30;  Surgeon: Moiz Nelson DDS;  [facility];  Service: Maxillofacial    RADICAL NECK DISSECTION N/A 8/8/2018    Procedure: SELECTIVE NECK DISSECTION;  Surgeon: Jo Hoang MD;  [facility];  Service: ENT    UPPER GASTROINTESTINAL ENDOSCOPY      VEIN LIGATION AND STRIPPING       Current Outpatient Medications on File Prior to Visit   Medication Sig Dispense Refill    amLODIPine (NORVASC) 5 mg tablet TAKE 1 TABLET BY MOUTH ONCE DAILY 90 tablet 3    LORazepam (ATIVAN) 0 5 mg tablet TAKE 1/2 (ONE-HALF) TABLET BY MOUTH EVERY 12 HOURS AS NEEDED FOR ANXIETY 30 tablet 0    Melatonin 10 MG TABS Take by mouth      Multiple Vitamins-Minerals (MULTI FOR HIM 50+ PO) Take 1 tablet by mouth daily      sertraline (ZOLOFT) 50 mg tablet 1 5 tablets once a day 45 tablet 3    warfarin (COUMADIN) 2 mg tablet TAKE 1 TABLET BY MOUTH ONCE DAILY 90 tablet 3    fluticasone (FLONASE) 50 mcg/act nasal spray 1 spray into each nostril daily      ipratropium (ATROVENT) 0 03 % nasal spray 2 sprays into each nostril 3 (three) times a day as needed for rhinitis (Patient not taking: Reported on 12/17/2019) 30 mL 6    Loratadine (CLARITIN) 10 MG CAPS Take by mouth daily as needed         No current facility-administered medications on file prior to visit        Family History   Problem Relation Age of Onset    Heart disease Brother     Diverticulitis Mother      Social History     Socioeconomic History    Marital status: /Civil Union     Spouse name: Not on file    Number of children: Not on file    Years of education: Not on file    Highest education level: Not on file   Occupational History    Not on file   Social Needs    Financial resource strain: Not on file    Food insecurity:     Worry: Not on file     Inability: Not on file    Transportation needs:     Medical: Not on file     Non-medical: Not on file   Tobacco Use    Smoking status: Former Smoker     Packs/day: 1      Years: 10 00     Pack years: 10      Types: Cigarettes     Last attempt to quit: 1981     Years since quittin 6    Smokeless tobacco: Never Used    Tobacco comment: 50 years ago   Substance and Sexual Activity    Alcohol use: No     Comment: hx etoh abuse pt states he quit 9 years ago    Drug use: No    Sexual activity: Never   Lifestyle    Physical activity:     Days per week: Not on file     Minutes per session: Not on file    Stress: Not on file   Relationships    Social connections:     Talks on phone: Not on file     Gets together: Not on file     Attends Latter day service: Not on file     Active member of club or organization: Not on file     Attends meetings of clubs or organizations: Not on file     Relationship status: Not on file    Intimate partner violence:     Fear of current or ex partner: Not on file     Emotionally abused: Not on file     Physically abused: Not on file     Forced sexual activity: Not on file   Other Topics Concern    Not on file   Social History Narrative    Not on file     Vitals:    20 1501   BP: 140/84   BP Location: Left arm   Patient Position: Sitting   Cuff Size: Large   Pulse: 68   Temp: 98 1 °F (36 7 °C)   Weight: 59 kg (130 lb)     Results for orders placed or performed during the hospital encounter of 01/07/20   CBC   Result Value Ref Range    WBC 5 05 4 31 - 10 16 Thousand/uL    RBC 4 34 3 88 - 5 62 Million/uL    Hemoglobin 13 5 12 0 - 17 0 g/dL    Hematocrit 39 7 36 5 - 49 3 %    MCV 92 82 - 98 fL    MCH 31 1 26 8 - 34 3 pg    MCHC 34 0 31 4 - 37 4 g/dL    RDW 12 9 11 6 - 15 1 %    Platelets 927 (L) 056 - 390 Thousands/uL    MPV 9 9 8 9 - 12 7 fL   Comprehensive metabolic panel   Result Value Ref Range    Sodium 140 136 - 145 mmol/L    Potassium 4 5 3 5 - 5 3 mmol/L    Chloride 104 100 - 108 mmol/L    CO2 30 21 - 32 mmol/L    ANION GAP 6 4 - 13 mmol/L    BUN 12 5 - 25 mg/dL    Creatinine 0 92 0 60 - 1 30 mg/dL    Glucose 88 65 - 140 mg/dL    Calcium 9 0 8 3 - 10 1 mg/dL    AST 18 5 - 45 U/L    ALT 20 12 - 78 U/L    Alkaline Phosphatase 66 46 - 116 U/L    Total Protein 6 5 6 4 - 8 2 g/dL    Albumin 3 7 3 5 - 5 0 g/dL    Total Bilirubin 1 09 (H) 0 20 - 1 00 mg/dL    eGFR 82 ml/min/1 73sq m   Protime-INR   Result Value Ref Range    Protime 25 8 (H) 11 6 - 14 5 seconds    INR 2 47 (H) 0 84 - 1 19   APTT   Result Value Ref Range    PTT 35 23 - 37 seconds   Lipase   Result Value Ref Range    Lipase 74 73 - 393 u/L   Protime-INR   Result Value Ref Range    Protime 30 4 (H) 11 6 - 14 5 seconds    INR 3 03 (H) 0 84 - 1 19   C-reactive protein   Result Value Ref Range    CRP <3 0 <3 0 mg/L   Sedimentation rate, automated   Result Value Ref Range    Sed Rate 2 0 - 10 mm/hour   Fingerstick Glucose (POCT)   Result Value Ref Range    POC Glucose 71 65 - 140 mg/dl     Weight (last 2 days)     Date/Time   Weight    01/14/20 1501   59 (130)            Body mass index is 21 97 kg/m²  BP      Temp      Pulse     Resp      SpO2        Vitals:    01/14/20 1501   Weight: 59 kg (130 lb)     Vitals:    01/14/20 1501   Weight: 59 kg (130 lb)     Objective:     Physical Exam   Constitutional: He appears well-developed and well-nourished  No distress     HENT:   Head: Normocephalic and atraumatic  Right Ear: External ear normal    Left Ear: External ear normal    Mouth/Throat: Oropharynx is clear and moist    Eyes: Pupils are equal, round, and reactive to light  Conjunctivae are normal  Right eye exhibits no discharge  Left eye exhibits no discharge  No scleral icterus  Neck: Neck supple  Cardiovascular: Normal rate, regular rhythm and normal heart sounds  Exam reveals no gallop and no friction rub  No murmur heard  Pulmonary/Chest: No respiratory distress  He has no wheezes  He has no rales  Abdominal: Soft  Bowel sounds are normal  He exhibits no distension and no mass  There is tenderness (Epigastric/mid abdomen)  There is no rebound and no guarding  Musculoskeletal: He exhibits no edema or deformity  Lymphadenopathy:     He has no cervical adenopathy  Neurological: He is alert  Skin: He is not diaphoretic  Psychiatric: His mood appears anxious  His affect is labile  He does not exhibit a depressed mood  He expresses no suicidal ideation  Vitals:    01/14/20 1501   BP: 140/84   BP Location: Left arm   Patient Position: Sitting   Cuff Size: Large   Pulse: 68   Temp: 98 1 °F (36 7 °C)   Weight: 59 kg (130 lb)       Transitional Care Management Review:  Sheri Hill is a 69 y o  male here for TCM follow up  During the TCM phone call patient stated:    TCM Call (since 12/14/2019)     Date and time call was made  1/9/2020  1:46 PM    Hospital care reviewed  Records reviewed    Patient was hospitialized at  [address] [place] [address]    Date of Admission  01/07/20    Date of discharge  01/09/20    Diagnosis  Epigastric pain    Disposition  Home    Were the patients medications reviewed and updated  No    Current Symptoms  -- <img src='C:FILES (X86)      TCM Call (since 12/14/2019)     Post hospital issues  None    Should patient be enrolled in anticoag monitoring? Yes    Scheduled for follow up?   Yes    Did you obtain your prescribed medications  Yes    Do you need help managing your prescriptions or medications  No    I have advised the patient to call PCP with any new or worsening symptoms  Rob Solano, MR    Are you recieving any outpatient services  No    Are you recieving home care services  No    Are you using any community resources  No    Have you fallen in the last 12 months  Yes    How many times  Many    Interperter language line needed  No    Counseling  Patient          Millicent Capone, DO

## 2020-01-15 ENCOUNTER — SOCIAL WORK (OUTPATIENT)
Dept: BEHAVIORAL/MENTAL HEALTH CLINIC | Facility: CLINIC | Age: 75
End: 2020-01-15
Payer: MEDICARE

## 2020-01-15 ENCOUNTER — TELEPHONE (OUTPATIENT)
Dept: GASTROENTEROLOGY | Facility: AMBULARY SURGERY CENTER | Age: 75
End: 2020-01-15

## 2020-01-15 DIAGNOSIS — F41.9 ANXIETY: Primary | ICD-10-CM

## 2020-01-15 PROCEDURE — 90834 PSYTX W PT 45 MINUTES: CPT | Performed by: SOCIAL WORKER

## 2020-01-15 NOTE — ASSESSMENT & PLAN NOTE
Clinically stable and doing well continue the current medical regiment will continue monitor    Continue Coumadin will need to stop the Coumadin 5 days prior to EGD his blood clot was many years ago he has not had a recent blood clot

## 2020-01-15 NOTE — PATIENT INSTRUCTIONS
Continues to review stress mgmt strategies to utilize as well as productive outlets for his emotions  Continue to reinforce need for compliance with treatment and appointments given his apprehension with upcoming endoscopy

## 2020-01-15 NOTE — PSYCH
Assessment/Plan: Manage anxiety and stress     There are no diagnoses linked to this encounter  Subjective: Continues to report issues with anxiety and stress and difficulties managing same  May again have ulcers which may have been a long-standing issues  Has been in chronic discomfort and will have endoscopy done next week  Some tension at home with spouse  Denies depressive symptoms  No SI or HI  Patient ID: Delphia Severin is a 69 y o  male  Met with Jaki Flores for 40 minutes from 1:30PM-2:10PM  Continues to have issues with stress and anxiety and physical pain issues, particularly gastric issues, that contribute  Hopes these may be treated after having endoscopy  Has been less physcially active due to pain which has not helped his anxiety  Review of Systems      Objective: Jaki Flores presents as anxious but seemed to lessen to degree as sessions progressed  Tremors in hands remain evident  Presents as verbal, cooperative and well oriented       Physical Exam   Psychiatric: His behavior is normal  Judgment and thought content normal

## 2020-01-15 NOTE — ASSESSMENT & PLAN NOTE
No suicidal ideation will meet with counselor Romie Malloy and be seeing Psychiatry and very near future he reports me the Ativan has been helpful    Reports me significant relationship problems with his wife no SI no HI I did ask him to talk about this more with Vikas Parra tomorrow

## 2020-01-15 NOTE — ASSESSMENT & PLAN NOTE
Ultrasound show gallstones but CT scan and HIDA scan were negative he was hospitalized here for transition care management visit I did review the transition care management visit laboratories in testing next step he will see GI for upper endoscopy to rule out peptic ulcer disease I will start him on Protonix 40 mg once daily I would like him to follow ulcer free diet he is to call if any change, worsening of the symptoms they do not tess  RTO 4 weeks call if any problems

## 2020-01-17 ENCOUNTER — TRANSCRIBE ORDERS (OUTPATIENT)
Dept: LAB | Facility: CLINIC | Age: 75
End: 2020-01-17

## 2020-01-17 ENCOUNTER — APPOINTMENT (OUTPATIENT)
Dept: LAB | Facility: CLINIC | Age: 75
End: 2020-01-17
Payer: MEDICARE

## 2020-01-17 DIAGNOSIS — I82.4Y9 DEEP VEIN THROMBOSIS (DVT) OF PROXIMAL LOWER EXTREMITY, UNSPECIFIED CHRONICITY, UNSPECIFIED LATERALITY (HCC): ICD-10-CM

## 2020-01-20 ENCOUNTER — ANTICOAG VISIT (OUTPATIENT)
Dept: INTERNAL MEDICINE CLINIC | Facility: CLINIC | Age: 75
End: 2020-01-20

## 2020-01-22 ENCOUNTER — ANESTHESIA EVENT (OUTPATIENT)
Dept: GASTROENTEROLOGY | Facility: HOSPITAL | Age: 75
End: 2020-01-22

## 2020-01-23 ENCOUNTER — HOSPITAL ENCOUNTER (OUTPATIENT)
Dept: GASTROENTEROLOGY | Facility: HOSPITAL | Age: 75
Setting detail: OUTPATIENT SURGERY
Discharge: HOME | End: 2020-01-23
Attending: INTERNAL MEDICINE | Admitting: INTERNAL MEDICINE
Payer: MEDICARE

## 2020-01-23 ENCOUNTER — ANESTHESIA (OUTPATIENT)
Dept: GASTROENTEROLOGY | Facility: HOSPITAL | Age: 75
End: 2020-01-23

## 2020-01-23 VITALS
SYSTOLIC BLOOD PRESSURE: 124 MMHG | OXYGEN SATURATION: 96 % | BODY MASS INDEX: 20.33 KG/M2 | WEIGHT: 122 LBS | DIASTOLIC BLOOD PRESSURE: 74 MMHG | TEMPERATURE: 97.5 F | RESPIRATION RATE: 18 BRPM | HEIGHT: 65 IN | HEART RATE: 66 BPM

## 2020-01-23 DIAGNOSIS — R63.4 WEIGHT LOSS: ICD-10-CM

## 2020-01-23 DIAGNOSIS — R11.0 NAUSEA: ICD-10-CM

## 2020-01-23 DIAGNOSIS — K86.89 DILATED PANCREATIC DUCT: ICD-10-CM

## 2020-01-23 DIAGNOSIS — K50.00 CROHN'S DISEASE OF SMALL INTESTINE WITHOUT COMPLICATION (HCC): ICD-10-CM

## 2020-01-23 DIAGNOSIS — K50.119 CROHN'S DISEASE OF LARGE INTESTINE WITH COMPLICATION (HCC): ICD-10-CM

## 2020-01-23 PROCEDURE — 88305 TISSUE EXAM BY PATHOLOGIST: CPT | Performed by: PATHOLOGY

## 2020-01-23 PROCEDURE — 43239 EGD BIOPSY SINGLE/MULTIPLE: CPT | Performed by: INTERNAL MEDICINE

## 2020-01-23 RX ORDER — LIDOCAINE HYDROCHLORIDE 10 MG/ML
INJECTION, SOLUTION EPIDURAL; INFILTRATION; INTRACAUDAL; PERINEURAL AS NEEDED
Status: DISCONTINUED | OUTPATIENT
Start: 2020-01-23 | End: 2020-01-23 | Stop reason: SURG

## 2020-01-23 RX ORDER — SODIUM CHLORIDE, SODIUM LACTATE, POTASSIUM CHLORIDE, CALCIUM CHLORIDE 600; 310; 30; 20 MG/100ML; MG/100ML; MG/100ML; MG/100ML
100 INJECTION, SOLUTION INTRAVENOUS CONTINUOUS
Status: CANCELLED | OUTPATIENT
Start: 2020-01-23

## 2020-01-23 RX ORDER — PROPOFOL 10 MG/ML
INJECTION, EMULSION INTRAVENOUS AS NEEDED
Status: DISCONTINUED | OUTPATIENT
Start: 2020-01-23 | End: 2020-01-23 | Stop reason: SURG

## 2020-01-23 RX ORDER — SODIUM CHLORIDE, SODIUM LACTATE, POTASSIUM CHLORIDE, CALCIUM CHLORIDE 600; 310; 30; 20 MG/100ML; MG/100ML; MG/100ML; MG/100ML
INJECTION, SOLUTION INTRAVENOUS CONTINUOUS PRN
Status: DISCONTINUED | OUTPATIENT
Start: 2020-01-23 | End: 2020-01-23 | Stop reason: SURG

## 2020-01-23 RX ADMIN — PROPOFOL 20 MG: 10 INJECTION, EMULSION INTRAVENOUS at 08:20

## 2020-01-23 RX ADMIN — SODIUM CHLORIDE, SODIUM LACTATE, POTASSIUM CHLORIDE, AND CALCIUM CHLORIDE: .6; .31; .03; .02 INJECTION, SOLUTION INTRAVENOUS at 08:15

## 2020-01-23 RX ADMIN — PROPOFOL 20 MG: 10 INJECTION, EMULSION INTRAVENOUS at 08:24

## 2020-01-23 RX ADMIN — PROPOFOL 20 MG: 10 INJECTION, EMULSION INTRAVENOUS at 08:22

## 2020-01-23 RX ADMIN — PROPOFOL 100 MG: 10 INJECTION, EMULSION INTRAVENOUS at 08:17

## 2020-01-23 RX ADMIN — LIDOCAINE HYDROCHLORIDE 50 MG: 10 INJECTION, SOLUTION EPIDURAL; INFILTRATION; INTRACAUDAL; PERINEURAL at 08:17

## 2020-01-23 NOTE — ANESTHESIA PREPROCEDURE EVALUATION
Review of Systems/Medical History  Patient summary reviewed  Chart reviewed  No history of anesthetic complications     Cardiovascular  Exercise tolerance (METS): >4,  Hypertension ,    Pulmonary  Smoker ex-smoker  ,        GI/Hepatic      Comment: crohns     Negative  ROS        Endo/Other  Negative endo/other ROS      GYN       Hematology    Coagulation disorder currently taking oral anticoagulants,    Musculoskeletal  Negative musculoskeletal ROS        Neurology  Negative neurology ROS      Psychology   Anxiety, Depression ,              Physical Exam    Airway    Mallampati score: II  TM Distance: >3 FB  Neck ROM: full     Dental   No notable dental hx     Cardiovascular      Pulmonary      Other Findings        Anesthesia Plan  ASA Score- 3     Anesthesia Type- IV sedation with anesthesia with ASA Monitors  Additional Monitors:   Airway Plan:     Comment: Per patient, appropriately NPO, denies active CP/SOB/wheezing/symptoms related to heartburn/nausea/vomiting    Plan Factors-  Patient did not smoke on day of surgery  Induction- intravenous  Postoperative Plan-     Informed Consent- Anesthetic plan and risks discussed with patient  I personally reviewed this patient with the CRNA  Discussed and agreed on the Anesthesia Plan with the CRNA  Rickey Lezama

## 2020-01-23 NOTE — H&P
History and Physical - [facility]  Cynthia Do 69 y o  male MRN: [id number]    HPI: Cynthia Do is a 69y o  year old male who presents with epigastric pain and weight loss  Review of Systems    Historical Information   Past Medical History:   Diagnosis Date    Anxiety     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)     [facility]     Depression     [facility]) ([facility] )     [place] (hard of hearing)     no hearing aids    Hypertension     Mass in neck     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[facility] )     Psychiatric disorder     depression, anger    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     Past Surgical History:   Procedure Laterality Date    COLON SURGERY      Partial colectomy and colostomy    COLONOSCOPY      ESOPHAGOGASTRODUODENOSCOPY N/A 4/5/2017    Procedure: ESOPHAGOGASTRODUODENOSCOPY (EGD); Surgeon: Otoniel Ta MD;  [facility]; Service:     EYE SURGERY Right     Cataract    KNEE SURGERY      HI EDG US EXAM SURGICAL ALTER STOM DUODENUM/JEJUNUM N/A 4/9/2018    Procedure: LINEAR ENDOSCOPIC U/S;  Surgeon: Armand Gordillo MD;  [facility];   Service: Gastroenterology    HI EXC PAROTD,LAT LOBE,DISSECT 5TH NERV Right 8/8/2018    Procedure: PAROTIDECTOMY WITH FACIAL NERVE MONITOR AND FROZEN SECTION;  Surgeon: Kira Balderas MD;  [facility];  Service: ENT    HI IMPACT TOOTH [address] COMP BONY N/A 8/8/2018    Procedure: EXTRACTION TEETH #15 and #30;  Surgeon: Magda Pollard DDS;  [facility];  Service: Maxillofacial    RADICAL NECK DISSECTION N/A 8/8/2018    Procedure: SELECTIVE NECK DISSECTION;  Surgeon: Kira Balderas MD;  [facility];  Service: ENT    UPPER GASTROINTESTINAL ENDOSCOPY      VEIN LIGATION AND STRIPPING       Social History   Social History     Substance and Sexual Activity   Alcohol Use No    Comment: hx etoh abuse pt states he quit 9 years ago     Social History     Substance and Sexual Activity   Drug Use No Social History     Tobacco Use   Smoking Status Former Smoker    Packs/day: 1 00    Years: 10 00    Pack years: 10 00    Types: Cigarettes    Last attempt to quit: 1981    Years since quittin 6   Smokeless Tobacco Never Used   Tobacco Comment    48 years ago     Family History   Problem Relation Age of Onset    Heart disease Brother     Diverticulitis Mother        Meds/Allergies       (Not in a hospital admission)    Allergies   Allergen Reactions    Duloxetine Other (See Comments)     Panic attacks    Other      Seasonal       Objective     /81   Pulse 81   Temp (!) 97 3 °F (36 3 °C) (Temporal)   Resp 16   Ht 5' 5" (1 651 m)   Wt 55 3 kg (122 lb)   SpO2 100%   BMI 20 30 kg/m²       PHYSICAL EXAM    Gen: NAD  CV: RRR  CHEST: Clear  ABD: soft, NT/ND  EXT: no edema  Neuro: AAO      ASSESSMENT/PLAN:  This is a 69y o  year old male here for epigastric pain and weight loss         PLAN:   Procedure: Krzysztof Garcia

## 2020-01-23 NOTE — ANESTHESIA POSTPROCEDURE EVALUATION
Post-Op Assessment Note    CV Status:  Stable  Pain Score: 0    Pain management: adequate     Mental Status:  Alert and awake   Hydration Status:  Euvolemic   PONV Controlled:  Controlled   Airway Patency:  Patent   Post Op Vitals Reviewed: Yes      Staff: CRNA           BP   109/62   Temp  97 9   Pulse  66   Resp   12   SpO2   98%

## 2020-01-24 ENCOUNTER — APPOINTMENT (OUTPATIENT)
Dept: LAB | Facility: CLINIC | Age: 75
End: 2020-01-24
Payer: MEDICARE

## 2020-01-24 DIAGNOSIS — I82.4Y9 DEEP VEIN THROMBOSIS (DVT) OF PROXIMAL LOWER EXTREMITY, UNSPECIFIED CHRONICITY, UNSPECIFIED LATERALITY (HCC): ICD-10-CM

## 2020-01-24 LAB
INR PPP: 1.39 (ref 0.84–1.19)
PROTHROMBIN TIME: 16.4 SECONDS (ref 11.6–14.5)

## 2020-01-24 PROCEDURE — 36415 COLL VENOUS BLD VENIPUNCTURE: CPT

## 2020-01-24 PROCEDURE — 85610 PROTHROMBIN TIME: CPT

## 2020-01-27 ENCOUNTER — APPOINTMENT (OUTPATIENT)
Dept: LAB | Facility: CLINIC | Age: 75
End: 2020-01-27
Payer: MEDICARE

## 2020-01-27 DIAGNOSIS — I82.4Y9 DEEP VEIN THROMBOSIS (DVT) OF PROXIMAL LOWER EXTREMITY, UNSPECIFIED CHRONICITY, UNSPECIFIED LATERALITY (HCC): ICD-10-CM

## 2020-01-27 LAB
INR PPP: 1.81 (ref 0.84–1.19)
PROTHROMBIN TIME: 20.2 SECONDS (ref 11.6–14.5)

## 2020-01-27 PROCEDURE — 85610 PROTHROMBIN TIME: CPT

## 2020-01-27 PROCEDURE — 36415 COLL VENOUS BLD VENIPUNCTURE: CPT

## 2020-01-29 ENCOUNTER — SOCIAL WORK (OUTPATIENT)
Dept: BEHAVIORAL/MENTAL HEALTH CLINIC | Facility: CLINIC | Age: 75
End: 2020-01-29
Payer: MEDICARE

## 2020-01-29 DIAGNOSIS — F41.9 ANXIETY: Primary | ICD-10-CM

## 2020-01-29 PROCEDURE — 90832 PSYTX W PT 30 MINUTES: CPT | Performed by: SOCIAL WORKER

## 2020-01-29 NOTE — PATIENT INSTRUCTIONS
Provided positive reinforcement for his discussion today and realization he would benefit from psychiatrist level of care He agrees to contact me following the appointment next week to report on his status  Reviewed stress mgmt/relaxation strategies to continue to utilize

## 2020-01-29 NOTE — PSYCH
Assessment/Plan: Manage anxiety/mood issues     There are no diagnoses linked to this encounter  Subjective: Dex Clifford reports and increase in anxiety and mood lability  Feels it is worsening despite current meds and continues to create stress at home with his partner  Acknowledges for first time that he needs to see psychiatrist  He did so in the past and was discharged when he was stable  Continues to deal with stress related to his physical health issues, relationship issues and financial issues  Continues to complain of dizziness and tremors that seem to lessen as he is engaged in therapy sessions  No alcohol as he quit 9 years ago  No street drug use  Patient ID: Shelley Steele is a 69 y o  male  Met with Dex Clifford for 30 minutes from 1:00PM-1:30PM  Continues to detail somatic issues- tremors, dizziness  Lessens during our sessions but more prevalent at home and in public  Likely related at least in part to mood issues  He finally agrees to see psychiatrist next week and has benefited from this service in past  No current SI or HI  Remorseful regarding his mood lability and stress it creates his partner  Review of Systems   Psychiatric/Behavioral: Positive for agitation and dysphoric mood  The patient is nervous/anxious  Objective: Dex Clifford presents as anxious and overwhelmed  Seems to lessen during session  Has long-standing issues anger and mood lability that are not evident in our sessions but have been apparent in waiting room with partner as well as at home and in public  He is cooperative, engaged and oriented during session       Physical Exam   Psychiatric: His behavior is normal  Judgment and thought content normal

## 2020-01-31 ENCOUNTER — APPOINTMENT (OUTPATIENT)
Dept: LAB | Facility: CLINIC | Age: 75
End: 2020-01-31
Payer: MEDICARE

## 2020-01-31 DIAGNOSIS — I82.4Y9 DEEP VEIN THROMBOSIS (DVT) OF PROXIMAL LOWER EXTREMITY, UNSPECIFIED CHRONICITY, UNSPECIFIED LATERALITY (HCC): ICD-10-CM

## 2020-01-31 DIAGNOSIS — I82.4Z9 ACUTE VENOUS EMBOLISM AND THROMBOSIS OF DEEP VESSELS OF DISTAL LOWER EXTREMITY, UNSPECIFIED LATERALITY (HCC): ICD-10-CM

## 2020-01-31 LAB
INR PPP: 2.34 (ref 0.84–1.19)
PROTHROMBIN TIME: 24.7 SECONDS (ref 11.6–14.5)

## 2020-01-31 PROCEDURE — 36415 COLL VENOUS BLD VENIPUNCTURE: CPT

## 2020-01-31 PROCEDURE — 85610 PROTHROMBIN TIME: CPT

## 2020-02-03 ENCOUNTER — TELEPHONE (OUTPATIENT)
Dept: GASTROENTEROLOGY | Facility: AMBULARY SURGERY CENTER | Age: 75
End: 2020-02-03

## 2020-02-03 NOTE — TELEPHONE ENCOUNTER
Patients GI provider:  Dr Sharlene Hall    Number to return call: ([phone]    Reason for call: Pt wife calling for recent egd results    Scheduled procedure/appointment date if applicable: 0/10/28
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
## 2020-02-03 NOTE — TELEPHONE ENCOUNTER
Please let patients wife know that the biopsy results are still pending and Dr Es Branch will review and result them when available

## 2020-02-04 ENCOUNTER — OFFICE VISIT (OUTPATIENT)
Dept: PSYCHIATRY | Facility: CLINIC | Age: 75
End: 2020-02-04
Payer: MEDICARE

## 2020-02-04 VITALS
HEIGHT: 65 IN | BODY MASS INDEX: 21.33 KG/M2 | WEIGHT: 128 LBS | DIASTOLIC BLOOD PRESSURE: 76 MMHG | SYSTOLIC BLOOD PRESSURE: 129 MMHG | HEART RATE: 73 BPM

## 2020-02-04 DIAGNOSIS — F50.9 EATING DISORDER, UNSPECIFIED TYPE: Chronic | ICD-10-CM

## 2020-02-04 DIAGNOSIS — G47.09 OTHER INSOMNIA: Chronic | ICD-10-CM

## 2020-02-04 DIAGNOSIS — F41.1 GAD (GENERALIZED ANXIETY DISORDER): Chronic | ICD-10-CM

## 2020-02-04 DIAGNOSIS — F41.0 PANIC DISORDER WITHOUT AGORAPHOBIA: Chronic | ICD-10-CM

## 2020-02-04 DIAGNOSIS — F33.2 MAJOR DEPRESSIVE DISORDER, RECURRENT, SEVERE WITHOUT PSYCHOTIC FEATURES (HCC): Primary | Chronic | ICD-10-CM

## 2020-02-04 PROBLEM — H91.90 DECREASED HEARING: Status: ACTIVE | Noted: 2017-11-27

## 2020-02-04 PROBLEM — D22.9 ATYPICAL MOLE: Status: ACTIVE | Noted: 2017-11-27

## 2020-02-04 PROBLEM — L98.9 SKIN LESION: Status: ACTIVE | Noted: 2018-01-21

## 2020-02-04 PROCEDURE — 1036F TOBACCO NON-USER: CPT | Performed by: PSYCHIATRY & NEUROLOGY

## 2020-02-04 PROCEDURE — 90792 PSYCH DIAG EVAL W/MED SRVCS: CPT | Performed by: PSYCHIATRY & NEUROLOGY

## 2020-02-04 RX ORDER — SERTRALINE HYDROCHLORIDE 100 MG/1
100 TABLET, FILM COATED ORAL DAILY
Qty: 30 TABLET | Refills: 2 | Status: SHIPPED | OUTPATIENT
Start: 2020-02-04 | End: 2020-03-03 | Stop reason: SDUPTHER

## 2020-02-04 RX ORDER — LORAZEPAM 0.5 MG/1
0.5 TABLET ORAL 2 TIMES DAILY PRN
Qty: 60 TABLET | Refills: 2 | Status: SHIPPED | OUTPATIENT
Start: 2020-02-04 | End: 2020-03-03 | Stop reason: SDUPTHER

## 2020-02-04 RX ORDER — OLANZAPINE 2.5 MG/1
2.5 TABLET ORAL
Qty: 30 TABLET | Refills: 2 | Status: SHIPPED | OUTPATIENT
Start: 2020-02-04 | End: 2020-03-03 | Stop reason: SDUPTHER

## 2020-02-04 NOTE — BH TREATMENT PLAN
TREATMENT PLAN (Medication Management Only)        [facility]    Name/Date of Birth/MRN:  Leanna Landis 69 y o  1945 MRN: [id number]  Date of Treatment Plan: February 4, 2020  Diagnosis/Diagnoses:   1  Major depressive disorder, recurrent, severe without psychotic features ([facility] [address] )    2  JOSÉ LUIS (generalized anxiety disorder)    3  Panic disorder without agoraphobia    4  Eating disorder, unspecified type    5  Other insomnia      Strengths/Personal Resources for Self-Care: "family support"  Area/Areas of need (in own words): "anxiety, depression, anger"  1  Long Term Goal:   "get better"  Target Date: 4 weeks - 3/3/2020  Person/Persons responsible for completion of goal: Reza Wilson and wife  2  Short Term Objective (s) - How will we reach this goal?:   A  Provider new recommended medication/dosage changes and/or continue medication(s): increase Zoloft, start Zyprexa, continue all other medications (Ativan and Melatonin)  B   "listen"  C   N/A  Target Date: 4 weeks - 3/3/2020  Person/Persons Responsible for Completion of Goal: Reza Wilson and wife   Progress Towards Goals: initiating treatment  Treatment Modality: medication management every 4 weeks, continue psychotherapy with [facility] therapist  Review due 180 days from date of this plan: 6 months - 8/4/2020  Expected length of service: ongoing treatment unless revised  My Physician/PA/NP and I have developed this plan together and I agree to work on the goals and objectives  I understand the treatment goals that were developed for my treatment    Electronic Signatures: on file (unless signed below)    Pamela Barba MD 02/04/20

## 2020-02-05 ENCOUNTER — ANTICOAG VISIT (OUTPATIENT)
Dept: INTERNAL MEDICINE CLINIC | Facility: CLINIC | Age: 75
End: 2020-02-05

## 2020-02-06 ENCOUNTER — TELEPHONE (OUTPATIENT)
Dept: GASTROENTEROLOGY | Facility: CLINIC | Age: 75
End: 2020-02-06

## 2020-02-06 NOTE — TELEPHONE ENCOUNTER
Called and relayed results to patient's wife she stated the medication was stopped by his psychiatrist he has an apt with [facility] Turpin Locus      He seen his pcp and when he told the Dr  about his abdominal pain he said it's his crohn's acting up patient's wife is concerned     Please advise thank you!

## 2020-02-06 NOTE — TELEPHONE ENCOUNTER
Patient could not do any time that day  Next available was 3/30/2020  Please confirm this is ok as Kadi Humphreys did state 3-4 weeks      Patient is currently scheduled for 3/30/2020 unless I call back otherwise    Thank you

## 2020-02-06 NOTE — TELEPHONE ENCOUNTER
Can you please assist in finding patient 3-4 week apt with Dr Sophia Gotti or Devon Delgado as per Too Harmon note    [facility] [place] [facility] [place] [facility] f/u ???? Made by call center  Please confirm I should cancel that and give him apt you find      Thank you

## 2020-02-06 NOTE — TELEPHONE ENCOUNTER
----- Message from Agnes Bess MD sent at 2/5/2020  2:05 PM EST -----  Please inform patient biopsies from small intestine were negative for celiac sprue, biopsies from stomach were negative for H pylori  Please find out of patient has started taking amitriptyline, he can follow up in the office in 3 to 6 months otherwise follow up with his PCP

## 2020-02-06 NOTE — TELEPHONE ENCOUNTER
Please offer pt: 3/13 at 1pm with Leward Soulier at [facility]   Please cancel whichever apt patient does not wish to keep

## 2020-02-06 NOTE — TELEPHONE ENCOUNTER
He has had extensive workup which has not indicated crohns flare up (recent ct scan is negative)  EGD was negative   He needs a sooner follow up to discuss further it would make sense for him to come to the [facility] if he wishes to follow with Dr José Miguel Marques (schedule within the next 3-4 weeks with Dr José Miguel Marques or Nader Cintron)

## 2020-02-07 ENCOUNTER — ANTICOAG VISIT (OUTPATIENT)
Dept: INTERNAL MEDICINE CLINIC | Facility: CLINIC | Age: 75
End: 2020-02-07

## 2020-02-07 ENCOUNTER — LAB (OUTPATIENT)
Dept: LAB | Facility: CLINIC | Age: 75
End: 2020-02-07
Payer: MEDICARE

## 2020-02-07 DIAGNOSIS — I82.4Y9 DEEP VEIN THROMBOSIS (DVT) OF PROXIMAL LOWER EXTREMITY, UNSPECIFIED CHRONICITY, UNSPECIFIED LATERALITY (HCC): ICD-10-CM

## 2020-02-07 LAB
INR PPP: 2.71 (ref 0.84–1.19)
PROTHROMBIN TIME: 27.8 SECONDS (ref 11.6–14.5)

## 2020-02-07 PROCEDURE — 85610 PROTHROMBIN TIME: CPT

## 2020-02-07 PROCEDURE — 36415 COLL VENOUS BLD VENIPUNCTURE: CPT

## 2020-02-07 NOTE — RESULT ENCOUNTER NOTE
Results reviewed, find me for instructions, please enter into anticoagulation flow sheet if not already done, call patient with those instructions, and confirm the accuracy of the dosing regimen    No change in dose, recheck in 2 weeks

## 2020-02-19 ENCOUNTER — SOCIAL WORK (OUTPATIENT)
Dept: BEHAVIORAL/MENTAL HEALTH CLINIC | Facility: CLINIC | Age: 75
End: 2020-02-19
Payer: MEDICARE

## 2020-02-19 DIAGNOSIS — F33.2 MAJOR DEPRESSIVE DISORDER, RECURRENT SEVERE WITHOUT PSYCHOTIC FEATURES (HCC): Primary | ICD-10-CM

## 2020-02-19 PROCEDURE — 1036F TOBACCO NON-USER: CPT | Performed by: SOCIAL WORKER

## 2020-02-19 PROCEDURE — 90834 PSYTX W PT 45 MINUTES: CPT | Performed by: SOCIAL WORKER

## 2020-02-19 NOTE — PSYCH
Assessment/Plan: Manage mood issues     There are no diagnoses linked to this encounter  Subjective: Pierre Medrano reports he is feeling no better physically or mentally  However, he reports some improvement in his appetite and has gained two pounds  He also is shaking carmen/carmen tremulous in his hands than in previous sessions  Continues to report ongoing issues with dizziness and reports some falls at home  Uses can consistently but not walker  Some continued struggles with obsessive thinking as well as compulsive behavior  Mood appears to be somewhat less labile  Denies SI or HI  Patient ID: Stephane Davis is a 69 y o  male  Met with Pierre Medrano for 45 minutes from 1:00PM-1:45PM  Reports continued issues with physical and mental health and frustration with same  Have preached patience given specialists he is now seeing and need to give them time to develop treatment plan  Has limited support system  Wife supportive but consistent tension exists between them  He also reports sleep is an issue but also stated that early this morning he awoke but was able to fall back to sleep which is a new occurrence for him  Review of Systems   Psychiatric/Behavioral: Positive for dysphoric mood and sleep disturbance  The patient is nervous/anxious  Objective: Pierre Medrano continues to present as anxious and irritable but this appears to be to a lesser degree than in previous sessions  He is verbal, cooperative, oriented and engaged during session       Physical Exam   Psychiatric: Judgment and thought content normal

## 2020-02-19 NOTE — PATIENT INSTRUCTIONS
Processed his struggles and stressors during session- supportive therapy provided  Reviewed stress mgmt strategies to utilize and with onset of nicer weather have encouraged him to increase time outside walking or sitting on porch, etc Focused on need to be compliant with treatment plan moving forward and need to give time and patience to hopefully see positive results

## 2020-02-21 ENCOUNTER — APPOINTMENT (OUTPATIENT)
Dept: LAB | Facility: CLINIC | Age: 75
End: 2020-02-21
Payer: MEDICARE

## 2020-02-21 ENCOUNTER — ANTICOAG VISIT (OUTPATIENT)
Dept: INTERNAL MEDICINE CLINIC | Facility: CLINIC | Age: 75
End: 2020-02-21

## 2020-02-21 ENCOUNTER — TRANSCRIBE ORDERS (OUTPATIENT)
Dept: LAB | Facility: CLINIC | Age: 75
End: 2020-02-21

## 2020-02-21 DIAGNOSIS — I82.4Y9 DEEP VEIN THROMBOSIS (DVT) OF PROXIMAL LOWER EXTREMITY, UNSPECIFIED CHRONICITY, UNSPECIFIED LATERALITY (HCC): ICD-10-CM

## 2020-02-21 LAB
INR PPP: 3.22 (ref 0.84–1.19)
PROTHROMBIN TIME: 31.9 SECONDS (ref 11.6–14.5)

## 2020-02-21 PROCEDURE — 85610 PROTHROMBIN TIME: CPT

## 2020-02-21 PROCEDURE — 36415 COLL VENOUS BLD VENIPUNCTURE: CPT

## 2020-02-27 ENCOUNTER — APPOINTMENT (OUTPATIENT)
Dept: LAB | Facility: CLINIC | Age: 75
End: 2020-02-27
Payer: MEDICARE

## 2020-02-27 ENCOUNTER — ANTICOAG VISIT (OUTPATIENT)
Dept: INTERNAL MEDICINE CLINIC | Facility: CLINIC | Age: 75
End: 2020-02-27

## 2020-02-27 DIAGNOSIS — I82.4Y9 DEEP VEIN THROMBOSIS (DVT) OF PROXIMAL LOWER EXTREMITY, UNSPECIFIED CHRONICITY, UNSPECIFIED LATERALITY (HCC): ICD-10-CM

## 2020-02-27 LAB
INR PPP: 2.71 (ref 0.84–1.19)
PROTHROMBIN TIME: 27.8 SECONDS (ref 11.6–14.5)

## 2020-02-27 PROCEDURE — 36415 COLL VENOUS BLD VENIPUNCTURE: CPT

## 2020-02-27 PROCEDURE — 85610 PROTHROMBIN TIME: CPT

## 2020-03-03 ENCOUNTER — OFFICE VISIT (OUTPATIENT)
Dept: PSYCHIATRY | Facility: CLINIC | Age: 75
End: 2020-03-03
Payer: MEDICARE

## 2020-03-03 VITALS
BODY MASS INDEX: 22.16 KG/M2 | DIASTOLIC BLOOD PRESSURE: 80 MMHG | HEIGHT: 65 IN | WEIGHT: 133 LBS | HEART RATE: 73 BPM | SYSTOLIC BLOOD PRESSURE: 150 MMHG

## 2020-03-03 DIAGNOSIS — G47.09 OTHER INSOMNIA: Chronic | ICD-10-CM

## 2020-03-03 DIAGNOSIS — F50.9 EATING DISORDER, UNSPECIFIED TYPE: Chronic | ICD-10-CM

## 2020-03-03 DIAGNOSIS — F41.1 GAD (GENERALIZED ANXIETY DISORDER): Chronic | ICD-10-CM

## 2020-03-03 DIAGNOSIS — F33.2 MAJOR DEPRESSIVE DISORDER, RECURRENT, SEVERE WITHOUT PSYCHOTIC FEATURES (HCC): Primary | Chronic | ICD-10-CM

## 2020-03-03 DIAGNOSIS — F41.0 PANIC DISORDER WITHOUT AGORAPHOBIA: Chronic | ICD-10-CM

## 2020-03-03 PROCEDURE — 90833 PSYTX W PT W E/M 30 MIN: CPT | Performed by: PSYCHIATRY & NEUROLOGY

## 2020-03-03 PROCEDURE — 1111F DSCHRG MED/CURRENT MED MERGE: CPT | Performed by: PSYCHIATRY & NEUROLOGY

## 2020-03-03 PROCEDURE — 4040F PNEUMOC VAC/ADMIN/RCVD: CPT | Performed by: PSYCHIATRY & NEUROLOGY

## 2020-03-03 PROCEDURE — 3079F DIAST BP 80-89 MM HG: CPT | Performed by: PSYCHIATRY & NEUROLOGY

## 2020-03-03 PROCEDURE — 99214 OFFICE O/P EST MOD 30 MIN: CPT | Performed by: PSYCHIATRY & NEUROLOGY

## 2020-03-03 PROCEDURE — 3077F SYST BP >= 140 MM HG: CPT | Performed by: PSYCHIATRY & NEUROLOGY

## 2020-03-03 PROCEDURE — 1036F TOBACCO NON-USER: CPT | Performed by: PSYCHIATRY & NEUROLOGY

## 2020-03-03 PROCEDURE — 1160F RVW MEDS BY RX/DR IN RCRD: CPT | Performed by: PSYCHIATRY & NEUROLOGY

## 2020-03-03 RX ORDER — LORAZEPAM 0.5 MG/1
0.5 TABLET ORAL 2 TIMES DAILY PRN
Qty: 60 TABLET | Refills: 3 | Status: SHIPPED | OUTPATIENT
Start: 2020-05-04 | End: 2020-05-14 | Stop reason: SDUPTHER

## 2020-03-03 RX ORDER — OLANZAPINE 5 MG/1
5 TABLET ORAL
Qty: 30 TABLET | Refills: 3 | Status: SHIPPED | OUTPATIENT
Start: 2020-03-03 | End: 2020-04-13 | Stop reason: SDUPTHER

## 2020-03-03 NOTE — PATIENT INSTRUCTIONS
Decrease Zoloft to 50 mg daily due to increased irritability  Continue Ativan 0 5 mg bid as needed to improve anxiety symptoms  Increase Zyprexa to 5 mg at bedtime to help with mood and irritability  Continue melatonin 5 to 10 mg at bedtime as needed
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
## 2020-03-04 DIAGNOSIS — R10.13 DYSPEPSIA: ICD-10-CM

## 2020-03-04 RX ORDER — PANTOPRAZOLE SODIUM 40 MG/1
TABLET, DELAYED RELEASE ORAL
Qty: 30 TABLET | Refills: 0 | Status: SHIPPED | OUTPATIENT
Start: 2020-03-04 | End: 2020-03-14

## 2020-03-05 ENCOUNTER — APPOINTMENT (OUTPATIENT)
Dept: LAB | Facility: CLINIC | Age: 75
End: 2020-03-05
Payer: MEDICARE

## 2020-03-06 ENCOUNTER — ANTICOAG VISIT (OUTPATIENT)
Dept: INTERNAL MEDICINE CLINIC | Facility: CLINIC | Age: 75
End: 2020-03-06

## 2020-03-11 ENCOUNTER — SOCIAL WORK (OUTPATIENT)
Dept: BEHAVIORAL/MENTAL HEALTH CLINIC | Facility: CLINIC | Age: 75
End: 2020-03-11
Payer: MEDICARE

## 2020-03-11 DIAGNOSIS — F33.2 MDD (MAJOR DEPRESSIVE DISORDER), RECURRENT SEVERE, WITHOUT PSYCHOSIS (HCC): Primary | ICD-10-CM

## 2020-03-11 PROCEDURE — 90834 PSYTX W PT 45 MINUTES: CPT | Performed by: SOCIAL WORKER

## 2020-03-11 PROCEDURE — 1036F TOBACCO NON-USER: CPT | Performed by: SOCIAL WORKER

## 2020-03-11 NOTE — PSYCH
Assessment/Plan: Manage mood issues     There are no diagnoses linked to this encounter  Subjective: Alfredo Longoria details continued issues with depression, anger and anxiety  States he has been sleeping better on current medications and reports some lessening of his stomach discomfort  With onset of nicer weather, he has been walking more and being more active outside of house which is beneficial for his stress and as well as for his partner  Patient ID: Mannie Rebolledo is a 69 y o  male  Met with Alfredo Valdesronn for 45 minutes from 1:00PM-1:45PM  Continues to struggle with mood issues as well as numerous physical complaints  Live in stressful home environment which he aggravates with his mood sand anger issues at time  Denies any SI or HI  Review of Systems   Psychiatric/Behavioral: Positive for dysphoric mood  The patient is nervous/anxious  Objective: Alfredo Longoria presents as anxious and is tremulous at times but this continues to lessen as session progressed  He presents as verbal, cooperative, well oriented and engaged during session       Physical Exam   Psychiatric: His behavior is normal  Judgment and thought content normal

## 2020-03-11 NOTE — PATIENT INSTRUCTIONS
Jhonny Fragoso utilizes session to discuss and process symptoms and stressors- supportive therapy provided  Reviewed stress mgmt and relaxation strategies to utilize in response to stress at home  Reviewed appropriate outlets for and expressions of anger to utilize

## 2020-03-13 ENCOUNTER — ANTICOAG VISIT (OUTPATIENT)
Dept: INTERNAL MEDICINE CLINIC | Facility: CLINIC | Age: 75
End: 2020-03-13

## 2020-03-13 ENCOUNTER — APPOINTMENT (OUTPATIENT)
Dept: LAB | Facility: CLINIC | Age: 75
End: 2020-03-13
Payer: MEDICARE

## 2020-03-13 ENCOUNTER — OFFICE VISIT (OUTPATIENT)
Dept: GASTROENTEROLOGY | Facility: AMBULARY SURGERY CENTER | Age: 75
End: 2020-03-13
Payer: MEDICARE

## 2020-03-13 VITALS
WEIGHT: 130 LBS | RESPIRATION RATE: 18 BRPM | HEIGHT: 65 IN | TEMPERATURE: 99.1 F | SYSTOLIC BLOOD PRESSURE: 120 MMHG | BODY MASS INDEX: 21.66 KG/M2 | DIASTOLIC BLOOD PRESSURE: 70 MMHG | HEART RATE: 76 BPM

## 2020-03-13 DIAGNOSIS — K86.2 PANCREATIC CYST: ICD-10-CM

## 2020-03-13 DIAGNOSIS — R68.81 EARLY SATIETY: ICD-10-CM

## 2020-03-13 DIAGNOSIS — R68.81 EARLY SATIETY: Primary | ICD-10-CM

## 2020-03-13 DIAGNOSIS — R63.4 WEIGHT LOSS: Primary | ICD-10-CM

## 2020-03-13 DIAGNOSIS — I82.4Y9 DEEP VEIN THROMBOSIS (DVT) OF PROXIMAL LOWER EXTREMITY, UNSPECIFIED CHRONICITY, UNSPECIFIED LATERALITY (HCC): ICD-10-CM

## 2020-03-13 DIAGNOSIS — K50.00 CROHN'S DISEASE OF SMALL INTESTINE WITHOUT COMPLICATION (HCC): Chronic | ICD-10-CM

## 2020-03-13 LAB
INR PPP: 2.82 (ref 0.84–1.19)
PROTHROMBIN TIME: 28.7 SECONDS (ref 11.6–14.5)

## 2020-03-13 PROCEDURE — 3074F SYST BP LT 130 MM HG: CPT | Performed by: PHYSICIAN ASSISTANT

## 2020-03-13 PROCEDURE — 99214 OFFICE O/P EST MOD 30 MIN: CPT | Performed by: PHYSICIAN ASSISTANT

## 2020-03-13 PROCEDURE — 4040F PNEUMOC VAC/ADMIN/RCVD: CPT | Performed by: PHYSICIAN ASSISTANT

## 2020-03-13 PROCEDURE — 3078F DIAST BP <80 MM HG: CPT | Performed by: PHYSICIAN ASSISTANT

## 2020-03-13 PROCEDURE — 1036F TOBACCO NON-USER: CPT | Performed by: PHYSICIAN ASSISTANT

## 2020-03-13 PROCEDURE — 1160F RVW MEDS BY RX/DR IN RCRD: CPT | Performed by: PHYSICIAN ASSISTANT

## 2020-03-13 PROCEDURE — 85610 PROTHROMBIN TIME: CPT

## 2020-03-13 PROCEDURE — 36415 COLL VENOUS BLD VENIPUNCTURE: CPT

## 2020-03-13 RX ORDER — SERTRALINE HYDROCHLORIDE 100 MG/1
100 TABLET, FILM COATED ORAL DAILY
COMMUNITY
Start: 2020-03-04 | End: 2020-04-13 | Stop reason: SDUPTHER

## 2020-03-13 NOTE — PROGRESS NOTES
[facility] - Outpatient Follow-up Note  Willian Reyes 69 y o  male MRN: [id number]  Encounter: [id number]  ASSESSMENT AND PLAN:      Weight loss  Early satiety  -likely due to anxiety, anorexia evaluation for other etiologies have been negative thus far, CT and CRP show no evidence of crohns flare and his output has been stable, EGD with mild gastritis which would not explain his symptoms  -will check gastric emptying scan   -he has been closely following with his psychiatrist and therapist who have been changing his medications    Pancreatic cyst  -stable 2mm cyst, needs MRI 10/2020    History of crohns disease  -s/p total proctocolectomy in 1994  -lab, imaging that were reviewed and symptoms suggest remission    ______________________________________________________________________    SUBJECTIVE: 70 yo male who presents for follow up of weight loss, epigastric pain  He has been seeing a new psychiatrist who has been altering his medications  Still eating very poorly, small amount of mainly carbs, his wife states he gets anxiety when eating fruits and vegetables as it fills his ostomy bag more  He continues with frequent exercising  He has lost about 9 lbs since October  His wife had bought boost for him but he refuses to take it  Unfortunately his anxiety is severe, there was significant anxiety involving todays appointment  Patient states he does not feel hungry and when he does eat he feels full early  He has been sleeping better since starting zyprexa at night  He has intermittent dizziness which has been chronic  He only had epigastric pain with palpation, otherwise it does not bother him  He had an EGD 1/23/20 with mild gastritis otherwise normal      REVIEW OF SYSTEMS IS OTHERWISE NEGATIVE        Historical Information   Past Medical History:   Diagnosis Date    Allergic rhinitis     Anosmia     Anxiety     Benign parotid tumor     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[facility])     Cro[facility]'s disease ([facility] [address]     Depression     Dilated pancreatic duct     DVT (deep venous thrombosis) (HCC)     GERD (gastroesophageal reflux disease)     [place] (hard of hearing)     no hearing aids    Hypertension     Leucocytosis     Mass in neck     Nail anomaly     Polyuria     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HCC)     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    Recurrent falls     Solar lentigo     Thrombocytopenia (HCC)     Vertigo     Vision loss of left eye      Past Surgical History:   Procedure Laterality Date    COLON SURGERY      Partial colectomy and colostomy    COLONOSCOPY      ESOPHAGOGASTRODUODENOSCOPY N/A 4/5/2017    Procedure: ESOPHAGOGASTRODUODENOSCOPY (EGD); Surgeon: Mariam Suarez MD;  [facility]; Service:     EYE SURGERY Right     Cataract    KNEE SURGERY      [place] EDG US EXAM SURGICAL ALTER STOM DUODENUM/JEJUNUM N/A 4/9/2018    Procedure: LINEAR ENDOSCOPIC U/S;  Surgeon: Clem Chapman MD;  [facility];   Service: Gastroenterology    [place] EXC PAROTD,LAT LOBE,DISSECT 5TH NERV Right 8/8/2018    Procedure: PAROTIDECTOMY WITH FACIAL NERVE MONITOR AND FROZEN SECTION;  Surgeon: Kristen Crowell MD;  [facility];  Service: ENT    [place] IMPACT TOOTH [address] COMP BONY N/A 8/8/2018    Procedure: EXTRACTION TEETH #15 and #30;  Surgeon: Gina Avendano DDS;  [facility];  Service: Maxillofacial    RADICAL NECK DISSECTION N/A 8/8/2018    Procedure: SELECTIVE NECK DISSECTION;  Surgeon: Kristen Crowell MD;  [facility];  Service: ENT    UPPER GASTROINTESTINAL ENDOSCOPY     [address] AND STRIPPING       Social History   Social History     Substance and Sexual Activity   Alcohol Use No    Frequency: Never    Drinks per session: 1 or 2    Binge frequency: Never    Comment: history of excessive alcohol use - quit in 2008     Social History     Substance and Sexual Activity   Drug Use No     Social History     Tobacco Use   Smoking Status Former Smoker    Packs/day: 1 00    Years: 10 00    Pack years: 10 00    Types: Cigarettes    Last attempt to quit: 1981    Years since quittin 7   Smokeless Tobacco Never Used   Tobacco Comment    50 years ago     Family History   Problem Relation Age of Onset    Heart disease Brother     Depression Brother     Alcohol abuse Brother     Diverticulitis Mother     Drug abuse Mother     Depression Sister     Anxiety disorder Sister     Depression Sister     Anxiety disorder Sister     Mental illness Other     Alcohol abuse Other     Drug abuse Other     Completed Suicide  Other        Meds/Allergies       Current Outpatient Medications:     amLODIPine (NORVASC) 5 mg tablet    ipratropium (ATROVENT) 0 03 % nasal spray    [START ON 2020] LORazepam (ATIVAN) 0 5 mg tablet    Melatonin 10 MG TABS    Multiple Vitamins-Minerals (MULTI FOR HIM 50+ PO)    OLANZapine (ZyPREXA) 5 mg tablet    pantoprazole (PROTONIX) 40 mg tablet    sertraline (ZOLOFT) 50 mg tablet    warfarin (COUMADIN) 2 mg tablet    Allergies   Allergen Reactions    Duloxetine Other (See Comments)     Panic attacks    Other      Seasonal           Objective     There were no vitals taken for this visit  There is no height or weight on file to calculate BMI  PHYSICAL EXAM:      General Appearance:   Appears very anxious, patient nodding off, wife stated he took ativan just prior to the visit   HEENT:   Normocephalic, atraumatic, anicteric      Neck:  Supple, symmetrical, trachea midline   Lungs:    respirations unlabored    Heart[de-identified]   Regular rate and rhythm; no murmur, rub, or gallop  Abdomen:   Soft, non-tender, non-distended; normal bowel sounds   Genitalia:   Deferred    Rectal:   Deferred    Extremities:  No edema    Pulses:  2+ and symmetric    Skin:  No jaundice, rashes, or lesions    Lymph nodes:  No palpable cervical lymphadenopathy        Lab Results:   No visits with results within 1 Day(s) from this visit     Latest known visit with results is:   Appointment on 02/27/2020   Component Date Value    Protime 02/27/2020 27 8*    INR 02/27/2020 2 71*         Radiology Results:   No results found

## 2020-03-14 DIAGNOSIS — R10.13 DYSPEPSIA: ICD-10-CM

## 2020-03-14 RX ORDER — PANTOPRAZOLE SODIUM 40 MG/1
TABLET, DELAYED RELEASE ORAL
Qty: 30 TABLET | Refills: 0 | Status: SHIPPED | OUTPATIENT
Start: 2020-03-14 | End: 2020-04-07

## 2020-03-23 ENCOUNTER — ANTICOAG VISIT (OUTPATIENT)
Dept: INTERNAL MEDICINE CLINIC | Facility: CLINIC | Age: 75
End: 2020-03-23

## 2020-03-26 ENCOUNTER — ANTICOAG VISIT (OUTPATIENT)
Dept: INTERNAL MEDICINE CLINIC | Facility: CLINIC | Age: 75
End: 2020-03-26

## 2020-03-26 ENCOUNTER — APPOINTMENT (OUTPATIENT)
Dept: LAB | Facility: CLINIC | Age: 75
End: 2020-03-26
Payer: MEDICARE

## 2020-03-26 DIAGNOSIS — I82.4Y9 DEEP VEIN THROMBOSIS (DVT) OF PROXIMAL LOWER EXTREMITY, UNSPECIFIED CHRONICITY, UNSPECIFIED LATERALITY (HCC): ICD-10-CM

## 2020-03-26 LAB
INR PPP: 3.26 (ref 0.84–1.19)
PROTHROMBIN TIME: 32.2 SECONDS (ref 11.6–14.5)

## 2020-03-26 PROCEDURE — 85610 PROTHROMBIN TIME: CPT

## 2020-03-26 PROCEDURE — 36415 COLL VENOUS BLD VENIPUNCTURE: CPT

## 2020-03-30 ENCOUNTER — ANTICOAG VISIT (OUTPATIENT)
Dept: INTERNAL MEDICINE CLINIC | Facility: CLINIC | Age: 75
End: 2020-03-30

## 2020-03-30 ENCOUNTER — APPOINTMENT (OUTPATIENT)
Dept: LAB | Facility: CLINIC | Age: 75
End: 2020-03-30
Payer: MEDICARE

## 2020-03-30 DIAGNOSIS — I82.4Y9 DEEP VEIN THROMBOSIS (DVT) OF PROXIMAL LOWER EXTREMITY, UNSPECIFIED CHRONICITY, UNSPECIFIED LATERALITY (HCC): ICD-10-CM

## 2020-03-30 LAB
INR PPP: 2.11 (ref 0.84–1.19)
PROTHROMBIN TIME: 22.8 SECONDS (ref 11.6–14.5)

## 2020-03-30 PROCEDURE — 36415 COLL VENOUS BLD VENIPUNCTURE: CPT

## 2020-03-30 PROCEDURE — 85610 PROTHROMBIN TIME: CPT

## 2020-03-31 ENCOUNTER — TELEPHONE (OUTPATIENT)
Dept: INTERNAL MEDICINE CLINIC | Facility: CLINIC | Age: 75
End: 2020-03-31

## 2020-03-31 NOTE — TELEPHONE ENCOUNTER
Gema's wife called today to see if you were still seeing patients  This patient is on your schedule for 1:00 Wednesday  I told her you were doing virtual visits  He does not have access to any video capabilities, but would like to do the visit over the phone  I changed this visit type to a virtual visit by telephone only  Thanks

## 2020-04-01 ENCOUNTER — TELEMEDICINE (OUTPATIENT)
Dept: BEHAVIORAL/MENTAL HEALTH CLINIC | Facility: CLINIC | Age: 75
End: 2020-04-01
Payer: MEDICARE

## 2020-04-01 DIAGNOSIS — F33.3 MAJOR DEPRESSIVE DISORDER, RECURRENT, SEVERE WITH PSYCHOTIC FEATURES (HCC): Primary | ICD-10-CM

## 2020-04-01 PROCEDURE — 98967 PH1 ASSMT&MGMT NQHP 11-20: CPT | Performed by: SOCIAL WORKER

## 2020-04-07 ENCOUNTER — ANTICOAG VISIT (OUTPATIENT)
Dept: INTERNAL MEDICINE CLINIC | Facility: CLINIC | Age: 75
End: 2020-04-07

## 2020-04-07 DIAGNOSIS — R10.13 DYSPEPSIA: ICD-10-CM

## 2020-04-07 RX ORDER — PANTOPRAZOLE SODIUM 40 MG/1
TABLET, DELAYED RELEASE ORAL
Qty: 30 TABLET | Refills: 0 | Status: SHIPPED | OUTPATIENT
Start: 2020-04-07 | End: 2020-10-26 | Stop reason: ALTCHOICE

## 2020-04-09 ENCOUNTER — TRANSCRIBE ORDERS (OUTPATIENT)
Dept: LAB | Facility: CLINIC | Age: 75
End: 2020-04-09

## 2020-04-09 ENCOUNTER — APPOINTMENT (OUTPATIENT)
Dept: LAB | Facility: CLINIC | Age: 75
End: 2020-04-09
Payer: MEDICARE

## 2020-04-09 ENCOUNTER — ANTICOAG VISIT (OUTPATIENT)
Dept: INTERNAL MEDICINE CLINIC | Facility: CLINIC | Age: 75
End: 2020-04-09

## 2020-04-09 DIAGNOSIS — I82.4Y9 DEEP VEIN THROMBOSIS (DVT) OF PROXIMAL LOWER EXTREMITY, UNSPECIFIED CHRONICITY, UNSPECIFIED LATERALITY (HCC): ICD-10-CM

## 2020-04-09 LAB
INR PPP: 1.53 (ref 0.84–1.19)
PROTHROMBIN TIME: 17.7 SECONDS (ref 11.6–14.5)

## 2020-04-09 PROCEDURE — 36415 COLL VENOUS BLD VENIPUNCTURE: CPT

## 2020-04-09 PROCEDURE — 85610 PROTHROMBIN TIME: CPT

## 2020-04-13 ENCOUNTER — TELEMEDICINE (OUTPATIENT)
Dept: PSYCHIATRY | Facility: CLINIC | Age: 75
End: 2020-04-13
Payer: MEDICARE

## 2020-04-13 VITALS — HEIGHT: 65 IN | WEIGHT: 122 LBS | BODY MASS INDEX: 20.33 KG/M2

## 2020-04-13 DIAGNOSIS — F50.9 EATING DISORDER, UNSPECIFIED TYPE: Chronic | ICD-10-CM

## 2020-04-13 DIAGNOSIS — F33.2 MAJOR DEPRESSIVE DISORDER, RECURRENT, SEVERE WITHOUT PSYCHOTIC FEATURES (HCC): Primary | Chronic | ICD-10-CM

## 2020-04-13 DIAGNOSIS — F41.0 PANIC DISORDER WITHOUT AGORAPHOBIA: Chronic | ICD-10-CM

## 2020-04-13 DIAGNOSIS — F41.1 GAD (GENERALIZED ANXIETY DISORDER): Chronic | ICD-10-CM

## 2020-04-13 DIAGNOSIS — G47.09 OTHER INSOMNIA: Chronic | ICD-10-CM

## 2020-04-13 PROCEDURE — 90832 PSYTX W PT 30 MINUTES: CPT | Performed by: PSYCHIATRY & NEUROLOGY

## 2020-04-13 RX ORDER — OLANZAPINE 10 MG/1
10 TABLET ORAL
Qty: 30 TABLET | Refills: 3 | Status: SHIPPED | OUTPATIENT
Start: 2020-04-13 | End: 2020-08-11

## 2020-04-16 ENCOUNTER — APPOINTMENT (OUTPATIENT)
Dept: LAB | Facility: CLINIC | Age: 75
End: 2020-04-16
Payer: MEDICARE

## 2020-04-16 ENCOUNTER — ANTICOAG VISIT (OUTPATIENT)
Dept: INTERNAL MEDICINE CLINIC | Facility: CLINIC | Age: 75
End: 2020-04-16

## 2020-04-16 DIAGNOSIS — I82.4Y9 DEEP VEIN THROMBOSIS (DVT) OF PROXIMAL LOWER EXTREMITY, UNSPECIFIED CHRONICITY, UNSPECIFIED LATERALITY (HCC): ICD-10-CM

## 2020-04-16 LAB
INR PPP: 1.59 (ref 0.84–1.19)
PROTHROMBIN TIME: 18.2 SECONDS (ref 11.6–14.5)

## 2020-04-16 PROCEDURE — 36415 COLL VENOUS BLD VENIPUNCTURE: CPT

## 2020-04-16 PROCEDURE — 85610 PROTHROMBIN TIME: CPT

## 2020-04-20 DIAGNOSIS — O22.30 DVT (DEEP VEIN THROMBOSIS) IN PREGNANCY: ICD-10-CM

## 2020-04-20 RX ORDER — WARFARIN SODIUM 2 MG/1
TABLET ORAL
Qty: 90 TABLET | Refills: 0 | Status: SHIPPED | OUTPATIENT
Start: 2020-04-20 | End: 2020-10-08

## 2020-04-23 ENCOUNTER — ANTICOAG VISIT (OUTPATIENT)
Dept: INTERNAL MEDICINE CLINIC | Facility: CLINIC | Age: 75
End: 2020-04-23

## 2020-04-23 ENCOUNTER — APPOINTMENT (OUTPATIENT)
Dept: LAB | Facility: CLINIC | Age: 75
End: 2020-04-23
Payer: MEDICARE

## 2020-04-23 DIAGNOSIS — I82.4Y9 DEEP VEIN THROMBOSIS (DVT) OF PROXIMAL LOWER EXTREMITY, UNSPECIFIED CHRONICITY, UNSPECIFIED LATERALITY (HCC): ICD-10-CM

## 2020-04-23 LAB
INR PPP: 2.23 (ref 0.84–1.19)
PROTHROMBIN TIME: 23.8 SECONDS (ref 11.6–14.5)

## 2020-04-23 PROCEDURE — 85610 PROTHROMBIN TIME: CPT

## 2020-04-23 PROCEDURE — 36415 COLL VENOUS BLD VENIPUNCTURE: CPT

## 2020-04-30 ENCOUNTER — APPOINTMENT (OUTPATIENT)
Dept: LAB | Facility: CLINIC | Age: 75
End: 2020-04-30
Payer: MEDICARE

## 2020-04-30 ENCOUNTER — ANTICOAG VISIT (OUTPATIENT)
Dept: INTERNAL MEDICINE CLINIC | Facility: CLINIC | Age: 75
End: 2020-04-30

## 2020-04-30 DIAGNOSIS — I82.4Y9 DEEP VEIN THROMBOSIS (DVT) OF PROXIMAL LOWER EXTREMITY, UNSPECIFIED CHRONICITY, UNSPECIFIED LATERALITY (HCC): ICD-10-CM

## 2020-04-30 LAB
INR PPP: 2.31 (ref 0.84–1.19)
PROTHROMBIN TIME: 24.5 SECONDS (ref 11.6–14.5)

## 2020-04-30 PROCEDURE — 85610 PROTHROMBIN TIME: CPT

## 2020-04-30 PROCEDURE — 36415 COLL VENOUS BLD VENIPUNCTURE: CPT

## 2020-05-14 ENCOUNTER — ANTICOAG VISIT (OUTPATIENT)
Dept: INTERNAL MEDICINE CLINIC | Facility: CLINIC | Age: 75
End: 2020-05-14

## 2020-05-14 ENCOUNTER — TELEMEDICINE (OUTPATIENT)
Dept: PSYCHIATRY | Facility: CLINIC | Age: 75
End: 2020-05-14
Payer: MEDICARE

## 2020-05-14 VITALS — WEIGHT: 127 LBS | HEIGHT: 65 IN | BODY MASS INDEX: 21.16 KG/M2

## 2020-05-14 DIAGNOSIS — F41.0 PANIC DISORDER WITHOUT AGORAPHOBIA: Chronic | ICD-10-CM

## 2020-05-14 DIAGNOSIS — F41.1 GAD (GENERALIZED ANXIETY DISORDER): Chronic | ICD-10-CM

## 2020-05-14 DIAGNOSIS — G47.09 OTHER INSOMNIA: Chronic | ICD-10-CM

## 2020-05-14 DIAGNOSIS — F50.9 EATING DISORDER, UNSPECIFIED TYPE: Chronic | ICD-10-CM

## 2020-05-14 DIAGNOSIS — F33.2 MAJOR DEPRESSIVE DISORDER, RECURRENT, SEVERE WITHOUT PSYCHOTIC FEATURES (HCC): Primary | Chronic | ICD-10-CM

## 2020-05-14 PROCEDURE — 90833 PSYTX W PT W E/M 30 MIN: CPT | Performed by: PSYCHIATRY & NEUROLOGY

## 2020-05-14 PROCEDURE — 99214 OFFICE O/P EST MOD 30 MIN: CPT | Performed by: PSYCHIATRY & NEUROLOGY

## 2020-05-14 RX ORDER — LORAZEPAM 0.5 MG/1
0.5 TABLET ORAL 3 TIMES DAILY PRN
Qty: 90 TABLET | Refills: 3 | Status: SHIPPED | OUTPATIENT
Start: 2020-05-14 | End: 2020-10-26 | Stop reason: SDUPTHER

## 2020-05-18 ENCOUNTER — APPOINTMENT (OUTPATIENT)
Dept: LAB | Facility: CLINIC | Age: 75
End: 2020-05-18
Payer: MEDICARE

## 2020-05-18 DIAGNOSIS — I82.4Y9 DEEP VEIN THROMBOSIS (DVT) OF PROXIMAL LOWER EXTREMITY, UNSPECIFIED CHRONICITY, UNSPECIFIED LATERALITY (HCC): ICD-10-CM

## 2020-05-18 LAB
INR PPP: 2.93 (ref 0.84–1.19)
PROTHROMBIN TIME: 29.6 SECONDS (ref 11.6–14.5)

## 2020-05-18 PROCEDURE — 36415 COLL VENOUS BLD VENIPUNCTURE: CPT

## 2020-05-18 PROCEDURE — 85610 PROTHROMBIN TIME: CPT

## 2020-05-19 ENCOUNTER — ANTICOAG VISIT (OUTPATIENT)
Dept: INTERNAL MEDICINE CLINIC | Facility: CLINIC | Age: 75
End: 2020-05-19

## 2020-05-27 ENCOUNTER — TELEMEDICINE (OUTPATIENT)
Dept: BEHAVIORAL/MENTAL HEALTH CLINIC | Facility: CLINIC | Age: 75
End: 2020-05-27
Payer: MEDICARE

## 2020-05-27 DIAGNOSIS — F41.1 GAD (GENERALIZED ANXIETY DISORDER): Primary | ICD-10-CM

## 2020-05-27 PROCEDURE — 98967 PH1 ASSMT&MGMT NQHP 11-20: CPT | Performed by: SOCIAL WORKER

## 2020-06-02 ENCOUNTER — APPOINTMENT (OUTPATIENT)
Dept: LAB | Facility: CLINIC | Age: 75
End: 2020-06-02
Payer: MEDICARE

## 2020-06-02 ENCOUNTER — ANTICOAG VISIT (OUTPATIENT)
Dept: INTERNAL MEDICINE CLINIC | Facility: CLINIC | Age: 75
End: 2020-06-02

## 2020-06-02 DIAGNOSIS — I82.4Y9 DEEP VEIN THROMBOSIS (DVT) OF PROXIMAL LOWER EXTREMITY, UNSPECIFIED CHRONICITY, UNSPECIFIED LATERALITY (HCC): ICD-10-CM

## 2020-06-02 LAB
INR PPP: 2.21 (ref 0.84–1.19)
PROTHROMBIN TIME: 23.6 SECONDS (ref 11.6–14.5)

## 2020-06-02 PROCEDURE — 85610 PROTHROMBIN TIME: CPT

## 2020-06-02 PROCEDURE — 36415 COLL VENOUS BLD VENIPUNCTURE: CPT

## 2020-06-07 ENCOUNTER — HOSPITAL ENCOUNTER (OUTPATIENT)
Dept: NUCLEAR MEDICINE | Facility: HOSPITAL | Age: 75
Discharge: HOME | End: 2020-06-07
Payer: MEDICARE

## 2020-06-07 DIAGNOSIS — R68.81 EARLY SATIETY: ICD-10-CM

## 2020-06-07 PROCEDURE — 78264 GASTRIC EMPTYING IMG STUDY: CPT

## 2020-06-07 PROCEDURE — A9541 TC99M SULFUR COLLOID: HCPCS

## 2020-06-10 ENCOUNTER — TELEMEDICINE (OUTPATIENT)
Dept: BEHAVIORAL/MENTAL HEALTH CLINIC | Facility: CLINIC | Age: 75
End: 2020-06-10
Payer: MEDICARE

## 2020-06-10 DIAGNOSIS — F41.1 GAD (GENERALIZED ANXIETY DISORDER): Primary | ICD-10-CM

## 2020-06-10 PROCEDURE — 90832 PSYTX W PT 30 MINUTES: CPT | Performed by: SOCIAL WORKER

## 2020-06-11 ENCOUNTER — TELEPHONE (OUTPATIENT)
Dept: GASTROENTEROLOGY | Facility: AMBULARY SURGERY CENTER | Age: 75
End: 2020-06-11

## 2020-06-11 NOTE — TELEPHONE ENCOUNTER
----- Message from Migel Gee PA-C sent at 6/10/2020  9:26 AM EDT -----  Gastric emptying study normal

## 2020-06-16 ENCOUNTER — ANTICOAG VISIT (OUTPATIENT)
Dept: INTERNAL MEDICINE CLINIC | Facility: CLINIC | Age: 75
End: 2020-06-16

## 2020-06-16 ENCOUNTER — APPOINTMENT (OUTPATIENT)
Dept: LAB | Facility: CLINIC | Age: 75
End: 2020-06-16
Payer: MEDICARE

## 2020-06-16 DIAGNOSIS — I82.4Y9 DEEP VEIN THROMBOSIS (DVT) OF PROXIMAL LOWER EXTREMITY, UNSPECIFIED CHRONICITY, UNSPECIFIED LATERALITY (HCC): ICD-10-CM

## 2020-06-16 LAB
INR PPP: 3.32 (ref 0.84–1.19)
PROTHROMBIN TIME: 32.6 SECONDS (ref 11.6–14.5)

## 2020-06-16 PROCEDURE — 36415 COLL VENOUS BLD VENIPUNCTURE: CPT

## 2020-06-16 PROCEDURE — 85610 PROTHROMBIN TIME: CPT

## 2020-06-22 ENCOUNTER — ANTICOAG VISIT (OUTPATIENT)
Dept: INTERNAL MEDICINE CLINIC | Facility: CLINIC | Age: 75
End: 2020-06-22

## 2020-06-22 ENCOUNTER — APPOINTMENT (OUTPATIENT)
Dept: LAB | Facility: CLINIC | Age: 75
End: 2020-06-22
Payer: MEDICARE

## 2020-06-22 ENCOUNTER — TRANSCRIBE ORDERS (OUTPATIENT)
Dept: LAB | Facility: CLINIC | Age: 75
End: 2020-06-22

## 2020-06-22 DIAGNOSIS — I82.4Y9 DEEP VEIN THROMBOSIS (DVT) OF PROXIMAL LOWER EXTREMITY, UNSPECIFIED CHRONICITY, UNSPECIFIED LATERALITY (HCC): ICD-10-CM

## 2020-06-24 ENCOUNTER — TELEMEDICINE (OUTPATIENT)
Dept: BEHAVIORAL/MENTAL HEALTH CLINIC | Facility: CLINIC | Age: 75
End: 2020-06-24
Payer: MEDICARE

## 2020-06-24 DIAGNOSIS — F41.1 GAD (GENERALIZED ANXIETY DISORDER): Primary | ICD-10-CM

## 2020-06-24 PROCEDURE — 90832 PSYTX W PT 30 MINUTES: CPT | Performed by: SOCIAL WORKER

## 2020-06-29 ENCOUNTER — APPOINTMENT (OUTPATIENT)
Dept: LAB | Facility: CLINIC | Age: 75
End: 2020-06-29
Payer: MEDICARE

## 2020-06-29 ENCOUNTER — ANTICOAG VISIT (OUTPATIENT)
Dept: INTERNAL MEDICINE CLINIC | Facility: CLINIC | Age: 75
End: 2020-06-29

## 2020-06-29 DIAGNOSIS — I82.4Y9 DEEP VEIN THROMBOSIS (DVT) OF PROXIMAL LOWER EXTREMITY, UNSPECIFIED CHRONICITY, UNSPECIFIED LATERALITY (HCC): ICD-10-CM

## 2020-06-29 LAB
INR PPP: 2.36 (ref 0.84–1.19)
PROTHROMBIN TIME: 24.9 SECONDS (ref 11.6–14.5)

## 2020-06-29 PROCEDURE — 36415 COLL VENOUS BLD VENIPUNCTURE: CPT

## 2020-06-29 PROCEDURE — 85610 PROTHROMBIN TIME: CPT

## 2020-07-01 ENCOUNTER — ANTICOAG VISIT (OUTPATIENT)
Dept: INTERNAL MEDICINE CLINIC | Facility: CLINIC | Age: 75
End: 2020-07-01

## 2020-07-01 ENCOUNTER — TELEPHONE (OUTPATIENT)
Dept: INTERNAL MEDICINE CLINIC | Facility: CLINIC | Age: 75
End: 2020-07-01

## 2020-07-01 DIAGNOSIS — I82.4Y9 DEEP VEIN THROMBOSIS (DVT) OF PROXIMAL LOWER EXTREMITY, UNSPECIFIED CHRONICITY, UNSPECIFIED LATERALITY (HCC): Primary | ICD-10-CM

## 2020-07-01 NOTE — TELEPHONE ENCOUNTER
Pt's wife called in saying patient needs a new order for his PT INR draws  Please mail order to patient

## 2020-07-06 ENCOUNTER — APPOINTMENT (OUTPATIENT)
Dept: LAB | Facility: CLINIC | Age: 75
End: 2020-07-06
Payer: MEDICARE

## 2020-07-06 LAB — INR PPP: 2.38 (ref 0.84–1.19)

## 2020-07-08 ENCOUNTER — TELEMEDICINE (OUTPATIENT)
Dept: BEHAVIORAL/MENTAL HEALTH CLINIC | Facility: CLINIC | Age: 75
End: 2020-07-08
Payer: MEDICARE

## 2020-07-08 DIAGNOSIS — F41.1 GAD (GENERALIZED ANXIETY DISORDER): Primary | ICD-10-CM

## 2020-07-08 PROCEDURE — 98967 PH1 ASSMT&MGMT NQHP 11-20: CPT | Performed by: SOCIAL WORKER

## 2020-07-08 NOTE — PSYCH
Virtual Brief Visit    Assessment/Plan:    Problem List Items Addressed This Visit     None                Reason for visit is No chief complaint on file  Encounter provider Genaro Allen    Provider located at [address] [address]    Recent Visits  No visits were found meeting these conditions  Showing recent visits within past 7 days and meeting all other requirements     Future Appointments  No visits were found meeting these conditions  Showing future appointments within next 150 days and meeting all other requirements        After connecting through telephone, the patient was identified by name and date of birth  Fernando Guevara was informed that this is a telemedicine visit and that the visit is being conducted through telephone  My office door was closed  No one else was in the room  He acknowledged consent and understanding of privacy and security of the platform  The patient has agreed to participate and understands he can discontinue the visit at any time  Patient is aware this is a billable service  Subjective    Fernando Guevara is a 69 y o  male Sharmaine Abt reports continued problems with feeling anxious and restless and has consistent difficulty falling asleep at night  Feels his anger/mood lability is somewhat better  Stress continues to exist at home with his spouse  Denies any acute depressive symptoms   No SI       HPI     Past Medical History:   Diagnosis Date    Allergic rhinitis     Anosmia     Anxiety     Benign parotid tumor     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 [address]     Crohn's disease ([facility] [address] )     Depression     Dilated pancreatic duct     [facility])     GERD (gastroesophageal reflux disease)     Hearing loss     [place] (hard of hearing)     no hearing aids    Hypertension     Leucocytosis     Mass in neck     Nail anomaly     Polyuria     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[facility] [address] )     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    Recurrent falls     Solar lentigo     Thrombocytopenia (HCC)     Vertigo     Vision loss of left eye        Past Surgical History:   Procedure Laterality Date    COLON SURGERY      Partial colectomy and colostomy    COLONOSCOPY      ESOPHAGOGASTRODUODENOSCOPY N/A 4/5/2017    Procedure: ESOPHAGOGASTRODUODENOSCOPY (EGD); Surgeon: Adriel Persaud MD;  [facility]; Service:     EYE SURGERY Right     Cataract    KNEE SURGERY      [place] EDG US EXAM SURGICAL ALTER STOM DUODENUM/JEJUNUM N/A 4/9/2018    Procedure: LINEAR ENDOSCOPIC U/S;  Surgeon: Aleena Caro MD;  [facility];   Service: Gastroenterology    [place] EXC PAROTD,LAT LOBE,DISSECT 5TH NERV Right 8/8/2018    Procedure: PAROTIDECTOMY WITH FACIAL NERVE MONITOR AND FROZEN SECTION;  Surgeon: Raul Galvan MD;  [facility];  Service: ENT    [place] IMPACT TOOTH [address] COMP BONY N/A 8/8/2018    Procedure: EXTRACTION TEETH #15 and #30;  Surgeon: Ricarda Greenberg DDS;  [facility];  Service: Maxillofacial    RADICAL NECK DISSECTION N/A 8/8/2018    Procedure: SELECTIVE NECK DISSECTION;  Surgeon: Raul Galvan MD;  [facility];  Service: ENT    UPPER GASTROINTESTINAL ENDOSCOPY      VEIN LIGATION AND STRIPPING         Current Outpatient Medications   Medication Sig Dispense Refill    amLODIPine (NORVASC) 5 mg tablet TAKE 1 TABLET BY MOUTH ONCE DAILY 90 tablet 3    LORazepam (ATIVAN) 0 5 mg tablet Take 1 tablet (0 5 mg total) by mouth 3 (three) times a day as needed for anxiety 90 tablet 3    Melatonin 10 MG TABS Take by mouth      Multiple Vitamins-Minerals (MULTI FOR HIM 50+ PO) Take 1 tablet by mouth daily      OLANZapine (ZyPREXA) 10 mg tablet Take 1 tablet (10 mg total) by mouth daily at bedtime 30 tablet 3    pantoprazole (PROTONIX) 40 mg tablet TAKE 1 TABLET BY MOUTH ONCE DAILY BEFORE BREAKFAST 30 tablet 0    sertraline (ZOLOFT) 50 mg tablet Take 1 tablet (50 mg total) by mouth daily 30 tablet 3    warfarin (COUMADIN) 2 mg tablet Take 1 tablet by mouth once daily 90 tablet 0     No current facility-administered medications for this visit  Allergies   Allergen Reactions    Duloxetine Other (See Comments)     Panic attacks    Other      Seasonal       Review of Systems    There were no vitals filed for this visit  I spent 15 minutes directly with the patient during this visit from 6:00PM-6:15PM  Wai Gregg continues to feel anxious and restless  Sleep remains an issue  He is verbal, cooperative and well oriented during session  VIRTUAL VISIT DISCLAIMER    Prateek Xavier acknowledges that he has consented to an online visit or consultation  He understands that the online visit is based solely on information provided by him, and that, in the absence of a face-to-face physical evaluation by the physician, the diagnosis he receives is both limited and provisional in terms of accuracy and completeness  This is not intended to replace a full medical face-to-face evaluation by the physician  Prateek Xavier understands and accepts these terms  Virtual Brief Visit    Assessment/Plan:    Problem List Items Addressed This Visit        Other    JOSÉ LUIS (generalized anxiety disorder) - Primary (Chronic)                Reason for visit is   Chief Complaint   Patient presents with    Virtual Brief Visit        Encounter provider Wendy Ramirez    Provider located at [address] [address]    Recent Visits  Date Type Provider Dept   07/08/20 [facility] recent visits within past 7 days and meeting all other requirements     Future Appointments  No visits were found meeting these conditions     Showing future appointments within next 150 days and meeting all other requirements After connecting through telephone, the patient was identified by name and date of birth  Walt Kerr was informed that this is a telemedicine visit and that the visit is being conducted through telephone  My office door was closed  No one else was in the room  He acknowledged consent and understanding of privacy and security of the platform  The patient has agreed to participate and understands he can discontinue the visit at any time  Patient is aware this is a billable service  Subjective    Walt Kerr is a 69 y o  male whom continues to struggle with anxiety and sleep issues  Mood lability somewhat improved  Leads largely isolative lifestyle and has contact largely only with wife and son  Exercises and walks regularly and passes time cleaning his apartment  [employer]     Past Medical History:   Diagnosis Date    Allergic rhinitis     Anosmia     Anxiety     Benign parotid tumor     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     [facility]'s [facility] ([facility] )     Depression     Dilated pancreatic duct     DVT (deep venous thrombosis) (HCC)     GERD (gastroesophageal reflux disease)     Hearing loss     [place] (hard of hearing)     no hearing aids    Hypertension     Leucocytosis     Mass in neck     Nail anomaly     Polyuria     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HCC)     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    Recurrent falls     Solar lentigo     Thrombocytopenia (HCC)     Vertigo     Vision loss of left eye        Past Surgical History:   Procedure Laterality Date    COLON SURGERY      Partial colectomy and colostomy    COLONOSCOPY      ESOPHAGOGASTRODUODENOSCOPY N/A 4/5/2017    Procedure: ESOPHAGOGASTRODUODENOSCOPY (EGD); Surgeon: Mariya Cifuentes MD;  [facility]; Service:     EYE SURGERY Right     Cataract    KNEE SURGERY      [place] EDG US EXAM SURGICAL ALTER STOM DUODENUM/JEJUNUM N/A 4/9/2018    Procedure: LINEAR ENDOSCOPIC U/S;  Surgeon: Wilber Wadsworth MD;  [facility];   Service: Gastroenterology    [place] EXC PAROTD,LAT LOBE,DISSECT 5TH NERV Right 8/8/2018    Procedure: PAROTIDECTOMY WITH FACIAL NERVE MONITOR AND FROZEN SECTION;  Surgeon: Christine Torres MD;  [facility];  Service: ENT    [place] IMPACT TOOTH [address] COMP BONY N/A 8/8/2018    Procedure: EXTRACTION TEETH #15 and #30;  Surgeon: Melissa Hoyos DDS;  [facility];  Service: Maxillofacial    RADICAL NECK DISSECTION N/A 8/8/2018    Procedure: SELECTIVE NECK DISSECTION;  Surgeon: Christine Torres MD;  [facility];  Service: ENT    UPPER GASTROINTESTINAL ENDOSCOPY      VEIN LIGATION AND STRIPPING         Current Outpatient Medications   Medication Sig Dispense Refill    amLODIPine (NORVASC) 5 mg tablet TAKE 1 TABLET BY MOUTH ONCE DAILY 90 tablet 3    LORazepam (ATIVAN) 0 5 mg tablet Take 1 tablet (0 5 mg total) by mouth 3 (three) times a day as needed for anxiety 90 tablet 3    Melatonin 10 MG TABS Take by mouth      Multiple Vitamins-Minerals (MULTI FOR HIM 50+ PO) Take 1 tablet by mouth daily      OLANZapine (ZyPREXA) 10 mg tablet Take 1 tablet (10 mg total) by mouth daily at bedtime 30 tablet 3    pantoprazole (PROTONIX) 40 mg tablet TAKE 1 TABLET BY MOUTH ONCE DAILY BEFORE BREAKFAST 30 tablet 0    sertraline (ZOLOFT) 50 mg tablet Take 1 tablet (50 mg total) by mouth daily 30 tablet 3    warfarin (COUMADIN) 2 mg tablet Take 1 tablet by mouth once daily 90 tablet 0     No current facility-administered medications for this visit  Allergies   Allergen Reactions    Duloxetine Other (See Comments)     Panic attacks    Other      Seasonal       Review of Systems    There were no vitals filed for this visit  I spent 15 minutes directly with the patient during this visit from 6:00PM-6:15PM  Presents as anxious but is verbal, cooperative, oriented and engaged during session  VIRTUAL VISIT DISCLAIMER    Megan Castilloarpan acknowledges that he has consented to an online visit or consultation   He understands that the online visit is based solely on information provided by him, and that, in the absence of a face-to-face physical evaluation by the physician, the diagnosis he receives is both limited and provisional in terms of accuracy and completeness  This is not intended to replace a full medical face-to-face evaluation by the physician  Aramis Teixeira understands and accepts these terms

## 2020-07-09 NOTE — BH TREATMENT PLAN
[facility] Integration Treatment Plan    Sirena Sandoval  1945  Date of Treatment Plan: 7/10/20    Treatment Goals (after each item selected, indicate outcome measures; (ie: as evidenced by)    [] Reduce Risk Factors of:    [x] Reduce Major Symptoms of: anxiety and mood lability   [] Ameliorate Functional Impairments of:    [x] Develop Coping Strategies to Address Stress of: physcial health issues   [] Stabilize (short term) Crisis of:   [] Maintain (long term) Stabilization of Symptoms of   [] Medication Referral to: already referred to and sees psychiatry   [] Maintain Sobriety:     Planned Interventions- Patient Participation (must be consistent with treatment goals):    [] Assertiveness Training [] Problem Solving Skills Training   [] Anger Management [x] Solution Focused Techniques   [] Affect Identification and Expression [x] Stress Management   [] Cognitive Restructuring [x] Supportive Therapy   [] Communication Training  [] Self/Other [place] Training    [] Grief Work  [] Decision Options Exploration   [] Imagery/Relaxation Training [] Decision Option Exploration   [] Parent Training  [] Pattern Identification and Interruption       [] Engage Significant Others in Treatment:   [] Facilitate Decision Making Regarding:   [] Explore/Monitor:    [] Teach Skills of:    [] Educate Regarding:   [] Assign Readings:   [] Referrals Planned:   [] Use of Resources/Strengths:   [] Preventative Strategies:   [] Obstacles to Change:     Estimated Time Frames:     Goal 1TBD   Goal 2: N/A    I have been provided education on my primary diagnosis of: Generalized Anxiety Disorder    My therapist and I have developed this plan together, and I am in agreement to working on these issues and goals  I understand the plan that has been developed for my treatment  [x] Patient Declined copy of treatment plan

## 2020-07-09 NOTE — PSYCH
Virtual Brief Visit    Assessment/Plan:    Problem List Items Addressed This Visit        Other    JOSÉ LUIS (generalized anxiety disorder) - Primary (Chronic)                Reason for visit is   Chief Complaint   Patient presents with    Virtual Brief Visit        Encounter provider Will Soriano    Provider located at [address] [address]    Recent Visits  Date Type Provider Dept   07/08/20 2500 [facility] recent visits within past 7 days and meeting all other requirements     Future Appointments  No visits were found meeting these conditions  Showing future appointments within next 150 days and meeting all other requirements        After connecting through telephone, the patient was identified by name and date of birth  Keri Echeverria was informed that this is a telemedicine visit and that the visit is being conducted through telephone  My office door was closed  No one else was in the room  He acknowledged consent and understanding of privacy and security of the platform  The patient has agreed to participate and understands he can discontinue the visit at any time  Patient is aware this is a billable service  Subjective    Keri Echeverria is a 69 y o  male who continues to struggle with anxiety, restlessness and problems sleeping  Feels he is less angry/mood less labile but days remain difficult for him at times  Tension continues to exist at home with wife as does continued financial stressors  Has limited supports other than family  Exercises and walks regularly  Maintains home and watches tv to pass time  Denies any acute depressive symptoms   No SI   HPI     Past Medical History:   Diagnosis Date    Allergic rhinitis     Anosmia     Anxiety     Benign parotid tumor     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[facility])     Cro[facility]'s disease ([facility] [address]     Depression     Dilated pancreatic duct     DVT (deep venous thrombosis) (HCC)     GERD (gastroesophageal reflux disease)     Hearing loss     [place] (hard of hearing)     no hearing aids    Hypertension     Leucocytosis     Mass in neck     Nail anomaly     Polyuria     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HCC)     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    Recurrent falls     Solar lentigo     Thrombocytopenia (HCC)     Vertigo     Vision loss of left eye        Past Surgical History:   Procedure Laterality Date    COLON SURGERY      Partial colectomy and colostomy    COLONOSCOPY      ESOPHAGOGASTRODUODENOSCOPY N/A 4/5/2017    Procedure: ESOPHAGOGASTRODUODENOSCOPY (EGD); Surgeon: Yusra Anthony MD;  [facility]; Service:     EYE SURGERY Right     Cataract    KNEE SURGERY      [place] EDG US EXAM SURGICAL ALTER STOM DUODENUM/JEJUNUM N/A 4/9/2018    Procedure: LINEAR ENDOSCOPIC U/S;  Surgeon: Kavya Mahajan MD;  [facility];   Service: Gastroenterology    [place] EXC PAROTD,LAT LOBE,DISSECT 5TH NERV Right 8/8/2018    Procedure: PAROTIDECTOMY WITH FACIAL NERVE MONITOR AND FROZEN SECTION;  Surgeon: Trip Mccrary MD;  [facility];  Service: ENT    [place] IMPACT TOOTH REMOV COMP BONY N/A 8/8/2018    Procedure: EXTRACTION TEETH #15 and #30;  Surgeon: Elzbieta Barrios DDS;  [facility];  Service: Maxillofacial    RADICAL NECK DISSECTION N/A 8/8/2018    Procedure: SELECTIVE NECK DISSECTION;  Surgeon: Trip Mccrary MD;  [facility];  Service: ENT    UPPER GASTROINTESTINAL ENDOSCOPY      VEIN LIGATION AND STRIPPING         Current Outpatient Medications   Medication Sig Dispense Refill    amLODIPine (NORVASC) 5 mg tablet TAKE 1 TABLET BY MOUTH ONCE DAILY 90 tablet 3    LORazepam (ATIVAN) 0 5 mg tablet Take 1 tablet (0 5 mg total) by mouth 3 (three) times a day as needed for anxiety 90 tablet 3    Melatonin 10 MG TABS Take by mouth      Multiple Vitamins-Minerals (MULTI FOR HIM 50+ PO) Take 1 tablet by mouth daily      OLANZapine (ZyPREXA) 10 mg tablet Take 1 tablet (10 mg total) by mouth daily at bedtime 30 tablet 3    pantoprazole (PROTONIX) 40 mg tablet TAKE 1 TABLET BY MOUTH ONCE DAILY BEFORE BREAKFAST 30 tablet 0    sertraline (ZOLOFT) 50 mg tablet Take 1 tablet (50 mg total) by mouth daily 30 tablet 3    warfarin (COUMADIN) 2 mg tablet Take 1 tablet by mouth once daily 90 tablet 0     No current facility-administered medications for this visit  Allergies   Allergen Reactions    Duloxetine Other (See Comments)     Panic attacks    Other      Seasonal       Review of Systems   Psychiatric/Behavioral: Positive for sleep disturbance  The patient is nervous/anxious  There were no vitals filed for this visit  I spent 15 minutes directly with the patient during this visit from 6:00PM-6:15PM  Presents as anxious but is verbal, cooperative, oriented and engaged during session  VIRTUAL VISIT DISCLAIMER    Rahul Perry acknowledges that he has consented to an online visit or consultation  He understands that the online visit is based solely on information provided by him, and that, in the absence of a face-to-face physical evaluation by the physician, the diagnosis he receives is both limited and provisional in terms of accuracy and completeness  This is not intended to replace a full medical face-to-face evaluation by the physician  Rahul Peryr understands and accepts these terms

## 2020-07-09 NOTE — PSYCH
Virtual Brief Visit    Assessment/Plan:    Problem List Items Addressed This Visit        Other    JOSÉ LUIS (generalized anxiety disorder) - Primary (Chronic)                Reason for visit is   Chief Complaint   Patient presents with    Virtual Brief Visit        Encounter provider Genaro Allen    Provider located at [address] [id number]    Recent Visits  Date Type Provider Dept   07/08/20 [facility] recent visits within past 7 days and meeting all other requirements     Future Appointments  No visits were found meeting these conditions  Showing future appointments within next 150 days and meeting all other requirements        After connecting through telephone, the patient was identified by name and date of birth  Fernando Guevara was informed that this is a telemedicine visit and that the visit is being conducted through telephone  My office door was closed  No one else was in the room  He acknowledged consent and understanding of privacy and security of the platform  The patient has agreed to participate and understands he can discontinue the visit at any time  Patient is aware this is a billable service  Subjective    Fernando Guevara is a 69 y o  male who continues to struggle with anxiety, restlessness and sleep issues  Feels anger has improved  Stress remains at home between he and his wife and financial stressors persist  Has limited supports  No SI or HI    HPI     Past Medical History:   Diagnosis Date    Allergic rhinitis     Anosmia     Anxiety     Benign parotid tumor     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     Crohn's disease ([facility]     Depression     Dilated pancreatic duct     [facility]     GERD (gastroesophageal reflux disease)     Hearing loss     [place]hard of hearing[place]     no hearing aids    Hypertension     Leucocytosis     Mass in neck     Nail anomaly     Polyuria     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HCC)     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    Recurrent falls     Solar lentigo     Thrombocytopenia (HCC)     Vertigo     Vision loss of left eye        Past Surgical History:   Procedure Laterality Date    COLON SURGERY      Partial colectomy and colostomy    COLONOSCOPY      ESOPHAGOGASTRODUODENOSCOPY N/A 4/5/2017    Procedure: ESOPHAGOGASTRODUODENOSCOPY (EGD); Surgeon: Jabier Obando MD;  [facility]; Service:     EYE SURGERY Right     Cataract    KNEE SURGERY      AR EDG US EXAM SURGICAL ALTER STOM DUODENUM/JEJUNUM N/A 4/9/2018    Procedure: LINEAR ENDOSCOPIC U/S;  Surgeon: Cinthia Wilcox MD;  [facility]: [facility];   Service: Gastroenterology    AR EXC PAROTD,LAT LOBE,DISSECT 5TH NERV Right 8/8/2018    Procedure: PAROTIDECTOMY WITH FACIAL NERVE MONITOR AND FROZEN SECTION;  Surgeon: Juan Daniel Busby MD;  [facility];  Service: ENT    AR IMPACT TOOTH REMOV COMP BONY N/A 8/8/2018    Procedure: EXTRACTION TEETH #15 and #30;  Surgeon: Katarina Frias DDS;  [facility];  Service: Maxillofacial    RADICAL NECK DISSECTION N/A 8/8/2018    Procedure: SELECTIVE NECK DISSECTION;  Surgeon: Juan Daniel Busby MD;  [facility];  Service: ENT    UPPER GASTROINTESTINAL ENDOSCOPY      VEIN LIGATION AND STRIPPING         Current Outpatient Medications   Medication Sig Dispense Refill    amLODIPine (NORVASC) 5 mg tablet TAKE 1 TABLET BY MOUTH ONCE DAILY 90 tablet 3    LORazepam (ATIVAN) 0 5 mg tablet Take 1 tablet (0 5 mg total) by mouth 3 (three) times a day as needed for anxiety 90 tablet 3    Melatonin 10 MG TABS Take by mouth      Multiple Vitamins-Minerals (MULTI FOR HIM 50+ PO) Take 1 tablet by mouth daily      OLANZapine (ZyPREXA) 10 mg tablet Take 1 tablet (10 mg total) by mouth daily at bedtime 30 tablet 3    pantoprazole (PROTONIX) 40 mg tablet TAKE 1 TABLET BY MOUTH ONCE DAILY BEFORE BREAKFAST 30 tablet 0    sertraline (ZOLOFT) 50 mg tablet Take 1 tablet (50 mg total) by mouth daily 30 tablet 3    warfarin (COUMADIN) 2 mg tablet Take 1 tablet by mouth once daily 90 tablet 0     No current facility-administered medications for this visit  Allergies   Allergen Reactions    Duloxetine Other (See Comments)     Panic attacks    Other      Seasonal       Review of Systems    There were no vitals filed for this visit  I spent 15 minutes directly with the patient during this visit    VIRTUAL VISIT Tresa acknowledges that he has consented to an online visit or consultation  He understands that the online visit is based solely on information provided by him, and that, in the absence of a face-to-face physical evaluation by the physician, the diagnosis he receives is both limited and provisional in terms of accuracy and completeness  This is not intended to replace a full medical face-to-face evaluation by the physician  Moira Humphreys understands and accepts these terms

## 2020-07-09 NOTE — PATIENT INSTRUCTIONS
Processed stressors with him- supportive therapy provided  Reviewed stress mgmt strategies and appropriate outlets to utilize consistently

## 2020-07-20 ENCOUNTER — APPOINTMENT (OUTPATIENT)
Dept: LAB | Facility: CLINIC | Age: 75
End: 2020-07-20
Payer: MEDICARE

## 2020-07-21 ENCOUNTER — ANTICOAG VISIT (OUTPATIENT)
Dept: INTERNAL MEDICINE CLINIC | Facility: CLINIC | Age: 75
End: 2020-07-21

## 2020-08-10 ENCOUNTER — ANTICOAG VISIT (OUTPATIENT)
Dept: INTERNAL MEDICINE CLINIC | Facility: CLINIC | Age: 75
End: 2020-08-10

## 2020-08-10 ENCOUNTER — APPOINTMENT (OUTPATIENT)
Dept: LAB | Facility: CLINIC | Age: 75
End: 2020-08-10
Payer: MEDICARE

## 2020-08-10 ENCOUNTER — TRANSCRIBE ORDERS (OUTPATIENT)
Dept: LAB | Facility: CLINIC | Age: 75
End: 2020-08-10

## 2020-08-11 DIAGNOSIS — F33.2 MAJOR DEPRESSIVE DISORDER, RECURRENT, SEVERE WITHOUT PSYCHOTIC FEATURES (HCC): Chronic | ICD-10-CM

## 2020-08-11 RX ORDER — OLANZAPINE 10 MG/1
TABLET ORAL
Qty: 30 TABLET | Refills: 0 | Status: SHIPPED | OUTPATIENT
Start: 2020-08-11 | End: 2020-09-10

## 2020-09-09 ENCOUNTER — SOCIAL WORK (OUTPATIENT)
Dept: BEHAVIORAL/MENTAL HEALTH CLINIC | Facility: CLINIC | Age: 75
End: 2020-09-09
Payer: MEDICARE

## 2020-09-09 DIAGNOSIS — F33.2 MAJOR DEPRESSIVE DISORDER, RECURRENT SEVERE WITHOUT PSYCHOTIC FEATURES (HCC): Primary | ICD-10-CM

## 2020-09-09 PROCEDURE — 90832 PSYTX W PT 30 MINUTES: CPT | Performed by: SOCIAL WORKER

## 2020-09-09 NOTE — PATIENT INSTRUCTIONS
Processed struggles related to his physical and emotional health and their effect on his mood- validation and supportive therapy provided  Reviewed stress mgmt strategies and productive outlets to utilize to manage mood issues  Have encouraged more social contact/interaction via family, peers, neighbors

## 2020-09-09 NOTE — PSYCH
Assessment/Plan: Manage anxiety     There are no diagnoses linked to this encounter  Subjective: Lili Kapadia continues to report periods of anxiety that occur without specific triggers or stressors  However, physical health complaints contribute  States he continues to have issues with dizziness to point of falling daily  Despite this, continues to exercise and walk daily and spens time cleaning the home, sitting outside or watching television  Patient ID: Aramis Teixeira is a 77 y o  male  Met with Lili Kapadia for 30 minutes from 1:00PM-1:30PM  Continues to detail physical complaints as well as anxiety that affect him daily  Does best he can to manage primarily on his own  Has limited social supports outside of spouse and son  Denies any SI  Reports some periods of depressed mood but denies any SI  Review of Systems   Psychiatric/Behavioral: Positive for dysphoric mood  The patient is nervous/anxious  Objective: Lili Kapadia presents as anxious but this seemed to dissipate as session progressed  He is verbal, cooperative, pleasant and engaged during session  Physical Exam  Psychiatric:         Attention and Perception: Attention and perception normal          Mood and Affect: Affect normal  Mood is anxious  Speech: Speech normal          Behavior: Behavior normal  Behavior is cooperative  Thought Content:  Thought content normal          Cognition and Memory: Memory normal          Judgment: Judgment normal

## 2020-09-10 ENCOUNTER — ANTICOAG VISIT (OUTPATIENT)
Dept: INTERNAL MEDICINE CLINIC | Facility: CLINIC | Age: 75
End: 2020-09-10

## 2020-09-10 DIAGNOSIS — F33.2 MAJOR DEPRESSIVE DISORDER, RECURRENT, SEVERE WITHOUT PSYCHOTIC FEATURES (HCC): Primary | Chronic | ICD-10-CM

## 2020-09-10 RX ORDER — OLANZAPINE 10 MG/1
TABLET ORAL
Qty: 30 TABLET | Refills: 0 | Status: SHIPPED | OUTPATIENT
Start: 2020-09-10 | End: 2020-10-08

## 2020-09-14 ENCOUNTER — ANTICOAG VISIT (OUTPATIENT)
Dept: INTERNAL MEDICINE CLINIC | Facility: CLINIC | Age: 75
End: 2020-09-14

## 2020-09-14 ENCOUNTER — TRANSCRIBE ORDERS (OUTPATIENT)
Dept: LAB | Facility: CLINIC | Age: 75
End: 2020-09-14

## 2020-09-14 ENCOUNTER — APPOINTMENT (OUTPATIENT)
Dept: LAB | Facility: CLINIC | Age: 75
End: 2020-09-14
Payer: MEDICARE

## 2020-09-30 ENCOUNTER — SOCIAL WORK (OUTPATIENT)
Dept: BEHAVIORAL/MENTAL HEALTH CLINIC | Facility: CLINIC | Age: 75
End: 2020-09-30
Payer: MEDICARE

## 2020-09-30 ENCOUNTER — IMMUNIZATIONS (OUTPATIENT)
Dept: INTERNAL MEDICINE CLINIC | Facility: CLINIC | Age: 75
End: 2020-09-30
Payer: MEDICARE

## 2020-09-30 DIAGNOSIS — F33.2 MAJOR DEPRESSIVE DISORDER, RECURRENT SEVERE WITHOUT PSYCHOTIC FEATURES (HCC): Primary | ICD-10-CM

## 2020-09-30 DIAGNOSIS — Z23 NEED FOR VACCINATION: Primary | ICD-10-CM

## 2020-09-30 PROCEDURE — 90832 PSYTX W PT 30 MINUTES: CPT | Performed by: SOCIAL WORKER

## 2020-09-30 PROCEDURE — G0008 ADMIN INFLUENZA VIRUS VAC: HCPCS

## 2020-09-30 PROCEDURE — 90662 IIV NO PRSV INCREASED AG IM: CPT

## 2020-09-30 NOTE — PATIENT INSTRUCTIONS
Processed stressors- validation and supportive therapy provided  Reviewed stress mgmt and relaxation strategies to utilize  Reviewed appropriate boundaries and expectation to maintain in his marital relationship to lessen additional stress and tense interactions  Reviewed productive outlets for emotions

## 2020-09-30 NOTE — PSYCH
Assessment/Plan: Manage mood issues     There are no diagnoses linked to this encounter  Subjective: Meron Lyn continues to detail issues with consistent stress at home in his marital relationship, with financial issues and with his own health issues  States he has been feeling more anxious and depressed and managing best he can  Denies any SI or HI  To manage stress, he continues to go for walks, exercises in basement or watches television  Patient ID: Jasmyne Baca is a 77 y o  male  Met with Meron Lyn for 30 minutes from 1:00PM-1:30PM  Reports continued issues with depression and anxiety due tension at home with his wife, due to financial stressors and his own health issues  Long-standing marital issues likely to not change  Derives some support via his son  Has limited social supports  Review of Systems      Objective: Meron Lyn presents as anxious and somewhat irritable but this lessened as session progressed  Presents as verbal, cooperative, oriented and engaged during session  Physical Exam  Psychiatric:         Attention and Perception: Attention and perception normal          Mood and Affect: Mood is anxious  Affect is flat  Speech: Speech normal          Behavior: Behavior normal  Behavior is cooperative  Thought Content:  Thought content normal          Cognition and Memory: Cognition and memory normal          Judgment: Judgment normal

## 2020-10-08 DIAGNOSIS — O22.30 DVT (DEEP VEIN THROMBOSIS) IN PREGNANCY: ICD-10-CM

## 2020-10-08 DIAGNOSIS — F33.2 MAJOR DEPRESSIVE DISORDER, RECURRENT, SEVERE WITHOUT PSYCHOTIC FEATURES (HCC): Primary | Chronic | ICD-10-CM

## 2020-10-08 RX ORDER — OLANZAPINE 10 MG/1
TABLET ORAL
Qty: 30 TABLET | Refills: 0 | Status: SHIPPED | OUTPATIENT
Start: 2020-10-08 | End: 2020-10-26 | Stop reason: SDUPTHER

## 2020-10-08 RX ORDER — WARFARIN SODIUM 2 MG/1
TABLET ORAL
Qty: 90 TABLET | Refills: 0 | Status: SHIPPED | OUTPATIENT
Start: 2020-10-08 | End: 2021-01-14

## 2020-10-12 ENCOUNTER — LAB (OUTPATIENT)
Dept: LAB | Facility: CLINIC | Age: 75
End: 2020-10-12
Payer: MEDICARE

## 2020-10-12 ENCOUNTER — ANTICOAG VISIT (OUTPATIENT)
Dept: INTERNAL MEDICINE CLINIC | Facility: CLINIC | Age: 75
End: 2020-10-12

## 2020-10-13 ENCOUNTER — ANTICOAG VISIT (OUTPATIENT)
Dept: INTERNAL MEDICINE CLINIC | Facility: CLINIC | Age: 75
End: 2020-10-13

## 2020-10-19 ENCOUNTER — TRANSCRIBE ORDERS (OUTPATIENT)
Dept: LAB | Facility: CLINIC | Age: 75
End: 2020-10-19

## 2020-10-19 ENCOUNTER — LAB (OUTPATIENT)
Dept: LAB | Facility: CLINIC | Age: 75
End: 2020-10-19
Payer: MEDICARE

## 2020-10-20 ENCOUNTER — ANTICOAG VISIT (OUTPATIENT)
Dept: INTERNAL MEDICINE CLINIC | Facility: CLINIC | Age: 75
End: 2020-10-20

## 2020-10-21 ENCOUNTER — SOCIAL WORK (OUTPATIENT)
Dept: BEHAVIORAL/MENTAL HEALTH CLINIC | Facility: CLINIC | Age: 75
End: 2020-10-21
Payer: MEDICARE

## 2020-10-21 DIAGNOSIS — F33.2 MAJOR DEPRESSIVE DISORDER, RECURRENT SEVERE WITHOUT PSYCHOTIC FEATURES (HCC): Primary | ICD-10-CM

## 2020-10-21 PROCEDURE — 90834 PSYTX W PT 45 MINUTES: CPT | Performed by: SOCIAL WORKER

## 2020-10-26 ENCOUNTER — ANTICOAG VISIT (OUTPATIENT)
Dept: INTERNAL MEDICINE CLINIC | Facility: CLINIC | Age: 75
End: 2020-10-26

## 2020-10-26 ENCOUNTER — OFFICE VISIT (OUTPATIENT)
Dept: PSYCHIATRY | Facility: CLINIC | Age: 75
End: 2020-10-26
Payer: MEDICARE

## 2020-10-26 ENCOUNTER — LAB (OUTPATIENT)
Dept: LAB | Facility: CLINIC | Age: 75
End: 2020-10-26
Payer: MEDICARE

## 2020-10-26 VITALS — WEIGHT: 142 LBS | HEIGHT: 66 IN | BODY MASS INDEX: 22.82 KG/M2

## 2020-10-26 DIAGNOSIS — F50.9 EATING DISORDER, UNSPECIFIED TYPE: Chronic | ICD-10-CM

## 2020-10-26 DIAGNOSIS — F41.1 GAD (GENERALIZED ANXIETY DISORDER): Chronic | ICD-10-CM

## 2020-10-26 DIAGNOSIS — F41.0 PANIC DISORDER WITHOUT AGORAPHOBIA: Chronic | ICD-10-CM

## 2020-10-26 DIAGNOSIS — G47.09 OTHER INSOMNIA: Chronic | ICD-10-CM

## 2020-10-26 DIAGNOSIS — F33.2 MAJOR DEPRESSIVE DISORDER, RECURRENT, SEVERE WITHOUT PSYCHOTIC FEATURES (HCC): Primary | Chronic | ICD-10-CM

## 2020-10-26 PROCEDURE — 90833 PSYTX W PT W E/M 30 MIN: CPT | Performed by: PSYCHIATRY & NEUROLOGY

## 2020-10-26 PROCEDURE — 99214 OFFICE O/P EST MOD 30 MIN: CPT | Performed by: PSYCHIATRY & NEUROLOGY

## 2020-10-26 RX ORDER — LORAZEPAM 0.5 MG/1
0.5 TABLET ORAL 3 TIMES DAILY PRN
Qty: 90 TABLET | Refills: 3 | Status: SHIPPED | OUTPATIENT
Start: 2020-10-26 | End: 2021-01-25 | Stop reason: SDUPTHER

## 2020-10-26 RX ORDER — OLANZAPINE 10 MG/1
10 TABLET ORAL
Qty: 90 TABLET | Refills: 1 | Status: SHIPPED | OUTPATIENT
Start: 2020-10-26 | End: 2021-01-25 | Stop reason: SDUPTHER

## 2020-11-09 ENCOUNTER — LAB (OUTPATIENT)
Dept: LAB | Facility: CLINIC | Age: 75
End: 2020-11-09
Payer: MEDICARE

## 2020-11-09 ENCOUNTER — ANTICOAG VISIT (OUTPATIENT)
Dept: INTERNAL MEDICINE CLINIC | Facility: CLINIC | Age: 75
End: 2020-11-09

## 2020-11-10 DIAGNOSIS — I10 ESSENTIAL HYPERTENSION: ICD-10-CM

## 2020-11-10 RX ORDER — AMLODIPINE BESYLATE 5 MG/1
TABLET ORAL
Qty: 90 TABLET | Refills: 0 | Status: SHIPPED | OUTPATIENT
Start: 2020-11-10 | End: 2021-01-14

## 2020-11-11 ENCOUNTER — TELEPHONE (OUTPATIENT)
Dept: GASTROENTEROLOGY | Facility: AMBULARY SURGERY CENTER | Age: 75
End: 2020-11-11

## 2020-11-18 ENCOUNTER — SOCIAL WORK (OUTPATIENT)
Dept: BEHAVIORAL/MENTAL HEALTH CLINIC | Facility: CLINIC | Age: 75
End: 2020-11-18
Payer: MEDICARE

## 2020-11-18 DIAGNOSIS — F33.2 MAJOR DEPRESSIVE DISORDER, RECURRENT SEVERE WITHOUT PSYCHOTIC FEATURES (HCC): Primary | ICD-10-CM

## 2020-11-18 PROCEDURE — 90832 PSYTX W PT 30 MINUTES: CPT | Performed by: SOCIAL WORKER

## 2020-11-23 ENCOUNTER — APPOINTMENT (OUTPATIENT)
Dept: LAB | Facility: CLINIC | Age: 75
End: 2020-11-23
Payer: MEDICARE

## 2020-11-24 ENCOUNTER — ANTICOAG VISIT (OUTPATIENT)
Dept: INTERNAL MEDICINE CLINIC | Facility: CLINIC | Age: 75
End: 2020-11-24

## 2020-12-01 ENCOUNTER — LAB (OUTPATIENT)
Dept: LAB | Facility: CLINIC | Age: 75
End: 2020-12-01
Payer: MEDICARE

## 2020-12-02 ENCOUNTER — ANTICOAG VISIT (OUTPATIENT)
Dept: INTERNAL MEDICINE CLINIC | Facility: CLINIC | Age: 75
End: 2020-12-02

## 2020-12-07 ENCOUNTER — APPOINTMENT (OUTPATIENT)
Dept: LAB | Facility: CLINIC | Age: 75
End: 2020-12-07
Payer: MEDICARE

## 2020-12-08 ENCOUNTER — ANTICOAG VISIT (OUTPATIENT)
Dept: INTERNAL MEDICINE CLINIC | Facility: CLINIC | Age: 75
End: 2020-12-08

## 2020-12-09 ENCOUNTER — SOCIAL WORK (OUTPATIENT)
Dept: BEHAVIORAL/MENTAL HEALTH CLINIC | Facility: CLINIC | Age: 75
End: 2020-12-09
Payer: MEDICARE

## 2020-12-09 DIAGNOSIS — F33.2 MAJOR DEPRESSIVE DISORDER, RECURRENT SEVERE WITHOUT PSYCHOTIC FEATURES (HCC): Primary | ICD-10-CM

## 2020-12-09 PROCEDURE — 90832 PSYTX W PT 30 MINUTES: CPT | Performed by: SOCIAL WORKER

## 2020-12-14 ENCOUNTER — LAB (OUTPATIENT)
Dept: LAB | Facility: CLINIC | Age: 75
End: 2020-12-14
Payer: MEDICARE

## 2020-12-21 ENCOUNTER — LAB (OUTPATIENT)
Dept: LAB | Facility: CLINIC | Age: 75
End: 2020-12-21
Payer: MEDICARE

## 2020-12-22 ENCOUNTER — ANTICOAG VISIT (OUTPATIENT)
Dept: INTERNAL MEDICINE CLINIC | Facility: CLINIC | Age: 75
End: 2020-12-22

## 2020-12-30 ENCOUNTER — SOCIAL WORK (OUTPATIENT)
Dept: BEHAVIORAL/MENTAL HEALTH CLINIC | Facility: CLINIC | Age: 75
End: 2020-12-30
Payer: MEDICARE

## 2020-12-30 DIAGNOSIS — F33.2 MAJOR DEPRESSIVE DISORDER, RECURRENT SEVERE WITHOUT PSYCHOTIC FEATURES (HCC): Primary | ICD-10-CM

## 2020-12-30 PROCEDURE — 90832 PSYTX W PT 30 MINUTES: CPT | Performed by: SOCIAL WORKER

## 2021-01-04 ENCOUNTER — TRANSCRIBE ORDERS (OUTPATIENT)
Dept: LAB | Facility: CLINIC | Age: 76
End: 2021-01-04

## 2021-01-04 ENCOUNTER — LAB (OUTPATIENT)
Dept: LAB | Facility: CLINIC | Age: 76
End: 2021-01-04
Payer: MEDICARE

## 2021-01-05 ENCOUNTER — ANTICOAG VISIT (OUTPATIENT)
Dept: INTERNAL MEDICINE CLINIC | Facility: CLINIC | Age: 76
End: 2021-01-05

## 2021-01-11 ENCOUNTER — APPOINTMENT (OUTPATIENT)
Dept: LAB | Facility: CLINIC | Age: 76
End: 2021-01-11
Payer: MEDICARE

## 2021-01-14 DIAGNOSIS — I10 ESSENTIAL HYPERTENSION: ICD-10-CM

## 2021-01-14 DIAGNOSIS — O22.30 DVT (DEEP VEIN THROMBOSIS) IN PREGNANCY: ICD-10-CM

## 2021-01-14 RX ORDER — AMLODIPINE BESYLATE 5 MG/1
TABLET ORAL
Qty: 90 TABLET | Refills: 0 | Status: SHIPPED | OUTPATIENT
Start: 2021-01-14 | End: 2021-04-30

## 2021-01-14 RX ORDER — WARFARIN SODIUM 2 MG/1
TABLET ORAL
Qty: 90 TABLET | Refills: 0 | Status: SHIPPED | OUTPATIENT
Start: 2021-01-14 | End: 2021-04-30

## 2021-01-20 ENCOUNTER — SOCIAL WORK (OUTPATIENT)
Dept: BEHAVIORAL/MENTAL HEALTH CLINIC | Facility: CLINIC | Age: 76
End: 2021-01-20
Payer: MEDICARE

## 2021-01-20 DIAGNOSIS — F33.2 MAJOR DEPRESSIVE DISORDER, RECURRENT SEVERE WITHOUT PSYCHOTIC FEATURES (HCC): Primary | ICD-10-CM

## 2021-01-20 PROCEDURE — 90832 PSYTX W PT 30 MINUTES: CPT | Performed by: SOCIAL WORKER

## 2021-01-20 NOTE — PSYCH
Assessment/Plan: Manage depression and anxiety     There are no diagnoses linked to this encounter  Subjective: Continues to report ongoing issues with anxiety that aggravate existing GI issues  Some periods of depression and mood swings related to physical health issues as well as ongoing marital stressors  Making efforts to better manage anger and frustration  Details more periods where he feels dizzy  States these episode are more frequent  Cites no specific trigger or stressor  Patient ID: Willian Reyes is a 77 y o  male  Met with Ale Avila for 30 minutes from 12:55PM-1:25PM  Presents as mildly anxious and frustrated by ongoing physcial health issues/symptoms and stress at home  Managing best he can  Review of Systems   Psychiatric/Behavioral: The patient is nervous/anxious  Objective:Lorenzo presents as mildly anxious and this seemed to lessen as session progressed  Presents as pleasant, verbal, cooperative and well oriented during session  Physical Exam  Psychiatric:         Attention and Perception: Attention and perception normal          Mood and Affect: Affect normal  Mood is anxious  Speech: Speech normal          Behavior: Behavior normal  Behavior is cooperative  Thought Content:  Thought content normal          Cognition and Memory: Cognition normal          Judgment: Judgment normal

## 2021-01-20 NOTE — PATIENT INSTRUCTIONS
Processed stressors during session- supportive therapy provided  Reviewed stress mgmt strategies and outlets to utilize to manage any stress and anxiety at home
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
## 2021-01-25 ENCOUNTER — OFFICE VISIT (OUTPATIENT)
Dept: PSYCHIATRY | Facility: CLINIC | Age: 76
End: 2021-01-25
Payer: MEDICARE

## 2021-01-25 ENCOUNTER — APPOINTMENT (OUTPATIENT)
Dept: LAB | Facility: CLINIC | Age: 76
End: 2021-01-25
Payer: MEDICARE

## 2021-01-25 VITALS — WEIGHT: 141 LBS | BODY MASS INDEX: 23.49 KG/M2 | HEIGHT: 65 IN

## 2021-01-25 DIAGNOSIS — G47.09 OTHER INSOMNIA: Chronic | ICD-10-CM

## 2021-01-25 DIAGNOSIS — F41.0 PANIC DISORDER WITHOUT AGORAPHOBIA: Chronic | ICD-10-CM

## 2021-01-25 DIAGNOSIS — F33.2 MAJOR DEPRESSIVE DISORDER, RECURRENT, SEVERE WITHOUT PSYCHOTIC FEATURES (HCC): Primary | Chronic | ICD-10-CM

## 2021-01-25 DIAGNOSIS — F41.1 GAD (GENERALIZED ANXIETY DISORDER): Chronic | ICD-10-CM

## 2021-01-25 DIAGNOSIS — F50.9 EATING DISORDER, UNSPECIFIED TYPE: Chronic | ICD-10-CM

## 2021-01-25 PROCEDURE — 90833 PSYTX W PT W E/M 30 MIN: CPT | Performed by: PSYCHIATRY & NEUROLOGY

## 2021-01-25 PROCEDURE — 99214 OFFICE O/P EST MOD 30 MIN: CPT | Performed by: PSYCHIATRY & NEUROLOGY

## 2021-01-25 RX ORDER — OLANZAPINE 10 MG/1
10 TABLET ORAL
Qty: 90 TABLET | Refills: 2 | Status: SHIPPED | OUTPATIENT
Start: 2021-01-25 | End: 2021-06-02 | Stop reason: SDUPTHER

## 2021-01-25 RX ORDER — LORAZEPAM 0.5 MG/1
0.5 TABLET ORAL 3 TIMES DAILY PRN
Qty: 90 TABLET | Refills: 3 | Status: SHIPPED | OUTPATIENT
Start: 2021-02-23 | End: 2021-06-02 | Stop reason: SDUPTHER

## 2021-01-25 RX ORDER — SERTRALINE HYDROCHLORIDE 100 MG/1
100 TABLET, FILM COATED ORAL DAILY
Qty: 90 TABLET | Refills: 2 | Status: SHIPPED | OUTPATIENT
Start: 2021-01-25 | End: 2021-04-07

## 2021-01-25 NOTE — BH TREATMENT PLAN
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 psychotic features ([facility] [address] )    2  JOSÉ LUIS (generalized anxiety disorder)    3  Panic disorder without agoraphobia    4  Eating disorder, unspecified type    5  Other insomnia      Strengths/Personal Resources for Self-Care: "therapist"  Area/Areas of need (in own words): "anxiety and depression"  1  Long Term Goal:   improve control of anxiety, improve control of depression  Target Date: 2 months - 3/25/2021  Person/Persons responsible for completion of goal: Amy Bello and wife  2  Short Term Objective (s) - How will we reach this goal?:   A  Provider new recommended medication/dosage changes and/or continue medication(s): increase Zoloft, continue all other medications (Zyprexa, Ativan and Melatonin)  B   N/A   C   N/A  Target Date: 2 months - 3/25/2021  Person/Persons Responsible for Completion of Goal: Amy Bello and wife   Progress Towards Goals: limited progress  Treatment Modality: medication management every 2 months, continue psychotherapy with [facility] therapist  Review due 180 days from date of this plan: 6 months - 7/25/2021  Expected length of service: ongoing treatment unless revised  My Physician/PA/NP and I have developed this plan together and I agree to work on the goals and objectives  I understand the treatment goals that were developed for my treatment    Electronic Signatures: on file (unless signed below)    Yahaira Long MD 01/25/21

## 2021-02-08 ENCOUNTER — APPOINTMENT (OUTPATIENT)
Dept: LAB | Facility: CLINIC | Age: 76
End: 2021-02-08
Payer: MEDICARE

## 2021-02-08 ENCOUNTER — ANTICOAG VISIT (OUTPATIENT)
Dept: INTERNAL MEDICINE CLINIC | Facility: CLINIC | Age: 76
End: 2021-02-08

## 2021-02-10 ENCOUNTER — SOCIAL WORK (OUTPATIENT)
Dept: BEHAVIORAL/MENTAL HEALTH CLINIC | Facility: CLINIC | Age: 76
End: 2021-02-10
Payer: MEDICARE

## 2021-02-10 DIAGNOSIS — F33.2 MAJOR DEPRESSIVE DISORDER, RECURRENT SEVERE WITHOUT PSYCHOTIC FEATURES (HCC): Primary | ICD-10-CM

## 2021-02-10 PROCEDURE — 90832 PSYTX W PT 30 MINUTES: CPT | Performed by: SOCIAL WORKER

## 2021-02-10 NOTE — PSYCH
Assessment/Plan: Manage depression and anxiety     There are no diagnoses linked to this encounter  Subjective: Christi Bowers continues to report some periods of mood swings and irritability but making more of a conscious effort "to let things go"  Struggles with periods of anxiety without precipitating stressors or triggers  Deals with ongoing stressors relate to his health, financial issues and relationship issues  Managing best he can  Denies any SI or HI  Patient ID: Kareem Camejo is a 77 y o  male  Met with Christi Bowers for 30 minutes from 1:00PM-1:30PM       Review of Systems   Psychiatric/Behavioral: Positive for dysphoric mood  The patient is nervous/anxious  Objective: Christi Bowers presents as anxious but is pleasant, verbal, cooperative and well oriented during session  Physical Exam  Psychiatric:         Attention and Perception: Attention and perception normal          Mood and Affect: Affect normal  Mood is anxious  Speech: Speech normal          Behavior: Behavior normal  Behavior is cooperative           Cognition and Memory: Cognition and memory normal          Judgment: Judgment normal

## 2021-02-10 NOTE — PATIENT INSTRUCTIONS
Processed stressors during session- validation and supportive therapy provided  Reviewed coping strategies and appropriate outlets to utilize to manage  Focused on strengths to bolster self-esteem

## 2021-02-22 ENCOUNTER — ANTICOAG VISIT (OUTPATIENT)
Dept: INTERNAL MEDICINE CLINIC | Facility: CLINIC | Age: 76
End: 2021-02-22

## 2021-02-22 ENCOUNTER — LAB (OUTPATIENT)
Dept: LAB | Facility: CLINIC | Age: 76
End: 2021-02-22
Payer: MEDICARE

## 2021-02-27 ENCOUNTER — IMMUNIZATIONS (OUTPATIENT)
Dept: FAMILY MEDICINE CLINIC | Facility: HOSPITAL | Age: 76
End: 2021-02-27

## 2021-02-27 DIAGNOSIS — Z23 ENCOUNTER FOR IMMUNIZATION: Primary | ICD-10-CM

## 2021-02-27 PROCEDURE — 91300 SARS-COV-2 / COVID-19 MRNA VACCINE (PFIZER-BIONTECH) 30 MCG: CPT

## 2021-02-27 PROCEDURE — 0001A SARS-COV-2 / COVID-19 MRNA VACCINE (PFIZER-BIONTECH) 30 MCG: CPT

## 2021-03-03 ENCOUNTER — SOCIAL WORK (OUTPATIENT)
Dept: BEHAVIORAL/MENTAL HEALTH CLINIC | Facility: CLINIC | Age: 76
End: 2021-03-03
Payer: MEDICARE

## 2021-03-03 DIAGNOSIS — F33.2 MAJOR DEPRESSIVE DISORDER, RECURRENT SEVERE WITHOUT PSYCHOTIC FEATURES (HCC): Primary | ICD-10-CM

## 2021-03-03 PROCEDURE — 90834 PSYTX W PT 45 MINUTES: CPT | Performed by: SOCIAL WORKER

## 2021-03-03 NOTE — PATIENT INSTRUCTIONS
Processed stressors at home with family- validation and supportive therapy provided  Reviewed communication strategies and boundaries to maintain with family to lessen stress  Reviewed appropriate outlets to utilize to manage stress and mood issues

## 2021-03-03 NOTE — PSYCH
Assessment/Plan: Manage anxiety and depression     There are no diagnoses linked to this encounter  Subjective: Christi Bowers continues to report periods of anxiety and depression throughout day  Aggravated at times by stress at home  Becomes anxious at times with no paticulary stressors or triggers  As snow has melted, he has again been walking more and feels this helps his mood  Exercising more at home as well  Will be more active outdoors with onset of nicer weather  No SI  No HI  Patient ID: Kareem Camejo is a 77 y o  male  Met with Christi Bowers for 40 minutes from 1:00PM-1:40PM  Continues to detail periods of anxiety and depression but has been exercising more at home and walking more outisde with nicer weather  Tends to help his mood issues  Review of Systems   Psychiatric/Behavioral: Positive for dysphoric mood  The patient is nervous/anxious  Objective: Christi Bowers presents as anxious  Lessens at times during session and increase at others along with his tremors when discussing stress at home  He is verbal, cooperative and oriented during session  Physical Exam  Psychiatric:         Attention and Perception: Attention and perception normal          Mood and Affect: Affect normal  Mood is anxious  Speech: Speech normal          Behavior: Behavior normal  Behavior is cooperative  Thought Content:  Thought content normal          Cognition and Memory: Cognition and memory normal          Judgment: Judgment normal

## 2021-03-08 ENCOUNTER — APPOINTMENT (OUTPATIENT)
Dept: LAB | Facility: CLINIC | Age: 76
End: 2021-03-08
Payer: MEDICARE

## 2021-03-08 ENCOUNTER — ANTICOAG VISIT (OUTPATIENT)
Dept: INTERNAL MEDICINE CLINIC | Facility: CLINIC | Age: 76
End: 2021-03-08

## 2021-03-20 ENCOUNTER — IMMUNIZATIONS (OUTPATIENT)
Dept: FAMILY MEDICINE CLINIC | Facility: HOSPITAL | Age: 76
End: 2021-03-20

## 2021-03-20 DIAGNOSIS — Z23 ENCOUNTER FOR IMMUNIZATION: Primary | ICD-10-CM

## 2021-03-20 PROCEDURE — 0002A SARS-COV-2 / COVID-19 MRNA VACCINE (PFIZER-BIONTECH) 30 MCG: CPT

## 2021-03-20 PROCEDURE — 91300 SARS-COV-2 / COVID-19 MRNA VACCINE (PFIZER-BIONTECH) 30 MCG: CPT

## 2021-03-22 ENCOUNTER — ANTICOAG VISIT (OUTPATIENT)
Dept: INTERNAL MEDICINE CLINIC | Facility: CLINIC | Age: 76
End: 2021-03-22

## 2021-03-22 ENCOUNTER — APPOINTMENT (OUTPATIENT)
Dept: LAB | Facility: CLINIC | Age: 76
End: 2021-03-22
Payer: MEDICARE

## 2021-03-22 ENCOUNTER — TRANSCRIBE ORDERS (OUTPATIENT)
Dept: LAB | Facility: CLINIC | Age: 76
End: 2021-03-22

## 2021-03-24 ENCOUNTER — SOCIAL WORK (OUTPATIENT)
Dept: BEHAVIORAL/MENTAL HEALTH CLINIC | Facility: CLINIC | Age: 76
End: 2021-03-24
Payer: MEDICARE

## 2021-03-24 DIAGNOSIS — F33.2 MAJOR DEPRESSIVE DISORDER, RECURRENT SEVERE WITHOUT PSYCHOTIC FEATURES (HCC): Primary | ICD-10-CM

## 2021-03-24 PROCEDURE — 90832 PSYTX W PT 30 MINUTES: CPT | Performed by: SOCIAL WORKER

## 2021-03-24 NOTE — PATIENT INSTRUCTIONS
Processed stressors during session- validation and supportive therapy provided  Reviewed stress mgmt strategies and outlets to utilize consistently in response to stressors  Focused on strengths to bolster self-esteem

## 2021-03-24 NOTE — PSYCH
Assessment/Plan: Manage depression and anxiety     There are no diagnoses linked to this encounter  Subjective: Amy Bello continues to report some periods of depression and anxiety that stem largely from stress at home  To manage, her remains very active walking outside and exercising at home  Has limited social supports  Feels anger has been a little better  Denies any acute symptoms  No SI  Patient ID: Whit Ibarra is a 77 y o  male  Met with Amy Bello for 30 minutes from 1:00PM-1:30PM  Reports some limited progress regarding mood issues but presents as less anxious than in previous sessions  Review of Systems   Psychiatric/Behavioral: Positive for dysphoric mood  The patient is nervous/anxious  Objective: Amy Bello presents as mildly anxious but has no somatic complains or hand tremors during today's session  He is pleasant, verbal, cooperative and well oriented during session  Physical Exam  Psychiatric:         Attention and Perception: Attention and perception normal          Mood and Affect: Affect normal  Mood is anxious  Speech: Speech normal          Behavior: Behavior normal  Behavior is cooperative  Thought Content:  Thought content normal          Cognition and Memory: Cognition and memory normal          Judgment: Judgment normal

## 2021-03-26 ENCOUNTER — TELEPHONE (OUTPATIENT)
Dept: INTERNAL MEDICINE CLINIC | Facility: CLINIC | Age: 76
End: 2021-03-26

## 2021-03-26 NOTE — TELEPHONE ENCOUNTER
Patient wife called  Both patient and wife vaccinated with second Covid vaccine 3/20/21  Son was sent home from work sick on 3/24/21, went for a Covid test and awaiting results  Patient has appointment to see you 3/31  Patient experiencing NO symptoms  Do they need to reschedule this appointment or can they still keep it?

## 2021-03-26 NOTE — TELEPHONE ENCOUNTER
Spoke with Patient wife  Verbal acknowledgment  Will keep appointment and will change to virtual if need  Done

## 2021-03-29 DIAGNOSIS — Z20.822 EXPOSURE TO COVID-19 VIRUS: Primary | ICD-10-CM

## 2021-03-29 NOTE — TELEPHONE ENCOUNTER
Spoke to wife  Verbalized understanding  Wife states that patient received his second Covid vaccine 3/20/21  Wants to verify that he still needs to take a Covid test since it is so close? Please advise

## 2021-03-29 NOTE — TELEPHONE ENCOUNTER
Patient wife, Que Rios called  Patient son tested +Covid today 03/29/21  Patient does not have any symptoms   Requesting Covid test

## 2021-03-29 NOTE — TELEPHONE ENCOUNTER
Please notify the patient I will order a COVID-19 PCR nasal swab, patient should quarantine times 10 days, if they developed any symptoms notify us immediately; if any high temperature or shortness of breath go immediately to the ER

## 2021-03-30 DIAGNOSIS — Z20.822 EXPOSURE TO COVID-19 VIRUS: ICD-10-CM

## 2021-03-30 PROCEDURE — U0003 INFECTIOUS AGENT DETECTION BY NUCLEIC ACID (DNA OR RNA); SEVERE ACUTE RESPIRATORY SYNDROME CORONAVIRUS 2 (SARS-COV-2) (CORONAVIRUS DISEASE [COVID-19]), AMPLIFIED PROBE TECHNIQUE, MAKING USE OF HIGH THROUGHPUT TECHNOLOGIES AS DESCRIBED BY CMS-2020-01-R: HCPCS | Performed by: INTERNAL MEDICINE

## 2021-03-30 PROCEDURE — U0005 INFEC AGEN DETEC AMPLI PROBE: HCPCS | Performed by: INTERNAL MEDICINE

## 2021-03-31 LAB — SARS-COV-2 RNA RESP QL NAA+PROBE: POSITIVE

## 2021-04-01 ENCOUNTER — TELEMEDICINE (OUTPATIENT)
Dept: INTERNAL MEDICINE CLINIC | Facility: CLINIC | Age: 76
End: 2021-04-01
Payer: MEDICARE

## 2021-04-01 ENCOUNTER — TELEPHONE (OUTPATIENT)
Dept: INTERNAL MEDICINE CLINIC | Facility: CLINIC | Age: 76
End: 2021-04-01

## 2021-04-01 ENCOUNTER — TELEPHONE (OUTPATIENT)
Dept: PSYCHIATRY | Facility: CLINIC | Age: 76
End: 2021-04-01

## 2021-04-01 DIAGNOSIS — U07.1 COVID-19 VIRUS INFECTION: Primary | ICD-10-CM

## 2021-04-01 DIAGNOSIS — U07.1 COVID-19: ICD-10-CM

## 2021-04-01 PROCEDURE — 99213 OFFICE O/P EST LOW 20 MIN: CPT | Performed by: INTERNAL MEDICINE

## 2021-04-01 RX ORDER — ALBUTEROL SULFATE 90 UG/1
3 AEROSOL, METERED RESPIRATORY (INHALATION) ONCE AS NEEDED
Status: CANCELLED | OUTPATIENT
Start: 2021-04-02

## 2021-04-01 RX ORDER — SODIUM CHLORIDE 9 MG/ML
20 INJECTION, SOLUTION INTRAVENOUS ONCE
Status: CANCELLED | OUTPATIENT
Start: 2021-04-02

## 2021-04-01 RX ORDER — SODIUM CHLORIDE 9 MG/ML
20 INJECTION, SOLUTION INTRAVENOUS ONCE
Status: CANCELLED | OUTPATIENT
Start: 2021-04-01

## 2021-04-01 RX ORDER — ACETAMINOPHEN 325 MG/1
650 TABLET ORAL ONCE AS NEEDED
Status: CANCELLED | OUTPATIENT
Start: 2021-04-01

## 2021-04-01 RX ORDER — ALBUTEROL SULFATE 90 UG/1
3 AEROSOL, METERED RESPIRATORY (INHALATION) ONCE AS NEEDED
Status: CANCELLED | OUTPATIENT
Start: 2021-04-01

## 2021-04-01 RX ORDER — ACETAMINOPHEN 325 MG/1
650 TABLET ORAL ONCE AS NEEDED
Status: CANCELLED | OUTPATIENT
Start: 2021-04-02

## 2021-04-01 NOTE — TELEPHONE ENCOUNTER
Wife called to cancel patients appt for 4/2 with [facility]  Wife said patient tested positive for COVID and will be in the [facility] getting antibodies  I mentioned his next appt isn't until June 2nd and the wife said that was fine they will keep just that appointment

## 2021-04-01 NOTE — PROGRESS NOTES
COVID-19 Outpatient Progress Note    Assessment/Plan:    Problem List Items Addressed This Visit        Other    COVID-19 virus infection - Primary    COVID-19         Disposition:     I referred patient to the Emergency Department at: [facility]  I recommended self-quarantine for 14 days and to call back for worsening symptoms or development of shortness of breath  I recommended self-quarantine for 10 days and to watch for symptoms until 14 days after exposure  If patient were to develop symptoms, they should self isolate and call our office for further guidance  Patient meets criteria for Bamlanivimab/Etesevimab infusion  They were counseled in regards to risks, benefits, and side effects of this infusion  Bamlanivimab and etesevimab are investigational medicines used to treat mild to moderate symptoms of COVID-19 in non-hospitalized adults and adolescents (15years of age and older who weigh at least 80 pounds (40 kg)), and who are at high risk for developing severe COVID-19 symptoms or the need for hospitalization  Bamlanivimab and etesevimab are investigational because they are still being studied  There is limited information known about the safety and effectiveness of using bamlanivimab and etesevimab to treat people with COVID-19  The FDA has authorized the emergency use of bamlanivimab and etesevimab for the treatment of COVID-19 under an Emergency Use Authorization (EUA)  How will I receive bamlanivimab and etesevimab? Bamlanivimab and etesevimab are given at the same time through a vein (intravenous or IV)    You will receive one dose of bamlanivimab and etesevimab by IV infusion  The infusion will take 21-60 minutes or longer  Possible side effects of bamlanivimab and etesevimab: Allergic reactions can happen during and after infusion with bamlanivimab and etesevimab      Possible reactions include: fever, chills, nausea, headache, shortness of breath, low or high blood pressure, rapid or slow heart rate, chest discomfort or pain, weakness, confusion, feeling tired, wheezing, swelling of your lips, face, or throat, rash including hives, itching, muscle aches, dizziness, and sweating  These reactions may be severe or life threatening  Worsening symptoms after treatment: You may experience new or worsening symptoms after infusion, including fever, difficulty breathing, rapid or slow heart rate, tiredness, weakness or confusion  If these occur, contact your healthcare provider or seek immediate medical attention as some of these events have required hospitalization  It is unknown if these events are related to treatment or are due to the progression of COVID19  The side effects of getting any medicine by vein may include brief pain, bleeding, bruising of the skin, soreness, swelling, and possible infection at the infusion site  These are not all the possible side effects of bamlanivimab and etesevimab  Not a lot of people have been given bamlanivimab and etesevimab  Serious and unexpected side effects may happen  Bamlanivimab and etesevimab are still being studied so it is possible that all of the risks are not known at this time  It is possible that bamlanivimab and etesevimab could interfere with your body's own ability to fight off a future infection of SARS-CoV-2  Similarly, bamlanivimab and etesevimab may reduce your bodys immune response to a vaccine for SARS-CoV-2  Specific studies have not been conducted to address these possible risks  Talk to your healthcare provider if you have any questions  Emergency Use Authorization:    The [facility] FDA has made bamlanivimab and etesevimab available under an emergency access mechanism called an EUA   The EUA is supported by a  of Health and Human Service (HHS) declaration that circumstances exist to justify the emergency use of drugs and biological products during the COVID-19 pandemic  Bamlanivimab and etesevimab have not undergone the same type of review as an FDA-approved or cleared product  The FDA may issue an EUA when certain criteria are met, which includes that there are no adequate, approved, and available alternatives  In addition, the FDA decision is based on the totality of scientific evidence available showing that it is reasonable to believe that the product meets certain criteria for safety, performance, and labeling and may be effective in treatment of patients during the COVID-19 pandemic  All of these criteria must be met to allow for the product to be used in the treatment of patients during the COVID-19 pandemic  The EUA for bamlanivimab and etesevimab together is in effect for the duration of the COVID-19 declaration justifying emergency use of these products, unless terminated or revoked (after which the product may no longer be used)  What if I am pregnant or breastfeeding? There is limited experience treating pregnant women or breastfeeding mothers with bamlanivimab and etesevimab  For a mother and unborn baby, the benefit of receiving bamlanivimab and etesevimab may be greater than the risk from the treatment  If you are pregnant or breastfeeding, discuss your options and specific situation with your healthcare provider  Regarding COVID-19 Vaccination:    Currently there is no data or safety or efficacy of COVID-19 vaccination in persons who received monoclonal antibodies as part of COVID-19 treatment  Treatment should be deferred for at least 90 days to avoid interference of the treatment with vaccine-induced immune responses (this is based on estimated half-life of therapies and evidence suggesting reinfection is uncommon within 90 days of initial infection)  Full fact sheet document for patients can be found at: [employer] pt    The patient consents to proceed with bamlanivimab and etesevimab infusion            I have spent 35 minutes directly with the patient  Greater than 50% of this time was spent in counseling/coordination of care regarding: diagnostic results, risks and benefits of treatment options, patient and family education, importance of treatment compliance and risk factor reductions  Encounter provider Elder Kilgore MD    Provider located at [address] [place] [id number]    Recent Visits  Date Type Provider Dept   03/26/21 Telephone Christine Garcia, [address] recent visits within past 7 days and meeting all other requirements     Today's Visits  Date Type Provider Dept   04/01/21 MD Harpreet [address] today's visits and meeting all other requirements     Future Appointments  No visits were found meeting these conditions  Showing future appointments within next 150 days and meeting all other requirements      This virtual check-in was done via [employer] and patient was informed that this is a secure, HIPAA-compliant platform  He agrees to proceed  Patient agrees to participate in a virtual check in via telephone or video visit instead of presenting to the office to address urgent/immediate medical needs  Patient is aware this is a billable service  After connecting through [facility], the patient was identified by name and date of birth  Anna Harris was informed that this was a telemedicine visit and that the exam was being conducted confidentially over secure lines  My office door was closed  Anna Harris acknowledged consent and understanding of privacy and security of the telemedicine visit  I informed the patient that I have reviewed his record in Epic and presented the opportunity for him to ask any questions regarding the visit today  The patient agreed to participate      Subjective:   Anna Harris is a 77 y o  male who is concerned about COVID-19  Patient's symptoms include nasal congestion, rhinorrhea, anosmia, loss of taste, cough, abdominal pain and headache  Patient denies fatigue, malaise, sore throat, shortness of breath, chest tightness, nausea, vomiting, diarrhea and myalgias  Date of symptom onset: 3/26/2021  Date of exposure: 3/26/2021    Lab Results   Component Value Date    SARSCOV2 Positive (A) 03/30/2021     Past Medical History:   Diagnosis Date    Allergic rhinitis     Anosmia     Anxiety     Benign parotid tumor     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     Crohn's disease [facility] )     Depression     Dilated pancreatic duct     DVT [facility]deep venous thrombosis) ([facility])     GERD (gastroesophageal reflux disease)     Hearing loss     [place] (hard of hearing)     no hearing aids    Hypertension     Leucocytosis     Mass in neck     Nail anomaly     Polyuria     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[facility])     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    Recurrent falls     Solar lentigo     Thrombocytopenia ([facility])     Vertigo     Vision loss of left eye      Past Surgical History:   Procedure Laterality Date    COLON SURGERY      Partial colectomy and colostomy    COLONOSCOPY      ESOPHAGOGASTRODUODENOSCOPY N/A 4/5/2017    Procedure: ESOPHAGOGASTRODUODENOSCOPY (EGD); Surgeon: Jabier Swartz MD;  [facility]; Service:     EYE SURGERY Right     Cataract    KNEE SURGERY      WA EDG US EXAM SURGICAL ALTER STOM DUODENUM/JEJUNUM N/A 4/9/2018    Procedure: LINEAR ENDOSCOPIC U/S;  Surgeon: Danielle Schultz MD;  [facility];   Service: Gastroenterology    WA EXC PAROTD,LAT LOBE,DISSECT 5TH NERV Right 8/8/2018    Procedure: PAROTIDECTOMY WITH FACIAL NERVE MONITOR AND FROZEN SECTION;  Surgeon: Pato Womack MD;  [facility];  Service: ENT    [place] IMPACT TOOTH [address] COMP BONY N/A 8/8/2018    Procedure: EXTRACTION TEETH #15 and #30;  Surgeon: Karen Flores DDS;  [facility];  Service: Maxillofacial    RADICAL NECK DISSECTION N/A 8/8/2018    Procedure: SELECTIVE NECK DISSECTION;  Surgeon: Chichi Beal MD;  [facility];  Service: ENT    UPPER GASTROINTESTINAL ENDOSCOPY      VEIN LIGATION AND STRIPPING       Current Outpatient Medications   Medication Sig Dispense Refill    amLODIPine (NORVASC) 5 mg tablet Take 1 tablet by mouth once daily 90 tablet 0    LORazepam (ATIVAN) 0 5 mg tablet Take 1 tablet (0 5 mg total) by mouth 3 (three) times a day as needed for anxiety To be filled on or after 2/23/21 90 tablet 3    Melatonin 10 MG TABS Take by mouth      Multiple Vitamins-Minerals (MULTI FOR HIM 50+ PO) Take 1 tablet by mouth daily      OLANZapine (ZyPREXA) 10 mg tablet Take 1 tablet (10 mg total) by mouth daily at bedtime 90 tablet 2    sertraline (ZOLOFT) 100 mg tablet Take 1 tablet (100 mg total) by mouth daily 90 tablet 2    warfarin (COUMADIN) 2 mg tablet Take 1 tablet by mouth once daily 90 tablet 0     No current facility-administered medications for this visit  Allergies   Allergen Reactions    Duloxetine Other (See Comments)     Panic attacks    Other      Seasonal       Review of Systems   Constitutional: Negative for fatigue  HENT: Positive for congestion and rhinorrhea  Negative for sore throat  Respiratory: Positive for cough  Negative for chest tightness and shortness of breath  Gastrointestinal: Positive for abdominal pain  Negative for diarrhea, nausea and vomiting  Musculoskeletal: Negative for myalgias  Neurological: Positive for headaches  Objective: There were no vitals filed for this visit  Physical Exam   Limited, telephone encounter  VIRTUAL VISIT DISCLAIMER    Omar Mott acknowledges that he has consented to an online visit or consultation   He understands that the online visit is based solely on information provided by him, and that, in the absence of a face-to-face physical evaluation by the physician, the diagnosis he receives is both limited and provisional in terms of accuracy and completeness  This is not intended to replace a full medical face-to-face evaluation by the physician  Helder Brice understands and accepts these terms

## 2021-04-01 NOTE — PROGRESS NOTES
Virtual Brief Visit    Assessment/Plan:    Problem List Items Addressed This Visit     None                Reason for visit is   Chief Complaint   Patient presents with    Virtual Brief Visit        Encounter provider Lucinda Kussmaul, MD    Provider located at [address] [place], [place] [id number]    Recent Visits  Date Type Provider Dept   03/26/21 Telephone Gunjan Liam, [address] recent visits within past 7 days and meeting all other requirements     Today's Visits  Date Type Provider Dept   04/01/21 Gatito Stout MD \[facility]\[address] today's visits and meeting all other requirements     Future Appointments  No visits were found meeting these conditions  Showing future appointments within next 150 days and meeting all other requirements        After connecting through {AMB VIRTUAL VISIT PXOTCU:64700}, the patient was identified by name and date of birth  Jasmyne Baca was informed that this is a telemedicine visit and that the visit is being conducted through {AMB CORONAVIRUS VISIT HPWEKD:41348}  {Telemedicine confidentiality :16223} {Telemedicine participants:31622}  He acknowledged consent and understanding of privacy and security of the platform  The patient has agreed to participate and understands he can discontinue the visit at any time  Patient is aware this is a billable service  Subjective    Jasmyne Baca is a 77 y o  male ***    HPI     Past Medical History:   Diagnosis Date    Allergic rhinitis     Anosmia     Anxiety     Benign parotid tumor     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     Crohn's disease [facility]     Depression     Dilated pancreatic duct     [facility]     GERD (gastroesophageal reflux disease)     Hearing loss     [place] (hard of hearing)     no hearing aids    Hypertension     Leucocytosis     Mass in neck     Nail anomaly  Polyuria     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[facility])     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    Recurrent falls     Solar lentigo     Thrombocytopenia ([facility])     Vertigo     Vision loss of left eye        Past Surgical History:   Procedure Laterality Date    COLON SURGERY      Partial colectomy and colostomy    COLONOSCOPY      ESOPHAGOGASTRODUODENOSCOPY N/A 4/5/2017    Procedure: ESOPHAGOGASTRODUODENOSCOPY (EGD); Surgeon: Ashly Maurer MD;  [facility]; Service:     EYE SURGERY Right     Cataract    KNEE SURGERY      [place] EDG US EXAM SURGICAL ALTER STOM DUODENUM/JEJUNUM N/A 4/9/2018    Procedure: LINEAR ENDOSCOPIC U/S;  Surgeon: Nils Moritz, MD;  [facility];   Service: Gastroenterology    VT EXC PAROTD,LAT LOBE,DISSECT 5TH NERV Right 8/8/2018    Procedure: PAROTIDECTOMY WITH FACIAL NERVE MONITOR AND FROZEN SECTION;  Surgeon: Hao Fletcher MD;  [facility];  Service: ENT    VT IMPACT TOOTH REMOV COMP BONY N/A 8/8/2018    Procedure: EXTRACTION TEETH #15 and #30;  Surgeon: Tab Rodarte DDS;  [facility];  Service: Maxillofacial    RADICAL NECK DISSECTION N/A 8/8/2018    Procedure: SELECTIVE NECK DISSECTION;  Surgeon: Hao Fletcher MD;  [facility];  Service: ENT    UPPER GASTROINTESTINAL ENDOSCOPY      VEIN LIGATION AND STRIPPING         Current Outpatient Medications   Medication Sig Dispense Refill    amLODIPine (NORVASC) 5 mg tablet Take 1 tablet by mouth once daily 90 tablet 0    LORazepam (ATIVAN) 0 5 mg tablet Take 1 tablet (0 5 mg total) by mouth 3 (three) times a day as needed for anxiety To be filled on or after 2/23/21 90 tablet 3    Melatonin 10 MG TABS Take by mouth      Multiple Vitamins-Minerals (MULTI FOR HIM 50+ PO) Take 1 tablet by mouth daily      OLANZapine (ZyPREXA) 10 mg tablet Take 1 tablet (10 mg total) by mouth daily at bedtime 90 tablet 2    sertraline (ZOLOFT) 100 mg tablet Take 1 tablet (100 mg total) by mouth daily 90 tablet 2  warfarin (COUMADIN) 2 mg tablet Take 1 tablet by mouth once daily 90 tablet 0     No current facility-administered medications for this visit  Allergies   Allergen Reactions    Duloxetine Other (See Comments)     Panic attacks    Other      Seasonal       Review of Systems    There were no vitals filed for this visit  {covid time spent:86637}    VIRTUAL VISIT DISCLAIMER    Sirena Sandoval acknowledges that he has consented to an online visit or consultation  He understands that the online visit is based solely on information provided by him, and that, in the absence of a face-to-face physical evaluation by the physician, the diagnosis he receives is both limited and provisional in terms of accuracy and completeness  This is not intended to replace a full medical face-to-face evaluation by the physician  Sirena Sandoval understands and accepts these terms

## 2021-04-02 ENCOUNTER — HOSPITAL ENCOUNTER (OUTPATIENT)
Dept: INFUSION CENTER | Facility: HOSPITAL | Age: 76
Discharge: HOME | End: 2021-04-02
Payer: MEDICARE

## 2021-04-02 VITALS
DIASTOLIC BLOOD PRESSURE: 69 MMHG | HEART RATE: 65 BPM | TEMPERATURE: 97 F | RESPIRATION RATE: 18 BRPM | OXYGEN SATURATION: 97 % | SYSTOLIC BLOOD PRESSURE: 141 MMHG

## 2021-04-02 DIAGNOSIS — U07.1 COVID-19: Primary | ICD-10-CM

## 2021-04-02 PROCEDURE — M0245 HB BAMLAN AND ETESEV INFUSION ADMIN: HCPCS | Performed by: INTERNAL MEDICINE

## 2021-04-02 RX ORDER — SODIUM CHLORIDE 9 MG/ML
20 INJECTION, SOLUTION INTRAVENOUS ONCE
Status: CANCELLED | OUTPATIENT
Start: 2021-04-02

## 2021-04-02 RX ORDER — SODIUM CHLORIDE 9 MG/ML
20 INJECTION, SOLUTION INTRAVENOUS ONCE
Status: COMPLETED | OUTPATIENT
Start: 2021-04-02 | End: 2021-04-02

## 2021-04-02 RX ORDER — ALBUTEROL SULFATE 90 UG/1
3 AEROSOL, METERED RESPIRATORY (INHALATION) ONCE AS NEEDED
Status: CANCELLED | OUTPATIENT
Start: 2021-04-02

## 2021-04-02 RX ORDER — ALBUTEROL SULFATE 90 UG/1
3 AEROSOL, METERED RESPIRATORY (INHALATION) ONCE AS NEEDED
Status: DISCONTINUED | OUTPATIENT
Start: 2021-04-02 | End: 2021-04-05 | Stop reason: HOSPADM

## 2021-04-02 RX ORDER — ACETAMINOPHEN 325 MG/1
650 TABLET ORAL ONCE AS NEEDED
Status: CANCELLED | OUTPATIENT
Start: 2021-04-02

## 2021-04-02 RX ORDER — ACETAMINOPHEN 325 MG/1
650 TABLET ORAL ONCE AS NEEDED
Status: DISCONTINUED | OUTPATIENT
Start: 2021-04-02 | End: 2021-04-05 | Stop reason: HOSPADM

## 2021-04-02 RX ADMIN — SODIUM CHLORIDE 20 ML/HR: 9 INJECTION, SOLUTION INTRAVENOUS at 12:28

## 2021-04-02 RX ADMIN — SODIUM CHLORIDE: 9 INJECTION, SOLUTION INTRAVENOUS at 12:30

## 2021-04-02 NOTE — NURSING NOTE
X1 hour observation period ended at this time, no distress noted, no adverse reactions  Patient AAOx4 upon discharge, escorted to discharge by infusion RN  Patient aware to follow up with PCP within 24-48 hours, discharge instructions provided

## 2021-04-02 NOTE — PROGRESS NOTES
Patient is here today for their Bamlanivimab and Etesevimab infusion  Reviewed EUA with patient  Patient verbalized understanding of EUA  Copy of EUA given to patient  All questions answered to patients satisfaction  IV started, good blood return, flushes freely  Patient tolerated well  Patient gave verbal consent to receive treatment

## 2021-04-02 NOTE — PLAN OF CARE
Problem: Potential for Falls  Goal: Patient will remain free of falls  Description: INTERVENTIONS:  - Assess patient frequently for physical needs  -  Identify cognitive and physical deficits and behaviors that affect risk of falls    -  [place] fall precautions as indicated by assessment   - Educate patient/family on patient safety including physical limitations  - Instruct patient to call for assistance with activity based on assessment  - Modify environment to reduce risk of injury  - Consider OT/PT consult to assist with strengthening/mobility  Outcome: Progressing

## 2021-04-02 NOTE — PLAN OF CARE
Problem: Potential for Falls  Goal: Patient will remain free of falls  Description: INTERVENTIONS:  - Assess patient frequently for physical needs  -  Identify cognitive and physical deficits and behaviors that affect risk of falls    -  [place] fall precautions as indicated by assessment   - Educate patient/family on patient safety including physical limitations  - Instruct patient to call for assistance with activity based on assessment  - Modify environment to reduce risk of injury  - Consider OT/PT consult to assist with strengthening/mobility  Outcome: Progressing     Problem: PAIN - ADULT  Goal: Verbalizes/displays adequate comfort level or baseline comfort level  Description: Interventions:  - Encourage patient to monitor pain and request assistance  - Assess pain using appropriate pain scale  - Administer analgesics based on type and severity of pain and evaluate response  - Implement non-pharmacological measures as appropriate and evaluate response  - Consider cultural and social influences on pain and pain management  - Notify physician/advanced practitioner if interventions unsuccessful or patient reports new pain  Outcome: Progressing     Problem: INFECTION - ADULT  Goal: Absence or prevention of progression during hospitalization  Description: INTERVENTIONS:  - Assess and monitor for signs and symptoms of infection  - Monitor lab/diagnostic results  - Monitor all insertion sites, i e  indwelling lines, tubes, and drains  - Monitor endotracheal if appropriate and nasal secretions for changes in amount and color  - Blanket appropriate cooling/warming therapies per order  - Administer medications as ordered  - Instruct and encourage patient and family to use good hand hygiene technique  - Identify and instruct in appropriate isolation precautions for identified infection/condition  Outcome: Progressing  Goal: Absence of fever/infection during neutropenic period  Description: INTERVENTIONS:  - Monitor WBC    Outcome: Progressing     Problem: SAFETY ADULT  Goal: Patient will remain free of falls  Description: INTERVENTIONS:  - Assess patient frequently for physical needs  -  Identify cognitive and physical deficits and behaviors that affect risk of falls    -  [place] fall precautions as indicated by assessment   - Educate patient/family on patient safety including physical limitations  - Instruct patient to call for assistance with activity based on assessment  - Modify environment to reduce risk of injury  - Consider OT/PT consult to assist with strengthening/mobility  Outcome: Progressing  Goal: Maintain or return to baseline ADL function  Description: INTERVENTIONS:  -  Assess patient's ability to carry out ADLs; assess patient's baseline for ADL function and identify physical deficits which impact ability to perform ADLs (bathing, care of mouth/teeth, toileting, grooming, dressing, etc )  - Assess/evaluate cause of self-care deficits   - Assess range of motion  - Assess patient's mobility; develop plan if impaired  - Assess patient's need for assistive devices and provide as appropriate  - Encourage maximum independence but intervene and supervise when necessary  - Involve family in performance of ADLs  - Assess for home care needs following discharge   - Consider OT consult to assist with ADL evaluation and planning for discharge  - Provide patient education as appropriate  Outcome: Progressing  Goal: Maintain or return mobility status to optimal level  Description: INTERVENTIONS:  - Assess patient's baseline mobility status (ambulation, transfers, stairs, etc )    - Identify cognitive and physical deficits and behaviors that affect mobility  - Identify mobility aids required to assist with transfers and/or ambulation (gait belt, sit-to-stand, lift, walker, cane, etc )  - [place] fall precautions as indicated by assessment  - Record patient progress and toleration of activity level on Mobility SBAR; progress patient to next Phase/Stage  - Instruct patient to call for assistance with activity based on assessment  - Consider rehabilitation consult to assist with strengthening/weightbearing, etc   Outcome: Progressing     Problem: DISCHARGE PLANNING  Goal: Discharge to home or other facility with appropriate resources  Description: INTERVENTIONS:  - Identify barriers to discharge w/patient and caregiver  - Arrange for needed discharge resources and transportation as appropriate  - Identify discharge learning needs (meds, wound care, etc )  - Arrange for interpretive services to assist at discharge as needed  - Refer to Case Management Department for coordinating discharge planning if the patient needs post-hospital services based on physician/advanced practitioner order or complex needs related to functional status, cognitive ability, or social support system  Outcome: Progressing     Problem: Knowledge Deficit  Goal: Patient/family/caregiver demonstrates understanding of disease process, treatment plan, medications, and discharge instructions  Description: Complete learning assessment and assess knowledge base    Interventions:  - Provide teaching at level of understanding  - Provide teaching via preferred learning methods  Outcome: Progressing

## 2021-04-02 NOTE — NURSING NOTE
Lonnie Cai completed at this time, no immediate adverse reactions noted, VSS  Patient aware of x1 hour observation time  IVF infusing

## 2021-04-04 ENCOUNTER — TELEMEDICINE (OUTPATIENT)
Dept: INTERNAL MEDICINE CLINIC | Facility: CLINIC | Age: 76
End: 2021-04-04
Payer: MEDICARE

## 2021-04-04 DIAGNOSIS — U07.1 COVID-19: Primary | ICD-10-CM

## 2021-04-04 PROCEDURE — 99441 PR PHYS/QHP TELEPHONE EVALUATION 5-10 MIN: CPT | Performed by: INTERNAL MEDICINE

## 2021-04-04 NOTE — ASSESSMENT & PLAN NOTE
Mild symptoms, status post monoclonal antibody treatment tolerated well without any side effects similar symptoms he does report me nasal congestion/sinus congestion he may use Flonase 2 sprays each nostril once a day as needed plenty fluids, rest   He is to remain in quarantine RTO in 3 days call if any problems if any high temperature or   Shortness of breath go immediately to the ER

## 2021-04-04 NOTE — PROGRESS NOTES
COVID-19 Outpatient Progress Note    Assessment/Plan:    Problem List Items Addressed This Visit        Other    COVID-19 - Primary      Mild symptoms, status post monoclonal antibody treatment tolerated well without any side effects similar symptoms he does report me nasal congestion/sinus congestion he may use Flonase 2 sprays each nostril once a day as needed plenty fluids, rest   He is to remain in quarantine RTO in 3 days call if any problems if any high temperature or   Shortness of breath go immediately to the ER  Disposition:     I recommended continued isolation until at least 24 hours have passed since recovery defined as resolution of fever without the use of fever-reducing medications AND improvement in COVID symptoms AND 10 days have passed since onset of symptoms (or 10 days have passed since date of first positive viral diagnostic test for asymptomatic patients)  I have spent 5 minutes directly with the patient  Greater than 50% of this time was spent in counseling/coordination of care regarding: instructions for management  Encounter provider Brock Decker DO    Provider located at [address] [address] [address]    Recent Visits  Date Type Provider Dept   04/01/21 Telephone Maicol Lopez, [facility]   04/01/21 Jalyn Ahumada MD [facility]   Showing recent visits within past 7 days and meeting all other requirements     Today's Visits  Date Type Provider Dept   04/04/21 [id number] [facility] Natalie Burnett 25 today's visits and meeting all other requirements     Future Appointments  No visits were found meeting these conditions     Showing future appointments within next 150 days and meeting all other requirements        Patient agrees to participate in a virtual check in via telephone or video visit instead of presenting to the office to address urgent/immediate medical needs  Patient is aware this is a billable service  After connecting through Telephone, the patient was identified by name and date of birth  Jaylon Martínez was informed that this was a telemedicine visit and that the exam was being conducted confidentially over secure lines  My office door was closed  No one else was in the room  Jaylon Martínez acknowledged consent and understanding of privacy and security of the telemedicine visit  I informed the patient that I have reviewed his record in Epic and presented the opportunity for him to ask any questions regarding the visit today  The patient agreed to participate  Subjective:   Jaylon Martínez is a 77 y o  male who has been screened for COVID-19  Symptom change since last report: unchanged  Patient's symptoms include nasal congestion and nausea  Patient denies fever, chills, rhinorrhea, sore throat, anosmia, loss of taste, cough, shortness of breath (  Mild dyspnea on exertion unchanged), chest tightness and diarrhea  Hansa Kaufman has been staying home and has isolated themselves in his home  He is taking care to not share personal items and is cleaning all surfaces that are touched often, like counters, tabletops, and doorknobs using household cleaning sprays or wipes  He is wearing a mask when he leaves his room       Date of symptom onset: 3/26/2021  Date of exposure: 3/26/2021  Date of positive COVID-19 PCR: 3/30/2021    Monoclonal Antibody Follow-up Symptom Questionnaire  I feel overall: similar  My breathing is: similar  My fever is: better  My fatigue is: similar    Lab Results   Component Value Date    SARSCOV2 Positive (A) 03/30/2021     Past Medical History:   Diagnosis Date    Allergic rhinitis     Anosmia     Anxiety     Benign parotid tumor     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     Cro[facility]'s disease [facility]     Depression     Dilated pancreatic duct     DVT (deep venous thrombosis) (HCC)     GERD (gastroesophageal reflux disease)     Hearing loss     [place]hard of hearing[place]     no hearing aids    Hypertension     Leucocytosis     Mass in neck     Nail anomaly     Polyuria     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HCC)     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    Recurrent falls     Solar lentigo     Thrombocytopenia (HCC)     Vertigo     Vision loss of left eye      Past Surgical History:   Procedure Laterality Date    COLON SURGERY      Partial colectomy and colostomy    COLONOSCOPY      ESOPHAGOGASTRODUODENOSCOPY N/A 4/5/2017    Procedure: ESOPHAGOGASTRODUODENOSCOPY (EGD); Surgeon: Laurel Donohue MD;  [facility]; Service:     EYE SURGERY Right     Cataract    KNEE SURGERY      [place] EDG US EXAM SURGICAL ALTER STOM DUODENUM/JEJUNUM N/A 4/9/2018    Procedure: LINEAR ENDOSCOPIC U/S;  Surgeon: Lauryn Corrigan MD;  [facility];   Service: Gastroenterology    [place] EXC PAROTD,LAT LOBE,DISSECT 5TH NERV Right 8/8/2018    Procedure: PAROTIDECTOMY WITH FACIAL NERVE MONITOR AND FROZEN SECTION;  Surgeon: Eugenia Thomas MD;  [facility];  Service: ENT    RADICAL NECK DISSECTION N/A 8/8/2018    Procedure: SELECTIVE NECK DISSECTION;  Surgeon: Eugenia Thomas MD;  [facility];  Service: ENT    REMOVAL OF IMPACTED TOOTH - COMPLETELY BONY N/A 8/8/2018    Procedure: EXTRACTION TEETH #15 and #30;  Surgeon: Marialuisa Sharpe DDS;  [facility];  Service: Maxillofacial    UPPER GASTROINTESTINAL ENDOSCOPY      VEIN LIGATION AND STRIPPING       Current Outpatient Medications   Medication Sig Dispense Refill    amLODIPine (NORVASC) 5 mg tablet Take 1 tablet by mouth once daily 90 tablet 0    LORazepam (ATIVAN) 0 5 mg tablet Take 1 tablet (0 5 mg total) by mouth 3 (three) times a day as needed for anxiety To be filled on or after 2/23/21 90 tablet 3    Melatonin 10 MG TABS Take by mouth      Multiple Vitamins-Minerals (MULTI FOR HIM 50+ PO) Take 1 tablet by mouth daily      OLANZapine (ZyPREXA) 10 mg tablet Take 1 tablet (10 mg total) by mouth daily at bedtime 90 tablet 2    sertraline (ZOLOFT) 100 mg tablet Take 1 tablet (100 mg total) by mouth daily 90 tablet 2    warfarin (COUMADIN) 2 mg tablet Take 1 tablet by mouth once daily 90 tablet 0     No current facility-administered medications for this visit  Facility-Administered Medications Ordered in Other Visits   Medication Dose Route Frequency Provider Last Rate Last Admin    acetaminophen (TYLENOL) tablet 650 mg  650 mg Oral Once PRN Stephanie Barajas MD        albuterol (PROVENTIL HFA,VENTOLIN HFA) inhaler 3 puff  3 puff Inhalation Once PRN Stephanie Barajas MD         Allergies   Allergen Reactions    Duloxetine Other (See Comments)     Panic attacks    Other      Seasonal       Review of Systems   Constitutional: Negative for chills and fever  HENT: Positive for congestion  Negative for rhinorrhea and sore throat  Respiratory: Negative for cough, chest tightness and shortness of breath (  Mild dyspnea on exertion unchanged)  Gastrointestinal: Positive for nausea  Negative for diarrhea  Objective: There were no vitals filed for this visit  Physical Exam  VIRTUAL VISIT DISCLAIMER    Esther Mack acknowledges that he has consented to an online visit or consultation  He understands that the online visit is based solely on information provided by him, and that, in the absence of a face-to-face physical evaluation by the physician, the diagnosis he receives is both limited and provisional in terms of accuracy and completeness  This is not intended to replace a full medical face-to-face evaluation by the physician  Esther Mack understands and accepts these terms

## 2021-04-07 ENCOUNTER — TELEMEDICINE (OUTPATIENT)
Dept: BEHAVIORAL/MENTAL HEALTH CLINIC | Facility: CLINIC | Age: 76
End: 2021-04-07
Payer: MEDICARE

## 2021-04-07 ENCOUNTER — TELEMEDICINE (OUTPATIENT)
Dept: INTERNAL MEDICINE CLINIC | Facility: CLINIC | Age: 76
End: 2021-04-07
Payer: MEDICARE

## 2021-04-07 VITALS — WEIGHT: 128 LBS | HEIGHT: 65 IN | BODY MASS INDEX: 21.33 KG/M2

## 2021-04-07 DIAGNOSIS — Z12.11 SCREENING FOR COLORECTAL CANCER: Primary | ICD-10-CM

## 2021-04-07 DIAGNOSIS — F33.2 MAJOR DEPRESSIVE DISORDER, RECURRENT, SEVERE WITHOUT PSYCHOTIC FEATURES (HCC): Chronic | ICD-10-CM

## 2021-04-07 DIAGNOSIS — F41.1 GAD (GENERALIZED ANXIETY DISORDER): Chronic | ICD-10-CM

## 2021-04-07 DIAGNOSIS — F33.2 MAJOR DEPRESSIVE DISORDER, RECURRENT SEVERE WITHOUT PSYCHOTIC FEATURES (HCC): Primary | ICD-10-CM

## 2021-04-07 DIAGNOSIS — Z12.12 SCREENING FOR COLORECTAL CANCER: Primary | ICD-10-CM

## 2021-04-07 PROCEDURE — 90832 PSYTX W PT 30 MINUTES: CPT | Performed by: SOCIAL WORKER

## 2021-04-07 PROCEDURE — 99442 PR PHYS/QHP TELEPHONE EVALUATION 11-20 MIN: CPT | Performed by: NURSE PRACTITIONER

## 2021-04-07 RX ORDER — SERTRALINE HYDROCHLORIDE 100 MG/1
100 TABLET, FILM COATED ORAL DAILY
Qty: 90 TABLET | Refills: 2
Start: 2021-04-07 | End: 2021-06-02 | Stop reason: SDUPTHER

## 2021-04-07 NOTE — PROGRESS NOTES
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)    Relevant Medications    sertraline (ZOLOFT) 100 mg tablet    JOSÉ LUIS (generalized anxiety disorder) (Chronic)    Relevant Medications    sertraline (ZOLOFT) 100 mg tablet      Other Visit Diagnoses     Screening for colorectal cancer    -  Primary         Disposition:     Can end quarantine tomorrow    I have spent 15 minutes directly with the patient  Greater than 50% of this time was spent in counseling/coordination of care regarding: instructions for management and impressions  [facility]    Provider located at [address] [facility]  [address] [facility] [address]    Recent Visits  Date Type Provider Dept   04/04/21 [address] [facility] [place], [address]   04/01/21 Telephone Romelia Kang, [address] [facility]   04/01/21 Jalyn Ahumada MD [facility]   Showing recent visits within past 7 days and meeting all other requirements     Today's Visits  Date Type Provider Dept   04/07/21 Telemedicine Lizz Yancey today's visits and meeting all other requirements     Future Appointments  No visits were found meeting these conditions  Showing future appointments within next 150 days and meeting all other requirements        Patient agrees to participate in a virtual check in via telephone or video visit instead of presenting to the office to address urgent/immediate medical needs  Patient is aware this is a billable service  After connecting through Telephone, the patient was identified by name and date of birth  Karyle Jaegers was informed that this was a telemedicine visit and that the exam was being conducted confidentially over secure lines   My office door was closed  No one else was in the room  Tracy Mcdermott acknowledged consent and understanding of privacy and security of the telemedicine visit  I informed the patient that I have reviewed his record in Epic and presented the opportunity for him to ask any questions regarding the visit today  The patient agreed to participate  It was my intent to perform this visit via video technology but the patient was not able to do a video connection so the visit was completed via audio telephone only  Subjective:   Tracy Mcdermott is a 77 y o  male who has been screened for COVID-19  Patient is currently asymptomatic  Patient denies fever, chills, fatigue, malaise, congestion, rhinorrhea, sore throat, anosmia, loss of taste, cough, shortness of breath, chest tightness, abdominal pain, nausea, vomiting, diarrhea, myalgias and headaches  Marva Son has been staying home and has isolated themselves in his home  He is taking care to not share personal items and is cleaning all surfaces that are touched often, like counters, tabletops, and doorknobs using household cleaning sprays or wipes  He is wearing a mask when he leaves his room       Date of symptom onset: 3/26/2021  Date of exposure: 3/26/2021  Date of positive COVID-19 PCR: 3/30/2021    Monoclonal Antibody Follow-up Symptom Questionnaire  I feel overall: much better  My breathing is: much better  My fever is: better  My fatigue is: much better    Lab Results   Component Value Date    SARSCOV2 Positive (A) 03/30/2021     Past Medical History:   Diagnosis Date    Allergic rhinitis     Anosmia     Anxiety     Benign parotid tumor     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     [facility]     Depression     Dilated pancreatic duct     DVT (deep venous thrombosis) (HCC)     GERD (gastroesophageal reflux disease)     Hearing loss     [place] (hard of hearing)     no hearing aids    Hypertension     Leucocytosis     Mass in neck     Nail anomaly  Polyuria     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HCC)     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    Recurrent falls     Solar lentigo     Thrombocytopenia (HCC)     Vertigo     Vision loss of left eye      Past Surgical History:   Procedure Laterality Date    COLON SURGERY      Partial colectomy and colostomy    COLONOSCOPY      ESOPHAGOGASTRODUODENOSCOPY N/A 4/5/2017    Procedure: ESOPHAGOGASTRODUODENOSCOPY (EGD); Surgeon: Tushar Zee MD;  [facility]; Service:     EYE SURGERY Right     Cataract    KNEE SURGERY      [place] EDG US EXAM SURGICAL ALTER STOM DUODENUM/JEJUNUM N/A 4/9/2018    Procedure: LINEAR ENDOSCOPIC U/S;  Surgeon: Darrell Hutchinson MD;  [facility];   Service: Gastroenterology    [place] EXC PAROTD,LAT LOBE,DISSECT 5TH NERV Right 8/8/2018    Procedure: PAROTIDECTOMY WITH FACIAL NERVE MONITOR AND FROZEN SECTION;  Surgeon: Enedina Bui MD;  [facility];  Service: ENT    RADICAL NECK DISSECTION N/A 8/8/2018    Procedure: SELECTIVE NECK DISSECTION;  Surgeon: Enedina Bui MD;  [facility];  Service: ENT    REMOVAL OF IMPACTED TOOTH - COMPLETELY BONY N/A 8/8/2018    Procedure: EXTRACTION TEETH #15 and #30;  Surgeon: Tracy Gunn DDS;  [facility];  Service: Maxillofacial    UPPER GASTROINTESTINAL ENDOSCOPY      VEIN LIGATION AND STRIPPING       Current Outpatient Medications   Medication Sig Dispense Refill    amLODIPine (NORVASC) 5 mg tablet Take 1 tablet by mouth once daily 90 tablet 0    LORazepam (ATIVAN) 0 5 mg tablet Take 1 tablet (0 5 mg total) by mouth 3 (three) times a day as needed for anxiety To be filled on or after 2/23/21 90 tablet 3    Melatonin 10 MG TABS Take by mouth      Multiple Vitamins-Minerals (MULTI FOR HIM 50+ PO) Take 1 tablet by mouth daily      OLANZapine (ZyPREXA) 10 mg tablet Take 1 tablet (10 mg total) by mouth daily at bedtime 90 tablet 2    sertraline (ZOLOFT) 100 mg tablet Take 1 tablet (100 mg total) by mouth daily 90 tablet 2    warfarin (COUMADIN) 2 mg tablet Take 1 tablet by mouth once daily 90 tablet 0     No current facility-administered medications for this visit  Allergies   Allergen Reactions    Duloxetine Other (See Comments)     Panic attacks    Other      Seasonal       Review of Systems   Constitutional: Negative for chills, fatigue and fever  HENT: Negative for congestion, rhinorrhea and sore throat  Respiratory: Negative for cough, chest tightness and shortness of breath  Gastrointestinal: Negative for abdominal pain, diarrhea, nausea and vomiting  Musculoskeletal: Negative for myalgias  Neurological: Negative for headaches  Objective:    Vitals:    04/07/21 1553   Weight: 58 1 kg (128 lb)   Height: 5' 5" (1 651 m)       VIRTUAL VISIT DISCLAIMER    Jasmyne Baca acknowledges that he has consented to an online visit or consultation  He understands that the online visit is based solely on information provided by him, and that, in the absence of a face-to-face physical evaluation by the physician, the diagnosis he receives is both limited and provisional in terms of accuracy and completeness  This is not intended to replace a full medical face-to-face evaluation by the physician  Jasmyne Baca understands and accepts these terms

## 2021-04-07 NOTE — PSYCH
Virtual Brief Visit    Assessment/Plan:    Problem List Items Addressed This Visit     None      Visit Diagnoses     Major depressive disorder, recurrent severe without psychotic features ([facility] )    -  Primary                Reason for visit is No chief complaint on file  Encounter provider Jo Ann Kennedy    Provider located at [address] [address]    Recent Visits  Date Type Provider Dept   04/04/21 [address] [facility], DO [address] recent visits within past 7 days and meeting all other requirements     Future Appointments  No visits were found meeting these conditions  Showing future appointments within next 150 days and meeting all other requirements        After connecting through telephone, the patient was identified by name and date of birth  Esther Mack was informed that this is a telemedicine visit and that the visit is being conducted through telephone  My office door was closed  No one else was in the room  He acknowledged consent and understanding of privacy and security of the platform  The patient has agreed to participate and understands he can discontinue the visit at any time  Patient is aware this is a billable service  Subjective    Esther Mack is a 77 y o  male who recently diagnosed with Covid which prompted today's visit being done virtually  Reports there has been more stress and anxiety since he and his wife have had Covid and have been quarantined together for last couple weeks  Has not been able to sit or walk outside and these tend to be good outlets for him  Has been exercising in home despite symptoms related to Covid  Plans to begin walking and sitting outside tomorrow as quarantine will end at end of today  Denies any acute depressive symptoms but has felt more anxious   No SI   HPI     Past Medical History: Diagnosis Date    Allergic rhinitis     Anosmia     Anxiety     Benign parotid tumor     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     Cro[facility]'s disease ([facility]     Depression     Dilated pancreatic duct     DVT (deep venous thrombosis) (HCC)     GERD (gastroesophageal reflux disease)     Hearing loss     [place] (hard of hearing)     no hearing aids    Hypertension     Leucocytosis     Mass in neck     Nail anomaly     Polyuria     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HCC)     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    Recurrent falls     Solar lentigo     Thrombocytopenia (HCC)     Vertigo     Vision loss of left eye        Past Surgical History:   Procedure Laterality Date    COLON SURGERY      Partial colectomy and colostomy    COLONOSCOPY      ESOPHAGOGASTRODUODENOSCOPY N/A 4/5/2017    Procedure: ESOPHAGOGASTRODUODENOSCOPY (EGD); Surgeon: Shayan Garcia MD;  [facility]; Service:     EYE SURGERY Right     Cataract    KNEE SURGERY      [place] EDG US EXAM SURGICAL ALTER STOM DUODENUM/JEJUNUM N/A 4/9/2018    Procedure: LINEAR ENDOSCOPIC U/S;  Surgeon: Valerio Leslie MD;  [facility];   Service: Gastroenterology    [place] EXC PAROTD,LAT LOBE,DISSECT 5TH NERV Right 8/8/2018    Procedure: PAROTIDECTOMY WITH FACIAL NERVE MONITOR AND FROZEN SECTION;  Surgeon: Arnoldo Lira MD;  [facility];  Service: ENT    RADICAL NECK DISSECTION N/A 8/8/2018    Procedure: SELECTIVE NECK DISSECTION;  Surgeon: Arnoldo Lira MD;  [facility];  Service: ENT    REMOVAL OF IMPACTED TOOTH - COMPLETELY BONY N/A 8/8/2018    Procedure: EXTRACTION TEETH #15 and #30;  Surgeon: Diann Poon DDS;  [facility];  Service: Maxillofacial    UPPER GASTROINTESTINAL ENDOSCOPY      VEIN LIGATION AND STRIPPING         Current Outpatient Medications   Medication Sig Dispense Refill    amLODIPine (NORVASC) 5 mg tablet Take 1 tablet by mouth once daily 90 tablet 0    LORazepam (ATIVAN) 0 5 mg tablet Take 1 tablet (0 5 mg total) by mouth 3 (three) times a day as needed for anxiety To be filled on or after 2/23/21 90 tablet 3    Melatonin 10 MG TABS Take by mouth      Multiple Vitamins-Minerals (MULTI FOR HIM 50+ PO) Take 1 tablet by mouth daily      OLANZapine (ZyPREXA) 10 mg tablet Take 1 tablet (10 mg total) by mouth daily at bedtime 90 tablet 2    sertraline (ZOLOFT) 100 mg tablet Take 1 tablet (100 mg total) by mouth daily 90 tablet 2    warfarin (COUMADIN) 2 mg tablet Take 1 tablet by mouth once daily 90 tablet 0     No current facility-administered medications for this visit  Allergies   Allergen Reactions    Duloxetine Other (See Comments)     Panic attacks    Other      Seasonal       Review of Systems   Psychiatric/Behavioral: The patient is nervous/anxious  There were no vitals filed for this visit  I spent 20 minutes directly with the patient during this visit from 1:00PM-1:20PM  He presents as mildly anxious but is pleasant, verbal, cooperative and oriented during session  VIRTUAL VISIT DISCLAIMER    Fernando Guevara acknowledges that he has consented to an online visit or consultation  He understands that the online visit is based solely on information provided by him, and that, in the absence of a face-to-face physical evaluation by the physician, the diagnosis he receives is both limited and provisional in terms of accuracy and completeness  This is not intended to replace a full medical face-to-face evaluation by the physician  Fernando Gueavra understands and accepts these terms

## 2021-04-07 NOTE — PATIENT INSTRUCTIONS
Processed stressors related to home- supportive therapy provided  Reviewed stress mgmt, relaxation strategies and outlets to utilize to better manage any anxiety

## 2021-04-08 ENCOUNTER — APPOINTMENT (OUTPATIENT)
Dept: LAB | Facility: CLINIC | Age: 76
End: 2021-04-08
Payer: MEDICARE

## 2021-04-09 ENCOUNTER — ANTICOAG VISIT (OUTPATIENT)
Dept: INTERNAL MEDICINE CLINIC | Facility: CLINIC | Age: 76
End: 2021-04-09

## 2021-04-14 ENCOUNTER — OFFICE VISIT (OUTPATIENT)
Dept: GASTROENTEROLOGY | Facility: AMBULARY SURGERY CENTER | Age: 76
End: 2021-04-14
Payer: MEDICARE

## 2021-04-14 VITALS
SYSTOLIC BLOOD PRESSURE: 140 MMHG | WEIGHT: 137.4 LBS | DIASTOLIC BLOOD PRESSURE: 90 MMHG | HEIGHT: 65 IN | BODY MASS INDEX: 22.89 KG/M2

## 2021-04-14 DIAGNOSIS — R10.13 EPIGASTRIC PAIN: ICD-10-CM

## 2021-04-14 DIAGNOSIS — K50.00 CROHN'S DISEASE OF SMALL INTESTINE WITHOUT COMPLICATION (HCC): Primary | Chronic | ICD-10-CM

## 2021-04-14 DIAGNOSIS — F50.9 EATING DISORDER, UNSPECIFIED TYPE: Chronic | ICD-10-CM

## 2021-04-14 DIAGNOSIS — K86.2 PANCREATIC CYST: ICD-10-CM

## 2021-04-14 PROCEDURE — 99213 OFFICE O/P EST LOW 20 MIN: CPT | Performed by: INTERNAL MEDICINE

## 2021-04-14 RX ORDER — OMEPRAZOLE 40 MG/1
40 CAPSULE, DELAYED RELEASE ORAL DAILY
Qty: 30 CAPSULE | Refills: 6 | Status: SHIPPED | OUTPATIENT
Start: 2021-04-14 | End: 2021-10-28 | Stop reason: SDUPTHER

## 2021-04-14 NOTE — PROGRESS NOTES
[facility]  Karime France 77 y o  male MRN: [id number]            Assessment & Plan:     28-year-old gentle with significant generalized anxiety disorder, history of Crohn's disease status post total proctocolectomy with end ileostomy, severe anxiety and continue generalized abdominal discomfort and pain  1  Poor appetite: Secondary to severe anxiety, he has had extensive workup from a GI standpoint   - he is worried that food will interfere with this Coumadin, that foods will cause obstructive process  -tried to reassure the patient this was not a concern at this time   -encouraged him to advance his full liquid diet    2  Generalized abdominal pain: suspected visceral hypersensitivity   - had recommended amitriptyline in the past however it was not started  - he is currently on Zoloft and Zyprexa  -continue to follow closely with the therapist and psychiatrist  - will place the patient back on PPI therapy, had some mild gastritis on  previous endoscopic examination              _____________________________________________________________        CC: Follow-up    HPI:  Karime France is a 77 y o male who is here for  Follow-up  This is a 28-year-old gentle with severe generalized anxiety, remote history of Crohn's disease status post total proctocolectomy with end ileostomy  Patient has had intermittent vague abdominal pain for a long time, had mild gastritis, he had a normal biliary workup, normal HIDA scan, normal gastric emptying scan, normal CT scan, MRI with small 2 mm pancreatic duct cyst   Patient reports that he has intolerance to many foods, continues to have vague abdominal pain tender to palpation  Reports regular stool output through his ileostomy  His diet is currently restricted to froze you were, sugar cookies, water, supplements shakes such as boost and Ensure    Patient reports that he avoid screens could they will interfere with his Coumadin levels, he avoids roughage and vegetables because he is afraid of developing an obstruction  His weight has been stable  Continues to walk excessively  Patient follows with his psychiatrist and therapist as well  ROS:  The remainder of the ROS was negative except for the pertinent positives mentioned in HPI        Allergies: Duloxetine and Other    Medications:   Current Outpatient Medications:     amLODIPine (NORVASC) 5 mg tablet, Take 1 tablet by mouth once daily, Disp: 90 tablet, Rfl: 0    LORazepam (ATIVAN) 0 5 mg tablet, Take 1 tablet (0 5 mg total) by mouth 3 (three) times a day as needed for anxiety To be filled on or after 2/23/21, Disp: 90 tablet, Rfl: 3    Melatonin 10 MG TABS, Take by mouth, Disp: , Rfl:     Multiple Vitamins-Minerals (MULTI FOR HIM 50+ PO), Take 1 tablet by mouth daily, Disp: , Rfl:     OLANZapine (ZyPREXA) 10 mg tablet, Take 1 tablet (10 mg total) by mouth daily at bedtime, Disp: 90 tablet, Rfl: 2    sertraline (ZOLOFT) 100 mg tablet, Take 1 tablet (100 mg total) by mouth daily, Disp: 90 tablet, Rfl: 2    warfarin (COUMADIN) 2 mg tablet, Take 1 tablet by mouth once daily, Disp: 90 tablet, Rfl: 0    omeprazole (PriLOSEC) 40 MG capsule, Take 1 capsule (40 mg total) by mouth daily, Disp: 30 capsule, Rfl: 6    Past Medical History:   Diagnosis Date    Allergic rhinitis     Anosmia     Anxiety     Benign parotid tumor     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     [facility]'s disease [facility]     Depression     Dilated pancreatic duct     DVT (deep venous thrombosis) (HCC)     GERD (gastroesophageal reflux disease)     Hearing loss     [place] (hard of hearing)     no hearing aids    Hypertension     Leucocytosis     Mass in neck     Nail anomaly     Polyuria     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HCC)     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    Recurrent falls     Solar lentigo     Thrombocytopenia (HCC)     Vertigo     Vision loss of left eye        Past Surgical History:   Procedure Laterality Date  COLON SURGERY      Partial colectomy and colostomy    COLONOSCOPY      ESOPHAGOGASTRODUODENOSCOPY N/A 4/5/2017    Procedure: ESOPHAGOGASTRODUODENOSCOPY (EGD); Surgeon: Dagmar Crowell MD;  [facility]; Service:     EYE SURGERY Right     Cataract    KNEE SURGERY      MO EDG US EXAM SURGICAL ALTER STOM DUODENUM/JEJUNUM N/A 4/9/2018    Procedure: LINEAR ENDOSCOPIC U/S;  Surgeon: Melida Moreno MD;  [facility]; Service: Gastroenterology    MO EXC PAROTD,LAT LOBE,DISSECT 5TH NERV Right 8/8/2018    Procedure: PAROTIDECTOMY WITH FACIAL NERVE MONITOR AND FROZEN SECTION;  Surgeon: Wilmar Wyatt MD;  [facility];  Service: ENT    RADICAL NECK DISSECTION N/A 8/8/2018    Procedure: SELECTIVE NECK DISSECTION;  Surgeon: Wilmar Wyatt MD;  [facility];  Service: ENT    REMOVAL OF IMPACTED TOOTH - COMPLETELY BONY N/A 8/8/2018    Procedure: EXTRACTION TEETH #15 and #30;  Surgeon: Elizabeth Gilbert DDS;  [facility];  Service: Maxillofacial    UPPER GASTROINTESTINAL ENDOSCOPY      VEIN LIGATION AND [address] [facility]         Family History   Problem Relation Age of Onset    Heart disease Brother     Depression Brother     Alcohol abuse Brother     Diverticulitis Mother     Drug abuse Mother     Depression Sister     Anxiety disorder Sister     Depression Sister     Anxiety disorder Sister     Mental illness Other     Alcohol abuse Other     Drug abuse Other     Completed Suicide  Other         reports that he quit smoking about 39 years ago  His smoking use included cigarettes  He has a 10 00 pack-year smoking history  He has never used smokeless tobacco  He reports that he does not drink alcohol or use drugs        Physical Exam:    /90 (BP Location: Left arm, Patient Position: Sitting, Cuff Size: Standard)   Ht 5' 5" (1 651 m)   Wt 62 3 kg (137 lb 6 4 oz)   BMI 22 86 kg/m²     Gen: wn/wd, NAD , elderly male, appears anxious  HEENT: anicteric, MMM, no cervical LAD  CVS: RRR, no m/r/g  CHEST: CTA b/l  ABD: +BS, soft, NT,ND, no hepatosplenomegaly, end ileostomy  EXT: no c/c/e  NEURO: aaox3  SKIN: NO rashes

## 2021-04-14 NOTE — LETTER
April 14, 2021     Reece Tate  [address] [facility] [address]    Patient: Moira Humphreys   YOB: 1945   Date of Visit: 4/14/2021       Dear Dr Eddie Hall: Thank you for referring Brenden Brennan to me for evaluation  Below are my notes for this consultation  If you have questions, please do not hesitate to call me  I look forward to following your patient along with you  Sincerely,        Anthony Wolff MD        CC: No Recipients  Anthony Wolff MD  4/14/2021  1:35 PM  Sign when Signing Visit  [facility]  Moira Humphreys 77 y o  male MRN: [id number]            Assessment & Plan:     60-year-old gentle with significant generalized anxiety disorder, history of Crohn's disease status post total proctocolectomy with end ileostomy, severe anxiety and continue generalized abdominal discomfort and pain  1  Poor appetite: Secondary to severe anxiety, he has had extensive workup from a GI standpoint   - he is worried that food will interfere with this Coumadin, that foods will cause obstructive process  -tried to reassure the patient this was not a concern at this time   -encouraged him to advance his full liquid diet    2  Generalized abdominal pain: suspected visceral hypersensitivity   - had recommended amitriptyline in the past however it was not started  - he is currently on Zoloft and Zyprexa  -continue to follow closely with the therapist and psychiatrist  - will place the patient back on PPI therapy, had some mild gastritis on  previous endoscopic examination              _____________________________________________________________        CC: Follow-up    HPI:  Moira Humphreys is a 77 y o male who is here for  Follow-up  This is a 60-year-old gentle with severe generalized anxiety, remote history of Crohn's disease status post total proctocolectomy with end ileostomy    Patient has had intermittent vague abdominal pain for a long time, had mild gastritis, he had a normal biliary workup, normal HIDA scan, normal gastric emptying scan, normal CT scan, MRI with small 2 mm pancreatic duct cyst   Patient reports that he has intolerance to many foods, continues to have vague abdominal pain tender to palpation  Reports regular stool output through his ileostomy  His diet is currently restricted to froze you were, sugar cookies, water, supplements shakes such as boost and Ensure  Patient reports that he avoid screens could they will interfere with his Coumadin levels, he avoids roughage and vegetables because he is afraid of developing an obstruction  His weight has been stable  Continues to walk excessively  Patient follows with his psychiatrist and therapist as well  ROS:  The remainder of the ROS was negative except for the pertinent positives mentioned in HPI        Allergies: Duloxetine and Other    Medications:   Current Outpatient Medications:     amLODIPine (NORVASC) 5 mg tablet, Take 1 tablet by mouth once daily, Disp: 90 tablet, Rfl: 0    LORazepam (ATIVAN) 0 5 mg tablet, Take 1 tablet (0 5 mg total) by mouth 3 (three) times a day as needed for anxiety To be filled on or after 2/23/21, Disp: 90 tablet, Rfl: 3    Melatonin 10 MG TABS, Take by mouth, Disp: , Rfl:     Multiple Vitamins-Minerals (MULTI FOR HIM 50+ PO), Take 1 tablet by mouth daily, Disp: , Rfl:     OLANZapine (ZyPREXA) 10 mg tablet, Take 1 tablet (10 mg total) by mouth daily at bedtime, Disp: 90 tablet, Rfl: 2    sertraline (ZOLOFT) 100 mg tablet, Take 1 tablet (100 mg total) by mouth daily, Disp: 90 tablet, Rfl: 2    warfarin (COUMADIN) 2 mg tablet, Take 1 tablet by mouth once daily, Disp: 90 tablet, Rfl: 0    omeprazole (PriLOSEC) 40 MG capsule, Take 1 capsule (40 mg total) by mouth daily, Disp: 30 capsule, Rfl: 6    Past Medical History:   Diagnosis Date    Allergic rhinitis     Anosmia     Anxiety     Benign parotid tumor     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)     [facility]     Depression     Dilated pancreatic duct     DVT (deep venous thrombosis) (HCC)     GERD (gastroesophageal reflux disease)     Hearing loss     [place] (hard of hearing)     no hearing aids    Hypertension     Leucocytosis     Mass in neck     Nail anomaly     Polyuria     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HCC)     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    Recurrent falls     Solar lentigo     Thrombocytopenia (HCC)     Vertigo     Vision loss of left eye        Past Surgical History:   Procedure Laterality Date    COLON SURGERY      Partial colectomy and colostomy    COLONOSCOPY      ESOPHAGOGASTRODUODENOSCOPY N/A 4/5/2017    Procedure: ESOPHAGOGASTRODUODENOSCOPY (EGD); Surgeon: Wilma Archer MD;  [facility]; Service:     EYE SURGERY Right     Cataract    KNEE SURGERY      NE EDG US EXAM SURGICAL ALTER STOM DUODENUM/JEJUNUM N/A 4/9/2018    Procedure: LINEAR ENDOSCOPIC U/S;  Surgeon: Jac Louise MD;  [facility];   Service: Gastroenterology    NE EXC PAROTD,LAT LOBE,DISSECT 5TH NERV Right 8/8/2018    Procedure: PAROTIDECTOMY WITH FACIAL NERVE MONITOR AND FROZEN SECTION;  Surgeon: Chichi Beal MD;  [facility];  Service: ENT    RADICAL NECK DISSECTION N/A 8/8/2018    Procedure: SELECTIVE NECK DISSECTION;  Surgeon: Chichi Beal MD;  [facility];  Service: ENT    REMOVAL OF IMPACTED TOOTH - COMPLETELY BONY N/A 8/8/2018    Procedure: EXTRACTION TEETH #15 and #30;  Surgeon: Jagdeep Quiroga DDS;  [facility];  Service: Maxillofacial    UPPER GASTROINTESTINAL ENDOSCOPY      VEIN LIGATION AND [address] [facility]         Family History   Problem Relation Age of Onset    Heart disease Brother     Depression Brother     Alcohol abuse Brother     Diverticulitis Mother     Drug abuse Mother     Depression Sister     Anxiety disorder Sister     Depression Sister     Anxiety disorder Sister     Mental illness Other     Alcohol abuse Other     Drug abuse Other     Completed Suicide  Other         reports that he quit smoking about 39 years ago  His smoking use included cigarettes  He has a 10 00 pack-year smoking history  He has never used smokeless tobacco  He reports that he does not drink alcohol or use drugs        Physical Exam:    /90 (BP Location: Left arm, Patient Position: Sitting, Cuff Size: Standard)   Ht 5' 5" (1 651 m)   Wt 62 3 kg (137 lb 6 4 oz)   BMI 22 86 kg/m²     Gen: wn/wd, NAD , elderly male, appears anxious  HEENT: anicteric, MMM, no cervical LAD  CVS: RRR, no m/r/g  CHEST: CTA b/l  ABD: +BS, soft, NT,ND, no hepatosplenomegaly, end ileostomy  EXT: no c/c/e  NEURO: aaox3  SKIN: NO rashes

## 2021-04-21 ENCOUNTER — TRANSCRIBE ORDERS (OUTPATIENT)
Dept: LAB | Facility: CLINIC | Age: 76
End: 2021-04-21

## 2021-04-21 ENCOUNTER — ANTICOAG VISIT (OUTPATIENT)
Dept: INTERNAL MEDICINE CLINIC | Facility: CLINIC | Age: 76
End: 2021-04-21

## 2021-04-21 ENCOUNTER — APPOINTMENT (OUTPATIENT)
Dept: LAB | Facility: CLINIC | Age: 76
End: 2021-04-21
Payer: MEDICARE

## 2021-04-21 DIAGNOSIS — K50.00 CROHN'S DISEASE OF SMALL INTESTINE WITHOUT COMPLICATION (HCC): Chronic | ICD-10-CM

## 2021-04-21 LAB
ALBUMIN SERPL BCP-MCNC: 3.5 G/DL (ref 3.5–5)
ALP SERPL-CCNC: 64 U/L (ref 46–116)
ALT SERPL W P-5'-P-CCNC: 16 U/L (ref 12–78)
ANION GAP SERPL CALCULATED.3IONS-SCNC: 6 MMOL/L (ref 4–13)
AST SERPL W P-5'-P-CCNC: 19 U/L (ref 5–45)
BASOPHILS # BLD AUTO: 0.02 THOUSANDS/ΜL (ref 0–0.1)
BASOPHILS NFR BLD AUTO: 0 % (ref 0–1)
BILIRUB DIRECT SERPL-MCNC: 0.23 MG/DL (ref 0–0.2)
BILIRUB SERPL-MCNC: 1.07 MG/DL (ref 0.2–1)
BUN SERPL-MCNC: 12 MG/DL (ref 5–25)
CALCIUM SERPL-MCNC: 8.3 MG/DL (ref 8.3–10.1)
CHLORIDE SERPL-SCNC: 88 MMOL/L (ref 100–108)
CO2 SERPL-SCNC: 25 MMOL/L (ref 21–32)
CREAT SERPL-MCNC: 0.8 MG/DL (ref 0.6–1.3)
CRP SERPL QL: <3 MG/L
EOSINOPHIL # BLD AUTO: 0.09 THOUSAND/ΜL (ref 0–0.61)
EOSINOPHIL NFR BLD AUTO: 2 % (ref 0–6)
ERYTHROCYTE [DISTWIDTH] IN BLOOD BY AUTOMATED COUNT: 13.5 % (ref 11.6–15.1)
GFR SERPL CREATININE-BSD FRML MDRD: 88 ML/MIN/1.73SQ M
GLUCOSE P FAST SERPL-MCNC: 93 MG/DL (ref 65–99)
HCT VFR BLD AUTO: 41.3 % (ref 36.5–49.3)
HGB BLD-MCNC: 13.8 G/DL (ref 12–17)
IMM GRANULOCYTES # BLD AUTO: 0.01 THOUSAND/UL (ref 0–0.2)
IMM GRANULOCYTES NFR BLD AUTO: 0 % (ref 0–2)
LYMPHOCYTES # BLD AUTO: 1.13 THOUSANDS/ΜL (ref 0.6–4.47)
LYMPHOCYTES NFR BLD AUTO: 21 % (ref 14–44)
MCH RBC QN AUTO: 30.2 PG (ref 26.8–34.3)
MCHC RBC AUTO-ENTMCNC: 33.4 G/DL (ref 31.4–37.4)
MCV RBC AUTO: 90 FL (ref 82–98)
MONOCYTES # BLD AUTO: 0.47 THOUSAND/ΜL (ref 0.17–1.22)
MONOCYTES NFR BLD AUTO: 9 % (ref 4–12)
NEUTROPHILS # BLD AUTO: 3.61 THOUSANDS/ΜL (ref 1.85–7.62)
NEUTS SEG NFR BLD AUTO: 68 % (ref 43–75)
NRBC BLD AUTO-RTO: 0 /100 WBCS
PLATELET # BLD AUTO: 165 THOUSANDS/UL (ref 149–390)
PMV BLD AUTO: 10.3 FL (ref 8.9–12.7)
POTASSIUM SERPL-SCNC: 3.1 MMOL/L (ref 3.5–5.3)
PROT SERPL-MCNC: 6.5 G/DL (ref 6.4–8.2)
RBC # BLD AUTO: 4.57 MILLION/UL (ref 3.88–5.62)
SODIUM SERPL-SCNC: 119 MMOL/L (ref 136–145)
WBC # BLD AUTO: 5.33 THOUSAND/UL (ref 4.31–10.16)

## 2021-04-21 PROCEDURE — 86140 C-REACTIVE PROTEIN: CPT

## 2021-04-21 PROCEDURE — 85025 COMPLETE CBC W/AUTO DIFF WBC: CPT

## 2021-04-21 PROCEDURE — 80048 BASIC METABOLIC PNL TOTAL CA: CPT

## 2021-04-21 PROCEDURE — 80076 HEPATIC FUNCTION PANEL: CPT

## 2021-04-26 DIAGNOSIS — K50.00 CROHN'S DISEASE OF SMALL INTESTINE WITHOUT COMPLICATION (HCC): Primary | Chronic | ICD-10-CM

## 2021-04-27 ENCOUNTER — APPOINTMENT (OUTPATIENT)
Dept: LAB | Facility: CLINIC | Age: 76
End: 2021-04-27
Payer: MEDICARE

## 2021-04-27 DIAGNOSIS — K50.00 CROHN'S DISEASE OF SMALL INTESTINE WITHOUT COMPLICATION (HCC): Chronic | ICD-10-CM

## 2021-04-27 LAB
ANION GAP SERPL CALCULATED.3IONS-SCNC: 8 MMOL/L (ref 4–13)
BUN SERPL-MCNC: 18 MG/DL (ref 5–25)
CALCIUM SERPL-MCNC: 8.7 MG/DL (ref 8.3–10.1)
CHLORIDE SERPL-SCNC: 108 MMOL/L (ref 100–108)
CO2 SERPL-SCNC: 27 MMOL/L (ref 21–32)
CREAT SERPL-MCNC: 0.92 MG/DL (ref 0.6–1.3)
GFR SERPL CREATININE-BSD FRML MDRD: 82 ML/MIN/1.73SQ M
GLUCOSE P FAST SERPL-MCNC: 96 MG/DL (ref 65–99)
POTASSIUM SERPL-SCNC: 3.8 MMOL/L (ref 3.5–5.3)
SODIUM SERPL-SCNC: 143 MMOL/L (ref 136–145)

## 2021-04-27 PROCEDURE — 80048 BASIC METABOLIC PNL TOTAL CA: CPT

## 2021-04-28 ENCOUNTER — SOCIAL WORK (OUTPATIENT)
Dept: BEHAVIORAL/MENTAL HEALTH CLINIC | Facility: CLINIC | Age: 76
End: 2021-04-28
Payer: MEDICARE

## 2021-04-28 DIAGNOSIS — F33.2 MAJOR DEPRESSIVE DISORDER, RECURRENT SEVERE WITHOUT PSYCHOTIC FEATURES (HCC): Primary | ICD-10-CM

## 2021-04-28 PROCEDURE — 90832 PSYTX W PT 30 MINUTES: CPT | Performed by: SOCIAL WORKER

## 2021-04-29 ENCOUNTER — TELEPHONE (OUTPATIENT)
Dept: GASTROENTEROLOGY | Facility: AMBULARY SURGERY CENTER | Age: 76
End: 2021-04-29

## 2021-04-29 NOTE — PSYCH
Assessment/Plan: Manage mood issues     There are no diagnoses linked to this encounter  Subjective: Ale Knowles denies any acute issues  Reports periods of anxiety from no particular stressors or triggers  He has more somatic complaints during session and complains of feeling dizzy at times  Likely related to anxiety  enies acute depressive symptoms  Walking daily to manage stress  Patient ID: Omar Mott is a 77 y o  male  Met with Ale Knowles for 30 minutes from 1:00PM-1:30PM  Continues to detail ongoing issues with anxiety  Stress at home persists  Financial issues and lack of supports contributes  Review of Systems   Psychiatric/Behavioral: The patient is nervous/anxious  Objective: Ale Knowles presents as mildly anxious but is pleasant, verbal, cooperative and engaged during session  Physical Exam  Psychiatric:         Attention and Perception: Attention and perception normal          Mood and Affect: Affect normal  Mood is anxious  Speech: Speech normal          Behavior: Behavior normal  Behavior is cooperative  Thought Content:  Thought content normal          Cognition and Memory: Cognition and memory normal          Judgment: Judgment normal

## 2021-04-29 NOTE — TELEPHONE ENCOUNTER
----- Message from Ramesh Luo MD sent at 4/28/2021  2:38 PM EDT -----  Please inform the patient that laboratory studies are completely normal which is good news  I suspect that the blood work from last week was incorrect for some reason

## 2021-04-29 NOTE — PATIENT INSTRUCTIONS
Processed stressors during session- validation ans supportive therapy provided  Reviewed stress mgmt strategies and appropriate outlets to utilize in response

## 2021-04-30 DIAGNOSIS — O22.30 DVT (DEEP VEIN THROMBOSIS) IN PREGNANCY: ICD-10-CM

## 2021-04-30 DIAGNOSIS — I10 ESSENTIAL HYPERTENSION: ICD-10-CM

## 2021-04-30 RX ORDER — AMLODIPINE BESYLATE 5 MG/1
TABLET ORAL
Qty: 90 TABLET | Refills: 0 | Status: SHIPPED | OUTPATIENT
Start: 2021-04-30 | End: 2021-08-03

## 2021-04-30 RX ORDER — WARFARIN SODIUM 2 MG/1
TABLET ORAL
Qty: 90 TABLET | Refills: 0 | Status: SHIPPED | OUTPATIENT
Start: 2021-04-30 | End: 2021-08-03

## 2021-05-12 ENCOUNTER — ANTICOAG VISIT (OUTPATIENT)
Dept: INTERNAL MEDICINE CLINIC | Facility: CLINIC | Age: 76
End: 2021-05-12

## 2021-05-12 ENCOUNTER — APPOINTMENT (OUTPATIENT)
Dept: LAB | Facility: CLINIC | Age: 76
End: 2021-05-12
Payer: MEDICARE

## 2021-05-19 ENCOUNTER — SOCIAL WORK (OUTPATIENT)
Dept: BEHAVIORAL/MENTAL HEALTH CLINIC | Facility: CLINIC | Age: 76
End: 2021-05-19
Payer: MEDICARE

## 2021-05-19 DIAGNOSIS — F33.2 MAJOR DEPRESSIVE DISORDER, RECURRENT SEVERE WITHOUT PSYCHOTIC FEATURES (HCC): Primary | ICD-10-CM

## 2021-05-19 PROCEDURE — 90832 PSYTX W PT 30 MINUTES: CPT | Performed by: SOCIAL WORKER

## 2021-05-19 NOTE — PATIENT INSTRUCTIONS
Processed recent conflict with spouse- supportive therapy provided  Reviewed communication strategies and boundaries to utilize moving forward in an effort to reduce potential conflict  Reviewed stress mgmt/reduction strategies and productive outlets to utilize moving forward

## 2021-05-19 NOTE — PSYCH
Assessment/Plan: Manage anxiety and depression     There are no diagnoses linked to this encounter  Subjective: Bro Oneill presents as noticeably more anxious  Upon review with him, it stems from argument he had with his wife two days ago  It continues to resonate and he is more anxious, tremulous and preoccupied than in previous sessions  Continues to also nguyen periods of depression and states living with colostomy bag a major factor  He has been walking and sitting outside on a more frequent basis and this has some benefit to lessen stress and anxiety  Denies any SI or HI  Patient ID: Walt Kerr is a 77 y o  male  Met with Bro Oneill for 30 minutes from 1:00PM-1:30PM Presents as more anxious and tremulous  Review of Systems   Psychiatric/Behavioral: The patient is nervous/anxious  Objective: Bro Oneill presents as more anxious but is verbal, cooperative, well oriented and engaged during session  Physical Exam  Psychiatric:         Attention and Perception: Attention and perception normal          Mood and Affect: Affect normal  Mood is anxious  Speech: Speech normal          Behavior: Behavior normal  Behavior is cooperative  Thought Content:  Thought content normal          Cognition and Memory: Cognition and memory normal          Judgment: Judgment normal
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
## 2021-06-02 ENCOUNTER — OFFICE VISIT (OUTPATIENT)
Dept: PSYCHIATRY | Facility: CLINIC | Age: 76
End: 2021-06-02
Payer: MEDICARE

## 2021-06-02 VITALS — HEIGHT: 66 IN | WEIGHT: 130 LBS | BODY MASS INDEX: 20.89 KG/M2

## 2021-06-02 DIAGNOSIS — F50.9 EATING DISORDER, UNSPECIFIED TYPE: Chronic | ICD-10-CM

## 2021-06-02 DIAGNOSIS — F41.1 GAD (GENERALIZED ANXIETY DISORDER): Chronic | ICD-10-CM

## 2021-06-02 DIAGNOSIS — Z79.899 LONG-TERM USE OF HIGH-RISK MEDICATION: Chronic | ICD-10-CM

## 2021-06-02 DIAGNOSIS — F33.2 MAJOR DEPRESSIVE DISORDER, RECURRENT, SEVERE WITHOUT PSYCHOTIC FEATURES (HCC): Primary | Chronic | ICD-10-CM

## 2021-06-02 DIAGNOSIS — F41.0 PANIC DISORDER WITHOUT AGORAPHOBIA: Chronic | ICD-10-CM

## 2021-06-02 DIAGNOSIS — G47.09 OTHER INSOMNIA: Chronic | ICD-10-CM

## 2021-06-02 PROCEDURE — 99214 OFFICE O/P EST MOD 30 MIN: CPT | Performed by: PSYCHIATRY & NEUROLOGY

## 2021-06-02 PROCEDURE — 90833 PSYTX W PT W E/M 30 MIN: CPT | Performed by: PSYCHIATRY & NEUROLOGY

## 2021-06-02 RX ORDER — OLANZAPINE 15 MG/1
15 TABLET ORAL
Qty: 90 TABLET | Refills: 2 | Status: SHIPPED | OUTPATIENT
Start: 2021-06-02 | End: 2021-11-04 | Stop reason: SINTOL

## 2021-06-02 RX ORDER — SERTRALINE HYDROCHLORIDE 100 MG/1
100 TABLET, FILM COATED ORAL DAILY
Qty: 90 TABLET | Refills: 2
Start: 2021-06-02 | End: 2022-01-04 | Stop reason: SDUPTHER

## 2021-06-02 RX ORDER — LORAZEPAM 0.5 MG/1
0.5 TABLET ORAL 3 TIMES DAILY PRN
Qty: 90 TABLET | Refills: 3 | Status: SHIPPED | OUTPATIENT
Start: 2021-06-23 | End: 2021-09-09 | Stop reason: SDUPTHER

## 2021-06-09 ENCOUNTER — APPOINTMENT (OUTPATIENT)
Dept: LAB | Facility: CLINIC | Age: 76
End: 2021-06-09
Payer: MEDICARE

## 2021-06-09 ENCOUNTER — ANTICOAG VISIT (OUTPATIENT)
Dept: INTERNAL MEDICINE CLINIC | Facility: CLINIC | Age: 76
End: 2021-06-09

## 2021-06-09 ENCOUNTER — TELEMEDICINE (OUTPATIENT)
Dept: BEHAVIORAL/MENTAL HEALTH CLINIC | Facility: CLINIC | Age: 76
End: 2021-06-09
Payer: MEDICARE

## 2021-06-09 DIAGNOSIS — F33.2 MAJOR DEPRESSIVE DISORDER, RECURRENT SEVERE WITHOUT PSYCHOTIC FEATURES (HCC): Primary | ICD-10-CM

## 2021-06-09 PROCEDURE — 90832 PSYTX W PT 30 MINUTES: CPT | Performed by: SOCIAL WORKER

## 2021-06-09 NOTE — PSYCH
Virtual Brief Visit    Assessment/Plan:    Problem List Items Addressed This Visit     None      Visit Diagnoses     Major depressive disorder, recurrent severe without psychotic features ([facility] )    -  Primary                Reason for visit is No chief complaint on file  Encounter provider Javed Stokes    Provider located at [address] [address]    Recent Visits  No visits were found meeting these conditions  Showing recent visits within past 7 days and meeting all other requirements     Future Appointments  No visits were found meeting these conditions  Showing future appointments within next 150 days and meeting all other requirements        After connecting through telephone, the patient was identified by name and date of birth  Eric Doherty was informed that this is a telemedicine visit and that the visit is being conducted through telephone  My office door was closed  No one else was in the room  He acknowledged consent and understanding of privacy and security of the platform  The patient has agreed to participate and understands he can discontinue the visit at any time  Patient is aware this is a billable service  Subjective    Eric Doherty is a 77 y o  male who was unable to come into office for visit but had no transportation available  He continues to deal with consistent level of stress at home but has been managing  With no car, he and spouse together more and this has contributed to some additional stress  Denies any acute depressive symptoms  Less somatic in his complaints  No SI  Walking when weather allows    HPI     Past Medical History:   Diagnosis Date    Allergic rhinitis     Anosmia     Anxiety     Benign parotid tumor     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[facility])     [facility]     Depression     Dilated pancreatic duct     DVT (deep venous thrombosis) ([facility]     GERD (gastroesophageal reflux disease)     Hearing loss     [place] (hard of hearing[place]     no hearing aids    Hypertension     Leucocytosis     Mass in neck     Nail anomaly     Polyuria     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HCC)     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    Recurrent falls     Solar lentigo     Thrombocytopenia (HCC)     Vertigo     Vision loss of left eye        Past Surgical History:   Procedure Laterality Date    COLON SURGERY      Partial colectomy and colostomy    COLONOSCOPY      ESOPHAGOGASTRODUODENOSCOPY N/A 4/5/2017    Procedure: ESOPHAGOGASTRODUODENOSCOPY (EGD); Surgeon: Angie Ling MD;  [facility]; Service:     EYE SURGERY Right     Cataract    KNEE SURGERY      [place] EDG US EXAM SURGICAL ALTER STOM DUODENUM/JEJUNUM N/A 4/9/2018    Procedure: LINEAR ENDOSCOPIC U/S;  Surgeon: Frieda Diallo MD;  [facility];   Service: Gastroenterology    OH EXC PAROTD,LAT LOBE,DISSECT 5TH NERV Right 8/8/2018    Procedure: PAROTIDECTOMY WITH FACIAL NERVE MONITOR AND FROZEN SECTION;  Surgeon: Dafne Estrella MD;  [facility];  Service: ENT    RADICAL NECK DISSECTION N/A 8/8/2018    Procedure: SELECTIVE NECK DISSECTION;  Surgeon: Dafne Estrella MD;  [facility];  Service: ENT    REMOVAL OF IMPACTED TOOTH - COMPLETELY BONY N/A 8/8/2018    Procedure: EXTRACTION TEETH #15 and #30;  Surgeon: Thomas Tanner DDS;  [facility];  Service: Maxillofacial    UPPER GASTROINTESTINAL ENDOSCOPY      VEIN LIGATION AND STRIPPING         Current Outpatient Medications   Medication Sig Dispense Refill    amLODIPine (NORVASC) 5 mg tablet Take 1 tablet by mouth once daily 90 tablet 0    [START ON 6/23/2021] LORazepam (ATIVAN) 0 5 mg tablet Take 1 tablet (0 5 mg total) by mouth 3 (three) times a day as needed for anxiety To be filled on or after 6/23/21 90 tablet 3    Melatonin 10 MG TABS Take by mouth      Multiple Vitamins-Minerals (MULTI FOR HIM 50+ PO) Take 1 tablet by mouth daily      OLANZapine (ZyPREXA) 15 mg tablet Take 1 tablet (15 mg total) by mouth daily at bedtime 90 tablet 2    omeprazole (PriLOSEC) 40 MG capsule Take 1 capsule (40 mg total) by mouth daily 30 capsule 6    sertraline (ZOLOFT) 100 mg tablet Take 1 tablet (100 mg total) by mouth daily 90 tablet 2    warfarin (COUMADIN) 2 mg tablet Take 1 tablet by mouth once daily 90 tablet 0     No current facility-administered medications for this visit  Allergies   Allergen Reactions    Duloxetine Other (See Comments)     Panic attacks    Other      Seasonal       Review of Systems   Psychiatric/Behavioral: The patient is nervous/anxious  There were no vitals filed for this visit  I spent 20 minutes directly with the patient during this visit from 1:00PM-1:20PM  Vernon Leija presents as mildly anxious but is pleasant, verbal, cooperative during session  VIRTUAL VISIT DISCLAIMER    Clarita Hernández acknowledges that he has consented to an online visit or consultation  He understands that the online visit is based solely on information provided by him, and that, in the absence of a face-to-face physical evaluation by the physician, the diagnosis he receives is both limited and provisional in terms of accuracy and completeness  This is not intended to replace a full medical face-to-face evaluation by the physician  Clarita Hernández understands and accepts these terms

## 2021-06-09 NOTE — PATIENT INSTRUCTIONS
Processed stressors during session- validation and supportive therapy provided  Reviewed stress mgmt strategies and relaxation strategies to utilize at home when needed

## 2021-06-29 NOTE — BH TREATMENT PLAN
TREATMENT PLAN (Medication Management Only)        [facility]    Name/Date of Birth/MRN:  Ivana Porter 77 y o  1945 MRN: [id number]  Date of Treatment Plan: June 29, 2021  Diagnosis/Diagnoses:   1  Major depressive disorder, recurrent, severe without psychotic features ([facility] [address] )    2  JOSÉ LUIS (generalized anxiety disorder)    3  Panic disorder without agoraphobia    4  Eating disorder, unspecified type    5  Other insomnia    6  Long-term use of high-risk medication      Strengths/Personal Resources for Self-Care: supportive family  Area/Areas of need (in own words): anxiety, depression  1  Long Term Goal:   improve control of anxiety, improve control of depression  Target Date: 2 months - 8/29/2021  Person/Persons responsible for completion of goal: Lauri Hopson and wife  2  Short Term Objective (s) - How will we reach this goal?:   A  Provider new recommended medication/dosage changes and/or continue medication(s): increase Zyprexa, continue all other medications (Zoloft, Ativan and Melatonin)  B   N/A   C   N/A  Target Date: 2 months - 8/29/2021  Person/Persons Responsible for Completion of Goal: Lauri Hopson and wife   Progress Towards Goals: limited progress  Treatment Modality: medication management every 2 months, continue psychotherapy with own therapist  Review due 180 days from date of this plan: 6 months - 12/29/2021  Expected length of service: ongoing treatment unless revised  My Physician/PA/NP and I have developed this plan together and I agree to work on the goals and objectives  I understand the treatment goals that were developed for my treatment    Electronic Signatures: on file (unless signed below)    Santos Carrion MD 06/29/21

## 2021-07-02 ENCOUNTER — LAB (OUTPATIENT)
Dept: LAB | Facility: CLINIC | Age: 76
End: 2021-07-02
Payer: MEDICARE

## 2021-07-02 DIAGNOSIS — Z79.899 LONG-TERM USE OF HIGH-RISK MEDICATION: Chronic | ICD-10-CM

## 2021-07-02 DIAGNOSIS — F33.2 MAJOR DEPRESSIVE DISORDER, RECURRENT, SEVERE WITHOUT PSYCHOTIC FEATURES (HCC): Chronic | ICD-10-CM

## 2021-07-02 LAB
CHOLEST SERPL-MCNC: 189 MG/DL (ref 50–200)
HDLC SERPL-MCNC: 57 MG/DL
LDLC SERPL CALC-MCNC: 109 MG/DL (ref 0–100)
NONHDLC SERPL-MCNC: 132 MG/DL
TRIGL SERPL-MCNC: 114 MG/DL

## 2021-07-02 PROCEDURE — 80061 LIPID PANEL: CPT

## 2021-07-02 PROCEDURE — 83036 HEMOGLOBIN GLYCOSYLATED A1C: CPT

## 2021-07-02 PROCEDURE — 36415 COLL VENOUS BLD VENIPUNCTURE: CPT

## 2021-07-03 LAB
EST. AVERAGE GLUCOSE BLD GHB EST-MCNC: 97 MG/DL
HBA1C MFR BLD: 5 %

## 2021-07-07 ENCOUNTER — SOCIAL WORK (OUTPATIENT)
Dept: BEHAVIORAL/MENTAL HEALTH CLINIC | Facility: CLINIC | Age: 76
End: 2021-07-07
Payer: MEDICARE

## 2021-07-07 DIAGNOSIS — F41.1 GAD (GENERALIZED ANXIETY DISORDER): Primary | ICD-10-CM

## 2021-07-07 PROCEDURE — 90832 PSYTX W PT 30 MINUTES: CPT | Performed by: SOCIAL WORKER

## 2021-07-07 NOTE — PSYCH
Assessment/Plan: Manage anxiety and depression     There are no diagnoses linked to this encounter  Subjective: Terrence Bustamante denies any acute issues and appears to be less anxious than in previous sessions  Not tremulous during session and less somatically preoccupied  Affect appears brighter  Details continued marital stress and tension at home and he manages this the best he can  Denies any acute depressive symptoms  No SI  No HI  Patient ID: Jenniffer Alcaraz is a 77 y o  male  Met with Terrence Bustamante for 30 minutes from 12:55PM-1:25PM       Review of Systems   Psychiatric/Behavioral: The patient is nervous/anxious  Objective:  Terrence Bustamante presents as mildly anxious but is verbal, cooperative, oriented and engaged during session  Physical Exam  Psychiatric:         Attention and Perception: Attention and perception normal          Mood and Affect: Affect normal  Mood is anxious  Speech: Speech normal          Behavior: Behavior normal  Behavior is cooperative  Thought Content:  Thought content normal          Cognition and Memory: Cognition and memory normal          Judgment: Judgment normal

## 2021-07-07 NOTE — PATIENT INSTRUCTIONS
Processed stressors at home during session- validation and supportive therapy provided  Reviewed stress mgmt strategies and productive outlets to utilize to manage this stress  Reviewed communication strategies and boundaries to maintain within the relationship  **TREATMENT PLAN UPDATE PUT E-SIG PAD NOT WORKING   UNABLE TO SIGN

## 2021-07-07 NOTE — BH TREATMENT PLAN
[facility] Integration Treatment Plan    Karis Macedo  1945  Date of Treatment Plan: 7/7/21    Treatment Goals (after each item selected, indicate outcome measures; (ie: as evidenced by)    [] Reduce Risk Factors of:    [x] Reduce Major Symptoms of: anxiety and depression as evidenced by more stable mood   [] Ameliorate Functional Impairments of:    [x] Develop Coping Strategies to Address Stress of: stress and anxiety as evidenced by an increase in activity/interaction outside of home   [] Stabilize (short term) Crisis of:   [] Maintain (long term) Stabilization of Symptoms of   [] Medication Referral to:   [] Maintain Sobriety:     Planned Interventions- Patient Participation (must be consistent with treatment goals):    [] Assertiveness Training [] Problem Solving Skills Training   [] Anger Management [x] Solution Focused Techniques   [] Affect Identification and Expression [x] Stress Management   [] Cognitive Restructuring [x] Supportive Therapy   [] Communication Training  [] Self/Other [place] Training    [] Grief Work  [] Decision Options Exploration   [] Imagery/Relaxation Training [] Decision Option Exploration   [] Parent Training  [] Pattern Identification and Interruption       [] Engage Significant Others in Treatment:   [] Facilitate Decision Making Regarding:   [] Explore/Monitor:    [] Teach Skills of:    [] Educate Regarding:   [] Assign Readings:   [] Referrals Planned:   [] Use of Resources/Strengths:   [] Preventative Strategies:   [] Obstacles to Change:     Estimated Time Frames:     Goal 1: TBD   Goal 2: N/A    I have been provided education on my primary diagnosis of: JOSÉ LUIS    My therapist and I have developed this plan together, and I am in agreement to working on these issues and goals  I understand the plan that has been developed for my treatment  [x] Patient Declined copy of treatment plan

## 2021-07-09 ENCOUNTER — OFFICE VISIT (OUTPATIENT)
Dept: INTERNAL MEDICINE CLINIC | Facility: CLINIC | Age: 76
End: 2021-07-09
Payer: MEDICARE

## 2021-07-09 VITALS
HEART RATE: 81 BPM | OXYGEN SATURATION: 97 % | SYSTOLIC BLOOD PRESSURE: 145 MMHG | BODY MASS INDEX: 20.49 KG/M2 | DIASTOLIC BLOOD PRESSURE: 80 MMHG | HEIGHT: 65 IN | TEMPERATURE: 97.9 F | WEIGHT: 123 LBS

## 2021-07-09 DIAGNOSIS — Z12.12 SCREENING FOR COLORECTAL CANCER: Primary | ICD-10-CM

## 2021-07-09 DIAGNOSIS — R63.4 WEIGHT LOSS: ICD-10-CM

## 2021-07-09 DIAGNOSIS — R22.9 SKIN NODULE: ICD-10-CM
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
## 2021-07-09 DIAGNOSIS — Z12.11 SCREENING FOR COLORECTAL CANCER: Primary | ICD-10-CM

## 2021-07-09 DIAGNOSIS — Z00.00 MEDICARE ANNUAL WELLNESS VISIT, SUBSEQUENT: ICD-10-CM

## 2021-07-09 DIAGNOSIS — I82.449 ACUTE DEEP VEIN THROMBOSIS (DVT) OF TIBIAL VEIN, UNSPECIFIED LATERALITY (HCC): ICD-10-CM

## 2021-07-09 DIAGNOSIS — I83.93 VARICOSE VEINS OF BOTH LOWER EXTREMITIES, UNSPECIFIED WHETHER COMPLICATED: ICD-10-CM

## 2021-07-09 DIAGNOSIS — R25.1 TREMOR: ICD-10-CM

## 2021-07-09 PROCEDURE — 1123F ACP DISCUSS/DSCN MKR DOCD: CPT | Performed by: INTERNAL MEDICINE

## 2021-07-09 PROCEDURE — 99214 OFFICE O/P EST MOD 30 MIN: CPT | Performed by: INTERNAL MEDICINE

## 2021-07-09 PROCEDURE — G0438 PPPS, INITIAL VISIT: HCPCS | Performed by: INTERNAL MEDICINE
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
## 2021-07-09 NOTE — PATIENT INSTRUCTIONS
Medicare Preventive Visit Patient Instructions  Thank you for completing your Welcome to Medicare Visit or Medicare Annual Wellness Visit today  Your next wellness visit will be due in one year (7/10/2022)  The screening/preventive services that you may require over the next 5-10 years are detailed below  Some tests may not apply to you based off risk factors and/or age  Screening tests ordered at today's visit but not completed yet may show as past due  Also, please note that scanned in results may not display below  Preventive Screenings:  Service Recommendations Previous Testing/Comments   Colorectal Cancer Screening  · Colonoscopy    · Fecal Occult Blood Test (FOBT)/Fecal Immunochemical Test (FIT)  · Fecal DNA/Cologuard Test  · Flexible Sigmoidoscopy Age: 54-65 years old   Colonoscopy: every 10 years (May be performed more frequently if at higher risk)  OR  FOBT/FIT: every 1 year  OR  Cologuard: every 3 years  OR  Sigmoidoscopy: every 5 years  Screening may be recommended earlier than age 48 if at higher risk for colorectal cancer  Also, an individualized decision between you and your healthcare provider will decide whether screening between the ages of 74-80 would be appropriate  Colonoscopy: Not on file  FOBT/FIT: Not on file  Cologuard: Not on file  Sigmoidoscopy: Not on file          Prostate Cancer Screening Individualized decision between patient and health care provider in men between ages of 53-78   Medicare will cover every 12 months beginning on the day after your 50th birthday PSA: 1 3 ng/mL           Hepatitis C Screening Once for adults born between 1945 and 1965  More frequently in patients at high risk for Hepatitis C Hep C Antibody: 12/06/2018    Screening Current   Diabetes Screening 1-2 times per year if you're at risk for diabetes or have pre-diabetes Fasting glucose: 96 mg/dL   A1C: 5 0 %    Screening Current   Cholesterol Screening Once every 5 years if you don't have a lipid disorder  May order more often based on risk factors  Lipid panel: 07/02/2021    Screening Current      Other Preventive Screenings Covered by Medicare:  1  Abdominal Aortic Aneurysm (AAA) Screening: covered once if your at risk  You're considered to be at risk if you have a family history of AAA or a male between the age of 73-68 who smoking at least 100 cigarettes in your lifetime  2  Lung Cancer Screening: covers low dose CT scan once per year if you meet all of the following conditions: (1) Age 50-69; (2) No signs or symptoms of lung cancer; (3) Current smoker or have quit smoking within the last 15 years; (4) You have a tobacco smoking history of at least 30 pack years (packs per day x number of years you smoked); (5) You get a written order from a healthcare provider  3  Glaucoma Screening: covered annually if you're considered high risk: (1) You have diabetes OR (2) Family history of glaucoma OR (3)  aged 48 and older OR (3)  American aged 72 and older  3  Osteoporosis Screening: covered every 2 years if you meet one of the following conditions: (1) Have a vertebral abnormality; (2) On glucocorticoid therapy for more than 3 months; (3) Have primary hyperparathyroidism; (4) On osteoporosis medications and need to assess response to drug therapy  5  HIV Screening: covered annually if you're between the age of 12-76  Also covered annually if you are younger than 13 and older than 72 with risk factors for HIV infection  For pregnant patients, it is covered up to 3 times per pregnancy      Immunizations:  Immunization Recommendations   Influenza Vaccine Annual influenza vaccination during flu season is recommended for all persons aged >= 6 months who do not have contraindications   Pneumococcal Vaccine (Prevnar and Pneumovax)  * Prevnar = PCV13  * Pneumovax = PPSV23 Adults 25-60 years old: 1-3 doses may be recommended based on certain risk factors  Adults 72 years old: Prevnar (PCV13) vaccine recommended followed by Pneumovax (PPSV23) vaccine  If already received PPSV23 since turning 65, then PCV13 recommended at least one year after PPSV23 dose  Hepatitis B Vaccine 3 dose series if at intermediate or high risk (ex: diabetes, end stage renal disease, liver disease)   Tetanus (Td) Vaccine - COST NOT COVERED BY MEDICARE PART B Following completion of primary series, a booster dose should be given every 10 years to maintain immunity against tetanus  Td may also be given as tetanus wound prophylaxis  Tdap Vaccine - COST NOT COVERED BY MEDICARE PART B Recommended at least once for all adults  For pregnant patients, recommended with each pregnancy  Shingles Vaccine (Shingrix) - COST NOT COVERED BY MEDICARE PART B  2 shot series recommended in those aged 48 and above     Health Maintenance Due:      Topic Date Due    Colorectal Cancer Screening  Never done    Hepatitis C Screening  Completed     Immunizations Due:      Topic Date Due    Influenza Vaccine (1) 09/01/2021     Advance Directives   What are advance directives? Advance directives are legal documents that state your wishes and plans for medical care  These plans are made ahead of time in case you lose your ability to make decisions for yourself  Advance directives can apply to any medical decision, such as the treatments you want, and if you want to donate organs  What are the types of advance directives? There are many types of advance directives, and each state has rules about how to use them  You may choose a combination of any of the following:  · Living will: This is a written record of the treatment you want  You can also choose which treatments you do not want, which to limit, and which to stop at a certain time  This includes surgery, medicine, IV fluid, and tube feedings  · Durable power of  for healthcare [facility]):   This is a written record that states who you want to make healthcare choices for you when you are unable to make them for yourself  This person, called a proxy, is usually a family member or a friend  You may choose more than 1 proxy  · Do not resuscitate (DNR) order:  A DNR order is used in case your heart stops beating or you stop breathing  It is a request not to have certain forms of treatment, such as CPR  A DNR order may be included in other types of advance directives  · Medical directive: This covers the care that you want if you are in a coma, near death, or unable to make decisions for yourself  You can list the treatments you want for each condition  Treatment may include pain medicine, surgery, blood transfusions, dialysis, IV or tube feedings, and a ventilator (breathing machine)  · Values history: This document has questions about your views, beliefs, and how you feel and think about life  This information can help others choose the care that you would choose  Why are advance directives important? An advance directive helps you control your care  Although spoken wishes may be used, it is better to have your wishes written down  Spoken wishes can be misunderstood, or not followed  Treatments may be given even if you do not want them  An advance directive may make it easier for your family to make difficult choices about your care  Fall Prevention    Fall prevention  includes ways to make your home and other areas safer  It also includes ways you can move more carefully to prevent a fall  Health conditions that cause changes in your blood pressure, vision, or muscle strength and coordination may increase your risk for falls  Medicines may also increase your risk for falls if they make you dizzy, weak, or sleepy  Fall prevention tips:   · Stand or sit up slowly  · Use assistive devices as directed  · Wear shoes that fit well and have soles that   · Wear a personal alarm  · Stay active  · Manage your medical conditions      Home Safety Tips:  · Add items to prevent falls in the bathroom  · Keep paths clear  · Install bright lights in your home  · Keep items you use often on shelves within reach  · Paint or place reflective tape on the edges of your stairs  © Copyright [employer] 2018 Information is for End User's use only and may not be sold, redistributed or otherwise used for commercial purposes   All illustrations and images included in CareNotes® are the copyrighted property of A D A M , Inc  or [address] [place] [address]

## 2021-07-10 PROBLEM — I83.93 VARICOSE VEINS OF BOTH LOWER EXTREMITIES: Status: ACTIVE | Noted: 2021-07-10

## 2021-07-10 PROBLEM — R22.9 SKIN NODULE: Status: ACTIVE | Noted: 2021-07-10

## 2021-07-10 NOTE — ASSESSMENT & PLAN NOTE
He has had GI workup that was negative and his symptoms are related to anxiety is working with Psychiatry I would have him see a dietitian I have talked to him at length today about gaining weight increasing his caloric intake eating 3-5 meals per day increasing protein in his diet decreased exercise RTO in 2 months call if any problems

## 2021-07-10 NOTE — ASSESSMENT & PLAN NOTE
Assessment and plan 1  Medicare subsequent annual wellness examination overall the patient is clinically stable and doing well, we encouraged the patient to follow a healthy and balanced diet  We recommend that the patient exercise routinely approximately 30 minutes 5 times per week   We have reviewed the patient's vaccines and have made recommendations for updates if necessary    Flu vaccine in the fall     We will be ordering screening laboratories which are age appropriate  Return to the office in    3 months   call if any problems
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
## 2021-07-10 NOTE — ASSESSMENT & PLAN NOTE
He has developed nodularity of the pains in dilated veins rule out DVT will check venous Doppler bilateral lower extremities  Has a known history of DVT and currently on anticoagulation

## 2021-07-13 ENCOUNTER — TELEPHONE (OUTPATIENT)
Dept: INTERNAL MEDICINE CLINIC | Facility: CLINIC | Age: 76
End: 2021-07-13

## 2021-07-13 DIAGNOSIS — Z79.01 CURRENT USE OF LONG TERM ANTICOAGULATION: Primary | ICD-10-CM

## 2021-07-13 NOTE — TELEPHONE ENCOUNTER
This is a Dr Luci Carrington patient    The patient would like new order for his PT INR  His order   He would like a paper copy of the order  He will come to the office to pick it up  Please advise  Thank you

## 2021-07-14 ENCOUNTER — APPOINTMENT (OUTPATIENT)
Dept: LAB | Facility: CLINIC | Age: 76
End: 2021-07-14
Payer: MEDICARE

## 2021-07-14 ENCOUNTER — ANTICOAG VISIT (OUTPATIENT)
Dept: INTERNAL MEDICINE CLINIC | Facility: CLINIC | Age: 76
End: 2021-07-14

## 2021-07-14 DIAGNOSIS — Z79.01 CURRENT USE OF LONG TERM ANTICOAGULATION: ICD-10-CM

## 2021-07-14 LAB
INR PPP: 2.56 (ref 0.84–1.19)
PROTHROMBIN TIME: 27.6 SECONDS (ref 11.6–14.5)

## 2021-07-14 PROCEDURE — 85610 PROTHROMBIN TIME: CPT

## 2021-07-14 PROCEDURE — 36415 COLL VENOUS BLD VENIPUNCTURE: CPT

## 2021-07-16 ENCOUNTER — HOSPITAL ENCOUNTER (OUTPATIENT)
Dept: VASCULAR ULTRASOUND | Facility: HOSPITAL | Age: 76
Discharge: HOME | End: 2021-07-16
Payer: MEDICARE

## 2021-07-16 DIAGNOSIS — I83.93 VARICOSE VEINS OF BOTH LOWER EXTREMITIES, UNSPECIFIED WHETHER COMPLICATED: ICD-10-CM

## 2021-07-16 DIAGNOSIS — R22.9 SKIN NODULE: ICD-10-CM

## 2021-07-16 PROCEDURE — 93970 EXTREMITY STUDY: CPT | Performed by: SURGERY

## 2021-07-16 PROCEDURE — 93970 EXTREMITY STUDY: CPT

## 2021-08-03 DIAGNOSIS — I10 ESSENTIAL HYPERTENSION: ICD-10-CM

## 2021-08-03 DIAGNOSIS — O22.30 DVT (DEEP VEIN THROMBOSIS) IN PREGNANCY: ICD-10-CM

## 2021-08-03 RX ORDER — AMLODIPINE BESYLATE 5 MG/1
TABLET ORAL
Qty: 90 TABLET | Refills: 1 | Status: SHIPPED | OUTPATIENT
Start: 2021-08-03 | End: 2022-02-01 | Stop reason: SDUPTHER

## 2021-08-03 RX ORDER — WARFARIN SODIUM 2 MG/1
TABLET ORAL
Qty: 90 TABLET | Refills: 1 | Status: SHIPPED | OUTPATIENT
Start: 2021-08-03 | End: 2021-10-28 | Stop reason: SDUPTHER

## 2021-08-04 ENCOUNTER — TELEPHONE (OUTPATIENT)
Dept: INTERNAL MEDICINE CLINIC | Facility: CLINIC | Age: 76
End: 2021-08-04

## 2021-08-04 ENCOUNTER — SOCIAL WORK (OUTPATIENT)
Dept: BEHAVIORAL/MENTAL HEALTH CLINIC | Facility: CLINIC | Age: 76
End: 2021-08-04
Payer: MEDICARE

## 2021-08-04 DIAGNOSIS — F41.1 GAD (GENERALIZED ANXIETY DISORDER): Primary | ICD-10-CM

## 2021-08-04 PROCEDURE — 90834 PSYTX W PT 45 MINUTES: CPT | Performed by: SOCIAL WORKER

## 2021-08-04 NOTE — PSYCH
Assessment/Plan: Manage mood issues     There are no diagnoses linked to this encounter  Subjective: Todd Cha continues to report some periods of stress and anxiety  Cites physical health issues, ongoing financial stressors and marital issues as primary stressors  Has been utilizing exercise at home or walking to manage the stress and get distance from the home environment  Enjoys watching some television or sits outside to relax  Has limited supports  Feels stressor not likely to change  Enjoys contact with live in son  Denies any acute depressive symptoms  No SI  No HI  Patient ID: Quirino Monzon is a 77 y o  male  Met with Todd Cha for 40 minutes from 12:50PM-1:30PM       Review of Systems   Psychiatric/Behavioral: The patient is nervous/anxious  Objective: Todd Cha presents as mildly anxious but overall he is pleasant, verbal, cooperative and oriented  Some shaking evident in his hands  Physical Exam  Psychiatric:         Attention and Perception: Attention and perception normal          Mood and Affect: Affect normal  Mood is anxious  Speech: Speech normal          Behavior: Behavior normal  Behavior is cooperative  Thought Content:  Thought content normal          Cognition and Memory: Cognition and memory normal          Judgment: Judgment normal

## 2021-08-04 NOTE — PATIENT INSTRUCTIONS
Processed stressors with him- validation and supportive therapy provided  Reviewed coping strategies and outlets to utilize to manage anxiety  Reviewed strategies to utilize at home to reduce stress and hopefully help develop more positive communication with spouse  Reviewed appropriate boundaries and expectations to maintain within this relationship

## 2021-08-08 ENCOUNTER — APPOINTMENT (EMERGENCY)
Dept: CT IMAGING | Facility: HOSPITAL | Age: 76
End: 2021-08-08
Payer: MEDICARE

## 2021-08-08 ENCOUNTER — APPOINTMENT (EMERGENCY)
Dept: RADIOLOGY | Facility: HOSPITAL | Age: 76
End: 2021-08-08
Payer: MEDICARE

## 2021-08-08 ENCOUNTER — HOSPITAL ENCOUNTER (EMERGENCY)
Facility: HOSPITAL | Age: 76
Discharge: HOME | End: 2021-08-09
Attending: EMERGENCY MEDICINE | Admitting: EMERGENCY MEDICINE
Payer: MEDICARE

## 2021-08-08 VITALS
OXYGEN SATURATION: 98 % | RESPIRATION RATE: 18 BRPM | TEMPERATURE: 97.8 F | DIASTOLIC BLOOD PRESSURE: 80 MMHG | HEART RATE: 80 BPM | SYSTOLIC BLOOD PRESSURE: 145 MMHG

## 2021-08-08 DIAGNOSIS — S80.01XA CONTUSION OF RIGHT KNEE, INITIAL ENCOUNTER: ICD-10-CM

## 2021-08-08 DIAGNOSIS — S20.219A CHEST WALL CONTUSION: ICD-10-CM

## 2021-08-08 DIAGNOSIS — L08.9 ABRASION OF LEFT CHEST WALL WITH INFECTION, INITIAL ENCOUNTER: ICD-10-CM

## 2021-08-08 DIAGNOSIS — S20.312A ABRASION OF LEFT CHEST WALL WITH INFECTION, INITIAL ENCOUNTER: ICD-10-CM
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
## 2021-08-08 DIAGNOSIS — S80.02XA CONTUSION OF LEFT KNEE, INITIAL ENCOUNTER: ICD-10-CM

## 2021-08-08 LAB
INR PPP: 2.57 (ref 0.84–1.19)
PROTHROMBIN TIME: 27.6 SECONDS (ref 11.6–14.5)
TROPONIN I SERPL-MCNC: <0.02 NG/ML

## 2021-08-08 PROCEDURE — 99284 EMERGENCY DEPT VISIT MOD MDM: CPT

## 2021-08-08 PROCEDURE — 71250 CT THORAX DX C-: CPT

## 2021-08-08 PROCEDURE — 99285 EMERGENCY DEPT VISIT HI MDM: CPT | Performed by: EMERGENCY MEDICINE

## 2021-08-08 PROCEDURE — 73590 X-RAY EXAM OF LOWER LEG: CPT

## 2021-08-08 PROCEDURE — G1004 CDSM NDSC: HCPCS

## 2021-08-08 PROCEDURE — 73564 X-RAY EXAM KNEE 4 OR MORE: CPT

## 2021-08-08 PROCEDURE — 85610 PROTHROMBIN TIME: CPT | Performed by: EMERGENCY MEDICINE

## 2021-08-08 PROCEDURE — 36415 COLL VENOUS BLD VENIPUNCTURE: CPT | Performed by: EMERGENCY MEDICINE

## 2021-08-08 PROCEDURE — 93005 ELECTROCARDIOGRAM TRACING: CPT

## 2021-08-08 PROCEDURE — 84484 ASSAY OF TROPONIN QUANT: CPT | Performed by: EMERGENCY MEDICINE

## 2021-08-08 RX ORDER — LIDOCAINE 50 MG/G
1 PATCH TOPICAL ONCE
Status: DISCONTINUED | OUTPATIENT
Start: 2021-08-08 | End: 2021-08-09 | Stop reason: HOSPADM

## 2021-08-08 RX ORDER — OXYCODONE HYDROCHLORIDE 5 MG/1
5 TABLET ORAL ONCE
Status: COMPLETED | OUTPATIENT
Start: 2021-08-08 | End: 2021-08-08

## 2021-08-08 RX ADMIN — OXYCODONE HYDROCHLORIDE 5 MG: 5 TABLET ORAL at 21:58

## 2021-08-08 RX ADMIN — LIDOCAINE 5% 1 PATCH: 700 PATCH TOPICAL at 21:58

## 2021-08-09 LAB
ATRIAL RATE: 73 BPM
P AXIS: 61 DEGREES
PR INTERVAL: 152 MS
QRS AXIS: 43 DEGREES
QRSD INTERVAL: 104 MS
QT INTERVAL: 376 MS
QTC INTERVAL: 414 MS
T WAVE AXIS: 64 DEGREES
VENTRICULAR RATE: 73 BPM

## 2021-08-09 PROCEDURE — 93010 ELECTROCARDIOGRAM REPORT: CPT | Performed by: INTERNAL MEDICINE

## 2021-08-09 RX ORDER — OXYCODONE HYDROCHLORIDE 5 MG/1
5 CAPSULE ORAL EVERY 6 HOURS PRN
Qty: 10 CAPSULE | Refills: 0 | Status: SHIPPED | OUTPATIENT
Start: 2021-08-09 | End: 2021-10-28 | Stop reason: ALTCHOICE

## 2021-08-09 NOTE — ED PROCEDURE NOTE
PROCEDURE  ECG 12 Lead Documentation Only    Date/Time: 8/8/2021 10:24 PM  Performed by: Sara Mcclure MD  Authorized by: Sara Mcclure MD     Indications / Diagnosis:  Fall/ anterior chest wall pain   ECG reviewed by me, the ED Provider: yes    Patient location:  ED  Previous ECG:     Previous ECG:  Compared to current    Comparison ECG info:  10/6/18    Similarity:  No change    Comparison to cardiac monitor: Yes    Interpretation:     Interpretation: non-specific    Rate:     ECG rate:  73    ECG rate assessment: normal    Rhythm:     Rhythm: sinus rhythm    Ectopy:     Ectopy: PVCs      PVCs:  Infrequent and unifocal  QRS:     QRS axis:  Normal    QRS intervals:   Wide  Conduction:     Conduction: abnormal      Abnormal conduction: non-specific intraventricular conduction delay    ST segments:     ST segments:  Normal  T waves:     T waves: flattening      Flattening:  V1 and aVL  Q waves:     Q waves:  AVL and V1  Other findings:     Other findings: U wave    Comments:      No ecg signs of injury          Sara Mcclure MD  08/08/21 8930

## 2021-08-09 NOTE — DISCHARGE INSTRUCTIONS
Diagnosis; fall/ chest wall contusion- bruise- Minerva Bal / bilateral knee contusion/abrasions    - activity as tolerated     - expect to feel sore and achy for next 1-2 week     - the lidocaine patch  can remain on for up to 12 hrs at a time- can not get warm or wet -- can use over the counter lidocaine patches to area    - for pain- over the counter generic tylenol 500 mg every 4 hrs     - for severe pain only on  an as needed basis-- oxycodone 1 tablet     - for abrasions of knees- wash daily with soap and water -- can apply over the counter topical antibiotic ointment over areas once a day for 1 week      - please return to  the er for any  signs of wound infection - any spreading redness/swelling/ warmth  of area  or any new/ worsening/concerning symptoms to you

## 2021-08-11 ENCOUNTER — ANTICOAG VISIT (OUTPATIENT)
Dept: INTERNAL MEDICINE CLINIC | Facility: CLINIC | Age: 76
End: 2021-08-11

## 2021-08-12 ENCOUNTER — APPOINTMENT (OUTPATIENT)
Dept: LAB | Facility: CLINIC | Age: 76
End: 2021-08-12
Payer: MEDICARE

## 2021-08-16 ENCOUNTER — OFFICE VISIT (OUTPATIENT)
Dept: NEUROLOGY | Facility: CLINIC | Age: 76
End: 2021-08-16
Payer: MEDICARE

## 2021-08-16 VITALS
SYSTOLIC BLOOD PRESSURE: 132 MMHG | HEART RATE: 87 BPM | DIASTOLIC BLOOD PRESSURE: 70 MMHG | RESPIRATION RATE: 16 BRPM | TEMPERATURE: 98 F | WEIGHT: 135.1 LBS | BODY MASS INDEX: 21.71 KG/M2 | HEIGHT: 66 IN

## 2021-08-16 DIAGNOSIS — R25.1 TREMOR: Primary | ICD-10-CM

## 2021-08-16 DIAGNOSIS — R41.3 MEMORY LOSS: ICD-10-CM

## 2021-08-16 PROCEDURE — 99214 OFFICE O/P EST MOD 30 MIN: CPT | Performed by: NURSE PRACTITIONER

## 2021-08-16 NOTE — PROGRESS NOTES
Patient ID: Marychuy Irby is a 77 y o  male  Assessment/Plan:    Memory loss      Patient scored 16/30 on [facility] testing today, he has difficulty with short term memory  Will order work up for memory loss- labs, brain MRI  Would also consider the effect that his anxiety/depression has on his memory as well, he denies any hallucinations  Patient was encouraged to increase mind stimulating activities such as reading, crosswords, word searches, puzzles, Soduku, solitaire, coloring and other brain games  We also discussed the importance of staying physically active and eating a health diet such as the Mediterranean or MIND diet  Tremor  Patient with variable tremor for the past few years  Does note the tremor either possible started or more noticable after starting zyprexa  On exam he has a resting, postural and intention tremor R>L-at rest is does worsen when discussing tremor  He has not bradykinesia or rigidity, slightly decreased facial expression, upon formal testing he staggers with ambulation, however when he was ambulating to check out his gait was normal  He was diagnosed with 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 in the past  We discussed possible side effects of zyprexa and would first like him to discuss either decreasing or eliminating this medication first prior to any further intervention  Subjective:    HPI    Marychuy Irby is a 77 y o  man with a history of Chron's diease, protein S deficiency, DVT on coumadin, and chronic dizziness  He was evaluated in our office by Dr Fareed Corona in November 2018 for 2nd opinion for abnormal movements, confusion-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-he has a complex psych history, 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 dating back to his 25s  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  At that visit he had a variable tremor was extinguished by competing motor tasks in the opposite hand      He presents for follow up today for the above issue-mainly tremor and cognitive changes  He has seen psych and therapist in the interim who has prescribe zoloft and zyprexa, ativan-they do help him sleep, has helped to calm his "temper" but he still has anxiety  He continues with tremors that started over the past few years-think they started after he was started on zyprexa  they haven't significant worsened recently but are still present  Noticeable at rest and when he is doing fine motor activity  Get worse with stress  His memory is getting worse-short term memory issues, forgets details of conversation, misplacing items, names  Long term memory is fine  Denies leaving the stove on  No trouble with walking, he states he has problems with his balance  But if he stands up too quickly he will get dizzy-feels like room is spinning  He did just have a fall over a week ago in which he tripped over pavement  No hallucinations, sleeps well  No issues with eating or drinking due to tremor  No trouble swallowing  He has a hard time writing due to tremor  He has been losing weight over the past few years did have CT abd/pelvis is 1/2020 that was normal     Objective:    Blood pressure 132/70, pulse 87, temperature 98 °F (36 7 °C), temperature source Tympanic, resp  rate 16, height 5' 5 5" (1 664 m), weight 61 3 kg (135 lb 1 6 oz)  Physical Exam  Constitutional:       General: He is awake  HENT:      Right Ear: Hearing normal       Left Ear: Hearing normal    Eyes:      Extraocular Movements: Extraocular movements intact  Pupils: Pupils are equal, round, and reactive to light  Neurological:      Mental Status: He is alert  Neurological Exam  Mental Status  Awake and alert  Oriented only to person, place and situation  Memory: 0/5 delayed recall  Unable to copy figure  Clock drawing is normal  Speech: hypophonia  Able to name objects  MoCA 16/30  Cranial Nerves  CN III, IV, VI: Extraocular movements intact bilaterally   Pupils equal round and reactive to light bilaterally  CN V:  Right: Facial sensation is normal   Left: Facial sensation is normal on the left  CN VII:  Right: There is no facial weakness  Left: There is no facial weakness  CN VIII:  Right: Hearing is normal   Left: Hearing is normal   CN XI:  Right: Trapezius strength is normal   Left: Trapezius strength is normal   CN XII: Tongue midline without atrophy or fasciculations  Motor    Strength: Strength 5/5 upper and lower extremities  Sensory  Light touch is normal in upper and lower extremities  Coordination  Right: Finger-to-nose abnormality: Rapid alternating movement abnormality:  Left: Finger-to-nose abnormality: Rapid alternating movement abnormality:  Mild difficulty with finger taps L>R, but supination/pronation and hand movements normal    Intermittent resting tremor noted in right hand-worsens when discussing tremor  Postural tremor and intention tremor noted with FTN testing       Gait  Casual gait: Able to rise from chair without using arms  When gait formally assessed staggers into the wall and has a decreased stride, when he walks to check out he is able to ambulate without difficulty  I have personally reviewed the ROS performed by the MA     ROS:    Review of Systems   Constitutional: Negative  Negative for appetite change and fever  HENT: Positive for hearing loss (wears a hearing aide)  Negative for tinnitus, trouble swallowing and voice change  Eyes: Negative  Negative for photophobia and pain  Respiratory: Negative for shortness of breath  Patient bruised his chest when he fell  Cardiovascular: Negative  Negative for palpitations  Gastrointestinal: Negative  Negative for nausea and vomiting  Endocrine: Negative  Negative for cold intolerance  Genitourinary: Positive for frequency  Negative for dysuria and urgency     Musculoskeletal: Positive for back pain (lower back), gait problem (balance issues), joint swelling, neck pain and neck stiffness  Negative for myalgias  Joint pain   Skin: Negative  Negative for rash  Allergic/Immunologic: Negative  Neurological: Positive for dizziness (at times) and tremors (right hand)  Negative for seizures, syncope, facial asymmetry, speech difficulty, weakness, light-headedness, numbness and headaches  Hematological: Bruises/bleeds easily  Psychiatric/Behavioral: Positive for agitation, behavioral problems and confusion (at times)  Negative for hallucinations and sleep disturbance  The patient is nervous/anxious           Memory issues, depression, anxiety, mood swings

## 2021-08-17 PROBLEM — R41.3 MEMORY LOSS: Status: ACTIVE | Noted: 2021-08-17

## 2021-08-17 NOTE — ASSESSMENT & PLAN NOTE
Patient scored 16/30 on [facility] testing today, he has difficulty with short term memory  Will order work up for memory loss- labs, brain MRI  Would also consider the effect that his anxiety/depression has on his memory as well, he denies any hallucinations  Patient was encouraged to increase mind stimulating activities such as reading, crosswords, word searches, puzzles, Soduku, solitaire, coloring and other brain games  We also discussed the importance of staying physically active and eating a health diet such as the Mediterranean or MIND diet

## 2021-08-17 NOTE — ASSESSMENT & PLAN NOTE
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

## 2021-08-20 ENCOUNTER — TELEPHONE (OUTPATIENT)
Dept: PSYCHIATRY | Facility: CLINIC | Age: 76
End: 2021-08-20

## 2021-08-20 ENCOUNTER — APPOINTMENT (OUTPATIENT)
Dept: LAB | Facility: CLINIC | Age: 76
End: 2021-08-20
Payer: MEDICARE

## 2021-08-20 ENCOUNTER — ANTICOAG VISIT (OUTPATIENT)
Dept: INTERNAL MEDICINE CLINIC | Facility: CLINIC | Age: 76
End: 2021-08-20

## 2021-08-20 DIAGNOSIS — R41.3 MEMORY LOSS: ICD-10-CM

## 2021-08-20 LAB
TSH SERPL DL<=0.05 MIU/L-ACNC: 1.89 UIU/ML (ref 0.36–3.74)
VIT B12 SERPL-MCNC: 705 PG/ML (ref 100–900)

## 2021-08-20 PROCEDURE — 84443 ASSAY THYROID STIM HORMONE: CPT

## 2021-08-20 PROCEDURE — 82607 VITAMIN B-12: CPT

## 2021-08-20 PROCEDURE — 86592 SYPHILIS TEST NON-TREP QUAL: CPT

## 2021-08-20 NOTE — TELEPHONE ENCOUNTER
Pt's wife called saying she had questions from the neurologist about changing medications   I transferred her to the front office

## 2021-08-23 LAB — RPR SER QL: NORMAL

## 2021-08-27 ENCOUNTER — TELEPHONE (OUTPATIENT)
Dept: PSYCHIATRY | Facility: CLINIC | Age: 76
End: 2021-08-27

## 2021-08-27 NOTE — TELEPHONE ENCOUNTER
Irasema Edwards, wife LVMATHEW Marshall presented to neurologist with tremors in hands  Neuro wants to rule out cause by Zyprexa  Request to stop, or cut back on dosage  Irasema Edwards phone [phone]

## 2021-08-27 NOTE — TELEPHONE ENCOUNTER
Wife called re:status of previous call  There is nothing documented  Transferred this call to nursing

## 2021-08-27 NOTE — TELEPHONE ENCOUNTER
Spoke with wife - Jordyn Mon has hand tremor and neurologist recommended lowering or stopping Zyprexa  Plan: will decrease Zyprexa to 7 5 mg at bedtime   If no improvement - consider switch to Seroquel

## 2021-09-07 ENCOUNTER — TELEMEDICINE (OUTPATIENT)
Dept: BEHAVIORAL/MENTAL HEALTH CLINIC | Facility: CLINIC | Age: 76
End: 2021-09-07
Payer: MEDICARE

## 2021-09-07 DIAGNOSIS — F41.1 GAD (GENERALIZED ANXIETY DISORDER): Primary | ICD-10-CM

## 2021-09-07 DIAGNOSIS — F41.1 GAD (GENERALIZED ANXIETY DISORDER): Chronic | ICD-10-CM

## 2021-09-07 PROCEDURE — 90832 PSYTX W PT 30 MINUTES: CPT | Performed by: SOCIAL WORKER

## 2021-09-07 RX ORDER — LORAZEPAM 0.5 MG/1
0.5 TABLET ORAL 3 TIMES DAILY PRN
Qty: 90 TABLET | Refills: 3 | OUTPATIENT
Start: 2021-09-07 | End: 2022-01-05

## 2021-09-07 NOTE — PATIENT INSTRUCTIONS
Processed stressors during session- validation and supportive therapy provided  Reviewed stress mgmt strategies and appropriate outlets to utilize to manage stress

## 2021-09-07 NOTE — PSYCH
Virtual Brief Visit    Verification of patient location:    Patient is located in the following state in which I hold an active license [place]      Assessment/Plan:    Problem List Items Addressed This Visit        Other    JOSÉ LUIS (generalized anxiety disorder) - Primary (Chronic)                Reason for visit is   Chief Complaint   Patient presents with    Virtual Brief Visit        Encounter provider Chris Field    Provider located at [email or web address] [phone]    Recent Visits  No visits were found meeting these conditions  Showing recent visits within past 7 days and meeting all other requirements  Today's Visits  Date Type Provider Dept   09/07/21 [facility] today's visits and meeting all other requirements  Future Appointments  No visits were found meeting these conditions  Showing future appointments within next 150 days and meeting all other requirements       After connecting through telephone, the patient was identified by name and date of birth  Christine Black was informed that this is a telemedicine visit and that the visit is being conducted through telephone  My office door was closed  No one else was in the room  He acknowledged consent and understanding of privacy and security of the platform  The patient has agreed to participate and understands he can discontinue the visit at any time  Patient is aware this is a billable service  Subjective    Christine Black is a 77 y o  male who has a fall after we last me while walking and tripping on uneven pavement  Since that time, he has not been walking outside but has been working once a day at home  Feels things are less stressful at home now that he and his wife have another vehicle and are able to leave house   Has walked down street to visit with a friend on occasion  Some continues financial and marital stressors at home but managing best he can  Denies any acute issues  No acute depression or anxiety symptoms  No SI   HPI     Past Medical History:   Diagnosis Date    Allergic rhinitis     Anosmia     Anxiety     Benign parotid tumor     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     [facility]'s disease ([facility]     Depression     Dilated pancreatic duct     DVT (deep venous thrombosis) (HCC)     GERD (gastroesophageal reflux disease)     Hearing loss     Council (hard of hearing)     no hearing aids    Hypertension     Leucocytosis     Mass in neck     Nail anomaly     Polyuria     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HCC)     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    Recurrent falls     Solar lentigo     Thrombocytopenia (HCC)     Vertigo     Vision loss of left eye        Past Surgical History:   Procedure Laterality Date    COLON SURGERY      Partial colectomy and colostomy    COLONOSCOPY      ESOPHAGOGASTRODUODENOSCOPY N/A 4/5/2017    Procedure: ESOPHAGOGASTRODUODENOSCOPY (EGD); Surgeon: Kandace Cassidy MD;  [facility]; Service:     EYE SURGERY Right     Cataract    KNEE SURGERY      MD EDG US EXAM SURGICAL ALTER STOM DUODENUM/JEJUNUM N/A 4/9/2018    Procedure: LINEAR ENDOSCOPIC U/S;  Surgeon: Estephanie Hendrix MD;  [facility];   Service: Gastroenterology    MD EXC PAROTD,LAT LOBE,DISSECT 5TH NERV Right 8/8/2018    Procedure: PAROTIDECTOMY WITH FACIAL NERVE MONITOR AND FROZEN SECTION;  Surgeon: Adela Stratton MD;  [facility];  Service: ENT    RADICAL NECK DISSECTION N/A 8/8/2018    Procedure: SELECTIVE NECK DISSECTION;  Surgeon: Adela Stratton MD;  [facility];  Service: ENT    REMOVAL OF IMPACTED TOOTH - COMPLETELY BONY N/A 8/8/2018    Procedure: EXTRACTION TEETH #15 and #30;  Surgeon: Luke Samuel DDS;  [facility];  Service: Maxillofacial    UPPER GASTROINTESTINAL ENDOSCOPY      VEIN LIGATION AND STRIPPING         Current Outpatient Medications   Medication Sig Dispense Refill    amLODIPine (NORVASC) 5 mg tablet Take 1 tablet by mouth once daily 90 tablet 1    LORazepam (ATIVAN) 0 5 mg tablet Take 1 tablet (0 5 mg total) by mouth 3 (three) times a day as needed for anxiety To be filled on or after 6/23/21 90 tablet 3    Melatonin 10 MG TABS Take by mouth      Multiple Vitamins-Minerals (MULTI FOR HIM 50+ PO) Take 1 tablet by mouth daily      OLANZapine (ZyPREXA) 15 mg tablet Take 1 tablet (15 mg total) by mouth daily at bedtime 90 tablet 2    omeprazole (PriLOSEC) 40 MG capsule Take 1 capsule (40 mg total) by mouth daily 30 capsule 6    oxyCODONE (OXY-IR) 5 MG capsule Take 1 capsule (5 mg total) by mouth every 6 (six) hours as needed for severe pain for up to 10 doses Can substitute oxycodone tablets for capsulesMax Daily Amount: 20 mg (Patient not taking: Reported on 8/16/2021) 10 capsule 0    sertraline (ZOLOFT) 100 mg tablet Take 1 tablet (100 mg total) by mouth daily 90 tablet 2    warfarin (COUMADIN) 2 mg tablet Take 1 tablet by mouth once daily 90 tablet 1     No current facility-administered medications for this visit  Allergies   Allergen Reactions    Duloxetine Other (See Comments)     Panic attacks    Other      Seasonal       Review of Systems   Psychiatric/Behavioral: The patient is nervous/anxious  There were no vitals filed for this visit  I spent 20 minutes directly with the patient during this visit from 12:55PM-1:15PM     VIRTUAL VISIT [address] verbally agrees to participate in [employer]  Pt is aware that [employer] could be limited without vital signs or the ability to perform a full hands-on physical Libra Pedraza understands he or the provider may request at any time to terminate the video visit and request the patient to seek care or treatment in person

## 2021-09-07 NOTE — TELEPHONE ENCOUNTER
Jackie Mo should have prescription on file at [place] from 6/23/21 for 30 days with 3 RF   Per PDMP he has been feeling prescription form January

## 2021-09-08 ENCOUNTER — APPOINTMENT (OUTPATIENT)
Dept: LAB | Facility: CLINIC | Age: 76
End: 2021-09-08
Payer: MEDICARE

## 2021-09-08 ENCOUNTER — ANTICOAG VISIT (OUTPATIENT)
Dept: INTERNAL MEDICINE CLINIC | Facility: CLINIC | Age: 76
End: 2021-09-08

## 2021-09-08 DIAGNOSIS — F41.1 GAD (GENERALIZED ANXIETY DISORDER): Primary | Chronic | ICD-10-CM

## 2021-09-08 RX ORDER — LORAZEPAM 0.5 MG/1
0.5 TABLET ORAL 3 TIMES DAILY PRN
Qty: 90 TABLET | Refills: 3 | OUTPATIENT
Start: 2021-09-08 | End: 2022-01-06

## 2021-09-08 NOTE — TELEPHONE ENCOUNTER
I spoke with [facility] [address] (pharmacy for this refill) - they have not filled prescriptions for him since 2018  They checked his file at [address] and that pharmacy filled Ativan for him on 9/6/21  That is the pharmacy where Ativan prescription was sent in June with refills  He needs to  Ativan there

## 2021-09-09 RX ORDER — LORAZEPAM 0.5 MG/1
0.5 TABLET ORAL 3 TIMES DAILY PRN
Qty: 90 TABLET | Refills: 1 | Status: SHIPPED | OUTPATIENT
Start: 2021-09-09 | End: 2021-11-04 | Stop reason: SDUPTHER

## 2021-09-09 NOTE — TELEPHONE ENCOUNTER
Dr Son Carrion called wife back to relay your message and she stated that she did  the prescription on  but was told they would not fill any more because it had  Clemencia Landeros)  Wife is requesting new prescription be sent to [facility] [address] [facility] in [place]  She states  has about a week's worth of medication left

## 2021-09-09 NOTE — TELEPHONE ENCOUNTER
Spoke with [facility] in [place] - they filled Ativan on 9/6/21 from the prescription dated 6/23/21 - patient never picked it up  I asked [facility] Highman in [place] to cancel that fill and the whole prescription form 6/23/21   Ne Ativan prescription for 30 days with 1 RF escripted to Cherylene Highman in [facility], [address] [place] [address]

## 2021-09-09 NOTE — TELEPHONE ENCOUNTER
I spoke to Mrs Julia Fitzpatrick and informed her that new prescription was sent to [facility] [address] in [place]

## 2021-10-05 ENCOUNTER — APPOINTMENT (OUTPATIENT)
Dept: LAB | Facility: CLINIC | Age: 76
End: 2021-10-05
Payer: MEDICARE

## 2021-10-05 ENCOUNTER — SOCIAL WORK (OUTPATIENT)
Dept: BEHAVIORAL/MENTAL HEALTH CLINIC | Facility: CLINIC | Age: 76
End: 2021-10-05
Payer: MEDICARE

## 2021-10-05 DIAGNOSIS — F41.1 GAD (GENERALIZED ANXIETY DISORDER): Primary | ICD-10-CM

## 2021-10-05 PROCEDURE — 90832 PSYTX W PT 30 MINUTES: CPT | Performed by: SOCIAL WORKER

## 2021-10-07 ENCOUNTER — TELEPHONE (OUTPATIENT)
Dept: PSYCHIATRY | Facility: CLINIC | Age: 76
End: 2021-10-07

## 2021-10-13 ENCOUNTER — HOSPITAL ENCOUNTER (OUTPATIENT)
Dept: MRI IMAGING | Facility: HOSPITAL | Age: 76
Discharge: HOME | End: 2021-10-13
Payer: MEDICARE

## 2021-10-13 DIAGNOSIS — R41.3 MEMORY LOSS: ICD-10-CM

## 2021-10-13 DIAGNOSIS — R25.1 TREMOR: ICD-10-CM

## 2021-10-13 PROCEDURE — 70551 MRI BRAIN STEM W/O DYE: CPT

## 2021-10-18 ENCOUNTER — OFFICE VISIT (OUTPATIENT)
Dept: VASCULAR SURGERY | Facility: CLINIC | Age: 76
End: 2021-10-18
Payer: MEDICARE

## 2021-10-18 VITALS
DIASTOLIC BLOOD PRESSURE: 76 MMHG | RESPIRATION RATE: 16 BRPM | TEMPERATURE: 98.4 F | HEART RATE: 84 BPM | SYSTOLIC BLOOD PRESSURE: 126 MMHG | WEIGHT: 138 LBS | HEIGHT: 66 IN | BODY MASS INDEX: 22.18 KG/M2

## 2021-10-18 DIAGNOSIS — I82.5Y2 CHRONIC DEEP VEIN THROMBOSIS (DVT) OF PROXIMAL VEIN OF LEFT LOWER EXTREMITY (HCC): ICD-10-CM

## 2021-10-18 DIAGNOSIS — I83.813 VARICOSE VEINS OF BOTH LOWER EXTREMITIES WITH PAIN: Primary | ICD-10-CM

## 2021-10-18 PROCEDURE — 99203 OFFICE O/P NEW LOW 30 MIN: CPT | Performed by: SURGERY

## 2021-10-25 ENCOUNTER — OFFICE VISIT (OUTPATIENT)
Dept: NEUROLOGY | Facility: CLINIC | Age: 76
End: 2021-10-25
Payer: MEDICARE

## 2021-10-25 VITALS
BODY MASS INDEX: 22.19 KG/M2 | SYSTOLIC BLOOD PRESSURE: 132 MMHG | HEART RATE: 83 BPM | HEIGHT: 66 IN | WEIGHT: 138.1 LBS | DIASTOLIC BLOOD PRESSURE: 73 MMHG

## 2021-10-25 DIAGNOSIS — R41.3 MEMORY LOSS: ICD-10-CM

## 2021-10-25 DIAGNOSIS — R25.1 TREMOR: Primary | ICD-10-CM

## 2021-10-25 PROCEDURE — 99214 OFFICE O/P EST MOD 30 MIN: CPT | Performed by: NURSE PRACTITIONER

## 2021-10-28 ENCOUNTER — OFFICE VISIT (OUTPATIENT)
Dept: INTERNAL MEDICINE CLINIC | Facility: CLINIC | Age: 76
End: 2021-10-28
Payer: MEDICARE

## 2021-10-28 VITALS
OXYGEN SATURATION: 98 % | SYSTOLIC BLOOD PRESSURE: 124 MMHG | HEART RATE: 78 BPM | DIASTOLIC BLOOD PRESSURE: 58 MMHG | BODY MASS INDEX: 22.79 KG/M2 | HEIGHT: 65 IN | WEIGHT: 136.8 LBS

## 2021-10-28 DIAGNOSIS — Z00.00 ROUTINE ADULT HEALTH MAINTENANCE: ICD-10-CM

## 2021-10-28 DIAGNOSIS — O22.30 DVT (DEEP VEIN THROMBOSIS) IN PREGNANCY: ICD-10-CM

## 2021-10-28 DIAGNOSIS — R63.4 WEIGHT LOSS: ICD-10-CM

## 2021-10-28 DIAGNOSIS — R10.13 EPIGASTRIC PAIN: ICD-10-CM

## 2021-10-28 DIAGNOSIS — R25.1 TREMOR: Primary | ICD-10-CM

## 2021-10-28 DIAGNOSIS — E03.8 SUBCLINICAL HYPOTHYROIDISM: ICD-10-CM

## 2021-10-28 PROBLEM — G47.9 SLEEP DISTURBANCE: Status: ACTIVE | Noted: 2021-10-28

## 2021-10-28 PROCEDURE — 99213 OFFICE O/P EST LOW 20 MIN: CPT | Performed by: INTERNAL MEDICINE

## 2021-10-28 RX ORDER — WARFARIN SODIUM 2 MG/1
2 TABLET ORAL DAILY
Qty: 90 TABLET | Refills: 1 | Status: ON HOLD | OUTPATIENT
Start: 2021-10-28 | End: 2022-03-28 | Stop reason: SDUPTHER

## 2021-10-28 RX ORDER — OMEPRAZOLE 40 MG/1
40 CAPSULE, DELAYED RELEASE ORAL DAILY
Qty: 30 CAPSULE | Refills: 6 | Status: SHIPPED | OUTPATIENT
Start: 2021-10-28 | End: 2022-03-28 | Stop reason: HOSPADM

## 2021-11-01 ENCOUNTER — APPOINTMENT (OUTPATIENT)
Dept: LAB | Facility: CLINIC | Age: 76
End: 2021-11-01
Payer: MEDICARE

## 2021-11-02 ENCOUNTER — TELEMEDICINE (OUTPATIENT)
Dept: BEHAVIORAL/MENTAL HEALTH CLINIC | Facility: CLINIC | Age: 76
End: 2021-11-02
Payer: MEDICARE

## 2021-11-02 ENCOUNTER — ANTICOAG VISIT (OUTPATIENT)
Dept: INTERNAL MEDICINE CLINIC | Facility: CLINIC | Age: 76
End: 2021-11-02

## 2021-11-02 DIAGNOSIS — F41.1 GAD (GENERALIZED ANXIETY DISORDER): Primary | ICD-10-CM

## 2021-11-02 PROCEDURE — 90832 PSYTX W PT 30 MINUTES: CPT | Performed by: SOCIAL WORKER

## 2021-11-04 ENCOUNTER — OFFICE VISIT (OUTPATIENT)
Dept: PSYCHIATRY | Facility: CLINIC | Age: 76
End: 2021-11-04
Payer: MEDICARE

## 2021-11-04 VITALS
DIASTOLIC BLOOD PRESSURE: 76 MMHG | BODY MASS INDEX: 22.82 KG/M2 | WEIGHT: 137 LBS | HEIGHT: 65 IN | HEART RATE: 69 BPM | SYSTOLIC BLOOD PRESSURE: 135 MMHG

## 2021-11-04 DIAGNOSIS — F41.1 GAD (GENERALIZED ANXIETY DISORDER): Chronic | ICD-10-CM

## 2021-11-04 DIAGNOSIS — F41.0 PANIC DISORDER WITHOUT AGORAPHOBIA: Chronic | ICD-10-CM

## 2021-11-04 DIAGNOSIS — G47.09 OTHER INSOMNIA: Chronic | ICD-10-CM

## 2021-11-04 DIAGNOSIS — Z79.899 LONG-TERM USE OF HIGH-RISK MEDICATION: Chronic | ICD-10-CM

## 2021-11-04 DIAGNOSIS — F33.2 MAJOR DEPRESSIVE DISORDER, RECURRENT, SEVERE WITHOUT PSYCHOTIC FEATURES (HCC): Primary | Chronic | ICD-10-CM

## 2021-11-04 DIAGNOSIS — F50.9 EATING DISORDER, UNSPECIFIED TYPE: Chronic | ICD-10-CM

## 2021-11-04 PROCEDURE — 90833 PSYTX W PT W E/M 30 MIN: CPT | Performed by: PSYCHIATRY & NEUROLOGY

## 2021-11-04 PROCEDURE — 99214 OFFICE O/P EST MOD 30 MIN: CPT | Performed by: PSYCHIATRY & NEUROLOGY

## 2021-11-04 RX ORDER — LORAZEPAM 0.5 MG/1
0.5 TABLET ORAL 3 TIMES DAILY PRN
Qty: 90 TABLET | Refills: 4 | Status: SHIPPED | OUTPATIENT
Start: 2021-11-04 | End: 2022-03-28 | Stop reason: HOSPADM

## 2021-11-04 RX ORDER — MIRTAZAPINE 7.5 MG/1
7.5 TABLET, FILM COATED ORAL
Qty: 90 TABLET | Refills: 1 | Status: SHIPPED | OUTPATIENT
Start: 2021-11-04 | End: 2022-01-04 | Stop reason: SINTOL

## 2021-11-04 RX ORDER — QUETIAPINE FUMARATE 25 MG/1
TABLET, FILM COATED ORAL
Qty: 90 TABLET | Refills: 1 | Status: SHIPPED | OUTPATIENT
Start: 2021-11-04 | End: 2022-01-04 | Stop reason: SDUPTHER

## 2021-11-08 ENCOUNTER — APPOINTMENT (OUTPATIENT)
Dept: LAB | Facility: CLINIC | Age: 76
End: 2021-11-08
Payer: MEDICARE

## 2021-11-08 ENCOUNTER — ANTICOAG VISIT (OUTPATIENT)
Dept: INTERNAL MEDICINE CLINIC | Facility: CLINIC | Age: 76
End: 2021-11-08

## 2021-11-17 ENCOUNTER — APPOINTMENT (OUTPATIENT)
Dept: LAB | Facility: CLINIC | Age: 76
End: 2021-11-17
Payer: MEDICARE

## 2021-11-18 ENCOUNTER — ANTICOAG VISIT (OUTPATIENT)
Dept: INTERNAL MEDICINE CLINIC | Facility: CLINIC | Age: 76
End: 2021-11-18

## 2021-11-23 ENCOUNTER — SOCIAL WORK (OUTPATIENT)
Dept: BEHAVIORAL/MENTAL HEALTH CLINIC | Facility: CLINIC | Age: 76
End: 2021-11-23
Payer: MEDICARE

## 2021-11-23 ENCOUNTER — APPOINTMENT (OUTPATIENT)
Dept: LAB | Facility: CLINIC | Age: 76
End: 2021-11-23
Payer: MEDICARE

## 2021-11-23 ENCOUNTER — ANTICOAG VISIT (OUTPATIENT)
Dept: INTERNAL MEDICINE CLINIC | Facility: CLINIC | Age: 76
End: 2021-11-23

## 2021-11-23 DIAGNOSIS — F33.2 MAJOR DEPRESSIVE DISORDER, RECURRENT SEVERE WITHOUT PSYCHOTIC FEATURES (HCC): Primary | ICD-10-CM

## 2021-11-23 PROCEDURE — 90832 PSYTX W PT 30 MINUTES: CPT | Performed by: SOCIAL WORKER

## 2021-11-29 ENCOUNTER — ANTICOAG VISIT (OUTPATIENT)
Dept: INTERNAL MEDICINE CLINIC | Facility: CLINIC | Age: 76
End: 2021-11-29

## 2021-11-29 ENCOUNTER — APPOINTMENT (OUTPATIENT)
Dept: LAB | Facility: CLINIC | Age: 76
End: 2021-11-29
Payer: MEDICARE

## 2021-12-06 ENCOUNTER — CLINICAL SUPPORT (OUTPATIENT)
Dept: NUTRITION | Facility: HOSPITAL | Age: 76
End: 2021-12-06
Payer: COMMERCIAL

## 2021-12-06 VITALS — BODY MASS INDEX: 22.37 KG/M2 | WEIGHT: 134.4 LBS

## 2021-12-06 DIAGNOSIS — R63.4 WEIGHT LOSS: ICD-10-CM

## 2021-12-21 ENCOUNTER — SOCIAL WORK (OUTPATIENT)
Dept: BEHAVIORAL/MENTAL HEALTH CLINIC | Facility: CLINIC | Age: 76
End: 2021-12-21
Payer: MEDICARE

## 2021-12-21 DIAGNOSIS — F33.2 MAJOR DEPRESSIVE DISORDER, RECURRENT SEVERE WITHOUT PSYCHOTIC FEATURES (HCC): Primary | ICD-10-CM

## 2021-12-21 PROCEDURE — 90832 PSYTX W PT 30 MINUTES: CPT | Performed by: SOCIAL WORKER

## 2021-12-28 ENCOUNTER — ANTICOAG VISIT (OUTPATIENT)
Dept: INTERNAL MEDICINE CLINIC | Facility: CLINIC | Age: 76
End: 2021-12-28

## 2021-12-28 ENCOUNTER — APPOINTMENT (OUTPATIENT)
Dept: LAB | Facility: CLINIC | Age: 76
End: 2021-12-28
Payer: MEDICARE
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
## 2022-01-04 ENCOUNTER — OFFICE VISIT (OUTPATIENT)
Dept: PSYCHIATRY | Facility: CLINIC | Age: 77
End: 2022-01-04
Payer: MEDICARE

## 2022-01-04 VITALS
BODY MASS INDEX: 20.89 KG/M2 | WEIGHT: 130 LBS | DIASTOLIC BLOOD PRESSURE: 80 MMHG | HEART RATE: 78 BPM | HEIGHT: 66 IN | SYSTOLIC BLOOD PRESSURE: 155 MMHG

## 2022-01-04 DIAGNOSIS — F41.0 PANIC DISORDER WITHOUT AGORAPHOBIA: Chronic | ICD-10-CM

## 2022-01-04 DIAGNOSIS — F33.2 MAJOR DEPRESSIVE DISORDER, RECURRENT, SEVERE WITHOUT PSYCHOTIC FEATURES (HCC): Primary | Chronic | ICD-10-CM

## 2022-01-04 DIAGNOSIS — G47.09 OTHER INSOMNIA: Chronic | ICD-10-CM

## 2022-01-04 DIAGNOSIS — F50.9 EATING DISORDER, UNSPECIFIED TYPE: Chronic | ICD-10-CM

## 2022-01-04 DIAGNOSIS — F41.1 GAD (GENERALIZED ANXIETY DISORDER): Chronic | ICD-10-CM

## 2022-01-04 DIAGNOSIS — Z79.899 LONG-TERM USE OF HIGH-RISK MEDICATION: Chronic | ICD-10-CM

## 2022-01-04 PROBLEM — I10 HTN (HYPERTENSION): Status: ACTIVE | Noted: 2017-04-01

## 2022-01-04 PROBLEM — K50.90 CROHN'S DISEASE (HCC): Status: ACTIVE | Noted: 2017-04-01

## 2022-01-04 PROBLEM — Z86.718 HISTORY OF DVT (DEEP VEIN THROMBOSIS): Status: ACTIVE | Noted: 2017-04-01

## 2022-01-04 PROCEDURE — 90833 PSYTX W PT W E/M 30 MIN: CPT | Performed by: PSYCHIATRY & NEUROLOGY

## 2022-01-04 PROCEDURE — 99214 OFFICE O/P EST MOD 30 MIN: CPT | Performed by: PSYCHIATRY & NEUROLOGY

## 2022-01-04 RX ORDER — QUETIAPINE FUMARATE 50 MG/1
50 TABLET, FILM COATED ORAL
Qty: 90 TABLET | Refills: 2 | Status: SHIPPED | OUTPATIENT
Start: 2022-01-04 | End: 2022-03-28 | Stop reason: HOSPADM

## 2022-01-04 RX ORDER — SERTRALINE HYDROCHLORIDE 100 MG/1
100 TABLET, FILM COATED ORAL DAILY
Qty: 90 TABLET | Refills: 2
Start: 2022-01-04 | End: 2022-01-05 | Stop reason: SDUPTHER

## 2022-01-05 ENCOUNTER — TELEPHONE (OUTPATIENT)
Dept: PSYCHIATRY | Facility: CLINIC | Age: 77
End: 2022-01-05

## 2022-01-05 DIAGNOSIS — F33.2 MAJOR DEPRESSIVE DISORDER, RECURRENT, SEVERE WITHOUT PSYCHOTIC FEATURES (HCC): Primary | Chronic | ICD-10-CM

## 2022-01-05 DIAGNOSIS — F41.1 GAD (GENERALIZED ANXIETY DISORDER): Chronic | ICD-10-CM

## 2022-01-05 RX ORDER — SERTRALINE HYDROCHLORIDE 100 MG/1
100 TABLET, FILM COATED ORAL DAILY
Qty: 90 TABLET | Refills: 2 | Status: SHIPPED | OUTPATIENT
Start: 2022-01-05 | End: 2022-03-28 | Stop reason: HOSPADM

## 2022-01-05 NOTE — TELEPHONE ENCOUNTER
Pts wife called concerned about medication for her   She said she spoke with [facility] and the sertaline 100  Mg tablet was not called in  Pt is requesting call back

## 2022-01-05 NOTE — TELEPHONE ENCOUNTER
Prescription for Zoloft issued yesterday went as "No Print" so pharmacy did not receive it  I reissued prescription today for 90 days with 2 RF  Please notify wife

## 2022-01-18 ENCOUNTER — SOCIAL WORK (OUTPATIENT)
Dept: BEHAVIORAL/MENTAL HEALTH CLINIC | Facility: CLINIC | Age: 77
End: 2022-01-18
Payer: MEDICARE

## 2022-01-18 DIAGNOSIS — F41.1 GAD (GENERALIZED ANXIETY DISORDER): Primary | ICD-10-CM

## 2022-01-18 PROCEDURE — 90832 PSYTX W PT 30 MINUTES: CPT | Performed by: SOCIAL WORKER

## 2022-01-18 NOTE — PSYCH
Psychotherapy Provided: Individual Psychotherapy 30 minutes from 1:00-1:30    Length of time in session: 30 minutes, follow up in 4 week    No diagnosis found  Goals addressed in session: Goal 1     Pain:      none    0    Current suicide risk : Low     D- Zygmunt Feliz continues to struggle with periods of anxiety, stress and some depression  Cites enviornwmtnal stress and ongoing marital issues as his primary stressors  Making more of a conscious effort to avoid or walk away from conflict  Unfortunately, he has limited social supports so when not at home he is generally out walking alone  Has periods where he feels more anxious and restless  Exercises to combat  Denies any acute depressive symptoms  No SI/  Reports some periods of irritability that he states is from the the consistent level of stress within his home  Kiley Brood presents as anxious  However, he is pleasant, verbal, cooperative and oriented during session  Thoughts are logical  No psychosis  Appropriately dressed and groomed  P- reviewed appropriate boundaries and expectations to maintain at home in his family relationships  Reviewed stress mgmt/reduction strategies to utilize as well as productive outlets to utilize to manage stress  Behavioral Health Treatment Plan [facility]: Diagnosis and Treatment Plan explained to Dana Cantrell relates understanding diagnosis and is agreeable to Treatment Plan   Yes

## 2022-01-25 ENCOUNTER — APPOINTMENT (OUTPATIENT)
Dept: LAB | Facility: CLINIC | Age: 77
End: 2022-01-25
Payer: MEDICARE

## 2022-01-25 ENCOUNTER — ANTICOAG VISIT (OUTPATIENT)
Dept: INTERNAL MEDICINE CLINIC | Facility: CLINIC | Age: 77
End: 2022-01-25

## 2022-01-25 NOTE — PROGRESS NOTES
Per Dr Olga Conklin the patient is to increase to 3 mg/2 mg/2 mg recheck in one week  I spoke with the patient's wife

## 2022-02-01 ENCOUNTER — APPOINTMENT (OUTPATIENT)
Dept: LAB | Facility: CLINIC | Age: 77
End: 2022-02-01
Payer: MEDICARE

## 2022-02-01 DIAGNOSIS — I10 ESSENTIAL HYPERTENSION: ICD-10-CM

## 2022-02-01 RX ORDER — AMLODIPINE BESYLATE 5 MG/1
5 TABLET ORAL DAILY
Qty: 90 TABLET | Refills: 1 | Status: ON HOLD | OUTPATIENT
Start: 2022-02-01 | End: 2022-03-28 | Stop reason: SDUPTHER

## 2022-02-02 ENCOUNTER — ANTICOAG VISIT (OUTPATIENT)
Dept: INTERNAL MEDICINE CLINIC | Facility: CLINIC | Age: 77
End: 2022-02-02

## 2022-02-08 ENCOUNTER — ANTICOAG VISIT (OUTPATIENT)
Dept: INTERNAL MEDICINE CLINIC | Facility: CLINIC | Age: 77
End: 2022-02-08

## 2022-02-08 ENCOUNTER — APPOINTMENT (OUTPATIENT)
Dept: LAB | Facility: CLINIC | Age: 77
End: 2022-02-08
Payer: MEDICARE

## 2022-02-09 ENCOUNTER — OFFICE VISIT (OUTPATIENT)
Dept: INTERNAL MEDICINE CLINIC | Facility: CLINIC | Age: 77
End: 2022-02-09
Payer: MEDICARE

## 2022-02-09 ENCOUNTER — TELEPHONE (OUTPATIENT)
Dept: ADMINISTRATIVE | Facility: OTHER | Age: 77
End: 2022-02-09

## 2022-02-09 VITALS
SYSTOLIC BLOOD PRESSURE: 138 MMHG | HEIGHT: 65 IN | OXYGEN SATURATION: 98 % | DIASTOLIC BLOOD PRESSURE: 62 MMHG | WEIGHT: 128.4 LBS | HEART RATE: 83 BPM | BODY MASS INDEX: 21.39 KG/M2

## 2022-02-09 DIAGNOSIS — S41.111A SKIN TEAR OF RIGHT UPPER ARM WITHOUT COMPLICATION, INITIAL ENCOUNTER: Primary | ICD-10-CM

## 2022-02-09 DIAGNOSIS — R63.4 WEIGHT LOSS: ICD-10-CM

## 2022-02-09 PROCEDURE — 99213 OFFICE O/P EST LOW 20 MIN: CPT | Performed by: NURSE PRACTITIONER

## 2022-02-09 RX ORDER — CEPHALEXIN 500 MG/1
500 CAPSULE ORAL EVERY 6 HOURS SCHEDULED
Qty: 20 CAPSULE | Refills: 0 | Status: SHIPPED | OUTPATIENT
Start: 2022-02-09 | End: 2022-02-14

## 2022-02-09 NOTE — TELEPHONE ENCOUNTER
----- Message from [employer] sent at 2/9/2022  8:42 AM EST -----  Regarding: colonostomy  02/09/22 8:42 AM    Hello, our patient attached above has had total colectomy completed  He would not be capable of a colonoscopy at this time  We will need an exclusion for him        Thank you,    Jethro Jennings

## 2022-02-09 NOTE — PROGRESS NOTES
Assessment/Plan:    Skin tear of right upper arm without complication  Apply Bactroban ointment   Monitor for purulent discharge increased redness   Start antibiotics       Diagnoses and all orders for this visit:    Skin tear of right upper arm without complication, initial encounter  -     cephalexin (KEFLEX) 500 mg capsule; Take 1 capsule (500 mg total) by mouth every 6 (six) hours for 5 days    Weight loss          Subjective:      Patient ID: Helder Brice is a 77 y o  male  Patient is here for a week of right forearm skin tear from a [address] is red with granulation tissue  He was unable to put the skin back over the wound  No major pain no discharge no fever  Has been applying Bactroban ointment      The following portions of the patient's history were reviewed and updated as appropriate: allergies, current medications, past family history, past medical history, past social history, past surgical history and problem list     Review of Systems   Constitutional: Negative  HENT: Negative  Eyes: Negative  Respiratory: Negative  Cardiovascular: Negative  Gastrointestinal: Negative  Musculoskeletal: Negative  Skin: Positive for wound  Neurological: Negative  Objective:      /62   Pulse 83   Ht 5' 5" (1 651 m)   Wt 58 2 kg (128 lb 6 4 oz)   SpO2 98%   BMI 21 37 kg/m²          Physical Exam  Vitals reviewed  Constitutional:       Appearance: Normal appearance  Skin:         Neurological:      Mental Status: He is alert

## 2022-02-10 NOTE — TELEPHONE ENCOUNTER
Upon review of the In Basket request we this account already states patient is not a canidate for colonoscopy and there is a report from [address] stating total colectomy   Any questions please reach out to liason  Any additional questions or concerns should be emailed to the [employer] via [email or web address] email, please do not reply via In Basket      Thank you  Dylon Staton MA

## 2022-02-15 ENCOUNTER — SOCIAL WORK (OUTPATIENT)
Dept: BEHAVIORAL/MENTAL HEALTH CLINIC | Facility: CLINIC | Age: 77
End: 2022-02-15
Payer: MEDICARE

## 2022-02-15 DIAGNOSIS — F41.1 GAD (GENERALIZED ANXIETY DISORDER): Primary | ICD-10-CM

## 2022-02-15 PROCEDURE — 90832 PSYTX W PT 30 MINUTES: CPT | Performed by: SOCIAL WORKER

## 2022-02-15 NOTE — PSYCH
Psychotherapy Provided: Individual Psychotherapy 30 minutes from 12:50-1:20    Length of time in session: 30 minutes, follow up in 5 week    Encounter Diagnosis     ICD-10-CM    1  JOSÉ LUIS (generalized anxiety disorder)  F41 1        Goals addressed in session: Goal 1     Pain:      none    0    Current suicide risk : Low     D- Gareld Pavan has been struggling with some increased physical health issues which have created some stress and additional anxiety  Continues to deal with some stress at home within his marital relationship but also due to financial stressors  Walks and work out daily to manage his anxiety  Denies any acute anxiety issues  No acute depressive symptoms  Stays active physically despite winter weather  Denies any SI  Joie Harris presents as mildly anxious but is verbal, pleasant, cooperative, well oriented and engaged during session  Affect appears brighter and he is laughing and joking at some points during session  Appropriately dressed and groomed  No psychosis  P- processed stressors during session- validation and supportive therapy provided  Reviewed appropriate stress mgmt strategies, productive outlets and conflict mgmt strategies to utilize in response to stressors  Focused on strengths to bolster self-esteem  Behavioral Health Treatment Plan [facility]: Diagnosis and Treatment Plan explained to Brit Perry relates understanding diagnosis and is agreeable to Treatment Plan   Yes

## 2022-02-16 NOTE — PROGRESS NOTES
Daily Note     Today's date: 2018  Patient name: Mary Ramos  : 1945  MRN: [id number]  Referring provider: Compa Stephens DO  Dx:   Encounter Diagnosis     ICD-10-CM    1  Vertigo R42                   Subjective: Dizziness not as bad today upon arrival   With no complaints of nausea before or during session  Dizziness at worst a 7/10 during session  Objective: See treatment diary below    Precautions: twitches (pt states is chronic); HTN; anxiety     Specialty Daily Treatment Diary      Manual                                                                                          Exercise Diary   18       VORx1, H/V standing Plain  2x30s ea  Plain  2x30s ea   Plain  2x45s ea       Saccades, H/V  Standing Plain  2x30s ea  Plain  2x30s ea   Plain  2x45s ea       VORcx, H/V  Standing Plain  2x30s ea  Plain  2x30s ea   Plain  2x45s ea       Foam, arms crossed EC FT  3x30s  FT  3x30s semitandem  4x30s       Foam twists, R/L and up/down  EO 30s ea  EC 30s ea  EO  2x30s ea  EC  2x30s ea       Ball toss hand to hand Standing  1 min  Standing  1 min  Standing  1 min       Walking with HTs, H/V FWD in killian  2x40' ea  FWD in killian  2x40' ea  FWD in killian  2x40' ea       Tandem gait, arms crossed [facility] In killian  2x40'  In killian  2x40'   In killian  2x40'       Walking with 360 turns In killian EO  2x40'  deferred  in killian EO  2x40'       Rockerboard, M/L and A/P EO  1 min ea  EO  1 min ea  EO  1 min ea       Foam beams      side with HTs 3 laps  Tandem EO 3 laps                                                                                                                                        Assessment: Able to progress times with oculomotor exercises today  Also able to progress to [facility] with foam turning  Pt showing improved balance as seen by ability to progress to semitandem on foam with no LOBs, occasional CGA from PT to steady     Patient would benefit from continued PT      Plan: Attempt ball toss with gait NV due to minimal difficulty during just standing today  Libtayo Counseling- I discussed with the patient the risks of Libtayo including but not limited to nausea, vomiting, diarrhea, and bone or muscle pain.  The patient verbalized understanding of the proper use and possible adverse effects of Libtayo.  All of the patient's questions and concerns were addressed.

## 2022-02-23 ENCOUNTER — APPOINTMENT (OUTPATIENT)
Dept: LAB | Facility: CLINIC | Age: 77
End: 2022-02-23
Payer: MEDICARE

## 2022-02-23 ENCOUNTER — ANTICOAG VISIT (OUTPATIENT)
Dept: INTERNAL MEDICINE CLINIC | Facility: CLINIC | Age: 77
End: 2022-02-23

## 2022-03-08 ENCOUNTER — ANTICOAG VISIT (OUTPATIENT)
Dept: INTERNAL MEDICINE CLINIC | Facility: CLINIC | Age: 77
End: 2022-03-08

## 2022-03-08 ENCOUNTER — APPOINTMENT (OUTPATIENT)
Dept: LAB | Facility: CLINIC | Age: 77
End: 2022-03-08
Payer: MEDICARE

## 2022-03-08 NOTE — PROGRESS NOTES
Called and spoke to pt  Advised to continue current dose and recheck in 2 weeks   Current dose is 3 mg, then 2 mg then mg repeated every 3 days  [facility]
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
## 2022-03-21 ENCOUNTER — OFFICE VISIT (OUTPATIENT)
Dept: PSYCHIATRY | Facility: CLINIC | Age: 77
End: 2022-03-21
Payer: MEDICARE

## 2022-03-21 ENCOUNTER — HOSPITAL ENCOUNTER (EMERGENCY)
Facility: HOSPITAL | Age: 77
End: 2022-03-22
Attending: EMERGENCY MEDICINE | Admitting: EMERGENCY MEDICINE
Payer: MEDICARE

## 2022-03-21 VITALS
SYSTOLIC BLOOD PRESSURE: 151 MMHG | BODY MASS INDEX: 19.93 KG/M2 | HEART RATE: 87 BPM | DIASTOLIC BLOOD PRESSURE: 82 MMHG | WEIGHT: 124 LBS | HEIGHT: 66 IN

## 2022-03-21 DIAGNOSIS — F50.9 EATING DISORDER: ICD-10-CM

## 2022-03-21 DIAGNOSIS — R63.4 WEIGHT LOSS: ICD-10-CM

## 2022-03-21 DIAGNOSIS — Z79.899 LONG-TERM USE OF HIGH-RISK MEDICATION: Chronic | ICD-10-CM

## 2022-03-21 DIAGNOSIS — G47.09 OTHER INSOMNIA: Chronic | ICD-10-CM

## 2022-03-21 DIAGNOSIS — F50.9 EATING DISORDER, UNSPECIFIED TYPE: Chronic | ICD-10-CM

## 2022-03-21 DIAGNOSIS — F41.0 PANIC DISORDER WITHOUT AGORAPHOBIA: Chronic | ICD-10-CM

## 2022-03-21 DIAGNOSIS — F33.2 MAJOR DEPRESSIVE DISORDER, RECURRENT, SEVERE WITHOUT PSYCHOTIC FEATURES (HCC): Primary | Chronic | ICD-10-CM

## 2022-03-21 DIAGNOSIS — F29 PSYCHOSIS (HCC): Primary | ICD-10-CM

## 2022-03-21 DIAGNOSIS — G47.9 SLEEP DISTURBANCE: ICD-10-CM

## 2022-03-21 DIAGNOSIS — R62.7 FAILURE TO THRIVE IN ADULT: ICD-10-CM

## 2022-03-21 DIAGNOSIS — F41.1 GAD (GENERALIZED ANXIETY DISORDER): Chronic | ICD-10-CM

## 2022-03-21 LAB
ALBUMIN SERPL BCP-MCNC: 3.6 G/DL (ref 3.5–5)
ALP SERPL-CCNC: 70 U/L (ref 46–116)
ALT SERPL W P-5'-P-CCNC: 27 U/L (ref 12–78)
ANION GAP SERPL CALCULATED.3IONS-SCNC: 10 MMOL/L (ref 4–13)
APTT PPP: 36 SECONDS (ref 23–37)
AST SERPL W P-5'-P-CCNC: 24 U/L (ref 5–45)
BASOPHILS # BLD AUTO: 0.02 THOUSANDS/ΜL (ref 0–0.1)
BASOPHILS NFR BLD AUTO: 0 % (ref 0–1)
BILIRUB SERPL-MCNC: 0.74 MG/DL (ref 0.2–1)
BUN SERPL-MCNC: 20 MG/DL (ref 5–25)
CALCIUM SERPL-MCNC: 8.4 MG/DL (ref 8.3–10.1)
CHLORIDE SERPL-SCNC: 103 MMOL/L (ref 100–108)
CO2 SERPL-SCNC: 26 MMOL/L (ref 21–32)
CREAT SERPL-MCNC: 0.91 MG/DL (ref 0.6–1.3)
EOSINOPHIL # BLD AUTO: 0.01 THOUSAND/ΜL (ref 0–0.61)
EOSINOPHIL NFR BLD AUTO: 0 % (ref 0–6)
ERYTHROCYTE [DISTWIDTH] IN BLOOD BY AUTOMATED COUNT: 13.9 % (ref 11.6–15.1)
ETHANOL EXG-MCNC: 0 MG/DL
FLUAV RNA RESP QL NAA+PROBE: NEGATIVE
FLUBV RNA RESP QL NAA+PROBE: NEGATIVE
GFR SERPL CREATININE-BSD FRML MDRD: 82 ML/MIN/1.73SQ M
GLUCOSE SERPL-MCNC: 90 MG/DL (ref 65–140)
HCT VFR BLD AUTO: 39.8 % (ref 36.5–49.3)
HGB BLD-MCNC: 13.2 G/DL (ref 12–17)
IMM GRANULOCYTES # BLD AUTO: 0.03 THOUSAND/UL (ref 0–0.2)
IMM GRANULOCYTES NFR BLD AUTO: 1 % (ref 0–2)
INR PPP: 2.08 (ref 0.84–1.19)
LYMPHOCYTES # BLD AUTO: 0.79 THOUSANDS/ΜL (ref 0.6–4.47)
LYMPHOCYTES NFR BLD AUTO: 12 % (ref 14–44)
MAGNESIUM SERPL-MCNC: 1.9 MG/DL (ref 1.6–2.6)
MCH RBC QN AUTO: 29.7 PG (ref 26.8–34.3)
MCHC RBC AUTO-ENTMCNC: 33.2 G/DL (ref 31.4–37.4)
MCV RBC AUTO: 89 FL (ref 82–98)
MONOCYTES # BLD AUTO: 0.46 THOUSAND/ΜL (ref 0.17–1.22)
MONOCYTES NFR BLD AUTO: 7 % (ref 4–12)
NEUTROPHILS # BLD AUTO: 5.19 THOUSANDS/ΜL (ref 1.85–7.62)
NEUTS SEG NFR BLD AUTO: 80 % (ref 43–75)
NRBC BLD AUTO-RTO: 0 /100 WBCS
PLATELET # BLD AUTO: 160 THOUSANDS/UL (ref 149–390)
PMV BLD AUTO: 9.2 FL (ref 8.9–12.7)
POTASSIUM SERPL-SCNC: 4.1 MMOL/L (ref 3.5–5.3)
PROT SERPL-MCNC: 6.6 G/DL (ref 6.4–8.2)
PROTHROMBIN TIME: 23.1 SECONDS (ref 11.6–14.5)
RBC # BLD AUTO: 4.45 MILLION/UL (ref 3.88–5.62)
RSV RNA RESP QL NAA+PROBE: NEGATIVE
SARS-COV-2 RNA RESP QL NAA+PROBE: NEGATIVE
SODIUM SERPL-SCNC: 139 MMOL/L (ref 136–145)
WBC # BLD AUTO: 6.5 THOUSAND/UL (ref 4.31–10.16)

## 2022-03-21 PROCEDURE — 0241U HB NFCT DS VIR RESP RNA 4 TRGT: CPT | Performed by: EMERGENCY MEDICINE

## 2022-03-21 PROCEDURE — 85730 THROMBOPLASTIN TIME PARTIAL: CPT | Performed by: EMERGENCY MEDICINE

## 2022-03-21 PROCEDURE — 82075 ASSAY OF BREATH ETHANOL: CPT | Performed by: EMERGENCY MEDICINE

## 2022-03-21 PROCEDURE — 80053 COMPREHEN METABOLIC PANEL: CPT

## 2022-03-21 PROCEDURE — 85025 COMPLETE CBC W/AUTO DIFF WBC: CPT

## 2022-03-21 PROCEDURE — 90833 PSYTX W PT W E/M 30 MIN: CPT | Performed by: PSYCHIATRY & NEUROLOGY

## 2022-03-21 PROCEDURE — 99285 EMERGENCY DEPT VISIT HI MDM: CPT | Performed by: EMERGENCY MEDICINE

## 2022-03-21 PROCEDURE — 96360 HYDRATION IV INFUSION INIT: CPT

## 2022-03-21 PROCEDURE — 36415 COLL VENOUS BLD VENIPUNCTURE: CPT

## 2022-03-21 PROCEDURE — 83735 ASSAY OF MAGNESIUM: CPT

## 2022-03-21 PROCEDURE — 85610 PROTHROMBIN TIME: CPT | Performed by: EMERGENCY MEDICINE

## 2022-03-21 PROCEDURE — 99285 EMERGENCY DEPT VISIT HI MDM: CPT

## 2022-03-21 PROCEDURE — 99214 OFFICE O/P EST MOD 30 MIN: CPT | Performed by: PSYCHIATRY & NEUROLOGY

## 2022-03-21 RX ORDER — LORAZEPAM 0.5 MG/1
0.5 TABLET ORAL EVERY 8 HOURS PRN
Status: DISCONTINUED | OUTPATIENT
Start: 2022-03-21 | End: 2022-03-22 | Stop reason: HOSPADM

## 2022-03-21 RX ORDER — LANOLIN ALCOHOL/MO/W.PET/CERES
6 CREAM (GRAM) TOPICAL
Status: DISCONTINUED | OUTPATIENT
Start: 2022-03-21 | End: 2022-03-22 | Stop reason: HOSPADM

## 2022-03-21 RX ORDER — WARFARIN SODIUM 2 MG/1
2 TABLET ORAL DAILY
Status: DISCONTINUED | OUTPATIENT
Start: 2022-03-22 | End: 2022-03-22 | Stop reason: HOSPADM

## 2022-03-21 RX ORDER — PANTOPRAZOLE SODIUM 40 MG/1
40 TABLET, DELAYED RELEASE ORAL
Status: DISCONTINUED | OUTPATIENT
Start: 2022-03-22 | End: 2022-03-22 | Stop reason: HOSPADM

## 2022-03-21 RX ORDER — SERTRALINE HYDROCHLORIDE 100 MG/1
100 TABLET, FILM COATED ORAL DAILY
Status: DISCONTINUED | OUTPATIENT
Start: 2022-03-22 | End: 2022-03-22 | Stop reason: HOSPADM

## 2022-03-21 RX ORDER — QUETIAPINE FUMARATE 25 MG/1
50 TABLET, FILM COATED ORAL
Status: DISCONTINUED | OUTPATIENT
Start: 2022-03-21 | End: 2022-03-22 | Stop reason: HOSPADM

## 2022-03-21 RX ORDER — AMLODIPINE BESYLATE 5 MG/1
5 TABLET ORAL DAILY
Status: DISCONTINUED | OUTPATIENT
Start: 2022-03-22 | End: 2022-03-22 | Stop reason: HOSPADM

## 2022-03-21 RX ADMIN — SODIUM CHLORIDE 1000 ML: 0.9 INJECTION, SOLUTION INTRAVENOUS at 20:02

## 2022-03-21 RX ADMIN — Medication 6 MG: at 21:47

## 2022-03-21 RX ADMIN — QUETIAPINE FUMARATE 50 MG: 25 TABLET ORAL at 21:47

## 2022-03-21 RX ADMIN — LORAZEPAM 0.5 MG: 0.5 TABLET ORAL at 22:04

## 2022-03-21 NOTE — LETTER
[address] [place] [address]  Dept: [phone]      [facility] TRANSFER CONSENT    NAME Clarita Hernández                                         1945                              MRN [id number]    I have been informed of my rights regarding examination, treatment, and transfer   by Dr Efra Godfrey MD    Benefits: Specialized equipment and/or services available at the receiving facility (Include comment)________________________,Continuity of care,Other benefits (Include comment)_______________________ (inpatient mental health 12)    Risks: Potential for delay in receiving treatment,Potential deterioration of medical condition,Possible worsening of condition or death during transfer,Increased discomfort during transfer      Consent for Transfer:  I acknowledge that my medical condition has been evaluated and explained to me by the emergency department physician or other qualified medical person and/or my attending physician, who has recommended that I be transferred to the service of  Accepting Physician: Annie Emanuel at [address] Name, [address] : [facility]  The above potential benefits of such transfer, the potential risks associated with such transfer, and the probable risks of not being transferred have been explained to me, and I fully understand them  The doctor has explained that, in my case, the benefits of transfer outweigh the risks  I agree to be transferred  I authorize the performance of emergency medical procedures and treatments upon me in both transit and upon arrival at the receiving facility  Additionally, I authorize the release of any and all medical records to the receiving facility and request they be transported with me, if possible  I understand that the safest mode of transportation during a medical emergency is an ambulance and that the Hospital advocates the use of this mode of transport   Risks of traveling to the receiving facility by car, including absence of medical control, life sustaining equipment, such as oxygen, and medical personnel has been explained to me and I fully understand them  (SATISH CORRECT BOX BELOW)  [X]  I consent to the stated transfer and to be transported by ambulance/helicopter  [  ]  I consent to the stated transfer, but refuse transportation by ambulance and accept full responsibility for my transportation by car  I understand the risks of non-ambulance transfers and I exonerate the Hospital and its staff from any deterioration in my condition that results from this refusal     X___________________________________________    DATE  22  TIME________  Signature of patient or legally responsible individual signing on patient behalf           RELATIONSHIP TO PATIENT_________________________            Provider Certification    NAME Jasmyne Baca                                         1945                              MRN [id number]    A medical screening exam was performed on the above named patient  Based on the examination:    Condition Necessitating Transfer The primary encounter diagnosis was Psychosis ([address] )  Diagnoses of Failure to thrive in adult, Weight loss, Eating disorder, and Sleep disturbance were also pertinent to this visit      Patient Condition: The patient has been stabilized such that within reasonable medical probability, no material deterioration of the patient condition or the condition of the unborn child(krystian) is likely to result from the transfer    Reason for Transfer: Level of Care needed not available at this facility,No bed available at level of patient's needs,Other (Include comment)____________________ (inpatient mental health 201)    Transfer Requirements: Facility [address] [place] [address]   · Space available and qualified personnel available for treatment as acknowledged by Crisis [phone]  · Agreed to accept transfer and to provide appropriate medical treatment as acknowledged by       Wilver Thao  · Appropriate medical records of the examination and treatment of the patient are provided at the time of transfer   [address] _______  · Transfer will be performed by qualified personnel from [address] [place] [address]  and appropriate transfer equipment as required, including the use of necessary and appropriate life support measures  Provider Certification: I have examined the patient and explained the following risks and benefits of being transferred/refusing transfer to the patient/family:  General risk, such as traffic hazards, adverse weather conditions, rough terrain or turbulence, possible failure of equipment (including vehicle or aircraft), or consequences of actions of persons outside the control of the transport personnel,Unanticipated needs of medical equipment and personnel during transport,Risk of worsening condition,The possibility of a transport vehicle being unavailable,The patient is stable for psychiatric transfer because they are medically stable, and is protected from harming him/herself or others during transport      Based on these reasonable risks and benefits to the patient and/or the unborn child(krystian), and based upon the information available at the time of the patients examination, I certify that the medical benefits reasonably to be expected from the provision of appropriate medical treatments at another medical facility outweigh the increasing risks, if any, to the individuals medical condition, and in the case of labor to the unborn child, from effecting the transfer      X____________________________________________ DATE 03/22/22        TIME_______      ORIGINAL - SEND TO MEDICAL RECORDS   COPY - SEND WITH PATIENT DURING TRANSFER

## 2022-03-21 NOTE — ED PROVIDER NOTES
History  Chief Complaint   Patient presents with    Psychiatric Evaluation     PT was sent in by psychiatrist related to a continuous weightloss and for further psych eval  PT denies SI and HI     Patient presents from psychiatric office due to concerns of developing medical problem likely electrolyte abnormality secondary to eating disorder decreased PO intake and constant exercising  Patients wife is at bedside and states that patient eats one meal a day and exercises all day  He drinks mostly water  Patient believes that he is eating adequately, does not want to get overweight and has been exercising to keep the weight off  Patient states that he "used to weight 212 lbs does not wanto to go back to being fat " review of systems is negative patient states that he feels well  Per conversation with Psychiatrist if patient has no medical reason for admission will need to be admitted for psychiatric treatment due to OCD, eating disorder complications  History provided by:  Spouse  Psychiatric Evaluation  Presenting symptoms: bizarre behavior    Presenting symptoms: no homicidal ideas, no suicidal thoughts, no suicidal threats and no suicide attempt    Patient accompanied by:  Family member  Degree of incapacity (severity): Moderate  Onset quality:  Gradual  Duration:  2 months  Timing:  Constant  Progression:  Worsening  Chronicity:  Chronic  Treatment compliance:  Unable to specify  Relieved by:  Nothing  Worsened by:  Nothing  Ineffective treatments:  None tried  Associated symptoms: poor judgment and weight change    Associated symptoms: no abdominal pain, no appetite change and no chest pain    Risk factors: hx of mental illness        Prior to Admission Medications   Prescriptions Last Dose Informant Patient Reported? Taking?    LORazepam (ATIVAN) 0 5 mg tablet   No No   Sig: Take 1 tablet (0 5 mg total) by mouth 3 (three) times a day as needed for anxiety   Melatonin 10 MG TABS  Spouse/Significant Other Yes No   Sig: Take by mouth   Patient not taking: Reported on 3/22/2022    Multiple Vitamins-Minerals (MULTI FOR HIM 50+ PO)  Spouse/Significant Other Yes No   Sig: Take 1 tablet by mouth daily   QUEtiapine (SEROquel) 50 mg tablet   No No   Sig: Take 1 tablet (50 mg total) by mouth daily at bedtime   amLODIPine (NORVASC) 5 mg tablet   No No   Sig: Take 1 tablet (5 mg total) by mouth daily   omeprazole (PriLOSEC) 40 MG capsule   No No   Sig: Take 1 capsule (40 mg total) by mouth daily   sertraline (ZOLOFT) 100 mg tablet   No No   Sig: Take 1 tablet (100 mg total) by mouth daily   warfarin (COUMADIN) 2 mg tablet   No No   Sig: Take 1 tablet (2 mg total) by mouth daily      Facility-Administered Medications: None       Past Medical History:   Diagnosis Date    Allergic rhinitis     Anosmia     Anxiety     Benign parotid tumor     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     Crohn's disease ([facility]     Depression     Dilated pancreatic duct     [facility] thrombosis) ([facility]     Eating disorder     GERD (gastroesophageal reflux disease)     Hearing loss     Heart disease     [place] hearing[place]     no hearing aids    Hypertension     Leucocytosis     Mass in neck     Nail anomaly     Polyuria     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HCC)     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    Recurrent falls     Solar lentigo     Thrombocytopenia (HCC)     Vertigo     Vision loss of left eye        Past Surgical History:   Procedure Laterality Date    COLON SURGERY      Partial colectomy and colostomy    COLONOSCOPY      ESOPHAGOGASTRODUODENOSCOPY N/A 4/5/2017    Procedure: ESOPHAGOGASTRODUODENOSCOPY (EGD); Surgeon: Adriel Persaud MD;  [facility]; Service:     EYE SURGERY Right     Cataract    KNEE SURGERY      CA EDG US EXAM SURGICAL ALTER STOM DUODENUM/JEJUNUM N/A 4/9/2018    Procedure: LINEAR ENDOSCOPIC U/S;  Surgeon: Aleena Caro MD;  [facility];   Service: Gastroenterology    CA EXC PAROTD,LAT LOBE,DISSECT 5TH NERV Right 2018    Procedure: PAROTIDECTOMY WITH FACIAL NERVE MONITOR AND FROZEN SECTION;  Surgeon: Trip Mccrary MD;  [facility];  Service: ENT    RADICAL NECK DISSECTION N/A 2018    Procedure: SELECTIVE NECK DISSECTION;  Surgeon: Trip Mccrary MD;  [facility];  Service: ENT    REMOVAL OF IMPACTED TOOTH - COMPLETELY BONY N/A 2018    Procedure: EXTRACTION TEETH #15 and #30;  Surgeon: Elzbieta Barrios DDS;  [facility];  Service: Maxillofacial    UPPER GASTROINTESTINAL ENDOSCOPY      VEIN LIGATION AND [address]         Family History   Problem Relation Age of Onset    Heart disease Brother     Depression Brother     Alcohol abuse Brother     Diverticulitis Mother     Drug abuse Mother     Depression Sister     Anxiety disorder Sister     Depression Sister     Anxiety disorder Sister     Mental illness Other     Alcohol abuse Other     Drug abuse Other     Completed Suicide  Other      I have reviewed and agree with the history as documented  E-Cigarette/Vaping    E-Cigarette Use Never User      E-Cigarette/Vaping Substances    Nicotine No     THC No     CBD No     Flavoring No     Other No     Unknown No      Social History     Tobacco Use    Smoking status: Former Smoker     Packs/day: 1 00     Years: 10 00     Pack years: 10 00     Types: Cigarettes     Quit date: 1981     Years since quittin 8    Smokeless tobacco: Never Used    Tobacco comment: 50 years ago   Vaping Use    Vaping Use: Never used   Substance Use Topics    Alcohol use: No     Comment: history of excessive alcohol use - quit in     Drug use: No        Review of Systems   Constitutional: Negative for appetite change, chills and fever  HENT: Negative for ear pain and sore throat  Eyes: Negative for pain and visual disturbance  Respiratory: Negative for cough and shortness of breath      Cardiovascular: Negative for chest pain and palpitations  Gastrointestinal: Negative for abdominal pain and vomiting  Genitourinary: Negative for dysuria and hematuria  Musculoskeletal: Negative for arthralgias and back pain  Skin: Negative for color change and rash  Neurological: Negative for seizures and syncope  Psychiatric/Behavioral: Negative for homicidal ideas and suicidal ideas  All other systems reviewed and are negative  Physical Exam  ED Triage Vitals [03/21/22 1733]   Temperature Pulse Respirations Blood Pressure SpO2   98 6 °F (37 °C) 84 18 147/72 97 %      Temp Source Heart Rate Source Patient Position - Orthostatic VS BP Location FiO2 (%)   Oral Monitor Sitting Left arm --      Pain Score       --             Orthostatic Vital Signs  Vitals:    03/21/22 1733 03/21/22 2145 03/22/22 0847   BP: 147/72 133/93 158/82   Pulse: 84 81 82   Patient Position - Orthostatic VS: Sitting Sitting Lying       Physical Exam  Vitals and nursing note reviewed  Constitutional:       General: He is not in acute distress  Appearance: He is well-developed  He is cachectic  He is not ill-appearing  HENT:      Head: Normocephalic and atraumatic  Eyes:      Conjunctiva/sclera: Conjunctivae normal    Cardiovascular:      Rate and Rhythm: Normal rate and regular rhythm  Heart sounds: No murmur heard  Pulmonary:      Effort: Pulmonary effort is normal  No respiratory distress  Breath sounds: Normal breath sounds  No wheezing or rales  Abdominal:      General: Bowel sounds are normal       Palpations: Abdomen is soft  Tenderness: There is no abdominal tenderness  There is no guarding  Musculoskeletal:      Cervical back: Neck supple  Right lower leg: No edema  Left lower leg: No edema  Skin:     General: Skin is warm and dry  Capillary Refill: Capillary refill takes less than 2 seconds  Neurological:      General: No focal deficit present        Mental Status: He is alert and oriented to person, place, and time  Psychiatric:         Attention and Perception: Attention normal          Mood and Affect: Affect is blunt and flat  Speech: Speech normal          Behavior: Behavior is cooperative  Thought Content: Thought content is delusional          Cognition and Memory: Memory normal          Judgment: Judgment is inappropriate  ED Medications  Medications   sodium chloride 0 9 % bolus 1,000 mL (0 mL Intravenous Stopped 3/21/22 2113)       Diagnostic Studies  Results Reviewed     Procedure Component Value Units Date/Time    Urine Microscopic [[id number]] Collected: 03/22/22 0849    Lab Status: Final result Specimen: Urine, Clean Catch Updated: 03/22/22 0927     RBC, UA 1-2 /hpf      WBC, UA 0-1 /hpf      Epithelial Cells None Seen /hpf      Bacteria, UA Occasional /hpf      Uric Acid Melly, UA Occasional /hpf     Rapid drug screen, urine [[id number]]  (Normal) Collected: 03/22/22 0849    Lab Status: Final result Specimen: Urine, Clean Catch Updated: 03/22/22 0911     Amph/Meth UR Negative     Barbiturate Ur Negative     Benzodiazepine Urine Negative     Cocaine Urine Negative     Methadone Urine Negative     Opiate Urine Negative     PCP Ur Negative     THC Urine Negative     Oxycodone Urine Negative    Narrative:      FOR MEDICAL PURPOSES ONLY  IF CONFIRMATION NEEDED PLEASE CONTACT THE LAB WITHIN 5 DAYS      Drug Screen Cutoff Levels:  AMPHETAMINE/METHAMPHETAMINES  1000 ng/mL  BARBITURATES     200 ng/mL  BENZODIAZEPINES     200 ng/mL  COCAINE      300 ng/mL  METHADONE      300 ng/mL  OPIATES      300 ng/mL  PHENCYCLIDINE     25 ng/mL  THC       50 ng/mL  OXYCODONE      100 ng/mL    UA (URINE) with reflex to Scope [[id number]]  (Abnormal) Collected: 03/22/22 0849    Lab Status: Final result Specimen: Urine, Clean Catch Updated: 03/22/22 0904     Color, UA Yellow     Clarity, UA Clear     Specific Gravity, UA >=1 030     pH, UA 5 5     Leukocytes, UA Negative     Nitrite, UA Negative Protein, UA Negative mg/dl      Glucose, UA Negative mg/dl      Ketones, UA Negative mg/dl      Urobilinogen, UA 0 2 E U /dl      Bilirubin, UA Negative     Blood, UA Trace-Intact    COVID/FLU/RSV - 2 hour TAT [[id number]]  (Normal) Collected: 03/21/22 2216    Lab Status: Final result Specimen: Nares from Nose Updated: 03/21/22 2307     SARS-CoV-2 Negative     INFLUENZA A PCR Negative     INFLUENZA B PCR Negative     RSV PCR Negative    Narrative:      FOR PEDIATRIC PATIENTS - copy/paste COVID Guidelines URL to browser: [email or web address]    SARS-CoV-2 assay is a Nucleic Acid Amplification assay intended for the  qualitative detection of nucleic acid from SARS-CoV-2 in nasopharyngeal  swabs  Results are for the presumptive identification of SARS-CoV-2 RNA  Positive results are indicative of infection with SARS-CoV-2, the virus  causing COVID-19, but do not rule out bacterial infection or co-infection  with other viruses  Laboratories within the [place] and its  territories are required to report all positive results to the appropriate  public health authorities  Negative results do not preclude SARS-CoV-2  infection and should not be used as the sole basis for treatment or other  patient management decisions  Negative results must be combined with  clinical observations, patient history, and epidemiological information  This test has not been FDA cleared or approved  This test has been authorized by FDA under an Emergency Use Authorization  (EUA)  This test is only authorized for the duration of time the  declaration that circumstances exist justifying the authorization of the  emergency use of an in vitro diagnostic tests for detection of SARS-CoV-2  virus and/or diagnosis of COVID-19 infection under section 564(b)(1) of  the Act, 21 U  S C  964DTE-6(F)(4), unless the authorization is terminated  or revoked sooner   The test has been validated but independent review by FDA  and CLIA is pending  Test performed using [employer] Gene[employer]pert: This RT-PCR assay targets N2,  a region unique to SARS-CoV-2  A conserved region in the E-gene was chosen  for pan-Sarbecovirus detection which includes SARS-CoV-2      POCT alcohol breath test [[id number]]  (Normal) Resulted: 03/21/22 2212    Lab Status: Final result Updated: 03/21/22 2212     EXTBreath Alcohol 0 00    Comprehensive metabolic panel [[id number]] Collected: 03/21/22 2000    Lab Status: Final result Specimen: Blood from Arm, Right Updated: 03/21/22 2041     Sodium 139 mmol/L      Potassium 4 1 mmol/L      Chloride 103 mmol/L      CO2 26 mmol/L      ANION GAP 10 mmol/L      BUN 20 mg/dL      Creatinine 0 91 mg/dL      Glucose 90 mg/dL      Calcium 8 4 mg/dL      AST 24 U/L      ALT 27 U/L      Alkaline Phosphatase 70 U/L      Total Protein 6 6 g/dL      Albumin 3 6 g/dL      Total Bilirubin 0 74 mg/dL      eGFR 82 ml/min/1 73sq m     Narrative:      [facility] guidelines for Chronic Kidney Disease (CKD):     Stage 1 with normal or high GFR (GFR > 90 mL/min/1 73 square meters)    Stage 2 Mild CKD (GFR = 60-89 mL/min/1 73 square meters)    Stage 3A Moderate CKD (GFR = 45-59 mL/min/1 73 square meters)    Stage 3B Moderate CKD (GFR = 30-44 mL/min/1 73 square meters)    Stage 4 Severe CKD (GFR = 15-29 mL/min/1 73 square meters)    Stage 5 End Stage CKD (GFR <15 mL/min/1 73 square meters)  Note: GFR calculation is accurate only with a steady state creatinine    Magnesium [[id number]]  (Normal) Collected: 03/21/22 2000    Lab Status: Final result Specimen: Blood from Arm, Right Updated: 03/21/22 2041     Magnesium 1 9 mg/dL     Protime-INR [[id number]]  (Abnormal) Collected: 03/21/22 2000    Lab Status: Final result Specimen: Blood from Arm, Right Updated: 03/21/22 2036     Protime 23 1 seconds      INR 2 08    APTT [[id number]]  (Normal) Collected: 03/21/22 2000    Lab Status: Final result Specimen: Blood from Arm, Right Updated: 03/21/22 2036     PTT 36 seconds     CBC and differential [[id number]]  (Abnormal) Collected: 03/21/22 2000    Lab Status: Final result Specimen: Blood from Arm, Right Updated: 03/21/22 2012     WBC 6 50 Thousand/uL      RBC 4 45 Million/uL      Hemoglobin 13 2 g/dL      Hematocrit 39 8 %      MCV 89 fL      MCH 29 7 pg      MCHC 33 2 g/dL      RDW 13 9 %      MPV 9 2 fL      Platelets 682 Thousands/uL      nRBC 0 /100 WBCs      Neutrophils Relative 80 %      Immat GRANS % 1 %      Lymphocytes Relative 12 %      Monocytes Relative 7 %      Eosinophils Relative 0 %      Basophils Relative 0 %      Neutrophils Absolute 5 19 Thousands/µL      Immature Grans Absolute 0 03 Thousand/uL      Lymphocytes Absolute 0 79 Thousands/µL      Monocytes Absolute 0 46 Thousand/µL      Eosinophils Absolute 0 01 Thousand/µL      Basophils Absolute 0 02 Thousands/µL                  No orders to display         Procedures  Procedures      ED Course  ED Course as of 03/23/22 2330   Mon Mar 21, 2022   2105 The pt has agreed to sign in voluntarily as a 201  Medically cleared at this time  D/w his psychiatrist who strongly feels he needs to be admitted psychiatrically due to extreme weight loss, and eating disorder  Signed out to Dr Margarita Claros at shift change  SBIRT 22yo+      Most Recent Value   SBIRT (22 yo +)    In order to provide better care to our patients, we are screening all of our patients for alcohol and drug use  Would it be okay to ask you these screening questions? No Filed at: 03/21/2022 2001                [facility]  Number of Diagnoses or Management Options  Diagnosis management comments: 77 y o male with eating disorder presenting from psychiatrist office due to concerns of developing medical problems due to eating disorder - limiting calorie intake and exercising for hours every day       Differential include but not limited abnormal labs, failure to thrive    Initial ED plan CBC, CMP, Magnesium  Disposition  Final diagnoses:   Psychosis ([facility] )   Failure to thrive in adult   Weight loss   Eating disorder     Time reflects when diagnosis was documented in both MDM as applicable and the Disposition within this note     Time User Action Codes Description Comment    3/21/2022  9:08 PM Dorethia Finders [F29] Psychosis ([facility] )     3/21/2022  9:08 PM Adele Mems Add [R62 51] Failure to thrive (child)     3/21/2022  9:08 PM Breana Beers [R62 51] Failure to thrive (child)     3/21/2022  9:08 PM Dorethia Finders [R62 7] Failure to thrive in adult     3/21/2022  9:23 PM Macho Cuevas Add [R63 4] Weight loss     3/21/2022  9:23 PM Adele Mems Add [F50 9] Eating disorder     3/22/2022 12:10 AM Alicia Maciel Add [G47 9] Sleep disturbance       ED Disposition     ED Disposition Condition Date/Time Comment    Transfer to [facility] Mar 21, 2022  9:08 PM Moira Humphreys should be transferred out to behavioral health facility (pending placement) and has been medically cleared          MD Documentation      Most Recent Value   Patient Condition The patient has been stabilized such that within reasonable medical probability, no material deterioration of the patient condition or the condition of the unborn child(krystian) is likely to result from the transfer   Reason for Transfer Level of Care needed not available at this facility, No bed available at level of patient's needs, Other (Include comment)____________________  [inpatient mental health 201]   Benefits of Transfer Specialized equipment and/or services available at the receiving facility (Include comment)________________________, Continuity of care, Other benefits (Include comment)_______________________  [inpatient mental health 201]   Risks of Transfer Potential for delay in receiving treatment, Potential deterioration of medical condition, Possible worsening of condition or death during transfer, Increased discomfort during transfer   Accepting Physician [facility] Col[facility] Wu [facility] [address] [place] [address] 6T    (Name & Tel number) Crisis [phone]   Transported by (Company and Unit #) [facility]   Sending MD Zain Stiles   Provider Certification General risk, such as traffic hazards, adverse weather conditions, rough terrain or turbulence, possible failure of equipment (including vehicle or aircraft), or consequences of actions of persons outside the control of the transport personnel, Unanticipated needs of medical equipment and personnel during transport, Risk of worsening condition, The possibility of a transport vehicle being unavailable, The patient is stable for psychiatric transfer because they are medically stable, and is protected from harming him/herself or others during transport      RN Documentation      [address] Name, [address] 6T   Bed Assignment Per RN    (Name & Tel number) Crisis [phone]   Report Given to RN to RN   Medications Reviewed with Next Provider of Service Yes   Transport Mode Other (Comment)  [CTS]   Transported by [employer] and Unit #) [facility]   Level of Care Other (Comment)  [CTS]   Copies of Medical Records Sent History and Physical, Orders, Progress note, Transfer form, Nursing note, Labs, Other (comment), Med Rec form  [original 201]   Patient Belongings Disposition Sent with patient   Transfer Date 03/22/22   Transfer Time [id number]      Follow-up Information    None         Discharge Medication List as of 3/22/2022  1:24 PM      CONTINUE these medications which have NOT CHANGED    Details   amLODIPine (NORVASC) 5 mg tablet Take 1 tablet (5 mg total) by mouth daily, Starting Tue 2/1/2022, Normal      Multiple Vitamins-Minerals (MULTI FOR HIM 50+ PO) Take 1 tablet by mouth daily, Starting Tue 8/21/2012, Historical Med      sertraline (ZOLOFT) 100 mg tablet Take 1 tablet (100 mg total) by mouth daily, Starting Wed 1/5/2022, Until Sun 10/2/2022, Normal      LORazepam (ATIVAN) 0 5 mg tablet Take 1 tablet (0 5 mg total) by mouth 3 (three) times a day as needed for anxiety, Starting Thu 11/4/2021, Until Sun 4/3/2022 at 2359, Normal      Melatonin 10 MG TABS Take by mouth, Historical Med      omeprazole (PriLOSEC) 40 MG capsule Take 1 capsule (40 mg total) by mouth daily, Starting Thu 10/28/2021, Normal      QUEtiapine (SEROquel) 50 mg tablet Take 1 tablet (50 mg total) by mouth daily at bedtime, Starting Tue 1/4/2022, Until Sat 10/1/2022, Normal      warfarin (COUMADIN) 2 mg tablet Take 1 tablet (2 mg total) by mouth daily, Starting Thu 10/28/2021, Normal           No discharge procedures on file  PDMP Review       Value Time User    PDMP Reviewed  Yes 3/15/2022  5:02 PM Melissa Cortez MD           ED Provider  Attending physically available and evaluated Sirena Sandoval  MARQUIS managed the patient along with the ED Attending      Electronically Signed by         Rajwinder Monaco,   03/21/22 3469       Farzaneh Bernal,   03/23/22 6807

## 2022-03-21 NOTE — ED ATTENDING ATTESTATION
3/21/2022  IVeronica DO, saw and evaluated the patient  I have discussed the patient with the resident/non-physician practitioner and agree with the resident's/non-physician practitioner's findings, Plan of Care, and MDM as documented in the resident's/non-physician practitioner's note, except where noted  All available labs and Radiology studies were reviewed  I was present for key portions of any procedure(s) performed by the resident/non-physician practitioner and I was immediately available to provide assistance  At this point I agree with the current assessment done in the Emergency Department  I have conducted an independent evaluation of this patient a history and physical is as follows:    78 yo M coming into the ED for evaluation of weight loss, eating disorder, sent in by his psychiatrist after being seen today  His wife reports gradual weight loss  Has hx of crohn's disease w/ a colostomy  He exercises 3 times daily, eats poorly, and weighs himself daily  His wife states he seems happy with his weight  He now weighs 124 lbs, 2 months ago he weighed 130 lbs  On physical exam: pt is well appearing, in NAD  mucous membranes dry  CTA b/l , heart RRR  Abdomen NT, ND, no R/R/G  Abdomen scaphoid  Colostomy bag in place  Neuro intact, gcs 15  Cap refill < 2 sec, skin warm and dry  Poor skin turgor  He is anxious appearing, nervous, right foot twitching  + temporal wasting, sunken eye sockets  ED Course  ED Course as of 03/21/22 2107   [facility] Mar 21, 2022   2105 The pt has agreed to sign in voluntarily as a 201  Medically cleared at this time  D/w his psychiatrist who strongly feels he needs to be admitted psychiatrically due to extreme weight loss, and eating disorder  Signed out to Dr Violetta Johnson at shift change        Wt Readings from Last 3 Encounters:   03/21/22 56 2 kg (124 lb)   02/09/22 58 2 kg (128 lb 6 4 oz)   01/04/22 59 kg (130 lb)         Critical Care Time  Procedures

## 2022-03-22 ENCOUNTER — HOSPITAL ENCOUNTER (INPATIENT)
Facility: HOSPITAL | Age: 77
LOS: 6 days | Discharge: HOME | DRG: 885 | End: 2022-03-28
Attending: STUDENT IN AN ORGANIZED HEALTH CARE EDUCATION/TRAINING PROGRAM | Admitting: PSYCHIATRY & NEUROLOGY
Payer: MEDICARE

## 2022-03-22 VITALS
SYSTOLIC BLOOD PRESSURE: 158 MMHG | OXYGEN SATURATION: 98 % | RESPIRATION RATE: 16 BRPM | TEMPERATURE: 98.6 F | HEART RATE: 82 BPM | DIASTOLIC BLOOD PRESSURE: 82 MMHG

## 2022-03-22 DIAGNOSIS — F33.2 MAJOR DEPRESSIVE DISORDER, RECURRENT, SEVERE WITHOUT PSYCHOTIC FEATURES (HCC): Primary | Chronic | ICD-10-CM

## 2022-03-22 DIAGNOSIS — G47.9 SLEEP DISTURBANCE: ICD-10-CM

## 2022-03-22 DIAGNOSIS — R63.0 ANOREXIA: ICD-10-CM

## 2022-03-22 DIAGNOSIS — I10 ESSENTIAL HYPERTENSION: ICD-10-CM

## 2022-03-22 DIAGNOSIS — K21.9 GASTROESOPHAGEAL REFLUX DISEASE WITHOUT ESOPHAGITIS: ICD-10-CM

## 2022-03-22 DIAGNOSIS — O22.30 DVT (DEEP VEIN THROMBOSIS) IN PREGNANCY: ICD-10-CM

## 2022-03-22 PROBLEM — Z00.8 MEDICAL CLEARANCE FOR PSYCHIATRIC ADMISSION: Status: ACTIVE | Noted: 2022-03-22

## 2022-03-22 PROBLEM — Z93.3 COLOSTOMY IN PLACE (HCC): Status: ACTIVE | Noted: 2022-03-22

## 2022-03-22 LAB
AMPHETAMINES SERPL QL SCN: NEGATIVE
BACTERIA UR QL AUTO: NORMAL /HPF
BARBITURATES UR QL: NEGATIVE
BENZODIAZ UR QL: NEGATIVE
BILIRUB UR QL STRIP: NEGATIVE
CLARITY UR: CLEAR
COCAINE UR QL: NEGATIVE
COLOR UR: YELLOW
GLUCOSE UR STRIP-MCNC: NEGATIVE MG/DL
HGB UR QL STRIP.AUTO: ABNORMAL
KETONES UR STRIP-MCNC: NEGATIVE MG/DL
LEUKOCYTE ESTERASE UR QL STRIP: NEGATIVE
METHADONE UR QL: NEGATIVE
NITRITE UR QL STRIP: NEGATIVE
NON-SQ EPI CELLS URNS QL MICRO: NORMAL /HPF
OPIATES UR QL SCN: NEGATIVE
OXYCODONE+OXYMORPHONE UR QL SCN: NEGATIVE
PCP UR QL: NEGATIVE
PH UR STRIP.AUTO: 5.5 [PH]
PROT UR STRIP-MCNC: NEGATIVE MG/DL
RBC #/AREA URNS AUTO: NORMAL /HPF
SP GR UR STRIP.AUTO: >=1.03 (ref 1–1.03)
THC UR QL: NEGATIVE
URATE CRY URNS QL MICRO: NORMAL /HPF
UROBILINOGEN UR QL STRIP.AUTO: 0.2 E.U./DL
WBC #/AREA URNS AUTO: NORMAL /HPF

## 2022-03-22 PROCEDURE — 81001 URINALYSIS AUTO W/SCOPE: CPT | Performed by: EMERGENCY MEDICINE

## 2022-03-22 PROCEDURE — 99222 1ST HOSP IP/OBS MODERATE 55: CPT

## 2022-03-22 PROCEDURE — 80307 DRUG TEST PRSMV CHEM ANLYZR: CPT | Performed by: EMERGENCY MEDICINE

## 2022-03-22 RX ORDER — SERTRALINE HYDROCHLORIDE 100 MG/1
100 TABLET, FILM COATED ORAL DAILY
Status: DISCONTINUED | OUTPATIENT
Start: 2022-03-23 | End: 2022-03-26

## 2022-03-22 RX ORDER — RISPERIDONE 0.25 MG/1
0.25 TABLET, ORALLY DISINTEGRATING ORAL
Status: CANCELLED | OUTPATIENT
Start: 2022-03-22

## 2022-03-22 RX ORDER — RISPERIDONE 1 MG/1
1 TABLET, ORALLY DISINTEGRATING ORAL
Status: DISCONTINUED | OUTPATIENT
Start: 2022-03-22 | End: 2022-03-28 | Stop reason: HOSPADM

## 2022-03-22 RX ORDER — RISPERIDONE 1 MG/1
1 TABLET, ORALLY DISINTEGRATING ORAL
Status: CANCELLED | OUTPATIENT
Start: 2022-03-22

## 2022-03-22 RX ORDER — MAGNESIUM HYDROXIDE/ALUMINUM HYDROXICE/SIMETHICONE 120; 1200; 1200 MG/30ML; MG/30ML; MG/30ML
30 SUSPENSION ORAL EVERY 4 HOURS PRN
Status: CANCELLED | OUTPATIENT
Start: 2022-03-22

## 2022-03-22 RX ORDER — AMLODIPINE BESYLATE 5 MG/1
5 TABLET ORAL DAILY
Status: CANCELLED | OUTPATIENT
Start: 2022-03-22

## 2022-03-22 RX ORDER — HALOPERIDOL 5 MG/ML
5 INJECTION INTRAMUSCULAR
Status: DISCONTINUED | OUTPATIENT
Start: 2022-03-22 | End: 2022-03-28 | Stop reason: HOSPADM

## 2022-03-22 RX ORDER — AMLODIPINE BESYLATE 5 MG/1
5 TABLET ORAL DAILY
Status: DISCONTINUED | OUTPATIENT
Start: 2022-03-23 | End: 2022-03-28 | Stop reason: HOSPADM

## 2022-03-22 RX ORDER — LORAZEPAM 2 MG/ML
1 INJECTION INTRAMUSCULAR
Status: CANCELLED | OUTPATIENT
Start: 2022-03-22

## 2022-03-22 RX ORDER — WARFARIN SODIUM 2 MG/1
2 TABLET ORAL
Status: COMPLETED | OUTPATIENT
Start: 2022-03-22 | End: 2022-03-22

## 2022-03-22 RX ORDER — WARFARIN SODIUM 2 MG/1
2 TABLET ORAL
Status: DISCONTINUED | OUTPATIENT
Start: 2022-03-25 | End: 2022-03-28 | Stop reason: HOSPADM

## 2022-03-22 RX ORDER — RISPERIDONE 0.5 MG/1
0.5 TABLET, ORALLY DISINTEGRATING ORAL
Status: DISCONTINUED | OUTPATIENT
Start: 2022-03-22 | End: 2022-03-28 | Stop reason: HOSPADM

## 2022-03-22 RX ORDER — WARFARIN SODIUM 2 MG/1
2 TABLET ORAL DAILY
Status: DISCONTINUED | OUTPATIENT
Start: 2022-03-22 | End: 2022-03-22

## 2022-03-22 RX ORDER — PANTOPRAZOLE SODIUM 40 MG/1
40 TABLET, DELAYED RELEASE ORAL
Status: CANCELLED | OUTPATIENT
Start: 2022-03-22

## 2022-03-22 RX ORDER — MINERAL OIL AND PETROLATUM 150; 830 MG/G; MG/G
1 OINTMENT OPHTHALMIC
Status: DISCONTINUED | OUTPATIENT
Start: 2022-03-22 | End: 2022-03-28 | Stop reason: HOSPADM

## 2022-03-22 RX ORDER — HYDROXYZINE HYDROCHLORIDE 25 MG/1
25 TABLET, FILM COATED ORAL
Status: CANCELLED | OUTPATIENT
Start: 2022-03-22

## 2022-03-22 RX ORDER — MAGNESIUM HYDROXIDE/ALUMINUM HYDROXICE/SIMETHICONE 120; 1200; 1200 MG/30ML; MG/30ML; MG/30ML
30 SUSPENSION ORAL EVERY 4 HOURS PRN
Status: DISCONTINUED | OUTPATIENT
Start: 2022-03-22 | End: 2022-03-28 | Stop reason: HOSPADM

## 2022-03-22 RX ORDER — QUETIAPINE FUMARATE 25 MG/1
50 TABLET, FILM COATED ORAL
Status: CANCELLED | OUTPATIENT
Start: 2022-03-22

## 2022-03-22 RX ORDER — WARFARIN SODIUM 2 MG/1
2 TABLET ORAL DAILY
Status: CANCELLED | OUTPATIENT
Start: 2022-03-22

## 2022-03-22 RX ORDER — RISPERIDONE 0.25 MG/1
0.25 TABLET, ORALLY DISINTEGRATING ORAL
Status: DISCONTINUED | OUTPATIENT
Start: 2022-03-22 | End: 2022-03-28 | Stop reason: HOSPADM

## 2022-03-22 RX ORDER — PANTOPRAZOLE SODIUM 40 MG/1
40 TABLET, DELAYED RELEASE ORAL
Status: DISCONTINUED | OUTPATIENT
Start: 2022-03-23 | End: 2022-03-28 | Stop reason: HOSPADM

## 2022-03-22 RX ORDER — QUETIAPINE FUMARATE 50 MG/1
50 TABLET, FILM COATED ORAL
Status: DISCONTINUED | OUTPATIENT
Start: 2022-03-22 | End: 2022-03-23

## 2022-03-22 RX ORDER — HYDROXYZINE HYDROCHLORIDE 25 MG/1
25 TABLET, FILM COATED ORAL
Status: DISCONTINUED | OUTPATIENT
Start: 2022-03-22 | End: 2022-03-28 | Stop reason: HOSPADM

## 2022-03-22 RX ORDER — RISPERIDONE 0.5 MG/1
0.5 TABLET, ORALLY DISINTEGRATING ORAL
Status: CANCELLED | OUTPATIENT
Start: 2022-03-22

## 2022-03-22 RX ORDER — SERTRALINE HYDROCHLORIDE 100 MG/1
100 TABLET, FILM COATED ORAL DAILY
Status: CANCELLED | OUTPATIENT
Start: 2022-03-22

## 2022-03-22 RX ORDER — LANOLIN ALCOHOL/MO/W.PET/CERES
3 CREAM (GRAM) TOPICAL
Status: CANCELLED | OUTPATIENT
Start: 2022-03-22

## 2022-03-22 RX ORDER — MINERAL OIL AND PETROLATUM 150; 830 MG/G; MG/G
1 OINTMENT OPHTHALMIC
Status: CANCELLED | OUTPATIENT
Start: 2022-03-22

## 2022-03-22 RX ORDER — LORAZEPAM 2 MG/ML
1 INJECTION INTRAMUSCULAR
Status: DISCONTINUED | OUTPATIENT
Start: 2022-03-22 | End: 2022-03-28 | Stop reason: HOSPADM

## 2022-03-22 RX ORDER — WARFARIN SODIUM 2 MG/1
2 TABLET ORAL
Status: DISCONTINUED | OUTPATIENT
Start: 2022-03-24 | End: 2022-03-28 | Stop reason: HOSPADM

## 2022-03-22 RX ORDER — HALOPERIDOL 5 MG/ML
5 INJECTION INTRAMUSCULAR
Status: CANCELLED | OUTPATIENT
Start: 2022-03-22

## 2022-03-22 RX ORDER — WARFARIN SODIUM 2 MG/1
3 TABLET ORAL
Status: DISCONTINUED | OUTPATIENT
Start: 2022-03-23 | End: 2022-03-28 | Stop reason: HOSPADM

## 2022-03-22 RX ORDER — LANOLIN ALCOHOL/MO/W.PET/CERES
3 CREAM (GRAM) TOPICAL
Status: DISCONTINUED | OUTPATIENT
Start: 2022-03-22 | End: 2022-03-26

## 2022-03-22 RX ADMIN — MELATONIN TAB 3 MG 3 MG: 3 TAB at 21:13

## 2022-03-22 RX ADMIN — AMLODIPINE BESYLATE 5 MG: 5 TABLET ORAL at 08:53

## 2022-03-22 RX ADMIN — PANTOPRAZOLE SODIUM 40 MG: 40 TABLET, DELAYED RELEASE ORAL at 08:52

## 2022-03-22 RX ADMIN — QUETIAPINE FUMARATE 50 MG: 50 TABLET ORAL at 21:13

## 2022-03-22 RX ADMIN — WARFARIN SODIUM 2 MG: 2 TABLET ORAL at 17:52

## 2022-03-22 RX ADMIN — SERTRALINE 100 MG: 100 TABLET, FILM COATED ORAL at 08:53

## 2022-03-22 RX ADMIN — MULTIPLE VITAMINS W/ MINERALS TAB 1 TABLET: TAB ORAL at 08:51

## 2022-03-22 NOTE — ED NOTES
KEVIN completed and signed by patient and MD   Transfer packet prepared and placed on patient's chart  RN advised

## 2022-03-22 NOTE — ED NOTES
Patient presents to ED with inability to care for self  Pt restricts his food intake and eats once per day on average, mostly junk food  Pt also exercises 4 times per day on average  Pt states that it makes him feel good and that he enjoys the exercise  Pt may be concerned about his age rather than body image and possibly believes these habits are beneficial for health  Pt has Chron's disease and may also believe decreased eating/increased exercise help him manage his medical concerns  Pt has poor insight into his persistent weight loss over the course of 4 years, and pt currently weighs about 124Ibs  Pt's wife reports that his eating and exercising behaviors started when pt's brother  about 4 years ago, which may also serve as a trigger for his eating/exercise behaviors  Pt reports no hallucinations, no drug issues, no SI/HI, no past trauma  Pt does not currently have outpatient therapy  Pt agrees to voluntary inpatient treatment      MATHEW Juarez

## 2022-03-22 NOTE — NURSING NOTE
Pt tremulous but declined PRN stating, "I'll be ok but I'm just really nervous"  Pleasant  Ate 50% of dinner  Will continue to monitor and maintain q 7 min checks  · Currently in NSR, status post cardioversion and in previous admission  · Continue amiodarone and metoprolol  · Eliquis on hold given drop in hemoglobin

## 2022-03-22 NOTE — ED NOTES
Pt face sheet and 201 consent faxed to [facility]'s intake and referral       MATHEW Gonzales S

## 2022-03-22 NOTE — ASSESSMENT & PLAN NOTE
· Patient is on life-long of anticoagulation, warfarin  · Chart review performed current warfarin regimen of 3 mg, then 2 mg, then 2 mg every 3 days     · Goal INR: 2 0-3 0  · Most recent INR on 3/21/22 2 08  · Repeat INR on 3/28/22

## 2022-03-22 NOTE — ED NOTES
Received report from Gokul Pulido at [facility] that they are awaiting a transport time from [address] [facility] for this patient

## 2022-03-22 NOTE — ED NOTES
Patient is accepted at [address] [facility]  Patient is accepted by Dr Yrn Lara    Transportation is being arranged by overnight crisis  Chart will be updated soon as it is secured  *Patient can go to the floor after 1100 3/23*      Nurse report is to be called to [phone] prior to patient transfer

## 2022-03-22 NOTE — ED CARE HANDOFF
Emergency Department Sign Out Note        Sign out and transfer of care from Dr Dottie Pierce et  al  See Separate Emergency Department note  The patient, Fernando Guevara, was evaluated by the previous provider for eating disorder, psychosis, failure to thrive in an adult  Workup Completed:  yes    ED Course / Workup Pending (followup):  201 placement as per crisis  Patient to be transferred to [facility] under Dr Rigoberto Rodarte  Signed out to Dr Allen Hamilton in AM                                       Procedures  MDM        Disposition  Final diagnoses:   Psychosis ([address] )   Failure to thrive in adult   Weight loss   Eating disorder     Time reflects when diagnosis was documented in both MDM as applicable and the Disposition within this note     Time User Action Codes Description Comment    3/21/2022  9:08 PM Ravi Albarado [F29] Psychosis ([address] )     3/21/2022  9:08 PM Patricio Bejarano Add [R62 51] Failure to thrive (child)     3/21/2022  9:08 PM Pamella Bar [R62 51] Failure to thrive (child)     3/21/2022  9:08 PM Ravi Albarado [R62 7] Failure to thrive in adult     3/21/2022  9:23 PM Grisel Davalos Add [R63 4] Weight loss     3/21/2022  9:23 PM Patricio Bejarano Add [F50 9] Eating disorder     3/22/2022 12:10 AM Vivienne Mccall Add [G47 9] Sleep disturbance       ED Disposition     ED Disposition Condition Date/Time Comment    Transfer to [facility] Mar 21, 2022  9:08 PM Fernando Guevara should be transferred out to behavioral health facility (pending placement) and has been medically cleared          MD Documentation      Most Recent Value   Patient Condition The patient has been stabilized such that within reasonable medical probability, no material deterioration of the patient condition or the condition of the unborn child(krystian) is likely to result from the transfer   Reason for Transfer Level of Care needed not available at this facility, No bed available at level of patient's needs, Other (Include comment)____________________  [inpatient mental health 201]   Benefits of Transfer Specialized equipment and/or services available at the receiving facility (Include comment)________________________, Continuity of care, Other benefits (Include comment)_______________________  [inpatient mental health 201]   Risks of Transfer Potential for delay in receiving treatment, Potential deterioration of medical condition, Possible worsening of condition or death during transfer, Increased discomfort during transfer   Accepting Physician [address] Name, [address]  [address] [facility] [address]    (Name & Tel number) Crisis [phone]   Transported by (Company and Unit #) [facility]   Sending MD Claudean Beals   Provider Certification General risk, such as traffic hazards, adverse weather conditions, rough terrain or turbulence, possible failure of equipment (including vehicle or aircraft), or consequences of actions of persons outside the control of the transport personnel, Unanticipated needs of medical equipment and personnel during transport, Risk of worsening condition, The possibility of a transport vehicle being unavailable, The patient is stable for psychiatric transfer because they are medically stable, and is protected from harming him/herself or others during transport      RN Documentation      [address] Name, [address]  [facility] Assignment Per RN    (Name & Tel number) Crisis [phone]   Report Given to RN to RN   Medications Reviewed with Next Provider of Service Yes   Transport Mode Other (Comment)  [CTS]   Transported by [employer] and Unit #) [facility]   Level of Care Other (Comment)  [CTS]   Copies of Medical Records Sent History and Physical, Orders, Progress note, Transfer form, Nursing note, Labs, Other (comment), Med Rec form  [original 201]   Patient Belongings Disposition Sent with patient Transfer Date 03/22/22   Transfer Time 1345      Follow-up Information    None       Patient's Medications   Discharge Prescriptions    No medications on file     No discharge procedures on file         ED Provider  Electronically Signed by     Talha Rodriguez MD  03/22/22 Alyson Heimlich, MD  03/22/22 3305

## 2022-03-22 NOTE — ASSESSMENT & PLAN NOTE
Weight (last 2 days)     Date/Time Weight    03/22/22 1345 56 1 (123 6)      ·   · Per chart review patient has been restricting food and excessively exercising  · Family report recent weight loss of 6lbs in 2 months     · Will consult nutrition for dietary supplementation recommendations

## 2022-03-22 NOTE — NURSING NOTE
Pt arrived voluntary from [facility]  Stated, "I guess I am here because they said I exercise too much"  Pt reported that he exercises 3-4 times a day; several walks that are a half an hour in duration and riding his bike for half hour intervals  Denied not eating meals reporting that he eats 3 meals a day  Stated, "my wife gets upset that I don't eat but I do  She could lose some weight too"  Denies depression stating, "now that I am here I am depressed"  Admitted to anxiety but "no" panic attack  Denies history of suicide attempts or previous inpatient treatment  Pt denies wanting to lose more weight  Stated, "they told me I should be about 150 pounds but I am ok where I am at  I don't want to lose more"  Medical history significant for colostomy (for past 20 years), [place], hx DVT, GERD, varicose veins and gait dysfunction  Pt reports that he has a cane but "carries it"  Cognitive deficits noted  Oriented to the unit  Will continue to monitor and maintain q 7 min checks

## 2022-03-22 NOTE — PLAN OF CARE
Problem: Prexisting or High Potential for Compromised Skin Integrity  Goal: Skin integrity is maintained or improved  Description: INTERVENTIONS:  - Identify patients at risk for skin breakdown  - Assess and monitor skin integrity  - Assess and monitor nutrition and hydration status  - Monitor labs   - Assess for incontinence   - Turn and reposition patient  - Assist with mobility/ambulation  - Relieve pressure over bony prominences  - Avoid friction and shearing  - Provide appropriate hygiene as needed including keeping skin clean and dry  - Evaluate need for skin moisturizer/barrier cream  - Collaborate with interdisciplinary team   - Patient/family teaching  - Consider wound care consult   Outcome: Progressing     Problem: Alteration in Thoughts and Perception  Goal: Treatment Goal: Gain control of psychotic behaviors/thinking, reduce/eliminate presenting symptoms and demonstrate improved reality functioning upon discharge  Outcome: Progressing  Goal: Verbalize thoughts and feelings  Description: Interventions:  - Promote a nonjudgmental and trusting relationship with the patient through active listening and therapeutic communication  - Assess patient's level of functioning, behavior and potential for risk  - Engage patient in 1 on 1 interactions  - Encourage patient to express fears, feelings, frustrations, and discuss symptoms    - Reading patient to reality, help patient recognize reality-based thinking   - Administer medications as ordered and assess for potential side effects  - Provide the patient education related to the signs and symptoms of the illness and desired effects of prescribed medications  Outcome: Progressing  Goal: Refrain from acting on delusional thinking/internal stimuli  Description: Interventions:  - Monitor patient closely, per order   - Utilize least restrictive measures   - Set reasonable limits, give positive feedback for acceptable   - Administer medications as ordered and monitor of potential side effects  Outcome: Progressing  Goal: Agree to be compliant with medication regime, as prescribed and report medication side effects  Description: Interventions:  - Offer appropriate PRN medication and supervise ingestion; conduct AIMS, as needed   Outcome: Progressing  Goal: Attend and participate in unit activities, including therapeutic, recreational, and educational groups  Description: Interventions:  -Encourage Visitation and family involvement in care  Outcome: Progressing  Goal: Recognize dysfunctional thoughts, communicate reality-based thoughts at the time of discharge  Description: Interventions:  - Provide medication and psycho-education to assist patient in compliance and developing insight into his/her illness   Outcome: Progressing  Goal: Complete daily ADLs, including personal hygiene independently, as able  Description: Interventions:  - Observe, teach, and assist patient with ADLS  - Monitor and promote a balance of rest/activity, with adequate nutrition and elimination   Outcome: Progressing     Problem: Risk for Self Injury/Neglect  Goal: Treatment Goal: Remain safe during length of stay, learn and adopt new coping skills, and be free of self-injurious ideation, impulses and acts at the time of discharge  Outcome: Progressing  Goal: Verbalize thoughts and feelings  Description: Interventions:  - Assess and re-assess patient's lethality and potential for self-injury  - Engage patient in 1:1 interactions, daily, for a minimum of 15 minutes  - Encourage patient to express feelings, fears, frustrations, hopes  - Establish rapport/trust with patient   Outcome: Progressing  Goal: Refrain from harming self  Description: Interventions:  - Monitor patient closely, per order  - Develop a trusting relationship  - Supervise medication ingestion, monitor effects and side effects   Outcome: Progressing  Goal: Attend and participate in unit activities, including therapeutic, recreational, and educational groups  Description: Interventions:  - Provide therapeutic and educational activities daily, encourage attendance and participation, and document same in the medical record  - Obtain collateral information, encourage visitation and family involvement in care   Outcome: Progressing  Goal: Recognize maladaptive responses and adopt new coping mechanisms  Outcome: Progressing  Goal: Complete daily ADLs, including personal hygiene independently, as able  Description: Interventions:  - Observe, teach, and assist patient with ADLS  - Monitor and promote a balance of rest/activity, with adequate nutrition and elimination  Outcome: Progressing     Problem: Risk for Self Injury/Neglect  Goal: Treatment Goal: Remain safe during length of stay, learn and adopt new coping skills, and be free of self-injurious ideation, impulses and acts at the time of discharge  Outcome: Progressing     Problem: Risk for Self Injury/Neglect  Goal: Treatment Goal: Remain safe during length of stay, learn and adopt new coping skills, and be free of self-injurious ideation, impulses and acts at the time of discharge  Outcome: Progressing  Goal: Verbalize thoughts and feelings  Description: Interventions:  - Assess and re-assess patient's lethality and potential for self-injury  - Engage patient in 1:1 interactions, daily, for a minimum of 15 minutes  - Encourage patient to express feelings, fears, frustrations, hopes  - Establish rapport/trust with patient   Outcome: Progressing  Goal: Refrain from harming self  Description: Interventions:  - Monitor patient closely, per order  - Develop a trusting relationship  - Supervise medication ingestion, monitor effects and side effects   Outcome: Progressing  Goal: Attend and participate in unit activities, including therapeutic, recreational, and educational groups  Description: Interventions:  - Provide therapeutic and educational activities daily, encourage attendance and participation, and document same in the medical record  - Obtain collateral information, encourage visitation and family involvement in care   Outcome: Progressing  Goal: Recognize maladaptive responses and adopt new coping mechanisms  Outcome: Progressing  Goal: Complete daily ADLs, including personal hygiene independently, as able  Description: Interventions:  - Observe, teach, and assist patient with ADLS  - Monitor and promote a balance of rest/activity, with adequate nutrition and elimination  Outcome: Progressing     Problem: Anxiety  Goal: Anxiety is at manageable level  Description: Interventions:  - Assess and monitor patient's anxiety level  - Monitor for signs and symptoms (heart palpitations, chest pain, shortness of breath, headaches, nausea, feeling jumpy, restlessness, irritable, apprehensive)  - Collaborate with interdisciplinary team and initiate plan and interventions as ordered  - [place] patient to unit/surroundings  - Explain treatment plan  - Encourage participation in care  - Encourage verbalization of concerns/fears  - Identify coping mechanisms  - Assist in developing anxiety-reducing skills  - Administer/offer alternative therapies  - Limit or eliminate stimulants  Outcome: Progressing     Problem: Nutrition/Hydration-ADULT  Goal: Nutrient/Hydration intake appropriate for improving, restoring or maintaining nutritional needs  Description: Monitor and assess patient's nutrition/hydration status for malnutrition  Collaborate with interdisciplinary team and initiate plan and interventions as ordered  Monitor patient's weight and dietary intake as ordered or per policy  Utilize nutrition screening tool and intervene as necessary  Determine patient's food preferences and provide high-protein, high-caloric foods as appropriate       INTERVENTIONS:  - Monitor oral intake, urinary output, labs, and treatment plans  - Assess nutrition and hydration status and recommend course of action  - Evaluate amount of meals eaten  - Assist patient with eating if necessary   - Allow adequate time for meals  - Recommend/ encourage appropriate diets, oral nutritional supplements, and vitamin/mineral supplements  - Order, calculate, and assess calorie counts as needed  - Recommend, monitor, and adjust tube feedings and TPN/PPN based on assessed needs  - Assess need for intravenous fluids  - Provide specific nutrition/hydration education as appropriate  - Include patient/family/caregiver in decisions related to nutrition  Outcome: Progressing     Problem: Potential for Falls  Goal: Patient will remain free of falls  Description: INTERVENTIONS:  - Educate patient/family on patient safety including physical limitations  - Instruct patient to call for assistance with activity   - Consult OT/PT to assist with strengthening/mobility   - Keep Call bell within reach  - Keep bed low and locked with side rails adjusted as appropriate  - Keep care items and personal belongings within reach  - Initiate and maintain comfort rounds  - Make Fall Risk Sign visible to staff  - Offer Toileting every Hours, in advance of need  - Initiate/[place] alarm  - Obtain necessary fall risk management equipment:   - Apply yellow socks and bracelet for high fall risk patients  - Consider moving patient to room near nurses station  Outcome: Progressing

## 2022-03-22 NOTE — ED NOTES
Insurance Authorization for admission:   Patient has Medicare Primary with an [employer]  Card on file  Per card, claims are submitted to Medicare; supplemental plan does not require precertification of services

## 2022-03-22 NOTE — ASSESSMENT & PLAN NOTE
· Patient is medically cleared for admission to the [facility] for treatment of the underlying psychiatric illness

## 2022-03-22 NOTE — ASSESSMENT & PLAN NOTE
· Surgical History of Partial Colectomy   · Past Medical Hx: Chron's Disease  · Continue Colostomy care

## 2022-03-22 NOTE — ED NOTES
Called [facility] to request transport  Provided information  They will call when a  time is determined  They are aware the patient cannot arrive until after 11am on 3/22/22

## 2022-03-22 NOTE — NURSING NOTE
Pt visible on unit this evening, calm on approach  Pt reports 4/4 anxiety related to unit disruptions  Denies depression at this time  States "I am not depressed I am just bored  I did not want to come here, this was my wife's idea"  Pt reports "I am used to always doing something, I feel bored"  Pt encouraged to socialize on unit  Medication compliant  Will maintain q7min checks

## 2022-03-22 NOTE — CONSULTS
[address] 1945, 77 y o  male MRN: [id number]  Unit/Bed#: Michael Leal 891-02 Encounter: [id number]  Primary Care Provider: Tanja Mckeon DO   Date and time admitted to hospital: 3/22/2022  1:42 PM    Inpatient consult for Medical Clearance for [address] patient  Consult performed by: Doug Lynn PA-C  Consult ordered by: Roger Hinson MD          Medical clearance for psychiatric admission  Assessment & Plan  · Patient is medically cleared for admission to the [facility] for treatment of the underlying psychiatric illness    GERD (gastroesophageal reflux disease)  Assessment & Plan  · Continue protonix     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[facility])  Assessment & Plan  · Surgical History of Partial Colectomy   · Past Medical Hx: Chron's Disease  · Continue Colostomy care     Weight loss  Assessment & Plan  Weight (last 2 days)     Date/Time Weight    03/22/22 1345 56 1 (123 6)      ·   · Per chart review patient has been restricting food and excessively exercising  · Family report recent weight loss of 6lbs in 2 months  · Will consult nutrition for dietary supplementation recommendations     HTN (hypertension)  Assessment & Plan  · /79  · Continue pre-hospital regimen of amlodipine 5 mg daily  · Continue to monitor vitals     History of DVT (deep vein thrombosis)  Assessment & Plan  · Patient is on life-long of anticoagulation, warfarin  · Chart review performed current warfarin regimen of 3 mg, then 2 mg, then 2 mg every 3 days     · Goal INR: 2 0-3 0  · Most recent INR on 3/21/22 2 08  · Repeat INR on 3/28/22     Crohn's disease  Assessment & Plan  · No acute exacerbation     ECT Clearance:   History of recent seizure or stroke:  no   History of pheochromocytoma:  no   History of active bleeding (Intracranial hemorrhage, aneurysm or AVM):  no   History of metallic implants in the head or neck:  no   History of increased intracranial pressure with mass effect: no  ·   EKG within 3 months? No  o If yes, was an arrhythmia present and at baseline?  o If yes, what is the baseline QT interval?  o If no, obtain prior to ECT for arrhythmia evaluation and baseline QT interval     Based on above criteria, Patient IS NOT medically cleared for ECT should it be recommended  Please obtain Baseline EKG in order for patient to be medically cleared for ECT  Counseling / Coordination of Care Time: 45 minutes  Greater than 50% of total time spent on patient counseling and coordination of care  Collaboration of Care: Were Recommendations Directly Discussed with Primary Treatment Team? - No     History of Present Illness:    Tory Boo is a 77 y o  male who is originally admitted to the psychiatry service due to continuous weight loss and anxiety  We are consulted for medical clearance for admission to [facility] and treatment of underlying psychiatric illness  Patient has a past medical history of crohn's disease with colostomy in place, hypertension, GERD, anxiety, and history of DVT on anticoagulation  Patient reports that his wife is concerned of his weight as tends to over exercise throughout the day  He reports that he is particular with his diet due to his history of Chron's Disease  Patient denies any physical complaints at this time such as dizziness, headaches, chest pain, shortness of breath, nausea/vomiting/diarrhea, urinary symptoms at this time  Inpatient admission for psychiatric evaluation  Will continue to follow with patient throughout hospitalization      Review of Systems:    Review of Systems   HENT: Positive for congestion  Negative for sore throat  Respiratory: Negative  Negative for shortness of breath  Cardiovascular: Negative  Negative for chest pain  Gastrointestinal: Negative  Negative for abdominal pain, constipation, nausea and vomiting  Genitourinary: Negative  Musculoskeletal: Positive for back pain   Negative for arthralgias and myalgias  Neurological: Negative  Psychiatric/Behavioral: Positive for dysphoric mood  Past Medical and Surgical History:     Past Medical History:   Diagnosis Date    Allergic rhinitis     Anosmia     Anxiety     Benign parotid tumor     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 [facility] )     Crohn's disease ([facility] [facility] )     Depression     Dilated pancreatic duct     DVT (deep venous thrombosis) [facility])     Eating disorder     GERD (gastroesophageal reflux disease)     Hearing loss     Heart disease     [place] (hard of hearing)     no hearing aids    Hypertension     Leucocytosis     Mass in neck     Nail anomaly     Polyuria     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HCC)     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    Recurrent falls     Solar lentigo     Thrombocytopenia ([facility])     Vertigo     Vision loss of left eye        Past Surgical History:   Procedure Laterality Date    COLON SURGERY      Partial colectomy and colostomy    COLONOSCOPY      ESOPHAGOGASTRODUODENOSCOPY N/A 4/5/2017    Procedure: ESOPHAGOGASTRODUODENOSCOPY (EGD); Surgeon: Sasha Chaudhry MD;  [facility]: [facility]; Service:     EYE SURGERY Right     Cataract    KNEE SURGERY      CA EDG US EXAM SURGICAL ALTER STOM DUODENUM/JEJUNUM N/A 4/9/2018    Procedure: LINEAR ENDOSCOPIC U/S;  Surgeon: Amandeep Carrera MD;  Location: [facility];   Service: Gastroenterology    CA EXC PAROTD,LAT LOBE,DISSECT 5TH NERV Right 8/8/2018    Procedure: PAROTIDECTOMY WITH FACIAL NERVE MONITOR AND FROZEN SECTION;  Surgeon: Solange Molina MD;  [facility];  Service: ENT    RADICAL NECK DISSECTION N/A 8/8/2018    Procedure: SELECTIVE NECK DISSECTION;  Surgeon: Solange Molina MD;  [facility];  Service: ENT    REMOVAL OF IMPACTED TOOTH - COMPLETELY BONY N/A 8/8/2018    Procedure: EXTRACTION TEETH #15 and #30;  Surgeon: Mimi Posey DDS;  [facility];  Service: Maxillofacial    UPPER GASTROINTESTINAL ENDOSCOPY      VEIN LIGATION AND [address] Meds/Allergies:    all medications and allergies reviewed    Allergies: Allergies   Allergen Reactions    Duloxetine Other (See Comments)     Panic attacks    Other      Seasonal       Social History:     Marital Status: /Civil Union    Substance Use History:   Social History     Substance and Sexual Activity   Alcohol Use No    Comment: history of excessive alcohol use - quit in      Social History     Tobacco Use   Smoking Status Former Smoker    Packs/day: 1 00    Years: 10 00    Pack years: 10 00    Types: Cigarettes    Quit date: 1981    Years since quittin 8   Smokeless Tobacco Never Used   Tobacco Comment    50 years ago     Social History     Substance and Sexual Activity   Drug Use No       Family History:    non-contributory    Physical Exam:     Vitals:   Blood Pressure: (!) 174/79 (22 1345)  Pulse: 79 (22 1345)  Temperature: 98 6 °F (37 °C) (22 1345)  Temp Source: Temporal (22 1345)  Respirations: 17 (22 1345)  Height: 5' 5" (165 1 cm) (22 1345)  Weight - Scale: 56 1 kg (123 lb 9 6 oz) (22 1345)  SpO2: 97 % (22 1345)    Physical Exam  HENT:      Head: Normocephalic and atraumatic  Cardiovascular:      Rate and Rhythm: Normal rate and regular rhythm  Pulmonary:      Effort: No respiratory distress  Breath sounds: Normal breath sounds  No wheezing  Abdominal:      General: Bowel sounds are normal  There is no distension  Palpations: Abdomen is soft  Tenderness: There is no abdominal tenderness  Skin:     General: Skin is warm and dry  Neurological:      Mental Status: He is oriented to person, place, and time  Psychiatric:         Mood and Affect: Mood is depressed  Additional Data:     Lab Results: I have personally reviewed pertinent reports        Results from last 7 days   Lab Units 22   WBC Thousand/uL 6 50   HEMOGLOBIN g/dL 13 2   HEMATOCRIT % 39 8   PLATELETS Thousands/uL 160   NEUTROS PCT % 80*   LYMPHS PCT % 12*   MONOS PCT % 7   EOS PCT % 0     Results from last 7 days   Lab Units 03/21/22 2000   SODIUM mmol/L 139   POTASSIUM mmol/L 4 1   CHLORIDE mmol/L 103   CO2 mmol/L 26   BUN mg/dL 20   CREATININE mg/dL 0 91   ANION GAP mmol/L 10   CALCIUM mg/dL 8 4   ALBUMIN g/dL 3 6   TOTAL BILIRUBIN mg/dL 0 74   ALK PHOS U/L 70   ALT U/L 27   AST U/L 24   GLUCOSE RANDOM mg/dL 90     Results from last 7 days   Lab Units 03/21/22 2000   INR  2 08*         Lab Results   Component Value Date/Time    HGBA1C 5 0 07/02/2021 10:56 AM    HGBA1C 4 5 10/10/2019 09:48 AM    HGBA1C 5 5 03/21/2017 10:47 AM           EKG, Pathology, and Other Studies Reviewed on Admission:   EKG: obtain baseline EKG     ** Please Note: This note has been constructed using a voice recognition system   **

## 2022-03-23 LAB
25(OH)D3 SERPL-MCNC: 43.7 NG/ML (ref 30–100)
CHOLEST SERPL-MCNC: 191 MG/DL
FOLATE SERPL-MCNC: >20 NG/ML (ref 3.1–17.5)
HDLC SERPL-MCNC: 64 MG/DL
INR PPP: 2.12 (ref 0.84–1.19)
LDLC SERPL CALC-MCNC: 114 MG/DL
NONHDLC SERPL-MCNC: 127 MG/DL
PROTHROMBIN TIME: 22.7 SECONDS (ref 11.6–14.5)
TRIGL SERPL-MCNC: 63 MG/DL
TSH SERPL DL<=0.05 MIU/L-ACNC: 2.49 UIU/ML (ref 0.47–4.68)
VIT B12 SERPL-MCNC: 586 PG/ML (ref 100–900)

## 2022-03-23 PROCEDURE — 86592 SYPHILIS TEST NON-TREP QUAL: CPT | Performed by: STUDENT IN AN ORGANIZED HEALTH CARE EDUCATION/TRAINING PROGRAM

## 2022-03-23 PROCEDURE — 82306 VITAMIN D 25 HYDROXY: CPT | Performed by: STUDENT IN AN ORGANIZED HEALTH CARE EDUCATION/TRAINING PROGRAM

## 2022-03-23 PROCEDURE — 82607 VITAMIN B-12: CPT | Performed by: STUDENT IN AN ORGANIZED HEALTH CARE EDUCATION/TRAINING PROGRAM

## 2022-03-23 PROCEDURE — 85610 PROTHROMBIN TIME: CPT | Performed by: STUDENT IN AN ORGANIZED HEALTH CARE EDUCATION/TRAINING PROGRAM

## 2022-03-23 PROCEDURE — 84443 ASSAY THYROID STIM HORMONE: CPT | Performed by: STUDENT IN AN ORGANIZED HEALTH CARE EDUCATION/TRAINING PROGRAM

## 2022-03-23 PROCEDURE — 82746 ASSAY OF FOLIC ACID SERUM: CPT | Performed by: STUDENT IN AN ORGANIZED HEALTH CARE EDUCATION/TRAINING PROGRAM

## 2022-03-23 PROCEDURE — 99223 1ST HOSP IP/OBS HIGH 75: CPT | Performed by: STUDENT IN AN ORGANIZED HEALTH CARE EDUCATION/TRAINING PROGRAM

## 2022-03-23 PROCEDURE — 80061 LIPID PANEL: CPT | Performed by: STUDENT IN AN ORGANIZED HEALTH CARE EDUCATION/TRAINING PROGRAM

## 2022-03-23 RX ORDER — OLANZAPINE 2.5 MG/1
2.5 TABLET ORAL
Status: DISCONTINUED | OUTPATIENT
Start: 2022-03-23 | End: 2022-03-26

## 2022-03-23 RX ADMIN — SERTRALINE HYDROCHLORIDE 100 MG: 100 TABLET ORAL at 09:11

## 2022-03-23 RX ADMIN — PANTOPRAZOLE SODIUM 40 MG: 40 TABLET, DELAYED RELEASE ORAL at 05:46

## 2022-03-23 RX ADMIN — OLANZAPINE 2.5 MG: 2.5 TABLET, FILM COATED ORAL at 21:02

## 2022-03-23 RX ADMIN — WARFARIN SODIUM 3 MG: 2 TABLET ORAL at 17:23

## 2022-03-23 RX ADMIN — AMLODIPINE BESYLATE 5 MG: 5 TABLET ORAL at 09:11

## 2022-03-23 RX ADMIN — MULTIPLE VITAMINS W/ MINERALS TAB 1 TABLET: TAB ORAL at 09:11

## 2022-03-23 RX ADMIN — MELATONIN TAB 3 MG 3 MG: 3 TAB at 21:02

## 2022-03-23 NOTE — NURSING NOTE
Pt visible on unit, social and talkative with roommate  Pt cooperative, medication compliant  Pt reports good appetite, ate 100% dinner  Pt reported feeling restless in evening, scheduled medication effective  Pt asleep at current time

## 2022-03-23 NOTE — NURSING NOTE
Pt alert and visible on the unit  Interacting with select peers  Remains anxious stating, "I am really nervous here"  Reported depression 0/10 and anxiety 2/4  Pleasant on approach  Denies suicidal thoughts  Appetite 50% breakfast and 100% for lunch  Will continue to monitor and maintain q 7 min checks

## 2022-03-23 NOTE — PLAN OF CARE
Pt  Attended 3 of 4 groups today and reflected that he exercises to get away from his wife  Desires better communications with her    Problem: Ineffective Coping  Goal: Participates in unit activities  Description: Interventions:  - Provide therapeutic environment   - Provide required programming   - Redirect inappropriate behaviors   Outcome: Progressing

## 2022-03-23 NOTE — TREATMENT TEAM
03/23/22 0827   Team Meeting   Meeting Type Daily Rounds   Initial Conference Date 03/23/22   Team Members Present   Team Members Present Physician;Nurse;;; Occupational Therapist   Physician Team Member Dr Marcus Cheatham, Dr Stu Velazquez, Dr Taty Perez Team Member Santhosh 116 Work Team Member Óscar   OT Team Member Melania Jackson   Patient/Family Present   Patient Present No   Patient's Family Present No     201 admission from [facility], went to ED from Psychiatrist office due to weight loss, obsessive exercising 4x/day, only eating one meal per day, weighs self daily, + colostomy, denies SI

## 2022-03-23 NOTE — PLAN OF CARE
Problem: Alteration in Thoughts and Perception  Goal: Treatment Goal: Gain control of psychotic behaviors/thinking, reduce/eliminate presenting symptoms and demonstrate improved reality functioning upon discharge  Outcome: Progressing  Goal: Verbalize thoughts and feelings  Description: Interventions:  - Promote a nonjudgmental and trusting relationship with the patient through active listening and therapeutic communication  - Assess patient's level of functioning, behavior and potential for risk  - Engage patient in 1 on 1 interactions  - Encourage patient to express fears, feelings, frustrations, and discuss symptoms    - [place] patient to reality, help patient recognize reality-based thinking   - Administer medications as ordered and assess for potential side effects  - Provide the patient education related to the signs and symptoms of the illness and desired effects of prescribed medications  Outcome: Progressing  Goal: Refrain from acting on delusional thinking/internal stimuli  Description: Interventions:  - Monitor patient closely, per order   - Utilize least restrictive measures   - Set reasonable limits, give positive feedback for acceptable   - Administer medications as ordered and monitor of potential side effects  Outcome: Progressing  Goal: Agree to be compliant with medication regime, as prescribed and report medication side effects  Description: Interventions:  - Offer appropriate PRN medication and supervise ingestion; conduct AIMS, as needed   Outcome: Progressing  Goal: Attend and participate in unit activities, including therapeutic, recreational, and educational groups  Description: Interventions:  -Encourage Visitation and family involvement in care  Outcome: Progressing  Goal: Recognize dysfunctional thoughts, communicate reality-based thoughts at the time of discharge  Description: Interventions:  - Provide medication and psycho-education to assist patient in compliance and developing insight into his/her illness   Outcome: Progressing  Goal: Complete daily ADLs, including personal hygiene independently, as able  Description: Interventions:  - Observe, teach, and assist patient with ADLS  - Monitor and promote a balance of rest/activity, with adequate nutrition and elimination   Outcome: Progressing     Problem: Risk for Self Injury/Neglect  Goal: Treatment Goal: Remain safe during length of stay, learn and adopt new coping skills, and be free of self-injurious ideation, impulses and acts at the time of discharge  Outcome: Progressing  Goal: Verbalize thoughts and feelings  Description: Interventions:  - Assess and re-assess patient's lethality and potential for self-injury  - Engage patient in 1:1 interactions, daily, for a minimum of 15 minutes  - Encourage patient to express feelings, fears, frustrations, hopes  - Establish rapport/trust with patient   Outcome: Progressing  Goal: Refrain from harming self  Description: Interventions:  - Monitor patient closely, per order  - Develop a trusting relationship  - Supervise medication ingestion, monitor effects and side effects   Outcome: Progressing  Goal: Attend and participate in unit activities, including therapeutic, recreational, and educational groups  Description: Interventions:  - Provide therapeutic and educational activities daily, encourage attendance and participation, and document same in the medical record  - Obtain collateral information, encourage visitation and family involvement in care   Outcome: Progressing  Goal: Recognize maladaptive responses and adopt new coping mechanisms  Outcome: Progressing  Goal: Complete daily ADLs, including personal hygiene independently, as able  Description: Interventions:  - Observe, teach, and assist patient with ADLS  - Monitor and promote a balance of rest/activity, with adequate nutrition and elimination  Outcome: Progressing

## 2022-03-23 NOTE — TREATMENT TEAM
03/23/22 1644   Team Meeting   Meeting Type Tx Team Meeting   Initial Conference Date 03/23/22   Team Members Present   Team Members Present Physician;Nurse;   Physician Team Member Dr Chance De Anda Team Member Modesto Qeppa [address] Management Team Member Zacarias Neri   Patient/Family Present   Patient Present Yes   Patient's Family Present No     Pt in agreement with treatment plan and goals; treatment plan signed by pt

## 2022-03-23 NOTE — TREATMENT PLAN
TREATMENT PLAN REVIEW - [facility] [facility] DANNA De Jesus 77 y o  1945 male MRN: [id number]    [address] Room / Bed: Annemarie Rocha [facility] Encounter: [id number]          Admit Date/Time:  3/22/2022  1:42 PM    Treatment Team: Attending Provider: Milena Haynes MD; Consulting Physician: Antonio Soto PA-C; Patient Care Assistant: Hilario Tomlin; Occupational Therapy Assistant: JIMMY Thibodeaux;  Registered Nurse: Claude Ibarra; Nurse Manager: Fernando Uribe RN; Patient Care Assistant: Keeley Maurer    Diagnosis: Principal Problem:    Major depressive disorder, recurrent, severe without psychotic features ([facility] [address] )  Active Problems:    Crohn's disease    History of DVT (deep vein thrombosis)    HTN (hypertension)    Weight loss    Eating disorder, unspecified    Medical clearance for psychiatric admission    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[facility])    GERD (gastroesophageal reflux disease)      Patient Strengths/Assets: cooperative, communication skills, compliant with medication, patient is on a voluntary commitment, reasoning ability, resourceful, supportive family/friends, work skills    Patient Barriers/Limitations: difficulty adapting, impaired cognition, poor reasoning ability, self-care deficit    Short Term Goals: decrease in depressive symptoms, decrease in anxiety symptoms, improvement in ability to express basic needs, improvement in insight, improvement in reasoning ability, improvement in self care, increase in group attendance, increase in socialization with peers on the unit, acceptance of need for psychiatric treatment    Long Term Goals: improvement in depression, improvement in anxiety, improvement in reasoning ability, improved insight, adequate self care, adequate sleep, adequate appetite, adequate oral intake, appropriate interaction with peers, appropriate interaction with family    Progress Towards Goals: starting psychiatric medications as prescribed, improving    Recommended Treatment: medication management, patient medication education, group therapy, milieu therapy, continued Behavioral Health psychiatric evaluation/assessment process    Treatment Frequency: daily medication monitoring, group and milieu therapy daily, monitoring through interdisciplinary rounds, monitoring through weekly patient care conferences    Expected Discharge Date:   To be determined    Discharge Plan: referrals as indicated, return to previous living arrangement    Treatment Plan Created/Updated By: Tushar Lebron MD

## 2022-03-23 NOTE — CMS CERTIFICATION NOTE
Certification: Based upon physical, mental and social evaluations, I certify that inpatient psychiatric services are medically necessary for this patient for a duration of 12 midnights for the treatment of  MDD, eating disorder

## 2022-03-23 NOTE — CASE MANAGEMENT
Psychosocial Assessment 1:1    CM met with pt privately  Pt visibly nervous, hands trembling while signing paperwork  Pt reports not wanting to be in hospital although recognizes reason for admission related to his "over exercising" and that his wife "wants me here " Pt pleasant and cooperative with intake although focused on d/c  Cm reviewed recommendation for PHP programming upon d/c  CM reviewed purpose of PHP and length of program  Pt reports not wanting PHP due to his wife having to drive him and unable to manage phone technology for virtual programming  Pt reports "my wife will probably make me go "       Admission / Details: admitted from Dr Hemanth Laws office due to pt not eating, exercising all day, only drinking water, and weighing self daily  [facility]: [facility] Status: 201  Insurance: Medicare A&B, secondary: [employer]  Rx coverage: denies difficulty obtaining meds  Marital Status:  for 46 years  Children: son [place], tel# [phone]  Family: one sister- frequent contact, one brother  4 hrs ago  Residence: private home   Can return home: yes  Lives with: wife Nohemy Salter, tel# [phone]  Level of Ed: dropped out of school 10th grade, no GED  Work History: [employer]  Income/Source: , reports not knowing amount; wife manages finances  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[employer]  Transportation: pt has license but does not drive; wife drives  Legal Issues: denies  [address]acy:  [address]  [address] HX: denies inpt treatment   Outpatient treatment only  Trauma HX: death of brother 4 yrs ago  Family hx: reports sister has depression and in treatment  D&A HX: denies  Medical: colostomy x20 yrs, Crohn's disease, hard of hearing, DVT, GERD, varicose veins, reports difficulty seeing with left eye  DME: hearing aid, reading glasses; reports having cane and r/w but does not use them  Tobacco: denies   HX: denies  Access to firearms: reports wife has his gun locked up and pt does not have access, reports son has a gun but it is also locked up with no access  UDS Results: normal  PCP: Dr Eusebia Arrington: [facility] office  Therapist: Wes Hooker in PCP office  ICM/ACT:  denies  Community Supports: wife  Stressors: frequent arguments with wife, being in hospital, poor sleep  Strengths:  "I'm always moving "  Coping Skills: exercise   ROIS Signed: wife, PCP,  [facility]  Treatment Plan Signed: yes  IMM Signed: yes  Upcoming Appointments: Dr Petrona Moss, PCP on 4/25 and 5/4  Covid vaccine received: Pfizer: 11/30/21, 3/20/21, 2/27/21

## 2022-03-23 NOTE — H&P
Psychiatric Evaluation - Behavioral Health     Identification Data:Lorenzo Nicholson Seen 77 y o  male MRN: [id number]  Unit/Bed#: Cecelia Ferraro 367-30 Encounter: [id number]    Chief Complaint: " my wife thinks I might die from too much exercise"    History of present illness:    Patient is a 77year old male, domiciled with his wife ,   History of depression, JOSÉ LUIS, eating disorder,     He was admitted following recommendation by OPD provider Dr Brice Mahajan on 3/21/22 due to concerns about worsening depression, poor self care and poor appetite and weightloss  Per outpatient notes by Dr Brice Mahajan- Wife reports that he has been more irritated recently and argues with her often about the need to loose more weight and also about eating  Wife reports that he only eats one meal a day at supper time - throws away breakfast and lunch  He continues to exercise excessively every day - goes for hours long walks 3 times a day and also uses treadmill every day  Wife reports that the whole family has been concerned about his health  She also noted leg jerking and some confusion recently  MMSE was completed during the session today and Nena Corey scored 24/30 (recall was 0/3, he was also unable to copy pentagons)  On initial evaluation, pt is minimizing all symptoms, he reports he has been trying to live healthy, reason why he has been watching what he eats  He also exercises frequently because he enjoys it( he goes for a 45 minute walk 3 times a day and rides his stationary bike for 30minutes daily)  He denies throwing away his meals, insists he eats 3 meals a day and that also drinks a boost  He reports family has been concerned about his excessive exercising but her feels comfortable and this makes him happy  Patient endorses depressed mood,  some anhedonia due to his preoccupation with  exercising, he  reports eating 3 meals but at same time states he has  been limiting his intake because his colostomy bag fills up easily    Patient is preoccupied with his weight  He denies suicidal or homicidal ideations, he denies AVH, No overt psychosis  He reports reduced sleep( about 2hrs per night), having a lot of energy but no inflated self esteem, is not talkative, no grandiosity  No history of mariela  He has been restricting his calories, but denies binging and purging behavior  He is preoccupied with his weight, states he no longer wants to go above 130 lbs  He reports compliance with his medications at home  Today, this writer  spoke with Dr Faiza Benton, she reports pt did well on zyprexa in the past, but this was decreased and discontinued by neurology last year due to concerns tremors  She recommends to discontinue seroquel and restarting zyprexa at low dose to help negative thoughts, appetite and mood  Psychiatric Review Of Systems:  Change in sleep: yes  Appetite changes: yes  Weight changes: yes, lost 80Lbs in last 4 years,  Change in energy/anergy: yes  Change in interest/pleasure/anhedonia: yes  Somatic symptoms: no  Anxiety/panic: yes  Manic symptoms: no  Guilt feelings:no  Hopeless: no  Self injurious behavior/risky behavior: no    Historical Information     Past Psychiatric History:   Panic disorder, MDD, eating disoder follows Outpatient with Dr Fields  Patient was last precribed zoloft 100mg daily, ativan 0 5mg TID as needed and seroquel 50mg HS  No history of psych IP admissions, has a therapist   No past suicide attempts  Medication Trials: Zoloft, Cymbalta, Remeron, Ativan, Zyprexa, Seroquel, Valium and Melatonin     Substance Abuse History:  Quit alcohol in 2008  Former smoker    Family Psychiatric History:   His sister may have anxiety    Social History:  Developmental: none; He had 5 siblings , only 1 sister left alive  Education: to be explored, 10th grade  Marital history: , had 1 adult son    Living arrangement, social support: wife  Occupational History: retired, [employer], stopped working at age 49 after his colostomy bag  Access to firearms: has a 22 rifle , hidden by his wife  Legal :none  ; none    Traumatic History:  Abuse:none is reported  Other Traumatic Events: none reported  Past Medical History:   Diagnosis Date    Allergic rhinitis     Anosmia     Anxiety     Benign parotid tumor     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     Crohn's disease ([facility] )     Depression     Dilated pancreatic duct     [facility]     Eating disorder     GERD (gastroesophageal reflux disease)     Hearing loss     Heart disease     [place] (hard of hearing)     no hearing aids    Hypertension     Leucocytosis     Mass in neck     Nail anomaly     Polyuria     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[facility]     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    Recurrent falls     Solar lentigo     Thrombocytopenia ([facility]     Vertigo     Vision loss of left eye        Medical Review Of Systems:  Constitutional: negative  Eyes: negative  Ears, nose, mouth, throat, and face: negative  Respiratory: negative  Cardiovascular: negative  Gastrointestinal: negative  Genitourinary:negative  Integument/breast: negative  Hematologic/lymphatic: negative  Musculoskeletal:negative  Neurological: negative  Endocrine: negative  Allergic/Immunologic: negative    Meds/Allergies   all current active meds have been reviewed  Allergies   Allergen Reactions    Duloxetine Other (See Comments)     Panic attacks    Other      Seasonal     Objective      Mental Status Evaluation:  Appearance:  causally dressed, frail   Behavior:  calm, cooperative and restless and fidgety   Speech:   Language Normal rate and Normal volume  No overt abnormality   Mood:  Depressed and Anxious   Affect:    Thought process mood-congruent  Goal directed and coherent   Associations: Tightly connected   Thought Content:  Does not verbalize delusional material and is somatically preoccupied   Perceptual Disturbances: Denies hallucinations and does not appear to be responding to internal stimuli   Risk Potential: No suicidal or homicidal ideation   Orientation  Oriented x 3   Memory Not tested, per OPD note MMSE was 24/30   Attention/Concentration attention span and concentration were age appropriate   Fund of knowledge aware of current events   Insight:  limited   Judgment: Poor judgment   Gait/Station: normal gait/station   Motor Activity: No abnormal movement noted          Lab Results: I have personally reviewed pertinent lab results        Recent Results (from the past 168 hour(s))   Comprehensive metabolic panel    Collection Time: 03/21/22  8:00 PM   Result Value Ref Range    Sodium 139 136 - 145 mmol/L    Potassium 4 1 3 5 - 5 3 mmol/L    Chloride 103 100 - 108 mmol/L    CO2 26 21 - 32 mmol/L    ANION GAP 10 4 - 13 mmol/L    BUN 20 5 - 25 mg/dL    Creatinine 0 91 0 60 - 1 30 mg/dL    Glucose 90 65 - 140 mg/dL    Calcium 8 4 8 3 - 10 1 mg/dL    AST 24 5 - 45 U/L    ALT 27 12 - 78 U/L    Alkaline Phosphatase 70 46 - 116 U/L    Total Protein 6 6 6 4 - 8 2 g/dL    Albumin 3 6 3 5 - 5 0 g/dL    Total Bilirubin 0 74 0 20 - 1 00 mg/dL    eGFR 82 ml/min/1 73sq m   CBC and differential    Collection Time: 03/21/22  8:00 PM   Result Value Ref Range    WBC 6 50 4 31 - 10 16 Thousand/uL    RBC 4 45 3 88 - 5 62 Million/uL    Hemoglobin 13 2 12 0 - 17 0 g/dL    Hematocrit 39 8 36 5 - 49 3 %    MCV 89 82 - 98 fL    MCH 29 7 26 8 - 34 3 pg    MCHC 33 2 31 4 - 37 4 g/dL    RDW 13 9 11 6 - 15 1 %    MPV 9 2 8 9 - 12 7 fL    Platelets 999 575 - 023 Thousands/uL    nRBC 0 /100 WBCs    Neutrophils Relative 80 (H) 43 - 75 %    Immat GRANS % 1 0 - 2 %    Lymphocytes Relative 12 (L) 14 - 44 %    Monocytes Relative 7 4 - 12 %    Eosinophils Relative 0 0 - 6 %    Basophils Relative 0 0 - 1 %    Neutrophils Absolute 5 19 1 85 - 7 62 Thousands/µL    Immature Grans Absolute 0 03 0 00 - 0 20 Thousand/uL    Lymphocytes Absolute 0 79 0 60 - 4 47 Thousands/µL    Monocytes Absolute 0 46 0 17 - 1 22 Thousand/µL    Eosinophils Absolute 0 01 0 00 - 0 61 Thousand/µL    Basophils Absolute 0 02 0 00 - 0 10 Thousands/µL   Magnesium    Collection Time: 03/21/22  8:00 PM   Result Value Ref Range    Magnesium 1 9 1 6 - 2 6 mg/dL   Protime-INR    Collection Time: 03/21/22  8:00 PM   Result Value Ref Range    Protime 23 1 (H) 11 6 - 14 5 seconds    INR 2 08 (H) 0 84 - 1 19   APTT    Collection Time: 03/21/22  8:00 PM   Result Value Ref Range    PTT 36 23 - 37 seconds   POCT alcohol breath test    Collection Time: 03/21/22 10:12 PM   Result Value Ref Range    EXTBreath Alcohol 0 00    COVID/FLU/RSV - 2 hour TAT    Collection Time: 03/21/22 10:16 PM    Specimen: Nose; Nares   Result Value Ref Range    SARS-CoV-2 Negative Negative    INFLUENZA A PCR Negative Negative    INFLUENZA B PCR Negative Negative    RSV PCR Negative Negative   Rapid drug screen, urine    Collection Time: 03/22/22  8:49 AM   Result Value Ref Range    Amph/Meth UR Negative Negative    Barbiturate Ur Negative Negative    Benzodiazepine Urine Negative Negative    Cocaine Urine Negative Negative    Methadone Urine Negative Negative    Opiate Urine Negative Negative    PCP Ur Negative Negative    THC Urine Negative Negative    Oxycodone Urine Negative Negative   UA (URINE) with reflex to Scope    Collection Time: 03/22/22  8:49 AM   Result Value Ref Range    Color, UA Yellow     Clarity, UA Clear     Specific Gravity, UA >=1 030 1 003 - 1 030    pH, UA 5 5 4 5, 5 0, 5 5, 6 0, 6 5, 7 0, 7 5, 8 0    Leukocytes, UA Negative Negative    Nitrite, UA Negative Negative    Protein, UA Negative Negative mg/dl    Glucose, UA Negative Negative mg/dl    Ketones, UA Negative Negative mg/dl    Urobilinogen, UA 0 2 0 2, 1 0 E U /dl E U /dl    Bilirubin, UA Negative Negative    Blood, UA Trace-Intact (A) Negative   Urine Microscopic    Collection Time: 03/22/22  8:49 AM   Result Value Ref Range    RBC, UA 1-2 None Seen, 0-1, 1-2, 2-4, 0-5 /hpf    WBC, UA 0-1 None Seen, 0-1, 1-2, 0-5, 2-4 /hpf    Epithelial Cells None Seen None Seen, Occasional /hpf    Bacteria, UA Occasional None Seen, Occasional /hpf    Uric Acid Melly, UA Occasional /hpf   Folate    Collection Time: 03/23/22  6:22 AM   Result Value Ref Range    Folate >20 0 (H) 3 1 - 17 5 ng/mL   Protime-INR    Collection Time: 03/23/22  6:22 AM   Result Value Ref Range    Protime 22 7 (H) 11 6 - 14 5 seconds    INR 2 12 (H) 0 84 - 1 19   TSH, 3rd generation    Collection Time: 03/23/22  6:22 AM   Result Value Ref Range    TSH 3RD GENERATON 2 490 0 465 - 4 680 uIU/mL   Vitamin B12    Collection Time: 03/23/22  6:22 AM   Result Value Ref Range    Vitamin B-12 586 100 - 900 pg/mL   Vitamin D 25 hydroxy    Collection Time: 03/23/22  6:22 AM   Result Value Ref Range    Vit D, 25-Hydroxy 43 7 30 0 - 100 0 ng/mL   Lipid panel    Collection Time: 03/23/22  6:22 AM   Result Value Ref Range    Cholesterol 191 See Comment mg/dL    Triglycerides 63 See Comment mg/dL    HDL, Direct 64 >=40 mg/dL    LDL Calculated 114 <130 mg/dL    Non-HDL-Chol (CHOL-HDL) 127 mg/dl     Imaging Studies:    MRI [facility] without contrast done on 10/18/21  IMPRESSION:   1   No acute infarction, intracranial hemorrhage or mass effect  2   Trace, chronic microangiopathy is similar to the previous study    3   Bilateral mastoid air cell effusions redemonstrated      EKG, Pathology, and Other Studies:   8/8/21  Age and gender specific ECG analysis   Sinus rhythm with occasional Premature ventricular complexes  Otherwise normal ECG  When compared with ECG of 06-OCT-2018 13:32,  GA interval has increased  QT has shortened  Confirmed by Vladimir Kowalski ([id number]) on 8/9/2021 8:33:12 AM    Code Status:Full code    Patient Strengths/Assets: cooperative, communication skills, compliant with medication, family ties, patient is on a voluntary commitment    Patient Barriers/Limitations: difficulty adapting, impaired cognition, limited family ties, poor insight    Assessment/Plan Principal Problem:    Major depressive disorder, recurrent, severe without psychotic features ([facility] )  Active Problems:    Crohn's disease    History of DVT (deep vein thrombosis)    HTN (hypertension)    Weight loss    Eating disorder, unspecified    Medical clearance for psychiatric admission    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[facility])    GERD (gastroesophageal reflux disease)    Plan:     Continue Zoloft 100mg daily   Discontinue Seroquel as discussed above  Start Olanzapine 2 5mg HS for anxiety, mood, thoughts    Medical per SLIM  Ongoing discharge planning  Likely be a short stay, will obtain collateral from wife, Plan for PHP after discharge, Likely connect with Therapist with expertise in eating disorder  Risks, benefits and possible side effects of Medications:   Risks, benefits, and possible side effects of medications explained to patient and patient verbalizes understanding

## 2022-03-24 LAB — RPR SER QL: NORMAL

## 2022-03-24 PROCEDURE — 99232 SBSQ HOSP IP/OBS MODERATE 35: CPT | Performed by: STUDENT IN AN ORGANIZED HEALTH CARE EDUCATION/TRAINING PROGRAM

## 2022-03-24 RX ADMIN — MELATONIN TAB 3 MG 3 MG: 3 TAB at 21:36

## 2022-03-24 RX ADMIN — WARFARIN SODIUM 2 MG: 2 TABLET ORAL at 17:26

## 2022-03-24 RX ADMIN — AMLODIPINE BESYLATE 5 MG: 5 TABLET ORAL at 08:44

## 2022-03-24 RX ADMIN — SERTRALINE HYDROCHLORIDE 100 MG: 100 TABLET ORAL at 08:45

## 2022-03-24 RX ADMIN — PANTOPRAZOLE SODIUM 40 MG: 40 TABLET, DELAYED RELEASE ORAL at 05:50

## 2022-03-24 RX ADMIN — OLANZAPINE 2.5 MG: 2.5 TABLET, FILM COATED ORAL at 21:36

## 2022-03-24 RX ADMIN — MULTIPLE VITAMINS W/ MINERALS TAB 1 TABLET: TAB ORAL at 08:45

## 2022-03-24 NOTE — PLAN OF CARE
Pt  Attended all groups as assigned and completed a relapse prevention plan with staff assistance  Notable anxiety at times and limited variety of [place] choices currently in his life    Problem: Ineffective Coping  Goal: Participates in unit activities  Description: Interventions:  - Provide therapeutic environment   - Provide required programming   - Redirect inappropriate behaviors   Outcome: Progressing

## 2022-03-24 NOTE — TREATMENT TEAM
03/24/22 0811   Team Meeting   Meeting Type Daily Rounds   Initial Conference Date 03/24/22   Team Members Present   Team Members Present Physician;Nurse;;Occupational Therapist   Physician Team Member Dr Stu Velazquez, Dr Rico Cormier Management Team Member Kamala Soni   OT Team Member Melania Jackson   Patient/Family Present   Patient Present No   Patient's Family Present No     Denies depression, reports anxiety 2/4, 50% breakfast, 100% lunch and dinner, denies SI, reports feeling restless in evening, social with roommate, pacing during day, restarted olanzapine

## 2022-03-24 NOTE — PROGRESS NOTES
Progress Note - Behavioral Health   Prateek Xavier 77 y o  male MRN: [id number]  Unit/Bed#: Raheel Jackson 782-43 Encounter: [id number]    The patient was seen for continuing care and reviewed with treatment team  Patient has been social, talkative, he reported using exercise as a way from getting away from his wife during a conflict, he states he loves his wife very much and does not want her to leave him  Today, pt appears almost tearful when medication and PHP discussed  states being here makes him very anxious and depressed  He is in agreement with plan for PHP  Sleep- reports poor, only able to get about 2-3 hours last night  " there is nothing to do here, so I don't feel tired"   Frequently wakes up to use the restroom  Appetite- consumes % of his meals  States he was stopped from giving away his food this morning  " I was full"   Energy: report , ok  No suicidal or homicidal ideations  No AVH  No EPS, denies any side effects of medication      /76 (BP Location: Left arm)   Pulse 82   Temp 98 °F (36 7 °C) (Temporal)   Resp 16   Ht 5' 5" (1 651 m)   Wt 56 1 kg (123 lb 9 6 oz)   SpO2 98%   BMI 20 57 kg/m²     Current Mental Status Evaluation:  Appearance:  Adequate hygiene and grooming, hard of hearing , slightly taping is right hand and leg due to anxiety   Behavior:  calm, cooperative and friendly   Mood:  anxious and depressed   Affect: constricted   Speech: Normal rate and Normal volume   Thought Process:  Goal directed and coherent   Thought Content:  Does not verbalize delusional material   Perceptual Disturbances: Denies hallucinations and does not appear to be responding to internal stimuli   Risk Potential: No suicidal or homicidal ideation   Orientation:        Current Facility-Administered Medications   Medication Dose Route Frequency Provider Last Rate    aluminum-magnesium hydroxide-simethicone  30 mL Oral Q4H PRN Luci Rainey MD      amLODIPine  5 mg Oral Daily Julianne ANNA Charity Goodson MD      artificial tear  1 application Both Eyes A3Y PRN Bjornjuliet Polk, MD      haloperidol lactate  5 mg Intramuscular Q4H PRN Max 4/day Bjorn Jam, MD      hydrOXYzine HCL  25 mg Oral Q6H PRN Max 4/day Bjorn Lope, MD      LORazepam  1 mg Intramuscular Q6H PRN Max 3/day Bjornjuliet Polk, MD      melatonin  3 mg Oral HS Bjorn Polk, MD      multivitamin-minerals  1 tablet Oral Daily Bjornjuliet Polk, MD      OLANZapine  2 5 mg Oral HS Grady Elizabeth, MD      pantoprazole  40 mg Oral Early Morning Bjornjuliet Polk, MD      risperiDONE  0 25 mg Oral Q4H PRN Max 6/day Bjron Jam, MD      risperiDONE  0 5 mg Oral Q4H PRN Max 3/day Bjron Jam, MD      risperiDONE  1 mg Oral Q4H PRN Max 3/day Bjorn Lope, MD      sertraline  100 mg Oral Daily Bjorn Polk, MD      warfarin  2 mg Oral Q3 Days [place], [place]      [START ON 3/25/2022] warfarin  2 mg Oral Q3 Days [place], [place]      warfarin  3 mg Oral Q3 Days [facility]       Recent Results (from the past 168 hour(s))   Comprehensive metabolic panel    Collection Time: 03/21/22  8:00 PM   Result Value Ref Range    Sodium 139 136 - 145 mmol/L    Potassium 4 1 3 5 - 5 3 mmol/L    Chloride 103 100 - 108 mmol/L    CO2 26 21 - 32 mmol/L    ANION GAP 10 4 - 13 mmol/L    BUN 20 5 - 25 mg/dL    Creatinine 0 91 0 60 - 1 30 mg/dL    Glucose 90 65 - 140 mg/dL    Calcium 8 4 8 3 - 10 1 mg/dL    AST 24 5 - 45 U/L    ALT 27 12 - 78 U/L    Alkaline Phosphatase 70 46 - 116 U/L    Total Protein 6 6 6 4 - 8 2 g/dL    Albumin 3 6 3 5 - 5 0 g/dL    Total Bilirubin 0 74 0 20 - 1 00 mg/dL    eGFR 82 ml/min/1 73sq m   CBC and differential    Collection Time: 03/21/22  8:00 PM   Result Value Ref Range    WBC 6 50 4 31 - 10 16 Thousand/uL    RBC 4 45 3 88 - 5 62 Million/uL    Hemoglobin 13 2 12 0 - 17 0 g/dL    Hematocrit 39 8 36 5 - 49 3 %    MCV 89 82 - 98 fL    MCH 29 7 26 8 - 34 3 pg    MCHC 33 2 31 4 - 37 4 g/dL    RDW 13 9 11 6 - 15 1 %    MPV 9 2 8 9 - 12 7 fL    Platelets 759 156 - 158 Thousands/uL    nRBC 0 /100 WBCs    Neutrophils Relative 80 (H) 43 - 75 %    Immat GRANS % 1 0 - 2 %    Lymphocytes Relative 12 (L) 14 - 44 %    Monocytes Relative 7 4 - 12 %    Eosinophils Relative 0 0 - 6 %    Basophils Relative 0 0 - 1 %    Neutrophils Absolute 5 19 1 85 - 7 62 Thousands/µL    Immature Grans Absolute 0 03 0 00 - 0 20 Thousand/uL    Lymphocytes Absolute 0 79 0 60 - 4 47 Thousands/µL    Monocytes Absolute 0 46 0 17 - 1 22 Thousand/µL    Eosinophils Absolute 0 01 0 00 - 0 61 Thousand/µL    Basophils Absolute 0 02 0 00 - 0 10 Thousands/µL   Magnesium    Collection Time: 03/21/22  8:00 PM   Result Value Ref Range    Magnesium 1 9 1 6 - 2 6 mg/dL   Protime-INR    Collection Time: 03/21/22  8:00 PM   Result Value Ref Range    Protime 23 1 (H) 11 6 - 14 5 seconds    INR 2 08 (H) 0 84 - 1 19   APTT    Collection Time: 03/21/22  8:00 PM   Result Value Ref Range    PTT 36 23 - 37 seconds   POCT alcohol breath test    Collection Time: 03/21/22 10:12 PM   Result Value Ref Range    EXTBreath Alcohol 0 00    COVID/FLU/RSV - 2 hour TAT    Collection Time: 03/21/22 10:16 PM    Specimen: Nose; Nares   Result Value Ref Range    SARS-CoV-2 Negative Negative    INFLUENZA A PCR Negative Negative    INFLUENZA B PCR Negative Negative    RSV PCR Negative Negative   Rapid drug screen, urine    Collection Time: 03/22/22  8:49 AM   Result Value Ref Range    Amph/Meth UR Negative Negative    Barbiturate Ur Negative Negative    Benzodiazepine Urine Negative Negative    Cocaine Urine Negative Negative    Methadone Urine Negative Negative    Opiate Urine Negative Negative    PCP Ur Negative Negative    THC Urine Negative Negative    Oxycodone Urine Negative Negative   UA (URINE) with reflex to Scope    Collection Time: 03/22/22  8:49 AM   Result Value Ref Range    Color, UA Yellow     Clarity, UA Clear     Specific [place], UA >=1 030 1 003 - 1 030    pH, UA 5 5 4 5, 5 0, 5 5, 6 0, 6 5, 7 0, 7 5, 8 0    Leukocytes, UA Negative Negative    Nitrite, UA Negative Negative    Protein, UA Negative Negative mg/dl    Glucose, UA Negative Negative mg/dl    Ketones, UA Negative Negative mg/dl    Urobilinogen, UA 0 2 0 2, 1 0 E U /dl E U /dl    Bilirubin, UA Negative Negative    Blood, UA Trace-Intact (A) Negative   Urine Microscopic    Collection Time: 03/22/22  8:49 AM   Result Value Ref Range    RBC, UA 1-2 None Seen, 0-1, 1-2, 2-4, 0-5 /hpf    WBC, UA 0-1 None Seen, 0-1, 1-2, 0-5, 2-4 /hpf    Epithelial Cells None Seen None Seen, Occasional /hpf    Bacteria, UA Occasional None Seen, Occasional /hpf    Uric Acid Melly, UA Occasional /hpf   Folate    Collection Time: 03/23/22  6:22 AM   Result Value Ref Range    Folate >20 0 (H) 3 1 - 17 5 ng/mL   Protime-INR    Collection Time: 03/23/22  6:22 AM   Result Value Ref Range    Protime 22 7 (H) 11 6 - 14 5 seconds    INR 2 12 (H) 0 84 - 1 19   RPR    Collection Time: 03/23/22  6:22 AM   Result Value Ref Range    RPR Non-Reactive Non-Reactive   TSH, 3rd generation    Collection Time: 03/23/22  6:22 AM   Result Value Ref Range    TSH 3RD GENERATON 2 490 0 465 - 4 680 uIU/mL   Vitamin B12    Collection Time: 03/23/22  6:22 AM   Result Value Ref Range    Vitamin B-12 586 100 - 900 pg/mL   Vitamin D 25 hydroxy    Collection Time: 03/23/22  6:22 AM   Result Value Ref Range    Vit D, 25-Hydroxy 43 7 30 0 - 100 0 ng/mL   Lipid panel    Collection Time: 03/23/22  6:22 AM   Result Value Ref Range    Cholesterol 191 See Comment mg/dL    Triglycerides 63 See Comment mg/dL    HDL, Direct 64 >=40 mg/dL    LDL Calculated 114 <130 mg/dL    Non-HDL-Chol (CHOL-HDL) 127 mg/dl     Progress Toward Goals: progressing, will increase sertraline, INR is stable  Ordered a neuropsych evaluation to assess for cognition     Assessment     Principal Problem:    Major depressive disorder, recurrent, severe without psychotic features ([facility] [address] ); Eating disorder    Active Problems:    Crohn's disease    History of DVT (deep vein thrombosis)    HTN (hypertension)    Weight loss    Eating disorder, unspecified    Medical clearance for psychiatric admission    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[facility])    GERD (gastroesophageal reflux disease)        Plan :    - Medications;   Psychiatric: Increase to sertraline 150mg daily                        Continue Olanzapine 2 5mg daily HS, as augmentation     Medical: per SLIM    -Therapy: occupational therapy, milieu and group therapy  - Legal: 12   -Disposition:home with wife, tentatively Monday with PHP

## 2022-03-24 NOTE — NURSING NOTE
Pt alert and visible on the unit  [place]  Easily distracted  Interacts with peers selectively  Reported depression 3/10 and anxiety 2/4  Stated, "I want to go home"  Also reported that he was "awake 3 times last night"  Disruptive in groups d/t "not being able to hear"  Pleasant on approach  Appetite 50% for meals  Cares for colostomy independently  Will continue to monitor and maintain q 7 min checks

## 2022-03-24 NOTE — PROGRESS NOTES
Pt attended communication and boundaries group   Pt was able to slef reflect and communicate with peers with prompting  Pt able to reminisce  03/24/22 1330   Activity/Group Checklist   Group Other (Comment)  (communication and boundaries group)   Attendance Attended   Attendance Duration (min) 31-45   Interactions Other (Comment)  (needed prompting)   Affect/Mood Appropriate   Goals Achieved Identified feelings; Discussed coping strategies; Able to listen to others; Able to engage in interactions; Able to reflect/comment on own behavior;Able to self-disclose

## 2022-03-24 NOTE — CASE MANAGEMENT
Call made to pt's wife Kailey Damon, tel# [phone]; reviewed admission and recommendation for PHP programming upon d/c  Pt's wife reports concern for transporting pt daily to [facility] and questioned option for pt to do half virtual and half in person  CM to check with [facility] program  Pt's wife reports she does give pt his meals although at breakfast pt will say he wants to go for walk first, when he returns he will refuse breakfast stating it is almost lunch time so he will wait  At lunch time pt will go for another walk and then state it is too late to eat due to dinner time  Pt's wife reports pt will drink a Boost shake if she pushes him to drink it  Pt will not prepare any food for himself, pt will only independently eat an evening snack of cookies or ice cream  Pt will eat dinner  Pt's wife reports pt contacted her and stated he does not having his hearing aids in because he does not know how to put them in  Pt's wife reports pt does know how to put in his hearing aids as she has shown him multiple times  Pt's wife reports pt "won't do anything for himself  He always wants me to do everything  He is capable of doing more then he says " Pt's wife expressed frustration with pt "always throwing temper tantrums" Reports pt will get easily agitated, start to yell and curse and "throw things " Pt's wife reports pt manages his own medications but has reported to wife that he stopped taking his lorazepam three times a day and only takes it in AM and PM  Pt's wife reports noticing pt's irritability being more during the afternoon hours  Pt's wife reports wanting pt to have neuropsych eval for possible alzheimer dementia

## 2022-03-24 NOTE — PLAN OF CARE
Problem: Prexisting or High Potential for Compromised Skin Integrity  Goal: Skin integrity is maintained or improved  Description: INTERVENTIONS:  - Identify patients at risk for skin breakdown  - Assess and monitor skin integrity  - Assess and monitor nutrition and hydration status  - Monitor labs   - Assess for incontinence   - Turn and reposition patient  - Assist with mobility/ambulation  - Relieve pressure over bony prominences  - Avoid friction and shearing  - Provide appropriate hygiene as needed including keeping skin clean and dry  - Evaluate need for skin moisturizer/barrier cream  - Collaborate with interdisciplinary team   - Patient/family teaching  - Consider wound care consult   Outcome: Progressing     Problem: Alteration in Thoughts and Perception  Goal: Treatment Goal: Gain control of psychotic behaviors/thinking, reduce/eliminate presenting symptoms and demonstrate improved reality functioning upon discharge  Outcome: Progressing  Goal: Verbalize thoughts and feelings  Description: Interventions:  - Promote a nonjudgmental and trusting relationship with the patient through active listening and therapeutic communication  - Assess patient's level of functioning, behavior and potential for risk  - Engage patient in 1 on 1 interactions  - Encourage patient to express fears, feelings, frustrations, and discuss symptoms    - [place] patient to reality, help patient recognize reality-based thinking   - Administer medications as ordered and assess for potential side effects  - Provide the patient education related to the signs and symptoms of the illness and desired effects of prescribed medications  Outcome: Progressing  Goal: Refrain from acting on delusional thinking/internal stimuli  Description: Interventions:  - Monitor patient closely, per order   - Utilize least restrictive measures   - Set reasonable limits, give positive feedback for acceptable   - Administer medications as ordered and monitor of potential side effects  Outcome: Progressing  Goal: Agree to be compliant with medication regime, as prescribed and report medication side effects  Description: Interventions:  - Offer appropriate PRN medication and supervise ingestion; conduct AIMS, as needed   Outcome: Progressing  Goal: Attend and participate in unit activities, including therapeutic, recreational, and educational groups  Description: Interventions:  -Encourage Visitation and family involvement in care  Outcome: Progressing  Goal: Recognize dysfunctional thoughts, communicate reality-based thoughts at the time of discharge  Description: Interventions:  - Provide medication and psycho-education to assist patient in compliance and developing insight into his/her illness   Outcome: Progressing  Goal: Complete daily ADLs, including personal hygiene independently, as able  Description: Interventions:  - Observe, teach, and assist patient with ADLS  - Monitor and promote a balance of rest/activity, with adequate nutrition and elimination   Outcome: Progressing     Problem: Risk for Self Injury/Neglect  Goal: Treatment Goal: Remain safe during length of stay, learn and adopt new coping skills, and be free of self-injurious ideation, impulses and acts at the time of discharge  Outcome: Progressing  Goal: Verbalize thoughts and feelings  Description: Interventions:  - Assess and re-assess patient's lethality and potential for self-injury  - Engage patient in 1:1 interactions, daily, for a minimum of 15 minutes  - Encourage patient to express feelings, fears, frustrations, hopes  - Establish rapport/trust with patient   Outcome: Progressing  Goal: Refrain from harming self  Description: Interventions:  - Monitor patient closely, per order  - Develop a trusting relationship  - Supervise medication ingestion, monitor effects and side effects   Outcome: Progressing  Goal: Attend and participate in unit activities, including therapeutic, recreational, and educational groups  Description: Interventions:  - Provide therapeutic and educational activities daily, encourage attendance and participation, and document same in the medical record  - Obtain collateral information, encourage visitation and family involvement in care   Outcome: Progressing  Goal: Recognize maladaptive responses and adopt new coping mechanisms  Outcome: Progressing  Goal: Complete daily ADLs, including personal hygiene independently, as able  Description: Interventions:  - Observe, teach, and assist patient with ADLS  - Monitor and promote a balance of rest/activity, with adequate nutrition and elimination  Outcome: Progressing     Problem: Anxiety  Goal: Anxiety is at manageable level  Description: Interventions:  - Assess and monitor patient's anxiety level  - Monitor for signs and symptoms (heart palpitations, chest pain, shortness of breath, headaches, nausea, feeling jumpy, restlessness, irritable, apprehensive)  - Collaborate with interdisciplinary team and initiate plan and interventions as ordered  - [place] patient to unit/surroundings  - Explain treatment plan  - Encourage participation in care  - Encourage verbalization of concerns/fears  - Identify coping mechanisms  - Assist in developing anxiety-reducing skills  - Administer/offer alternative therapies  - Limit or eliminate stimulants  Outcome: Progressing     Problem: Nutrition/Hydration-ADULT  Goal: Nutrient/Hydration intake appropriate for improving, restoring or maintaining nutritional needs  Description: Monitor and assess patient's nutrition/hydration status for malnutrition  Collaborate with interdisciplinary team and initiate plan and interventions as ordered  Monitor patient's weight and dietary intake as ordered or per policy  Utilize nutrition screening tool and intervene as necessary  Determine patient's food preferences and provide high-protein, high-caloric foods as appropriate       INTERVENTIONS:  - Monitor oral intake, urinary output, labs, and treatment plans  - Assess nutrition and hydration status and recommend course of action  - Evaluate amount of meals eaten  - Assist patient with eating if necessary   - Allow adequate time for meals  - Recommend/ encourage appropriate diets, oral nutritional supplements, and vitamin/mineral supplements  - Order, calculate, and assess calorie counts as needed  - Recommend, monitor, and adjust tube feedings and TPN/PPN based on assessed needs  - Assess need for intravenous fluids  - Provide specific nutrition/hydration education as appropriate  - Include patient/family/caregiver in decisions related to nutrition  Outcome: Progressing     Problem: Potential for Falls  Goal: Patient will remain free of falls  Description: INTERVENTIONS:  - Educate patient/family on patient safety including physical limitations  - Instruct patient to call for assistance with activity   - Consult OT/PT to assist with strengthening/mobility   - Keep Call bell within reach  - Keep bed low and locked with side rails adjusted as appropriate  - Keep care items and personal belongings within reach  - Initiate and maintain comfort rounds  - Make Fall Risk Sign visible to staff  - Offer Toileting every Hours, in advance of need  - Initiate/Maintain alarm  - Obtain necessary fall risk management equipment:   - Apply yellow socks and bracelet for high fall risk patients  - Consider moving patient to room near nurses station  Outcome: Progressing     Problem: Nutrition/Hydration-ADULT  Goal: Nutrient/Hydration intake appropriate for improving, restoring or maintaining nutritional needs  Description: Monitor and assess patient's nutrition/hydration status for malnutrition  Collaborate with interdisciplinary team and initiate plan and interventions as ordered  Monitor patient's weight and dietary intake as ordered or per policy  Utilize nutrition screening tool and intervene as necessary   Determine patient's food preferences and provide high-protein, high-caloric foods as appropriate       INTERVENTIONS:  - Monitor oral intake, urinary output, labs, and treatment plans  - Assess nutrition and hydration status and recommend course of action  - Evaluate amount of meals eaten  - Assist patient with eating if necessary   - Allow adequate time for meals  - Recommend/ encourage appropriate diets, oral nutritional supplements, and vitamin/mineral supplements  - Order, calculate, and assess calorie counts as needed  - Recommend, monitor, and adjust tube feedings and TPN/PPN based on assessed needs  - Assess need for intravenous fluids  - Provide specific nutrition/hydration education as appropriate  - Include patient/family/caregiver in decisions related to nutrition  Outcome: Progressing     Problem: DISCHARGE PLANNING - CARE MANAGEMENT  Goal: Discharge to post-acute care or home with appropriate resources  Description: INTERVENTIONS:  - Conduct assessment to determine patient/family and health care team treatment goals, and need for post-acute services based on payer coverage, community resources, and patient preferences, and barriers to discharge  - Address psychosocial, clinical, and financial barriers to discharge as identified in assessment in conjunction with the patient/family and health care team  - Arrange appropriate level of post-acute services according to patients   needs and preference and payer coverage in collaboration with the physician and health care team  - Communicate with and update the patient/family, physician, and health care team regarding progress on the discharge plan  - Arrange appropriate transportation to post-acute venues  Outcome: Progressing

## 2022-03-25 ENCOUNTER — TELEPHONE (OUTPATIENT)
Dept: PSYCHIATRY | Facility: CLINIC | Age: 77
End: 2022-03-25

## 2022-03-25 ENCOUNTER — TRANSITIONAL CARE MANAGEMENT (OUTPATIENT)
Dept: INTERNAL MEDICINE CLINIC | Facility: CLINIC | Age: 77
End: 2022-03-25

## 2022-03-25 PROCEDURE — 99232 SBSQ HOSP IP/OBS MODERATE 35: CPT

## 2022-03-25 RX ADMIN — MELATONIN TAB 3 MG 3 MG: 3 TAB at 21:08

## 2022-03-25 RX ADMIN — OLANZAPINE 2.5 MG: 2.5 TABLET, FILM COATED ORAL at 21:08

## 2022-03-25 RX ADMIN — PANTOPRAZOLE SODIUM 40 MG: 40 TABLET, DELAYED RELEASE ORAL at 05:56

## 2022-03-25 RX ADMIN — AMLODIPINE BESYLATE 5 MG: 5 TABLET ORAL at 09:15

## 2022-03-25 RX ADMIN — MULTIPLE VITAMINS W/ MINERALS TAB 1 TABLET: TAB ORAL at 09:14

## 2022-03-25 RX ADMIN — WARFARIN SODIUM 2 MG: 2 TABLET ORAL at 17:19

## 2022-03-25 RX ADMIN — SERTRALINE HYDROCHLORIDE 100 MG: 100 TABLET ORAL at 09:14

## 2022-03-25 NOTE — NURSING NOTE
Pt is visible on unit social with select peers  Pt is pleasant on approach  Pt reports depression 6-10 and anxiety 2-4  Pt reports depression and anxiety due to phone call with family members and wanting to go home  Pt compliant with medication

## 2022-03-25 NOTE — TREATMENT TEAM
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

## 2022-03-25 NOTE — PLAN OF CARE
Pt with fluctuating ability to focus in groups due to anxiety and restlessness especially this afternoon    Problem: Ineffective Coping  Goal: Participates in unit activities  Description: Interventions:  - Provide therapeutic environment   - Provide required programming   - Redirect inappropriate behaviors   Outcome: Progressing

## 2022-03-25 NOTE — PLAN OF CARE
Problem: Prexisting or High Potential for Compromised Skin Integrity  Goal: Skin integrity is maintained or improved  Description: INTERVENTIONS:  - Identify patients at risk for skin breakdown  - Assess and monitor skin integrity  - Assess and monitor nutrition and hydration status  - Monitor labs   - Assess for incontinence   - Turn and reposition patient  - Assist with mobility/ambulation  - Relieve pressure over bony prominences  - Avoid friction and shearing  - Provide appropriate hygiene as needed including keeping skin clean and dry  - Evaluate need for skin moisturizer/barrier cream  - Collaborate with interdisciplinary team   - Patient/family teaching  - Consider wound care consult   Outcome: Progressing     Problem: Alteration in Thoughts and Perception  Goal: Treatment Goal: Gain control of psychotic behaviors/thinking, reduce/eliminate presenting symptoms and demonstrate improved reality functioning upon discharge  Outcome: Progressing  Goal: Verbalize thoughts and feelings  Description: Interventions:  - Promote a nonjudgmental and trusting relationship with the patient through active listening and therapeutic communication  - Assess patient's level of functioning, behavior and potential for risk  - Engage patient in 1 on 1 interactions  - Encourage patient to express fears, feelings, frustrations, and discuss symptoms    - [place] patient to reality, help patient recognize reality-based thinking   - Administer medications as ordered and assess for potential side effects  - Provide the patient education related to the signs and symptoms of the illness and desired effects of prescribed medications  Outcome: Progressing  Goal: Refrain from acting on delusional thinking/internal stimuli  Description: Interventions:  - Monitor patient closely, per order   - Utilize least restrictive measures   - Set reasonable limits, give positive feedback for acceptable   - Administer medications as ordered and monitor of potential side effects  Outcome: Progressing  Goal: Agree to be compliant with medication regime, as prescribed and report medication side effects  Description: Interventions:  - Offer appropriate PRN medication and supervise ingestion; conduct AIMS, as needed   Outcome: Progressing  Goal: Attend and participate in unit activities, including therapeutic, recreational, and educational groups  Description: Interventions:  -Encourage Visitation and family involvement in care  Outcome: Progressing  Goal: Recognize dysfunctional thoughts, communicate reality-based thoughts at the time of discharge  Description: Interventions:  - Provide medication and psycho-education to assist patient in compliance and developing insight into his/her illness   Outcome: Progressing  Goal: Complete daily ADLs, including personal hygiene independently, as able  Description: Interventions:  - Observe, teach, and assist patient with ADLS  - Monitor and promote a balance of rest/activity, with adequate nutrition and elimination   Outcome: Progressing     Problem: Risk for Self Injury/Neglect  Goal: Treatment Goal: Remain safe during length of stay, learn and adopt new coping skills, and be free of self-injurious ideation, impulses and acts at the time of discharge  Outcome: Progressing  Goal: Verbalize thoughts and feelings  Description: Interventions:  - Assess and re-assess patient's lethality and potential for self-injury  - Engage patient in 1:1 interactions, daily, for a minimum of 15 minutes  - Encourage patient to express feelings, fears, frustrations, hopes  - Establish rapport/trust with patient   Outcome: Progressing  Goal: Refrain from harming self  Description: Interventions:  - Monitor patient closely, per order  - Develop a trusting relationship  - Supervise medication ingestion, monitor effects and side effects   Outcome: Progressing  Goal: Attend and participate in unit activities, including therapeutic, recreational, and educational groups  Description: Interventions:  - Provide therapeutic and educational activities daily, encourage attendance and participation, and document same in the medical record  - Obtain collateral information, encourage visitation and family involvement in care   Outcome: Progressing  Goal: Recognize maladaptive responses and adopt new coping mechanisms  Outcome: Progressing  Goal: Complete daily ADLs, including personal hygiene independently, as able  Description: Interventions:  - Observe, teach, and assist patient with ADLS  - Monitor and promote a balance of rest/activity, with adequate nutrition and elimination  Outcome: Progressing     Problem: Anxiety  Goal: Anxiety is at manageable level  Description: Interventions:  - Assess and monitor patient's anxiety level  - Monitor for signs and symptoms (heart palpitations, chest pain, shortness of breath, headaches, nausea, feeling jumpy, restlessness, irritable, apprehensive)  - Collaborate with interdisciplinary team and initiate plan and interventions as ordered  - [place] patient to unit/surroundings  - Explain treatment plan  - Encourage participation in care  - Encourage verbalization of concerns/fears  - Identify coping mechanisms  - Assist in developing anxiety-reducing skills  - Administer/offer alternative therapies  - Limit or eliminate stimulants  Outcome: Progressing     Problem: Nutrition/Hydration-ADULT  Goal: Nutrient/Hydration intake appropriate for improving, restoring or maintaining nutritional needs  Description: Monitor and assess patient's nutrition/hydration status for malnutrition  Collaborate with interdisciplinary team and initiate plan and interventions as ordered  Monitor patient's weight and dietary intake as ordered or per policy  Utilize nutrition screening tool and intervene as necessary  Determine patient's food preferences and provide high-protein, high-caloric foods as appropriate       INTERVENTIONS:  - Monitor oral intake, urinary output, labs, and treatment plans  - Assess nutrition and hydration status and recommend course of action  - Evaluate amount of meals eaten  - Assist patient with eating if necessary   - Allow adequate time for meals  - Recommend/ encourage appropriate diets, oral nutritional supplements, and vitamin/mineral supplements  - Order, calculate, and assess calorie counts as needed  - Recommend, monitor, and adjust tube feedings and TPN/PPN based on assessed needs  - Assess need for intravenous fluids  - Provide specific nutrition/hydration education as appropriate  - Include patient/family/caregiver in decisions related to nutrition  Outcome: Progressing     Problem: Potential for Falls  Goal: Patient will remain free of falls  Description: INTERVENTIONS:  - Educate patient/family on patient safety including physical limitations  - Instruct patient to call for assistance with activity   - Consult OT/PT to assist with strengthening/mobility   - Keep Call bell within reach  - Keep bed low and locked with side rails adjusted as appropriate  - Keep care items and personal belongings within reach  - Initiate and maintain comfort rounds  - Make Fall Risk Sign visible to staff  - Offer Toileting every  Hours, in advance of need  - Initiate/Maintain alarm  - Obtain necessary fall risk management equipment:  - Apply yellow socks and bracelet for high fall risk patients  - Consider moving patient to room near nurses station  Outcome: Progressing     Problem: Nutrition/Hydration-ADULT  Goal: Nutrient/Hydration intake appropriate for improving, restoring or maintaining nutritional needs  Description: Monitor and assess patient's nutrition/hydration status for malnutrition  Collaborate with interdisciplinary team and initiate plan and interventions as ordered  Monitor patient's weight and dietary intake as ordered or per policy  Utilize nutrition screening tool and intervene as necessary   Determine patient's food preferences and provide high-protein, high-caloric foods as appropriate       INTERVENTIONS:  - Monitor oral intake, urinary output, labs, and treatment plans  - Assess nutrition and hydration status and recommend course of action  - Evaluate amount of meals eaten  - Assist patient with eating if necessary   - Allow adequate time for meals  - Recommend/ encourage appropriate diets, oral nutritional supplements, and vitamin/mineral supplements  - Order, calculate, and assess calorie counts as needed  - Recommend, monitor, and adjust tube feedings and TPN/PPN based on assessed needs  - Assess need for intravenous fluids  - Provide specific nutrition/hydration education as appropriate  - Include patient/family/caregiver in decisions related to nutrition  Outcome: Progressing     Problem: DISCHARGE PLANNING - CARE MANAGEMENT  Goal: Discharge to post-acute care or home with appropriate resources  Description: INTERVENTIONS:  - Conduct assessment to determine patient/family and health care team treatment goals, and need for post-acute services based on payer coverage, community resources, and patient preferences, and barriers to discharge  - Address psychosocial, clinical, and financial barriers to discharge as identified in assessment in conjunction with the patient/family and health care team  - Arrange appropriate level of post-acute services according to patients   needs and preference and payer coverage in collaboration with the physician and health care team  - Communicate with and update the patient/family, physician, and health care team regarding progress on the discharge plan  - Arrange appropriate transportation to post-acute venues  Outcome: Progressing     Problem: Ineffective Coping  Goal: Participates in unit activities  Description: Interventions:  - Provide therapeutic environment   - Provide required programming   - Redirect inappropriate behaviors   Outcome: Progressing

## 2022-03-25 NOTE — PROGRESS NOTES
03/25/22 1000 03/25/22 1330   Activity/Group Checklist   Group Tess Rollins  (guided imagery relaxation session )   Attendance Attended Attended   Attendance Duration (min) 16-30 0-15   Interactions Interacted appropriately Other (Comment)  (arrived late left quickly due to anxiety,paced halls )   Affect/Mood Appropriate;Bright;Normal range Other (Comment)  (pt  anxious,restless )   Goals Achieved Able to engage in interactions; Able to listen to others;Discussed coping strategies Other (Comment)

## 2022-03-25 NOTE — CASE MANAGEMENT
Call made to pt's wife Fareed Rooney, informed her that [facility] would have to be either full time virtual or full time in person  Pt's wife reports she will not be able to transport pt to [facility] every day  Pt's wife also reports pt will not be able to do virtual meetings due to pt's frustration with phone technology and they do not have a computer for pt to use  Pt's wife reports pt had difficulty with telehealth appts also  Pt's wife reports wanting pt to follow up with PCP, therapist and Dr Jenae Cortés  Email sent to pt's therapist Ruth Ann requesting pt to be seen on a weekly basis post d/c  Call made to PCP office, scheduled follow up appt for Dr Anand Galvan on 4/1 at 10am  CM attempted to schedule appt with therapist Ruth Ann; no availability till May  CM to await email response from Armando Brown  In basket message sent to Neetu Osei to schedule follow up appt with Dr Jenae Cortés

## 2022-03-25 NOTE — PROGRESS NOTES
Progress Note - Behavioral Health   [facility] 77 [facility]  [facility] MRN: [id number]  Unit/Bed#: Jamshid Becker 653-22 Encounter: [id number]    Subjective:  Patient was seen today for continuation of care, records reviewed and patient was discussed with the morning case review team     Ale Knowles was seen today for psychiatric follow-up  No behavioral outbursts or acute events noted overnight  No psychiatric PRN's required  Ale Knowles was seen while standing in his room  Ale Knowles was cooperative but did appear visibly anxious during our interaction  He does report feeling less depressed and more hopeful for the future  He continues to perseverate on his wife and how I need to be a better man  He states that when him and his wife get into fights, he will go out to exercise and blow off some steam   He is appreciative of his wife and has nothing but good things to say about her  He is very thankful that she helps take care of him and he states I would be long gone without her  His speech is pressured and rapid however he is redirectable and organized in his thought process  He reports wanting to be discharged today however was agreeable to stay throughout the weekend and be discharged on Monday  He is agreeable to outpatient follow-up  He is tolerating his Zyprexa well  He states that he is very pleased with his current medication regimen  His appetite is improving  His full intake yesterday was 50/100/100 and his intake so far today was 100/75  His sleep is fragmented due to his colostomy care  He is reporting anxiety 3/4 (4 being the worst) and depression 4/10 (10 being the worst)  He does require some redirection in his thought process however is logical and goal oriented  He is very perseverative  Alesalima Knowles denies endorsing active suicidal ideation/intent/plan  Ale Knowles also denies HI/AH/VH, and does not appear overtly manic    Alesalima Knowles denies feeling paranoid, denies grandiose, somatic, or bizarre delusions, and does not appear to be responding to internal stimuli  Ray Lake remains adherent to his current psychotropic medication regimen and denies any side effects from medications  No medication changes indicated at this time, continue on current medication regimen  Vitals:  Vitals:    03/25/22 0741   BP: 146/90   Pulse: 68   Resp: 18   Temp: 97 9 °F (36 6 °C)   SpO2: 96%     Laboratory Results:    I have personally reviewed all pertinent laboratory/tests results    Most Recent Labs:   Lab Results   Component Value Date    WBC 6 50 03/21/2022    RBC 4 45 03/21/2022    HGB 13 2 03/21/2022    HCT 39 8 03/21/2022     03/21/2022    RDW 13 9 03/21/2022    NEUTROABS 5 19 03/21/2022    SODIUM 139 03/21/2022    K 4 1 03/21/2022     03/21/2022    CO2 26 03/21/2022    BUN 20 03/21/2022    CREATININE 0 91 03/21/2022    GLUC 90 03/21/2022    GLUF 96 04/27/2021    CALCIUM 8 4 03/21/2022    AST 24 03/21/2022    ALT 27 03/21/2022    ALKPHOS 70 03/21/2022    TP 6 6 03/21/2022    ALB 3 6 03/21/2022    TBILI 0 74 03/21/2022    CHOLESTEROL 191 03/23/2022    HDL 64 03/23/2022    TRIG 63 03/23/2022    LDLCALC 114 03/23/2022    NONHDLC 127 03/23/2022    AMMONIA <10 (L) 10/09/2018    [id number] 2 490 03/23/2022    FREET4 1 0 06/10/2014    RPR Non-Reactive 03/23/2022    HGBA1C 5 0 07/02/2021    EAG 97 07/02/2021     Psychiatric Review of Systems:  Behavior over the last 24 hours:  Slight progressing  Sleep: fragmented sleep due to colostomy  Appetite: 100/75  Medication side effects: None reported  ROS: no complaints, denies shortness of breath or chest pain and all other systems are negative for acute changes    Mental Status Evaluation:  Appearance:  casually dressed, dressed appropriately, adequate grooming, looks stated age, thin   Behavior:  pleasant, cooperative, calm, restless and fidgety, responds to redirection   Speech:  fluent, clear, pressured, hypertalkative   Mood:  anxious   Affect:  constricted   Thought Process: logical, coherent, goal directed, linear   Thought Content:  no overt delusions, no overt paranoia noted on exam   Perceptual Disturbances: no auditory hallucinations, no visual hallucinations, denies when asked, appears distracted, appears preoccupied, does not appear responding to internal stimuli   Risk Potential: Suicidal ideation - None at present, contracts for safety on the unit, would talk to staff if not feeling safe on the unit  Homicidal ideation - None at present  Potential for aggression - Not at present   Memory:  recent and remote memory grossly intact   Sensorium  person, place, time/date and situation      Consciousness:  alert and awake   Attention: attention span and concentration appear shorter than expected for age   Insight:  improving   Judgment: improving   Gait/Station: normal gait/station, normal balance   Motor Activity: no abnormal movements   Progress Toward Goals:   Progressing  Tima Browne is reporting an improved mood  He still does appear overtly anxious  His intake is improving, his sleep continues to be disrupted due to colostomy bag  Discharge pending for Monday  Assessment/Plan   Principal Problem:    Major depressive disorder, recurrent, severe without psychotic features ([facility] [address] )  Active Problems:    Crohn's disease    History of DVT (deep vein thrombosis)    HTN (hypertension)    Weight loss    Eating disorder, unspecified    Medical clearance for psychiatric admission    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[facility])    GERD (gastroesophageal reflux disease)      Recommended Treatment: Treatment plan and medication changes discussed and per the attending physician the plan is: 1  Continue with group therapy, milieu therapy and occupational therapy  2  Behavioral Health checks every 7 minutes  3  Continue frequent safety checks and vitals per unit protocol  4  Continue with SLIM medical management as indicated  5  Continue with current medication regimen  6  Will review labs in the a m    7 Disposition Planning:Discharge pending for Monday    Behavioral Health Medications: all current active meds have been reviewed and continue current psychiatric medications  Current Facility-Administered Medications   Medication Dose Route Frequency Provider Last Rate    aluminum-magnesium hydroxide-simethicone  30 mL Oral Q4H PRN Yesi Lujan MD      amLODIPine  5 mg Oral Daily Yesi Lujan MD      artificial tear  1 application Both Eyes U1B PRN Yesi Lujan MD      haloperidol lactate  5 mg Intramuscular Q4H PRN Max 4/day Yesi Lujan MD      hydrOXYzine HCL  25 mg Oral Q6H PRN Max 4/day Yesi Lujan MD      LORazepam  1 mg Intramuscular Q6H PRN Max 3/day Yesi Lujan MD      melatonin  3 mg Oral HS Yesi Lujan MD      multivitamin-minerals  1 tablet Oral Daily Yesi Lujan MD      OLANZapine  2 5 mg Oral HS Santi Flor MD      pantoprazole  40 mg Oral Early Morning Yesi Lujan MD      risperiDONE  0 25 mg Oral Q4H PRN Max 6/day Yesi Lujan MD      risperiDONE  0 5 mg Oral Q4H PRN Max 3/day Yesi Lujan MD      risperiDONE  1 mg Oral Q4H PRN Max 3/day Yesi Lujan MD      sertraline  100 mg Oral Daily Yesi Lujan MD      warfarin  2 mg Oral Q3 Days Isaiah Dunlap      warfarin  2 mg Oral Q3 Days Julio Lacy PA-C      warfarin  3 mg Oral Q3 Days Julio Lacy PA-C       Planned Medication Changes: None at this time    Risks / Benefits of Treatment:  Risks, benefits, and possible side effects of medications explained to patient and patient verbalizes understanding and agreement for treatment  Counseling / Coordination of Care:  Patient's progress reviewed with nursing staff  Medications, treatment progress and treatment plan reviewed with patient  Supportive counseling provided to the patient  Total floor/unit time spent today 25 minutes   Greater than 50% of total time was spent with the patient and / or family counseling and / or coordination of care  A description of the counseling / coordination of care: medication education, treatment plan, supportive therapy

## 2022-03-25 NOTE — NURSING NOTE
Pt is cooperative with care  Pt is visible on the unit and social with select peers  Pt rated 1/4 anxiety ,2/10 depresstion and denies SI  Pt had good intake at dinner time and medication compliant  Will continue to monitor

## 2022-03-25 NOTE — TELEPHONE ENCOUNTER
----- Message from NIKOLAI Villegas sent at 3/25/2022  3:05 PM EDT -----  Regarding: schedule appt  This pt follows with Dr Christina Ocasio  Would you be able to check if pt has upcoming appt scheduled? If not, pt would need appt scheduled as pt to be discharged from [facility] on Monday    Thank you  Cheryl Obando

## 2022-03-26 PROCEDURE — 99232 SBSQ HOSP IP/OBS MODERATE 35: CPT | Performed by: STUDENT IN AN ORGANIZED HEALTH CARE EDUCATION/TRAINING PROGRAM

## 2022-03-26 RX ORDER — ACETAMINOPHEN 325 MG/1
650 TABLET ORAL EVERY 6 HOURS PRN
Status: DISCONTINUED | OUTPATIENT
Start: 2022-03-26 | End: 2022-03-28 | Stop reason: HOSPADM

## 2022-03-26 RX ORDER — OLANZAPINE 5 MG/1
5 TABLET ORAL
Status: DISCONTINUED | OUTPATIENT
Start: 2022-03-26 | End: 2022-03-28 | Stop reason: HOSPADM

## 2022-03-26 RX ORDER — TRAZODONE HYDROCHLORIDE 50 MG/1
50 TABLET ORAL
Status: DISCONTINUED | OUTPATIENT
Start: 2022-03-26 | End: 2022-03-28 | Stop reason: HOSPADM

## 2022-03-26 RX ORDER — LORAZEPAM 0.5 MG/1
0.5 TABLET ORAL EVERY 8 HOURS PRN
Status: DISCONTINUED | OUTPATIENT
Start: 2022-03-26 | End: 2022-03-28 | Stop reason: HOSPADM

## 2022-03-26 RX ORDER — LANOLIN ALCOHOL/MO/W.PET/CERES
6 CREAM (GRAM) TOPICAL
Status: DISCONTINUED | OUTPATIENT
Start: 2022-03-26 | End: 2022-03-28 | Stop reason: HOSPADM

## 2022-03-26 RX ADMIN — OLANZAPINE 5 MG: 5 TABLET, FILM COATED ORAL at 21:31

## 2022-03-26 RX ADMIN — AMLODIPINE BESYLATE 5 MG: 5 TABLET ORAL at 08:16

## 2022-03-26 RX ADMIN — SERTRALINE HYDROCHLORIDE 100 MG: 100 TABLET ORAL at 08:16

## 2022-03-26 RX ADMIN — PANTOPRAZOLE SODIUM 40 MG: 40 TABLET, DELAYED RELEASE ORAL at 05:52

## 2022-03-26 RX ADMIN — MELATONIN TAB 3 MG 6 MG: 3 TAB at 21:31

## 2022-03-26 RX ADMIN — WARFARIN SODIUM 3 MG: 2 TABLET ORAL at 17:46

## 2022-03-26 RX ADMIN — MULTIPLE VITAMINS W/ MINERALS TAB 1 TABLET: TAB ORAL at 08:16

## 2022-03-26 RX ADMIN — LORAZEPAM 0.5 MG: 0.5 TABLET ORAL at 14:34

## 2022-03-26 NOTE — CONSULTS
Consultation - Neuropsychology/Psychology Department  Tomasa Yuan 77 y o  male MRN: [id number]  Unit/Bed#: Stacie Patient 198-25 Encounter: [id number]        Reason for Consultation:  Tomasa Yuan is a 77y o  year old male who was referred for a Neuropsychological Exam to assess cognitive functioning      History of Present Illness  Admitted for depression    Physician Requesting Consult: Ana Burnham MD    PROBLEM LIST:  Patient Active Problem List   Diagnosis    Crohn's disease    Allergic rhinitis    Major depressive disorder, recurrent, severe without psychotic features ([facility] [address] )    History of DVT (deep vein thrombosis)    JOSÉ LUIS (generalized anxiety disorder)    HTN (hypertension)    Leucocytosis    Solar lentigo    Dilated pancreatic duct    Vertigo    Nail anomaly    Neck mass    Weight loss    Benign parotid tumor    Thrombocytopenia (HCC)    Tremor    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    Deep vein thrombosis (DVT) of proximal lower extremity ([facility] [address] )    Need for pneumococcal vaccination    Screening for diabetes mellitus    Atypical chest pain    Glaucoma screening    Polyuria    Pain of right eye    Recurrent falls    Influenza vaccine needed    Screening for cardiovascular condition    Medicare annual wellness visit, subsequent    Anosmia    Leukopenia    Abdominal pain    Epigastric pain    Dyspepsia    Atypical mole    Subclinical hypothyroidism    Skin lesion    Sacroiliitis ([facility] [address] )    Right cataract    Primary localized osteoarthritis of right knee    Intervertebral disc disorders with radiculopathy, lumbar region    Other insomnia    Decreased hearing    Panic disorder without agoraphobia    Eating disorder, unspecified    Early satiety    Pancreatic cyst    COVID-19 virus infection    COVID-19    Long-term use of high-risk medication    Skin nodule    Varicose veins of both lower extremities    Memory loss    Sleep disturbance    Skin tear of right upper arm without complication    Medical clearance for psychiatric admission    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[facility])    GERD (gastroesophageal reflux disease)         Historical Information   Past Medical History:   Diagnosis Date    Allergic rhinitis     Anosmia     Anxiety     Benign parotid tumor     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[facility] [address] )     [facility]     Depression     Dilated pancreatic duct     D[facility] venous thrombosis) ([facility])     Eating disorder     GERD (gastroesophageal reflux disease)     Hearing loss     Heart disease     [place] (hard of hearing)     no hearing aids    Hypertension     Leucocytosis     Mass in neck     Nail anomaly     Polyuria     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HCC)     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    Recurrent falls     Solar lentigo     Thrombocytopenia (HCC)     Vertigo     Vision loss of left eye      Past Surgical History:   Procedure Laterality Date    COLON SURGERY      Partial colectomy and colostomy    COLONOSCOPY      ESOPHAGOGASTRODUODENOSCOPY N/A 4/5/2017    Procedure: ESOPHAGOGASTRODUODENOSCOPY (EGD); Surgeon: Zoila Landeros MD;  [facility]; Service:     EYE SURGERY Right     Cataract    KNEE SURGERY      DE EDG US EXAM SURGICAL ALTER STOM DUODENUM/JEJUNUM N/A 4/9/2018    Procedure: LINEAR ENDOSCOPIC U/S;  Surgeon: Dmitri Fernando MD;  [facility]: [facility];   Service: Gastroenterology    DE EXC PAROTD,LAT LOBE,DISSECT 5TH NERV Right 8/8/2018    Procedure: PAROTIDECTOMY WITH FACIAL NERVE MONITOR AND FROZEN SECTION;  Surgeon: Lorraine Manrique MD;  [facility];  Service: ENT    RADICAL NECK DISSECTION N/A 8/8/2018    Procedure: SELECTIVE NECK DISSECTION;  Surgeon: Lorraine Manrique MD;  [facility];  Service: ENT    REMOVAL OF IMPACTED TOOTH - COMPLETELY BONY N/A 8/8/2018    Procedure: EXTRACTION TEETH #15 and #30;  Surgeon: Veronica Jarrell DDS;  [facility];  Service: Maxillofacial    UPPER GASTROINTESTINAL ENDOSCOPY      VEIN LIGATION AND STRIPPING       Social History   Social History     Substance and Sexual Activity   Alcohol Use No    Comment: history of excessive alcohol use - quit in      Social History     Substance and Sexual Activity   Drug Use No     Social History     Tobacco Use   Smoking Status Former Smoker    Packs/day: 1 00    Years: 10 00    Pack years: 10 00    Types: Cigarettes    Quit date: 1981    Years since quittin 8   Smokeless Tobacco Never Used   Tobacco Comment    50 years ago     Family History:   Family History   Problem Relation Age of Onset    Heart disease Brother     Depression Brother     Alcohol abuse Brother     Diverticulitis Mother     Drug abuse Mother     Depression Sister     Anxiety disorder Sister     Depression Sister     Anxiety disorder Sister     Mental illness Other     Alcohol abuse Other     Drug abuse Other     Completed Suicide  Other        Meds/Allergies   current meds:   Current Facility-Administered Medications   Medication Dose Route Frequency    acetaminophen (TYLENOL) tablet 650 mg  650 mg Oral Q6H PRN    aluminum-magnesium hydroxide-simethicone (MYLANTA) oral suspension 30 mL  30 mL Oral Q4H PRN    amLODIPine (NORVASC) tablet 5 mg  5 mg Oral Daily    artificial tear (LUBRIFRESH P M ) ophthalmic ointment 1 application  1 application Both Eyes Q2A PRN    haloperidol lactate (HALDOL) injection 5 mg  5 mg Intramuscular Q4H PRN Max 4/day    hydrOXYzine HCL (ATARAX) tablet 25 mg  25 mg Oral Q6H PRN Max 4/day    LORazepam (ATIVAN) injection 1 mg  1 mg Intramuscular Q6H PRN Max 3/day    LORazepam (ATIVAN) tablet 0 5 mg  0 5 mg Oral Q8H PRN    melatonin tablet 6 mg  6 mg Oral HS    multivitamin-minerals (CENTRUM) tablet 1 tablet  1 tablet Oral Daily    OLANZapine (ZyPREXA) tablet 5 mg  5 mg Oral HS    pantoprazole (PROTONIX) EC tablet 40 mg  40 mg Oral Early Morning    risperiDONE (RisperDAL M-TAB) disintegrating tablet 0 25 mg  0 25 mg Oral Q4H PRN Max 6/day    risperiDONE (RisperDAL M-TAB) disintegrating tablet 0 5 mg  0 5 mg Oral Q4H PRN Max 3/day    risperiDONE (RisperDAL M-TAB) disintegrating tablet 1 mg  1 mg Oral Q4H PRN Max 3/day    [START ON 3/27/2022] sertraline (ZOLOFT) tablet 150 mg  150 mg Oral Daily    traZODone (DESYREL) tablet 50 mg  50 mg Oral HS PRN    warfarin (COUMADIN) tablet 2 mg  2 mg Oral Q3 Days    warfarin (COUMADIN) tablet 2 mg  2 mg Oral Q3 Days    warfarin (COUMADIN) tablet 3 mg  3 mg Oral Q3 Days       Allergies   Allergen Reactions    Duloxetine Other (See Comments)     Panic attacks    Other      Seasonal         Family and Social Support:   No data recorded    Behavioral Observations: Alert, UNABLE to accurately state the year and name of city; affect appeared pleasant and patient admitted to current depression and anxiety; no overt evidence of psychotic process; patient reported he was hospitalized "because I'm not eating right and I'm exercising too much"  States he is being treated "for not eating"  Patient denied cognitive changes/difficulty  Cognitive Examination    General Cognitive Functioning MMSE = Impaired 19/28; Attention/Concentration Auditory Selective Attention = Average; Auditory Vigilance = Impaired; Information Processing Speed = Within Normal Limits    Frontal Systems/Executive Functioning Mental Flexibility/Cognitive Control = Average; Working Memory = Impaired Abstract Reasoning = Impaired; Generative Ability = Impaired,      Language Functioning Confrontation naming = Average, Phonemic Fluency = Impaired; Semantic Retrieval = Impaired; Comprehension of Complex Ideational Material = Impaired; Praxis = Within Normal Limits; Repetition = Within Normal Limits; Basic Reading = Within Normal Limits; Following Commands = Within Normal Limits    Memory Functioning Narrative Recall - Short Delay = Impaired;  Long Delay Narrative Recall = Impaired; Visual Recognition = Impaired    Visuo-Spatial Abilities Not Assessed    Summary/Impression:  Results of Neuropsychological Exam revealed cognitive deficits in auditory memory and visual recognition, working memory, auditory vigilance, abstract reasoning ability, generative ability, and semantic retrieval  Notably, profile is at least consistent with Mild Neurocognitive Disorder and perhaps early stage Alzheimer's dementia    Recommendations:

## 2022-03-26 NOTE — NURSING NOTE
Pt is calm and cooperative  Pt reports 2/4 anxiety, denies SI  Pt is seen in the dayroom, social with peers and staff  Pt showered this morning, reports sleep last night was poor  Pt offers no complaints, is medication compliant

## 2022-03-26 NOTE — NURSING NOTE
Pt was observed walking the halls in and out of the dayroom and TV room throughout the shift and is social with his roommate  Patient is pleasant and in good spirits  Pt rated anxiety 0/4 while having 0/10 depression as the medication he received earlier in the day was effective  Pt denies any AH, VH, SI and HI  Patient denies pain at this time  Patient appetite good at dinner as he ate 75%  Pt compliant with medications as he tolerated them well  Pt maintained his colostomy bag independently without any difficulties  Q 7 minute checks maintained

## 2022-03-26 NOTE — PROGRESS NOTES
Progress Note - Behavioral Health   Deja Tijerina 77 y o  male MRN: [id number]  Unit/Bed#: [facility] 313-28 Encounter: [id number]    The patient was seen for continuing care and reviewed with treatment team Patient is visible and social, feels less anxious and less depressed  Thinks medication is helping  He has been eating his meals, but reported poor sleep last night  Discussed inceasing Melatonin to 6mg, Olanzapine to 5mg HS and adding Trazodone PRN , pt is in agreement  Appetite- eats 100% of his meals  Energy: normalizing but states he feels bored,   No suicidal or homicidal ideations  No EPS, denies any side effects of medication      /84 (BP Location: Left arm)   Pulse 76   Temp (!) 79 9 °F (26 6 °C) (Temporal)   Resp 16   Ht 5' 5" (1 651 m)   Wt 56 1 kg (123 lb 9 6 oz)   SpO2 97%   BMI 20 57 kg/m²     Current Mental Status Evaluation:  Appearance:  Adequate hygiene and grooming, thin   Behavior:  calm and cooperative   Mood:  " less depressed"   Affect: constricted   Speech: Normal rate and Normal volume   Thought Process:  Goal directed and coherent   Thought Content:  Does not verbalize delusional material   Perceptual Disturbances: Denies hallucinations and does not appear to be responding to internal stimuli   Risk Potential: No suicidal or homicidal ideation   Orientation:   Patient is alert, awake and oriented x 3     Recent Results (from the past 168 hour(s))   Comprehensive metabolic panel    Collection Time: 03/21/22  8:00 PM   Result Value Ref Range    Sodium 139 136 - 145 mmol/L    Potassium 4 1 3 5 - 5 3 mmol/L    Chloride 103 100 - 108 mmol/L    CO2 26 21 - 32 mmol/L    ANION GAP 10 4 - 13 mmol/L    BUN 20 5 - 25 mg/dL    Creatinine 0 91 0 60 - 1 30 mg/dL    Glucose 90 65 - 140 mg/dL    Calcium 8 4 8 3 - 10 1 mg/dL    AST 24 5 - 45 U/L    ALT 27 12 - 78 U/L    Alkaline Phosphatase 70 46 - 116 U/L    Total Protein 6 6 6 4 - 8 2 g/dL    Albumin 3 6 3 5 - 5 0 g/dL    Total Bilirubin 0  74 0 20 - 1 00 mg/dL    eGFR 82 ml/min/1 73sq m   CBC and differential    Collection Time: 03/21/22  8:00 PM   Result Value Ref Range    WBC 6 50 4 31 - 10 16 Thousand/uL    RBC 4 45 3 88 - 5 62 Million/uL    Hemoglobin 13 2 12 0 - 17 0 g/dL    Hematocrit 39 8 36 5 - 49 3 %    MCV 89 82 - 98 fL    MCH 29 7 26 8 - 34 3 pg    MCHC 33 2 31 4 - 37 4 g/dL    RDW 13 9 11 6 - 15 1 %    MPV 9 2 8 9 - 12 7 fL    Platelets 065 325 - 311 Thousands/uL    nRBC 0 /100 WBCs    Neutrophils Relative 80 (H) 43 - 75 %    Immat GRANS % 1 0 - 2 %    Lymphocytes Relative 12 (L) 14 - 44 %    Monocytes Relative 7 4 - 12 %    Eosinophils Relative 0 0 - 6 %    Basophils Relative 0 0 - 1 %    Neutrophils Absolute 5 19 1 85 - 7 62 Thousands/µL    Immature Grans Absolute 0 03 0 00 - 0 20 Thousand/uL    Lymphocytes Absolute 0 79 0 60 - 4 47 Thousands/µL    Monocytes Absolute 0 46 0 17 - 1 22 Thousand/µL    Eosinophils Absolute 0 01 0 00 - 0 61 Thousand/µL    Basophils Absolute 0 02 0 00 - 0 10 Thousands/µL   Magnesium    Collection Time: 03/21/22  8:00 PM   Result Value Ref Range    Magnesium 1 9 1 6 - 2 6 mg/dL   Protime-INR    Collection Time: 03/21/22  8:00 PM   Result Value Ref Range    Protime 23 1 (H) 11 6 - 14 5 seconds    INR 2 08 (H) 0 84 - 1 19   APTT    Collection Time: 03/21/22  8:00 PM   Result Value Ref Range    PTT 36 23 - 37 seconds   POCT alcohol breath test    Collection Time: 03/21/22 10:12 PM   Result Value Ref Range    EXTBreath Alcohol 0 00    COVID/FLU/RSV - 2 hour TAT    Collection Time: 03/21/22 10:16 PM    Specimen: Nose; Nares   Result Value Ref Range    SARS-CoV-2 Negative Negative    INFLUENZA A PCR Negative Negative    INFLUENZA B PCR Negative Negative    RSV PCR Negative Negative   Rapid drug screen, urine    Collection Time: 03/22/22  8:49 AM   Result Value Ref Range    Amph/Meth UR Negative Negative    Barbiturate Ur Negative Negative    Benzodiazepine Urine Negative Negative    Cocaine Urine Negative Negative Methadone Urine Negative Negative    Opiate Urine Negative Negative    PCP Ur Negative Negative    THC Urine Negative Negative    Oxycodone Urine Negative Negative   UA (URINE) with reflex to Scope    Collection Time: 03/22/22  8:49 AM   Result Value Ref Range    Color, UA Yellow     Clarity, UA Clear     Specific Gravity, UA >=1 030 1 003 - 1 030    pH, UA 5 5 4 5, 5 0, 5 5, 6 0, 6 5, 7 0, 7 5, 8 0    Leukocytes, UA Negative Negative    Nitrite, UA Negative Negative    Protein, UA Negative Negative mg/dl    Glucose, UA Negative Negative mg/dl    Ketones, UA Negative Negative mg/dl    Urobilinogen, UA 0 2 0 2, 1 0 E U /dl E U /dl    Bilirubin, UA Negative Negative    Blood, UA Trace-Intact (A) Negative   Urine Microscopic    Collection Time: 03/22/22  8:49 AM   Result Value Ref Range    RBC, UA 1-2 None Seen, 0-1, 1-2, 2-4, 0-5 /hpf    WBC, UA 0-1 None Seen, 0-1, 1-2, 0-5, 2-4 /hpf    Epithelial Cells None Seen None Seen, Occasional /hpf    Bacteria, UA Occasional None Seen, Occasional /hpf    Uric Acid Melly, UA Occasional /hpf   Folate    Collection Time: 03/23/22  6:22 AM   Result Value Ref Range    Folate >20 0 (H) 3 1 - 17 5 ng/mL   Protime-INR    Collection Time: 03/23/22  6:22 AM   Result Value Ref Range    Protime 22 7 (H) 11 6 - 14 5 seconds    INR 2 12 (H) 0 84 - 1 19   RPR    Collection Time: 03/23/22  6:22 AM   Result Value Ref Range    RPR Non-Reactive Non-Reactive   TSH, 3rd generation    Collection Time: 03/23/22  6:22 AM   Result Value Ref Range    TSH 3RD GENERATON 2 490 0 465 - 4 680 uIU/mL   Vitamin B12    Collection Time: 03/23/22  6:22 AM   Result Value Ref Range    Vitamin B-12 586 100 - 900 pg/mL   Vitamin D 25 hydroxy    Collection Time: 03/23/22  6:22 AM   Result Value Ref Range    Vit D, 25-Hydroxy 43 7 30 0 - 100 0 ng/mL   Lipid panel    Collection Time: 03/23/22  6:22 AM   Result Value Ref Range    Cholesterol 191 See Comment mg/dL    Triglycerides 63 See Comment mg/dL    HDL, Direct 64 >=40 mg/dL    LDL Calculated 114 <130 mg/dL    Non-HDL-Chol (CHOL-HDL) 127 mg/dl     Current Facility-Administered Medications   Medication Dose Route Frequency Provider Last Rate    acetaminophen  650 mg Oral Q6H PRN Jeff Cortez MD      aluminum-magnesium hydroxide-simethicone  30 mL Oral Q4H PRN Isis Martinez MD      amLODIPine  5 mg Oral Daily Isis Martinez MD      artificial tear  1 application Both Eyes C4C PRN Isis Martinez MD      haloperidol lactate  5 mg Intramuscular Q4H PRN Max 4/day Isis Martinez MD      hydrOXYzine HCL  25 mg Oral Q6H PRN Max 4/day Isis Martinez MD      LORazepam  1 mg Intramuscular Q6H PRN Max 3/day Isis Martinez MD      LORazepam  0 5 mg Oral Q8H PRN Jeff Cortez MD      melatonin  6 mg Oral HS Jeff Cortez MD      multivitamin-minerals  1 tablet Oral Daily Isis Martinez MD      OLANZapine  5 mg Oral HS Jeff Cortez MD      pantoprazole  40 mg Oral Early Morning Isis Martinez MD      risperiDONE  0 25 mg Oral Q4H PRN Max 6/day Isis Martinez MD      risperiDONE  0 5 mg Oral Q4H PRN Max 3/day Isis Martinez MD      risperiDONE  1 mg Oral Q4H PRN Max 3/day MD Yesica Watt ON 3/27/2022] sertraline  150 mg Oral Daily Jeff Cortez MD      traZODone  50 mg Oral HS PRN Jeff Cortez MD      warfarin  2 mg Oral Q3 Days Katherine Weems PA-C      warfarin  2 mg Oral Q3 Days Katherine Weems PA-C      warfarin  3 mg Oral Q3 Days Katherine Weems PA-C         Progress Toward Goals: progressing, pending neuropsych eval    Assessment     Principal Problem:    Major depressive disorder, recurrent, severe without psychotic features ([address] )  Active Problems:    Crohn's disease    History of DVT (deep vein thrombosis)    HTN (hypertension)    Weight loss    Eating disorder, unspecified    Medical clearance for psychiatric admission    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[facility])    GERD (gastroesophageal reflux disease)        Plan :    - Medications;   Psychiatric: Increase Olanzapine to 5mg HS                      Sertraline 150mg daily                       Increase Melatonin to 6mg HS for sleep                     Trazodone 50mg HS prn sleep     Medical: per SLIM    -Therapy: occupational therapy, milieu and group therapy  - Legal: 201   -Disposition: tentatively home with [facility] monday

## 2022-03-26 NOTE — PLAN OF CARE
Problem: Alteration in Thoughts and Perception  Goal: Verbalize thoughts and feelings  Description: Interventions:  - Promote a nonjudgmental and trusting relationship with the patient through active listening and therapeutic communication  - Assess patient's level of functioning, behavior and potential for risk  - Engage patient in 1 on 1 interactions  - Encourage patient to express fears, feelings, frustrations, and discuss symptoms    - [place] patient to reality, help patient recognize reality-based thinking   - Administer medications as ordered and assess for potential side effects  - Provide the patient education related to the signs and symptoms of the illness and desired effects of prescribed medications  Outcome: Progressing     Problem: Alteration in Thoughts and Perception  Goal: Refrain from acting on delusional thinking/internal stimuli  Description: Interventions:  - Monitor patient closely, per order   - Utilize least restrictive measures   - Set reasonable limits, give positive feedback for acceptable   - Administer medications as ordered and monitor of potential side effects  Outcome: Progressing     Problem: Alteration in Thoughts and Perception  Goal: Agree to be compliant with medication regime, as prescribed and report medication side effects  Description: Interventions:  - Offer appropriate PRN medication and supervise ingestion; conduct AIMS, as needed   Outcome: Progressing

## 2022-03-27 PROCEDURE — 99232 SBSQ HOSP IP/OBS MODERATE 35: CPT | Performed by: STUDENT IN AN ORGANIZED HEALTH CARE EDUCATION/TRAINING PROGRAM

## 2022-03-27 RX ADMIN — MELATONIN TAB 3 MG 6 MG: 3 TAB at 21:05

## 2022-03-27 RX ADMIN — AMLODIPINE BESYLATE 5 MG: 5 TABLET ORAL at 08:33

## 2022-03-27 RX ADMIN — PANTOPRAZOLE SODIUM 40 MG: 40 TABLET, DELAYED RELEASE ORAL at 05:40

## 2022-03-27 RX ADMIN — WARFARIN SODIUM 2 MG: 2 TABLET ORAL at 17:15

## 2022-03-27 RX ADMIN — SERTRALINE HYDROCHLORIDE 150 MG: 100 TABLET ORAL at 08:33

## 2022-03-27 RX ADMIN — MULTIPLE VITAMINS W/ MINERALS TAB 1 TABLET: TAB ORAL at 08:33

## 2022-03-27 RX ADMIN — OLANZAPINE 5 MG: 5 TABLET, FILM COATED ORAL at 21:05

## 2022-03-27 NOTE — NURSING NOTE
Pt is calm and cooperative  Pt reports he slept well last night, denies all s/s currently  Pt reports he did have high anxiety yesterday, Ativan was effective for that  Pt is medication compliant, social with staff and peers

## 2022-03-27 NOTE — PLAN OF CARE
Problem: Alteration in Thoughts and Perception  Goal: Verbalize thoughts and feelings  Description: Interventions:  - Promote a nonjudgmental and trusting relationship with the patient through active listening and therapeutic communication  - Assess patient's level of functioning, behavior and potential for risk  - Engage patient in 1 on 1 interactions  - Encourage patient to express fears, feelings, frustrations, and discuss symptoms    - [place] patient to reality, help patient recognize reality-based thinking   - Administer medications as ordered and assess for potential side effects  - Provide the patient education related to the signs and symptoms of the illness and desired effects of prescribed medications  Outcome: Progressing  Goal: Refrain from acting on delusional thinking/internal stimuli  Description: Interventions:  - Monitor patient closely, per order   - Utilize least restrictive measures   - Set reasonable limits, give positive feedback for acceptable   - Administer medications as ordered and monitor of potential side effects  Outcome: Progressing  Goal: Agree to be compliant with medication regime, as prescribed and report medication side effects  Description: Interventions:  - Offer appropriate PRN medication and supervise ingestion; conduct AIMS, as needed   Outcome: Progressing     Problem: Risk for Self Injury/Neglect  Goal: Verbalize thoughts and feelings  Description: Interventions:  - Assess and re-assess patient's lethality and potential for self-injury  - Engage patient in 1:1 interactions, daily, for a minimum of 15 minutes  - Encourage patient to express feelings, fears, frustrations, hopes  - Establish rapport/trust with patient   Outcome: Progressing  Goal: Refrain from harming self  Description: Interventions:  - Monitor patient closely, per order  - Develop a trusting relationship  - Supervise medication ingestion, monitor effects and side effects   Outcome: Progressing  Goal: Attend and participate in unit activities, including therapeutic, recreational, and educational groups  Description: Interventions:  - Provide therapeutic and educational activities daily, encourage attendance and participation, and document same in the medical record  - Obtain collateral information, encourage visitation and family involvement in care   Outcome: Progressing  Goal: Recognize maladaptive responses and adopt new coping mechanisms  Outcome: Progressing

## 2022-03-27 NOTE — PROGRESS NOTES
Progress Note - Behavioral Health   Kathy Moreau 77 y o  male MRN: [id number]  Unit/Bed#: [facility] 577-57 Encounter: [id number]    The patient was seen for continuing care and reviewed with treatment team  Patient has been doing well, reports he had a little argument with his wife over the phone yesterday but now resolved  He was able to sleep all night with medication adjustments done, did not require trazodone  He eats all his meals,  energy level is normal  No day time sedation, No EPS noted       Summary/Impression:  Results of Neuropsychological Exam revealed cognitive deficits in auditory memory and visual recognition, working memory, auditory vigilance, abstract reasoning ability, generative ability, and semantic retrieval  Notably, profile is at least consistent with Mild Neurocognitive Disorder and perhaps early stage Alzheimer's dementia        /76 (BP Location: Left arm)   Pulse 66   Temp 97 5 °F (36 4 °C) (Temporal)   Resp 16   Ht 5' 5" (1 651 m)   Wt 56 1 kg (123 lb 9 6 oz)   SpO2 98%   BMI 20 57 kg/m²     Current Mental Status Evaluation:  Appearance:  Adequate hygiene and grooming, thin   Behavior:  calm and cooperative   Mood:  "Ok"   Affect: constricted   Speech: Normal rate and Normal volume   Thought Process:  Goal directed and coherent   Thought Content:  Does not verbalize delusional material   Perceptual Disturbances: Denies hallucinations and does not appear to be responding to internal stimuli   Risk Potential: No suicidal or homicidal ideation   Orientation:   Patient is alert, awake and oriented x 3   Patient has fair insight, judgement and impulse control    Progress Toward Goals: No significant events in the past 24 hours  Principal Problem:    Major depressive disorder, recurrent, severe without psychotic features ([facility] [address] )  Active Problems:    Crohn's disease    History of DVT (deep vein thrombosis)    HTN (hypertension)    Weight loss    Eating disorder, unspecified Medical clearance for psychiatric admission    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[facility])    GERD (gastroesophageal reflux disease)    Discharge planning update: The patient will return to previous living arrangement , tentatively tomorrow with plan to start PHP    Recommended Treatment: Continue with pharmacotherapy, group therapy, milieu therapy and occupational therapy    The patient will be maintained on the following medications:  Current Facility-Administered Medications   Medication Dose Route Frequency Provider Last Rate    acetaminophen  650 mg Oral Q6H PRN Constance Augustin MD      aluminum-magnesium hydroxide-simethicone  30 mL Oral Q4H PRN Tiera Frank MD      amLODIPine  5 mg Oral Daily Tiera Frank MD      artificial tear  1 application Both Eyes R3K PRN Tiera Frank MD      haloperidol lactate  5 mg Intramuscular Q4H PRN Max 4/day Tiera Frank MD      hydrOXYzine HCL  25 mg Oral Q6H PRN Max 4/day Tiera Frank MD      LORazepam  1 mg Intramuscular Q6H PRN Max 3/day Tiera Frank MD      LORazepam  0 5 mg Oral Q8H PRN Constance Augustin MD      melatonin  6 mg Oral HS Constance Augustin MD      multivitamin-minerals  1 tablet Oral Daily Tiera Frank MD      OLANZapine  5 mg Oral HS Constance Augustin MD      pantoprazole  40 mg Oral Early Morning Tiera Frank MD      risperiDONE  0 25 mg Oral Q4H PRN Max 6/day Tiera Frank MD      risperiDONE  0 5 mg Oral Q4H PRN Max 3/day Tiera Frank MD      risperiDONE  1 mg Oral Q4H PRN Max 3/day Tiera Frank MD      sertraline  150 mg Oral Daily Constance Augustin MD      traZODone  50 mg Oral HS PRN Constance Augustin MD      warfarin  2 mg Oral Q3 Days [place], [place]      warfarin  2 mg Oral Q3 Days [place]      warfarin  3 mg Oral Q3 Days [place]

## 2022-03-28 VITALS
HEIGHT: 65 IN | WEIGHT: 123.6 LBS | HEART RATE: 72 BPM | RESPIRATION RATE: 17 BRPM | DIASTOLIC BLOOD PRESSURE: 72 MMHG | BODY MASS INDEX: 20.59 KG/M2 | OXYGEN SATURATION: 96 % | TEMPERATURE: 97.1 F | SYSTOLIC BLOOD PRESSURE: 150 MMHG

## 2022-03-28 PROCEDURE — 99238 HOSP IP/OBS DSCHRG MGMT 30/<: CPT

## 2022-03-28 RX ORDER — OLANZAPINE 5 MG/1
5 TABLET ORAL
Qty: 30 TABLET | Refills: 1 | Status: SHIPPED | OUTPATIENT
Start: 2022-03-28 | End: 2022-04-04 | Stop reason: SDUPTHER

## 2022-03-28 RX ORDER — LANOLIN ALCOHOL/MO/W.PET/CERES
6 CREAM (GRAM) TOPICAL
Qty: 30 TABLET | Refills: 0 | Status: CANCELLED | OUTPATIENT
Start: 2022-03-28

## 2022-03-28 RX ORDER — AMLODIPINE BESYLATE 5 MG/1
5 TABLET ORAL DAILY
Qty: 30 TABLET | Refills: 0 | Status: CANCELLED | OUTPATIENT
Start: 2022-03-28

## 2022-03-28 RX ORDER — PANTOPRAZOLE SODIUM 40 MG/1
40 TABLET, DELAYED RELEASE ORAL
Qty: 30 TABLET | Refills: 1 | Status: SHIPPED | OUTPATIENT
Start: 2022-03-29 | End: 2022-05-04 | Stop reason: SDUPTHER

## 2022-03-28 RX ORDER — LANOLIN ALCOHOL/MO/W.PET/CERES
6 CREAM (GRAM) TOPICAL
Qty: 60 TABLET | Refills: 1 | Status: SHIPPED | OUTPATIENT
Start: 2022-03-28 | End: 2022-04-04 | Stop reason: SDUPTHER

## 2022-03-28 RX ORDER — AMLODIPINE BESYLATE 5 MG/1
5 TABLET ORAL DAILY
Qty: 30 TABLET | Refills: 1 | Status: SHIPPED | OUTPATIENT
Start: 2022-03-28 | End: 2022-05-04 | Stop reason: SDUPTHER

## 2022-03-28 RX ORDER — PANTOPRAZOLE SODIUM 40 MG/1
40 TABLET, DELAYED RELEASE ORAL
Qty: 30 TABLET | Refills: 0 | Status: CANCELLED | OUTPATIENT
Start: 2022-03-29

## 2022-03-28 RX ORDER — OLANZAPINE 5 MG/1
5 TABLET ORAL
Qty: 30 TABLET | Refills: 0 | Status: CANCELLED | OUTPATIENT
Start: 2022-03-28

## 2022-03-28 RX ORDER — WARFARIN SODIUM 2 MG/1
2 TABLET ORAL DAILY
Qty: 90 TABLET | Refills: 0
Start: 2022-03-28 | End: 2022-04-19 | Stop reason: SDUPTHER

## 2022-03-28 RX ADMIN — MULTIPLE VITAMINS W/ MINERALS TAB 1 TABLET: TAB ORAL at 08:19

## 2022-03-28 RX ADMIN — SERTRALINE HYDROCHLORIDE 150 MG: 100 TABLET ORAL at 08:20

## 2022-03-28 RX ADMIN — PANTOPRAZOLE SODIUM 40 MG: 40 TABLET, DELAYED RELEASE ORAL at 06:15

## 2022-03-28 RX ADMIN — AMLODIPINE BESYLATE 5 MG: 5 TABLET ORAL at 08:19

## 2022-03-28 NOTE — TELEPHONE ENCOUNTER
Dr Fields  The patient's next appt is on 5/27  If you would like to see him sooner, please let me know on what date and time

## 2022-03-28 NOTE — CASE MANAGEMENT
Call made to [facility]sei, informed pt's appt on May 27th is only appt available for pt  CM informed that Dr Ross Castleman on vacation this week and pt to be placed on cancellation list      CM spoke with pt's wife, informed her of pt's appt with Colletta Morgans, therapist, PCP, and Dr Ross Castleman  Pt's wife in agreement with pt's appts and reports she will contact Dr Xiomara Jacques office next week to check on changing pt's appt  Pt's wife informed of meds sent to [address] [place] [address]   Pt's wife to  pt at 2pm

## 2022-03-28 NOTE — PLAN OF CARE
Problem: DISCHARGE PLANNING - CARE MANAGEMENT  Goal: Discharge to post-acute care or home with appropriate resources  Description: INTERVENTIONS:  - Conduct assessment to determine patient/family and health care team treatment goals, and need for post-acute services based on payer coverage, community resources, and patient preferences, and barriers to discharge  - Address psychosocial, clinical, and financial barriers to discharge as identified in assessment in conjunction with the patient/family and health care team  - Arrange appropriate level of post-acute services according to patients   needs and preference and payer coverage in collaboration with the physician and health care team  - Communicate with and update the patient/family, physician, and health care team regarding progress on the discharge plan  - Arrange appropriate transportation to post-acute venues  Outcome: Adequate for Discharge     Discharge to home with wife, outpatient follow up with [facility], therapist at PCP office  DC meds to [facility] DR EUFEMIA LYMAN   Pt's wife to transport pt

## 2022-03-28 NOTE — DISCHARGE INSTR - APPOINTMENTS
Meg Platt RN, our [employer], will be calling you after your discharge, on the phone number that you provided  She will be available as an additional support, if needed  If you wish to speak with her, you may contact Shannan Holloway at [phone]

## 2022-03-28 NOTE — TELEPHONE ENCOUNTER
Sanjiv Graham would like to know if there is an earlier appt with Dr Mackenzie Dowell available for this patient because he will d/c from [facility] unit      Anna Marie Houston

## 2022-03-28 NOTE — BH TRANSITION RECORD
Contact Information: If you have any questions, concerns, pended studies, tests and/or procedures, or emergencies regarding your inpatient behavioral health visit  Please contact [address] [place] [address] older adult behavioral health unit 6T [phone] and ask to speak to a , nurse or physician  A contact is available 24 hours/ 7 days a week at this number  Summary of Procedures Performed During your Stay:  Below is a list of major procedures performed during your hospital stay and a summary of results:  - Cardiac Procedures/Studies: EKG  Pending Studies (From admission, onward)    None        If studies are pending at discharge, follow up with your PCP and/or referring provider

## 2022-03-28 NOTE — NURSING NOTE
All belongings returned to pt  After visit summary reviewed and sent with patient  Pt acknowledges understanding and offers no complaints

## 2022-03-28 NOTE — TREATMENT TEAM
03/28/22 0816   Team Meeting   Meeting Type Daily Rounds   Initial Conference Date 03/28/22   Team Members Present   Team Members Present Physician;Nurse;;Occupational Therapist   Physician Team Member Dr Cinda Giles Team Member PERRY [facility] Management Team Member Tiarra Nguyen   OT Team Member Gulshan BALBUENA   Patient/Family Present   Patient Present No   Patient's Family Present No     Calm and cooperative, denies current s/s, acknowledged high anxiety Saturday and that PRN ativan worked, appeared anxious yesterday, eating well, cares for colostomy

## 2022-03-28 NOTE — BH TRANSITION RECORD
Contact Information: If you have any questions, concerns, pended studies, tests and/or procedures, or emergencies regarding your inpatient behavioral health visit  Please contact [facility] older adult behavioral health unit 6T [phone] and ask to speak to a , nurse or physician  A contact is available 24 hours/ 7 days a week at this number  Summary of Procedures Performed During your Stay:  Below is a list of major procedures performed during your hospital stay and a summary of results:  - Cardiac Procedures/Studies: EKG  Pending Studies (From admission, onward)     Start     Ordered    03/22/22 1351  UA w Reflex to Microscopic w Reflex to Culture  Once        Question:  Reason for ordering  Answer:  Fever with Suspicion of UTI    03/22/22 1350              If studies are pending at discharge, follow up with your PCP and/or referring provider

## 2022-03-28 NOTE — NURSING NOTE
Pt is visible on unit  Pt rated 2/4 anxiety related to "discharge home " Denies SI/HI/AH/VH and depression  Pt is compliant with medications and meals, appetite improved  Social with staff and peers  Will maintain q7 min checks

## 2022-03-28 NOTE — CASE MANAGEMENT
CM informed by  [facility] that pt has appt with Dr Mina Stevenson on May 27th   CM requested earlier appt date and for pt to be placed on waitlist for therapist

## 2022-03-28 NOTE — TELEPHONE ENCOUNTER
Patient is already an established patient of Dr Yehuda Negro, so her  would know about her schedule and be responsible for scheduling follow-up appointments  This does not go through intake because patient is already established

## 2022-03-28 NOTE — PLAN OF CARE
Problem: Prexisting or High Potential for Compromised Skin Integrity  Goal: Skin integrity is maintained or improved  Description: INTERVENTIONS:  - Identify patients at risk for skin breakdown  - Assess and monitor skin integrity  - Assess and monitor nutrition and hydration status  - Monitor labs   - Assess for incontinence   - Turn and reposition patient  - Assist with mobility/ambulation  - Relieve pressure over bony prominences  - Avoid friction and shearing  - Provide appropriate hygiene as needed including keeping skin clean and dry  - Evaluate need for skin moisturizer/barrier cream  - Collaborate with interdisciplinary team   - Patient/family teaching  - Consider wound care consult   Outcome: Completed     Problem: Alteration in Thoughts and Perception  Goal: Treatment Goal: Gain control of psychotic behaviors/thinking, reduce/eliminate presenting symptoms and demonstrate improved reality functioning upon discharge  Outcome: Completed  Goal: Verbalize thoughts and feelings  Description: Interventions:  - Promote a nonjudgmental and trusting relationship with the patient through active listening and therapeutic communication  - Assess patient's level of functioning, behavior and potential for risk  - Engage patient in 1 on 1 interactions  - Encourage patient to express fears, feelings, frustrations, and discuss symptoms    - [place] patient to reality, help patient recognize reality-based thinking   - Administer medications as ordered and assess for potential side effects  - Provide the patient education related to the signs and symptoms of the illness and desired effects of prescribed medications  Outcome: Completed  Goal: Refrain from acting on delusional thinking/internal stimuli  Description: Interventions:  - Monitor patient closely, per order   - Utilize least restrictive measures   - Set reasonable limits, give positive feedback for acceptable   - Administer medications as ordered and monitor of potential side effects  Outcome: Completed  Goal: Agree to be compliant with medication regime, as prescribed and report medication side effects  Description: Interventions:  - Offer appropriate PRN medication and supervise ingestion; conduct AIMS, as needed   Outcome: Completed  Goal: Attend and participate in unit activities, including therapeutic, recreational, and educational groups  Description: Interventions:  -Encourage Visitation and family involvement in care  Outcome: Completed  Goal: Recognize dysfunctional thoughts, communicate reality-based thoughts at the time of discharge  Description: Interventions:  - Provide medication and psycho-education to assist patient in compliance and developing insight into his/her illness   Outcome: Completed  Goal: Complete daily ADLs, including personal hygiene independently, as able  Description: Interventions:  - Observe, teach, and assist patient with ADLS  - Monitor and promote a balance of rest/activity, with adequate nutrition and elimination   Outcome: Completed     Problem: Risk for Self Injury/Neglect  Goal: Treatment Goal: Remain safe during length of stay, learn and adopt new coping skills, and be free of self-injurious ideation, impulses and acts at the time of discharge  Outcome: Completed  Goal: Verbalize thoughts and feelings  Description: Interventions:  - Assess and re-assess patient's lethality and potential for self-injury  - Engage patient in 1:1 interactions, daily, for a minimum of 15 minutes  - Encourage patient to express feelings, fears, frustrations, hopes  - Establish rapport/trust with patient   Outcome: Completed  Goal: Refrain from harming self  Description: Interventions:  - Monitor patient closely, per order  - Develop a trusting relationship  - Supervise medication ingestion, monitor effects and side effects   Outcome: Completed  Goal: Attend and participate in unit activities, including therapeutic, recreational, and educational groups  Description: Interventions:  - Provide therapeutic and educational activities daily, encourage attendance and participation, and document same in the medical record  - Obtain collateral information, encourage visitation and family involvement in care   Outcome: Completed  Goal: Recognize maladaptive responses and adopt new coping mechanisms  Outcome: Completed  Goal: Complete daily ADLs, including personal hygiene independently, as able  Description: Interventions:  - Observe, teach, and assist patient with ADLS  - Monitor and promote a balance of rest/activity, with adequate nutrition and elimination  Outcome: Completed     Problem: Anxiety  Goal: Anxiety is at manageable level  Description: Interventions:  - Assess and monitor patient's anxiety level  - Monitor for signs and symptoms (heart palpitations, chest pain, shortness of breath, headaches, nausea, feeling jumpy, restlessness, irritable, apprehensive)  - Collaborate with interdisciplinary team and initiate plan and interventions as ordered  - [place] patient to unit/surroundings  - Explain treatment plan  - Encourage participation in care  - Encourage verbalization of concerns/fears  - Identify coping mechanisms  - Assist in developing anxiety-reducing skills  - Administer/offer alternative therapies  - Limit or eliminate stimulants  Outcome: Completed     Problem: Nutrition/Hydration-ADULT  Goal: Nutrient/Hydration intake appropriate for improving, restoring or maintaining nutritional needs  Description: Monitor and assess patient's nutrition/hydration status for malnutrition  Collaborate with interdisciplinary team and initiate plan and interventions as ordered  Monitor patient's weight and dietary intake as ordered or per policy  Utilize nutrition screening tool and intervene as necessary  Determine patient's food preferences and provide high-protein, high-caloric foods as appropriate       INTERVENTIONS:  - Monitor oral intake, urinary output, labs, and treatment plans  - Assess nutrition and hydration status and recommend course of action  - Evaluate amount of meals eaten  - Assist patient with eating if necessary   - Allow adequate time for meals  - Recommend/ encourage appropriate diets, oral nutritional supplements, and vitamin/mineral supplements  - Order, calculate, and assess calorie counts as needed  - Recommend, monitor, and adjust tube feedings and TPN/PPN based on assessed needs  - Assess need for intravenous fluids  - Provide specific nutrition/hydration education as appropriate  - Include patient/family/caregiver in decisions related to nutrition  Outcome: Completed     Problem: Potential for Falls  Goal: Patient will remain free of falls  Description: INTERVENTIONS:  - Educate patient/family on patient safety including physical limitations  - Instruct patient to call for assistance with activity   - Consult OT/PT to assist with strengthening/mobility   - Keep Call bell within reach  - Keep bed low and locked with side rails adjusted as appropriate  - Keep care items and personal belongings within reach  - Initiate and maintain comfort rounds  - Make Fall Risk Sign visible to staff  - Offer Toileting every  Hours, in advance of need  - Initiate/Maintain alarm  - Obtain necessary fall risk management equipment:  - Apply yellow socks and bracelet for high fall risk patients  - Consider moving patient to room near nurses station  Outcome: Completed     Problem: Nutrition/Hydration-ADULT  Goal: Nutrient/Hydration intake appropriate for improving, restoring or maintaining nutritional needs  Description: Monitor and assess patient's nutrition/hydration status for malnutrition  Collaborate with interdisciplinary team and initiate plan and interventions as ordered  Monitor patient's weight and dietary intake as ordered or per policy  Utilize nutrition screening tool and intervene as necessary   Determine patient's food preferences and provide high-protein, high-caloric foods as appropriate       INTERVENTIONS:  - Monitor oral intake, urinary output, labs, and treatment plans  - Assess nutrition and hydration status and recommend course of action  - Evaluate amount of meals eaten  - Assist patient with eating if necessary   - Allow adequate time for meals  - Recommend/ encourage appropriate diets, oral nutritional supplements, and vitamin/mineral supplements  - Order, calculate, and assess calorie counts as needed  - Recommend, monitor, and adjust tube feedings and TPN/PPN based on assessed needs  - Assess need for intravenous fluids  - Provide specific nutrition/hydration education as appropriate  - Include patient/family/caregiver in decisions related to nutrition  Outcome: Completed     Problem: DISCHARGE PLANNING - CARE MANAGEMENT  Goal: Discharge to post-acute care or home with appropriate resources  Description: INTERVENTIONS:  - Conduct assessment to determine patient/family and health care team treatment goals, and need for post-acute services based on payer coverage, community resources, and patient preferences, and barriers to discharge  - Address psychosocial, clinical, and financial barriers to discharge as identified in assessment in conjunction with the patient/family and health care team  - Arrange appropriate level of post-acute services according to patients   needs and preference and payer coverage in collaboration with the physician and health care team  - Communicate with and update the patient/family, physician, and health care team regarding progress on the discharge plan  - Arrange appropriate transportation to post-acute venues  Outcome: Completed     Problem: Ineffective Coping  Goal: Participates in unit activities  Description: Interventions:  - Provide therapeutic environment   - Provide required programming   - Redirect inappropriate behaviors   Outcome: Completed

## 2022-03-28 NOTE — CASE MANAGEMENT
Cm met with pt, reviewed poss d/c and pending appt changes for pt to see Dr Brice Mahajan and therapist Ronnie Davis  Pt requesting d/c today; reports being aware of his need to eat and not exercise all day  Pt reports feeling better and missing his wife  CM reviewed pending changes to psychiatrist and therapy appts  Pt agrees with wife regarding PHP programming  CM reviewed IMM notice  Pt in agreement and signed  CM rec'd notification from [facility] that pt able to be seen Tue 3/29 at 10am  CM reviewed appt with Dr Esther Rendon; pt able to d/c today  CM notified Ronnie Davis of pt's appt today and to keep appt for pt tomorrow  Awaiting response from  Psych assoc to move appt with Dr Brice Mahajan sooner

## 2022-03-28 NOTE — DISCHARGE INSTR - OTHER ORDERS
You are being discharged to your residence: [facility] tel# [phone]    Triggers you have identified during your hospitalization that led to your admission distressed mood include poor nutritional intake  Coping skills you have identified during your hospitalization include exercising, although must be done in moderation  If you are unable to deal with your distressed mood alone please contact Dr Todd Araya office  If that is not effective and you continue to have distressed mood, overwhelmed, in crisis please contact [facility] # [phone], dial [phone] or go to the nearest emergency center

## 2022-03-28 NOTE — DISCHARGE SUMMARY
Discharge Summary - [id number] [facility] Liza 77 y o  male MRN: [id number]  Unit/Bed#: [facility] 303-75 Encounter: [id number]     Admission Date: 3/22/2022         Discharge Date: 3/28/2022    Attending Psychiatrist: Katrin Purvis MD    Reason for Admission/HPI:     According to H&P completed by Dr Benjie Ovalle on 3/23/2022:    Patient is a 77year old male, domiciled with his wife ,   History of depression, JOSÉ LUIS, eating disorder,     He was admitted following recommendation by OPD provider Dr Amanda Meza on 3/21/22 due to concerns about worsening depression, poor self care and poor appetite and weightloss        Per outpatient notes by Dr Amanda Meza- Wife reports that he has been more irritated recently and argues with her often about the need to loose more weight and also about eating  Wife reports that he only eats one meal a day at supper time - throws away breakfast and lunch  He continues to exercise excessively every day - goes for hours long walks 3 times a day and also uses treadmill every day  Wife reports that the whole family has been concerned about his health  She also noted leg jerking and some confusion recently  MMSE was completed during the session today and Ray [place] scored 24/30 (recall was 0/3, he was also unable to copy pentagons)      On initial evaluation, pt is minimizing all symptoms, he reports he has been trying to live healthy, reason why he has been watching what he eats  He also exercises frequently because he enjoys it( he goes for a 45 minute walk 3 times a day and rides his stationary bike for 30minutes daily)   He denies throwing away his meals, insists he eats 3 meals a day and that also drinks a boost  He reports family has been concerned about his excessive exercising but her feels comfortable and this makes him happy       Patient endorses depressed mood,  some anhedonia due to his preoccupation with  exercising, he  reports eating 3 meals but at same time states he has  been limiting his intake because his colostomy bag fills up easily   Patient is preoccupied with his weight       He denies suicidal or homicidal ideations, he denies AVH, No overt psychosis  He reports reduced sleep( about 2hrs per night), having a lot of energy but no inflated self esteem, is not talkative, no grandiosity  No history of mariela       He has been restricting his calories, but denies binging and purging behavior  He is preoccupied with his weight, states he no longer wants to go above 130 lbs  He reports compliance with his medications at home       Today, this writer  spoke with Dr Benjamín Mederos, she reports pt did well on zyprexa in the past, but this was decreased and discontinued by neurology last year due to concerns tremors  She recommends to discontinue seroquel and restarting zyprexa at low dose to help negative thoughts, appetite and mood      Social History     Tobacco History     Smoking Status  Former Smoker Quit date  6/9/1981 Smoking Frequency  1 pack/day for 10 years (10 pk yrs) Smoking Tobacco Type  Cigarettes    Smokeless Tobacco Use  Never Used    Tobacco Comment  50 years ago          Alcohol History     Alcohol Use Status  No Comment  history of excessive alcohol use - quit in 2008          Drug Use     Drug Use Status  No          Sexual Activity     Sexually Active  Not Currently Partners  Female          Activities of Daily Living    Not Asked               Past Medical History:   Diagnosis Date    Allergic rhinitis     Anosmia     Anxiety     Benign parotid tumor     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     Crohn's disease [facility]     Depression     Dilated pancreatic duct     [facility]     Eating disorder     GERD (gastroesophageal reflux disease)     Hearing loss     Heart disease     [place] (hard of hearing)     no hearing aids    Hypertension     Leucocytosis     Mass in neck     Nail anomaly     Polyuria     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     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    Recurrent falls     Solar lentigo     Thrombocytopenia (HCC)     Vertigo     Vision loss of left eye      Past Surgical History:   Procedure Laterality Date    COLON SURGERY      Partial colectomy and colostomy    COLONOSCOPY      ESOPHAGOGASTRODUODENOSCOPY N/A 4/5/2017    Procedure: ESOPHAGOGASTRODUODENOSCOPY (EGD); Surgeon: Leslie Johnson MD;  [facility]: [facility]; Service:     EYE SURGERY Right     Cataract    KNEE SURGERY      NE EDG US EXAM SURGICAL ALTER STOM DUODENUM/JEJUNUM N/A 4/9/2018    Procedure: LINEAR ENDOSCOPIC U/S;  Surgeon: Murali Toribio MD;  Location: [facility]; Service: Gastroenterology    NE EXC PAROTD,LAT LOBE,DISSECT 5TH NERV Right 8/8/2018    Procedure: PAROTIDECTOMY WITH FACIAL NERVE MONITOR AND FROZEN SECTION;  Surgeon: Khris Thorpe MD;  [facility];  Service: ENT    RADICAL NECK DISSECTION N/A 8/8/2018    Procedure: SELECTIVE NECK DISSECTION;  Surgeon: Khris Thorpe MD;  [facility];  Service: ENT    REMOVAL OF IMPACTED TOOTH - COMPLETELY BONY N/A 8/8/2018    Procedure: EXTRACTION TEETH #15 and #30;  Surgeon: Hao Love DDS;  [facility];  Service: Maxillofacial    UPPER GASTROINTESTINAL ENDOSCOPY      VEIN LIGATION AND STRIPPING         Medications: All current active medications have been reviewed  Medications prior to admission:    Prior to Admission Medications   Prescriptions Last Dose Informant Patient Reported? Taking?    LORazepam (ATIVAN) 0 5 mg tablet Unknown at Unknown time  No No   Sig: Take 1 tablet (0 5 mg total) by mouth 3 (three) times a day as needed for anxiety   Melatonin 10 MG TABS Not Taking at Unknown time Spouse/Significant Other Yes No   Sig: Take by mouth   Patient not taking: Reported on 3/22/2022    Multiple Vitamins-Minerals (MULTI FOR HIM 50+ PO) 3/22/2022 at Unknown time Spouse/Significant Other Yes Yes   Sig: Take 1 tablet by mouth daily   QUEtiapine (SEROquel) 50 mg tablet   No No   Sig: Take 1 tablet (50 mg total) by mouth daily at bedtime   amLODIPine (NORVASC) 5 mg tablet 3/22/2022 at Unknown time  No Yes   Sig: Take 1 tablet (5 mg total) by mouth daily   omeprazole (PriLOSEC) 40 MG capsule   No No   Sig: Take 1 capsule (40 mg total) by mouth daily   sertraline (ZOLOFT) 100 mg tablet 3/21/2022 at Unknown time  No Yes   Sig: Take 1 tablet (100 mg total) by mouth daily   warfarin (COUMADIN) 2 mg tablet Unknown at Unknown time  No No   Sig: Take 1 tablet (2 mg total) by mouth daily      Facility-Administered Medications: None       Allergies: Allergies   Allergen Reactions    Duloxetine Other (See Comments)     Panic attacks    Other      Seasonal       Objective     Vital signs in last 24 hours:    Temp:  [97 1 °F (36 2 °C)-97 6 °F (36 4 °C)] 97 1 °F (36 2 °C)  HR:  [71-74] 72  Resp:  [17-18] 17  BP: (144-150)/(72-86) 150/72      Intake/Output Summary (Last 24 hours) at 3/28/2022 0809  Last data filed at 3/27/2022 1700  Gross per 24 hour   Intake 720 ml   Output --   Net 720 ml       Hospital Course:     Karen Jerry was admitted to the inpatient psychiatric unit and started on Behavioral Health checks every 7 minutes  During the hospitalization he was encouraged to attend individual therapy, group therapy, milieu therapy and occupational therapy  Psychiatric medications were titrated over the hospital stay  To address depressive symptoms, irritability, agitation, obsessive thoughts and insomnia, Karen Jerry was treated with antidepressant Zoloft, antipsychotic medication Zyprexa and hypnotic medication Melatonin  Medication doses were added and titrated to Zoloft 150mg PO Daily, Zyprexa 5mg PO QHS, and Melatonin 3mg PO QHS during the hospital course    Prior to beginning of treatment medications risks and benefits and possible side effects including risk of parkinsonian symptoms, Tardive Dyskinesia and metabolic syndrome related to treatment with antipsychotic medications, risk of cardiovascular events in elderly related to treatment with antipsychotic medications, risk of suicidality and serotonin syndrome related to treatment with antidepressants and risk of impaired next-day mental alertness, complex sleep-related behavior and dependence related to treatment with hypnotic medications were reviewed with Marva Trevizo  He verbalized understanding and agreement for treatment  Upon admission Marva Trevizo was seen by medical service for medical clearance for inpatient treatment and medical follow up  Lorenzo's symptoms slowly improved over the hospital course  Initially after admission he was still feeling depressed, anxious and irritable  With adjustment of medications and therapeutic milieu his symptoms slowly improved  At the end of treatment Marva Trevizo was doing much better  His mood was less depressed, he was less anxious, his thought process was more goal directed and forward thinking  Marva Trevizo expressed being more hopeful for the future  His affect was more bright and his eye contact was improved  His energy and concentration was also improved  at the time of discharge  His intake also was much improved (eating almost 100% of meals daily), he was not as focused on his food and exercising  Win Little was seen for a neuropsychological exam while inpatient which revealed cognitive deficits in auditory memory and visual recognition, working memory, auditory vigilance, abstract reasoning ability, generative ability and semantic retrieval   This profile is at least consistent with Mild Neurocognitive Disorder and perhaps early stage Alzheimer's dementia  Marva Trevizo expressed remorse over being easily angered by his wife and agreed to work on his communication with her  Marva Trevizo denied suicidal ideation, intent or plan at the time of discharge and denied homicidal ideation, intent or plan at the time of discharge  he was participating appropriately in milieu at the time of discharge   Behavior was appropriate on the unit at the time of discharge  Sleep and appetite were improved  Kyrie Rios was tolerating medications and was not reporting any significant side effects at the time of discharge  Kyrie Rios has maximally benefited from inpatient admission  Patient is not at acute risk of harm to self or others  Risk has been mitigated by inpatient stay and treatment  Kyrie Rios verbalized an adequate safety plan to utilize post discharge  This includes: talking with his wife, working on house projects, and lastly, patient was encouraged to seek the nearest Emergency Department should suicidal symptoms or urges arise or worsen  Case was also reviewed with Lorenzo's wife, Brennan Ritchie, who supported this discharge plan  Since Kyrie Rios was doing well at the end of the hospitalization, treatment team felt that he could be safely discharged to outpatient care  Lorenzo's wife confirmed that all guns were securely locked at home  Lorenzo's wife confirmed that all medications were securely locked at home  The outpatient follow up was arranged by the unit  upon discharge:  · Therapy f/u with Jo Ann Kennedy of [facility] on 3/29/2022 @ 10AM   Request was made by CM for patient to be seen on a weekly basis post discharge  · PCP f/u with Dr Mira Longoria of [facility] on 4/1/2022 @ [address]  · Neurology f/u with BREA Shabazz [place] [id number] [facility] on 4/25/2022 @ 1:45PM  · Psychiatric f/u with Dr Kandy Heredia of [address] [place] [address] [address] on 5/27/2022 @ 2:30PM   Patient was instructed to call office next week to check if earlier appointment is available  30-day (w/ 1 refill) scripts electronically sent to [address] [facility]  Confirmed w/ [facility], patient is to continue to follow Warfarin schedule (has own supply of medications) therefore refill scripts were not sent in  INR on 3/23 was 2 12  He is to continue to follow bi-weekly anti-coagulation visits      Mental Status at Time of Discharge:     Appearance:  age appropriate, casually dressed, dressed appropriately, looks stated age   Behavior:  cooperative, calm, more redirectable, improved eye contact   Speech:  normal rate, normal volume, normal pitch   Mood:  "good"   Affect:  constricted, slightly brighter   Thought Process:  organized, logical, coherent, goal directed   Associations: intact associations   Thought Content:  no overt delusions, no overt paranoia noted on exam   Perceptual Disturbances: no auditory hallucinations, no visual hallucinations, denies when asked, does not appear responding to internal stimuli   Risk Potential: Suicidal ideation - Denies suicidal ideation/intent/plan  Safety plan reviewed with patient  Homicidal ideation - None at present  Potential for aggression - Not at present   Sensorium:  oriented to person, place and time/date   Memory:  recent and remote memory grossly intact   Consciousness:  alert and awake   Attention/Concentration: attention span and concentration appear shorter than expected for age   Insight:  fair   Judgment: fair   Gait/Station: normal gait/station, normal balance   Motor Activity: no abnormal movements     Suicide/Homicide Risk Assessment:    Risk of Harm to Self:   The following ratings are based on assessment at the time of discharge, review of the hospital stay progress and review of records  Demographic risk factors include: , male, elderly (76 or older)   Historical Risk Factors include: chronic depressive symptoms  Current Specific Risk Factors include: recent inpatient psychiatric admission - being discharged today, diagnosis of depression  Protective Factors: improved mood, ability to adapt to change, ability to manage anger well, ability to make plans for the future, no current suicidal plan or intent, outpatient psychiatric follow up established, family support established, being   Weapons/Firearms: gun   The following steps have been taken to ensure weapons are properly secured: locked, by wife, no access  Based on today's assessment, Ray Lake presents the following risk of harm to self: minimal    Risk of Harm to Others: The following ratings are based on assessment at the time of discharge, review of the hospital stay progress and review of records  Demographic Risk Factors include: male  Historical Risk Factors include: none  Current Specific Risk Factors include: recent episode of mood instability, recent episodes of agitation  Protective Factors: no current homicidal ideation, improved impulse control, improved mood, no current psychotic symptoms, compliant with medications, compliant with treatment, willing to continue psychiatric treatment, outpatient follow up established, effective coping skills  Weapons/Firearms: gun   The following steps have been taken to ensure weapons are properly secured: locked, by wife, no access  Based on today's assessment, Ray Lake presents the following risk of harm to others: minimal    The following interventions are recommended: outpatient follow up with a psychiatrist, outpatient follow up with a therapist    Admission Diagnosis:    Principal Problem:    Major depressive disorder, recurrent, severe without psychotic features ([facility] )  Active Problems:    Crohn's disease    History of DVT (deep vein thrombosis)    HTN (hypertension)    Weight loss    Eating disorder, unspecified    Medical clearance for psychiatric admission    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[facility])    GERD (gastroesophageal reflux disease)      Discharge Diagnosis:     Principal Problem:    Major depressive disorder, recurrent, severe without psychotic features ([facility] )  Active Problems:    Crohn's disease    History of DVT (deep vein thrombosis)    HTN (hypertension)    Weight loss    Eating disorder, unspecified    Medical clearance for psychiatric admission    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[facility])    GERD (gastroesophageal reflux disease)  Resolved Problems:    * No resolved hospital problems  *      Lab Results:   I have personally reviewed all pertinent laboratory/tests results  Most Recent Labs:   Lab Results   Component Value Date    WBC 6 50 03/21/2022    RBC 4 45 03/21/2022    HGB 13 2 03/21/2022    HCT 39 8 03/21/2022     03/21/2022    RDW 13 9 03/21/2022    NEUTROABS 5 19 03/21/2022    SODIUM 139 03/21/2022    K 4 1 03/21/2022     03/21/2022    CO2 26 03/21/2022    BUN 20 03/21/2022    CREATININE 0 91 03/21/2022    GLUC 90 03/21/2022    GLUF 96 04/27/2021    CALCIUM 8 4 03/21/2022    AST 24 03/21/2022    ALT 27 03/21/2022    ALKPHOS 70 03/21/2022    TP 6 6 03/21/2022    ALB 3 6 03/21/2022    TBILI 0 74 03/21/2022    CHOLESTEROL 191 03/23/2022    HDL 64 03/23/2022    TRIG 63 03/23/2022    LDLCALC 114 03/23/2022    NONHDLC 127 03/23/2022    AMMONIA <10 (L) 10/09/2018    UIQ4CBQYGWJQ 2 490 03/23/2022    FREET4 1 0 06/10/2014    RPR Non-Reactive 03/23/2022    HGBA1C 5 0 07/02/2021    EAG 97 07/02/2021       Discharge Medications:    See after visit summary for all reconciled discharge medications provided to patient and family  Discharge instructions/Information to patient and family:     See after visit summary for information provided to patient and family  Provisions for Follow-Up Care:    See after visit summary for information related to follow-up care and any pertinent home health orders  Discharge Statement:    I spent 30 minutes discharging the patient  This time was spent on the day of discharge  I had direct contact with the patient on the day of discharge  Additional documentation is required if more than 30 minutes were spent on discharge:    I reviewed with Bee Rai importance of compliance with medications and outpatient treatment after discharge  I discussed the medication regimen and possible side effects of the medications with Bee Rai prior to discharge  At the time of discharge he was tolerating psychiatric medications    I discussed outpatient follow up with Rip Yusuf  I reviewed with Rip Yusuf crisis plan and safety plan upon discharge    Rip Yusuf has been filing controlled prescriptions on time as prescribed according to Annalise Sanchez 17     Discharge on Two Antipsychotic Medications: BREA Enriquez 03/28/22

## 2022-03-29 ENCOUNTER — SOCIAL WORK (OUTPATIENT)
Dept: BEHAVIORAL/MENTAL HEALTH CLINIC | Facility: CLINIC | Age: 77
End: 2022-03-29
Payer: MEDICARE

## 2022-03-29 DIAGNOSIS — F33.2 MAJOR DEPRESSIVE DISORDER, RECURRENT SEVERE WITHOUT PSYCHOTIC FEATURES (HCC): Primary | ICD-10-CM

## 2022-03-29 PROCEDURE — 90832 PSYTX W PT 30 MINUTES: CPT | Performed by: SOCIAL WORKER

## 2022-03-29 NOTE — PSYCH
Psychotherapy Provided: Individual Psychotherapy 30 minutes from 10:00-10:30    Length of time in session: 30 minutes, follow up in 4 week    Encounter Diagnosis     ICD-10-CM    1  Major depressive disorder, recurrent severe without psychotic features ([facility] )  F33 2        Goals addressed in session: Goal 1     Pain:      none    0    Current suicide risk : Low     D- since last visit, Ceferino Aguiar was hospitalized on the [facility] unit for increased depression  Stress and tension at home contributed and his ongoing mood issues aggravated situation  Since discharge, he has felt much less depressed and feels "happy" and is "smiling all the time and talking too much"  When asked how long it has been since he felt genuinely happy, he is unable to recall  Has been reducing/limiting his exercise and focusing on eating more consistently  Wants to continue to make a more conscious effort to not argue at home and be able to discuss any matters  Some anxiety evident but this too has lessened since last seen  Denies any SI  Marilou Hammans states he feels much less depressed and is happy, smiling and is very verbal and engaged throughout session  He is pleasant, less anxious and engaged during session  Thoughts are logical and goal directed  He is appropriately dressed and groomed  Maintained consistent eye contact  P- focused on progress made regarding his mood via his own report but also as evidenced by his presentation  Reviewed appropriate boundaries and expectations to maintain with family at home  Reviewed appropriate stress mgmt strategies and productive outlets to utilize in moderation  Behavioral Health Treatment Plan [facility]: Diagnosis and Treatment Plan explained to Fernanda Ramirez relates understanding diagnosis and is agreeable to Treatment Plan   Yes

## 2022-04-01 ENCOUNTER — OFFICE VISIT (OUTPATIENT)
Dept: INTERNAL MEDICINE CLINIC | Facility: CLINIC | Age: 77
End: 2022-04-01
Payer: MEDICARE

## 2022-04-01 VITALS
SYSTOLIC BLOOD PRESSURE: 144 MMHG | OXYGEN SATURATION: 98 % | DIASTOLIC BLOOD PRESSURE: 84 MMHG | HEIGHT: 65 IN | BODY MASS INDEX: 21.06 KG/M2 | WEIGHT: 126.4 LBS | HEART RATE: 83 BPM

## 2022-04-01 DIAGNOSIS — F50.82 AVOIDANT-RESTRICTIVE FOOD INTAKE DISORDER (ARFID): Primary | ICD-10-CM

## 2022-04-01 PROCEDURE — 99495 TRANSJ CARE MGMT MOD F2F 14D: CPT | Performed by: INTERNAL MEDICINE
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
## 2022-04-03 PROBLEM — F50.82 AVOIDANT-RESTRICTIVE FOOD INTAKE DISORDER (ARFID): Status: ACTIVE | Noted: 2022-04-03

## 2022-04-03 NOTE — ASSESSMENT & PLAN NOTE
Transition care management visit regarding recent hospitalization for weight loss, eating disorder, depression today we did review the discharge summary laboratories in test that were completed patient to follow-up with Psychiatry will have patient see counselor Radha Thorpe I have counseled patient only to exercise for 1 time per day for 30 minute he has agreed he is willing to have his exercise equipment removed if he cannot comply with this    He is now eating better  RTO in 2 months call if any problems

## 2022-04-04 ENCOUNTER — OFFICE VISIT (OUTPATIENT)
Dept: PSYCHIATRY | Facility: CLINIC | Age: 77
End: 2022-04-04
Payer: MEDICARE

## 2022-04-04 VITALS
BODY MASS INDEX: 21.66 KG/M2 | HEIGHT: 65 IN | SYSTOLIC BLOOD PRESSURE: 148 MMHG | DIASTOLIC BLOOD PRESSURE: 80 MMHG | HEART RATE: 76 BPM | WEIGHT: 130 LBS

## 2022-04-04 DIAGNOSIS — G47.09 OTHER INSOMNIA: Chronic | ICD-10-CM

## 2022-04-04 DIAGNOSIS — F41.1 GAD (GENERALIZED ANXIETY DISORDER): Chronic | ICD-10-CM

## 2022-04-04 DIAGNOSIS — F33.41 MAJOR DEPRESSIVE DISORDER, RECURRENT, IN PARTIAL REMISSION (HCC): Primary | Chronic | ICD-10-CM

## 2022-04-04 DIAGNOSIS — F50.82 AVOIDANT-RESTRICTIVE FOOD INTAKE DISORDER (ARFID): Chronic | ICD-10-CM

## 2022-04-04 DIAGNOSIS — F41.0 PANIC DISORDER WITHOUT AGORAPHOBIA: Chronic | ICD-10-CM

## 2022-04-04 DIAGNOSIS — F09 MILD COGNITIVE DISORDER: Chronic | ICD-10-CM

## 2022-04-04 DIAGNOSIS — Z79.899 LONG-TERM USE OF HIGH-RISK MEDICATION: Chronic | ICD-10-CM

## 2022-04-04 PROCEDURE — 90833 PSYTX W PT W E/M 30 MIN: CPT | Performed by: PSYCHIATRY & NEUROLOGY

## 2022-04-04 PROCEDURE — 99214 OFFICE O/P EST MOD 30 MIN: CPT | Performed by: PSYCHIATRY & NEUROLOGY

## 2022-04-04 RX ORDER — SERTRALINE HYDROCHLORIDE 100 MG/1
150 TABLET, FILM COATED ORAL DAILY
Qty: 135 TABLET | Refills: 2 | Status: SHIPPED | OUTPATIENT
Start: 2022-04-04 | End: 2022-12-30

## 2022-04-04 RX ORDER — LANOLIN ALCOHOL/MO/W.PET/CERES
6 CREAM (GRAM) TOPICAL
Qty: 180 TABLET | Refills: 2 | Status: SHIPPED | OUTPATIENT
Start: 2022-04-04 | End: 2022-12-30

## 2022-04-04 RX ORDER — OLANZAPINE 5 MG/1
5 TABLET ORAL
Qty: 90 TABLET | Refills: 2 | Status: SHIPPED | OUTPATIENT
Start: 2022-04-04 | End: 2022-12-30

## 2022-04-04 NOTE — BH TREATMENT PLAN
TREATMENT PLAN (Medication Management Only)        [facility]    Name/Date of Birth/MRN:  Mason Nelson 77 y o  1945 MRN: [id number]  Date of Treatment Plan: April 4, 2022  Diagnosis/Diagnoses:   1  Major depressive disorder, recurrent, in partial remission ([facility] [address] )    2  JOSÉ LUIS (generalized anxiety disorder)    3  Panic disorder without agoraphobia    4  Other insomnia    5  Long-term use of high-risk medication    6  Avoidant-restrictive food intake disorder (ARFID)      Strengths/Personal Resources for Self-Care: "doing something in the house"  Area/Areas of need (in own words): "eating healthy"  1  Long Term Goal:   maintain control of anxiety, maintain stability of depression  Target Date: 2 months - 6/4/2022  Person/Persons responsible for completion of goal: Raphael Ortiz and wife  2  Short Term Objective (s) - How will we reach this goal?:   A  Provider new recommended medication/dosage changes and/or continue medication(s): continue current medications as prescribed (Zoloft, Zyprexa and Melatonin)  B   N/A   C   N/A  Target Date: 2 months - 6/4/2022  Person/Persons Responsible for Completion of Goal: Raphael Ortiz and wife   Progress Towards Goals: progressing  Treatment Modality: medication management every 2 months, continue psychotherapy with [facility] therapist  Review due 180 days from date of this plan: 6 months - 10/4/2022  Expected length of service: ongoing treatment unless revised  My Physician/PA/NP and I have developed this plan together and I agree to work on the goals and objectives  I understand the treatment goals that were developed for my treatment    Electronic Signatures: on file (unless signed below)    Noemi Harris MD 04/04/22
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
## 2022-04-04 NOTE — TELEPHONE ENCOUNTER
Narda Mott,    Please see note from Dr Xavi Soliman:    Edd Andre, I do not see people at 2:40 PM  My schedule on 4/27 has to be fixed - I have a patient on 2:20 and 2:40 pm  Please reschedule 2:20 PM and change 2:40 PM to 2:30 PM  Thank you     Best,  Ruth Hernandez

## 2022-04-05 ENCOUNTER — ANTICOAG VISIT (OUTPATIENT)
Dept: INTERNAL MEDICINE CLINIC | Facility: CLINIC | Age: 77
End: 2022-04-05

## 2022-04-05 ENCOUNTER — APPOINTMENT (OUTPATIENT)
Dept: LAB | Facility: CLINIC | Age: 77
End: 2022-04-05
Payer: MEDICARE

## 2022-04-05 DIAGNOSIS — Z00.00 ROUTINE ADULT HEALTH MAINTENANCE: ICD-10-CM

## 2022-04-05 LAB
ALBUMIN SERPL BCP-MCNC: 3.3 G/DL (ref 3.5–5)
ALP SERPL-CCNC: 81 U/L (ref 46–116)
ALT SERPL W P-5'-P-CCNC: 50 U/L (ref 12–78)
ANION GAP SERPL CALCULATED.3IONS-SCNC: 9 MMOL/L (ref 4–13)
AST SERPL W P-5'-P-CCNC: 39 U/L (ref 5–45)
BASOPHILS # BLD AUTO: 0.02 THOUSANDS/ΜL (ref 0–0.1)
BASOPHILS NFR BLD AUTO: 0 % (ref 0–1)
BILIRUB SERPL-MCNC: 0.3 MG/DL (ref 0.2–1)
BUN SERPL-MCNC: 18 MG/DL (ref 5–25)
CALCIUM ALBUM COR SERPL-MCNC: 9 MG/DL (ref 8.3–10.1)
CALCIUM SERPL-MCNC: 8.4 MG/DL (ref 8.3–10.1)
CHLORIDE SERPL-SCNC: 108 MMOL/L (ref 100–108)
CO2 SERPL-SCNC: 26 MMOL/L (ref 21–32)
CREAT SERPL-MCNC: 0.75 MG/DL (ref 0.6–1.3)
EOSINOPHIL # BLD AUTO: 0.03 THOUSAND/ΜL (ref 0–0.61)
EOSINOPHIL NFR BLD AUTO: 0 % (ref 0–6)
ERYTHROCYTE [DISTWIDTH] IN BLOOD BY AUTOMATED COUNT: 14.4 % (ref 11.6–15.1)
GFR SERPL CREATININE-BSD FRML MDRD: 89 ML/MIN/1.73SQ M
GLUCOSE SERPL-MCNC: 86 MG/DL (ref 65–140)
HCT VFR BLD AUTO: 41.9 % (ref 36.5–49.3)
HGB BLD-MCNC: 13.5 G/DL (ref 12–17)
IMM GRANULOCYTES # BLD AUTO: 0.04 THOUSAND/UL (ref 0–0.2)
IMM GRANULOCYTES NFR BLD AUTO: 1 % (ref 0–2)
LYMPHOCYTES # BLD AUTO: 0.62 THOUSANDS/ΜL (ref 0.6–4.47)
LYMPHOCYTES NFR BLD AUTO: 9 % (ref 14–44)
MCH RBC QN AUTO: 29.3 PG (ref 26.8–34.3)
MCHC RBC AUTO-ENTMCNC: 32.2 G/DL (ref 31.4–37.4)
MCV RBC AUTO: 91 FL (ref 82–98)
MONOCYTES # BLD AUTO: 0.43 THOUSAND/ΜL (ref 0.17–1.22)
MONOCYTES NFR BLD AUTO: 6 % (ref 4–12)
NEUTROPHILS # BLD AUTO: 5.56 THOUSANDS/ΜL (ref 1.85–7.62)
NEUTS SEG NFR BLD AUTO: 84 % (ref 43–75)
NRBC BLD AUTO-RTO: 0 /100 WBCS
PLATELET # BLD AUTO: 180 THOUSANDS/UL (ref 149–390)
PMV BLD AUTO: 9.5 FL (ref 8.9–12.7)
POTASSIUM SERPL-SCNC: 4.6 MMOL/L (ref 3.5–5.3)
PROT SERPL-MCNC: 6.7 G/DL (ref 6.4–8.2)
RBC # BLD AUTO: 4.61 MILLION/UL (ref 3.88–5.62)
SODIUM SERPL-SCNC: 143 MMOL/L (ref 136–145)
T4 FREE SERPL-MCNC: 0.71 NG/DL (ref 0.76–1.46)
TSH SERPL DL<=0.05 MIU/L-ACNC: 1.75 UIU/ML (ref 0.45–4.5)
WBC # BLD AUTO: 6.7 THOUSAND/UL (ref 4.31–10.16)

## 2022-04-05 PROCEDURE — 84443 ASSAY THYROID STIM HORMONE: CPT

## 2022-04-05 PROCEDURE — 85025 COMPLETE CBC W/AUTO DIFF WBC: CPT

## 2022-04-05 PROCEDURE — 84439 ASSAY OF FREE THYROXINE: CPT

## 2022-04-05 PROCEDURE — 80053 COMPREHEN METABOLIC PANEL: CPT

## 2022-04-05 NOTE — PROGRESS NOTES
Per Dr Schultz, increase dose to 3mg then 2mg repeating every two days  Recheck in one week  Spoke with pt and advised  Confirmed understanding

## 2022-04-11 ENCOUNTER — APPOINTMENT (OUTPATIENT)
Dept: LAB | Facility: CLINIC | Age: 77
End: 2022-04-11
Payer: MEDICARE

## 2022-04-12 ENCOUNTER — ANTICOAG VISIT (OUTPATIENT)
Dept: INTERNAL MEDICINE CLINIC | Facility: CLINIC | Age: 77
End: 2022-04-12

## 2022-04-12 ENCOUNTER — TELEPHONE (OUTPATIENT)
Dept: NEUROLOGY | Facility: CLINIC | Age: 77
End: 2022-04-12

## 2022-04-19 ENCOUNTER — TELEPHONE (OUTPATIENT)
Dept: INTERNAL MEDICINE CLINIC | Facility: CLINIC | Age: 77
End: 2022-04-19

## 2022-04-19 ENCOUNTER — APPOINTMENT (OUTPATIENT)
Dept: LAB | Facility: CLINIC | Age: 77
End: 2022-04-19
Payer: MEDICARE

## 2022-04-19 ENCOUNTER — ANTICOAG VISIT (OUTPATIENT)
Dept: INTERNAL MEDICINE CLINIC | Facility: CLINIC | Age: 77
End: 2022-04-19

## 2022-04-19 DIAGNOSIS — O22.30 DVT (DEEP VEIN THROMBOSIS) IN PREGNANCY: ICD-10-CM

## 2022-04-19 RX ORDER — WARFARIN SODIUM 2 MG/1
2 TABLET ORAL DAILY
Qty: 90 TABLET | Refills: 3 | Status: SHIPPED | OUTPATIENT
Start: 2022-04-19 | End: 2022-04-20 | Stop reason: SDUPTHER

## 2022-04-19 NOTE — TELEPHONE ENCOUNTER
Patients wife came in to the office and is requesting a refill on the patients script for:    Warfarin    Pharmacy: [facility] [address] EUFEMIA LYMAN [address], [place] - [address] [address] [place] [id number] [facility] [id number]

## 2022-04-19 NOTE — PROGRESS NOTES
Per Dr Kath Caraballo patient to continue current dose and recheck in one week  Patient notified, verbalized understanding and has no further questions or concerns at this time

## 2022-04-20 DIAGNOSIS — O22.30 DVT (DEEP VEIN THROMBOSIS) IN PREGNANCY: ICD-10-CM

## 2022-04-20 RX ORDER — WARFARIN SODIUM 2 MG/1
TABLET ORAL
Qty: 135 TABLET | Refills: 3 | Status: SHIPPED | OUTPATIENT
Start: 2022-04-20

## 2022-04-25 ENCOUNTER — OFFICE VISIT (OUTPATIENT)
Dept: NEUROLOGY | Facility: CLINIC | Age: 77
End: 2022-04-25
Payer: MEDICARE

## 2022-04-25 VITALS
BODY MASS INDEX: 22.49 KG/M2 | TEMPERATURE: 98.2 F | HEIGHT: 65 IN | WEIGHT: 135 LBS | SYSTOLIC BLOOD PRESSURE: 150 MMHG | DIASTOLIC BLOOD PRESSURE: 70 MMHG | HEART RATE: 80 BPM

## 2022-04-25 DIAGNOSIS — R25.1 TREMOR: ICD-10-CM

## 2022-04-25 DIAGNOSIS — R41.9 COGNITIVE COMPLAINTS: ICD-10-CM

## 2022-04-25 DIAGNOSIS — F09 MILD COGNITIVE DISORDER: Chronic | ICD-10-CM

## 2022-04-25 DIAGNOSIS — R41.3 MEMORY LOSS: Primary | ICD-10-CM

## 2022-04-25 PROCEDURE — 99214 OFFICE O/P EST MOD 30 MIN: CPT | Performed by: NURSE PRACTITIONER

## 2022-04-25 NOTE — PROGRESS NOTES
Patient ID: Carlos Moreno is a 77 y o  male  Assessment/Plan:    Tremor  Patient with continued improvement of tremor since cutting back on Zyprexa  He does continues with  have a mild postural and intention tremor however is not interfering with his functioning  he does have a family history of tremor in his sister  He has no bradykinesia or other parkinsonian features on exam today  He tells me he has always had tremors to some degree for as long he can remember  Given currently non-bothersome and not impairing with functioning would not recommend any medication at this time  Continue to monitor  Memory loss  Patient with continued memory loss per wife over the past few years  Complaints include deficits in short term memory as well as not recognizing familiar places  Has had some increased difficulty following simple direction per wife  Labs and MRI brain with NQ in the past with no significant findings, he did score poorly on MOCA testing in the past 16/30  We did discuss the role of anxiety and poor sleep on cognition, however cannot fully rule out an underlying cognitive disorder at this time    Plan for further assessment through neuropsychology to better determine deficits  Diagnoses and all orders for this visit:    Memory loss  -     Ambulatory referral to Neuropsychology; Future    Cognitive complaints  -     Ambulatory referral to Neuropsychology; Future    Mild cognitive disorder    Tremor           Subjective:    HPI    Carlos Moreno is a 77 y o  man with a history of Chron's diease, protein S deficiency, DVT on coumadin, and chronic dizziness  He was evaluated in our office by Dr James Lim in November 2018 for 2nd opinion for abnormal movements, confusion-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-he has a complex psych history, 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 dating back to his 25s  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    At that visit he had a variable tremor was extinguished by competing motor tasks in the opposite hand  He did have MRI and EEG work up for myoclonus/abnormal movements that were both unrevealing  Last office visit 10/2021 in which decrease in zyprexa did help to improve tremors  Did recommend follow up with neuropsychology for memory concerns but patient declined  Was to discuss further reduction of zyprexa with [facility]  Interval History:  He was inpatient psych in march-weight loss and anxiety  They did increase his sertraline and stopped his ativan  He was exercising excessively per his wife  They did recommend for him to see neuropsychology as well given cognitive changes  His wife does not feel his memory is getting any better-did try to get into the wrong car today, doesn't recognize being at a place he has been to a few times  He does not sleep well  He sleeps 2-3 hours at a time  Maybe averages 6 hours  He does feel his tremor is better-this was decreased from last visit  No issues eating, drinking, or writing due to tremor  He was attempting to due his medications, however he was making errors and therefore his wife has taken over medication management  He performs ADLs independently  His wife manages the finances and always has done this  He is not currently driving  He is having more difficulty following simple directions, for example his wife had directed him to get a bag of bandaids for a wound and he brought back a bag of coins  He does tend to repeat himself  He has noted some vivid dreams and night sweats recently  prior work up:  MRI brain with NQ 10/2021: 1  No acute infarction, intracranial hemorrhage or mass effect  2   Trace, chronic microangiopathy is similar to the previous study  3   Bilateral mastoid air cell effusions redemonstrated  NeuroQuant analysis was performed: Normal study; Does not support neurodegeneration       labs: b12 705, RPR negative, TSH normall         Objective:    Blood pressure 150/70, pulse 80, temperature 98 2 °F (36 8 °C), temperature source Temporal, height 5' 5" (1 651 m), weight 61 2 kg (135 lb)  Physical Exam  Constitutional:       General: He is awake  HENT:      Right Ear: Hearing normal    Eyes:      General: Lids are normal       Extraocular Movements: Extraocular movements intact  Pupils: Pupils are equal, round, and reactive to light  Neurological:      Mental Status: He is alert  Psychiatric:         Speech: Speech normal          Neurological Exam  Mental Status  Awake and alert  Oriented only to person, place and situation  Speech is normal  Follows complex commands  Fund of knowledge is appropriate for level of education  Cranial Nerves  CN III, IV, VI: Extraocular movements intact bilaterally  Normal lids and orbits bilaterally  Pupils equal round and reactive to light bilaterally  CN V:  Right: Facial sensation is normal   Left: Facial sensation is normal on the left  CN VII:  Right: There is no facial weakness  Left: There is no facial weakness  CN VIII:  Right: Hearing is normal   Left: Hearing is decreased  CN XI:  Right: Trapezius strength is normal   Left: Trapezius strength is normal   CN XII: Tongue midline without atrophy or fasciculations  Motor    Strength: Strength 5/5 upper and lower extremities  Sensory  Light touch is normal in upper and lower extremities  Coordination  Right: Finger-to-nose abnormality: Rapid alternating movement abnormality:  Left: Finger-to-nose abnormality: Rapid alternating movement abnormality:  No resting or postural tremor, mild to moderate intention tremor noted with FTN testing L>R  No bradykinesia or ridgitiy/cogwheeling  No vocal or head tremor  Gait  Casual gait is normal including stance, stride, and arm swing  Able to rise from chair without using arms  I have personally reviewed the ROS performed by the MA     ROS:    Review of Systems   Constitutional: Negative    Negative for appetite change and fever    HENT: Negative  Negative for hearing loss, tinnitus, trouble swallowing and voice change  Eyes: Negative  Negative for photophobia and pain  Respiratory: Negative  Negative for shortness of breath  Cardiovascular: Negative  Negative for palpitations  Gastrointestinal: Negative  Negative for nausea and vomiting  Endocrine: Negative  Negative for cold intolerance  Genitourinary: Negative  Negative for dysuria, frequency and urgency  Musculoskeletal: Negative  Negative for myalgias and neck pain  Skin: Negative  Negative for rash  Neurological: Negative  Negative for dizziness, tremors, seizures, syncope, facial asymmetry, speech difficulty, weakness, light-headedness, numbness and headaches  Hematological: Negative  Does not bruise/bleed easily  Psychiatric/Behavioral: Negative  Negative for confusion, hallucinations and sleep disturbance

## 2022-04-25 NOTE — ASSESSMENT & PLAN NOTE
Patient with continued improvement of tremor since cutting back on Zyprexa  He does continues with  have a mild postural and intention tremor however is not interfering with his functioning  he does have a family history of tremor in his sister  He has no bradykinesia or other parkinsonian features on exam today  He tells me he has always had tremors to some degree for as long he can remember  Given currently non-bothersome and not impairing with functioning would not recommend any medication at this time  Continue to monitor

## 2022-04-25 NOTE — PATIENT INSTRUCTIONS
Testing through [facility] [phone]   [address] [facility])     [address] [facility] [place])    [phone]      Things that we know are helpful for thinking and memory   Exercise program: gradually increase your physical activity over time  Start small and be patient  Aerobic (cardio) activity is best but incorporate balance, strength and flexibility training as well  Try to get at least 30min 3 times per week   Diet: Mediterranean diet (colorful fruits and vegetables, olive oil, fish, whole grains, very little red meet is any), MIND diet, anti-inflammatory diet  Stay well hydrated: drink 6-8 glasses of water per day   What's good for your heart is good for your brain   Avoid high-salt foods   Sleep: aim for at least 7-8 hours per night   Avoid alcohol and medications like Benadryl (Tylenol PM) or other sedating drugs   Stress management/mindfulness practice:   Talk with our social workers about finding a cognitive behavioral therapists   Try a smart phone nery like "[employer]" or "mindfulness for beginners"   Try "curable" for pain     Take a course in Mindfulness Based Stress Reduction (MBSR)   Michelle thompson   Read or listen to an audiobook about it:   Mindfulness for beginners   10% happier   The happiness advantage   Social engagement:   Stay in touch with family and friends   Plan a few specific activities for your social health every week   Join a local support group   Volunteer! www [email or web address] or call [phone]     MIND diet score:   1 point for each component       Green leafy vegetables: at least 6 per week   Other vegetable: at least 1 per day   Berries: at least 2 per week   Red meat: fewer than 4 per week   Fish: at least 1 per week   Poultry: at least 2 per week   Beans: at least 3 per week   Nuts: at least 5 per week   Fast or fried food: less than 1 per week   Olive oil   Butter less: less than 1 table-spoon per day   Cheese: less than 1 serving per week   Pastries/sweets: less than 5 servings per week   Alcohol: no more than 1 serving/ day

## 2022-04-26 ENCOUNTER — ANTICOAG VISIT (OUTPATIENT)
Dept: INTERNAL MEDICINE CLINIC | Facility: CLINIC | Age: 77
End: 2022-04-26

## 2022-04-26 ENCOUNTER — APPOINTMENT (OUTPATIENT)
Dept: LAB | Facility: AMBULARY SURGERY CENTER | Age: 77
End: 2022-04-26
Payer: MEDICARE

## 2022-04-26 NOTE — PROGRESS NOTES
Per Dr Dipti Unger the patient is to continue the same dose  Recheck in one week  Reviewed with the patients wife

## 2022-05-03 ENCOUNTER — SOCIAL WORK (OUTPATIENT)
Dept: BEHAVIORAL/MENTAL HEALTH CLINIC | Facility: CLINIC | Age: 77
End: 2022-05-03
Payer: MEDICARE

## 2022-05-03 DIAGNOSIS — F41.1 GAD (GENERALIZED ANXIETY DISORDER): Primary | ICD-10-CM

## 2022-05-03 PROCEDURE — 90834 PSYTX W PT 45 MINUTES: CPT | Performed by: SOCIAL WORKER

## 2022-05-03 NOTE — PSYCH
Psychotherapy Provided: Individual Psychotherapy 40 minutes from 12:50-1:30    Length of time in session: 40 minutes, follow up in 7 week    Encounter Diagnosis     ICD-10-CM    1  JOS ÉLUIS (generalized anxiety disorder)  F41 1        Goals addressed in session: Goal 1     Pain:      none    0    Current suicide risk : Low     D- Jaden Awkward reports struggling more with anxiety, tremors, dizziness since last seen  Reports continued marital tension as being primary stressor  Reports that wife frequently is angry with him, never admits when she is wrong and makes statements about having admitted to hospital in response  Clearly, as his stress and anxiety worsen so do his myriad of physical health symptoms  States he has been feeling more depressed as things shouldn't be the way they are at home  Denies any SI  Continues to walk and also recently spoke with his sister by phone  Visits a friend daily and these activities are helpful   Minors presents as more anxious and tremulous than in previous session  Near tears at point during session when discussing stress at home  Overall, he is verbal, cooperative, pleasant, engaged and oriented during session  Thoughts are logical and goal directed  P- processed stressors at home- supportive therapy provided  Reviewed communication strategies as well as adjusting boundaries and expectations within his relationship in an efffort to reduce stress and anxiety  Behavioral Health Treatment Plan [facility] Pending sale to [facility]: Diagnosis and Treatment Plan explained to Darion Thompson relates understanding diagnosis and is agreeable to Treatment Plan   Yes

## 2022-05-04 ENCOUNTER — ANTICOAG VISIT (OUTPATIENT)
Dept: INTERNAL MEDICINE CLINIC | Facility: CLINIC | Age: 77
End: 2022-05-04

## 2022-05-04 ENCOUNTER — OFFICE VISIT (OUTPATIENT)
Dept: INTERNAL MEDICINE CLINIC | Facility: CLINIC | Age: 77
End: 2022-05-04
Payer: MEDICARE

## 2022-05-04 ENCOUNTER — APPOINTMENT (OUTPATIENT)
Dept: LAB | Facility: CLINIC | Age: 77
End: 2022-05-04
Payer: MEDICARE

## 2022-05-04 VITALS
SYSTOLIC BLOOD PRESSURE: 140 MMHG | DIASTOLIC BLOOD PRESSURE: 70 MMHG | BODY MASS INDEX: 23.36 KG/M2 | WEIGHT: 140.2 LBS | HEIGHT: 65 IN | RESPIRATION RATE: 16 BRPM | OXYGEN SATURATION: 98 % | HEART RATE: 72 BPM

## 2022-05-04 DIAGNOSIS — R41.3 MEMORY LOSS: Primary | ICD-10-CM

## 2022-05-04 DIAGNOSIS — B35.1 ONYCHOMYCOSIS: ICD-10-CM

## 2022-05-04 DIAGNOSIS — F09 MILD COGNITIVE DISORDER: Chronic | ICD-10-CM

## 2022-05-04 DIAGNOSIS — I10 ESSENTIAL HYPERTENSION: ICD-10-CM

## 2022-05-04 DIAGNOSIS — F41.1 GAD (GENERALIZED ANXIETY DISORDER): Chronic | ICD-10-CM

## 2022-05-04 DIAGNOSIS — K21.9 GASTROESOPHAGEAL REFLUX DISEASE WITHOUT ESOPHAGITIS: ICD-10-CM

## 2022-05-04 PROCEDURE — 87102 FUNGUS ISOLATION CULTURE: CPT | Performed by: INTERNAL MEDICINE

## 2022-05-04 PROCEDURE — 99214 OFFICE O/P EST MOD 30 MIN: CPT | Performed by: INTERNAL MEDICINE

## 2022-05-04 RX ORDER — PANTOPRAZOLE SODIUM 40 MG/1
40 TABLET, DELAYED RELEASE ORAL
Qty: 30 TABLET | Refills: 1 | Status: SHIPPED | OUTPATIENT
Start: 2022-05-04 | End: 2022-07-14 | Stop reason: SDUPTHER

## 2022-05-04 RX ORDER — AMLODIPINE BESYLATE 5 MG/1
5 TABLET ORAL DAILY
Qty: 30 TABLET | Refills: 1 | Status: SHIPPED | OUTPATIENT
Start: 2022-05-04 | End: 2022-07-14 | Stop reason: SDUPTHER

## 2022-05-05 PROBLEM — B35.1 ONYCHOMYCOSIS: Status: ACTIVE | Noted: 2022-05-05

## 2022-05-05 PROBLEM — I10 ESSENTIAL HYPERTENSION: Status: ACTIVE | Noted: 2022-05-05
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
## 2022-05-05 NOTE — ASSESSMENT & PLAN NOTE
Clinically stable and doing well continue the current medical regiment will continue monitor    Continue Protonix 40 milligrams once daily

## 2022-05-05 NOTE — ASSESSMENT & PLAN NOTE
Reporting ongoing anxiety continues to work with the counter Sung and Psychiatry no SI he reports me interpersonal relationship problem with that is wife; they report any arguments patient reports to me that he walks away and exercises reports counselor Rusty Julien is very helpful to continue follow-up

## 2022-05-05 NOTE — ASSESSMENT & PLAN NOTE
Rule out early dementia he had seen Neurology and had a reduced David score will have him see neuropsychiatry for more advanced testing

## 2022-05-05 NOTE — ASSESSMENT & PLAN NOTE
Mildly elevated today but he appears to be anxious his blood pressures at 140/70 improved compared to the prior continue amlodipine 5 milligram once daily will continue monitor

## 2022-05-17 ENCOUNTER — APPOINTMENT (OUTPATIENT)
Dept: LAB | Facility: CLINIC | Age: 77
End: 2022-05-17
Payer: MEDICARE

## 2022-05-18 ENCOUNTER — TELEPHONE (OUTPATIENT)
Dept: NEUROLOGY | Facility: CLINIC | Age: 77
End: 2022-05-18

## 2022-05-18 NOTE — TELEPHONE ENCOUNTER
Neuropsychological Evaluation  [facility]  [address] [facility], [facility]  P: [phone] F: [phone]    Intake Note  Date of Referral to Neuropsychology: 04/25/2022  Referring Provider: Brigitte Raza to conduct screening prior to scheduling neuropsychological evaluation  Explained nature of neuropsychological evaluation  Patient is agreeable to evaluation  1 ) Does the patient have the ability to do a virtual intake? Yes    2 ) Does the patient have any problems that would limit his ability to participate in testing that requires interaction with another person, such as hearing or language impairments? Patient has a hearing aid    3 ) Does the patient have any problems that would limit his ability to complete testing during one appointment that can last for more than 2 hours? (e g , severe fatigue, pain, or other debility)? No    4 ) Does the patient have a preference between completing the evaluation in one session, or over the course of a few sessions? No    5 ) Does the patient have difficulties ambulating (e g , does he need a walker, cane, or wheelchair)? Cane when needed    6 ) Would the patient be available for a last minute appointment if the opportunity arises? No to many other medical appointments    Referral will be reviewed by providers and we will call to schedule as appropriate  Referral is pending insurance review  Insurance company will be contacted to verify participation of providers and authorization/pre-certification for Neuropsychological evaluation  Patient has been made aware of this
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
## 2022-05-27 ENCOUNTER — OFFICE VISIT (OUTPATIENT)
Dept: PSYCHIATRY | Facility: CLINIC | Age: 77
End: 2022-05-27
Payer: MEDICARE

## 2022-05-27 VITALS
BODY MASS INDEX: 23.49 KG/M2 | WEIGHT: 141 LBS | SYSTOLIC BLOOD PRESSURE: 148 MMHG | DIASTOLIC BLOOD PRESSURE: 80 MMHG | HEART RATE: 78 BPM | HEIGHT: 65 IN

## 2022-05-27 DIAGNOSIS — G47.09 OTHER INSOMNIA: Chronic | ICD-10-CM

## 2022-05-27 DIAGNOSIS — F33.41 MAJOR DEPRESSIVE DISORDER, RECURRENT, IN PARTIAL REMISSION (HCC): Primary | Chronic | ICD-10-CM

## 2022-05-27 DIAGNOSIS — Z79.899 LONG-TERM USE OF HIGH-RISK MEDICATION: Chronic | ICD-10-CM

## 2022-05-27 DIAGNOSIS — F41.1 GAD (GENERALIZED ANXIETY DISORDER): Chronic | ICD-10-CM

## 2022-05-27 DIAGNOSIS — F50.82 AVOIDANT-RESTRICTIVE FOOD INTAKE DISORDER (ARFID): Chronic | ICD-10-CM

## 2022-05-27 DIAGNOSIS — F41.0 PANIC DISORDER WITHOUT AGORAPHOBIA: Chronic | ICD-10-CM

## 2022-05-27 DIAGNOSIS — F09 MILD COGNITIVE DISORDER: Chronic | ICD-10-CM

## 2022-05-27 PROCEDURE — 90833 PSYTX W PT W E/M 30 MIN: CPT | Performed by: PSYCHIATRY & NEUROLOGY

## 2022-05-27 PROCEDURE — 99214 OFFICE O/P EST MOD 30 MIN: CPT | Performed by: PSYCHIATRY & NEUROLOGY

## 2022-06-06 LAB — FUNGUS SPEC CULT: NORMAL

## 2022-06-08 ENCOUNTER — TELEPHONE (OUTPATIENT)
Dept: INTERNAL MEDICINE CLINIC | Facility: CLINIC | Age: 77
End: 2022-06-08

## 2022-06-08 ENCOUNTER — APPOINTMENT (OUTPATIENT)
Dept: LAB | Facility: CLINIC | Age: 77
End: 2022-06-08
Payer: MEDICARE

## 2022-06-08 DIAGNOSIS — Q84.6 NAIL ANOMALY: Primary | ICD-10-CM

## 2022-06-08 NOTE — TELEPHONE ENCOUNTER
Call placed to  Podiatry to schedule appointment for patient  Appt scheduled for 7/19/22 at 11:15 am with Dr Vital Diss at [address]  Patient aware

## 2022-06-14 ENCOUNTER — APPOINTMENT (OUTPATIENT)
Dept: LAB | Facility: CLINIC | Age: 77
End: 2022-06-14
Payer: MEDICARE

## 2022-06-14 ENCOUNTER — ANTICOAG VISIT (OUTPATIENT)
Dept: INTERNAL MEDICINE CLINIC | Facility: CLINIC | Age: 77
End: 2022-06-14

## 2022-06-21 ENCOUNTER — ANTICOAG VISIT (OUTPATIENT)
Dept: INTERNAL MEDICINE CLINIC | Facility: CLINIC | Age: 77
End: 2022-06-21

## 2022-06-21 ENCOUNTER — APPOINTMENT (OUTPATIENT)
Dept: LAB | Facility: CLINIC | Age: 77
End: 2022-06-21
Payer: MEDICARE

## 2022-06-21 ENCOUNTER — SOCIAL WORK (OUTPATIENT)
Dept: BEHAVIORAL/MENTAL HEALTH CLINIC | Facility: CLINIC | Age: 77
End: 2022-06-21
Payer: MEDICARE

## 2022-06-21 DIAGNOSIS — F41.1 GAD (GENERALIZED ANXIETY DISORDER): Primary | ICD-10-CM

## 2022-06-21 PROCEDURE — 90832 PSYTX W PT 30 MINUTES: CPT | Performed by: SOCIAL WORKER

## 2022-06-21 NOTE — PROGRESS NOTES
Per Dr Shultz Comes 3 mg tonight, then 3 mg/2mg/2 mg repeat 3 mg/2mg/2 mg recheck one week    I reviewed with the pt

## 2022-06-21 NOTE — PSYCH
Psychotherapy Provided: Individual Psychotherapy 30 minutes from 12:55-1:25    Length of time in session: 30 minutes, follow up in 5 week    Encounter Diagnosis     ICD-10-CM    1  JOSÉ LUIS (generalized anxiety disorder)  F41 1        Goals addressed in session: Goal 1     Pain:      none    0    Current suicide risk : Low     D- Awais Certain reports continuing to struggle to manage stress related to environment/marital issues at home  Long-standing communication issues persist with his wife  Focus has been on him and his mood issues but he feels she has her struggles that contributes as well  Admittedly, he goes for walks or works out in basement when things become tense and does this instead of arguing  He also admits to periods of depression because of environment at home, his health struggles and having limited supports  Denies any SI or HI  Sharmaine Early presents as anxious due to conflicts at home  His anxiety diminished as session progressed  He is pleasant, verbal, cooperative, oriented and engaged during session  Thoughts are logical  Eye contact good  Appropriately dressed and groomed  P- processed stressors related to his physical health issues and issues at home- validation and supportive therapy provided  Reviewed stress mgmt strategies and appropriate outlets to utilize  Reviewed communication strategies/alterations to employ at home  Behavioral Health Treatment Plan [facility]: Diagnosis and Treatment Plan explained to Marilyn Heaton relates understanding diagnosis and is agreeable to Treatment Plan   Yes

## 2022-06-28 ENCOUNTER — ANTICOAG VISIT (OUTPATIENT)
Dept: INTERNAL MEDICINE CLINIC | Facility: CLINIC | Age: 77
End: 2022-06-28

## 2022-06-28 ENCOUNTER — APPOINTMENT (OUTPATIENT)
Dept: LAB | Facility: CLINIC | Age: 77
End: 2022-06-28
Payer: MEDICARE

## 2022-06-28 NOTE — PROGRESS NOTES
Per Dr Alice Joseph patient to continue current dose and recheck in two weeks  Patient wife notified, verbalized understanding and has no further questions or concerns at this time

## 2022-07-13 ENCOUNTER — ANTICOAG VISIT (OUTPATIENT)
Dept: INTERNAL MEDICINE CLINIC | Facility: CLINIC | Age: 77
End: 2022-07-13

## 2022-07-13 ENCOUNTER — TELEPHONE (OUTPATIENT)
Dept: INTERNAL MEDICINE CLINIC | Facility: CLINIC | Age: 77
End: 2022-07-13

## 2022-07-13 ENCOUNTER — APPOINTMENT (OUTPATIENT)
Dept: LAB | Facility: CLINIC | Age: 77
End: 2022-07-13
Payer: MEDICARE

## 2022-07-13 DIAGNOSIS — I82.5Y2 CHRONIC DEEP VEIN THROMBOSIS (DVT) OF PROXIMAL VEIN OF LEFT LOWER EXTREMITY (HCC): Primary | ICD-10-CM

## 2022-07-14 DIAGNOSIS — K21.9 GASTROESOPHAGEAL REFLUX DISEASE WITHOUT ESOPHAGITIS: ICD-10-CM

## 2022-07-14 DIAGNOSIS — I10 ESSENTIAL HYPERTENSION: ICD-10-CM

## 2022-07-14 RX ORDER — AMLODIPINE BESYLATE 5 MG/1
5 TABLET ORAL DAILY
Qty: 90 TABLET | Refills: 1 | Status: SHIPPED | OUTPATIENT
Start: 2022-07-14

## 2022-07-14 RX ORDER — PANTOPRAZOLE SODIUM 40 MG/1
40 TABLET, DELAYED RELEASE ORAL
Qty: 90 TABLET | Refills: 1 | Status: SHIPPED | OUTPATIENT
Start: 2022-07-14

## 2022-07-19 ENCOUNTER — OFFICE VISIT (OUTPATIENT)
Dept: PODIATRY | Facility: CLINIC | Age: 77
End: 2022-07-19
Payer: MEDICARE

## 2022-07-19 VITALS — BODY MASS INDEX: 23.97 KG/M2 | HEIGHT: 64 IN | WEIGHT: 140.4 LBS

## 2022-07-19 DIAGNOSIS — B35.1 ONYCHOMYCOSIS: Primary | ICD-10-CM

## 2022-07-19 DIAGNOSIS — Q84.6 NAIL ANOMALY: ICD-10-CM

## 2022-07-19 PROCEDURE — 99202 OFFICE O/P NEW SF 15 MIN: CPT | Performed by: PODIATRIST
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
## 2022-07-26 ENCOUNTER — SOCIAL WORK (OUTPATIENT)
Dept: BEHAVIORAL/MENTAL HEALTH CLINIC | Facility: CLINIC | Age: 77
End: 2022-07-26
Payer: MEDICARE

## 2022-07-26 DIAGNOSIS — F41.1 GAD (GENERALIZED ANXIETY DISORDER): Primary | ICD-10-CM

## 2022-07-26 PROCEDURE — 90832 PSYTX W PT 30 MINUTES: CPT | Performed by: SOCIAL WORKER

## 2022-07-26 NOTE — PSYCH
Psychotherapy Provided: Individual Psychotherapy 30 minutes from 12:00-12:30    Length of time in session: 30 minutes, follow up in 4 week    Encounter Diagnosis     ICD-10-CM    1  JOSÉ LUIS (generalized anxiety disorder)  F41 1        Goals addressed in session: Goal 1     Pain:      none    0    Current suicide risk : Low     HOLDEN Hall reports an increase in anxiety and worry after his wife was transported to ED for chest pains  Will be following with cardiologist but this scenario has increased his anxiety and worry  Prior to this, continued to deal with ongoing marital stress and stress related to his physical health issues  Continues to walk and work out in basement  Has been visiting friend and maintaining contact with sister  Denies any acute anxiety or depression issues at present  No mood lability  Krysta Beverly presents as anxious but this diminished as session progressed  Responds well to support and intervention  He is verbal, cooperative, well oriented and engaged during session  P- processed stress related to wife's recent health issues and their exacerbation of his anxiety and worry- validation and supportive therapy provided  Reviewed appropriate stress mgmt strategies and productive outlets to utilize in response to recent stressors  Behavioral Health Treatment Plan [facility]: Diagnosis and Treatment Plan explained to Jose Mann relates understanding diagnosis and is agreeable to Treatment Plan   Yes

## 2022-08-03 ENCOUNTER — APPOINTMENT (OUTPATIENT)
Dept: LAB | Facility: CLINIC | Age: 77
End: 2022-08-03
Payer: MEDICARE

## 2022-08-03 ENCOUNTER — ANTICOAG VISIT (OUTPATIENT)
Dept: INTERNAL MEDICINE CLINIC | Facility: CLINIC | Age: 77
End: 2022-08-03

## 2022-08-03 LAB
INR PPP: 2.36 (ref 0.84–1.19)
PROTHROMBIN TIME: 26 SECONDS (ref 11.6–14.5)

## 2022-08-03 PROCEDURE — 36415 COLL VENOUS BLD VENIPUNCTURE: CPT

## 2022-08-03 PROCEDURE — 85610 PROTHROMBIN TIME: CPT

## 2022-08-03 NOTE — PROGRESS NOTES
Per Dr Micheline Garcia no changes recheck one month  The patient is currently taking 3mg/2mg/2 mg repeat every 3 days  Repeat INR in 4 weeks      Reviewed instructions with the patients wife and the patient

## 2022-08-23 ENCOUNTER — TELEMEDICINE (OUTPATIENT)
Dept: BEHAVIORAL/MENTAL HEALTH CLINIC | Facility: CLINIC | Age: 77
End: 2022-08-23
Payer: MEDICARE

## 2022-08-23 DIAGNOSIS — F41.1 GAD (GENERALIZED ANXIETY DISORDER): Primary | ICD-10-CM

## 2022-08-23 PROCEDURE — 90832 PSYTX W PT 30 MINUTES: CPT | Performed by: SOCIAL WORKER
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
## 2022-08-26 ENCOUNTER — TELEPHONE (OUTPATIENT)
Dept: PSYCHIATRY | Facility: CLINIC | Age: 77
End: 2022-08-26

## 2022-08-31 ENCOUNTER — ANTICOAG VISIT (OUTPATIENT)
Dept: INTERNAL MEDICINE CLINIC | Facility: CLINIC | Age: 77
End: 2022-08-31

## 2022-08-31 ENCOUNTER — APPOINTMENT (OUTPATIENT)
Dept: LAB | Facility: CLINIC | Age: 77
End: 2022-08-31
Payer: MEDICARE

## 2022-09-28 ENCOUNTER — APPOINTMENT (OUTPATIENT)
Dept: LAB | Facility: CLINIC | Age: 77
End: 2022-09-28
Payer: MEDICARE

## 2022-09-28 ENCOUNTER — TELEPHONE (OUTPATIENT)
Dept: INTERNAL MEDICINE CLINIC | Facility: CLINIC | Age: 77
End: 2022-09-28

## 2022-09-29 ENCOUNTER — ANTICOAG VISIT (OUTPATIENT)
Dept: INTERNAL MEDICINE CLINIC | Facility: CLINIC | Age: 77
End: 2022-09-29

## 2022-09-29 NOTE — PROGRESS NOTES
Per Dr Ledezma Early hold Coumadin last night  Then begin 2 mg daily, recheck in 1 week  LM for the patients wife  The patients wife called back and confirmed the directions with Tj Cadet

## 2022-10-05 ENCOUNTER — ANTICOAG VISIT (OUTPATIENT)
Dept: INTERNAL MEDICINE CLINIC | Facility: CLINIC | Age: 77
End: 2022-10-05

## 2022-10-05 ENCOUNTER — APPOINTMENT (OUTPATIENT)
Dept: LAB | Facility: CLINIC | Age: 77
End: 2022-10-05
Payer: MEDICARE

## 2022-10-05 NOTE — PROGRESS NOTES
Per Dr Eddie Hall no changes recheck in 2 weeks  LM for the patients wife  Asked that she call back to confirm

## 2022-10-11 ENCOUNTER — IMMUNIZATIONS (OUTPATIENT)
Dept: INTERNAL MEDICINE CLINIC | Facility: CLINIC | Age: 77
End: 2022-10-11
Payer: MEDICARE

## 2022-10-11 ENCOUNTER — SOCIAL WORK (OUTPATIENT)
Dept: BEHAVIORAL/MENTAL HEALTH CLINIC | Facility: CLINIC | Age: 77
End: 2022-10-11
Payer: MEDICARE

## 2022-10-11 DIAGNOSIS — F41.1 GAD (GENERALIZED ANXIETY DISORDER): Primary | ICD-10-CM

## 2022-10-11 DIAGNOSIS — Z23 ENCOUNTER FOR IMMUNIZATION: Primary | ICD-10-CM

## 2022-10-11 PROCEDURE — 90662 IIV NO PRSV INCREASED AG IM: CPT

## 2022-10-11 PROCEDURE — G0008 ADMIN INFLUENZA VIRUS VAC: HCPCS

## 2022-10-11 PROCEDURE — 90832 PSYTX W PT 30 MINUTES: CPT | Performed by: SOCIAL WORKER

## 2022-10-11 NOTE — BH TREATMENT PLAN
Omar Mott  1945       Date of Initial Treatment Plan: 10/11/22  Date of Current Treatment Plan: 10/11/22    Treatment Plan Number 01    Strengths/Personal Resources for Self Care: Resiliency, independence    Diagnosis:   1  JOSÉ LUIS (generalized anxiety disorder)         Area of Needs: lack of extensive social supports, self-esteem issues, marital issues      Long Term Goal 1: Manage anxiety    Target Date: 4/11/23  Completion Date: N/A         Short Term Objectives for Goal 1: Ale Knowles will urilize one coping skill and one productive outlet daily that has been discussed in previous sessions to better manage his stress and anxiety    Long Term Goal 2: N/A    Target Date: N/A  Completion Date: N/A    Short Term Objectives for Goal 2: N/A         Long Term Goal # 3: N/A     Target Date: N/A  Completion Date: N/A    Short Term Objectives for Goal 3: N/A    GOAL 1: Modality: N/A    GOAL 2: Modality: Individual oncex per month   Completion Date N/A and The person(s) responsible for carrying out the plan is  Ale Knowles and integration therapist     GOAL 3: Modality: Medication Management and The person(s) responsible for carrying out the plan is  Ale Knowles and Dr Yvette Hurley: Diagnosis and Treatment Plan explained to Onelmoon Jarrell relates understanding diagnosis and is agreeable to Treatment Plan         Client Comments : Please share your thoughts, feelings, need and/or experiences regarding your treatment plan: none

## 2022-10-11 NOTE — PSYCH
Psychotherapy Provided: Individual Psychotherapy 26 minutes     Length of time in session: 26 minutes, follow up in four weeks    Encounter Diagnosis     ICD-10-CM    1  JOSÉ LUIS (generalized anxiety disorder)  F41 1        Goals addressed in session: Goal 1     Pain:      none    0    Current suicide risk : Low     D- Sharmaine Abt reports that it again has become more stressful at home due to long-standing marital and communication issues  Had been better while wife was recovering from broken arm but states it has gotten "back to normal"  Has been feeling more anxious, tremulous and overwhelmed  Going for walks throughout the day to try and lessen exposure to and manage the stress  Has some social contact via select peers  Denies any acute depressive symptoms  Frustrated that things have returned to the way they were  Has limited support system  Krysta Beverly presents as more anxious and overwhelmed  Responds well to support and intervention  He is verbal, cooperative, well oriented and engaged during session  No SI  No HI  P- processed stressors and its influence on his anxiety- validation and supportive therapy provided  Reviewed appropriate coping strategies to employ to better manage  Behavioral Health Treatment Plan [facility]: Diagnosis and Treatment Plan explained to Jose Mann relates understanding diagnosis and is agreeable to Treatment Plan   Yes     Time In: 1:00  Time Out: 1:26  Total minutes in session: 26

## 2022-10-18 ENCOUNTER — APPOINTMENT (OUTPATIENT)
Dept: LAB | Age: 77
End: 2022-10-18
Payer: MEDICARE

## 2022-10-18 ENCOUNTER — OFFICE VISIT (OUTPATIENT)
Dept: PODIATRY | Facility: CLINIC | Age: 77
End: 2022-10-18
Payer: MEDICARE

## 2022-10-18 VITALS
WEIGHT: 130 LBS | HEIGHT: 64 IN | SYSTOLIC BLOOD PRESSURE: 118 MMHG | DIASTOLIC BLOOD PRESSURE: 70 MMHG | BODY MASS INDEX: 22.2 KG/M2

## 2022-10-18 DIAGNOSIS — B35.1 ONYCHOMYCOSIS: Primary | ICD-10-CM

## 2022-10-18 PROCEDURE — 99212 OFFICE O/P EST SF 10 MIN: CPT | Performed by: PODIATRIST
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
## 2022-10-19 ENCOUNTER — TELEPHONE (OUTPATIENT)
Dept: NEUROLOGY | Facility: CLINIC | Age: 77
End: 2022-10-19

## 2022-10-19 ENCOUNTER — ANTICOAG VISIT (OUTPATIENT)
Dept: INTERNAL MEDICINE CLINIC | Facility: CLINIC | Age: 77
End: 2022-10-19

## 2022-10-19 NOTE — TELEPHONE ENCOUNTER
Joey Travis has called and cancelled his appointment with Martin Marie for 10/28/22 at the [facility] office , [place], 11:30 am  He stated he will wait until 05/01/23 to see Sandra Song      Thank you,     Lance Posada

## 2022-10-28 ENCOUNTER — APPOINTMENT (OUTPATIENT)
Dept: LAB | Facility: CLINIC | Age: 77
End: 2022-10-28
Payer: MEDICARE

## 2022-10-28 ENCOUNTER — ANTICOAG VISIT (OUTPATIENT)
Dept: INTERNAL MEDICINE CLINIC | Facility: CLINIC | Age: 77
End: 2022-10-28

## 2022-11-07 DIAGNOSIS — F33.41 MAJOR DEPRESSIVE DISORDER, RECURRENT, IN PARTIAL REMISSION (HCC): Primary | Chronic | ICD-10-CM

## 2022-11-07 RX ORDER — OLANZAPINE 15 MG/1
7.5-15 TABLET ORAL
Qty: 90 TABLET | Refills: 0 | Status: SHIPPED | OUTPATIENT
Start: 2022-11-07 | End: 2023-02-05

## 2022-11-07 NOTE — TELEPHONE ENCOUNTER
Medication Refill Request     Name of Medication Olanzapine  Dose/Frequency 5mg once a day   Quantity 90 tablet  Verified pharmacy   [x]  Verified ordering Provider   [x]  Does patient have enough for the next 3 days? Yes [] No [x]  Does patient have a follow-up appointment scheduled?  Yes [x] No []   If so when is appointment: 11/21

## 2022-11-07 NOTE — TELEPHONE ENCOUNTER
Spoke with Lorenzo's wife - Rajni Friedman has been taking Zyprexa 7 5 mg at bedtime for some time (he never decreased the dose to 5 mg at bedtime)  Per wife he is doing well on current dose    Plan: new Rx escripted for Zyprexa 15 mg take 1/2 to 1 tablet at bedtime for 90 days, no RF

## 2022-11-08 ENCOUNTER — SOCIAL WORK (OUTPATIENT)
Dept: BEHAVIORAL/MENTAL HEALTH CLINIC | Facility: CLINIC | Age: 77
End: 2022-11-08

## 2022-11-08 DIAGNOSIS — F41.1 GAD (GENERALIZED ANXIETY DISORDER): Primary | ICD-10-CM

## 2022-11-08 NOTE — PSYCH
Psychotherapy Provided: Individual Psychotherapy 30 minutes     Length of time in session: 30 minutes, follow up in three weeks    Encounter Diagnosis     ICD-10-CM    1  JOSÉ LUIS (generalized anxiety disorder)  F41 1        Goals addressed in session: Goal 1     Pain:      none    0    Current suicide risk : Low     HOLDEN Rosales reports continued issues with anxiety  States there remains consistent, daily marital tension at home  Long-standing issues persist  Consistent lack of intimacy azul and Celine Rust states that they rarely talk to one another and when they do, they argue  When processing these issues, he is noticeably more anxious and more tremulous  Denies any acute depressive symptoms  Has been walking more, cleaning the house more and visiting friends to offset tension at home  Kathy Richard presents as deep anxious and overwhelmed  Responds well to support and intervention  Anxiety lessened as session progressed  He is verbal, cooperative, well oriented and engaged  Denies any SI or HI  P- processed stressors related to home- supportive therapy provided  Reviewed coping strategies and productive outlets to utilize to manage this stress  Reviewed boundaries and communication strategies to utilize to lessen stress at home  Behavioral Health Treatment Plan [facility]: Diagnosis and Treatment Plan explained to Lupe He relates understanding diagnosis and is agreeable to Treatment Plan   Yes     Visit start and stop times: 1:00-1:30    11/08/22

## 2022-11-10 ENCOUNTER — APPOINTMENT (OUTPATIENT)
Dept: LAB | Facility: CLINIC | Age: 77
End: 2022-11-10

## 2022-11-10 ENCOUNTER — ANTICOAG VISIT (OUTPATIENT)
Dept: INTERNAL MEDICINE CLINIC | Facility: CLINIC | Age: 77
End: 2022-11-10

## 2022-11-10 NOTE — PROGRESS NOTES
Per Dr Schultz, hold medication tonight and then take 2mg once a day  Recheck in 1 week  Spoke with pt's wife and advised - verbalized understanding
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
## 2022-11-17 ENCOUNTER — APPOINTMENT (OUTPATIENT)
Dept: LAB | Facility: CLINIC | Age: 77
End: 2022-11-17

## 2022-11-18 ENCOUNTER — TELEPHONE (OUTPATIENT)
Dept: OTHER | Facility: OTHER | Age: 77
End: 2022-11-18

## 2022-11-18 ENCOUNTER — ANTICOAG VISIT (OUTPATIENT)
Dept: INTERNAL MEDICINE CLINIC | Facility: CLINIC | Age: 77
End: 2022-11-18

## 2022-11-18 NOTE — TELEPHONE ENCOUNTER
Spoke to patient's wife, advised we will call back with instructions once reviewed by Dr Fuad Araujo

## 2022-11-21 ENCOUNTER — OFFICE VISIT (OUTPATIENT)
Dept: PSYCHIATRY | Facility: CLINIC | Age: 77
End: 2022-11-21

## 2022-11-21 VITALS
HEART RATE: 76 BPM | BODY MASS INDEX: 23.99 KG/M2 | DIASTOLIC BLOOD PRESSURE: 78 MMHG | HEIGHT: 65 IN | SYSTOLIC BLOOD PRESSURE: 162 MMHG | WEIGHT: 144 LBS

## 2022-11-21 DIAGNOSIS — F41.0 PANIC DISORDER WITHOUT AGORAPHOBIA: Chronic | ICD-10-CM

## 2022-11-21 DIAGNOSIS — Z79.899 LONG-TERM USE OF HIGH-RISK MEDICATION: Chronic | ICD-10-CM

## 2022-11-21 DIAGNOSIS — F09 MILD COGNITIVE DISORDER: Chronic | ICD-10-CM

## 2022-11-21 DIAGNOSIS — F33.41 MAJOR DEPRESSIVE DISORDER, RECURRENT, IN PARTIAL REMISSION (HCC): Primary | Chronic | ICD-10-CM

## 2022-11-21 DIAGNOSIS — G47.09 OTHER INSOMNIA: Chronic | ICD-10-CM

## 2022-11-21 DIAGNOSIS — F41.1 GAD (GENERALIZED ANXIETY DISORDER): Chronic | ICD-10-CM

## 2022-11-21 RX ORDER — OLANZAPINE 15 MG/1
7.5-15 TABLET ORAL
Qty: 90 TABLET | Refills: 2 | Status: SHIPPED | OUTPATIENT
Start: 2022-11-21 | End: 2023-08-18

## 2022-11-21 RX ORDER — SERTRALINE HYDROCHLORIDE 100 MG/1
150 TABLET, FILM COATED ORAL DAILY
Qty: 135 TABLET | Refills: 2 | Status: SHIPPED | OUTPATIENT
Start: 2022-11-21 | End: 2023-08-18

## 2022-11-29 ENCOUNTER — SOCIAL WORK (OUTPATIENT)
Dept: BEHAVIORAL/MENTAL HEALTH CLINIC | Facility: CLINIC | Age: 77
End: 2022-11-29

## 2022-11-29 ENCOUNTER — ANTICOAG VISIT (OUTPATIENT)
Dept: INTERNAL MEDICINE CLINIC | Facility: CLINIC | Age: 77
End: 2022-11-29

## 2022-11-29 ENCOUNTER — APPOINTMENT (OUTPATIENT)
Dept: LAB | Facility: CLINIC | Age: 77
End: 2022-11-29

## 2022-11-29 DIAGNOSIS — F41.1 GAD (GENERALIZED ANXIETY DISORDER): Primary | ICD-10-CM

## 2022-11-29 NOTE — PROGRESS NOTES
Per Dr Landon Babcock no changes recheck in 2 weeks  I spoke with the patients wife, the patient will have a new INR completed in 2 weeks

## 2022-11-29 NOTE — PSYCH
Psychotherapy Provided: Individual Psychotherapy 30 minutes     Length of time in session: 30 minutes, follow up in one month    Encounter Diagnosis     ICD-10-CM    1  JOSÉ LUIS (generalized anxiety disorder)  F41 1           Goals addressed in session: Goal 1     Pain:      none    0    Current suicide risk : Low     HOLDEN Medrano continues to report tension at home in his relationship with his wife  States that have frequent verbal arguments that trigger his anxiety and depression  States these marital issues have been problematic for decades  This is why he tends to go for frequent walks or visit select peers away from home  Denies any physicall altercations at home  States that tension at home effects son that resides with them  Johny Shah clearly more anxious and tremulous when discussing tensions at home with spouse  This lessens as session progressed  He is verbal, cooperative, oriented and engaged during session  Responds well to support and intervention  P- processed stressors/triggers for his mood issues- validation and supportive therapy provided  Reviewed boundaries and communication strategies to utilize within relationship to lessen stress  Reviewed stress mgmt strategies to utilize in response to stressors/triggers  Behavioral Health Treatment Plan [facility]: Diagnosis and Treatment Plan explained to Franny Rivera relates understanding diagnosis and is agreeable to Treatment Plan   Yes     Visit start and stop times: 12:55-1:25    11/29/22

## 2022-12-06 ENCOUNTER — TELEPHONE (OUTPATIENT)
Dept: PSYCHIATRY | Facility: CLINIC | Age: 77
End: 2022-12-06

## 2022-12-06 NOTE — TELEPHONE ENCOUNTER
Wife called back and stated patient sees Kandace Pichardo for therapy and is not interested in other services  Patient can be removed from the wait list at this time

## 2022-12-06 NOTE — TELEPHONE ENCOUNTER
Called patient off talk therapy wait list to see if services were still needed  LVM for patient to call intake dept back  1st attempt

## 2022-12-08 ENCOUNTER — ANTICOAG VISIT (OUTPATIENT)
Dept: INTERNAL MEDICINE CLINIC | Facility: CLINIC | Age: 77
End: 2022-12-08

## 2022-12-08 ENCOUNTER — APPOINTMENT (OUTPATIENT)
Dept: LAB | Facility: CLINIC | Age: 77
End: 2022-12-08

## 2022-12-20 ENCOUNTER — APPOINTMENT (OUTPATIENT)
Dept: LAB | Facility: CLINIC | Age: 77
End: 2022-12-20

## 2022-12-21 ENCOUNTER — ANTICOAG VISIT (OUTPATIENT)
Dept: INTERNAL MEDICINE CLINIC | Facility: CLINIC | Age: 77
End: 2022-12-21

## 2022-12-21 NOTE — PROGRESS NOTES
Continue 2 mg daily and recheck in 2 weeks  Spoke with pt's wife and advised - verbalized understanding
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
## 2022-12-27 ENCOUNTER — SOCIAL WORK (OUTPATIENT)
Dept: BEHAVIORAL/MENTAL HEALTH CLINIC | Facility: CLINIC | Age: 77
End: 2022-12-27

## 2022-12-27 DIAGNOSIS — F41.1 GAD (GENERALIZED ANXIETY DISORDER): Primary | ICD-10-CM

## 2022-12-27 NOTE — PSYCH
Psychotherapy Provided: Individual Psychotherapy 30 minutes     Length of time in session: 30 minutes, follow up in 5 weeks    Encounter Diagnosis     ICD-10-CM    1  JOSÉ LUIS (generalized anxiety disorder)  F41 1           Goals addressed in session: Goal 1     Pain:      none    0    Current suicide risk : Low     D- Lucio Dawson continues to struggle with periods of anxiety stemming from long-standing marital and environmental issues at home  Compounded by physical health issues as well  Presents as anxious to day and anxiety worsens when discussing specific stressors at home  Remains active daily walking and also visits his friend daily  Has limited social supports  Feels he does the best he can with his circumstances  Reports continues periods of depression related to these issues/struggles  Nikkie Watts presents as more anxious but is verbal, cooperative, well oriented and engaged during session  Responds well to support and intervention  Denies any SI or HI  Enjoyed his [place] at home and wishes the home enviornment could be that calm more often  Thoughts are logical and goal directed  P- processed stressors/triggers for his anxiety- validation and supportive therapy provided  Reviewed stress mgmt and relaxation strategies to utilize in response to stressors  Focused on strengths to bolster self-esteem  Utilized cognitive restructuring strategies to view himself and his efforts in more positive terms  Behavioral Health Treatment Plan [facility]: Diagnosis and Treatment Plan explained to Dominic Skinner relates understanding diagnosis and is agreeable to Treatment Plan   Yes     Visit start and stop times: 1:00-1:30    12/27/22

## 2023-01-04 ENCOUNTER — APPOINTMENT (OUTPATIENT)
Dept: LAB | Age: 78
End: 2023-01-04

## 2023-01-04 ENCOUNTER — OFFICE VISIT (OUTPATIENT)
Dept: PSYCHIATRY | Facility: CLINIC | Age: 78
End: 2023-01-04

## 2023-01-04 VITALS
HEART RATE: 77 BPM | SYSTOLIC BLOOD PRESSURE: 146 MMHG | HEIGHT: 66 IN | WEIGHT: 144 LBS | BODY MASS INDEX: 23.14 KG/M2 | DIASTOLIC BLOOD PRESSURE: 68 MMHG

## 2023-01-04 DIAGNOSIS — F50.82 AVOIDANT-RESTRICTIVE FOOD INTAKE DISORDER (ARFID): Chronic | ICD-10-CM

## 2023-01-04 DIAGNOSIS — Z79.899 LONG-TERM USE OF HIGH-RISK MEDICATION: Chronic | ICD-10-CM

## 2023-01-04 DIAGNOSIS — G47.09 OTHER INSOMNIA: Chronic | ICD-10-CM

## 2023-01-04 DIAGNOSIS — F41.0 PANIC DISORDER WITHOUT AGORAPHOBIA: Chronic | ICD-10-CM

## 2023-01-04 DIAGNOSIS — F41.1 GAD (GENERALIZED ANXIETY DISORDER): Chronic | ICD-10-CM

## 2023-01-04 DIAGNOSIS — F09 MILD COGNITIVE DISORDER: Chronic | ICD-10-CM

## 2023-01-04 DIAGNOSIS — F33.42 MAJOR DEPRESSIVE DISORDER, RECURRENT EPISODE, IN FULL REMISSION (HCC): Primary | Chronic | ICD-10-CM

## 2023-01-05 ENCOUNTER — ANTICOAG VISIT (OUTPATIENT)
Dept: INTERNAL MEDICINE CLINIC | Facility: CLINIC | Age: 78
End: 2023-01-05

## 2023-01-09 DIAGNOSIS — I10 ESSENTIAL HYPERTENSION: ICD-10-CM

## 2023-01-09 DIAGNOSIS — K21.9 GASTROESOPHAGEAL REFLUX DISEASE WITHOUT ESOPHAGITIS: ICD-10-CM

## 2023-01-09 RX ORDER — PANTOPRAZOLE SODIUM 40 MG/1
40 TABLET, DELAYED RELEASE ORAL
Qty: 90 TABLET | Refills: 1 | Status: SHIPPED | OUTPATIENT
Start: 2023-01-09

## 2023-01-09 RX ORDER — AMLODIPINE BESYLATE 5 MG/1
5 TABLET ORAL DAILY
Qty: 90 TABLET | Refills: 1 | Status: SHIPPED | OUTPATIENT
Start: 2023-01-09

## 2023-01-24 ENCOUNTER — OFFICE VISIT (OUTPATIENT)
Dept: INTERNAL MEDICINE CLINIC | Facility: CLINIC | Age: 78
End: 2023-01-24

## 2023-01-24 VITALS
OXYGEN SATURATION: 97 % | HEIGHT: 64 IN | WEIGHT: 143.4 LBS | BODY MASS INDEX: 24.48 KG/M2 | HEART RATE: 87 BPM | DIASTOLIC BLOOD PRESSURE: 78 MMHG | SYSTOLIC BLOOD PRESSURE: 138 MMHG

## 2023-01-24 DIAGNOSIS — I10 ESSENTIAL HYPERTENSION: ICD-10-CM

## 2023-01-24 DIAGNOSIS — I82.5Y2 CHRONIC DEEP VEIN THROMBOSIS (DVT) OF PROXIMAL VEIN OF LEFT LOWER EXTREMITY (HCC): ICD-10-CM

## 2023-01-24 DIAGNOSIS — Z23 NEED FOR SHINGLES VACCINE: ICD-10-CM

## 2023-01-24 DIAGNOSIS — K50.919 CROHN'S DISEASE WITH COMPLICATION, UNSPECIFIED GASTROINTESTINAL TRACT LOCATION (HCC): ICD-10-CM

## 2023-01-24 DIAGNOSIS — Z00.00 MEDICARE ANNUAL WELLNESS VISIT, SUBSEQUENT: Primary | ICD-10-CM

## 2023-01-24 DIAGNOSIS — F33.42 MAJOR DEPRESSIVE DISORDER, RECURRENT EPISODE, IN FULL REMISSION (HCC): Chronic | ICD-10-CM

## 2023-01-24 RX ORDER — ZOSTER VACCINE RECOMBINANT, ADJUVANTED 50 MCG/0.5
0.5 KIT INTRAMUSCULAR ONCE
Qty: 1 EACH | Refills: 1 | Status: SHIPPED | OUTPATIENT
Start: 2023-01-24 | End: 2023-01-24
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
## 2023-01-24 NOTE — PATIENT INSTRUCTIONS
Medicare Preventive Visit Patient Instructions  Thank you for completing your Welcome to Medicare Visit or Medicare Annual Wellness Visit today  Your next wellness visit will be due in one year (1/25/2024)  The screening/preventive services that you may require over the next 5-10 years are detailed below  Some tests may not apply to you based off risk factors and/or age  Screening tests ordered at today's visit but not completed yet may show as past due  Also, please note that scanned in results may not display below  Preventive Screenings:  Service Recommendations Previous Testing/Comments   Colorectal Cancer Screening  · Colonoscopy    · Fecal Occult Blood Test (FOBT)/Fecal Immunochemical Test (FIT)  · Fecal DNA/Cologuard Test  · Flexible Sigmoidoscopy Age: 39-70 years old   Colonoscopy: every 10 years (May be performed more frequently if at higher risk)  OR  FOBT/FIT: every 1 year  OR  Cologuard: every 3 years  OR  Sigmoidoscopy: every 5 years  Screening may be recommended earlier than age 39 if at higher risk for colorectal cancer  Also, an individualized decision between you and your healthcare provider will decide whether screening between the ages of 74-80 would be appropriate   Colonoscopy: Not on file  FOBT/FIT: Not on file  Cologuard: Not on file  Sigmoidoscopy: Not on file          Prostate Cancer Screening Individualized decision between patient and health care provider in men between ages of 53-78   Medicare will cover every 12 months beginning on the day after your 50th birthday PSA: No results in last 5 years           Hepatitis C Screening Once for adults born between [birth year] and 1965  More frequently in patients at high risk for Hepatitis C Hep C Antibody: 12/06/2018        Diabetes Screening 1-2 times per year if you're at risk for diabetes or have pre-diabetes Fasting glucose: 96 mg/dL (4/27/2021)  A1C: 5 0 % (7/2/2021)      Cholesterol Screening Once every 5 years if you don't have a lipid disorder  May order more often based on risk factors  Lipid panel: 03/23/2022         Other Preventive Screenings Covered by Medicare:  1  Abdominal Aortic Aneurysm (AAA) Screening: covered once if your at risk  You're considered to be at risk if you have a family history of AAA or a male between the age of 73-68 who smoking at least 100 cigarettes in your lifetime  2  Lung Cancer Screening: covers low dose CT scan once per year if you meet all of the following conditions: (1) Age 50-69; (2) No signs or symptoms of lung cancer; (3) Current smoker or have quit smoking within the last 15 years; (4) You have a tobacco smoking history of at least 20 pack years (packs per day x number of years you smoked); (5) You get a written order from a healthcare provider  3  Glaucoma Screening: covered annually if you're considered high risk: (1) You have diabetes OR (2) Family history of glaucoma OR (3)  aged 48 and older OR (3)  American aged 72 and older  3  Osteoporosis Screening: covered every 2 years if you meet one of the following conditions: (1) Have a vertebral abnormality; (2) On glucocorticoid therapy for more than 3 months; (3) Have primary hyperparathyroidism; (4) On osteoporosis medications and need to assess response to drug therapy  5  HIV Screening: covered annually if you're between the age of 12-76  Also covered annually if you are younger than 13 and older than 72 with risk factors for HIV infection  For pregnant patients, it is covered up to 3 times per pregnancy      Immunizations:  Immunization Recommendations   Influenza Vaccine Annual influenza vaccination during flu season is recommended for all persons aged >= 6 months who do not have contraindications   Pneumococcal Vaccine   * Pneumococcal conjugate vaccine = PCV13 (Prevnar 13), PCV15 (Vaxneuvance), PCV20 (Prevnar 20)  * Pneumococcal polysaccharide vaccine = PPSV23 (Pneumovax) Adults 25-60 years old: 1-3 doses may be recommended based on certain risk factors  Adults 72 years old: 1-2 doses may be recommended based off what pneumonia vaccine you previously received   Hepatitis B Vaccine 3 dose series if at intermediate or high risk (ex: diabetes, end stage renal disease, liver disease)   Tetanus (Td) Vaccine - COST NOT COVERED BY MEDICARE PART B Following completion of primary series, a booster dose should be given every 10 years to maintain immunity against tetanus  Td may also be given as tetanus wound prophylaxis  Tdap Vaccine - COST NOT COVERED BY MEDICARE PART B Recommended at least once for all adults  For pregnant patients, recommended with each pregnancy  Shingles Vaccine (Shingrix) - COST NOT COVERED BY MEDICARE PART B  2 shot series recommended in those aged 48 and above     Health Maintenance Due:      Topic Date Due   • Hepatitis C Screening  Completed     Immunizations Due:      Topic Date Due   • COVID-19 Vaccine (4 - Booster for My[employer] series) 01/25/2022     Advance Directives   What are advance directives? Advance directives are legal documents that state your wishes and plans for medical care  These plans are made ahead of time in case you lose your ability to make decisions for yourself  Advance directives can apply to any medical decision, such as the treatments you want, and if you want to donate organs  What are the types of advance directives? There are many types of advance directives, and each state has rules about how to use them  You may choose a combination of any of the following:  · Living will: This is a written record of the treatment you want  You can also choose which treatments you do not want, which to limit, and which to stop at a certain time  This includes surgery, medicine, IV fluid, and tube feedings  · Durable power of  for healthcare [facility]):   This is a written record that states who you want to make healthcare choices for you when you are unable to make them for yourself  This person, called a proxy, is usually a family member or a friend  You may choose more than 1 proxy  · Do not resuscitate (DNR) order:  A DNR order is used in case your heart stops beating or you stop breathing  It is a request not to have certain forms of treatment, such as CPR  A DNR order may be included in other types of advance directives  · Medical directive: This covers the care that you want if you are in a coma, near death, or unable to make decisions for yourself  You can list the treatments you want for each condition  Treatment may include pain medicine, surgery, blood transfusions, dialysis, IV or tube feedings, and a ventilator (breathing machine)  · Values history: This document has questions about your views, beliefs, and how you feel and think about life  This information can help others choose the care that you would choose  Why are advance directives important? An advance directive helps you control your care  Although spoken wishes may be used, it is better to have your wishes written down  Spoken wishes can be misunderstood, or not followed  Treatments may be given even if you do not want them  An advance directive may make it easier for your family to make difficult choices about your care  © Copyright [employer] 2018 Information is for End User's use only and may not be sold, redistributed or otherwise used for commercial purposes   All illustrations and images included in CareNotes® are the copyrighted property of A D A M , Inc  or [facility]

## 2023-01-25 NOTE — ASSESSMENT & PLAN NOTE
Clinically stable and doing well continue the current medical regiment will continue monitor    Status post colectomy

## 2023-01-25 NOTE — ASSESSMENT & PLAN NOTE
Clinically stable and doing well continue the current medical regiment will continue monitor    Well-controlled on Coumadin INR very stable see yellow sheet

## 2023-01-25 NOTE — ASSESSMENT & PLAN NOTE
Moderate depression no suicidal ideation he does have thoughts of death but would not act on these thoughts, he is working with a counselor Malik Sherwood who has been very helpful he will continue follow-up with counselor Malik Sherwood and discussed these thoughts further with counsellor Idania HELLER

## 2023-01-31 ENCOUNTER — ANTICOAG VISIT (OUTPATIENT)
Dept: INTERNAL MEDICINE CLINIC | Facility: CLINIC | Age: 78
End: 2023-01-31

## 2023-01-31 ENCOUNTER — APPOINTMENT (OUTPATIENT)
Dept: LAB | Facility: CLINIC | Age: 78
End: 2023-01-31

## 2023-01-31 ENCOUNTER — SOCIAL WORK (OUTPATIENT)
Dept: BEHAVIORAL/MENTAL HEALTH CLINIC | Facility: CLINIC | Age: 78
End: 2023-01-31

## 2023-01-31 DIAGNOSIS — F41.1 GAD (GENERALIZED ANXIETY DISORDER): Primary | ICD-10-CM

## 2023-01-31 NOTE — PSYCH
Behavioral Health Psychotherapy Progress Note    Psychotherapy Provided: Individual Psychotherapy     1  JOSÉ LUIS (generalized anxiety disorder)            Goals addressed in session: Goal 1     DATA: Izell Barthel continues to struggle to manage stress, tension and some depression related to long-standing marital/communication issues at home  Does his best to avoid conflict but feels dismissed by his spouse many times  Created tension with his son as well  Feels resistance no matter how much effort he puts forth  Continues to walk, exercise in basement, clean the house and visit friend to offset stress  Denies any acute depression or anxiety symptoms  No SI or HI  During this session, this clinician used the following therapeutic modalities: Solution-Focused Therapy and Supportive Psychotherapy    Substance Abuse was not addressed during this session  If the client is diagnosed with a co-occurring substance use disorder, please indicate any changes in the frequency or amount of use: none  Stage of change for addressing substance use diagnoses: No substance use/Not applicable    ASSESSMENT:  Lieutenant Mcmahon presents with a Euthymic/ normal and Anxious mood  his affect is Normal range and intensity, which is congruent, with his mood and the content of the session  The client has made progress on their goals  Lieutenant Mcmahon presents with a minimal risk of suicide, minimal risk of self-harm, and minimal risk of harm to others  For any risk assessment that surpasses a "low" rating, a safety plan must be developed  A safety plan was indicated: no  If yes, describe in detail N/A    PLAN: Between sessions, Lieutenant Mcmahon will utilize communication strategies and appropriate expressions of emotions at home  Reviewed stress mgmt strategies to utilize  Elaina Kearney At the next session, the therapist will use Solution-Focused Therapy and Supportive Psychotherapy to address anxiety/stress      Behavioral Health Treatment Plan and Discharge Planning: Mittie Phoenix is aware of and agrees to continue to work on their treatment plan  They have identified and are working toward their discharge goals   yes    Visit start and stop times: 1:00-1:30    01/31/23

## 2023-01-31 NOTE — PROGRESS NOTES
Per Dr Jaelyn Rangel, patient is to increase to 3 mg, 2 mg, 2 mg recheck in 4 days  The patient was notified 
Awaiting inpatient bed, reasses resp status.  Joellen Quevedo MD

## 2023-02-06 ENCOUNTER — APPOINTMENT (OUTPATIENT)
Dept: LAB | Facility: CLINIC | Age: 78
End: 2023-02-06

## 2023-02-06 ENCOUNTER — ANTICOAG VISIT (OUTPATIENT)
Dept: INTERNAL MEDICINE CLINIC | Facility: CLINIC | Age: 78
End: 2023-02-06

## 2023-02-06 NOTE — PROGRESS NOTES
The patient is currently taking Warfarin 3 mg, 2 mg, 2 mg, 2 mg  Repeat dosage  Per Dr Swapna Ramírez current dosage recheck in 2 weeks  Reviewed results and directions with the patients wife

## 2023-02-20 ENCOUNTER — APPOINTMENT (OUTPATIENT)
Dept: LAB | Facility: CLINIC | Age: 78
End: 2023-02-20

## 2023-02-20 ENCOUNTER — ANTICOAG VISIT (OUTPATIENT)
Dept: INTERNAL MEDICINE CLINIC | Facility: CLINIC | Age: 78
End: 2023-02-20

## 2023-02-28 ENCOUNTER — SOCIAL WORK (OUTPATIENT)
Dept: BEHAVIORAL/MENTAL HEALTH CLINIC | Facility: CLINIC | Age: 78
End: 2023-02-28

## 2023-02-28 DIAGNOSIS — F41.1 GAD (GENERALIZED ANXIETY DISORDER): Primary | ICD-10-CM

## 2023-02-28 NOTE — PSYCH
Behavioral Health Psychotherapy Progress Note    Psychotherapy Provided: Individual Psychotherapy     1  JOSÉ LUIS (generalized anxiety disorder)            Goals addressed in session: Goal 1     DATA: An Jefferson reports continued tension at home in his interactions with his spouse  Feels he cannot say anything without her reacting in annoyed fashion  Often expressed his love her but she does not reciprocate  Feels this has developed over time and these are long-standing issues  Has affected his anxiety and he has been walking and exercising in response to this  Presents as anxious during initial part of session but responds well to support and this diminished as session progressed  Reports feeling depressed secondary to these issues  Denies any SI  No HI  During this session, this clinician used the following therapeutic modalities: Solution-Focused Therapy and Supportive Psychotherapy    Substance Abuse was not addressed during this session  If the client is diagnosed with a co-occurring substance use disorder, please indicate any changes in the frequency or amount of use: none  Stage of change for addressing substance use diagnoses: No substance use/Not applicable    ASSESSMENT:  Kylie Nguyễn presents with a Anxious mood  his affect is Normal range and intensity, which is congruent, with his mood and the content of the session  The client has made progress on their goals  Kylie Nguyễn presents with a minimal risk of suicide, minimal risk of self-harm, and minimal risk of harm to others  For any risk assessment that surpasses a "low" rating, a safety plan must be developed  A safety plan was indicated: no  If yes, describe in detail N/A    PLAN: Between sessions, Kylie Nguyễn will utilize stress mgmt strategies and productive outlets in response to stress and anxiety  Reviewed communication strategies and boundaries at home to reduce stress with his spouse   At the next session, the therapist will use Solution-Focused Therapy and Supportive Psychotherapy to address anxiety and depression  Behavioral Health Treatment Plan and Discharge Planning: Saravanan Murphy is aware of and agrees to continue to work on their treatment plan  They have identified and are working toward their discharge goals   yes    Visit start and stop times: 12:45-1:15    02/28/23

## 2023-03-20 ENCOUNTER — APPOINTMENT (OUTPATIENT)
Dept: LAB | Facility: CLINIC | Age: 78
End: 2023-03-20

## 2023-03-21 ENCOUNTER — ANTICOAG VISIT (OUTPATIENT)
Dept: INTERNAL MEDICINE CLINIC | Facility: CLINIC | Age: 78
End: 2023-03-21

## 2023-03-21 NOTE — PROGRESS NOTES
The patient is currently taking 3 mg, 2mg, 2 mg, 2 mg  Per Dr Hank Mario continue current dose recheck in one month  I spoke with the patients wife and reviewed the directions

## 2023-03-28 ENCOUNTER — TELEPHONE (OUTPATIENT)
Dept: NEUROLOGY | Facility: CLINIC | Age: 78
End: 2023-03-28
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
## 2023-03-28 NOTE — TELEPHONE ENCOUNTER
Called and spoke with patient's wife Syd Luna  Asked if patient can be rescheduled to 5/15/23 for his f/u instead of 5/1 as we are trying to get an urgent pt in  Syd Luna accepted, appt moved  Sending appt card with updated date/time to address on file

## 2023-04-03 ENCOUNTER — SOCIAL WORK (OUTPATIENT)
Dept: BEHAVIORAL/MENTAL HEALTH CLINIC | Facility: CLINIC | Age: 78
End: 2023-04-03

## 2023-04-03 DIAGNOSIS — F41.1 GAD (GENERALIZED ANXIETY DISORDER): Primary | ICD-10-CM

## 2023-04-03 NOTE — PSYCH
"Behavioral Health Psychotherapy Progress Note    Psychotherapy Provided: Individual Psychotherapy     1  JOSÉ LUIS (generalized anxiety disorder)            Goals addressed in session: Goal 1     DATA: Iveth Herrera continues to detail ongoing struggles with anxiety and depression secondary to marital issues  Tells his wife he loves her daily and gives her a kiss every morning but there is no response  Sleep in separate beds  Feels they only time to they interact verbally is when she is critical of him  Rather than dealing with this, he tends to leave the house several times a day to walk  Kim Salazar who resides with them frustrated regarding their discord but also has similar issues with his mother, per Iveth Marquezon  Processing/discussing these issues increases his anxiety in session  Responds well to the support and focus on relaxation  Denies any SI  No HI  During this session, this clinician used the following therapeutic modalities: Solution-Focused Therapy and Supportive Psychotherapy    Substance Abuse was addressed during this session  If the client is diagnosed with a co-occurring substance use disorder, please indicate any changes in the frequency or amount of use: none  Stage of change for addressing substance use diagnoses: No substance use/Not applicable    ASSESSMENT:  Al Hanley presents with a Anxious mood  his affect is Normal range and intensity, which is congruent, with his mood and the content of the session  The client has made progress on their goals  Al Hanley presents with a minimal risk of suicide, minimal risk of self-harm, and minimal risk of harm to others  For any risk assessment that surpasses a \"low\" rating, a safety plan must be developed  A safety plan was indicated: no  If yes, describe in detail n/a    PLAN: Between sessions, Al Hanley will utilize communication and conflict resolution strategies reviewed during session   Reviewed relaxation strategies to utilize at " home  At the next session, the therapist will use Solution-Focused Therapy and Supportive Psychotherapy to address anxiety  Behavioral Health Treatment Plan and Discharge Planning: Marybeth Rushing is aware of and agrees to continue to work on their treatment plan  They have identified and are working toward their discharge goals   yes    Visit start and stop times: 1:00-1:30    04/03/23

## 2023-04-07 ENCOUNTER — TELEPHONE (OUTPATIENT)
Dept: PSYCHIATRY | Facility: CLINIC | Age: 78
End: 2023-04-07

## 2023-04-07 ENCOUNTER — OFFICE VISIT (OUTPATIENT)
Dept: PSYCHIATRY | Facility: CLINIC | Age: 78
End: 2023-04-07

## 2023-04-07 VITALS
WEIGHT: 138 LBS | HEART RATE: 72 BPM | BODY MASS INDEX: 22.99 KG/M2 | HEIGHT: 65 IN | SYSTOLIC BLOOD PRESSURE: 140 MMHG | DIASTOLIC BLOOD PRESSURE: 65 MMHG

## 2023-04-07 DIAGNOSIS — F41.1 GAD (GENERALIZED ANXIETY DISORDER): Chronic | ICD-10-CM

## 2023-04-07 DIAGNOSIS — F33.42 MAJOR DEPRESSIVE DISORDER, RECURRENT EPISODE, IN FULL REMISSION (HCC): Primary | Chronic | ICD-10-CM

## 2023-04-07 DIAGNOSIS — Z79.899 LONG-TERM USE OF HIGH-RISK MEDICATION: Chronic | ICD-10-CM

## 2023-04-07 DIAGNOSIS — F09 MILD COGNITIVE DISORDER: Chronic | ICD-10-CM

## 2023-04-07 DIAGNOSIS — G47.09 OTHER INSOMNIA: Chronic | ICD-10-CM

## 2023-04-07 DIAGNOSIS — F50.82 AVOIDANT-RESTRICTIVE FOOD INTAKE DISORDER (ARFID): Chronic | ICD-10-CM

## 2023-04-07 DIAGNOSIS — F41.0 PANIC DISORDER WITHOUT AGORAPHOBIA: Chronic | ICD-10-CM

## 2023-04-07 RX ORDER — LANOLIN ALCOHOL/MO/W.PET/CERES
6 CREAM (GRAM) TOPICAL
COMMUNITY

## 2023-04-07 NOTE — BH TREATMENT PLAN
"TREATMENT PLAN (Medication Management Only)        [facility]    Name/Date of Birth/MRN:  David Ceja 69 y o  1945 MRN: [id number]  Date of Treatment Plan: April 7, 2023  Diagnosis/Diagnoses:   1  Major depressive disorder, recurrent episode, in full remission ([facility] [address] )    2  Panic disorder without agoraphobia    3  JOSÉ LUIS (generalized anxiety disorder)    4  Avoidant-restrictive food intake disorder (ARFID)    5  Mild cognitive disorder    6  Other insomnia    7  Long-term use of high-risk medication      Strengths/Personal Resources for Self-Care: \"I feel better after I see my providers\"  Area/Areas of need (in own words): \"eating\"  1  Long Term Goal:   improve control of anxiety, maintain stability of depression  Target Date: 3 months - 7/7/2023  Person/Persons responsible for completion of goal: Yovana Schwab and wife  2  Short Term Objective (s) - How will we reach this goal?:   A  Provider new recommended medication/dosage changes and/or continue medication(s): continue current medications as prescribed (Zoloft, Zyprexa and Melatonin)  B   N/A   C   N/A  Target Date: 3 months - 7/7/2023  Person/Persons Responsible for Completion of Goal: Yovana Schwab   Progress Towards Goals: progressing  Treatment Modality: medication management every 3 months, continue psychotherapy with [facility] therapist  Review due 180 days from date of this plan: 6 months - 10/7/2023  Expected length of service: ongoing treatment unless revised  My Physician/PA/NP and I have developed this plan together and I agree to work on the goals and objectives  I understand the treatment goals that were developed for my treatment    Electronic Signatures: on file (unless signed below)    Adry Collins MD 04/07/23  "

## 2023-04-07 NOTE — TELEPHONE ENCOUNTER
Patient contacted the office to schedule a follow up visit with provider   Patient is now scheduled for 10/9/2023  at 3:30pm

## 2023-04-25 ENCOUNTER — ANTICOAG VISIT (OUTPATIENT)
Dept: INTERNAL MEDICINE CLINIC | Facility: CLINIC | Age: 78
End: 2023-04-25

## 2023-04-25 ENCOUNTER — APPOINTMENT (OUTPATIENT)
Dept: LAB | Facility: CLINIC | Age: 78
End: 2023-04-25

## 2023-04-25 NOTE — PROGRESS NOTES
Per Dr Michelle Salgado no changes and recheck in 4 weeks  I spoke with the patient wife reviewed Dr Cipriano Maciel instructions

## 2023-05-02 ENCOUNTER — SOCIAL WORK (OUTPATIENT)
Dept: BEHAVIORAL/MENTAL HEALTH CLINIC | Facility: CLINIC | Age: 78
End: 2023-05-02

## 2023-05-02 DIAGNOSIS — F41.1 GAD (GENERALIZED ANXIETY DISORDER): Primary | ICD-10-CM

## 2023-05-02 NOTE — PSYCH
"Behavioral Health Psychotherapy Progress Note    Psychotherapy Provided: Individual Psychotherapy     1  JOSÉ LUIS (generalized anxiety disorder)            Goals addressed in session: Goal 1     DATA: Loulou Finn has been feeling more anxious and overwhelmed as there continues to be daily tension at home  Feels the only time he and his wife communicate are when they are arguing  Feels he is in a loveless marriage as he reports she rarely talks with him and spends no time with him even to watch television  Does not respond to his efforts to spend more time together and this continues to build over time until there is an argument  Denies any acute depressive symptoms  Continue to alk several times a day and visit his friend to try and manage stress  Discussed utilizing journaling to better manage and his expression his frustration on consistent basis as opposed to leeting it built to [employer] of argument  Denies any SI or HI  During this session, this clinician used the following therapeutic modalities: Solution-Focused Therapy and Supportive Psychotherapy    Substance Abuse was addressed during this session  If the client is diagnosed with a co-occurring substance use disorder, please indicate any changes in the frequency or amount of use: none  Stage of change for addressing substance use diagnoses: No substance use/Not applicable    ASSESSMENT:  Dary Farias presents with a Anxious mood  his affect is Normal range and intensity, which is congruent, with his mood and the content of the session  The client has not made progress on their goals  Dary Farias presents with a minimal risk of suicide, minimal risk of self-harm, and minimal risk of harm to others  For any risk assessment that surpasses a \"low\" rating, a safety plan must be developed      A safety plan was indicated: yes  If yes, describe in detail If he would become depressed to point of SI, he would do the followin) Contact me directly; " 2)Contact his psychiatrist office by phone; 3) Contact his PCP office; 4) Go to ED for evaluation    PLAN: Between sessions, Wade More will utilize appropriate coping strategies and outlets for emotions to prevent further tension and stress at home  At the next session, the therapist will use Solution-Focused Therapy and Supportive Psychotherapy to address anxiety  Behavioral Health Treatment Plan and Discharge Planning: Wade More is aware of and agrees to continue to work on their treatment plan  They have identified and are working toward their discharge goals   yes    Visit start and stop times: 1:00-1:30    05/02/23

## 2023-05-15 ENCOUNTER — OFFICE VISIT (OUTPATIENT)
Dept: NEUROLOGY | Facility: CLINIC | Age: 78
End: 2023-05-15

## 2023-05-15 VITALS — SYSTOLIC BLOOD PRESSURE: 134 MMHG | DIASTOLIC BLOOD PRESSURE: 80 MMHG | WEIGHT: 138.1 LBS | BODY MASS INDEX: 22.98 KG/M2

## 2023-05-15 DIAGNOSIS — R25.1 TREMOR: ICD-10-CM

## 2023-05-15 DIAGNOSIS — F09 MILD COGNITIVE DISORDER: Primary | Chronic | ICD-10-CM

## 2023-05-15 DIAGNOSIS — F44.4 FUNCTIONAL NEUROLOGICAL SYMPTOM DISORDER WITH ABNORMAL MOVEMENT: ICD-10-CM

## 2023-05-15 DIAGNOSIS — F41.1 GAD (GENERALIZED ANXIETY DISORDER): Chronic | ICD-10-CM

## 2023-05-15 NOTE — PROGRESS NOTES
Patient ID: Torrey Mckeon is a 69 y o  male    Assessment/Plan:    Mild cognitive disorder  Mild cognitive changes in the setting of underlying longstanding history of anxiety  Progression of any underlying cognitive changes is unclear as this is confounded by other factors such as anxiety and variable sleep  Prior MRI with neuro quant from 2021 was reviewed  We discussed the potential benefit of obtaining repeat MRI  He was not interested this time  We discussed the potential benefits of neuropsychological testing to further clarify his cause  Given they are unable to handle all visits they were unable to obtain this  Her underlying work-up was reviewed  We will have him continue to follow-up with his PCP and psychiatrist   If there is continued concerns with regards to underlying cognitive disorder, could consider referring for neuropsychological testing at an outside facility different procedure  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  No functional symptoms seen on exam other than occasional right leg movements particularly with walking  He would do a sudden sidestep and then catch himself  Tremor  Mixed postural and rest tremor is present in both hands and occasionally in the right leg  Upper extremity tremor did appear to be stable with what is seen with medication induced tremor  Not distractible  Medications were reviewed  Zyprexa could be contributing  They report that this has been reduced and psychiatry is monitoring  No additional findings to suggest an underlying parkinsonian syndrome  Diagnoses and all orders for this visit:    Mild cognitive disorder    Tremor    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    JOSÉ LUIS (generalized anxiety disorder)        Subjective:      Falguni Ricobelgicamarilynn LaLiza is a 69 y  o  man with a history of Chron's diease, protein S deficiency, DVT on coumadin, and chronic dizziness    This is my first visit with the patient  Review of chart:  Evaluated Dr Rosemarie Brown in November 2018 for 2nd opinion for abnormal movements, confusion-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  Complex psych history, 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 dating back to his 25s  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  At that visit he had a variable tremor was extinguished by competing motor tasks in the opposite hand  MRI and EEG work up for myoclonus/abnormal movements were both unrevealing  Returned and saw Sara Ou in 8/2021 with memory loss and tremor  Tremor thought to be related to Zyprexa  He follows with psychiatry       He returns today for follow up  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 to do certain things  She has been encouraging him to do things for himself  There have been no clear changes  He can perform self care independently  He tends to move his right leg  He is not sure why  He does a lot of walking  Neuropsychological testing was never completed as they could not complete all the visits       prior work up:  MRI brain with NQ 10/2021: 1  No acute infarction, intracranial hemorrhage or mass effect  2   Trace, chronic microangiopathy is similar to the previous study  3   Bilateral mastoid air cell effusions redemonstrated    NeuroQuant analysis was performed: Normal study; Does not support neurodegeneration      labs: b12 705, RPR negative, TSH normall               Objective:    /80 (BP Location: Left arm, Patient Position: Sitting, Cuff Size: Standard)   Wt 62 6 kg (138 lb 1 6 oz)   BMI 22 98 kg/m²       Physical Exam  Vitals reviewed  Eyes:      Extraocular Movements: Extraocular movements intact  Neurological:      Motor: Motor strength is normal    Psychiatric:         Speech: Speech normal          Neurological Exam  Mental Status   Oriented only to person, place and situation  Speech is normal  With occasional repetition   Attention and concentration are normal     Cranial Nerves  CN II: Blindness  CN III, IV, VI: Extraocular movements intact bilaterally  Right pupil: 1 mm  CN VII: Full and symmetric facial movement  CN VIII:  Right: Hearing is decreased  Left: Hearing is decreased  CN IX, X: Palate elevates symmetrically  CN XI: Shoulder shrug strength is normal   CN XII: Tongue midline without atrophy or fasciculations  Motor   Normal muscle tone  Strength is 5/5 throughout all four extremities  Sensory  Light touch is normal in upper and lower extremities  Coordination  Right: Rapid alternating movement normal Left: Rapid alternating movement normal   No bradykinesia on FT  Hg, CHICHI  Mild postural, rest tremors of the hands,  Occasional right leg tremor/ volitional movement       Gait  Casual gait is normal including stance, stride, and arm swing  Able to rise from chair without using arms  Good stride  Occasional side step and catches himself  No freezing or festination            Current Outpatient Medications on File Prior to Visit   Medication Sig Dispense Refill   • amLODIPine (NORVASC) 5 mg tablet Take 1 tablet (5 mg total) by mouth daily 90 tablet 1   • ciclopirox (PENLAC) 8 % solution Apply topically daily at bedtime 6 6 mL 5   • melatonin 3 mg Take 6 mg by mouth daily at bedtime     • Multiple Vitamins-Minerals (MULTI FOR HIM 50+ PO) Take 1 tablet by mouth daily     • OLANZapine (ZyPREXA) 15 mg tablet Take 0 5-1 tablets (7 5-15 mg total) by mouth daily at bedtime 90 tablet 2   • pantoprazole (PROTONIX) 40 mg tablet Take 1 tablet (40 mg total) by mouth daily in the early morning 90 tablet 1   • sertraline (ZOLOFT) 100 mg tablet Take 1 5 tablets (150 mg total) by mouth daily 135 tablet 2   • warfarin (COUMADIN) 2 mg tablet Take 3 mg one day, then 2 mg the next day  Repeat schedule  135 tablet 3     No current facility-administered medications on file prior to visit           Jay Faulkner MD  Movement disorder physician  [facility]

## 2023-05-15 NOTE — PROGRESS NOTES
Review of Systems   Constitutional: Positive for appetite change (Doesnt eat normally   as stated by wife)  Negative for fever  HENT: Negative  Negative for hearing loss, tinnitus, trouble swallowing and voice change  Eyes: Negative  Negative for photophobia, pain and visual disturbance  Respiratory: Negative  Negative for shortness of breath  Cardiovascular: Negative  Negative for palpitations  Gastrointestinal: Negative  Negative for nausea and vomiting  Endocrine: Negative  Negative for cold intolerance  Genitourinary: Negative  Negative for dysuria, frequency and urgency  Musculoskeletal: Negative for gait problem, myalgias and neck pain  Balance Issues, has stayed the same     Skin: Negative  Negative for rash  Allergic/Immunologic: Negative  Neurological: Positive for dizziness, tremors (Stayed the same a lot of them are self induced as stated by wife  , Right Side) and light-headedness  Negative for seizures, syncope, facial asymmetry, speech difficulty, weakness, numbness and headaches  Hematological: Bruises/bleeds easily  Psychiatric/Behavioral: Positive for sleep disturbance (Hard time falling asleep)  Negative for confusion and hallucinations  Memory Issues     All other systems reviewed and are negative

## 2023-05-16 NOTE — ASSESSMENT & PLAN NOTE
Mixed postural and rest tremor is present in both hands and occasionally in the right leg  Upper extremity tremor did appear to be stable with what is seen with medication induced tremor  Not distractible  Medications were reviewed  Zyprexa could be contributing  They report that this has been reduced and psychiatry is monitoring  No additional findings to suggest an underlying parkinsonian syndrome

## 2023-05-16 NOTE — ASSESSMENT & PLAN NOTE
No functional symptoms seen on exam other than occasional right leg movements particularly with walking  He would do a sudden sidestep and then catch himself

## 2023-05-16 NOTE — ASSESSMENT & PLAN NOTE
Mild cognitive changes in the setting of underlying longstanding history of anxiety  Progression of any underlying cognitive changes is unclear as this is confounded by other factors such as anxiety and variable sleep  Prior MRI with neuro quant from 2021 was reviewed  We discussed the potential benefit of obtaining repeat MRI  He was not interested this time  We discussed the potential benefits of neuropsychological testing to further clarify his cause  Given they are unable to handle all visits they were unable to obtain this  Her underlying work-up was reviewed  We will have him continue to follow-up with his PCP and psychiatrist   If there is continued concerns with regards to underlying cognitive disorder, could consider referring for neuropsychological testing at an outside facility different procedure

## 2023-05-22 ENCOUNTER — APPOINTMENT (OUTPATIENT)
Dept: LAB | Facility: CLINIC | Age: 78
End: 2023-05-22

## 2023-05-23 ENCOUNTER — ANTICOAG VISIT (OUTPATIENT)
Dept: INTERNAL MEDICINE CLINIC | Facility: CLINIC | Age: 78
End: 2023-05-23

## 2023-06-06 ENCOUNTER — SOCIAL WORK (OUTPATIENT)
Dept: BEHAVIORAL/MENTAL HEALTH CLINIC | Facility: CLINIC | Age: 78
End: 2023-06-06
Payer: MEDICARE

## 2023-06-06 DIAGNOSIS — F41.1 GAD (GENERALIZED ANXIETY DISORDER): Primary | ICD-10-CM

## 2023-06-06 PROCEDURE — 90832 PSYTX W PT 30 MINUTES: CPT | Performed by: SOCIAL WORKER

## 2023-06-06 NOTE — PSYCH
"Behavioral Health Psychotherapy Progress Note    Psychotherapy Provided: Individual Psychotherapy     JOSÉ LUIS    Goals addressed in session: Goal 1     DATA: Ashley Vieyra continues to struggle to manage stress and anxiety which is more difficulty at home due to long-standing marital issues that are not likely to change  Continue to focus on managing his response to this stress  Admits to periods of depression at times and sites environment at home as being his primary stressors/trigger  Denies any SI or HI  During this session, this clinician used the following therapeutic modalities: Solution-Focused Therapy and Supportive Psychotherapy    Substance Abuse was addressed during this session  If the client is diagnosed with a co-occurring substance use disorder, please indicate any changes in the frequency or amount of use: none  Stage of change for addressing substance use diagnoses: No substance use/Not applicable    ASSESSMENT:  Blanca Lerma presents with a Anxious mood  his affect is Normal range and intensity, which is congruent, with his mood and the content of the session  The client has made progress on their goals  Blanca Lerma presents with a minimal risk of suicide, minimal risk of self-harm, and minimal risk of harm to others  For any risk assessment that surpasses a \"low\" rating, a safety plan must be developed  A safety plan was indicated: no  If yes, describe in detail n/a    PLAN: Between sessions, Blanca Lerma will continues to utilize stress mgmt strategies, [productive outlets and relaxation strategies reviewed during sessions to better manage stress and anxiety    At the next session, the therapist will use Solution-Focused Therapy and Supportive Psychotherapy to address anxiety  Behavioral Health Treatment Plan and Discharge Planning: Blanca Lerma is aware of and agrees to continue to work on their treatment plan   They have identified and are working toward their " discharge goals   no    Visit start and stop times: 12:55-1:25    06/06/23

## 2023-06-12 ENCOUNTER — APPOINTMENT (OUTPATIENT)
Dept: LAB | Facility: CLINIC | Age: 78
End: 2023-06-12
Payer: MEDICARE

## 2023-06-12 DIAGNOSIS — O22.30 DVT (DEEP VEIN THROMBOSIS) IN PREGNANCY: ICD-10-CM

## 2023-06-12 RX ORDER — WARFARIN SODIUM 2 MG/1
TABLET ORAL
Qty: 60 TABLET | Refills: 0 | Status: SHIPPED | OUTPATIENT
Start: 2023-06-12

## 2023-06-22 ENCOUNTER — ANTICOAG VISIT (OUTPATIENT)
Dept: INTERNAL MEDICINE CLINIC | Facility: CLINIC | Age: 78
End: 2023-06-22
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
## 2023-07-03 ENCOUNTER — OFFICE VISIT (OUTPATIENT)
Dept: PSYCHIATRY | Facility: CLINIC | Age: 78
End: 2023-07-03
Payer: MEDICARE

## 2023-07-03 VITALS
HEART RATE: 75 BPM | BODY MASS INDEX: 22.99 KG/M2 | HEIGHT: 65 IN | WEIGHT: 138 LBS | DIASTOLIC BLOOD PRESSURE: 74 MMHG | SYSTOLIC BLOOD PRESSURE: 144 MMHG

## 2023-07-03 DIAGNOSIS — F41.1 GAD (GENERALIZED ANXIETY DISORDER): Chronic | ICD-10-CM

## 2023-07-03 DIAGNOSIS — F33.42 MAJOR DEPRESSIVE DISORDER, RECURRENT EPISODE, IN FULL REMISSION (HCC): Primary | Chronic | ICD-10-CM

## 2023-07-03 DIAGNOSIS — F50.82 AVOIDANT-RESTRICTIVE FOOD INTAKE DISORDER (ARFID): Chronic | ICD-10-CM

## 2023-07-03 DIAGNOSIS — F09 MILD COGNITIVE DISORDER: Chronic | ICD-10-CM

## 2023-07-03 DIAGNOSIS — F41.0 PANIC DISORDER WITHOUT AGORAPHOBIA: Chronic | ICD-10-CM

## 2023-07-03 DIAGNOSIS — G47.09 OTHER INSOMNIA: Chronic | ICD-10-CM

## 2023-07-03 DIAGNOSIS — Z79.899 LONG-TERM USE OF HIGH-RISK MEDICATION: Chronic | ICD-10-CM

## 2023-07-03 PROCEDURE — 99214 OFFICE O/P EST MOD 30 MIN: CPT | Performed by: PSYCHIATRY & NEUROLOGY

## 2023-07-03 PROCEDURE — 90833 PSYTX W PT W E/M 30 MIN: CPT | Performed by: PSYCHIATRY & NEUROLOGY

## 2023-07-03 RX ORDER — OLANZAPINE 15 MG/1
7.5-15 TABLET ORAL
Qty: 90 TABLET | Refills: 2 | Status: SHIPPED | OUTPATIENT
Start: 2023-07-03 | End: 2024-03-29

## 2023-07-03 RX ORDER — SERTRALINE HYDROCHLORIDE 100 MG/1
150 TABLET, FILM COATED ORAL DAILY
Qty: 135 TABLET | Refills: 2 | Status: SHIPPED | OUTPATIENT
Start: 2023-07-03 | End: 2024-03-29

## 2023-07-09 DIAGNOSIS — I10 ESSENTIAL HYPERTENSION: ICD-10-CM

## 2023-07-09 RX ORDER — AMLODIPINE BESYLATE 5 MG/1
TABLET ORAL
Qty: 90 TABLET | Refills: 0 | Status: SHIPPED | OUTPATIENT
Start: 2023-07-09

## 2023-07-11 ENCOUNTER — SOCIAL WORK (OUTPATIENT)
Dept: BEHAVIORAL/MENTAL HEALTH CLINIC | Facility: CLINIC | Age: 78
End: 2023-07-11
Payer: MEDICARE

## 2023-07-11 DIAGNOSIS — F41.1 GAD (GENERALIZED ANXIETY DISORDER): Primary | ICD-10-CM

## 2023-07-11 PROCEDURE — 90832 PSYTX W PT 30 MINUTES: CPT | Performed by: SOCIAL WORKER

## 2023-07-11 NOTE — PSYCH
Behavioral Health Psychotherapy Progress Note    Psychotherapy Provided: Individual Psychotherapy     1. JOSÉ LUIS (generalized anxiety disorder)            Goals addressed in session: Goal 1     DATA: Komal Edward continues to struggle with periods of anxiety and depression largely related to continues marital tension, tension at home, physical health and financial issues. Continues to utilize walking and exercise to counterbalance. Would like to be more active socially but he doesn't drive, have much money and is not comfortable going places with his colostomy bag. Utilizes session to ventilate on these stressors- validation and supportive therapy provided. Denies any SI or HI. During this session, this clinician used the following therapeutic modalities: Solution-Focused Therapy and Supportive Psychotherapy    Substance Abuse was addressed during this session. If the client is diagnosed with a co-occurring substance use disorder, please indicate any changes in the frequency or amount of use: none. Stage of change for addressing substance use diagnoses: No substance use/Not applicable    ASSESSMENT:  Naz Bennett presents with a Anxious mood. his affect is Normal range and intensity, which is congruent, with his mood and the content of the session. The client has made progress on their goals. Naz Bennett presents with a minimal risk of suicide, minimal risk of self-harm, and minimal risk of harm to others. For any risk assessment that surpasses a "low" rating, a safety plan must be developed.     A safety plan was indicated: yes  If yes, describe in detail If his symptoms of depression and anxiety would worsen, he agrees to do the followin) Contact me/his PCP office for support and/or sooner appt; 2) Contact his psychiatrist's office for support, sooner appt, change of meds; 3) Go for walk to see if this is helpful; 4) Go to ED for eval    PLAN: Between sessions, Naz Bennett will utilize stress mgmt and relaxation strategies reviewed to manage any stress and anxiety. At the next session, the therapist will use Solution-Focused Therapy and Supportive Psychotherapy to address anxiety. Behavioral Health Treatment Plan and Discharge Planning: Froy Saenz is aware of and agrees to continue to work on their treatment plan. They have identified and are working toward their discharge goals.  no    Visit start and stop times: 12:45-1:20    07/11/23

## 2023-07-11 NOTE — BH TREATMENT PLAN
[address]  1945     Date of Initial Psychotherapy Assessment: 10/11/22  Date of Current Treatment Plan: 07/11/23  Treatment Plan Target Date: 1/11/24  Treatment Plan Expiration Date: N/A    Diagnosis:   1. JOSÉ LUIS (generalized anxiety disorder)            Area(s) of Need: lack of social supports, financial, transportation    Long Term Goal 1 (in the client's own words): Handle my anxiety better    Stage of Change: Action    Target Date for completion: 1/11/24     Anticipated therapeutic modalities: supportive psychotherapy, solution-focused psychotherapy, CBT     People identified to complete this goal: Evita Trevino and Jazmyne Almonte LCSW      Objective 1: (identify the means of measuring success in meeting the objective): Evita Trevino will utilize stress mgmt and relaxation strategies reviewed during session daily at home to manage any stress/tension at home and improve his marital interactions      Objective 2: (identify the means of measuring success in meeting the objective): Evita Trevino will utilize appropriate and productive outlets identified during sessions daily to generate more energy and interest and manage irritability at home related to any stressors      I am currently under the care of a [facility] psychiatric provider: yes    My [facility] psychiatric provider is: Dr. Adriel Iraheta    I am currently taking psychiatric medications: Yes, as prescribed    I feel that I will be ready for discharge from mental health care when I reach the following (measurable goal/objective): When I have 90 consecutive days where I have no or easily manages anxiety or depression    For children and adults who have a legal guardian:   Has there been any change to custody orders and/or guardianship status? NA. If yes, attach updated documentation.     I have updated my Crisis Plan and have been offered a copy of this [facility]: Diagnosis and Treatment Plan explained to Jose Yusuf acknowledges an understanding of their diagnosis. Landy Bowman agrees to this treatment plan.     I have been offered a copy of this Treatment Plan. yes

## 2023-07-17 ENCOUNTER — APPOINTMENT (OUTPATIENT)
Dept: LAB | Facility: CLINIC | Age: 78
End: 2023-07-17
Payer: MEDICARE

## 2023-07-17 DIAGNOSIS — Z79.899 LONG-TERM USE OF HIGH-RISK MEDICATION: Chronic | ICD-10-CM

## 2023-07-17 DIAGNOSIS — F33.42 MAJOR DEPRESSIVE DISORDER, RECURRENT EPISODE, IN FULL REMISSION (HCC): Chronic | ICD-10-CM

## 2023-07-17 DIAGNOSIS — I10 ESSENTIAL HYPERTENSION: ICD-10-CM

## 2023-07-17 DIAGNOSIS — I82.5Y2 CHRONIC DEEP VEIN THROMBOSIS (DVT) OF PROXIMAL VEIN OF LEFT LOWER EXTREMITY (HCC): Primary | ICD-10-CM

## 2023-07-17 LAB
ALBUMIN SERPL BCP-MCNC: 3.9 G/DL (ref 3.5–5)
ALP SERPL-CCNC: 55 U/L (ref 34–104)
ALT SERPL W P-5'-P-CCNC: 11 U/L (ref 7–52)
ANION GAP SERPL CALCULATED.3IONS-SCNC: 5 MMOL/L
AST SERPL W P-5'-P-CCNC: 16 U/L (ref 13–39)
BASOPHILS # BLD AUTO: 0.02 THOUSANDS/ÂΜL (ref 0–0.1)
BASOPHILS NFR BLD AUTO: 0 % (ref 0–1)
BILIRUB SERPL-MCNC: 0.78 MG/DL (ref 0.2–1)
BUN SERPL-MCNC: 22 MG/DL (ref 5–25)
CALCIUM SERPL-MCNC: 8.9 MG/DL (ref 8.4–10.2)
CHLORIDE SERPL-SCNC: 109 MMOL/L (ref 96–108)
CHOLEST SERPL-MCNC: 197 MG/DL
CO2 SERPL-SCNC: 27 MMOL/L (ref 21–32)
CREAT SERPL-MCNC: 0.89 MG/DL (ref 0.6–1.3)
EOSINOPHIL # BLD AUTO: 0.03 THOUSAND/ÂΜL (ref 0–0.61)
EOSINOPHIL NFR BLD AUTO: 1 % (ref 0–6)
ERYTHROCYTE [DISTWIDTH] IN BLOOD BY AUTOMATED COUNT: 15.4 % (ref 11.6–15.1)
EST. AVERAGE GLUCOSE BLD GHB EST-MCNC: 97 MG/DL
GFR SERPL CREATININE-BSD FRML MDRD: 83 ML/MIN/1.73SQ M
GLUCOSE P FAST SERPL-MCNC: 97 MG/DL (ref 65–99)
HBA1C MFR BLD: 5 %
HCT VFR BLD AUTO: 35.4 % (ref 36.5–49.3)
HDLC SERPL-MCNC: 59 MG/DL
HGB BLD-MCNC: 10.8 G/DL (ref 12–17)
IMM GRANULOCYTES # BLD AUTO: 0.03 THOUSAND/UL (ref 0–0.2)
IMM GRANULOCYTES NFR BLD AUTO: 1 % (ref 0–2)
LDLC SERPL CALC-MCNC: 121 MG/DL (ref 0–100)
LYMPHOCYTES # BLD AUTO: 0.91 THOUSANDS/ÂΜL (ref 0.6–4.47)
LYMPHOCYTES NFR BLD AUTO: 14 % (ref 14–44)
MCH RBC QN AUTO: 24.3 PG (ref 26.8–34.3)
MCHC RBC AUTO-ENTMCNC: 30.5 G/DL (ref 31.4–37.4)
MCV RBC AUTO: 80 FL (ref 82–98)
MONOCYTES # BLD AUTO: 0.55 THOUSAND/ÂΜL (ref 0.17–1.22)
MONOCYTES NFR BLD AUTO: 9 % (ref 4–12)
NEUTROPHILS # BLD AUTO: 4.78 THOUSANDS/ÂΜL (ref 1.85–7.62)
NEUTS SEG NFR BLD AUTO: 75 % (ref 43–75)
NRBC BLD AUTO-RTO: 0 /100 WBCS
PLATELET # BLD AUTO: 181 THOUSANDS/UL (ref 149–390)
PMV BLD AUTO: 9.9 FL (ref 8.9–12.7)
POTASSIUM SERPL-SCNC: 4.4 MMOL/L (ref 3.5–5.3)
PROT SERPL-MCNC: 6.4 G/DL (ref 6.4–8.4)
RBC # BLD AUTO: 4.45 MILLION/UL (ref 3.88–5.62)
SODIUM SERPL-SCNC: 141 MMOL/L (ref 135–147)
TRIGL SERPL-MCNC: 83 MG/DL
WBC # BLD AUTO: 6.32 THOUSAND/UL (ref 4.31–10.16)

## 2023-07-17 PROCEDURE — 85025 COMPLETE CBC W/AUTO DIFF WBC: CPT

## 2023-07-17 PROCEDURE — 80061 LIPID PANEL: CPT

## 2023-07-17 PROCEDURE — 83036 HEMOGLOBIN GLYCOSYLATED A1C: CPT

## 2023-07-17 PROCEDURE — 80053 COMPREHEN METABOLIC PANEL: CPT

## 2023-07-17 PROCEDURE — 36415 COLL VENOUS BLD VENIPUNCTURE: CPT

## 2023-07-18 ENCOUNTER — ANTICOAG VISIT (OUTPATIENT)
Dept: INTERNAL MEDICINE CLINIC | Facility: CLINIC | Age: 78
End: 2023-07-18

## 2023-07-28 ENCOUNTER — OFFICE VISIT (OUTPATIENT)
Dept: INTERNAL MEDICINE CLINIC | Facility: CLINIC | Age: 78
End: 2023-07-28
Payer: MEDICARE

## 2023-07-28 VITALS
OXYGEN SATURATION: 98 % | BODY MASS INDEX: 23.39 KG/M2 | HEIGHT: 65 IN | WEIGHT: 140.4 LBS | DIASTOLIC BLOOD PRESSURE: 64 MMHG | SYSTOLIC BLOOD PRESSURE: 120 MMHG | TEMPERATURE: 98.4 F | HEART RATE: 71 BPM

## 2023-07-28 DIAGNOSIS — I10 ESSENTIAL HYPERTENSION: ICD-10-CM

## 2023-07-28 DIAGNOSIS — K21.9 GASTROESOPHAGEAL REFLUX DISEASE WITHOUT ESOPHAGITIS: ICD-10-CM

## 2023-07-28 DIAGNOSIS — D69.6 THROMBOCYTOPENIA (HCC): ICD-10-CM

## 2023-07-28 DIAGNOSIS — M46.1 SACROILIITIS (HCC): ICD-10-CM

## 2023-07-28 DIAGNOSIS — I82.4Y9 DEEP VEIN THROMBOSIS (DVT) OF PROXIMAL LOWER EXTREMITY, UNSPECIFIED CHRONICITY, UNSPECIFIED LATERALITY (HCC): ICD-10-CM

## 2023-07-28 DIAGNOSIS — Z93.3 COLOSTOMY STATUS (HCC): ICD-10-CM

## 2023-07-28 DIAGNOSIS — L98.9 SKIN LESION: ICD-10-CM

## 2023-07-28 DIAGNOSIS — F33.42 MAJOR DEPRESSIVE DISORDER, RECURRENT EPISODE, IN FULL REMISSION (HCC): Chronic | ICD-10-CM

## 2023-07-28 DIAGNOSIS — D50.9 IRON DEFICIENCY ANEMIA, UNSPECIFIED IRON DEFICIENCY ANEMIA TYPE: Primary | ICD-10-CM

## 2023-07-28 PROCEDURE — 99214 OFFICE O/P EST MOD 30 MIN: CPT | Performed by: INTERNAL MEDICINE

## 2023-07-28 RX ORDER — PANTOPRAZOLE SODIUM 40 MG/1
40 TABLET, DELAYED RELEASE ORAL
Qty: 90 TABLET | Refills: 1 | Status: SHIPPED | OUTPATIENT
Start: 2023-07-28

## 2023-07-28 RX ORDER — WARFARIN SODIUM 2 MG/1
TABLET ORAL
Qty: 60 TABLET | Refills: 0 | Status: SHIPPED | OUTPATIENT
Start: 2023-07-28
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
## 2023-07-30 PROBLEM — D50.9 IRON DEFICIENCY ANEMIA: Status: ACTIVE | Noted: 2023-07-30

## 2023-07-30 PROBLEM — Z93.3 COLOSTOMY STATUS (HCC): Status: ACTIVE | Noted: 2023-07-30

## 2023-07-30 NOTE — ASSESSMENT & PLAN NOTE
Start over-the-counter iron 324 mg once daily recheck CBC, iron panel occult blood stool x3 we will have patient see GI for upper endoscopy rule out peptic ulcer disease

## 2023-07-30 NOTE — ASSESSMENT & PLAN NOTE
Epigastric discomfort and tenderness on examination along with new onset anemia the patient has a history of total colectomy I would like to rule out peptic ulcer disease recommend ulcer free diet we will have patient see GI for upper endoscopy

## 2023-07-30 NOTE — ASSESSMENT & PLAN NOTE
Hypertension - controlled, I have counseled patient following healthy balance diet, I would like the patient reduce sodium, exercise routinely, I would like the patient continued the med current medical regiment and we will continue to monitor.   Continue amlodipine 5 mg once daily

## 2023-08-14 ENCOUNTER — APPOINTMENT (OUTPATIENT)
Dept: LAB | Facility: CLINIC | Age: 78
End: 2023-08-14
Payer: MEDICARE

## 2023-08-14 DIAGNOSIS — D50.9 IRON DEFICIENCY ANEMIA, UNSPECIFIED IRON DEFICIENCY ANEMIA TYPE: ICD-10-CM

## 2023-08-14 LAB
BASOPHILS # BLD AUTO: 0.02 THOUSANDS/ÂΜL (ref 0–0.1)
BASOPHILS NFR BLD AUTO: 0 % (ref 0–1)
EOSINOPHIL # BLD AUTO: 0.05 THOUSAND/ÂΜL (ref 0–0.61)
EOSINOPHIL NFR BLD AUTO: 1 % (ref 0–6)
ERYTHROCYTE [DISTWIDTH] IN BLOOD BY AUTOMATED COUNT: 18.6 % (ref 11.6–15.1)
FERRITIN SERPL-MCNC: 11 NG/ML (ref 24–336)
HCT VFR BLD AUTO: 36.9 % (ref 36.5–49.3)
HGB BLD-MCNC: 11.1 G/DL (ref 12–17)
IMM GRANULOCYTES # BLD AUTO: 0.02 THOUSAND/UL (ref 0–0.2)
IMM GRANULOCYTES NFR BLD AUTO: 0 % (ref 0–2)
IRON SATN MFR SERPL: 46 % (ref 20–50)
IRON SERPL-MCNC: 154 UG/DL (ref 65–175)
LYMPHOCYTES # BLD AUTO: 0.85 THOUSANDS/ÂΜL (ref 0.6–4.47)
LYMPHOCYTES NFR BLD AUTO: 18 % (ref 14–44)
MCH RBC QN AUTO: 24.5 PG (ref 26.8–34.3)
MCHC RBC AUTO-ENTMCNC: 30.1 G/DL (ref 31.4–37.4)
MCV RBC AUTO: 82 FL (ref 82–98)
MONOCYTES # BLD AUTO: 0.36 THOUSAND/ÂΜL (ref 0.17–1.22)
MONOCYTES NFR BLD AUTO: 8 % (ref 4–12)
NEUTROPHILS # BLD AUTO: 3.49 THOUSANDS/ÂΜL (ref 1.85–7.62)
NEUTS SEG NFR BLD AUTO: 73 % (ref 43–75)
NRBC BLD AUTO-RTO: 0 /100 WBCS
PLATELET # BLD AUTO: 179 THOUSANDS/UL (ref 149–390)
PMV BLD AUTO: 9.6 FL (ref 8.9–12.7)
RBC # BLD AUTO: 4.53 MILLION/UL (ref 3.88–5.62)
TIBC SERPL-MCNC: 336 UG/DL (ref 250–450)
WBC # BLD AUTO: 4.79 THOUSAND/UL (ref 4.31–10.16)

## 2023-08-14 PROCEDURE — 82728 ASSAY OF FERRITIN: CPT

## 2023-08-14 PROCEDURE — 83540 ASSAY OF IRON: CPT

## 2023-08-14 PROCEDURE — 83550 IRON BINDING TEST: CPT

## 2023-08-14 PROCEDURE — 85025 COMPLETE CBC W/AUTO DIFF WBC: CPT

## 2023-08-15 ENCOUNTER — SOCIAL WORK (OUTPATIENT)
Dept: BEHAVIORAL/MENTAL HEALTH CLINIC | Facility: CLINIC | Age: 78
End: 2023-08-15
Payer: MEDICARE

## 2023-08-15 ENCOUNTER — ANTICOAG VISIT (OUTPATIENT)
Dept: INTERNAL MEDICINE CLINIC | Facility: CLINIC | Age: 78
End: 2023-08-15

## 2023-08-15 DIAGNOSIS — F41.1 GAD (GENERALIZED ANXIETY DISORDER): Primary | ICD-10-CM

## 2023-08-15 LAB — SL AMB POCT FECES OCC BLD: NEGATIVE

## 2023-08-15 PROCEDURE — 90832 PSYTX W PT 30 MINUTES: CPT | Performed by: SOCIAL WORKER

## 2023-08-15 NOTE — PSYCH
Behavioral Health Psychotherapy Progress Note    Psychotherapy Provided: Individual Psychotherapy     1. JOSÉ LUIS (generalized anxiety disorder)            Goals addressed in session: Goal 1     DATA: Navid Coyne has been feeling more anxious  Due to physical health issues including skin cancer which has created him more stress and anxiety. Marital tension persists. Continues to exercise and walk to distance self from this tension. Has social interactions with neighbors ands friends while out walking. Expresses concerns regarding onset of recent  physical health issues. Responds well to support and intervention. Denies any acute depressive symptoms. No SI or HI. During this session, this clinician used the following therapeutic modalities: Solution-Focused Therapy and Supportive Psychotherapy    Substance Abuse was addressed during this session. If the client is diagnosed with a co-occurring substance use disorder, please indicate any changes in the frequency or amount of use: none. Stage of change for addressing substance use diagnoses: No substance use/Not applicable    ASSESSMENT:  Landy Bowman presents with a Anxious mood. his affect is Normal range and intensity, which is congruent, with his mood and the content of the session. The client has made progress on their goals. Landy Bowman presents with a minimal risk of suicide, minimal risk of self-harm, and minimal risk of harm to others. For any risk assessment that surpasses a "low" rating, a safety plan must be developed. A safety plan was indicated: no  If yes, describe in detail n/a    PLAN: Between sessions, Landy Bowman will utilize stress mgmt and relaxation strategies to utilize in response to his increase in stress related to physical health issues. . At the next session, the therapist will use Hypnotherapy, Solution-Focused Therapy and Supportive Psychotherapy to address anxiety.     Behavioral Health Treatment Plan and Discharge Planning: Dione Harden is aware of and agrees to continue to work on their treatment plan. They have identified and are working toward their discharge goals.  no    Visit start and stop times: 12:50-1:20    08/15/23

## 2023-09-12 ENCOUNTER — APPOINTMENT (OUTPATIENT)
Dept: LAB | Facility: CLINIC | Age: 78
End: 2023-09-12
Payer: MEDICARE

## 2023-09-12 ENCOUNTER — ANTICOAG VISIT (OUTPATIENT)
Dept: INTERNAL MEDICINE CLINIC | Facility: CLINIC | Age: 78
End: 2023-09-12

## 2023-09-12 NOTE — PROGRESS NOTES
Per Dr. Lyubov Álvarez no changes and recheck in 4 weeks. I reviewed the information with the patients wife.

## 2023-09-19 ENCOUNTER — SOCIAL WORK (OUTPATIENT)
Dept: BEHAVIORAL/MENTAL HEALTH CLINIC | Facility: CLINIC | Age: 78
End: 2023-09-19
Payer: MEDICARE

## 2023-09-19 DIAGNOSIS — F41.1 GAD (GENERALIZED ANXIETY DISORDER): Primary | ICD-10-CM

## 2023-09-19 PROCEDURE — 90832 PSYTX W PT 30 MINUTES: CPT | Performed by: SOCIAL WORKER

## 2023-09-19 NOTE — PSYCH
Behavioral Health Psychotherapy Progress Note    Psychotherapy Provided: Individual Psychotherapy     1. JOSÉ LUIS (generalized anxiety disorder)            Goals addressed in session: Goal 1     DATA: Macario Seen states he had been feeling less anxious over the last month and had been making more efforts to keep peace at home. Reports that tension arose this morning at home over trivial issues and this triggered his anxiety. In addition, has procedure next moth to address his skin cancer issues and this is creating stress and anxiety for him. Processed these issues during session- validation and supportive therapy provided. He is aware of my departure next month and we processed these issues as well. Does not wish to be referred to another therapist. Will meet for one more session next month. Denies any acute depressive symptoms. No SI. He is anxious but pleasant, verbal, cooperative during session. During this session, this clinician used the following therapeutic modalities: Solution-Focused Therapy and Supportive Psychotherapy    Substance Abuse was addressed during this session. If the client is diagnosed with a co-occurring substance use disorder, please indicate any changes in the frequency or amount of use: none. Stage of change for addressing substance use diagnoses: No substance use/Not applicable    ASSESSMENT:  Jama Koo presents with a Anxious mood. his affect is Normal range and intensity, which is congruent, with his mood and the content of the session. The client has made progress on their goals. Jama Koo presents with a minimal risk of suicide, minimal risk of self-harm, and minimal risk of harm to others. For any risk assessment that surpasses a "low" rating, a safety plan must be developed.     A safety plan was indicated: no  If yes, describe in detail n/a    PLAN: Between sessions, Jama Koo will utilize stress mgmt and relaxation strategies along with productive outlets to offset his stress and anxiety. . At the next session, the therapist will use Solution-Focused Therapy and Supportive Psychotherapy to address anxiety. Behavioral Health Treatment Plan and Discharge Planning: Chelsea Monk is aware of and agrees to continue to work on their treatment plan. They have identified and are working toward their discharge goals.  no    Visit start and stop times: 12:50-1:21    09/19/23

## 2023-09-28 ENCOUNTER — OFFICE VISIT (OUTPATIENT)
Dept: INTERNAL MEDICINE CLINIC | Facility: CLINIC | Age: 78
End: 2023-09-28
Payer: MEDICARE

## 2023-09-28 VITALS
SYSTOLIC BLOOD PRESSURE: 124 MMHG | DIASTOLIC BLOOD PRESSURE: 74 MMHG | HEART RATE: 67 BPM | WEIGHT: 145.4 LBS | RESPIRATION RATE: 16 BRPM | OXYGEN SATURATION: 97 % | HEIGHT: 65 IN | BODY MASS INDEX: 24.22 KG/M2

## 2023-09-28 DIAGNOSIS — Z23 NEEDS FLU SHOT: ICD-10-CM

## 2023-09-28 DIAGNOSIS — D50.9 IRON DEFICIENCY ANEMIA, UNSPECIFIED IRON DEFICIENCY ANEMIA TYPE: Primary | ICD-10-CM

## 2023-09-28 DIAGNOSIS — K21.9 GASTROESOPHAGEAL REFLUX DISEASE WITHOUT ESOPHAGITIS: ICD-10-CM

## 2023-09-28 DIAGNOSIS — B35.1 ONYCHOMYCOSIS: ICD-10-CM

## 2023-09-28 DIAGNOSIS — R22.9 SKIN NODULE: ICD-10-CM

## 2023-09-28 DIAGNOSIS — I10 ESSENTIAL HYPERTENSION: ICD-10-CM

## 2023-09-28 PROBLEM — D64.9 ANEMIA: Status: ACTIVE | Noted: 2023-09-28

## 2023-09-28 PROCEDURE — 99214 OFFICE O/P EST MOD 30 MIN: CPT

## 2023-09-28 PROCEDURE — G0008 ADMIN INFLUENZA VIRUS VAC: HCPCS

## 2023-09-28 PROCEDURE — 90662 IIV NO PRSV INCREASED AG IM: CPT

## 2023-09-28 NOTE — ASSESSMENT & PLAN NOTE
-Patient has possible iron deficiency anemia.   CBC from 8/14 hemoglobin of 11.1 and ferritin 11.  -Hemoccult screening was negative  -Continue taking over-the-counter iron 324 mg once daily along with vitamin C   -Follow-up with GI for further work-up

## 2023-09-28 NOTE — ASSESSMENT & PLAN NOTE
-Patient has a skin nodule/mole in the right calf. -Currently does not have any pain or any acute symptoms  -Following up with dermatologist Dr. Eliana Larson to have it surgically removed.

## 2023-09-28 NOTE — ASSESSMENT & PLAN NOTE
-Patient has some slight epigastric tenderness and discomfort on examination along with anemia that is currently being worked up.  -Patient has a history of total colectomy.  -Plan is to rule out any peptic ulcer disease.   -Patient has appointment coming up with gastroenterologist next week.    -Continue taking Protonix

## 2023-09-28 NOTE — PROGRESS NOTES
Assessment and Plan:    GERD (gastroesophageal reflux disease)  -Patient has some slight epigastric tenderness and discomfort on examination along with anemia that is currently being worked up.  -Patient has a history of total colectomy.  -Plan is to rule out any peptic ulcer disease.   -Patient has appointment coming up with gastroenterologist next week. -Continue taking Protonix    Essential hypertension  -Patient's hypertension is controlled. Advised patient to continue taking healthy balanced diet with low sodium.   -Continue amlodipine 5 mg once daily    Skin nodule  -Patient has a skin nodule/mole in the right calf. -Currently does not have any pain or any acute symptoms  -Following up with dermatologist Dr. Zhao Chew to have it surgically removed. Onychomycosis  -Patient has thickening and discoloration of the toenails.  -Has a history of fungal infection currently following up with podiatrist.   -Continue taking Penlac  -Advised patient to wash his feet and keep it dry regularly. Recommended patient of good foot hygiene and using Dr. Vee Barragan for his shoes    Anemia  -Patient has possible iron deficiency anemia. CBC from 8/14 hemoglobin of 11.1 and ferritin 11.  -Hemoccult screening was negative  -Continue taking over-the-counter iron 324 mg once daily along with vitamin C   -Follow-up with GI for further work-up       Subjective:     Patient ID: Jama Koo is a 69 y.o. male. JORGE Gaytan is a 81-year-old male who presents to the clinic today for follow-up regarding his anemia, GERD, fungal infection of the feet, hypertension and skin nodule. Patient reports being compliant with his medications without any new side effects. At today's visit patient does not have any acute complaints. He is due for a follow-up with his podiatrist for his fungal infection, gastroenterologist for anemia and mild epigastric pain and dermatologist for his right calf nodule.   Patient said he has been trying to eat a healthy balanced diet and has been routinely walking every day for about 4 to 5 miles. Otherwise patient does not report having any nausea/vomiting, headache, chest pain, palpitations, abdominal pain, melena or blood in the stool. He was accompanied by his wife today. Patient has no other complaints at this time. Review of Systems   Constitutional: Negative for chills and fever. HENT: Negative for ear pain and sore throat. Eyes: Negative for pain and visual disturbance. Respiratory: Negative for cough and shortness of breath. Cardiovascular: Negative for chest pain and palpitations. Gastrointestinal: Negative for abdominal pain and vomiting. Genitourinary: Negative for dysuria and hematuria. Musculoskeletal: Negative for arthralgias and back pain. Skin: Negative for color change and rash. Neurological: Negative for seizures and syncope. All other systems reviewed and are negative.        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   • Deep vein thrombosis (DVT) of proximal lower extremity (HCC)   • Need for pneumococcal vaccination   • Screening for diabetes mellitus   • Atypical chest pain   • Glaucoma screening   • Polyuria   • Pain of right eye   • Recurrent falls   • Influenza vaccine needed   • Screening for cardiovascular condition   • Medicare annual wellness visit, subsequent   • Anosmia   • Leukopenia   • Abdominal pain   • Epigastric pain   • Dyspepsia   • Atypical mole   • Subclinical hypothyroidism   • Skin lesion   • Sacroiliitis (HCC)   • Right cataract   • Primary localized osteoarthritis of right knee   • Intervertebral disc disorders with radiculopathy, lumbar region   • Other insomnia   • Decreased hearing   • Panic disorder without agoraphobia   • Early satiety   • Pancreatic cyst   • COVID-19 virus infection   • COVID-19   • Long-term use of high-risk medication   • Skin nodule   • Varicose veins of both lower extremities   • Memory loss   • Sleep disturbance   • Skin tear of right upper arm without complication   • Medical clearance for psychiatric admission   • 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[facility])   • GERD (gastroesophageal reflux disease)   • Avoidant-restrictive food intake disorder (ARFID)   • Mild cognitive disorder   • Essential hypertension   • Onychomycosis   • Iron deficiency anemia   • Colostomy status (HCC)   • Anemia        Current Outpatient Medications   Medication Sig Dispense Refill   • amLODIPine (NORVASC) 5 mg tablet Take 1 tablet by mouth once daily 90 tablet 0   • ciclopirox (PENLAC) 8 % solution Apply topically daily at bedtime 6.6 mL 5   • melatonin 3 mg Take 6 mg by mouth daily at bedtime     • Multiple Vitamins-Minerals (MULTI FOR HIM 50+ PO) Take 1 tablet by mouth daily     • OLANZapine (ZyPREXA) 15 mg tablet Take 0.5-1 tablets (7.5-15 mg total) by mouth daily at bedtime 90 tablet 2   • pantoprazole (PROTONIX) 40 mg tablet Take 1 tablet (40 mg total) by mouth daily in the early morning 90 tablet 1   • sertraline (ZOLOFT) 100 mg tablet Take 1.5 tablets (150 mg total) by mouth daily 135 tablet 2   • warfarin (COUMADIN) 2 mg tablet TAKE ONE AND ONE-HALF TABLETS BY MOUTH ONE DAY, THEN ONE TABLET THE NEXT DAY. REPEAT SCHEDULE 60 tablet 0     No current facility-administered medications for this visit.         Allergies   Allergen Reactions   • Duloxetine Other (See Comments)     Panic attacks   • Other      Seasonal        The following portions of the patient's history were reviewed and updated as appropriate: allergies, current medications, past family history, past medical history, past social history, past surgical history and problem list.          Objective:    /74   Pulse 67   Resp 16   Ht 5' 5" (1.651 m)   Wt 66 kg (145 lb 6.4 oz)   SpO2 97%   BMI 24.20 kg/m²      Body mass index is 24.2 kg/m². Physical Exam  Vitals and nursing note reviewed. Constitutional:       General: He is not in acute distress. Appearance: He is well-developed. HENT:      Head: Normocephalic and atraumatic. Eyes:      Conjunctiva/sclera: Conjunctivae normal.   Cardiovascular:      Rate and Rhythm: Normal rate and regular rhythm. Pulses: Normal pulses. Heart sounds: Normal heart sounds. No murmur heard. Pulmonary:      Effort: Pulmonary effort is normal. No respiratory distress. Breath sounds: Normal breath sounds. Abdominal:      Palpations: Abdomen is soft. Tenderness: There is abdominal tenderness (Epigastric tenderness on deep palpation). Musculoskeletal:         General: No swelling. Cervical back: Neck supple. Skin:     General: Skin is warm and dry. Capillary Refill: Capillary refill takes less than 2 seconds. Findings: No lesion. Comments: Nodule/mole right calf   Neurological:      Mental Status: He is alert and oriented to person, place, and time.    Psychiatric:         Mood and Affect: Mood normal.

## 2023-09-28 NOTE — ASSESSMENT & PLAN NOTE
-Patient's hypertension is controlled.   Advised patient to continue taking healthy balanced diet with low sodium.   -Continue amlodipine 5 mg once daily
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
## 2023-10-02 ENCOUNTER — TELEPHONE (OUTPATIENT)
Dept: GASTROENTEROLOGY | Facility: AMBULARY SURGERY CENTER | Age: 78
End: 2023-10-02

## 2023-10-02 ENCOUNTER — OFFICE VISIT (OUTPATIENT)
Dept: GASTROENTEROLOGY | Facility: AMBULARY SURGERY CENTER | Age: 78
End: 2023-10-02
Payer: MEDICARE

## 2023-10-02 VITALS
BODY MASS INDEX: 23.49 KG/M2 | DIASTOLIC BLOOD PRESSURE: 64 MMHG | SYSTOLIC BLOOD PRESSURE: 138 MMHG | OXYGEN SATURATION: 97 % | HEART RATE: 86 BPM | WEIGHT: 141 LBS | HEIGHT: 65 IN

## 2023-10-02 DIAGNOSIS — K50.00 CROHN'S DISEASE OF SMALL INTESTINE WITHOUT COMPLICATION (HCC): ICD-10-CM

## 2023-10-02 DIAGNOSIS — D50.9 IRON DEFICIENCY ANEMIA, UNSPECIFIED IRON DEFICIENCY ANEMIA TYPE: ICD-10-CM

## 2023-10-02 DIAGNOSIS — K86.2 PANCREATIC CYST: Primary | ICD-10-CM

## 2023-10-02 DIAGNOSIS — R10.13 EPIGASTRIC PAIN: ICD-10-CM

## 2023-10-02 PROCEDURE — 99214 OFFICE O/P EST MOD 30 MIN: CPT | Performed by: INTERNAL MEDICINE

## 2023-10-02 NOTE — PROGRESS NOTES
[facility]  Chelsea Monk 69 y.o. male MRN: [id number]            Assessment & Plan:  60-year-old gent with history of significant anxiety, history of Crohn's disease status post total proctocolectomy, end ileostomy, chronic abdominal pain, pancreatic cyst.  Now with iron deficiency anemia. 1.  Iron deficiency anemia: Unclear etiology  -No overt bleeding  -Recommend proceeding with an upper endoscopy and ileoscopy to evaluate for any ulcers, recurrent inflammation that could cause patient's iron deficiency  -Biopsy for celiac sprue  -We will repeat CBC at this time    2. Upper abdominal pain and history of pancreatic cyst:  -Check a CT scan of abdomen pelvis to exclude any change in the pancreatic cyst  -Schedule upper endoscopy at the same time as colonoscopy to evaluate for peptic ulcer disease, celiac sprue      Destin Showers was seen today for anemia. Diagnoses and all orders for this visit:    Pancreatic cyst  -     CT abdomen pelvis w contrast; Future    Iron deficiency anemia, unspecified iron deficiency anemia type  -     Ambulatory Referral to Gastroenterology  -     Basic metabolic panel; Future  -     CBC and differential; Future  -     EGD; Future  -     Ileoscopy; Future    Crohn's disease of small intestine without complication ([address])  -     Basic metabolic panel; Future    Epigastric pain    Other orders  -     Diet NPO; Sips with meds; Standing  -     Void on call to OR; Standing            _____________________________________________________________        CC: Iron deficiency anemia    HPI:  Chelsea Monk is a 69 y. o.male who is here for iron deficiency anemia. This is a pleasant 60-year-old gentleman, with a remote history of Crohn's disease status post total proctocolectomy, end ileostomy. We have seen him in the past, has also has a history of chronic abdominal pain felt to be due to visceral hypersensitivity and significant exercise including sit ups.   Patient recently was found to have mild iron deficiency anemia, no overt GI bleeding. Patient reports the same chronic generalized upper abdominal pain and discomfort. He denies any nausea, vomiting. Reports he has variable output from his ostomy, recently had increased stool output and diarrhea. His wife notes that occasionally there is some blood around his stoma, mostly in the skin which is excoriated around. The ostomy for the patient is still pink and functioning properly. Denies any significant arthralgias, nausea, vomiting, dysphagia. He continues to exercise significantly walks several miles per day. Denies any NSAID use. His weight has been stable. Continues to have significant anxiety. Recently reports having darker output through his ostomy in the setting of iron supplementation. ROS:  The remainder of the ROS was negative except for the pertinent positives mentioned in HPI. Allergies: Duloxetine and Other    Medications:   Current Outpatient Medications:   •  amLODIPine (NORVASC) 5 mg tablet, Take 1 tablet by mouth once daily, Disp: 90 tablet, Rfl: 0  •  ciclopirox (PENLAC) 8 % solution, Apply topically daily at bedtime, Disp: 6.6 mL, Rfl: 5  •  Ferrous Sulfate (IRON PO), Take by mouth, Disp: , Rfl:   •  melatonin 3 mg, Take 6 mg by mouth daily at bedtime, Disp: , Rfl:   •  Multiple Vitamins-Minerals (MULTI FOR HIM 50+ PO), Take 1 tablet by mouth daily, Disp: , Rfl:   •  OLANZapine (ZyPREXA) 15 mg tablet, Take 0.5-1 tablets (7.5-15 mg total) by mouth daily at bedtime, Disp: 90 tablet, Rfl: 2  •  pantoprazole (PROTONIX) 40 mg tablet, Take 1 tablet (40 mg total) by mouth daily in the early morning, Disp: 90 tablet, Rfl: 1  •  sertraline (ZOLOFT) 100 mg tablet, Take 1.5 tablets (150 mg total) by mouth daily, Disp: 135 tablet, Rfl: 2  •  warfarin (COUMADIN) 2 mg tablet, TAKE ONE AND ONE-HALF TABLETS BY MOUTH ONE DAY, THEN ONE TABLET THE NEXT DAY.  REPEAT SCHEDULE, Disp: 60 tablet, Rfl: 0    Past Medical History:   Diagnosis Date   • Allergic rhinitis    • Anosmia    • Anxiety    • Benign parotid tumor    • 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[facility])    • Crohn's disease ([address] )    • Depression    • Dilated pancreatic duct    • DVT (deep venous thrombosis) ([facility])    • Eating disorder    • GERD (gastroesophageal reflux disease)    • Hearing loss    • Heart disease    • [place] (hard of hearing)     no hearing aids   • Hypertension    • Leucocytosis    • Mass in neck    • Nail anomaly    • Polyuria    • 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HCC)    • 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   • Recurrent falls    • Solar lentigo    • Thrombocytopenia (HCC)    • Vertigo    • Vision loss of left eye        Past Surgical History:   Procedure Laterality Date   • COLON SURGERY      Partial colectomy and colostomy   • COLONOSCOPY     • ESOPHAGOGASTRODUODENOSCOPY N/A 4/5/2017    Procedure: ESOPHAGOGASTRODUODENOSCOPY (EGD); Surgeon: Aron Rockwell MD;  [facility]; Service:    • EYE SURGERY Right     Cataract   • KNEE SURGERY     • MA EDG US EXAM SURGICAL ALTER STOM DUODENUM/JEJUNUM N/A 4/9/2018    Procedure: LINEAR ENDOSCOPIC U/S;  Surgeon: Mckenna Merritt MD;  [facility];   Service: Gastroenterology   • MA EXC PRTD RENNY/PRTD GLND LAT DSJ&PRSRV FACIAL NR Right 8/8/2018    Procedure: PAROTIDECTOMY WITH FACIAL NERVE MONITOR AND FROZEN SECTION;  Surgeon: Tyler Bauer MD;  [facility];  Service: ENT   • RADICAL NECK DISSECTION N/A 8/8/2018    Procedure: SELECTIVE NECK DISSECTION;  Surgeon: Tyler Bauer MD;  [facility];  Service: ENT   • REMOVAL OF IMPACTED TOOTH - COMPLETELY BONY N/A 8/8/2018    Procedure: EXTRACTION TEETH #15 and #30;  Surgeon: Linda Figueroa DDS;  [facility];  Service: Maxillofacial   • UPPER GASTROINTESTINAL ENDOSCOPY     • VEIN LIGATION AND STRIPPING         Family History   Problem Relation Age of Onset   • Heart disease Brother    • Depression Brother    • Alcohol abuse Brother    • Diverticulitis Mother    • Drug abuse Mother    • Depression Sister    • Anxiety disorder Sister    • Depression Sister    • Anxiety disorder Sister    • Mental illness Other    • Alcohol abuse Other    • Drug abuse Other    • Completed Suicide  Other         reports that he quit smoking about 42 years ago. His smoking use included cigarettes. He started smoking about 57 years ago. He has a 10.00 pack-year smoking history. He has never used smokeless tobacco. He reports that he does not drink alcohol and does not use drugs.       Physical Exam:    /64 (BP Location: Left arm, Patient Position: Sitting, Cuff Size: Standard)   Pulse 86   Ht 5' 5" (1.651 m)   Wt 64 kg (141 lb)   SpO2 97%   BMI 23.46 kg/m²     Gen: wn/wd, NAD, anxious appearing, restless gentleman  HEENT: anicteric, MMM, no cervical LAD  CVS: RRR, no m/r/g  CHEST: CTA b/l  ABD: +BS, soft, right lower quadrant ileostomy, not evaluated, no hepatosplenomegaly well-healed midline surgical scar, mild epigastric tenderness, no rebound or guarding  EXT: no c/c/e  NEURO: aaox3  SKIN: NO rashes

## 2023-10-02 NOTE — LETTER
October 2, 2023     Rajwinder Riojas, [address]    Patient: Courtney Prater   YOB: 1945   Date of Visit: 10/2/2023       Dear Dr. Paula Encinas: Thank you for referring Ayla Li to me for evaluation. Below are my notes for this consultation. If you have questions, please do not hesitate to call me. I look forward to following your patient along with you. Sincerely,        Nurys Reagan MD        CC: No Recipients    Nurys Reagan MD  10/2/2023  1:23 PM  Sign when Signing Visit  [address] [facility]  Courtney Prater 69 y.o. male MRN: [id number]            Assessment & Plan:  27-year-old gent with history of significant anxiety, history of Crohn's disease status post total proctocolectomy, end ileostomy, chronic abdominal pain, pancreatic cyst.  Now with iron deficiency anemia. 1.  Iron deficiency anemia: Unclear etiology  -No overt bleeding  -Recommend proceeding with an upper endoscopy and ileoscopy to evaluate for any ulcers, recurrent inflammation that could cause patient's iron deficiency  -Biopsy for celiac sprue  -We will repeat CBC at this time    2. Upper abdominal pain and history of pancreatic cyst:  -Check a CT scan of abdomen pelvis to exclude any change in the pancreatic cyst  -Schedule upper endoscopy at the same time as colonoscopy to evaluate for peptic ulcer disease, celiac sprue      Behzad Reyes was seen today for anemia. Diagnoses and all orders for this visit:    Pancreatic cyst  -     CT abdomen pelvis w contrast; Future    Iron deficiency anemia, unspecified iron deficiency anemia type  -     Ambulatory Referral to Gastroenterology  -     Basic metabolic panel; Future  -     CBC and differential; Future  -     EGD; Future  -     Ileoscopy; Future    Crohn's disease of small intestine without complication ([address])  -     Basic metabolic panel;  Future    Epigastric pain    Other orders  -     Diet NPO; Sips with meds; Standing  -     Void on call to OR; Standing            _____________________________________________________________        CC: Iron deficiency anemia    HPI:  Courtney Prater is a 69 y. o.male who is here for iron deficiency anemia. This is a pleasant 72-year-old gentleman, with a remote history of Crohn's disease status post total proctocolectomy, end ileostomy. We have seen him in the past, has also has a history of chronic abdominal pain felt to be due to visceral hypersensitivity and significant exercise including sit ups. Patient recently was found to have mild iron deficiency anemia, no overt GI bleeding. Patient reports the same chronic generalized upper abdominal pain and discomfort. He denies any nausea, vomiting. Reports he has variable output from his ostomy, recently had increased stool output and diarrhea. His wife notes that occasionally there is some blood around his stoma, mostly in the skin which is excoriated around. The ostomy for the patient is still pink and functioning properly. Denies any significant arthralgias, nausea, vomiting, dysphagia. He continues to exercise significantly walks several miles per day. Denies any NSAID use. His weight has been stable. Continues to have significant anxiety. Recently reports having darker output through his ostomy in the setting of iron supplementation. ROS:  The remainder of the ROS was negative except for the pertinent positives mentioned in HPI.       Allergies: Duloxetine and Other    Medications:   Current Outpatient Medications:   •  amLODIPine (NORVASC) 5 mg tablet, Take 1 tablet by mouth once daily, Disp: 90 tablet, Rfl: 0  •  ciclopirox (PENLAC) 8 % solution, Apply topically daily at bedtime, Disp: 6.6 mL, Rfl: 5  •  Ferrous Sulfate (IRON PO), Take by mouth, Disp: , Rfl:   •  melatonin 3 mg, Take 6 mg by mouth daily at bedtime, Disp: , Rfl:   •  Multiple Vitamins-Minerals (MULTI FOR HIM 50+ PO), Take 1 tablet by mouth daily, Disp: , Rfl:   •  OLANZapine (ZyPREXA) 15 mg tablet, Take 0.5-1 tablets (7.5-15 mg total) by mouth daily at bedtime, Disp: 90 tablet, Rfl: 2  •  pantoprazole (PROTONIX) 40 mg tablet, Take 1 tablet (40 mg total) by mouth daily in the early morning, Disp: 90 tablet, Rfl: 1  •  sertraline (ZOLOFT) 100 mg tablet, Take 1.5 tablets (150 mg total) by mouth daily, Disp: 135 tablet, Rfl: 2  •  warfarin (COUMADIN) 2 mg tablet, TAKE ONE AND ONE-HALF TABLETS BY MOUTH ONE DAY, THEN ONE TABLET THE NEXT DAY. REPEAT SCHEDULE, Disp: 60 tablet, Rfl: 0    Past Medical History:   Diagnosis Date   • Allergic rhinitis    • Anosmia    • Anxiety    • Benign parotid tumor    • 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[facility])    • Crohn's disease ([facility])    • Depression    • Dilated pancreatic duct    • DVT (deep venous thrombosis) (HCC)    • Eating disorder    • GERD (gastroesophageal reflux disease)    • Hearing loss    • Heart disease    • [place] (hard of hearing)     no hearing aids   • Hypertension    • Leucocytosis    • Mass in neck    • Nail anomaly    • Polyuria    • 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HCC)    • 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   • Recurrent falls    • Solar lentigo    • Thrombocytopenia (HCC)    • Vertigo    • Vision loss of left eye        Past Surgical History:   Procedure Laterality Date   • COLON SURGERY      Partial colectomy and colostomy   • COLONOSCOPY     • ESOPHAGOGASTRODUODENOSCOPY N/A 4/5/2017    Procedure: ESOPHAGOGASTRODUODENOSCOPY (EGD); Surgeon: Conchita Moore MD;  [facility]; Service:    • EYE SURGERY Right     Cataract   • KNEE SURGERY     • MN EDG US EXAM SURGICAL ALTER STOM DUODENUM/JEJUNUM N/A 4/9/2018    Procedure: LINEAR ENDOSCOPIC U/S;  Surgeon: Mary Cummings MD;  [facility];   Service: Gastroenterology   • MN EXC PRTD RENNY/PRTD GLND LAT DSJ&PRSRV FACIAL NR Right 8/8/2018    Procedure: PAROTIDECTOMY WITH FACIAL NERVE MONITOR AND FROZEN SECTION;  Surgeon: Yung Douglass MD;  [facility]; Service: ENT   • RADICAL NECK DISSECTION N/A 8/8/2018    Procedure: SELECTIVE NECK DISSECTION;  Surgeon: Stephany More MD;  [facility];  Service: ENT   • REMOVAL OF IMPACTED TOOTH - COMPLETELY BONY N/A 8/8/2018    Procedure: EXTRACTION TEETH #15 and #30;  Surgeon: Chris Goldberg DDS;  [facility];  Service: Maxillofacial   • UPPER GASTROINTESTINAL ENDOSCOPY     • VEIN LIGATION AND STRIPPING         Family History   Problem Relation Age of Onset   • Heart disease Brother    • Depression Brother    • Alcohol abuse Brother    • Diverticulitis Mother    • Drug abuse Mother    • Depression Sister    • Anxiety disorder Sister    • Depression Sister    • Anxiety disorder Sister    • Mental illness Other    • Alcohol abuse Other    • Drug abuse Other    • Completed Suicide  Other         reports that he quit smoking about 42 years ago. His smoking use included cigarettes. He started smoking about 57 years ago. He has a 10.00 pack-year smoking history. He has never used smokeless tobacco. He reports that he does not drink alcohol and does not use drugs.       Physical Exam:    /64 (BP Location: Left arm, Patient Position: Sitting, Cuff Size: Standard)   Pulse 86   Ht 5' 5" (1.651 m)   Wt 64 kg (141 lb)   SpO2 97%   BMI 23.46 kg/m²     Gen: wn/wd, NAD, anxious appearing, restless gentleman  HEENT: anicteric, MMM, no cervical LAD  CVS: RRR, no m/r/g  CHEST: CTA b/l  ABD: +BS, soft, right lower quadrant ileostomy, not evaluated, no hepatosplenomegaly well-healed midline surgical scar, mild epigastric tenderness, no rebound or guarding  EXT: no c/c/e  NEURO: aaox3  SKIN: NO rashes

## 2023-10-02 NOTE — PATIENT INSTRUCTIONS
Scheduled date of EGD/colonoscopy (as of today): 11/14/23  Physician performing EGD/colonoscopy: Dr. Nata Roy  [facility] of EGD[facility]colonoscopy[facility]   Desired bowel prep reviewed with patient:Miralax/Dolcolax  Instructions reviewed with patient by: Cameron Schumacher  Clearances:   N/A

## 2023-10-02 NOTE — TELEPHONE ENCOUNTER
Our mutual patient is scheduled for procedure: Colon/EGD    On: 11/14/23     With: Dr. Tyson Mckay is taking the following blood thinner: Coumadin    Can this be stopped 5 days prior to the procedure    Physician Approving clearance: Reine Goldmann, DO

## 2023-10-04 DIAGNOSIS — I10 ESSENTIAL HYPERTENSION: ICD-10-CM

## 2023-10-04 RX ORDER — AMLODIPINE BESYLATE 5 MG/1
5 TABLET ORAL DAILY
Qty: 90 TABLET | Refills: 1 | Status: SHIPPED | OUTPATIENT
Start: 2023-10-04

## 2023-10-04 NOTE — TELEPHONE ENCOUNTER
Reason for call:   [x] Refill   [] Prior Auth  [] Other:     Office:   [x] PCP/Provider - Dr Douglas Child  [] Speciality/Provider -     Medication: amlodipine  Dose/Frequency:  5 mg/ daily    Quantity: 90D with refill    Pharmacy: [facility] on file    Does the patient have enough for 3 days?    [x] Yes   [] No - Send as HP to POD

## 2023-10-09 ENCOUNTER — OFFICE VISIT (OUTPATIENT)
Dept: PSYCHIATRY | Facility: CLINIC | Age: 78
End: 2023-10-09
Payer: MEDICARE

## 2023-10-09 VITALS
WEIGHT: 145 LBS | HEART RATE: 76 BPM | SYSTOLIC BLOOD PRESSURE: 154 MMHG | HEIGHT: 65 IN | DIASTOLIC BLOOD PRESSURE: 74 MMHG | BODY MASS INDEX: 24.16 KG/M2

## 2023-10-09 DIAGNOSIS — F33.42 MAJOR DEPRESSIVE DISORDER, RECURRENT EPISODE, IN FULL REMISSION (HCC): Primary | Chronic | ICD-10-CM

## 2023-10-09 DIAGNOSIS — G47.09 OTHER INSOMNIA: Chronic | ICD-10-CM

## 2023-10-09 DIAGNOSIS — Z79.899 LONG-TERM USE OF HIGH-RISK MEDICATION: Chronic | ICD-10-CM

## 2023-10-09 DIAGNOSIS — F50.82 AVOIDANT-RESTRICTIVE FOOD INTAKE DISORDER (ARFID): Chronic | ICD-10-CM

## 2023-10-09 DIAGNOSIS — F09 MILD COGNITIVE DISORDER: Chronic | ICD-10-CM

## 2023-10-09 DIAGNOSIS — F41.1 GAD (GENERALIZED ANXIETY DISORDER): Chronic | ICD-10-CM

## 2023-10-09 DIAGNOSIS — F41.0 PANIC DISORDER WITHOUT AGORAPHOBIA: Chronic | ICD-10-CM

## 2023-10-09 DIAGNOSIS — I82.4Y9 DEEP VEIN THROMBOSIS (DVT) OF PROXIMAL LOWER EXTREMITY, UNSPECIFIED CHRONICITY, UNSPECIFIED LATERALITY (HCC): ICD-10-CM

## 2023-10-09 PROCEDURE — 90833 PSYTX W PT W E/M 30 MIN: CPT | Performed by: PSYCHIATRY & NEUROLOGY

## 2023-10-09 PROCEDURE — 99214 OFFICE O/P EST MOD 30 MIN: CPT | Performed by: PSYCHIATRY & NEUROLOGY

## 2023-10-09 RX ORDER — MULTIVIT-MIN/IRON/FOLIC ACID/K 18-600-40
CAPSULE ORAL
COMMUNITY

## 2023-10-09 RX ORDER — SERTRALINE HYDROCHLORIDE 100 MG/1
150 TABLET, FILM COATED ORAL DAILY
Qty: 135 TABLET | Refills: 2 | Status: SHIPPED | OUTPATIENT
Start: 2023-10-09 | End: 2024-07-05

## 2023-10-09 RX ORDER — WARFARIN SODIUM 2 MG/1
TABLET ORAL
Qty: 60 TABLET | Refills: 0 | Status: SHIPPED | OUTPATIENT
Start: 2023-10-09

## 2023-10-09 NOTE — TELEPHONE ENCOUNTER
Medication Refill Request     Name of Medication Zoloft  Dose/Frequency 100 mg  Quantity 135  Verified pharmacy   [x]  Verified ordering Provider   [x]  Does patient have enough for the next 3 days? Yes [] No [x]  Does patient have a follow-up appointment scheduled?  Yes [x] No []   If so when is appointment: 1/11/24

## 2023-10-10 ENCOUNTER — SOCIAL WORK (OUTPATIENT)
Dept: BEHAVIORAL/MENTAL HEALTH CLINIC | Facility: CLINIC | Age: 78
End: 2023-10-10
Payer: MEDICARE

## 2023-10-10 ENCOUNTER — ANTICOAG VISIT (OUTPATIENT)
Dept: INTERNAL MEDICINE CLINIC | Facility: CLINIC | Age: 78
End: 2023-10-10

## 2023-10-10 ENCOUNTER — APPOINTMENT (OUTPATIENT)
Dept: LAB | Facility: CLINIC | Age: 78
End: 2023-10-10
Payer: MEDICARE

## 2023-10-10 DIAGNOSIS — K50.00 CROHN'S DISEASE OF SMALL INTESTINE WITHOUT COMPLICATION (HCC): ICD-10-CM

## 2023-10-10 DIAGNOSIS — K21.9 GASTROESOPHAGEAL REFLUX DISEASE WITHOUT ESOPHAGITIS: ICD-10-CM

## 2023-10-10 DIAGNOSIS — F41.1 GAD (GENERALIZED ANXIETY DISORDER): Primary | ICD-10-CM

## 2023-10-10 DIAGNOSIS — D50.9 IRON DEFICIENCY ANEMIA, UNSPECIFIED IRON DEFICIENCY ANEMIA TYPE: ICD-10-CM

## 2023-10-10 LAB
ANION GAP SERPL CALCULATED.3IONS-SCNC: 4 MMOL/L
BASOPHILS # BLD AUTO: 0.01 THOUSANDS/ÂΜL (ref 0–0.1)
BASOPHILS NFR BLD AUTO: 0 % (ref 0–1)
BUN SERPL-MCNC: 16 MG/DL (ref 5–25)
CALCIUM SERPL-MCNC: 9 MG/DL (ref 8.4–10.2)
CHLORIDE SERPL-SCNC: 107 MMOL/L (ref 96–108)
CO2 SERPL-SCNC: 28 MMOL/L (ref 21–32)
CREAT SERPL-MCNC: 0.86 MG/DL (ref 0.6–1.3)
EOSINOPHIL # BLD AUTO: 0.07 THOUSAND/ÂΜL (ref 0–0.61)
EOSINOPHIL NFR BLD AUTO: 1 % (ref 0–6)
ERYTHROCYTE [DISTWIDTH] IN BLOOD BY AUTOMATED COUNT: 18.8 % (ref 11.6–15.1)
GFR SERPL CREATININE-BSD FRML MDRD: 83 ML/MIN/1.73SQ M
GLUCOSE SERPL-MCNC: 90 MG/DL (ref 65–140)
HCT VFR BLD AUTO: 41.3 % (ref 36.5–49.3)
HGB BLD-MCNC: 13.2 G/DL (ref 12–17)
IMM GRANULOCYTES # BLD AUTO: 0.02 THOUSAND/UL (ref 0–0.2)
IMM GRANULOCYTES NFR BLD AUTO: 0 % (ref 0–2)
LYMPHOCYTES # BLD AUTO: 0.88 THOUSANDS/ÂΜL (ref 0.6–4.47)
LYMPHOCYTES NFR BLD AUTO: 16 % (ref 14–44)
MCH RBC QN AUTO: 27.4 PG (ref 26.8–34.3)
MCHC RBC AUTO-ENTMCNC: 32 G/DL (ref 31.4–37.4)
MCV RBC AUTO: 86 FL (ref 82–98)
MONOCYTES # BLD AUTO: 0.45 THOUSAND/ÂΜL (ref 0.17–1.22)
MONOCYTES NFR BLD AUTO: 8 % (ref 4–12)
NEUTROPHILS # BLD AUTO: 4.18 THOUSANDS/ÂΜL (ref 1.85–7.62)
NEUTS SEG NFR BLD AUTO: 75 % (ref 43–75)
NRBC BLD AUTO-RTO: 0 /100 WBCS
PLATELET # BLD AUTO: 157 THOUSANDS/UL (ref 149–390)
PMV BLD AUTO: 9.5 FL (ref 8.9–12.7)
POTASSIUM SERPL-SCNC: 4.7 MMOL/L (ref 3.5–5.3)
RBC # BLD AUTO: 4.82 MILLION/UL (ref 3.88–5.62)
SODIUM SERPL-SCNC: 139 MMOL/L (ref 135–147)
WBC # BLD AUTO: 5.61 THOUSAND/UL (ref 4.31–10.16)

## 2023-10-10 PROCEDURE — 36415 COLL VENOUS BLD VENIPUNCTURE: CPT

## 2023-10-10 PROCEDURE — 80048 BASIC METABOLIC PNL TOTAL CA: CPT

## 2023-10-10 PROCEDURE — 85025 COMPLETE CBC W/AUTO DIFF WBC: CPT

## 2023-10-10 PROCEDURE — 90832 PSYTX W PT 30 MINUTES: CPT | Performed by: SOCIAL WORKER

## 2023-10-10 RX ORDER — PANTOPRAZOLE SODIUM 40 MG/1
40 TABLET, DELAYED RELEASE ORAL
Qty: 90 TABLET | Refills: 1 | Status: SHIPPED | OUTPATIENT
Start: 2023-10-10

## 2023-10-10 NOTE — PSYCH
Behavioral Health Psychotherapy Progress Note    Psychotherapy Provided: Individual Psychotherapy     1. JOSÉ LUIS (generalized anxiety disorder)            Goals addressed in session: Goal 1     DATA: Doug Chow presents as more anxious. Has many upcoming medical appts and concerns regarding his physical health issues. Stress at home with his spouse contributes. ontinues to try and counterbalance his stress and anxiety via exercise/walking. Processed his concerns regarding upcoming medical appts- validation and supportive therapy provided. Reminded that this will be out last session- became briefly tearful in response. He would like to be referred to a male therapist to help manage his anxiety/mood issues. During this session, this clinician used the following therapeutic modalities: Solution-Focused Therapy and Supportive Psychotherapy    Substance Abuse was addressed during this session. If the client is diagnosed with a co-occurring substance use disorder, please indicate any changes in the frequency or amount of use: none. Stage of change for addressing substance use diagnoses: No substance use/Not applicable    ASSESSMENT:  Nadine Sandoval presents with a Anxious mood. his affect is Normal range and intensity, which is congruent, with his mood and the content of the session. The client has made progress on their goals. Nadine Sandoval presents with a minimal risk of suicide, minimal risk of self-harm, and minimal risk of harm to others. For any risk assessment that surpasses a "low" rating, a safety plan must be developed. A safety plan was indicated: no  If yes, describe in detail n/a    PLAN: Between sessions, Nadine Sandoval will continues to utilize stress mgmt strategies and productive outlets to manage his stress and anxiety. Will refer to male therapist given my transition after this week to a new position. . At the next session, the therapist will use Solution-Focused Therapy to address anxiety/mood issues. Behavioral Health Treatment Plan and Discharge Planning: Duran Vogel is aware of and agrees to continue to work on their treatment plan. They have identified and are working toward their discharge goals.  no    Visit start and stop times:12:58-1:22    10/10/23

## 2023-10-16 ENCOUNTER — APPOINTMENT (OUTPATIENT)
Dept: LAB | Facility: CLINIC | Age: 78
End: 2023-10-16
Payer: MEDICARE

## 2023-10-17 ENCOUNTER — TELEPHONE (OUTPATIENT)
Age: 78
End: 2023-10-17

## 2023-10-17 ENCOUNTER — ANTICOAG VISIT (OUTPATIENT)
Dept: INTERNAL MEDICINE CLINIC | Facility: CLINIC | Age: 78
End: 2023-10-17

## 2023-10-17 NOTE — TELEPHONE ENCOUNTER
Patient wife called and asked for inr there are no instruction for the patient please reach out to patient.

## 2023-10-24 ENCOUNTER — OFFICE VISIT (OUTPATIENT)
Dept: PODIATRY | Facility: CLINIC | Age: 78
End: 2023-10-24
Payer: MEDICARE

## 2023-10-24 VITALS
HEIGHT: 65 IN | SYSTOLIC BLOOD PRESSURE: 156 MMHG | HEART RATE: 76 BPM | WEIGHT: 145.1 LBS | BODY MASS INDEX: 24.18 KG/M2 | DIASTOLIC BLOOD PRESSURE: 79 MMHG

## 2023-10-24 DIAGNOSIS — B35.1 ONYCHOMYCOSIS: Primary | ICD-10-CM

## 2023-10-24 PROCEDURE — 99212 OFFICE O/P EST SF 10 MIN: CPT | Performed by: PODIATRIST
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
## 2023-10-26 ENCOUNTER — HOSPITAL ENCOUNTER (OUTPATIENT)
Dept: CT IMAGING | Facility: HOSPITAL | Age: 78
Discharge: HOME | End: 2023-10-26
Attending: INTERNAL MEDICINE
Payer: MEDICARE

## 2023-10-26 DIAGNOSIS — K86.2 PANCREATIC CYST: ICD-10-CM

## 2023-10-26 PROCEDURE — 74177 CT ABD & PELVIS W/CONTRAST: CPT

## 2023-10-26 PROCEDURE — G1004 CDSM NDSC: HCPCS

## 2023-10-26 RX ADMIN — IOHEXOL 100 ML: 350 INJECTION, SOLUTION INTRAVENOUS at 15:30

## 2023-10-31 ENCOUNTER — TELEPHONE (OUTPATIENT)
Age: 78
End: 2023-10-31

## 2023-10-31 ENCOUNTER — ANESTHESIA (OUTPATIENT)
Dept: ANESTHESIOLOGY | Facility: AMBULATORY SURGERY CENTER | Age: 78
End: 2023-10-31

## 2023-10-31 ENCOUNTER — ANESTHESIA EVENT (OUTPATIENT)
Dept: ANESTHESIOLOGY | Facility: AMBULATORY SURGERY CENTER | Age: 78
End: 2023-10-31

## 2023-10-31 NOTE — TELEPHONE ENCOUNTER
Melania Schultz  On chart review, pt is on coumadin for DVT that happened more than a year ago. Ok to stop coumadin. No need for heparin bridge.

## 2023-10-31 NOTE — TELEPHONE ENCOUNTER
Patient was last seen 10/02/23 and given the miralax/dulcolax prep for procedure set for 11/14/23     Please advise if a prep is needed thank you!

## 2023-10-31 NOTE — TELEPHONE ENCOUNTER
Patients GI provider:  Dr. Edwards Bold    Number to return call: [phone]    Reason for call: Pt's wife Mirna Daigle is calling in regard to pt's colonoscopy instructions. Pt's wife stated pt does not have a colon and does pt have to do the prep.      Scheduled procedure/appointment date if applicable: 87/24/1360

## 2023-10-31 NOTE — TELEPHONE ENCOUNTER
Wife calling - patient has colonoscopy scheduled 11/14 and was instructed to hold coumadin 5 days prior to surgery. She is asking if the patient needs to be on heparin or another medication during those 5 days. Please f/u. Thanks !

## 2023-10-31 NOTE — TELEPHONE ENCOUNTER
LM on VM with Dr. Jagruti Llanes message and advised Niharika Appl call back if she has any questions.

## 2023-11-06 ENCOUNTER — APPOINTMENT (OUTPATIENT)
Dept: LAB | Facility: CLINIC | Age: 78
End: 2023-11-06
Payer: MEDICARE

## 2023-11-06 ENCOUNTER — ANTICOAG VISIT (OUTPATIENT)
Dept: INTERNAL MEDICINE CLINIC | Facility: CLINIC | Age: 78
End: 2023-11-06

## 2023-11-06 NOTE — TELEPHONE ENCOUNTER
Verified with patient she had the prep instruction paperwork     Also went over the location and address with patient as well as what to expect regarding the call the day before with her time

## 2023-11-06 NOTE — TELEPHONE ENCOUNTER
Called and confirmed procedure with wife. Confirmed holding Coumadin 5 days. Also answered her questions about the prep. He is to take two of the dulcolax, 119 grams of Miralax with Gatorade per Dr. Barak Polanco. He is to do half the prep and also clear liquids day prior. She is his  and will be called day prior with the arrival time.

## 2023-11-14 ENCOUNTER — ANESTHESIA EVENT (OUTPATIENT)
Dept: GASTROENTEROLOGY | Facility: AMBULATORY SURGERY CENTER | Age: 78
End: 2023-11-14

## 2023-11-14 ENCOUNTER — ANESTHESIA (OUTPATIENT)
Dept: GASTROENTEROLOGY | Facility: AMBULATORY SURGERY CENTER | Age: 78
End: 2023-11-14

## 2023-11-14 ENCOUNTER — HOSPITAL ENCOUNTER (OUTPATIENT)
Dept: GASTROENTEROLOGY | Facility: AMBULATORY SURGERY CENTER | Age: 78
Discharge: HOME | End: 2023-11-14
Attending: INTERNAL MEDICINE

## 2023-11-14 VITALS
SYSTOLIC BLOOD PRESSURE: 148 MMHG | RESPIRATION RATE: 20 BRPM | HEIGHT: 65 IN | TEMPERATURE: 97.7 F | WEIGHT: 138 LBS | DIASTOLIC BLOOD PRESSURE: 80 MMHG | HEART RATE: 72 BPM | BODY MASS INDEX: 22.99 KG/M2 | OXYGEN SATURATION: 98 %

## 2023-11-14 DIAGNOSIS — D50.9 IRON DEFICIENCY ANEMIA, UNSPECIFIED IRON DEFICIENCY ANEMIA TYPE: ICD-10-CM

## 2023-11-14 PROCEDURE — 88305 TISSUE EXAM BY PATHOLOGIST: CPT | Performed by: STUDENT IN AN ORGANIZED HEALTH CARE EDUCATION/TRAINING PROGRAM

## 2023-11-14 PROCEDURE — 88342 IMHCHEM/IMCYTCHM 1ST ANTB: CPT | Performed by: STUDENT IN AN ORGANIZED HEALTH CARE EDUCATION/TRAINING PROGRAM

## 2023-11-14 RX ORDER — SODIUM CHLORIDE, SODIUM LACTATE, POTASSIUM CHLORIDE, CALCIUM CHLORIDE 600; 310; 30; 20 MG/100ML; MG/100ML; MG/100ML; MG/100ML
20 INJECTION, SOLUTION INTRAVENOUS CONTINUOUS
Status: DISCONTINUED | OUTPATIENT
Start: 2023-11-14 | End: 2023-11-18 | Stop reason: HOSPADM

## 2023-11-14 RX ORDER — LIDOCAINE HYDROCHLORIDE 20 MG/ML
INJECTION, SOLUTION EPIDURAL; INFILTRATION; INTRACAUDAL; PERINEURAL AS NEEDED
Status: DISCONTINUED | OUTPATIENT
Start: 2023-11-14 | End: 2023-11-14

## 2023-11-14 RX ORDER — PROPOFOL 10 MG/ML
INJECTION, EMULSION INTRAVENOUS AS NEEDED
Status: DISCONTINUED | OUTPATIENT
Start: 2023-11-14 | End: 2023-11-14

## 2023-11-14 RX ORDER — SODIUM CHLORIDE, SODIUM LACTATE, POTASSIUM CHLORIDE, CALCIUM CHLORIDE 600; 310; 30; 20 MG/100ML; MG/100ML; MG/100ML; MG/100ML
125 INJECTION, SOLUTION INTRAVENOUS CONTINUOUS
Status: DISCONTINUED | OUTPATIENT
Start: 2023-11-14 | End: 2023-11-18 | Stop reason: HOSPADM

## 2023-11-14 RX ORDER — SODIUM CHLORIDE, SODIUM LACTATE, POTASSIUM CHLORIDE, CALCIUM CHLORIDE 600; 310; 30; 20 MG/100ML; MG/100ML; MG/100ML; MG/100ML
INJECTION, SOLUTION INTRAVENOUS CONTINUOUS PRN
Status: DISCONTINUED | OUTPATIENT
Start: 2023-11-14 | End: 2023-11-14

## 2023-11-14 RX ADMIN — PROPOFOL 130 MG: 10 INJECTION, EMULSION INTRAVENOUS at 07:35

## 2023-11-14 RX ADMIN — PROPOFOL 20 MG: 10 INJECTION, EMULSION INTRAVENOUS at 07:37

## 2023-11-14 RX ADMIN — LIDOCAINE HYDROCHLORIDE 70 MG: 20 INJECTION, SOLUTION EPIDURAL; INFILTRATION; INTRACAUDAL; PERINEURAL at 07:35

## 2023-11-14 RX ADMIN — SODIUM CHLORIDE, SODIUM LACTATE, POTASSIUM CHLORIDE, CALCIUM CHLORIDE: 600; 310; 30; 20 INJECTION, SOLUTION INTRAVENOUS at 07:25

## 2023-11-14 RX ADMIN — PROPOFOL 20 MG: 10 INJECTION, EMULSION INTRAVENOUS at 07:39

## 2023-11-14 RX ADMIN — PROPOFOL 20 MG: 10 INJECTION, EMULSION INTRAVENOUS at 07:40

## 2023-11-14 RX ADMIN — PROPOFOL 20 MG: 10 INJECTION, EMULSION INTRAVENOUS at 07:41

## 2023-11-14 RX ADMIN — PROPOFOL 20 MG: 10 INJECTION, EMULSION INTRAVENOUS at 07:38

## 2023-11-14 NOTE — H&P
History and Physical - [facility]  William Villar 69 y.o. male MRN: [id number]    HPI: William Villar is a 69y.o. year old male who presents with iron def anemia, and crohns      Review of Systems    Historical Information   Past Medical History:   Diagnosis Date    Allergic rhinitis     Anemia     Anosmia     Anxiety     Basal cell carcinoma     Benign parotid tumor     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[facility])     Crohn's disease ([address])     Depression     Dilated pancreatic duct     DVT (deep venous thrombosis) ([facility])     Eating disorder     GERD (gastroesophageal reflux disease)     Hearing loss     Heart disease     [place] (hard of hearing)     no hearing aids    Hypertension     Leucocytosis     Mass in neck     Nail anomaly     Polyuria     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[facility])     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    Recurrent falls     Seizures ([address])     due to anxiety    Solar lentigo     Thrombocytopenia ([facility])     Vertigo     Vision loss of left eye      Past Surgical History:   Procedure Laterality Date    CATARACT EXTRACTION      COLON SURGERY      Partial colectomy and colostomy    COLONOSCOPY      ESOPHAGOGASTRODUODENOSCOPY N/A 04/05/2017    Procedure: ESOPHAGOGASTRODUODENOSCOPY (EGD); Surgeon: Vivi Opitz, MD;  [facility]; Service:     EYE SURGERY Right     Cataract    KNEE SURGERY      [place] EDG US EXAM SURGICAL ALTER STOM DUODENUM/JEJUNUM N/A 04/09/2018    Procedure: LINEAR ENDOSCOPIC U/S;  Surgeon: General Edgardo MD;  [facility];   Service: Gastroenterology    MN EXC PRTD RENNY/PRTD GLND LAT DSJ&PRSRV FACIAL NR Right 08/08/2018    Procedure: PAROTIDECTOMY WITH FACIAL NERVE MONITOR AND FROZEN SECTION;  Surgeon: Harjinder Colon MD;  [facility];  Service: ENT    RADICAL NECK DISSECTION N/A 08/08/2018    Procedure: SELECTIVE NECK DISSECTION;  Surgeon: Harjinder Colon MD;  [facility];  Service: ENT    REMOVAL OF IMPACTED TOOTH - COMPLETELY BONY N/A 08/08/2018    Procedure: EXTRACTION TEETH #15 and #30;  Surgeon: Josette Lam DDS;  [facility];  Service: Maxillofacial    UPPER GASTROINTESTINAL ENDOSCOPY      VEIN LIGATION AND STRIPPING       Social History   Social History     Substance and Sexual Activity   Alcohol Use No    Comment: history of excessive alcohol use - quit in      Social History     Substance and Sexual Activity   Drug Use No     Social History     Tobacco Use   Smoking Status Former    Packs/day: 1.00    Years: 10.00    Total pack years: 10.00    Types: Cigarettes    Start date: 1966    Quit date: 1981    Years since quittin.4   Smokeless Tobacco Never   Tobacco Comments    48 years ago     Family History   Problem Relation Age of Onset    Heart disease Brother     Depression Brother     Alcohol abuse Brother     Diverticulitis Mother     Drug abuse Mother     Depression Sister     Anxiety disorder Sister     Depression Sister     Anxiety disorder Sister     Mental illness Other     Alcohol abuse Other     Drug abuse Other     Completed Suicide  Other        Meds/Allergies     (Not in a hospital admission)      Allergies   Allergen Reactions    Duloxetine Other (See Comments)     Panic attacks    Other      Seasonal       Objective     /72   Pulse 84 Comment: NSR  Temp 97.7 °F (36.5 °C) (Temporal)   Resp 18   Ht 5' 5" (1.651 m)   Wt 62.6 kg (138 lb)   SpO2 97%   BMI 22.96 kg/m²       PHYSICAL EXAM    Gen: NAD  CV: RRR  CHEST: Clear  ABD: soft, NT/ND  EXT: no edema  Neuro: AAO      ASSESSMENT/PLAN:  This is a 69y.o. year old male here for  iron def anemia, and crohns    PLAN:   Procedure: egd/colonoscopy

## 2023-11-14 NOTE — ANESTHESIA POSTPROCEDURE EVALUATION
Post-Op Assessment Note    CV Status:  Stable  Pain Score: 0    Pain management: adequate     Mental Status:  Sleepy and arousable   PONV Controlled:  None   Airway Patency:  Patent      Post Op Vitals Reviewed: Yes      Staff: CRNA         No notable events documented.     BP   113/66   Temp      Pulse  75   Resp   14   SpO2   97

## 2023-11-14 NOTE — ANESTHESIA PREPROCEDURE EVALUATION
Procedure:  EGD  ILEOSCOPY    Relevant Problems   ANESTHESIA (within normal limits)      CARDIO   (+) Atypical chest pain   (+) Deep vein thrombosis (DVT) of proximal lower extremity ([facility])   (+) Essential hypertension   (+) HTN (hypertension)      ENDO   (+) Subclinical hypothyroidism      GI/HEPATIC   (+) Dilated pancreatic duct   (+) GERD (gastroesophageal reflux disease)   (+) Pancreatic cyst      HEMATOLOGY   (+) Anemia   (+) Iron deficiency anemia   (+) Thrombocytopenia (HCC)      MUSCULOSKELETAL   (+) Primary localized osteoarthritis of right knee   (+) Sacroiliitis (HCC)      NEURO/PSYCH   (+) JOSÉ LUIS (generalized anxiety disorder)   (+) Major depressive disorder, recurrent episode, in full remission ([facility])   (+) Panic disorder without agoraphobia      PULMONARY (within normal limits)      Other   (+) 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[facility])   (+) Colostomy status ([facility])   (+) Crohn's disease   (+) Decreased hearing        Physical Exam    Airway    Mallampati score: II  TM Distance: >3 FB  Neck ROM: full     Dental   No notable dental hx     Cardiovascular      Pulmonary      Other Findings        Anesthesia Plan  ASA Score- 2     Anesthesia Type- IV sedation with anesthesia with ASA Monitors. Additional Monitors:     Airway Plan:            Plan Factors-Exercise tolerance (METS): >4 METS. Chart reviewed. EKG reviewed. Existing labs reviewed. Patient summary reviewed. Patient is not a current smoker. Induction-     Postoperative Plan-     Informed Consent- Anesthetic plan and risks discussed with patient. I personally reviewed this patient with the CRNA. Discussed and agreed on the Anesthesia Plan with the CRNA. Annita Bar

## 2023-11-15 ENCOUNTER — TELEPHONE (OUTPATIENT)
Age: 78
End: 2023-11-15

## 2023-11-15 NOTE — TELEPHONE ENCOUNTER
Patients wife called and stated patient had a procedure done yesterday and coumadin was held. Patients wife stated that patient started taking 3 mg of coumadin. Please advise.

## 2023-11-17 ENCOUNTER — APPOINTMENT (OUTPATIENT)
Dept: LAB | Facility: CLINIC | Age: 78
End: 2023-11-17
Payer: MEDICARE

## 2023-11-17 ENCOUNTER — ANTICOAG VISIT (OUTPATIENT)
Dept: INTERNAL MEDICINE CLINIC | Facility: CLINIC | Age: 78
End: 2023-11-17

## 2023-11-20 PROCEDURE — 88305 TISSUE EXAM BY PATHOLOGIST: CPT | Performed by: STUDENT IN AN ORGANIZED HEALTH CARE EDUCATION/TRAINING PROGRAM

## 2023-11-20 PROCEDURE — 88342 IMHCHEM/IMCYTCHM 1ST ANTB: CPT | Performed by: STUDENT IN AN ORGANIZED HEALTH CARE EDUCATION/TRAINING PROGRAM

## 2023-11-21 ENCOUNTER — DOCUMENTATION (OUTPATIENT)
Dept: PSYCHIATRY | Facility: CLINIC | Age: 78
End: 2023-11-21

## 2023-11-21 ENCOUNTER — APPOINTMENT (OUTPATIENT)
Dept: LAB | Facility: CLINIC | Age: 78
End: 2023-11-21
Payer: MEDICARE

## 2023-11-21 ENCOUNTER — ANTICOAG VISIT (OUTPATIENT)
Dept: INTERNAL MEDICINE CLINIC | Facility: CLINIC | Age: 78
End: 2023-11-21

## 2023-11-21 DIAGNOSIS — I82.5Y2 CHRONIC DEEP VEIN THROMBOSIS (DVT) OF PROXIMAL VEIN OF LEFT LOWER EXTREMITY (HCC): ICD-10-CM

## 2023-11-21 LAB
INR PPP: 2.2 (ref 0.84–1.19)
PROTHROMBIN TIME: 25.3 SECONDS (ref 11.6–14.5)

## 2023-11-21 PROCEDURE — 85610 PROTHROMBIN TIME: CPT

## 2023-11-21 PROCEDURE — 36415 COLL VENOUS BLD VENIPUNCTURE: CPT

## 2023-11-21 NOTE — PROGRESS NOTES
Psychotherapy Discharge Summary    Preferred Name: Celeste Jacob  YOB: 1945    Admission date to psychotherapy: 3/22/17    Referred by: PCP    Presenting Problem: Long-standing struggles with anxiety, depression and mood lability    Course of treatment included : individual therapy     Progress/Outcome of Treatment Goals (brief summary of course of treatment) Seen for 55 sessions to address his issues with anxiety and depression that stem from aging/physical health issues, marital issues, past emotional trauma and his lack of supports. Utilized coping strategies and productive outlets to utilize to manage stress and anxiety in response to stressors. Responded well to support and intervention during sessions. Made aware of my departure and he would prefer to see my replacement at PCP site if it is a male provider. Will monitor and refer in future.     Treatment Complications (if any): lack of supports, marital issues    Treatment Progress: fair    Current [facility] Provider: Dr. Ramo Ahumada    Discharge Medications include: see psychiatry notes    Discharge Date: 11/21/23    Discharge Diagnosis: JOSÉ LUIS    Criteria for Discharge: need to be transferred to another service/level of care within the system    Aftercare recommendations include (include specific referral names and phone numbers, if appropriate): none    Prognosis: fair

## 2023-11-28 ENCOUNTER — APPOINTMENT (OUTPATIENT)
Dept: LAB | Facility: CLINIC | Age: 78
End: 2023-11-28
Payer: MEDICARE

## 2023-11-28 ENCOUNTER — ANTICOAG VISIT (OUTPATIENT)
Dept: INTERNAL MEDICINE CLINIC | Facility: CLINIC | Age: 78
End: 2023-11-28

## 2023-11-28 DIAGNOSIS — I82.5Y2 CHRONIC DEEP VEIN THROMBOSIS (DVT) OF PROXIMAL VEIN OF LEFT LOWER EXTREMITY (HCC): ICD-10-CM

## 2023-11-28 DIAGNOSIS — I82.4Y9 DEEP VEIN THROMBOSIS (DVT) OF PROXIMAL LOWER EXTREMITY, UNSPECIFIED CHRONICITY, UNSPECIFIED LATERALITY (HCC): ICD-10-CM

## 2023-11-28 LAB
INR PPP: 3.11 (ref 0.84–1.19)
PROTHROMBIN TIME: 33 SECONDS (ref 11.6–14.5)

## 2023-11-28 PROCEDURE — 36415 COLL VENOUS BLD VENIPUNCTURE: CPT

## 2023-11-28 PROCEDURE — 85610 PROTHROMBIN TIME: CPT

## 2023-11-28 RX ORDER — WARFARIN SODIUM 2 MG/1
TABLET ORAL
Qty: 60 TABLET | Refills: 5 | Status: SHIPPED | OUTPATIENT
Start: 2023-11-28

## 2023-12-04 ENCOUNTER — APPOINTMENT (OUTPATIENT)
Dept: LAB | Facility: CLINIC | Age: 78
End: 2023-12-04
Payer: MEDICARE

## 2023-12-04 DIAGNOSIS — I82.5Y2 CHRONIC DEEP VEIN THROMBOSIS (DVT) OF PROXIMAL VEIN OF LEFT LOWER EXTREMITY (HCC): ICD-10-CM

## 2023-12-04 LAB
INR PPP: 2.33 (ref 0.84–1.19)
PROTHROMBIN TIME: 26.4 SECONDS (ref 11.6–14.5)

## 2023-12-04 PROCEDURE — 36415 COLL VENOUS BLD VENIPUNCTURE: CPT

## 2023-12-04 PROCEDURE — 85610 PROTHROMBIN TIME: CPT

## 2023-12-05 ENCOUNTER — ANTICOAG VISIT (OUTPATIENT)
Dept: INTERNAL MEDICINE CLINIC | Facility: CLINIC | Age: 78
End: 2023-12-05

## 2023-12-05 ENCOUNTER — NURSE TRIAGE (OUTPATIENT)
Age: 78
End: 2023-12-05

## 2023-12-18 ENCOUNTER — APPOINTMENT (OUTPATIENT)
Dept: LAB | Facility: CLINIC | Age: 78
End: 2023-12-18
Payer: MEDICARE

## 2023-12-19 ENCOUNTER — TELEPHONE (OUTPATIENT)
Age: 78
End: 2023-12-19

## 2023-12-19 NOTE — TELEPHONE ENCOUNTER
Pts wife called for his INR results. Please advise, leave a detailed message if she does not answer.

## 2023-12-19 NOTE — TELEPHONE ENCOUNTER
The patient wife call to get the results for her  INR     Warm transfer to the CTS MA at [facility]

## 2023-12-26 ENCOUNTER — APPOINTMENT (OUTPATIENT)
Dept: LAB | Facility: CLINIC | Age: 78
End: 2023-12-26
Payer: MEDICARE

## 2023-12-27 ENCOUNTER — ANTICOAG VISIT (OUTPATIENT)
Dept: INTERNAL MEDICINE CLINIC | Facility: CLINIC | Age: 78
End: 2023-12-27

## 2023-12-27 NOTE — PROGRESS NOTES
Per Dr. Banda, continue current dose and recheck in one month. Lm with results and waiting on pt to call back and confirm.
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
## 2024-01-11 ENCOUNTER — TELEPHONE (OUTPATIENT)
Dept: PSYCHIATRY | Facility: CLINIC | Age: 79
End: 2024-01-11

## 2024-01-11 ENCOUNTER — OFFICE VISIT (OUTPATIENT)
Dept: PSYCHIATRY | Facility: CLINIC | Age: 79
End: 2024-01-11
Payer: MEDICARE

## 2024-01-11 VITALS
SYSTOLIC BLOOD PRESSURE: 161 MMHG | HEIGHT: 65 IN | DIASTOLIC BLOOD PRESSURE: 79 MMHG | BODY MASS INDEX: 25.33 KG/M2 | HEART RATE: 79 BPM | WEIGHT: 152 LBS

## 2024-01-11 DIAGNOSIS — F50.82 AVOIDANT-RESTRICTIVE FOOD INTAKE DISORDER (ARFID): Chronic | ICD-10-CM

## 2024-01-11 DIAGNOSIS — F41.1 GAD (GENERALIZED ANXIETY DISORDER): Chronic | ICD-10-CM

## 2024-01-11 DIAGNOSIS — G47.09 OTHER INSOMNIA: Chronic | ICD-10-CM

## 2024-01-11 DIAGNOSIS — F33.42 MAJOR DEPRESSIVE DISORDER, RECURRENT EPISODE, IN FULL REMISSION (HCC): Primary | Chronic | ICD-10-CM

## 2024-01-11 DIAGNOSIS — F41.0 PANIC DISORDER WITHOUT AGORAPHOBIA: Chronic | ICD-10-CM

## 2024-01-11 DIAGNOSIS — Z79.899 LONG-TERM USE OF HIGH-RISK MEDICATION: Chronic | ICD-10-CM

## 2024-01-11 DIAGNOSIS — F09 MILD COGNITIVE DISORDER: Chronic | ICD-10-CM

## 2024-01-11 PROCEDURE — 90833 PSYTX W PT W E/M 30 MIN: CPT | Performed by: PSYCHIATRY & NEUROLOGY

## 2024-01-11 PROCEDURE — 99214 OFFICE O/P EST MOD 30 MIN: CPT | Performed by: PSYCHIATRY & NEUROLOGY

## 2024-01-11 RX ORDER — OLANZAPINE 15 MG/1
7.5-15 TABLET ORAL
Qty: 90 TABLET | Refills: 2 | Status: SHIPPED | OUTPATIENT
Start: 2024-01-11 | End: 2024-10-07

## 2024-01-11 RX ORDER — SERTRALINE HYDROCHLORIDE 100 MG/1
150 TABLET, FILM COATED ORAL DAILY
Qty: 135 TABLET | Refills: 2 | Status: SHIPPED | OUTPATIENT
Start: 2024-01-11 | End: 2024-10-07

## 2024-01-11 NOTE — TELEPHONE ENCOUNTER
Per Provider's check out note below;    Please refer Lorenzo to Intake for psychotherapy.      Please follow up

## 2024-01-11 NOTE — BH CRISIS PLAN
"[place] Safety Plan    Creation Date: 1/11/24    Created By: Kerri Mojica MD       Step 1: Warning Signs:   Warning Signs   \"being nervous\"            Step 2: Internal Coping Strategies:   Internal Coping Strategies   \"walking\"            Step 3: People and social settings that provide distraction:   Name Contact Information   braxton Martinez in cell phone          Step 4: People whom I can ask for help during a crisis:    Name Contact Information    braxton Martinez in cell phone      Step 5: Professionals or agencies I can contact during a crisis:    Clinican/Agency Name Phone Emergency Contact    [facility] [phone]       Crisis Phone Numbers:   Suicide Prevention Lifeline: Call or Text  524 Crisis Text Line: Text HOME   to 771-723   Please note: Some [facility] do not have a separate number for   Child/Adolescent specific crisis. If your county is not listed under   Child/Adolescent, please call the adult number for your county      Adult Crisis Numbers: Child/Adolescent Crisis Numbers   [facility]: [phone] [facility]: [phone]   [facility]: [phone] [facility]: [phone]   [facility]: [phone] [place], [place]: [phone]   [facility]: [phone] [facility]: [phone]   [facility]: [phone]   [facility]: [phone]   [facility]: [phone]   [facility]: [phone] (daytime) [phone]   (after hours, weekends, holidays)      Step 6: Making the environment safer (plan for lethal means safety):      Optional: What is most important to me and worth living for?      [place] Safety Plan. Isha Champion and Felix Rust. Used with   permission of the authors.      "

## 2024-01-11 NOTE — BH TREATMENT PLAN
"TREATMENT PLAN (Medication Management Only)        [facility]    Name/Date of Birth/MRN:  Lorenzo De Jesus 78 y.o. 1945 MRN: [id number]  Date of Treatment Plan: January 11, 2024  Diagnosis/Diagnoses:   1. Major depressive disorder, recurrent episode, in full remission (HCC)    2. JOSÉ LUIS (generalized anxiety disorder)    3. Panic disorder without agoraphobia    4. Avoidant-restrictive food intake disorder (ARFID)    5. Mild cognitive disorder    6. Other insomnia    7. Long-term use of high-risk medication      Strengths/Personal Resources for Self-Care: \"my wife\".  Area/Areas of need (in own words): \"nerves and temper\".  1. Long Term Goal:   improve control of anxiety, maintain improvement in depression  Target Date: 3 months - 4/11/2024  Person/Persons responsible for completion of goal: Lorenzo and wife  2.  Short Term Objective (s) - How will we reach this goal?:   A.  Provider new recommended medication/dosage changes and/or continue medication(s): continue current medications as prescribed (Zoloft, Zyprexa, and Melatonin).  B.  N/A.  C.  N/A.  Target Date: 3 months - 4/11/2024  Person/Persons Responsible for Completion of Goal: Lorenzo and wife   Progress Towards Goals: progressing  Treatment Modality: medication management every 3 months  Review due 180 days from date of this plan: 6 months - 7/11/2024  Expected length of service: ongoing treatment unless revised  My Physician/PA/NP and I have developed this plan together and I agree to work on the goals and objectives. I understand the treatment goals that were developed for my treatment.  Electronic Signatures: on file (unless signed below)    Kerri Mojica MD 01/11/24  "

## 2024-01-29 ENCOUNTER — APPOINTMENT (OUTPATIENT)
Dept: LAB | Facility: CLINIC | Age: 79
End: 2024-01-29
Payer: MEDICARE

## 2024-01-29 ENCOUNTER — ANTICOAG VISIT (OUTPATIENT)
Dept: INTERNAL MEDICINE CLINIC | Facility: CLINIC | Age: 79
End: 2024-01-29

## 2024-02-19 ENCOUNTER — APPOINTMENT (OUTPATIENT)
Dept: LAB | Facility: CLINIC | Age: 79
End: 2024-02-19
Payer: MEDICARE

## 2024-02-19 DIAGNOSIS — Z01.818 OTHER SPECIFIED PRE-OPERATIVE EXAMINATION: ICD-10-CM

## 2024-02-19 LAB
INR PPP: 2.87 (ref 0.84–1.19)
PROTHROMBIN TIME: 31.1 SECONDS (ref 11.6–14.5)

## 2024-02-19 PROCEDURE — 36415 COLL VENOUS BLD VENIPUNCTURE: CPT

## 2024-02-19 PROCEDURE — 85610 PROTHROMBIN TIME: CPT

## 2024-02-20 ENCOUNTER — TELEPHONE (OUTPATIENT)
Dept: OTHER | Facility: OTHER | Age: 79
End: 2024-02-20

## 2024-02-20 ENCOUNTER — ANTICOAG VISIT (OUTPATIENT)
Dept: INTERNAL MEDICINE CLINIC | Facility: CLINIC | Age: 79
End: 2024-02-20

## 2024-02-20 NOTE — TELEPHONE ENCOUNTER
Spoke to the patients wife, Michelle and advised.    No change is Coumadin dose and recheck INR in 3 weeks.

## 2024-02-21 PROBLEM — Z00.00 MEDICARE ANNUAL WELLNESS VISIT, SUBSEQUENT: Status: RESOLVED | Noted: 2019-10-19 | Resolved: 2024-02-21

## 2024-02-21 PROBLEM — Z13.6 SCREENING FOR CARDIOVASCULAR CONDITION: Status: RESOLVED | Noted: 2019-09-29 | Resolved: 2024-02-21

## 2024-02-21 PROBLEM — Z13.5 GLAUCOMA SCREENING: Status: RESOLVED | Noted: 2019-04-21 | Resolved: 2024-02-21

## 2024-02-21 PROBLEM — Z13.1 SCREENING FOR DIABETES MELLITUS: Status: RESOLVED | Noted: 2018-12-09 | Resolved: 2024-02-21

## 2024-03-11 ENCOUNTER — ANTICOAG VISIT (OUTPATIENT)
Dept: INTERNAL MEDICINE CLINIC | Facility: CLINIC | Age: 79
End: 2024-03-11

## 2024-03-11 ENCOUNTER — APPOINTMENT (OUTPATIENT)
Dept: LAB | Facility: CLINIC | Age: 79
End: 2024-03-11
Payer: MEDICARE

## 2024-03-26 DIAGNOSIS — I10 ESSENTIAL HYPERTENSION: ICD-10-CM

## 2024-03-27 ENCOUNTER — OFFICE VISIT (OUTPATIENT)
Dept: GASTROENTEROLOGY | Facility: AMBULARY SURGERY CENTER | Age: 79
End: 2024-03-27
Payer: MEDICARE

## 2024-03-27 VITALS
BODY MASS INDEX: 25.56 KG/M2 | WEIGHT: 153.4 LBS | OXYGEN SATURATION: 95 % | SYSTOLIC BLOOD PRESSURE: 152 MMHG | DIASTOLIC BLOOD PRESSURE: 72 MMHG | HEIGHT: 65 IN | HEART RATE: 84 BPM

## 2024-03-27 DIAGNOSIS — D50.0 IRON DEFICIENCY ANEMIA DUE TO CHRONIC BLOOD LOSS: ICD-10-CM

## 2024-03-27 DIAGNOSIS — K50.00 CROHN'S DISEASE OF SMALL INTESTINE WITHOUT COMPLICATION (HCC): Primary | ICD-10-CM

## 2024-03-27 DIAGNOSIS — Z93.3 COLOSTOMY STATUS (HCC): ICD-10-CM

## 2024-03-27 DIAGNOSIS — D69.6 THROMBOCYTOPENIA (HCC): ICD-10-CM

## 2024-03-27 DIAGNOSIS — K21.9 GASTROESOPHAGEAL REFLUX DISEASE WITHOUT ESOPHAGITIS: ICD-10-CM

## 2024-03-27 PROCEDURE — 99214 OFFICE O/P EST MOD 30 MIN: CPT | Performed by: INTERNAL MEDICINE

## 2024-03-27 RX ORDER — AMLODIPINE BESYLATE 5 MG/1
5 TABLET ORAL DAILY
Qty: 90 TABLET | Refills: 1 | Status: SHIPPED | OUTPATIENT
Start: 2024-03-27

## 2024-03-27 RX ORDER — PANTOPRAZOLE SODIUM 40 MG/1
40 TABLET, DELAYED RELEASE ORAL
Qty: 90 TABLET | Refills: 3 | Status: SHIPPED | OUTPATIENT
Start: 2024-03-27

## 2024-03-27 NOTE — PROGRESS NOTES
[facility]  Lorenzo De Jesus 78 y.o. male MRN: [id number]            Assessment & Plan:  78-year-old gent with a history of significant generalized anxiety, end ileostomy for history of total proctocolectomy for Crohn disease.  History of GERD.    1.  GERD:  -Continue pantoprazole, refills were provided    2.  Iron deficiency:  -Will recheck iron studies and CBC, most likely secondary to malabsorption  -Endoscopic evaluation without any evidence of bleeding    3.  Crohn's disease:  -Status post total proctocolectomy end ileostomy, no evidence of active disease    Follow-up in 1 year      Lorenzo was seen today for follow-up.    Diagnoses and all orders for this visit:    Crohn's disease of small intestine without complication (HCC)  -     CBC and differential; Future  -     Ferritin; Future  -     TIBC Panel (incl. Iron, TIBC, % Iron Saturation); Future    Iron deficiency anemia due to chronic blood loss  -     CBC and differential; Future  -     Ferritin; Future  -     TIBC Panel (incl. Iron, TIBC, % Iron Saturation); Future    Gastroesophageal reflux disease without esophagitis  -     pantoprazole (PROTONIX) 40 mg tablet; Take 1 tablet (40 mg total) by mouth daily in the early morning    Colostomy status (HCC)    Thrombocytopenia (HCC)            _____________________________________________________________        CC: Follow-up    HPI:  Lorenzo De Jesus is a 78 y.o.male who is here for follow-up.  78-year-old gent with a history significant anxiety, history of Crohn's disease status post total proctocolectomy with end ileostomy, remote history of pancreatic cyst, prior iron deficiency.  Here for follow-up.  Patient overall is doing well.  Reports that has been eating better, weight has been stable, has stable output from his ostomy.  His weight is up about 6 or 7 pounds on our scales.    Patient still seems very anxious.  Taking iron pills daily.  Recent endoscopy and ileoscopy were  unremarkable.      ROS:  The remainder of the ROS was negative except for the pertinent positives mentioned in HPI.      Allergies: Duloxetine and Other    Medications:   Current Outpatient Medications:     Ascorbic Acid (Vitamin C) 500 MG CAPS, Take by mouth, Disp: , Rfl:     Ferrous Sulfate (IRON PO), Take by mouth, Disp: , Rfl:     melatonin 3 mg, Take 6 mg by mouth daily at bedtime, Disp: , Rfl:     Multiple Vitamins-Minerals (MULTI FOR HIM 50+ PO), Take 1 tablet by mouth daily, Disp: , Rfl:     OLANZapine (ZyPREXA) 15 mg tablet, Take 0.5-1 tablets (7.5-15 mg total) by mouth daily at bedtime, Disp: 90 tablet, Rfl: 2    pantoprazole (PROTONIX) 40 mg tablet, Take 1 tablet (40 mg total) by mouth daily in the early morning, Disp: 90 tablet, Rfl: 3    sertraline (ZOLOFT) 100 mg tablet, Take 1.5 tablets (150 mg total) by mouth daily, Disp: 135 tablet, Rfl: 2    warfarin (COUMADIN) 2 mg tablet, TAKE ONE AND ONE-HALF TABLETS BY MOUTH EVERY OTHER DAY ALTERNATING WITH ONE TABLET THE NEXT DAY. REPEAT SCHEDULE, Disp: 60 tablet, Rfl: 5    amLODIPine (NORVASC) 5 mg tablet, Take 1 tablet by mouth once daily, Disp: 90 tablet, Rfl: 1    Past Medical History:   Diagnosis Date    Allergic rhinitis     Anemia     Anosmia     Anxiety     Basal cell carcinoma     Benign parotid tumor     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HCC)     Crohn's disease (HCC)     Depression     Dilated pancreatic duct     DVT (deep venous thrombosis) (HCC)     Eating disorder     GERD (gastroesophageal reflux disease)     Hearing loss     Heart disease     [place] (hard of hearing)     no hearing aids    Hypertension     Leucocytosis     Mass in neck     Nail anomaly     Polyuria     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HCC)     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    Recurrent falls     Seizures (HCC)     due to anxiety    Solar lentigo     Thrombocytopenia (HCC)     Vertigo     Vision loss of left eye        Past Surgical History:   Procedure Laterality Date    CATARACT EXTRACTION       "COLON SURGERY      Partial colectomy and colostomy    COLONOSCOPY      ESOPHAGOGASTRODUODENOSCOPY N/A 04/05/2017    Procedure: ESOPHAGOGASTRODUODENOSCOPY (EGD);  Surgeon: Deion Bravo MD;  [facility];  Service:     EYE SURGERY Right     Cataract    KNEE SURGERY      MN EDG US EXAM SURGICAL ALTER STOM DUODENUM/JEJUNUM N/A 04/09/2018    Procedure: LINEAR ENDOSCOPIC U/S;  Surgeon: Abdirizak Jaffe MD;  [facility];  Service: Gastroenterology    MN EXC PRTD RENNY/PRTD GLND LAT DSJ&PRSRV FACIAL NR Right 08/08/2018    Procedure: PAROTIDECTOMY WITH FACIAL NERVE MONITOR AND FROZEN SECTION;  Surgeon: Dimitri Rust MD;  [facility];  Service: ENT    RADICAL NECK DISSECTION N/A 08/08/2018    Procedure: SELECTIVE NECK DISSECTION;  Surgeon: Dimitri Rust MD;  [facility];  Service: ENT    REMOVAL OF IMPACTED TOOTH - COMPLETELY BONY N/A 08/08/2018    Procedure: EXTRACTION TEETH #15 and #30;  Surgeon: Primo Maciel DDS;  [facility];  Service: Maxillofacial    SKIN LESION EXCISION      UPPER GASTROINTESTINAL ENDOSCOPY      VEIN LIGATION AND STRIPPING         Family History   Problem Relation Age of Onset    Heart disease Brother     Depression Brother     Alcohol abuse Brother     Diverticulitis Mother     Drug abuse Mother     Depression Sister     Anxiety disorder Sister     Depression Sister     Anxiety disorder Sister     Mental illness Other     Alcohol abuse Other     Drug abuse Other     Completed Suicide  Other         reports that he quit smoking about 42 years ago. His smoking use included cigarettes. He started smoking about 58 years ago. He has a 15.2 pack-year smoking history. He has never used smokeless tobacco. He reports that he does not drink alcohol and does not use drugs.      Physical Exam:    /72 (BP Location: Right arm, Patient Position: Sitting, Cuff Size: Standard)   Pulse 84   Ht 5' 5\" (1.651 m)   Wt 69.6 kg (153 lb 6.4 oz)   SpO2 95%   BMI 25.53 kg/m² "     Gen: wn/wd, NAD  HEENT: anicteric, MMM, no cervical LAD  CVS: RRR, no m/r/g  CHEST: CTA b/l  ABD: +BS, soft, right lower quadrant ileostomy, no hepatosplenomegaly  EXT: no c/c/e  NEURO: aaox3  SKIN: NO rashes

## 2024-04-01 ENCOUNTER — APPOINTMENT (OUTPATIENT)
Dept: LAB | Facility: AMBULARY SURGERY CENTER | Age: 79
End: 2024-04-01
Payer: MEDICARE

## 2024-04-01 DIAGNOSIS — D50.0 IRON DEFICIENCY ANEMIA DUE TO CHRONIC BLOOD LOSS: ICD-10-CM

## 2024-04-01 DIAGNOSIS — K50.00 CROHN'S DISEASE OF SMALL INTESTINE WITHOUT COMPLICATION (HCC): ICD-10-CM

## 2024-04-01 LAB
BASOPHILS # BLD AUTO: 0.02 THOUSANDS/ÂΜL (ref 0–0.1)
BASOPHILS NFR BLD AUTO: 0 % (ref 0–1)
EOSINOPHIL # BLD AUTO: 0.03 THOUSAND/ÂΜL (ref 0–0.61)
EOSINOPHIL NFR BLD AUTO: 1 % (ref 0–6)
ERYTHROCYTE [DISTWIDTH] IN BLOOD BY AUTOMATED COUNT: 14.2 % (ref 11.6–15.1)
FERRITIN SERPL-MCNC: 59 NG/ML (ref 24–336)
HCT VFR BLD AUTO: 42.7 % (ref 36.5–49.3)
HGB BLD-MCNC: 14.2 G/DL (ref 12–17)
IMM GRANULOCYTES # BLD AUTO: 0.02 THOUSAND/UL (ref 0–0.2)
IMM GRANULOCYTES NFR BLD AUTO: 0 % (ref 0–2)
IRON SATN MFR SERPL: 46 % (ref 15–50)
IRON SERPL-MCNC: 143 UG/DL (ref 50–212)
LYMPHOCYTES # BLD AUTO: 0.7 THOUSANDS/ÂΜL (ref 0.6–4.47)
LYMPHOCYTES NFR BLD AUTO: 12 % (ref 14–44)
MCH RBC QN AUTO: 29.9 PG (ref 26.8–34.3)
MCHC RBC AUTO-ENTMCNC: 33.3 G/DL (ref 31.4–37.4)
MCV RBC AUTO: 90 FL (ref 82–98)
MONOCYTES # BLD AUTO: 0.41 THOUSAND/ÂΜL (ref 0.17–1.22)
MONOCYTES NFR BLD AUTO: 7 % (ref 4–12)
NEUTROPHILS # BLD AUTO: 4.49 THOUSANDS/ÂΜL (ref 1.85–7.62)
NEUTS SEG NFR BLD AUTO: 80 % (ref 43–75)
NRBC BLD AUTO-RTO: 0 /100 WBCS
PLATELET # BLD AUTO: 158 THOUSANDS/UL (ref 149–390)
PMV BLD AUTO: 9.6 FL (ref 8.9–12.7)
RBC # BLD AUTO: 4.75 MILLION/UL (ref 3.88–5.62)
TIBC SERPL-MCNC: 310 UG/DL (ref 250–450)
UIBC SERPL-MCNC: 167 UG/DL (ref 155–355)
WBC # BLD AUTO: 5.67 THOUSAND/UL (ref 4.31–10.16)

## 2024-04-01 PROCEDURE — 83550 IRON BINDING TEST: CPT

## 2024-04-01 PROCEDURE — 85025 COMPLETE CBC W/AUTO DIFF WBC: CPT

## 2024-04-01 PROCEDURE — 36415 COLL VENOUS BLD VENIPUNCTURE: CPT

## 2024-04-01 PROCEDURE — 83540 ASSAY OF IRON: CPT

## 2024-04-01 PROCEDURE — 82728 ASSAY OF FERRITIN: CPT

## 2024-04-02 ENCOUNTER — OFFICE VISIT (OUTPATIENT)
Dept: INTERNAL MEDICINE CLINIC | Facility: CLINIC | Age: 79
End: 2024-04-02
Payer: MEDICARE

## 2024-04-02 VITALS
RESPIRATION RATE: 16 BRPM | DIASTOLIC BLOOD PRESSURE: 70 MMHG | BODY MASS INDEX: 25.19 KG/M2 | HEIGHT: 65 IN | SYSTOLIC BLOOD PRESSURE: 144 MMHG | OXYGEN SATURATION: 96 % | WEIGHT: 151.2 LBS | HEART RATE: 76 BPM

## 2024-04-02 DIAGNOSIS — Z00.00 MEDICARE ANNUAL WELLNESS VISIT, SUBSEQUENT: Primary | ICD-10-CM

## 2024-04-02 DIAGNOSIS — F41.1 GAD (GENERALIZED ANXIETY DISORDER): Chronic | ICD-10-CM

## 2024-04-02 DIAGNOSIS — I82.4Y9 DEEP VEIN THROMBOSIS (DVT) OF PROXIMAL LOWER EXTREMITY, UNSPECIFIED CHRONICITY, UNSPECIFIED LATERALITY (HCC): ICD-10-CM

## 2024-04-02 DIAGNOSIS — Z13.6 SCREENING FOR CARDIOVASCULAR CONDITION: ICD-10-CM

## 2024-04-02 DIAGNOSIS — Z12.5 SCREENING PSA (PROSTATE SPECIFIC ANTIGEN): ICD-10-CM

## 2024-04-02 DIAGNOSIS — L98.9 SKIN LESION: ICD-10-CM

## 2024-04-02 DIAGNOSIS — I10 PRIMARY HYPERTENSION: ICD-10-CM

## 2024-04-02 DIAGNOSIS — F33.2 MAJOR DEPRESSIVE DISORDER, RECURRENT SEVERE WITHOUT PSYCHOTIC FEATURES (HCC): ICD-10-CM

## 2024-04-02 DIAGNOSIS — K29.50 CHRONIC GASTRITIS WITHOUT BLEEDING, UNSPECIFIED GASTRITIS TYPE: ICD-10-CM

## 2024-04-02 PROCEDURE — 99214 OFFICE O/P EST MOD 30 MIN: CPT | Performed by: INTERNAL MEDICINE

## 2024-04-02 PROCEDURE — G0439 PPPS, SUBSEQ VISIT: HCPCS | Performed by: INTERNAL MEDICINE

## 2024-04-02 RX ORDER — FAMOTIDINE 20 MG/1
20 TABLET, FILM COATED ORAL
Qty: 30 TABLET | Refills: 1 | Status: SHIPPED | OUTPATIENT
Start: 2024-04-02 | End: 2024-06-01

## 2024-04-02 NOTE — ASSESSMENT & PLAN NOTE
Clinically stable and doing well continue the current medical regiment will continue monitor.  Avoid the patient is start working with counselor Roma notify the patient
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
## 2024-04-02 NOTE — ASSESSMENT & PLAN NOTE
Reviewed recent EGD and pathology report continue Protonix 40 mg.  Will add Pepcid 20 mg once daily recommend ulcer for

## 2024-04-02 NOTE — ASSESSMENT & PLAN NOTE
Clinically stable and doing well continue the current medical regiment will continue monitor.  Continue Coumadin INR well-controlled stable

## 2024-04-02 NOTE — ASSESSMENT & PLAN NOTE
No SI clinically stable and doing well continue the current medical regiment will continue monitor.  Will consult counselor Roma

## 2024-04-02 NOTE — PATIENT INSTRUCTIONS
Medicare Preventive Visit Patient Instructions  Thank you for completing your Welcome to Medicare Visit or Medicare Annual Wellness Visit today. Your next wellness visit will be due in one year (4/3/2025).  The screening/preventive services that you may require over the next 5-10 years are detailed below. Some tests may not apply to you based off risk factors and/or age. Screening tests ordered at today's visit but not completed yet may show as past due. Also, please note that scanned in results may not display below.  Preventive Screenings:  Service Recommendations Previous Testing/Comments   Colorectal Cancer Screening  Colonoscopy    Fecal Occult Blood Test (FOBT)/Fecal Immunochemical Test (FIT)  Fecal DNA/Cologuard Test  Flexible Sigmoidoscopy Age: 45-75 years old   Colonoscopy: every 10 years (May be performed more frequently if at higher risk)  OR  FOBT/FIT: every 1 year  OR  Cologuard: every 3 years  OR  Sigmoidoscopy: every 5 years  Screening may be recommended earlier than age 45 if at higher risk for colorectal cancer. Also, an individualized decision between you and your healthcare provider will decide whether screening between the ages of 76-85 would be appropriate. Colonoscopy: Not on file  FOBT/FIT: Not on file  Cologuard: Not on file  Sigmoidoscopy: Not on file          Prostate Cancer Screening Individualized decision between patient and health care provider in men between ages of 55-69   Medicare will cover every 12 months beginning on the day after your 50th birthday PSA: No results in last 5 years           Hepatitis C Screening Once for adults born between 1945 and 1965  More frequently in patients at high risk for Hepatitis C Hep C Antibody: 12/06/2018        Diabetes Screening 1-2 times per year if you're at risk for diabetes or have pre-diabetes Fasting glucose: 97 mg/dL (7/17/2023)  A1C: 5.0 % (7/17/2023)      Cholesterol Screening Once every 5 years if you don't have a lipid disorder. May  order more often based on risk factors. Lipid panel: 07/17/2023         Other Preventive Screenings Covered by Medicare:  Abdominal Aortic Aneurysm (AAA) Screening: covered once if your at risk. You're considered to be at risk if you have a family history of AAA or a male between the age of 65-75 who smoking at least 100 cigarettes in your lifetime.  Lung Cancer Screening: covers low dose CT scan once per year if you meet all of the following conditions: (1) Age 55-77; (2) No signs or symptoms of lung cancer; (3) Current smoker or have quit smoking within the last 15 years; (4) You have a tobacco smoking history of at least 20 pack years (packs per day x number of years you smoked); (5) You get a written order from a healthcare provider.  Glaucoma Screening: covered annually if you're considered high risk: (1) You have diabetes OR (2) Family history of glaucoma OR (3)  aged 50 and older OR (4)  American aged 65 and older  Osteoporosis Screening: covered every 2 years if you meet one of the following conditions: (1) Have a vertebral abnormality; (2) On glucocorticoid therapy for more than 3 months; (3) Have primary hyperparathyroidism; (4) On osteoporosis medications and need to assess response to drug therapy.  HIV Screening: covered annually if you're between the age of 15-65. Also covered annually if you are younger than 15 and older than 65 with risk factors for HIV infection. For pregnant patients, it is covered up to 3 times per pregnancy.    Immunizations:  Immunization Recommendations   Influenza Vaccine Annual influenza vaccination during flu season is recommended for all persons aged >= 6 months who do not have contraindications   Pneumococcal Vaccine   * Pneumococcal conjugate vaccine = PCV13 (Prevnar 13), PCV15 (Vaxneuvance), PCV20 (Prevnar 20)  * Pneumococcal polysaccharide vaccine = PPSV23 (Pneumovax) Adults 19-65 yo with certain risk factors or if 65+ yo  If never received any  pneumonia vaccine: recommend Prevnar 20 (PCV20)  Give PCV20 if previously received 1 dose of PCV13 or PPSV23   Hepatitis B Vaccine 3 dose series if at intermediate or high risk (ex: diabetes, end stage renal disease, liver disease)   Respiratory syncytial virus (RSV) Vaccine - COVERED BY MEDICARE PART D  * RSVPreF3 (Arexvy) CDC recommends that adults 60 years of age and older may receive a single dose of RSV vaccine using shared clinical decision-making (SCDM)   Tetanus (Td) Vaccine - COST NOT COVERED BY MEDICARE PART B Following completion of primary series, a booster dose should be given every 10 years to maintain immunity against tetanus. Td may also be given as tetanus wound prophylaxis.   Tdap Vaccine - COST NOT COVERED BY MEDICARE PART B Recommended at least once for all adults. For pregnant patients, recommended with each pregnancy.   Shingles Vaccine (Shingrix) - COST NOT COVERED BY MEDICARE PART B  2 shot series recommended in those 19 years and older who have or will have weakened immune systems or those 50 years and older     Health Maintenance Due:      Topic Date Due   • Hepatitis C Screening  Completed     Immunizations Due:      Topic Date Due   • COVID-19 Vaccine (4 - 2023-24 season) 09/01/2023     Advance Directives   What are advance directives?  Advance directives are legal documents that state your wishes and plans for medical care. These plans are made ahead of time in case you lose your ability to make decisions for yourself. Advance directives can apply to any medical decision, such as the treatments you want, and if you want to donate organs.   What are the types of advance directives?  There are many types of advance directives, and each state has rules about how to use them. You may choose a combination of any of the following:  Living will:  This is a written record of the treatment you want. You can also choose which treatments you do not want, which to limit, and which to stop at a  certain time. This includes surgery, medicine, IV fluid, and tube feedings.   Durable power of  for healthcare (DPAHC):  This is a written record that states who you want to make healthcare choices for you when you are unable to make them for yourself. This person, called a proxy, is usually a family member or a friend. You may choose more than 1 proxy.  Do not resuscitate (DNR) order:  A DNR order is used in case your heart stops beating or you stop breathing. It is a request not to have certain forms of treatment, such as CPR. A DNR order may be included in other types of advance directives.  Medical directive:  This covers the care that you want if you are in a coma, near death, or unable to make decisions for yourself. You can list the treatments you want for each condition. Treatment may include pain medicine, surgery, blood transfusions, dialysis, IV or tube feedings, and a ventilator (breathing machine).  Values history:  This document has questions about your views, beliefs, and how you feel and think about life. This information can help others choose the care that you would choose.  Why are advance directives important?  An advance directive helps you control your care. Although spoken wishes may be used, it is better to have your wishes written down. Spoken wishes can be misunderstood, or not followed. Treatments may be given even if you do not want them. An advance directive may make it easier for your family to make difficult choices about your care.   Weight Management   Why it is important to manage your weight:  Being overweight increases your risk of health conditions such as heart disease, high blood pressure, type 2 diabetes, and certain types of cancer. It can also increase your risk for osteoarthritis, sleep apnea, and other respiratory problems. Aim for a slow, steady weight loss. Even a small amount of weight loss can lower your risk of health problems.  How to lose weight safely:  A  safe and healthy way to lose weight is to eat fewer calories and get regular exercise. You can lose up about 1 pound a week by decreasing the number of calories you eat by 500 calories each day.   Healthy meal plan for weight management:  A healthy meal plan includes a variety of foods, contains fewer calories, and helps you stay healthy. A healthy meal plan includes the following:  Eat whole-grain foods more often.  A healthy meal plan should contain fiber. Fiber is the part of grains, fruits, and vegetables that is not broken down by your body. Whole-grain foods are healthy and provide extra fiber in your diet. Some examples of whole-grain foods are whole-wheat breads and pastas, oatmeal, brown rice, and bulgur.  Eat a variety of vegetables every day.  Include dark, leafy greens such as spinach, kale, sandy greens, and mustard greens. Eat yellow and orange vegetables such as carrots, sweet potatoes, and winter squash.   Eat a variety of fruits every day.  Choose fresh or canned fruit (canned in its own juice or light syrup) instead of juice. Fruit juice has very little or no fiber.  Eat low-fat dairy foods.  Drink fat-free (skim) milk or 1% milk. Eat fat-free yogurt and low-fat cottage cheese. Try low-fat cheeses such as mozzarella and other reduced-fat cheeses.  Choose meat and other protein foods that are low in fat.  Choose beans or other legumes such as split peas or lentils. Choose fish, skinless poultry (chicken or turkey), or lean cuts of red meat (beef or pork). Before you cook meat or poultry, cut off any visible fat.   Use less fat and oil.  Try baking foods instead of frying them. Add less fat, such as margarine, sour cream, regular salad dressing and mayonnaise to foods. Eat fewer high-fat foods. Some examples of high-fat foods include french fries, doughnuts, ice cream, and cakes.  Eat fewer sweets.  Limit foods and drinks that are high in sugar. This includes candy, cookies, regular soda, and  sweetened drinks.  Exercise:  Exercise at least 30 minutes per day on most days of the week. Some examples of exercise include walking, biking, dancing, and swimming. You can also fit in more physical activity by taking the stairs instead of the elevator or parking farther away from stores. Ask your healthcare provider about the best exercise plan for you.      © Copyright [employer] 2018 Information is for End User's use only and may not be sold, redistributed or otherwise used for commercial purposes. All illustrations and images included in CareNotes® are the copyrighted property of [employer] or [employer]

## 2024-04-02 NOTE — ASSESSMENT & PLAN NOTE
>>ASSESSMENT AND PLAN FOR MAJOR DEPRESSIVE DISORDER, RECURRENT SEVERE WITHOUT PSYCHOTIC FEATURES (HCC) WRITTEN ON 4/2/2024  7:55 PM BY NO NORRIS, DO    No SI clinically stable and doing well continue the current medical regiment will continue monitor.  Will consult counselor Roma
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
## 2024-04-04 ENCOUNTER — TELEPHONE (OUTPATIENT)
Dept: GASTROENTEROLOGY | Facility: CLINIC | Age: 79
End: 2024-04-04

## 2024-04-04 NOTE — TELEPHONE ENCOUNTER
----- Message from Deion Bravo MD sent at 4/2/2024 12:42 PM EDT -----  Good news, please inform the patient that his blood count is normal, his iron levels are normal.    Please have him follow-up in the office in 6 months time.

## 2024-04-04 NOTE — TELEPHONE ENCOUNTER
Called and relayed results and recommendations to patient's wife   Apt made     PCP added famotidine (PEPCID) 20 mg tablet to be taken at night since he takes the pantoprazole in the morning     Patient was having the abdominal tenderness regarding the hx with gastritis     Patient's wife wants to make sure this is okay to take this medication     Please advise thank you!

## 2024-04-08 ENCOUNTER — APPOINTMENT (OUTPATIENT)
Dept: LAB | Facility: CLINIC | Age: 79
End: 2024-04-08
Payer: MEDICARE

## 2024-04-08 ENCOUNTER — ANTICOAG VISIT (OUTPATIENT)
Dept: INTERNAL MEDICINE CLINIC | Facility: CLINIC | Age: 79
End: 2024-04-08

## 2024-04-10 ENCOUNTER — TELEPHONE (OUTPATIENT)
Dept: BEHAVIORAL/MENTAL HEALTH CLINIC | Facility: CLINIC | Age: 79
End: 2024-04-10

## 2024-04-11 ENCOUNTER — OFFICE VISIT (OUTPATIENT)
Dept: PSYCHIATRY | Facility: CLINIC | Age: 79
End: 2024-04-11

## 2024-04-11 VITALS
HEART RATE: 77 BPM | WEIGHT: 152 LBS | SYSTOLIC BLOOD PRESSURE: 155 MMHG | DIASTOLIC BLOOD PRESSURE: 78 MMHG | BODY MASS INDEX: 25.33 KG/M2 | HEIGHT: 65 IN

## 2024-04-11 DIAGNOSIS — Z79.899 LONG-TERM USE OF HIGH-RISK MEDICATION: Chronic | ICD-10-CM

## 2024-04-11 DIAGNOSIS — F41.1 GAD (GENERALIZED ANXIETY DISORDER): Chronic | ICD-10-CM

## 2024-04-11 DIAGNOSIS — F41.0 PANIC DISORDER WITHOUT AGORAPHOBIA: Chronic | ICD-10-CM

## 2024-04-11 DIAGNOSIS — F09 MILD COGNITIVE DISORDER: Chronic | ICD-10-CM

## 2024-04-11 DIAGNOSIS — F33.0 MAJOR DEPRESSIVE DISORDER, RECURRENT EPISODE, MILD (HCC): Primary | Chronic | ICD-10-CM

## 2024-04-11 DIAGNOSIS — F50.82 AVOIDANT-RESTRICTIVE FOOD INTAKE DISORDER (ARFID): Chronic | ICD-10-CM

## 2024-04-11 RX ORDER — OLANZAPINE 10 MG/1
10 TABLET ORAL
Qty: 90 TABLET | Refills: 2 | Status: SHIPPED | OUTPATIENT
Start: 2024-04-11 | End: 2025-01-06

## 2024-04-16 ENCOUNTER — SOCIAL WORK (OUTPATIENT)
Dept: BEHAVIORAL/MENTAL HEALTH CLINIC | Facility: CLINIC | Age: 79
End: 2024-04-16

## 2024-04-16 DIAGNOSIS — F41.1 GAD (GENERALIZED ANXIETY DISORDER): Chronic | ICD-10-CM

## 2024-04-16 DIAGNOSIS — F33.0 MAJOR DEPRESSIVE DISORDER, RECURRENT EPISODE, MILD (HCC): Primary | Chronic | ICD-10-CM
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
## 2024-04-30 ENCOUNTER — SOCIAL WORK (OUTPATIENT)
Dept: BEHAVIORAL/MENTAL HEALTH CLINIC | Facility: CLINIC | Age: 79
End: 2024-04-30
Payer: MEDICARE

## 2024-04-30 DIAGNOSIS — F41.1 GAD (GENERALIZED ANXIETY DISORDER): Chronic | ICD-10-CM

## 2024-04-30 DIAGNOSIS — F33.0 MAJOR DEPRESSIVE DISORDER, RECURRENT EPISODE, MILD (HCC): Primary | Chronic | ICD-10-CM

## 2024-04-30 PROCEDURE — 90837 PSYTX W PT 60 MINUTES: CPT

## 2024-04-30 NOTE — PSYCH
"Behavioral Health Psychotherapy Progress Note    Psychotherapy Provided: Individual Psychotherapy     1. Major depressive disorder, recurrent episode, mild (HCC)        2. JOSÉ LUIS (generalized anxiety disorder)            Goals addressed in session: Goal 1     DATA: Patient was present for his second psychotherapy session.  He was pleasant, but fidget and anxious; eye contact was good.  He was casually dressed, and appeared adequately groomed.  His wife did not join us for this session.  The primary focus of today's session was to develop his treatment plan, and to establish a safety plan; he needed significant support with both.  His primary concern is his relationship with his wife, and he frequently verbalized his need for her to tell him she loves him.  It is evident that he struggles with anxiety, but he was primarily fixated on his marriage.  He is aware of his own needs, but struggled to identify her needs.  Therapist pointed out that he has mentioned several times that she worries about his health, and gets mad when he goes for walks when the doctor has told him to limit this.  He eventually was able to acknowledge that taking better care of himself would make his wife happier.  He did appear to become overwhelmed briefly in session, and had what he called a \"seizure;\" however, therapist was able to quickly de-escalate by having him take a few deep breathes.  He wanted to end session early, but was able to tolerate the rest of it, and verbalized that session was helpful.    During this session, this clinician used the following therapeutic modalities: Client-centered Therapy, Motivational Interviewing, and Solution-Focused Therapy    Substance Abuse was not addressed during this session. If the client is diagnosed with a co-occurring substance use disorder, please indicate any changes in the frequency or amount of use: N/A. Stage of change for addressing substance use diagnoses: No substance use/Not " "applicable    ASSESSMENT:  Lorenzo De Jesus presents with a Anxious mood.     his affect is Normal range and intensity, which is congruent, with his mood and the content of the session. The client has made progress on their goals.     Lorenzo De Jesus presents with a minimal risk of suicide, none risk of self-harm, and none risk of harm to others.    For any risk assessment that surpasses a \"low\" rating, a safety plan must be developed.    A safety plan was indicated: No  If yes, describe in detail: N/A    PLAN: Between sessions, Lorenzo De Jesus will try and communicate more openly with his wife by asking her what needs she has. At the next session, the therapist will use Motivational Interviewing to address his reluctance to change.    Behavioral Health Treatment Plan and Discharge Planning: Lorenzo De Jesus is aware of and agrees to continue to work on their treatment plan. They have identified and are working toward their discharge goals- Yes    Visit start and stop times:    04/30/24  Start Time: 1455  Stop Time: 1548  Total Visit Time: 53 minutes  "

## 2024-04-30 NOTE — BH TREATMENT PLAN
"Outpatient Behavioral Health Psychotherapy Treatment Plan    Lorenzo De Jesus  1945     Date of Initial Psychotherapy Assessment: 04/16/2024   Date of Current Treatment Plan: 04/30/24  Treatment Plan Target Date: 07/30/2024  Treatment Plan Expiration Date: 10/30/2024    Diagnosis:   1. Major depressive disorder, recurrent episode, mild (HCC)        2. JOSÉ LUIS (generalized anxiety disorder)            Area(s) of Need: Anger Management Skills, Coping Skills, and Communication Skills    Long Term Goal 1 (in the client's own words): \"I want to communicate more with my wife.\"      Stage of Change: Contemplation    Target Date for completion: 07/30/2024     Anticipated therapeutic modalities: Client Centered and Supportive Therapy     People identified to complete this goal: Patient and Therapist      Objective 1: (identify the means of measuring success in meeting the objective): Patient will utilize therapy to build assertive communication skills.      Objective 2: (identify the means of measuring success in meeting the objective): Patient will learn 2 healthy ways to manage anger and frustration.      Long Term Goal 2 (in the client's own words): \" I want to feel less anxious and depressed.\"    Stage of Change: Contemplation    Target Date for completion: 07/30/2024     Anticipated therapeutic modalities: Supportive Therapy      People identified to complete this goal: Patient and Therapist      Objective 1: (identify the means of measuring success in meeting the objective): Utilize therapy to practice expressing thoughts and feelings.      Objective 2: (identify the means of measuring success in meeting the objective): Engage in meaningful activities daily.     I am currently under the care of a [facility] psychiatric provider: Yes    My [facility] psychiatric provider is: Dr. Mojica    I am currently taking psychiatric medications: Yes, as prescribed    I feel that I will be ready for discharge from mental health " care when I reach the following (measurable goal/objective): When I am regularly communicating with my wife, and my anxiety and depression are more controlled for a period of 3 months.    For children and adults who have a legal guardian:   Has there been any change to custody orders and/or guardianship status? NA. If yes, attach updated documentation.    I have created my Crisis Plan and have been offered a copy of this plan    Behavioral Health Treatment Plan [facility]: Diagnosis and Treatment Plan explained to Lorenzo De Jesus acknowledges an understanding of their diagnosis. Lorenzo De Jesus agrees to this treatment plan.    I have been offered a copy of this Treatment Plan-Yes

## 2024-05-02 PROBLEM — Z00.00 MEDICARE ANNUAL WELLNESS VISIT, SUBSEQUENT: Status: RESOLVED | Noted: 2024-04-02 | Resolved: 2024-05-02

## 2024-05-07 ENCOUNTER — ANTICOAG VISIT (OUTPATIENT)
Dept: INTERNAL MEDICINE CLINIC | Facility: CLINIC | Age: 79
End: 2024-05-07

## 2024-05-07 ENCOUNTER — APPOINTMENT (OUTPATIENT)
Dept: LAB | Facility: CLINIC | Age: 79
End: 2024-05-07
Payer: MEDICARE

## 2024-05-14 ENCOUNTER — SOCIAL WORK (OUTPATIENT)
Dept: BEHAVIORAL/MENTAL HEALTH CLINIC | Facility: CLINIC | Age: 79
End: 2024-05-14
Payer: MEDICARE

## 2024-05-14 DIAGNOSIS — F33.0 MAJOR DEPRESSIVE DISORDER, RECURRENT EPISODE, MILD (HCC): Primary | Chronic | ICD-10-CM

## 2024-05-14 DIAGNOSIS — F41.1 GAD (GENERALIZED ANXIETY DISORDER): Chronic | ICD-10-CM

## 2024-05-14 PROCEDURE — 90834 PSYTX W PT 45 MINUTES: CPT

## 2024-05-14 NOTE — PSYCH
"Behavioral Health Psychotherapy Progress Note    Psychotherapy Provided: Individual Psychotherapy     1. Major depressive disorder, recurrent episode, mild (HCC)        2. JOSÉ LUIS (generalized anxiety disorder)            Goals addressed in session: Goal 1 and Goal 2     DATA: Patient was present for his psychotherapy session.  He was pleasant, but anxious and fidgety; eye contact was good.  He was casually dressed, and appeared adequately groomed.  Therapist supported patient as he spoke about ongoing marital struggles; he got tearful once.  He expressed feeling depressed because his wife never shows affection or tells him she loves him.  He feels that they might still be together only because they are older.  He spoke about the things that cause them to argue, and acknowledges that there are things he could do to help, but is reluctant to make any changes.  He expressed feeling very lonely, and sometimes having passive death wish.  He was adamant that he would never commit suicide.  He had a few episodes of what he refers to as \"seizures,\" and therapist had him focus on his breath in those moments.  After about 35 minutes he requested to end session; he verbalized that he does find sessions helpful, but that he is not used to talking this much.  He again hugged therapist on his departure; at this time therapist feels this is appropriate, and beneficial to patient; the hug is brief and appropriate.    During this session, this clinician used the following therapeutic modalities: Client-centered Therapy and Supportive Psychotherapy    Substance Abuse was not addressed during this session. If the client is diagnosed with a co-occurring substance use disorder, please indicate any changes in the frequency or amount of use: N/A. Stage of change for addressing substance use diagnoses: No substance use/Not applicable    ASSESSMENT:  Lorenzo De Jesus presents with a Anxious mood.     his affect is Normal range and intensity, " "which is congruent, with his mood and the content of the session. The client has made progress on their goals.     Lorenzo De Jesus presents with a none risk of suicide, none risk of self-harm, and none risk of harm to others.    For any risk assessment that surpasses a \"low\" rating, a safety plan must be developed.    A safety plan was indicated: No  If yes, describe in detail: N/A    PLAN: Between sessions, Lorenzo De Jesus will focus on breath when feeling overwhelmed, and continue to engage in meaningful activities. At the next session, the therapist will use Client-centered Therapy and Supportive Psychotherapy to support patient as he processes marital issues and feelings of loneliness.    Behavioral Health Treatment Plan and Discharge Planning: Lorenzo De Jesus is aware of and agrees to continue to work on their treatment plan. They have identified and are working toward their discharge goals- Yes    Visit start and stop times:    05/14/24  Start Time: 1459  Stop Time: 1538  Total Visit Time: 39 minutes  "

## 2024-05-28 ENCOUNTER — SOCIAL WORK (OUTPATIENT)
Dept: BEHAVIORAL/MENTAL HEALTH CLINIC | Facility: CLINIC | Age: 79
End: 2024-05-28
Payer: MEDICARE

## 2024-05-28 DIAGNOSIS — F33.0 MAJOR DEPRESSIVE DISORDER, RECURRENT EPISODE, MILD (HCC): Chronic | ICD-10-CM

## 2024-05-28 DIAGNOSIS — F41.1 GAD (GENERALIZED ANXIETY DISORDER): Primary | Chronic | ICD-10-CM

## 2024-05-28 PROCEDURE — 90832 PSYTX W PT 30 MINUTES: CPT

## 2024-05-28 NOTE — PSYCH
Behavioral Health Psychotherapy Progress Note    Psychotherapy Provided: Individual Psychotherapy     1. JOSÉ LUIS (generalized anxiety disorder)        2. Major depressive disorder, recurrent episode, mild (HCC)            Goals addressed in session: Goal 1 and Goal 2     DATA: Patient was present for his psychotherapy session.  He was pleasant, but anxious; eye contact was good.  He was dressed appropriately, but wears the same outfit to every session.  He reported no change in his anxiety, and again expressed feeling depressed about his marital relationship.  He reported that he and his wife continue to argue about everything, and was vague when therapist probed for more specifics.  He also continues to minimally engage with his son.  Today, we spoke about ways in which he might be able to engage with his wife and son more, but he seemed reluctant to trying something new though he acknowledged that he would feel better if he spent more time with them.  One thing he has changed is wearing sunscreen; his wife confirmed that he is wearing it a majority of the time.      Today, he again stated that he doesn't like talking, but feels he has to come here.  Therapist reminded him that this service is voluntary.  Ultimately, he decided that he would like to continue to come for short sessions as he does feel better after sessions.      During this session, this clinician used the following therapeutic modalities: Client-centered Therapy and Supportive Psychotherapy    Substance Abuse was not addressed during this session. If the client is diagnosed with a co-occurring substance use disorder, please indicate any changes in the frequency or amount of use: N/A. Stage of change for addressing substance use diagnoses: No substance use/Not applicable    ASSESSMENT:  Lorenzo De Jesus presents with a Anxious mood.     his affect is Normal range and intensity, which is congruent, with his mood and the content of the session. The  "client has made progress on their goals.     Lorenzo De Jesus presents with a minimal risk of suicide, none risk of self-harm, and none risk of harm to others.    For any risk assessment that surpasses a \"low\" rating, a safety plan must be developed.    A safety plan was indicated: No  If yes, describe in detail: N/A    PLAN: Between sessions, Lorenzo De Jesus will engage his son about doing joint activities. At the next session, the therapist will use Supportive Psychotherapy to address depression and anxiety related to marital issues.    Behavioral Health Treatment Plan and Discharge Planning: Lorenzo De Jesus is aware of and agrees to continue to work on their treatment plan. They have identified and are working toward their discharge goals- Yes    Visit start and stop times:    05/28/24  Start Time: 1504  Stop Time: 1536  Total Visit Time: 32 minutes  "

## 2024-06-04 ENCOUNTER — APPOINTMENT (OUTPATIENT)
Dept: LAB | Facility: CLINIC | Age: 79
End: 2024-06-04
Payer: MEDICARE

## 2024-06-04 ENCOUNTER — ANTICOAG VISIT (OUTPATIENT)
Dept: INTERNAL MEDICINE CLINIC | Facility: CLINIC | Age: 79
End: 2024-06-04

## 2024-06-04 DIAGNOSIS — Z13.6 SCREENING FOR CARDIOVASCULAR CONDITION: ICD-10-CM

## 2024-06-18 ENCOUNTER — SOCIAL WORK (OUTPATIENT)
Dept: BEHAVIORAL/MENTAL HEALTH CLINIC | Facility: CLINIC | Age: 79
End: 2024-06-18
Payer: MEDICARE

## 2024-06-18 DIAGNOSIS — F41.1 GAD (GENERALIZED ANXIETY DISORDER): Primary | Chronic | ICD-10-CM

## 2024-06-18 PROCEDURE — 90832 PSYTX W PT 30 MINUTES: CPT

## 2024-06-18 NOTE — PSYCH
Behavioral Health Psychotherapy Progress Note    Psychotherapy Provided: Individual Psychotherapy     1. JOSÉ LUIS (generalized anxiety disorder)            Goals addressed in session: Goal 1     DATA: Patient was present for his psychotherapy session.  He again questioned his need for service, but was polite and pleasant.  Therapist reminded patient that he typically verbalizes that he feels better at the end of session.  Therapist also reminded patient that he can always make the decision to end services; at the end of session he was agreeable to rescheduling, and again verbalized feeling better.  He again presented as anxious throughout session, but was more talkative.  Therapist provided supportive therapy as patient spoke about his marriage, and skin cancer.  Recently, he learned that the skin cancer on his leg came back; he has to have surgery in 2 weeks to have it removed.  Therapist helped him process his feelings regarding this.  As far as his marital issues go, he is aware that he is somewhat at fault, but reported that he is no longer interested in trying because it hasn't worked previously.  He made a statement about not caring if he dies; therapist assessed him for suicide risk, and determined that he does not have suicidal ideations or intent, but has come to terms with that he is at the end stage of life.  He shared that his sister is dying; he processed these feelings with therapist.    During this session, this clinician used the following therapeutic modalities: Client-centered Therapy and Supportive Psychotherapy    Substance Abuse was not addressed during this session. If the client is diagnosed with a co-occurring substance use disorder, please indicate any changes in the frequency or amount of use: N/A. Stage of change for addressing substance use diagnoses: No substance use/Not applicable    ASSESSMENT:  Lorenzo De Jesus presents with a Anxious mood.     his affect is Normal range and intensity,  "which is congruent, with his mood and the content of the session. The client has made progress on their goals.     Lorenzo De Jesus presents with a none risk of suicide, none risk of self-harm, and none risk of harm to others.    For any risk assessment that surpasses a \"low\" rating, a safety plan must be developed.    A safety plan was indicated: No  If yes, describe in detail: N/A    PLAN: Between sessions, Lorenzo De Jesus will continue to utilize coping skills when feeling anxious. At the next session, the therapist will use Client-centered Therapy and Supportive Psychotherapy to support patient as he processes ongoing marital issues, and his health issues.    Behavioral Health Treatment Plan and Discharge Planning: Lorenzo De Jesus is aware of and agrees to continue to work on their treatment plan. They have identified and are working toward their discharge goal- Yes    Visit start and stop times:    06/18/24  Start Time: 1458  Stop Time: 1529  Total Visit Time: 31 minutes  "
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
## 2024-07-01 ENCOUNTER — OFFICE VISIT (OUTPATIENT)
Dept: PSYCHIATRY | Facility: CLINIC | Age: 79
End: 2024-07-01
Payer: MEDICARE

## 2024-07-01 VITALS
BODY MASS INDEX: 25.33 KG/M2 | HEIGHT: 65 IN | WEIGHT: 152 LBS | HEART RATE: 76 BPM | DIASTOLIC BLOOD PRESSURE: 70 MMHG | SYSTOLIC BLOOD PRESSURE: 138 MMHG

## 2024-07-01 DIAGNOSIS — F41.0 PANIC DISORDER WITHOUT AGORAPHOBIA: Chronic | ICD-10-CM

## 2024-07-01 DIAGNOSIS — G47.09 OTHER INSOMNIA: Chronic | ICD-10-CM

## 2024-07-01 DIAGNOSIS — F33.0 MAJOR DEPRESSIVE DISORDER, RECURRENT EPISODE, MILD (HCC): Primary | Chronic | ICD-10-CM

## 2024-07-01 DIAGNOSIS — F41.1 GAD (GENERALIZED ANXIETY DISORDER): Chronic | ICD-10-CM

## 2024-07-01 DIAGNOSIS — F50.82 AVOIDANT-RESTRICTIVE FOOD INTAKE DISORDER (ARFID): Chronic | ICD-10-CM

## 2024-07-01 DIAGNOSIS — Z79.899 LONG-TERM USE OF HIGH-RISK MEDICATION: Chronic | ICD-10-CM

## 2024-07-01 DIAGNOSIS — F09 MILD COGNITIVE DISORDER: Chronic | ICD-10-CM

## 2024-07-01 PROCEDURE — 90833 PSYTX W PT W E/M 30 MIN: CPT | Performed by: PSYCHIATRY & NEUROLOGY

## 2024-07-01 PROCEDURE — 99214 OFFICE O/P EST MOD 30 MIN: CPT | Performed by: PSYCHIATRY & NEUROLOGY

## 2024-07-01 PROCEDURE — G2211 COMPLEX E/M VISIT ADD ON: HCPCS | Performed by: PSYCHIATRY & NEUROLOGY

## 2024-07-09 ENCOUNTER — SOCIAL WORK (OUTPATIENT)
Dept: BEHAVIORAL/MENTAL HEALTH CLINIC | Facility: CLINIC | Age: 79
End: 2024-07-09
Payer: MEDICARE

## 2024-07-09 DIAGNOSIS — F33.0 MAJOR DEPRESSIVE DISORDER, RECURRENT EPISODE, MILD (HCC): Primary | Chronic | ICD-10-CM

## 2024-07-09 PROCEDURE — 90832 PSYTX W PT 30 MINUTES: CPT

## 2024-07-09 NOTE — PSYCH
Behavioral Health Psychotherapy Progress Note    Psychotherapy Provided: Individual Psychotherapy     1. Major depressive disorder, recurrent episode, mild (HCC)            Goals addressed in session: Goal 1     DATA: Patient was present for his psychotherapy session.  He presented as anxious, but was pleasant; eye contact was good.  He was casually dressed, and appeared well groomed.  He reported no change in his anxiety or depression.  He denied thoughts of suicide or passive death with.  He also denied any changes in the marital relationship, and expressed that he is starting to accept his circumstances with his wife.  Therapist spoke to patient about how he has been coping with his anxiety and depression, and he expressed that it has been more difficult due to the weather making it harder for him to tolerate walks.  We spoke about some alternative options, but he seemed pretty resistant.  Patient spoke about leg pain he has been having, and winced several times throughout session regarding the pain.  Therapist recommended that he reach out to his PCP, but he was not interested.  He has surgery in two weeks to have a cancerous spot on his leg removed, and will address it with the surgeon then.  Lastly, we spoke about his sister whose health has been declining; he is aware that she is at the end stages of her life, and seems to have accepted this.    During this session, this clinician used the following therapeutic modalities: Client-centered Therapy and Supportive Psychotherapy    Substance Abuse was not addressed during this session. If the client is diagnosed with a co-occurring substance use disorder, please indicate any changes in the frequency or amount of use: N/A. Stage of change for addressing substance use diagnoses: No substance use/Not applicable    ASSESSMENT:  Lorenzo De Jesus presents with a Anxious mood.     his affect is Normal range and intensity, which is congruent, with his mood and the  "content of the session. The client has made progress on their goals.     Lorenzo De Jesus presents with a none risk of suicide, none risk of self-harm, and none risk of harm to others.    For any risk assessment that surpasses a \"low\" rating, a safety plan must be developed.    A safety plan was indicated: No  If yes, describe in detail: N/A    PLAN: Between sessions, Lorenzo De Jesus will implement new exercise techniques as a coping method. At the next session, the therapist will use Client-centered Therapy and Supportive Psychotherapy to support patient as he processes ongoing health issues and marital issues.    Behavioral Health Treatment Plan and Discharge Planning: Lorenzo De Jesus is aware of and agrees to continue to work on their treatment plan. They have identified and are working toward their discharge goals-Yes    Visit start and stop times:    07/09/24  Start Time: 1500  Stop Time: 1530  Total Visit Time: 30 minutes  "

## 2024-07-11 ENCOUNTER — APPOINTMENT (OUTPATIENT)
Dept: LAB | Facility: AMBULARY SURGERY CENTER | Age: 79
End: 2024-07-11
Payer: MEDICARE

## 2024-07-11 ENCOUNTER — ANTICOAG VISIT (OUTPATIENT)
Dept: INTERNAL MEDICINE CLINIC | Facility: CLINIC | Age: 79
End: 2024-07-11

## 2024-07-11 DIAGNOSIS — Z79.899 LONG-TERM USE OF HIGH-RISK MEDICATION: Chronic | ICD-10-CM

## 2024-07-11 DIAGNOSIS — F33.0 MAJOR DEPRESSIVE DISORDER, RECURRENT EPISODE, MILD (HCC): Chronic | ICD-10-CM

## 2024-07-11 LAB
ALBUMIN SERPL BCG-MCNC: 4 G/DL (ref 3.5–5)
ALP SERPL-CCNC: 59 U/L (ref 34–104)
ALT SERPL W P-5'-P-CCNC: 12 U/L (ref 7–52)
ANION GAP SERPL CALCULATED.3IONS-SCNC: 7 MMOL/L (ref 4–13)
AST SERPL W P-5'-P-CCNC: 19 U/L (ref 13–39)
BILIRUB SERPL-MCNC: 0.94 MG/DL (ref 0.2–1)
BUN SERPL-MCNC: 18 MG/DL (ref 5–25)
CALCIUM SERPL-MCNC: 8.8 MG/DL (ref 8.4–10.2)
CHLORIDE SERPL-SCNC: 108 MMOL/L (ref 96–108)
CHOLEST SERPL-MCNC: 192 MG/DL
CO2 SERPL-SCNC: 23 MMOL/L (ref 21–32)
CREAT SERPL-MCNC: 0.81 MG/DL (ref 0.6–1.3)
EST. AVERAGE GLUCOSE BLD GHB EST-MCNC: 97 MG/DL
GFR SERPL CREATININE-BSD FRML MDRD: 85 ML/MIN/1.73SQ M
GLUCOSE P FAST SERPL-MCNC: 95 MG/DL (ref 65–99)
HBA1C MFR BLD: 5 %
HDLC SERPL-MCNC: 59 MG/DL
LDLC SERPL CALC-MCNC: 110 MG/DL (ref 0–100)
NONHDLC SERPL-MCNC: 133 MG/DL
POTASSIUM SERPL-SCNC: 4.1 MMOL/L (ref 3.5–5.3)
PROT SERPL-MCNC: 6.6 G/DL (ref 6.4–8.4)
SODIUM SERPL-SCNC: 138 MMOL/L (ref 135–147)
TRIGL SERPL-MCNC: 115 MG/DL

## 2024-07-11 PROCEDURE — 80053 COMPREHEN METABOLIC PANEL: CPT

## 2024-07-11 PROCEDURE — 80061 LIPID PANEL: CPT

## 2024-07-11 PROCEDURE — 83036 HEMOGLOBIN GLYCOSYLATED A1C: CPT

## 2024-07-18 ENCOUNTER — APPOINTMENT (OUTPATIENT)
Dept: LAB | Facility: CLINIC | Age: 79
End: 2024-07-18
Payer: MEDICARE

## 2024-07-18 DIAGNOSIS — I82.5Y2 CHRONIC EMBOLISM AND THROMBOSIS OF DEEP VEIN OF LEFT PROXIMAL LOWER EXTREMITY (HCC): ICD-10-CM

## 2024-07-19 ENCOUNTER — ANTICOAG VISIT (OUTPATIENT)
Dept: INTERNAL MEDICINE CLINIC | Facility: CLINIC | Age: 79
End: 2024-07-19

## 2024-07-21 NOTE — RESULT ENCOUNTER NOTE
CMP and Hemoglobin A1C are normal LDL is slightly elevated, but less than 1 year ago. Otherwise lipids are normal.

## 2024-07-22 ENCOUNTER — TELEPHONE (OUTPATIENT)
Dept: INTERNAL MEDICINE CLINIC | Facility: CLINIC | Age: 79
End: 2024-07-22

## 2024-07-22 DIAGNOSIS — I82.4Y9 DEEP VEIN THROMBOSIS (DVT) OF PROXIMAL LOWER EXTREMITY, UNSPECIFIED CHRONICITY, UNSPECIFIED LATERALITY (HCC): Primary | ICD-10-CM

## 2024-07-22 NOTE — TELEPHONE ENCOUNTER
Can you please place an INR orders old one , pt wanted to go today but found out they .     Please do as soon as possible, wife said he is going to continue doing as he has been.     TANJA clerical call pt when orders are in.

## 2024-07-23 ENCOUNTER — APPOINTMENT (OUTPATIENT)
Dept: LAB | Facility: CLINIC | Age: 79
End: 2024-07-23
Payer: MEDICARE

## 2024-07-23 DIAGNOSIS — Z12.5 SCREENING PSA (PROSTATE SPECIFIC ANTIGEN): ICD-10-CM

## 2024-07-23 DIAGNOSIS — I82.4Y9 DEEP VEIN THROMBOSIS (DVT) OF PROXIMAL LOWER EXTREMITY, UNSPECIFIED CHRONICITY, UNSPECIFIED LATERALITY (HCC): ICD-10-CM

## 2024-07-23 LAB
ALBUMIN SERPL BCG-MCNC: 4.3 G/DL (ref 3.5–5)
ALP SERPL-CCNC: 64 U/L (ref 34–104)
ALT SERPL W P-5'-P-CCNC: 15 U/L (ref 7–52)
ANION GAP SERPL CALCULATED.3IONS-SCNC: 6 MMOL/L (ref 4–13)
AST SERPL W P-5'-P-CCNC: 22 U/L (ref 13–39)
BILIRUB SERPL-MCNC: 0.95 MG/DL (ref 0.2–1)
BUN SERPL-MCNC: 14 MG/DL (ref 5–25)
CALCIUM SERPL-MCNC: 9.2 MG/DL (ref 8.4–10.2)
CHLORIDE SERPL-SCNC: 106 MMOL/L (ref 96–108)
CHOLEST SERPL-MCNC: 217 MG/DL
CO2 SERPL-SCNC: 28 MMOL/L (ref 21–32)
CREAT SERPL-MCNC: 0.94 MG/DL (ref 0.6–1.3)
GFR SERPL CREATININE-BSD FRML MDRD: 77 ML/MIN/1.73SQ M
GLUCOSE P FAST SERPL-MCNC: 103 MG/DL (ref 65–99)
HDLC SERPL-MCNC: 58 MG/DL
INR PPP: 2.69 (ref 0.84–1.19)
LDLC SERPL CALC-MCNC: 128 MG/DL (ref 0–100)
POTASSIUM SERPL-SCNC: 4.5 MMOL/L (ref 3.5–5.3)
PROT SERPL-MCNC: 6.8 G/DL (ref 6.4–8.4)
PROTHROMBIN TIME: 29.5 SECONDS (ref 11.6–14.5)
SODIUM SERPL-SCNC: 140 MMOL/L (ref 135–147)
TRIGL SERPL-MCNC: 157 MG/DL

## 2024-07-23 PROCEDURE — 85610 PROTHROMBIN TIME: CPT

## 2024-07-23 PROCEDURE — 80053 COMPREHEN METABOLIC PANEL: CPT

## 2024-07-23 PROCEDURE — 80061 LIPID PANEL: CPT

## 2024-07-23 PROCEDURE — 36415 COLL VENOUS BLD VENIPUNCTURE: CPT

## 2024-07-24 ENCOUNTER — ANTICOAG VISIT (OUTPATIENT)
Dept: INTERNAL MEDICINE CLINIC | Facility: CLINIC | Age: 79
End: 2024-07-24

## 2024-07-24 NOTE — PROGRESS NOTES
Per Dr. Schultz:    No change in dose, continue 2 mg daily and recheck the INR in 1 week.    Spoke to the patients wife and advised.

## 2024-07-25 ENCOUNTER — OFFICE VISIT (OUTPATIENT)
Dept: INTERNAL MEDICINE CLINIC | Facility: CLINIC | Age: 79
End: 2024-07-25
Payer: MEDICARE

## 2024-07-25 VITALS
SYSTOLIC BLOOD PRESSURE: 130 MMHG | WEIGHT: 151 LBS | TEMPERATURE: 97 F | HEIGHT: 65 IN | OXYGEN SATURATION: 96 % | RESPIRATION RATE: 16 BRPM | HEART RATE: 74 BPM | DIASTOLIC BLOOD PRESSURE: 78 MMHG | BODY MASS INDEX: 25.16 KG/M2

## 2024-07-25 DIAGNOSIS — E78.5 HLD (HYPERLIPIDEMIA): Primary | ICD-10-CM

## 2024-07-25 DIAGNOSIS — I82.4Y9 DEEP VEIN THROMBOSIS (DVT) OF PROXIMAL LOWER EXTREMITY, UNSPECIFIED CHRONICITY, UNSPECIFIED LATERALITY (HCC): ICD-10-CM

## 2024-07-25 DIAGNOSIS — M76.31 IT BAND SYNDROME, RIGHT: ICD-10-CM

## 2024-07-25 PROCEDURE — G2211 COMPLEX E/M VISIT ADD ON: HCPCS

## 2024-07-25 PROCEDURE — 99214 OFFICE O/P EST MOD 30 MIN: CPT

## 2024-07-25 RX ORDER — WARFARIN SODIUM 2 MG/1
TABLET ORAL
Qty: 60 TABLET | Refills: 5 | Status: SHIPPED | OUTPATIENT
Start: 2024-07-25

## 2024-07-25 RX ORDER — ROSUVASTATIN CALCIUM 5 MG/1
5 TABLET, COATED ORAL DAILY
Qty: 30 TABLET | Refills: 3 | Status: SHIPPED | OUTPATIENT
Start: 2024-07-25 | End: 2024-11-22

## 2024-07-25 NOTE — ASSESSMENT & PLAN NOTE
Patient recently has supratherapeutic INR of 3.59  Patient is current INR at 2.69   Patient denies any symptoms of DVT currently    PLAN:  Continue warfarin 2 Mg  Check INR in 1 week

## 2024-07-25 NOTE — ASSESSMENT & PLAN NOTE
Patient complains of right-sided thigh pain on the lateral side  Pain is reproducible on palpation  Patient's right leg is shorter than the left side  Patient also walks with a limp    PLAN;  Conservative management, Tylenol as needed

## 2024-07-25 NOTE — PROGRESS NOTES
Ambulatory Visit  Name: Lorenzo De Jesus      : 1945      MRN: [id number]  Encounter Provider: Mihaela Mayberry MD  Encounter Date: 2024   Enc[facility] department: [facility]    Assessment & Plan   1. HLD (hyperlipidemia)  Assessment & Plan:  Lab Results   Component Value Date    CHOLESTEROL 217 (H) 2024    TRIG 157 (H) 2024    HDL 58 2024    LDLCALC 128 (H) 2024        PLAN:  Start Crestor 5 Mg daily  Orders:  -     rosuvastatin (CRESTOR) 5 mg tablet; Take 1 tablet (5 mg total) by mouth daily  2. Deep vein thrombosis (DVT) of proximal lower extremity, unspecified chronicity, unspecified laterality (HCC)  Assessment & Plan:  Patient recently has supratherapeutic INR of 3.59  Patient is current INR at 2.69   Patient denies any symptoms of DVT currently    PLAN:  Continue warfarin 2 Mg  Check INR in 1 week  Orders:  -     warfarin (COUMADIN) 2 mg tablet; TAKE ONE AND ONE-HALF TABLETS BY MOUTH EVERY OTHER DAY ALTERNATING WITH ONE TABLET THE NEXT DAY. REPEAT SCHEDULE  3. It band syndrome, right  Assessment & Plan:  Patient complains of right-sided thigh pain on the lateral side  Pain is reproducible on palpation  Patient's right leg is shorter than the left side  Patient also walks with a limp    PLAN;  Conservative management, Tylenol as needed       History of Present Illness     HPI 78-year-old male comes in for follow-up after 3 months.  Patient had elevated INR with the previous visit, today INR is in therapeutic range.  Patient has been taking warfarin chronically due to history of DVT.  Patient also has elevated lipid panel today.  Patient also complains of right-sided thigh pain which is on the area where iliotibial band points.  Patient denies any shortness of breath, chest pain or any other concerning symptoms.    Review of Systems   Constitutional:  Positive for activity change.   HENT: Negative.     Eyes: Negative.    Respiratory: Negative.    "  Cardiovascular: Negative.    Genitourinary: Negative.    Musculoskeletal:  Positive for gait problem and myalgias.   Skin:  Positive for color change.   Hematological: Negative.    Psychiatric/Behavioral: Negative.       Current Outpatient Medications on File Prior to Visit   Medication Sig Dispense Refill    amLODIPine (NORVASC) 5 mg tablet Take 1 tablet by mouth once daily 90 tablet 1    Ascorbic Acid (Vitamin C) 500 MG CAPS Take by mouth      Ferrous Sulfate (IRON PO) Take by mouth      melatonin 3 mg Take 6 mg by mouth daily at bedtime      Multiple Vitamins-Minerals (MULTI FOR HIM 50+ PO) Take 1 tablet by mouth daily      mupirocin (BACTROBAN) 2 % ointment APPLY TOPICALLY ONCE DAILY WITH BANDAGE CHANGE      OLANZapine (ZyPREXA) 10 mg tablet Take 1 tablet (10 mg total) by mouth daily at bedtime 90 tablet 2    pantoprazole (PROTONIX) 40 mg tablet Take 1 tablet (40 mg total) by mouth daily in the early morning 90 tablet 3    sertraline (ZOLOFT) 100 mg tablet Take 1.5 tablets (150 mg total) by mouth daily 135 tablet 2    [DISCONTINUED] warfarin (COUMADIN) 2 mg tablet TAKE ONE AND ONE-HALF TABLETS BY MOUTH EVERY OTHER DAY ALTERNATING WITH ONE TABLET THE NEXT DAY. REPEAT SCHEDULE 60 tablet 5    famotidine (PEPCID) 20 mg tablet Take 1 tablet (20 mg total) by mouth daily at bedtime 30 tablet 1     No current facility-administered medications on file prior to visit.      Objective     /78 (BP Location: Left arm, Patient Position: Sitting, Cuff Size: Standard)   Pulse 74   Temp (!) 97 °F (36.1 °C) (Tympanic)   Resp 16   Ht 5' 5\" (1.651 m)   Wt 68.5 kg (151 lb)   SpO2 96%   BMI 25.13 kg/m²     Physical Exam  Vitals and nursing note reviewed.   Constitutional:       General: He is not in acute distress.     Appearance: Normal appearance. He is well-developed.   HENT:      Head: Normocephalic and atraumatic.      Right Ear: External ear normal.      Left Ear: External ear normal.      Nose: Nose normal.      " Mouth/Throat:      Pharynx: Oropharynx is clear.   Eyes:      Extraocular Movements: Extraocular movements intact.      Conjunctiva/sclera: Conjunctivae normal.      Pupils: Pupils are equal, round, and reactive to light.   Cardiovascular:      Rate and Rhythm: Normal rate and regular rhythm.      Pulses: Normal pulses.      Heart sounds: Normal heart sounds. No murmur heard.  Pulmonary:      Effort: Pulmonary effort is normal. No respiratory distress.      Breath sounds: Normal breath sounds.   Abdominal:      General: Bowel sounds are normal.      Palpations: Abdomen is soft.      Tenderness: There is no abdominal tenderness.   Musculoskeletal:         General: Tenderness present. No swelling.      Cervical back: Neck supple.      Comments: Tenderness on the lateral side of the right thigh    Skin:     General: Skin is warm and dry.      Capillary Refill: Capillary refill takes less than 2 seconds.   Neurological:      General: No focal deficit present.      Mental Status: He is alert and oriented to person, place, and time.   Psychiatric:         Mood and Affect: Mood normal.       Administrative Statements   I have spent a total time of 45 minutes in caring for this patient on the day of the visit/encounter including Reviewing / ordering tests, medicine, procedures  .

## 2024-07-25 NOTE — ASSESSMENT & PLAN NOTE
Lab Results   Component Value Date    CHOLESTEROL 217 (H) 07/23/2024    TRIG 157 (H) 07/23/2024    HDL 58 07/23/2024    LDLCALC 128 (H) 07/23/2024        PLAN:  Start Crestor 5 Mg daily

## 2024-07-30 ENCOUNTER — ANTICOAG VISIT (OUTPATIENT)
Dept: INTERNAL MEDICINE CLINIC | Facility: CLINIC | Age: 79
End: 2024-07-30

## 2024-07-30 ENCOUNTER — APPOINTMENT (OUTPATIENT)
Dept: LAB | Facility: CLINIC | Age: 79
End: 2024-07-30
Payer: MEDICARE

## 2024-07-30 DIAGNOSIS — I82.4Y9 DEEP VEIN THROMBOSIS (DVT) OF PROXIMAL LOWER EXTREMITY, UNSPECIFIED CHRONICITY, UNSPECIFIED LATERALITY (HCC): ICD-10-CM

## 2024-07-30 LAB
INR PPP: 2.53 (ref 0.84–1.19)
PROTHROMBIN TIME: 28.2 SECONDS (ref 11.6–14.5)

## 2024-07-30 PROCEDURE — 85610 PROTHROMBIN TIME: CPT

## 2024-07-30 PROCEDURE — 36415 COLL VENOUS BLD VENIPUNCTURE: CPT

## 2024-08-06 ENCOUNTER — SOCIAL WORK (OUTPATIENT)
Dept: BEHAVIORAL/MENTAL HEALTH CLINIC | Facility: CLINIC | Age: 79
End: 2024-08-06
Payer: MEDICARE

## 2024-08-06 DIAGNOSIS — F41.1 GAD (GENERALIZED ANXIETY DISORDER): Primary | Chronic | ICD-10-CM

## 2024-08-06 DIAGNOSIS — F33.0 MAJOR DEPRESSIVE DISORDER, RECURRENT EPISODE, MILD (HCC): Chronic | ICD-10-CM

## 2024-08-06 PROCEDURE — 90832 PSYTX W PT 30 MINUTES: CPT

## 2024-08-06 NOTE — PSYCH
Behavioral Health Psychotherapy Progress Note    Psychotherapy Provided: Individual Psychotherapy     1. JOSÉ LUIS (generalized anxiety disorder)        2. Major depressive disorder, recurrent episode, mild (HCC)            Goals addressed in session: Goal 1     DATA: Patient was present for his psychotherapy session.  She presented as anxious, but polite; eye contact was good.  He was casually dressed, and appeared adequately groomed.  He shared that he had a cancer spot moved from his leg 2 weeks ago, so he hasn't been walking as much.  He expressed that he has been feeling more bored and lonely.  He also reported some depression related to on going marital issues; he shared that they had conflict on their way to this session.  Therapist provided supportive therapy through validation of his feelings, active listening, and empathetic response.  Patient has expressed that he often feels he can't say anything right at home.  He did excitedly share that his son asked him to go on a car ride the other week, and that it was enjoyable; therapist encouraged him to ask his son to have another one.  Patient is reluctant to do so out of fear of being a burden; this is the same reason why he hasn't reached out to see how his sister is doing.  Therapist challenged his thinking, and patient agreed that he will reach out about his sister, and acknowledged that he has been thinking about her a lot.      During this session, this clinician used the following therapeutic modalities: Supportive Psychotherapy    Substance Abuse was not addressed during this session. If the client is diagnosed with a co-occurring substance use disorder, please indicate any changes in the frequency or amount of use: N/A. Stage of change for addressing substance use diagnoses: No substance use/Not applicable    ASSESSMENT:  Lorenzo De Jesus presents with a Anxious mood.     his affect is Normal range and intensity, which is congruent, with his mood and the  "content of the session. The client has made progress on their goals.     Lorenzo De Jesus presents with a none risk of suicide, none risk of self-harm, and none risk of harm to others.    For any risk assessment that surpasses a \"low\" rating, a safety plan must be developed.    A safety plan was indicated: No  If yes, describe in detail: N/A    PLAN: Between sessions, Lorenzo De Jesus will challenge his beliefs by reaching out to his niece to get information on his sister. At the next session, the therapist will use Supportive Psychotherapy to support patient as he navigates ongoing marital issues, and end of life issues.    Behavioral Health Treatment Plan and Discharge Planning: Lorenzo De Jesus is aware of and agrees to continue to work on their treatment plan. They have identified and are working toward their discharge goals-Yes    Visit start and stop times:    08/06/24  Start Time: 1458  Stop Time: 1532  Total Visit Time: 34 minutes  "

## 2024-08-14 ENCOUNTER — TELEPHONE (OUTPATIENT)
Age: 79
End: 2024-08-14

## 2024-08-14 NOTE — TELEPHONE ENCOUNTER
LVM for pt to reschedule his upcoming appt with Dr. Bravo on 10/8/24 as there has been a change in his schedule.

## 2024-08-16 NOTE — ASSESSMENT & PLAN NOTE
Compared to yesterday's exam the patient is much more relaxed spontaneously conversant and less anxious today  However as his wife was present and we discussed his discharge, medications, follow-up care, the non rhythmic movements and jerking, with head extension became more frequent  I was able to get the patient's attention distract him change the subject and his movements improved  We discussed his long-term and rather marked anxiety  His wife reports he has a longstanding history of anxiety  She reports that he may do well with a low dose of Xanax at home  We did discuss his prior history of Valium addiction  I suggested that he follow-up with Psychiatry and they report they were less happy with their previous psychiatrist who had used Haldol in this patient  At this time from a neurologic point of view he is stable for discharge  I have discussed with the patient and his wife that he can see our movement disorder specialist at our [place] office as an outpatient  We will make him an appointment 
He is clearly very anxious some at this time  He is somewhat unable to be reassured  His wife reports he has a longstanding history of anxiety  At this time I have given him 1 mg of Ativan IV and we will see whether not this helps settle what appears to be his anxiety as well as confusion and myoclonus 
Patient is awake alert and conversant at the bedside this morning  There is no evidence of any encephalopathy present on today's exam   I believe the episodes yesterday was simply psychogenic and anxiety driven  His MRI and EEG are both done and noted to be within normal limits  On his EEG the muscle twitching and other motor movements that were concerning in rapid succession for potential seizure or epileptic origin were not electrographically noted 
The episode seen this afternoon appears to have occurred without trigger 
· 2/2 Protein S deficiency   · INR 10/6 was 1 94  · Continue home coumadin dose  · Repeat INR in AM
· 2/2 Protein S deficiency   · INR now therapeutic at 2 3   · Continue home coumadin dose  · Monitor INR 
· 2/2 Protein S deficiency   · INR toda 1 94  · Continue home coumadin dose  · Repeat INR in AM
· BP controlled  · Continue home medications
· Monitor    · For further workup and management if this worsens significantly
· Monitor    · For further workup and management if this worsens significantly
· POA  Patient's wife reported on admission that patient he had been very confused-- repeating himself and asking where he was and why certain family members weren't with them  Also with increased twitching and dizziness  · Lab work is unremarkable thus far  No acute findings on CXR and CT head  · Normal B12, and elevated folate; TSH is normal   · Wife reports patient has problems with anxiety/anger/depression  She states that there have been multiple family members who have passed away recently  She also states that these twitching episodes have been evaluated by EEG in the past and they are not epileptiform in etiology   · Consulted behavioral health-- appreciate their input   · As per psychiatrist, she prescribe the patient with haloperidol 0 5 mg twice a day, particularly for patient's chronic motor tic disorder  According to her, patient would best benefit from Pimozide comma but it is not available on our formulary  · Neurology on board:  Obtain EEG  MRI of the brain with and without contrast   Patient's vertigo likely peripheral, but check MRI for central cause of patient's vertigo  From my discussion with the neurologist, from his assessment, patient's symptoms likely psychogenic, but will do the tests to rule out organic cause 1st   Outpatient movement Disorder doctor follow-up 
· POA  Patient's wife reports he has been very confused today-- repeating himself and asking where he was and why certain family members weren't with them  Also with increased twitching and dizziness  · Lab work is unremarkable thus far  No acute findings on CXR and CT head  · B12, folate, TSH are pending   · Wife reports patient has problems with anxiety/anger/depression  She states that there have been multiple family members who have passed recently   She also states that these twitching episodes have been evaluate by EEG and they are not epileptiform in etiology   · Will consult behavioral health-- appreciate their input
· POA  Patient's wife reports he has been very confused today-- repeating himself and asking where he was and why certain family members weren't with them  Also with increased twitching and dizziness  · Lab work is unremarkable thus far  No acute findings on CXR and CT head  · Normal B12, and elevated folate; TSH is normal   · Wife reports patient has problems with anxiety/anger/depression  She states that there have been multiple family members who have passed recently  She also states that these twitching episodes have been evaluated by EEG in the past and they are not epileptiform in etiology   · Will consult behavioral health-- appreciate their input   · Nephrology on board:  Obtain EEG  MRI of the brain with and without contrast   Patient's vertigo likely peripheral, but check MRI for central cause of patient's vertigo  From my discussion with the neurologist, from his assessment, patient's symptoms likely psychogenic, but will do the tests to rule out organic cause 1st   Outpatient movement Disorder doctor follow-up 
· S/p colostomy in the 90s  · Continue colostomy care
· Wife reports long-standing hx of this   She states that recently he has been more anxious and angry and has been having more twitching   · Takes remeron  · Appreciate behavioral health input
· Wife reports long-standing hx of this   She states that recently he has been more anxious and angry and has been having more twitching   · Takes remeron  · Appreciate behavioral health input
· Wife reports long-standing hx of this   She states that recently he has been more anxious and angry and has been having more twitching   · Takes remeron  · Appreciate behavioral health input  · Outpatient follow-up with psychiatrist
na

## 2024-08-20 ENCOUNTER — ANTICOAG VISIT (OUTPATIENT)
Dept: INTERNAL MEDICINE CLINIC | Facility: CLINIC | Age: 79
End: 2024-08-20

## 2024-08-20 ENCOUNTER — APPOINTMENT (OUTPATIENT)
Dept: LAB | Facility: CLINIC | Age: 79
End: 2024-08-20
Payer: MEDICARE

## 2024-08-20 DIAGNOSIS — I82.4Y9 DEEP VEIN THROMBOSIS (DVT) OF PROXIMAL LOWER EXTREMITY, UNSPECIFIED CHRONICITY, UNSPECIFIED LATERALITY (HCC): ICD-10-CM

## 2024-08-20 LAB
INR PPP: 2.53 (ref 0.85–1.19)
PROTHROMBIN TIME: 27.9 SECONDS (ref 12.3–15)

## 2024-08-20 PROCEDURE — 85610 PROTHROMBIN TIME: CPT

## 2024-08-20 PROCEDURE — 36415 COLL VENOUS BLD VENIPUNCTURE: CPT

## 2024-08-22 ENCOUNTER — TELEPHONE (OUTPATIENT)
Age: 79
End: 2024-08-22

## 2024-09-18 ENCOUNTER — APPOINTMENT (OUTPATIENT)
Dept: LAB | Facility: CLINIC | Age: 79
End: 2024-09-18
Payer: MEDICARE

## 2024-09-18 ENCOUNTER — ANTICOAG VISIT (OUTPATIENT)
Dept: INTERNAL MEDICINE CLINIC | Facility: CLINIC | Age: 79
End: 2024-09-18

## 2024-09-18 DIAGNOSIS — I82.4Y9 DEEP VEIN THROMBOSIS (DVT) OF PROXIMAL LOWER EXTREMITY, UNSPECIFIED CHRONICITY, UNSPECIFIED LATERALITY (HCC): ICD-10-CM

## 2024-09-18 LAB
INR PPP: 2.63 (ref 0.85–1.19)
PROTHROMBIN TIME: 28.7 SECONDS (ref 12.3–15)

## 2024-09-18 PROCEDURE — 85610 PROTHROMBIN TIME: CPT

## 2024-09-18 PROCEDURE — 36415 COLL VENOUS BLD VENIPUNCTURE: CPT

## 2024-09-19 ENCOUNTER — TELEPHONE (OUTPATIENT)
Age: 79
End: 2024-09-19

## 2024-09-19 NOTE — TELEPHONE ENCOUNTER
Michelle (wife) called for coumadin instruction for INR completed 9/18/2024. States did not receive any message or instructions. Please advise. No message from Dr Schultz on directions

## 2024-09-19 NOTE — TELEPHONE ENCOUNTER
Felicia Zayas  9/19/2024 11:54 AM EDT Back to Top      Patient's wife advised of instructions.    Km Schultz DO  9/18/2024  5:06 PM EDT       Please add the PT INR to the Coumadin flow sheet and bring to me to address the coumadin level and when the next PT/INR will be,  Thank you

## 2024-09-24 ENCOUNTER — SOCIAL WORK (OUTPATIENT)
Dept: BEHAVIORAL/MENTAL HEALTH CLINIC | Facility: CLINIC | Age: 79
End: 2024-09-24
Payer: MEDICARE

## 2024-09-24 DIAGNOSIS — F41.1 GAD (GENERALIZED ANXIETY DISORDER): Primary | Chronic | ICD-10-CM

## 2024-09-24 DIAGNOSIS — F33.0 MAJOR DEPRESSIVE DISORDER, RECURRENT EPISODE, MILD (HCC): Chronic | ICD-10-CM

## 2024-09-24 PROCEDURE — 90832 PSYTX W PT 30 MINUTES: CPT
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
## 2024-09-24 NOTE — PSYCH
"Behavioral Health Psychotherapy Progress Note    Psychotherapy Provided: Individual Psychotherapy     1. JOSÉ LUIS (generalized anxiety disorder)        2. Major depressive disorder, recurrent episode, mild (HCC)          Goals addressed in session: Goal 1     DATA: Patient was present for his psychotherapy session.  He presented as anxious, but was pleasant and talkative; eye contact was good.  He was casually dressed, and appeared adequately groomed.  His cognition appeared good; he is oriented x3 and recalled that we last saw each other 6 weeks ago.  He expressed that he has felt depressed due to the same marital issues.  He reported that they continue to argue about \"everything\" and that she continues to not sleep in the bed.  He also verbalized disappointment regarding the lack of physical intimacy.  Therapist provided supportive therapy by validating his feelings and empathetic response.  Patient has been reluctant to explore ways to improve his marriage; he feels hopeless regarding this.  He expressed that his leg has healed nicely and that the pain he was having has significantly improved.  Some positives he noted is that his sister is out of hospice and that he has been talking more with his son.  He spoke about his desire to see her; therapist encouraged him to make his wife aware of this, but he seemed reluctant.  Therapist acknowledged that he is much easier to speak with now; he is no longer guarded.  Patient expressed that he does find it nice to talk to someone as he spends most of his days in silence.  He still is only able to tolerate 30 minutes of session.    During this session, this clinician used the following therapeutic modalities: Supportive Psychotherapy    Substance Abuse was not addressed during this session. If the client is diagnosed with a co-occurring substance use disorder, please indicate any changes in the frequency or amount of use: N/A. Stage of change for addressing substance use " "diagnoses: No substance use/Not applicable    ASSESSMENT:  Lorenzo De Jesus presents with a Euthymic/ normal and Anxious mood.     his affect is Normal range and intensity, which is congruent, with his mood and the content of the session. The client has made progress on their goals.     Lorenzo De Jesus presents with a none risk of suicide, none risk of self-harm, and none risk of harm to others.    For any risk assessment that surpasses a \"low\" rating, a safety plan must be developed.    A safety plan was indicated: No  If yes, describe in detail: N/A    PLAN: Between sessions, Lorenzo De Jesus will make his wife aware of his desire to see his sister. At the next session, the therapist will use Supportive Psychotherapy to continue to validate his feelings as he processes ongoing marital issues.    Behavioral Health Treatment Plan and Discharge Planning: Lorenzo De Jesus is aware of and agrees to continue to work on their treatment plan. They have identified and are working toward their discharge goals-Yes    Visit start and stop times:    09/24/24  Start Time: 1459  Stop Time: 1530  Total Visit Time: 31 minutes  "

## 2024-10-01 DIAGNOSIS — I10 ESSENTIAL HYPERTENSION: ICD-10-CM

## 2024-10-02 RX ORDER — AMLODIPINE BESYLATE 5 MG/1
5 TABLET ORAL DAILY
Qty: 90 TABLET | Refills: 1 | Status: SHIPPED | OUTPATIENT
Start: 2024-10-02

## 2024-10-03 ENCOUNTER — OFFICE VISIT (OUTPATIENT)
Dept: PSYCHIATRY | Facility: CLINIC | Age: 79
End: 2024-10-03
Payer: MEDICARE

## 2024-10-03 VITALS
SYSTOLIC BLOOD PRESSURE: 152 MMHG | HEART RATE: 78 BPM | WEIGHT: 151 LBS | HEIGHT: 66 IN | BODY MASS INDEX: 24.27 KG/M2 | DIASTOLIC BLOOD PRESSURE: 76 MMHG

## 2024-10-03 DIAGNOSIS — F33.0 MAJOR DEPRESSIVE DISORDER, RECURRENT EPISODE, MILD (HCC): Primary | Chronic | ICD-10-CM

## 2024-10-03 DIAGNOSIS — F41.0 PANIC DISORDER WITHOUT AGORAPHOBIA: Chronic | ICD-10-CM

## 2024-10-03 DIAGNOSIS — F09 MILD COGNITIVE DISORDER: Chronic | ICD-10-CM

## 2024-10-03 DIAGNOSIS — Z79.899 LONG-TERM USE OF HIGH-RISK MEDICATION: Chronic | ICD-10-CM

## 2024-10-03 DIAGNOSIS — F41.1 GAD (GENERALIZED ANXIETY DISORDER): Chronic | ICD-10-CM

## 2024-10-03 DIAGNOSIS — F50.82 AVOIDANT-RESTRICTIVE FOOD INTAKE DISORDER (ARFID): Chronic | ICD-10-CM

## 2024-10-03 DIAGNOSIS — G47.09 OTHER INSOMNIA: Chronic | ICD-10-CM

## 2024-10-03 PROCEDURE — 90833 PSYTX W PT W E/M 30 MIN: CPT | Performed by: PSYCHIATRY & NEUROLOGY

## 2024-10-03 PROCEDURE — 99214 OFFICE O/P EST MOD 30 MIN: CPT | Performed by: PSYCHIATRY & NEUROLOGY

## 2024-10-03 PROCEDURE — G2211 COMPLEX E/M VISIT ADD ON: HCPCS | Performed by: PSYCHIATRY & NEUROLOGY

## 2024-10-03 RX ORDER — SERTRALINE HYDROCHLORIDE 100 MG/1
150 TABLET, FILM COATED ORAL DAILY
Qty: 135 TABLET | Refills: 2 | Status: SHIPPED | OUTPATIENT
Start: 2024-10-03 | End: 2025-06-30

## 2024-10-03 RX ORDER — OLANZAPINE 10 MG/1
10 TABLET ORAL
Qty: 90 TABLET | Refills: 2 | Status: SHIPPED | OUTPATIENT
Start: 2024-10-03 | End: 2025-06-30

## 2024-10-03 NOTE — ASSESSMENT & PLAN NOTE
Continues to report anxiety symptoms  Does not want any additional medications to control anxiety  Continue Zoloft 150 mg daily to control depressive symptoms  Orders:    sertraline (ZOLOFT) 100 mg tablet; Take 1.5 tablets (150 mg total) by mouth daily

## 2024-10-03 NOTE — ASSESSMENT & PLAN NOTE
Depressive symptoms are controlled  Continue Zoloft 150 mg daily to control depressive symptoms  Continue Zyprexa 10 mg at bedtime to help with mood  Orders:    sertraline (ZOLOFT) 100 mg tablet; Take 1.5 tablets (150 mg total) by mouth daily    OLANZapine (ZyPREXA) 10 mg tablet; Take 1 tablet (10 mg total) by mouth daily at bedtime

## 2024-10-03 NOTE — BH TREATMENT PLAN
"TREATMENT PLAN (Medication Management Only)        [facility] - [facility]    Name/Date of Birth/MRN:  Lorenzo De Jesus 79 y.o. 1945 MRN: [id number]  Date of Treatment Plan: October 3, 2024  Diagnosis/Diagnoses:   1. Major depressive disorder, recurrent episode, mild (HCC)    2. JOSÉ LUIS (generalized anxiety disorder)    3. Panic disorder without agoraphobia    4. Avoidant-restrictive food intake disorder (ARFID)    5. Mild cognitive disorder    6. Other insomnia    7. Long-term use of high-risk medication      Strengths/Personal Resources for Self-Care: \"I like to walk\".  Area/Areas of need (in own words): \"manage anxiety\".  1. Long Term Goal:   improve control of anxiety, maintain improvement in depression  Target Date: 3 months - 1/3/2025  Person/Persons responsible for completion of goal: Lorenzo  2.  Short Term Objective (s) - How will we reach this goal?:   A.  Provider new recommended medication/dosage changes and/or continue medication(s): continue current medications as prescribed (Zoloft, Zyprexa, and Melatonin).  B.  N/A.  C.  N/A.  Target Date: 3 months - 1/3/2025  Person/Persons Responsible for Completion of Goal: Lorenzo   Progress Towards Goals: progressing  Treatment Modality: medication management every 3 months, continue psychotherapy with [facility] therapist  Review due 180 days from date of this plan: 6 months - 4/3/2025  Expected length of service: ongoing treatment unless revised  My Physician/PA/NP and I have developed this plan together and I agree to work on the goals and objectives. I understand the treatment goals that were developed for my treatment.  Electronic Signatures: on file (unless signed below)    Kerri Mojica MD 10/03/24  "

## 2024-10-03 NOTE — ASSESSMENT & PLAN NOTE
Follows with family physician for glucose and lipid monitoring due to current therapy with antipsychotic medication  Monitor lipid profile and hemoglobin A1C yearly due to current therapy with antipsychotic medication - gets labs done with PCP

## 2024-10-03 NOTE — ASSESSMENT & PLAN NOTE
Stable  Orders:    sertraline (ZOLOFT) 100 mg tablet; Take 1.5 tablets (150 mg total) by mouth daily

## 2024-10-08 ENCOUNTER — APPOINTMENT (OUTPATIENT)
Dept: LAB | Facility: CLINIC | Age: 79
End: 2024-10-08
Payer: MEDICARE

## 2024-10-08 ENCOUNTER — ANTICOAG VISIT (OUTPATIENT)
Dept: INTERNAL MEDICINE CLINIC | Facility: CLINIC | Age: 79
End: 2024-10-08

## 2024-10-08 DIAGNOSIS — I82.4Y9 DEEP VEIN THROMBOSIS (DVT) OF PROXIMAL LOWER EXTREMITY, UNSPECIFIED CHRONICITY, UNSPECIFIED LATERALITY (HCC): ICD-10-CM

## 2024-10-08 LAB
INR PPP: 2.01 (ref 0.85–1.19)
PROTHROMBIN TIME: 23.5 SECONDS (ref 12.3–15)

## 2024-10-08 PROCEDURE — 85610 PROTHROMBIN TIME: CPT

## 2024-10-08 PROCEDURE — 36415 COLL VENOUS BLD VENIPUNCTURE: CPT

## 2024-10-22 ENCOUNTER — SOCIAL WORK (OUTPATIENT)
Dept: BEHAVIORAL/MENTAL HEALTH CLINIC | Facility: CLINIC | Age: 79
End: 2024-10-22
Payer: MEDICARE

## 2024-10-22 ENCOUNTER — IMMUNIZATIONS (OUTPATIENT)
Dept: INTERNAL MEDICINE CLINIC | Facility: CLINIC | Age: 79
End: 2024-10-22
Payer: MEDICARE

## 2024-10-22 DIAGNOSIS — F41.1 GAD (GENERALIZED ANXIETY DISORDER): Chronic | ICD-10-CM

## 2024-10-22 DIAGNOSIS — F33.0 MAJOR DEPRESSIVE DISORDER, RECURRENT EPISODE, MILD (HCC): Primary | Chronic | ICD-10-CM

## 2024-10-22 DIAGNOSIS — Z23 NEED FOR INFLUENZA VACCINATION: Primary | ICD-10-CM

## 2024-10-22 PROCEDURE — 90662 IIV NO PRSV INCREASED AG IM: CPT

## 2024-10-22 PROCEDURE — G0008 ADMIN INFLUENZA VIRUS VAC: HCPCS

## 2024-10-22 PROCEDURE — 90832 PSYTX W PT 30 MINUTES: CPT

## 2024-10-22 NOTE — PSYCH
Behavioral Health Psychotherapy Progress Note    Psychotherapy Provided: Individual Psychotherapy     1. Major depressive disorder, recurrent episode, mild (HCC)        2. JOSÉ LUIS (generalized anxiety disorder)            Goals addressed in session: Goal 1     DATA: Patient was present for his psychotherapy session.  He presented as depressed and anxious; eye contact was adequate.  He was casually dressed and appeared adequately groomed.  His wife joined us for the first few minutes of session.  Therapist informed patient and his wife of her resignation; patient was disappointed but understanding.  He verbalized that therapy has been helpful and that he would like to work a therapist again; however, would like to wait for a new therapist to join this practice ([facility]).  Once patient's wife left he expressed that he has been feeling very depressed.  Therapist provided supportive therapy through validation of feelings and empathetic response as he spoke about conflict with his wife, financial stressors, and loneliness.  He continues to feel like his wife does not love him.  He also expressed that nothing is going right for him right now.  We spoke more about this therapist leaving, which he got tearful about.  He started twitching, which he refers to as panic attacks; they occur when he is feeling very distressed.  He expressed that this is the only space in which he gets to talk openly.  Therapist encouraged him to continue to do his walks and connect with his neighbors.  Therapist also praised him for his willingness to work with a new therapist.  Therapist taught him a breathing technique called box breathing and encouraged him to use it when he feels anxious.  He was provided a handout on box breathing and agreed to give it a try.  Lastly, he spoke about his sister who is doing much better.  He still hasn't seen her, but wants to; therapist again encouraged him to let his wife know this.    During this session, this  "clinician used the following therapeutic modalities: Supportive Psychotherapy    Substance Abuse was not addressed during this session. If the client is diagnosed with a co-occurring substance use disorder, please indicate any changes in the frequency or amount of use: N/A. Stage of change for addressing substance use diagnoses: No substance use/Not applicable    ASSESSMENT:  Lorenzo De Jesus presents with a Anxious and Depressed mood.     his affect is Tearful, which is congruent, with his mood and the content of the session. The client has made progress on their goals.     Lorenzo De Jesus presents with a none risk of suicide, none risk of self-harm, and none risk of harm to others.    For any risk assessment that surpasses a \"low\" rating, a safety plan must be developed.    A safety plan was indicated: No  If yes, describe in detail: N/A    PLAN: Between sessions, Lorenzo De Jesus will use box breathing technique when feeling anxious or panicked. At the next session, the therapist will use Supportive Psychotherapy to process feelings related to discharge.    Behavioral Health Treatment Plan and Discharge Planning: Lorenzo De Jesus is aware of and agrees to continue to work on their treatment plan. They have identified and are working toward their discharge goals-Yes    Visit start and stop times:    10/22/24  Start Time: 1501  Stop Time: 1533  Total Visit Time: 32 minutes  "

## 2024-11-12 ENCOUNTER — SOCIAL WORK (OUTPATIENT)
Dept: BEHAVIORAL/MENTAL HEALTH CLINIC | Facility: CLINIC | Age: 79
End: 2024-11-12
Payer: MEDICARE

## 2024-11-12 ENCOUNTER — ANTICOAG VISIT (OUTPATIENT)
Dept: INTERNAL MEDICINE CLINIC | Facility: CLINIC | Age: 79
End: 2024-11-12

## 2024-11-12 ENCOUNTER — TELEPHONE (OUTPATIENT)
Dept: PSYCHIATRY | Facility: CLINIC | Age: 79
End: 2024-11-12

## 2024-11-12 ENCOUNTER — APPOINTMENT (OUTPATIENT)
Dept: LAB | Facility: CLINIC | Age: 79
End: 2024-11-12
Payer: MEDICARE

## 2024-11-12 DIAGNOSIS — I82.4Y9 DEEP VEIN THROMBOSIS (DVT) OF PROXIMAL LOWER EXTREMITY, UNSPECIFIED CHRONICITY, UNSPECIFIED LATERALITY (HCC): ICD-10-CM

## 2024-11-12 DIAGNOSIS — F41.1 GAD (GENERALIZED ANXIETY DISORDER): Chronic | ICD-10-CM

## 2024-11-12 DIAGNOSIS — F33.0 MAJOR DEPRESSIVE DISORDER, RECURRENT EPISODE, MILD (HCC): Primary | Chronic | ICD-10-CM

## 2024-11-12 LAB
INR PPP: 2.45 (ref 0.85–1.19)
PROTHROMBIN TIME: 27.3 SECONDS (ref 12.3–15)

## 2024-11-12 PROCEDURE — 90832 PSYTX W PT 30 MINUTES: CPT

## 2024-11-12 PROCEDURE — 36415 COLL VENOUS BLD VENIPUNCTURE: CPT

## 2024-11-12 PROCEDURE — 85610 PROTHROMBIN TIME: CPT

## 2024-11-12 NOTE — PSYCH
Psychotherapy Discharge Summary    Preferred Name: Lorenzo De Jesus  YOB: 1945    Admission date to psychotherapy: 04/16/2024    Referred by: Dr. Km Schultz DO    Presenting Problem: Depression and anxiety due to marital issues and health issues.  Patient has tics when he feels very anxious.  He frequently worries about his family and his colostomy bag.  Additionally, there were some concerns regarding anger management and his cognitive health; however, these were not as observable as sessions progressed with patient.     Course of treatment included: Medication management and individual therapy     Progress/Outcome of Treatment Goals (brief summary of course of treatment):  Patient was seen for 10 individual psychotherapy sessions.  Patient's goals are to improve his communication with his wife an to alleviate depressive symptoms and anxiety.  There were no improvements regarding his relationship with his wife; she has not be interested in marital counseling.  Unfortunately, his marital issues are the primary trigger of his anxiety and depression, so improvements have been minimal.  He does express that the therapy sessions are helpful to him in the moment.  Therapist has attempted to work on addressing his communication style through modeling as he has minimal interest in this.  He does best when his feelings are validated and he can just speak openly about them.    Treatment Complications (if any): Primary trigger appears to be the marriage and wife is not willing to doing joint sessions.  Additionally, patient is only able to tolerate 30 minutes of session monthly and is resistant to treatment suggestions.    Current [facility] Psychiatric Provider: Dr. Kerri Mojica MD     Discharge Medications Includes: Zoloft and Zyprexa    Discharge Date: 11/12/2024    Discharge Diagnosis: Major Depressive Disorder, Recurrent Episode, Mild and Generalized Anxiety Disorder     Criteria for Discharge:  Therapist is leaving the practice and patient has chosen to be discharged from services; he does not want to start with a new therapist.    Aftercare recommendations include (include specific referral names and phone numbers, if appropriate): Continue current psychiatric medication regimen and supportive psychotherapy monthly.    Prognosis: Fair

## 2024-11-12 NOTE — PSYCH
Behavioral Health Psychotherapy Progress Note    Psychotherapy Provided: Individual Psychotherapy     1. Major depressive disorder, recurrent episode, mild (HCC)        2. JOSÉ LUIS (generalized anxiety disorder)          Goals addressed in session: Goal 1     DATA: Patient was present for his final psychotherapy session.  He presented as anxious and sad, but engaged; eye contact was good.  He was casually dressed and appeared well groomed.  He immediately reported that he doesn't feel well and that he and his wife have been fighting a lot.  He did not want to talk about what they have been fighting about but stated several times throughout session that he can't say anything right.  He expressed that he stopped kissing her andrew or telling her he loves her a few days ago; however, it was evident that this is upsetting to him.  He was tearful as he spoke about how they sit in silence for hours; therapist acknowledged that this must be lonely.  He agreed that he does feel lonely but is not interested in exploring other ways to fill his social needs.  He still has not asked his wife about visiting his sister because he is afraid to upset her but did agree that he can ask his son; his son is getting laid off of his job.  He was excited to share that he and his son spent time together over the weekend and reported that he has been talking to him more.  He also reported that he lost the box breathing technique this therapist taught him; therapist provided him with a new copy.      His wife joined us for a few minutes so we could speak about transfer of care.  At this time, he is declining to work with a new therapist even though he acknowledged that therapy has been helpful.      During this session, this clinician used the following therapeutic modalities: Supportive Psychotherapy    Substance Abuse was not addressed during this session. If the client is diagnosed with a co-occurring substance use disorder, please indicate  "any changes in the frequency or amount of use: N/A. Stage of change for addressing substance use diagnoses: No substance use/Not applicable    ASSESSMENT:  Lorenzo De Jesus presents with a Anxious and Dysthymic mood.     his affect is Tearful, which is congruent, with his mood and the content of the session. The client has made some progress on their goals; he reported feeling a little less depressed.     Lorenzo De Jesus presents with a minimal risk of suicide, none risk of self-harm, and none risk of harm to others.    For any risk assessment that surpasses a \"low\" rating, a safety plan must be developed.    A safety plan was indicated: No  If yes, describe in detail: N/A    PLAN: Lorenzo De Jesus is being discharged from services, but is considering restarting services outside of the network after winter.    Behavioral Health Treatment Plan and Discharge Planning: Lorenzo De Jesus is aware of and agrees to continue to work on their treatment plan. They have identified and are working toward their discharge goals-Yes    Visit start and stop times:    11/12/24  Start Time: 1459  Stop Time: 1534  Total Visit Time: 35 minutes  "

## 2024-11-13 ENCOUNTER — RESULTS FOLLOW-UP (OUTPATIENT)
Dept: INTERNAL MEDICINE CLINIC | Facility: CLINIC | Age: 79
End: 2024-11-13

## 2024-11-15 DIAGNOSIS — E78.5 HLD (HYPERLIPIDEMIA): ICD-10-CM

## 2024-11-15 RX ORDER — ROSUVASTATIN CALCIUM 5 MG/1
5 TABLET, COATED ORAL DAILY
Qty: 30 TABLET | Refills: 5 | Status: SHIPPED | OUTPATIENT
Start: 2024-11-15

## 2024-11-21 ENCOUNTER — TELEPHONE (OUTPATIENT)
Age: 79
End: 2024-11-21

## 2024-11-21 NOTE — TELEPHONE ENCOUNTER
Patients GI provider:  Dr. Bravo    Number to return call: [phone]    Reason for call: Pt's wife returned our call to reschedule pt appt but first available is not until mid July. Pt was upset as she said her original appt was in Sept/Oct and was rescheduled to Feb and now is being rescheduled again to July. Pt would like something sooner than July and she did not schedule. Please reach out to pt if we can get her in sooner.     Scheduled procedure/appointment date if applicable: Apt/procedure N/A

## 2024-12-17 ENCOUNTER — APPOINTMENT (OUTPATIENT)
Dept: LAB | Facility: CLINIC | Age: 79
End: 2024-12-17
Payer: MEDICARE

## 2024-12-17 DIAGNOSIS — I82.4Y9 DEEP VEIN THROMBOSIS (DVT) OF PROXIMAL LOWER EXTREMITY, UNSPECIFIED CHRONICITY, UNSPECIFIED LATERALITY (HCC): ICD-10-CM

## 2024-12-17 LAB
INR PPP: 2.33 (ref 0.85–1.19)
PROTHROMBIN TIME: 26.3 SECONDS (ref 12.3–15)

## 2024-12-17 PROCEDURE — 36415 COLL VENOUS BLD VENIPUNCTURE: CPT

## 2024-12-17 PROCEDURE — 85610 PROTHROMBIN TIME: CPT

## 2024-12-18 ENCOUNTER — ANTICOAG VISIT (OUTPATIENT)
Dept: INTERNAL MEDICINE CLINIC | Facility: CLINIC | Age: 79
End: 2024-12-18

## 2024-12-18 ENCOUNTER — TELEPHONE (OUTPATIENT)
Age: 79
End: 2024-12-18

## 2024-12-18 NOTE — TELEPHONE ENCOUNTER
Patients wife called regarding patients pt/inr results.Patient received the results on his Mychart,wife wanted to know if the coumadin dosage will change.Please advise.    Name band;

## 2024-12-30 ENCOUNTER — TELEPHONE (OUTPATIENT)
Age: 79
End: 2024-12-30
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
## 2025-01-02 ENCOUNTER — OFFICE VISIT (OUTPATIENT)
Dept: PSYCHIATRY | Facility: CLINIC | Age: 80
End: 2025-01-02
Payer: MEDICARE

## 2025-01-02 VITALS
HEIGHT: 66 IN | BODY MASS INDEX: 24.91 KG/M2 | HEART RATE: 71 BPM | WEIGHT: 155 LBS | SYSTOLIC BLOOD PRESSURE: 168 MMHG | DIASTOLIC BLOOD PRESSURE: 53 MMHG

## 2025-01-02 DIAGNOSIS — F09 MILD COGNITIVE DISORDER: Chronic | ICD-10-CM

## 2025-01-02 DIAGNOSIS — F41.0 PANIC DISORDER WITHOUT AGORAPHOBIA: Chronic | ICD-10-CM

## 2025-01-02 DIAGNOSIS — F50.82 AVOIDANT-RESTRICTIVE FOOD INTAKE DISORDER (ARFID): Chronic | ICD-10-CM

## 2025-01-02 DIAGNOSIS — F41.1 GAD (GENERALIZED ANXIETY DISORDER): Chronic | ICD-10-CM

## 2025-01-02 DIAGNOSIS — Z79.899 LONG-TERM USE OF HIGH-RISK MEDICATION: Chronic | ICD-10-CM

## 2025-01-02 DIAGNOSIS — F33.0 MAJOR DEPRESSIVE DISORDER, RECURRENT EPISODE, MILD (HCC): Primary | Chronic | ICD-10-CM

## 2025-01-02 DIAGNOSIS — G47.09 OTHER INSOMNIA: Chronic | ICD-10-CM

## 2025-01-02 PROCEDURE — 90833 PSYTX W PT W E/M 30 MIN: CPT | Performed by: PSYCHIATRY & NEUROLOGY

## 2025-01-02 PROCEDURE — 99214 OFFICE O/P EST MOD 30 MIN: CPT | Performed by: PSYCHIATRY & NEUROLOGY

## 2025-01-02 PROCEDURE — G2211 COMPLEX E/M VISIT ADD ON: HCPCS | Performed by: PSYCHIATRY & NEUROLOGY

## 2025-01-02 NOTE — ASSESSMENT & PLAN NOTE
Chronic on and off anxiety    Continue Zoloft 150 mg daily to control depressive symptoms and anxiety

## 2025-01-02 NOTE — ASSESSMENT & PLAN NOTE
Mild chronic depressive symptoms    Continue Zoloft 150 mg daily to control depressive symptoms  Continue Zyprexa 10 mg at bedtime to help with mood  Does not want any medication changes

## 2025-01-03 ENCOUNTER — TELEPHONE (OUTPATIENT)
Age: 80
End: 2025-01-03

## 2025-01-03 NOTE — TELEPHONE ENCOUNTER
Contacted patient regarding an [employer] from provider requesting patient to start talk therapy. LVM for patient to call back to schedule patient for talk therapy at [phone] opt 3.

## 2025-01-06 ENCOUNTER — TELEPHONE (OUTPATIENT)
Age: 80
End: 2025-01-06

## 2025-01-06 DIAGNOSIS — Z13.6 SCREENING FOR CARDIOVASCULAR CONDITION: ICD-10-CM

## 2025-01-06 DIAGNOSIS — E03.8 SUBCLINICAL HYPOTHYROIDISM: ICD-10-CM

## 2025-01-06 DIAGNOSIS — I10 PRIMARY HYPERTENSION: Primary | ICD-10-CM

## 2025-01-06 NOTE — TELEPHONE ENCOUNTER
Patient's spouse Michelle called in to confirm any active lab orders that need to be completed prior to next OV scheduled for 1/20/25. Patient has PSA ordered 7/23/24. Does this need to be completed? Please follow up with Michelle for provider response.

## 2025-01-06 NOTE — TELEPHONE ENCOUNTER
Pt's wife returned call, explained that pt has been scheduled for TT in April; 4/7/25 with Damaso Álvarez (DAVID).    [Negative] : Heme/Lymph

## 2025-01-14 ENCOUNTER — RA CDI HCC (OUTPATIENT)
Dept: OTHER | Facility: HOSPITAL | Age: 80
End: 2025-01-14

## 2025-01-15 ENCOUNTER — APPOINTMENT (OUTPATIENT)
Dept: LAB | Facility: CLINIC | Age: 80
End: 2025-01-15
Payer: MEDICARE

## 2025-01-15 ENCOUNTER — ANTICOAG VISIT (OUTPATIENT)
Dept: INTERNAL MEDICINE CLINIC | Facility: CLINIC | Age: 80
End: 2025-01-15

## 2025-01-15 DIAGNOSIS — Z13.6 SCREENING FOR CARDIOVASCULAR CONDITION: ICD-10-CM

## 2025-01-15 DIAGNOSIS — I82.4Y9 DEEP VEIN THROMBOSIS (DVT) OF PROXIMAL LOWER EXTREMITY, UNSPECIFIED CHRONICITY, UNSPECIFIED LATERALITY (HCC): ICD-10-CM

## 2025-01-15 DIAGNOSIS — I10 PRIMARY HYPERTENSION: ICD-10-CM

## 2025-01-15 DIAGNOSIS — E03.8 SUBCLINICAL HYPOTHYROIDISM: ICD-10-CM

## 2025-01-15 LAB
ALBUMIN SERPL BCG-MCNC: 4.3 G/DL (ref 3.5–5)
ALP SERPL-CCNC: 77 U/L (ref 34–104)
ALT SERPL W P-5'-P-CCNC: 18 U/L (ref 7–52)
ANION GAP SERPL CALCULATED.3IONS-SCNC: 5 MMOL/L (ref 4–13)
AST SERPL W P-5'-P-CCNC: 20 U/L (ref 13–39)
BILIRUB SERPL-MCNC: 1.27 MG/DL (ref 0.2–1)
BUN SERPL-MCNC: 17 MG/DL (ref 5–25)
CALCIUM SERPL-MCNC: 9.2 MG/DL (ref 8.4–10.2)
CHLORIDE SERPL-SCNC: 105 MMOL/L (ref 96–108)
CHOLEST SERPL-MCNC: 152 MG/DL (ref ?–200)
CO2 SERPL-SCNC: 29 MMOL/L (ref 21–32)
CREAT SERPL-MCNC: 0.75 MG/DL (ref 0.6–1.3)
GFR SERPL CREATININE-BSD FRML MDRD: 87 ML/MIN/1.73SQ M
GLUCOSE P FAST SERPL-MCNC: 94 MG/DL (ref 65–99)
HDLC SERPL-MCNC: 62 MG/DL
INR PPP: 2.36 (ref 0.85–1.19)
LDLC SERPL CALC-MCNC: 71 MG/DL (ref 0–100)
POTASSIUM SERPL-SCNC: 4.2 MMOL/L (ref 3.5–5.3)
PROT SERPL-MCNC: 7 G/DL (ref 6.4–8.4)
PROTHROMBIN TIME: 26.5 SECONDS (ref 12.3–15)
PSA SERPL-MCNC: 2.41 NG/ML (ref 0–4)
SODIUM SERPL-SCNC: 139 MMOL/L (ref 135–147)
TRIGL SERPL-MCNC: 93 MG/DL (ref ?–150)
TSH SERPL DL<=0.05 MIU/L-ACNC: 2.55 UIU/ML (ref 0.45–4.5)

## 2025-01-15 PROCEDURE — 80061 LIPID PANEL: CPT

## 2025-01-15 PROCEDURE — 80053 COMPREHEN METABOLIC PANEL: CPT

## 2025-01-15 PROCEDURE — 85610 PROTHROMBIN TIME: CPT

## 2025-01-15 PROCEDURE — 84443 ASSAY THYROID STIM HORMONE: CPT

## 2025-01-17 ENCOUNTER — OFFICE VISIT (OUTPATIENT)
Dept: GASTROENTEROLOGY | Facility: AMBULARY SURGERY CENTER | Age: 80
End: 2025-01-17
Payer: MEDICARE

## 2025-01-17 VITALS
OXYGEN SATURATION: 97 % | BODY MASS INDEX: 25.69 KG/M2 | HEIGHT: 65 IN | WEIGHT: 154.2 LBS | SYSTOLIC BLOOD PRESSURE: 136 MMHG | DIASTOLIC BLOOD PRESSURE: 80 MMHG | HEART RATE: 83 BPM

## 2025-01-17 DIAGNOSIS — D50.9 IRON DEFICIENCY ANEMIA, UNSPECIFIED IRON DEFICIENCY ANEMIA TYPE: ICD-10-CM

## 2025-01-17 DIAGNOSIS — K50.919 CROHN'S DISEASE WITH COMPLICATION, UNSPECIFIED GASTROINTESTINAL TRACT LOCATION (HCC): ICD-10-CM

## 2025-01-17 DIAGNOSIS — D69.6 THROMBOCYTOPENIA (HCC): ICD-10-CM

## 2025-01-17 DIAGNOSIS — R17 ELEVATED BILIRUBIN: ICD-10-CM

## 2025-01-17 DIAGNOSIS — K21.9 GASTROESOPHAGEAL REFLUX DISEASE WITHOUT ESOPHAGITIS: Primary | ICD-10-CM

## 2025-01-17 DIAGNOSIS — Z93.3 COLOSTOMY STATUS (HCC): ICD-10-CM

## 2025-01-17 PROCEDURE — 99213 OFFICE O/P EST LOW 20 MIN: CPT | Performed by: PHYSICIAN ASSISTANT

## 2025-01-17 NOTE — PROGRESS NOTES
Name: Lorenzo De Jesus      : 1945      MRN: [id number]  Encounter Provider: Sofia Luke PA-C  Encounter Date: 2025   Encounter department: [facility]  :  Assessment & Plan  Gastroesophageal reflux disease without esophagitis  Patient with a long history of symptoms however prior EGDs were relatively unremarkable besides mild gastritis.    -Patient's wife is asking about tapering off of Protonix.  His most recent EGD did not show any chronic conditions that require long-term use of this.  -Can stop Protonix.  - Can use Pepcid 20 mg 1-2 times a day as needed  -If any worsening symptoms, can restart Protonix       Crohn's disease with complication, unspecified gastrointestinal tract location (HCC)  Patient with history of Crohn's status post total proctocolectomy with end ileostomy.  No evidence of disease on ileoscopy in .    -No evidence of active disease at this time.  Continue to monitor and consider further evaluation with ileoscopy, imaging based on development of symptoms in the future.       Colostomy status (HCC)  See Crohns above.       Elevated bilirubin  On most recent lab work patient had mild elevation of total bilirubin at 1.27 with other liver enzymes normal.  Denies alcohol use.  No recent new medications.  Otherwise feeling well.    -Recommend rechecking hepatic function next month.  - If persistent elevation of bilirubin, recommend right upper quadrant ultrasound and full serological workup.       Thrombocytopenia (HCC)  Most recent lab work showed normal platelets of 158.       Iron deficiency anemia, unspecified iron deficiency anemia type  Patient evaluation for iron deficiency anemia 2023 with relatively unremarkable EGD and ileoscopy.  Repeat lab work last year showing normal hemoglobin and iron studies.    -Can continue iron supplement for now.  - Recommend rechecking CBC and iron studies on routine labs with PCP         Follow-up  in 1 year or earlier based on labs if needed.    History of Present Illness   HPI  Lorenzo De Jesus is a 79 y.o. male who presents with Crohn's disease status post total proctocolectomy, hypertension, DVT on warfarin who presents the office for follow-up after last being seen 3/2024.  Patient presents with his wife.    Patient reports he is doing well.  His biggest complaint at this time is back pain.  Otherwise he denies any nausea or vomiting.  Weight is stable.  Appetite is normal.  Reports normal output from ostomy.  No bleeding.    Most recent abdominal imaging 10/2023 was unremarkable.  Most recent lab work showed normal thyroid function.  He had mild elevation of total bilirubin at 1.27 on most recent labs.  Last hemoglobin checked around 1 year ago was normal with hemoglobin of 14.    Denies alcohol use.  No new medications.    Patient had EGD 11/2023 showing moderate gastritis otherwise biopsies negative for celiac and H. pylori.  Ileoscopy performed at that time which was unremarkable up to approximately 40 cm with no evidence of active inflammatory bowel disease.        Review of Systems   All other systems reviewed and are negative.         Objective   There were no vitals taken for this visit.     Physical Exam  Vitals reviewed.   Constitutional:       General: He is not in acute distress.     Appearance: Normal appearance. He is not ill-appearing.   HENT:      Head: Normocephalic and atraumatic.   Eyes:      Conjunctiva/sclera: Conjunctivae normal.   Cardiovascular:      Rate and Rhythm: Normal rate and regular rhythm.      Heart sounds: No murmur heard.  Pulmonary:      Effort: Pulmonary effort is normal. No respiratory distress.      Breath sounds: Normal breath sounds.   Abdominal:      General: Abdomen is flat. There is no distension.      Palpations: Abdomen is soft.      Tenderness: There is no abdominal tenderness. There is no guarding or rebound.   Musculoskeletal:         General: No  swelling.      Cervical back: Normal range of motion.      Right lower leg: No edema.      Left lower leg: No edema.   Skin:     General: Skin is warm.      Coloration: Skin is not jaundiced.   Neurological:      General: No focal deficit present.      Mental Status: He is alert and oriented to person, place, and time. Mental status is at baseline.   Psychiatric:         Mood and Affect: Mood normal.         Behavior: Behavior normal.

## 2025-01-17 NOTE — ASSESSMENT & PLAN NOTE
Patient evaluation for iron deficiency anemia 11/2023 with relatively unremarkable EGD and ileoscopy.  Repeat lab work last year showing normal hemoglobin and iron studies.    -Can continue iron supplement for now.  - Recommend rechecking CBC and iron studies on routine labs with PCP

## 2025-01-17 NOTE — ASSESSMENT & PLAN NOTE
Patient with history of Crohn's status post total proctocolectomy with end ileostomy.  No evidence of disease on ileoscopy in 2023.    -No evidence of active disease at this time.  Continue to monitor and consider further evaluation with ileoscopy, imaging based on development of symptoms in the future.

## 2025-01-17 NOTE — PATIENT INSTRUCTIONS
You can stop Protonix. Take Pepcid 20 mg 1-2 times a day as needed instead. If you have any worsening symptoms, then go back to Protonix and reach out if needed.

## 2025-01-17 NOTE — ASSESSMENT & PLAN NOTE
Patient with a long history of symptoms however prior EGDs were relatively unremarkable besides mild gastritis.    -Patient's wife is asking about tapering off of Protonix.  His most recent EGD did not show any chronic conditions that require long-term use of this.  -Can stop Protonix.  - Can use Pepcid 20 mg 1-2 times a day as needed  -If any worsening symptoms, can restart Protonix

## 2025-01-20 ENCOUNTER — OFFICE VISIT (OUTPATIENT)
Dept: INTERNAL MEDICINE CLINIC | Facility: CLINIC | Age: 80
End: 2025-01-20
Payer: MEDICARE

## 2025-01-20 VITALS
BODY MASS INDEX: 25.79 KG/M2 | WEIGHT: 155 LBS | DIASTOLIC BLOOD PRESSURE: 88 MMHG | OXYGEN SATURATION: 97 % | SYSTOLIC BLOOD PRESSURE: 138 MMHG | HEART RATE: 77 BPM

## 2025-01-20 DIAGNOSIS — R41.3 MEMORY LOSS: ICD-10-CM

## 2025-01-20 DIAGNOSIS — K50.00 CROHN'S DISEASE OF SMALL INTESTINE WITHOUT COMPLICATION (HCC): ICD-10-CM

## 2025-01-20 DIAGNOSIS — I82.4Y9 DEEP VEIN THROMBOSIS (DVT) OF PROXIMAL LOWER EXTREMITY, UNSPECIFIED CHRONICITY, UNSPECIFIED LATERALITY (HCC): ICD-10-CM

## 2025-01-20 DIAGNOSIS — I10 PRIMARY HYPERTENSION: Primary | ICD-10-CM

## 2025-01-20 DIAGNOSIS — E03.8 SUBCLINICAL HYPOTHYROIDISM: ICD-10-CM

## 2025-01-20 PROCEDURE — G2211 COMPLEX E/M VISIT ADD ON: HCPCS | Performed by: INTERNAL MEDICINE

## 2025-01-20 PROCEDURE — 99214 OFFICE O/P EST MOD 30 MIN: CPT | Performed by: INTERNAL MEDICINE

## 2025-01-20 NOTE — PROGRESS NOTES
Name: Lorenzo De Jesus      : 1945      MRN: [id number]  Encounter Provider: Km Schultz DO  Encounter Date: 2025   [facility]: [facility]  :  Assessment & Plan  Primary hypertension  Hypertension - controlled, I have counseled patient following healthy balance diet, I would like the patient reduce sodium, exercise routinely, I would like the patient continued the med current medical regiment and we will continue to monitor.  Continue amlodipine 5 mg once daily       Deep vein thrombosis (DVT) of proximal lower extremity, unspecified chronicity, unspecified laterality (HCC)  Clinically stable and doing well continue the current medical regiment will continue monitor.  Therapeutic on Coumadin and INRs have been stable over the last 6 months Target INR 2-3       Memory loss  Mild memory loss over the last year or 2 MMSE completed in the office 25 out of 30 possibly related to hearing loss/behavioral health?  Early MCI; patient's wife is concerned is related to the higher dose of melatonin in combination with his psychiatric medication I did talk about undergoing MRI neuro quantitative testing he would like to hold off and observe his symptoms for now we will decrease the dose of melatonin to 3 mg at bedtime to see if this will be helpful we will also check a TSH and B12 level I did asked his wife to monitor if there is any worsening or changing to notify me for further testing  Orders:  •  TSH, 3rd generation; Future  •  Vitamin B12; Future    Subclinical hypothyroidism  Check third-generation TSH       Crohn's disease of small intestine without complication (HCC)  Clinically appears to be stable will check B12 level       RTO in 6 months call if any problems  I have spent a total time of 30 minutes in caring for this patient on the day of the visit/encounter including Diagnostic results, Impressions, Counseling / Coordination of care, Documenting in the medical  record, Reviewing / ordering tests, medicine, procedures  , and Obtaining or reviewing history  .      History of Present Illness   HPI 79-year old male coming in for a follow up visit regarding hypertension, DVT, memory loss, subclinical hypothyroidism and Crohn's disease; the patient reports me compliant taking medications without untoward side effects the.  The patient is here to review his medical condition, update me on the medical condition and the patient reports me no hospitalizations and no ER visits.  No injuries no illnesses he is walking less with the winter months and cold temperatures.  Wife has noticed he has been forgetting things , ? Melatonin v behavioral habit.  Patient's wife is concerned that memory loss is related to recombination of the Protonix and higher dose of melatonin he has had this memory loss over the last year or so.  Reports me that he is misplacing things.  She is interested in reducing the dose of melatonin.  There are other factors for his memory loss including his hearing loss and also his behavioral health problems.  He will be seeing counselor Roma in the near future.  He is working with psychiatry currently on Zoloft Zyprexa.  No injuries no illnesses here to review laboratories in detail  Review of Systems   Constitutional:  Negative for activity change, appetite change and unexpected weight change.   HENT:  Negative for congestion and postnasal drip.    Eyes:  Negative for visual disturbance.   Respiratory:  Negative for cough and shortness of breath.    Cardiovascular:  Negative for chest pain.   Gastrointestinal:  Negative for abdominal pain, diarrhea, nausea and vomiting.   Neurological:  Negative for dizziness, light-headedness and headaches.   Hematological:  Negative for adenopathy.   Mild memory loss  MMSE 25 out of 30    Objective   /88 (BP Location: Left arm, Patient Position: Sitting, Cuff Size: Standard)   Pulse 77   Wt 70.3 kg (155 lb)   SpO2 97%    BMI 25.79 kg/m²      Physical Exam  Vitals and nursing note reviewed.   Constitutional:       General: He is not in acute distress.     Appearance: Normal appearance. He is well-developed. He is not ill-appearing, toxic-appearing or diaphoretic.   HENT:      Head: Normocephalic and atraumatic.      Right Ear: External ear normal.      Left Ear: External ear normal.      Nose: Nose normal.   Eyes:      Pupils: Pupils are equal, round, and reactive to light.   Cardiovascular:      Rate and Rhythm: Normal rate and regular rhythm.      Heart sounds: Normal heart sounds. No murmur heard.  Pulmonary:      Effort: Pulmonary effort is normal.      Breath sounds: Normal breath sounds.   Abdominal:      General: There is no distension.      Palpations: Abdomen is soft.      Tenderness: There is no abdominal tenderness. There is no guarding.   Neurological:      Mental Status: He is alert.   Psychiatric:         Mood and Affect: Mood is anxious. Mood is not depressed.         Thought Content: Thought content does not include suicidal ideation.

## 2025-01-20 NOTE — ASSESSMENT & PLAN NOTE
Mild memory loss over the last year or 2 MMSE completed in the office 25 out of 30 possibly related to hearing loss/behavioral health?  Early MCI; patient's wife is concerned is related to the higher dose of melatonin in combination with his psychiatric medication I did talk about undergoing MRI neuro quantitative testing he would like to hold off and observe his symptoms for now we will decrease the dose of melatonin to 3 mg at bedtime to see if this will be helpful we will also check a TSH and B12 level I did asked his wife to monitor if there is any worsening or changing to notify me for further testing  Orders:  •  TSH, 3rd generation; Future  •  Vitamin B12; Future

## 2025-01-20 NOTE — ASSESSMENT & PLAN NOTE
Clinically stable and doing well continue the current medical regiment will continue monitor.  Therapeutic on Coumadin and INRs have been stable over the last 6 months Target INR 2-3

## 2025-02-05 ENCOUNTER — APPOINTMENT (OUTPATIENT)
Dept: LAB | Facility: CLINIC | Age: 80
End: 2025-02-05
Payer: MEDICARE

## 2025-02-05 DIAGNOSIS — I82.4Y9 DEEP VEIN THROMBOSIS (DVT) OF PROXIMAL LOWER EXTREMITY, UNSPECIFIED CHRONICITY, UNSPECIFIED LATERALITY (HCC): ICD-10-CM

## 2025-02-05 DIAGNOSIS — R17 ELEVATED BILIRUBIN: ICD-10-CM

## 2025-02-05 DIAGNOSIS — R41.3 MEMORY LOSS: ICD-10-CM

## 2025-02-05 LAB
INR PPP: 2.56 (ref 0.85–1.19)
PROTHROMBIN TIME: 28.1 SECONDS (ref 12.3–15)

## 2025-02-05 PROCEDURE — 36415 COLL VENOUS BLD VENIPUNCTURE: CPT

## 2025-02-05 PROCEDURE — 85610 PROTHROMBIN TIME: CPT

## 2025-02-06 ENCOUNTER — ANTICOAG VISIT (OUTPATIENT)
Dept: INTERNAL MEDICINE CLINIC | Facility: CLINIC | Age: 80
End: 2025-02-06

## 2025-03-05 ENCOUNTER — APPOINTMENT (OUTPATIENT)
Dept: LAB | Facility: AMBULARY SURGERY CENTER | Age: 80
End: 2025-03-05
Payer: MEDICARE

## 2025-03-05 DIAGNOSIS — I82.4Y9 DEEP VEIN THROMBOSIS (DVT) OF PROXIMAL LOWER EXTREMITY, UNSPECIFIED CHRONICITY, UNSPECIFIED LATERALITY (HCC): ICD-10-CM

## 2025-03-05 LAB
ALBUMIN SERPL BCG-MCNC: 4 G/DL (ref 3.5–5)
ALP SERPL-CCNC: 93 U/L (ref 34–104)
ALT SERPL W P-5'-P-CCNC: 14 U/L (ref 7–52)
AST SERPL W P-5'-P-CCNC: 23 U/L (ref 13–39)
BILIRUB DIRECT SERPL-MCNC: 0.24 MG/DL (ref 0–0.2)
BILIRUB SERPL-MCNC: 1.06 MG/DL (ref 0.2–1)
INR PPP: 2.87 (ref 0.85–1.19)
PROT SERPL-MCNC: 6.4 G/DL (ref 6.4–8.4)
PROTHROMBIN TIME: 29.8 SECONDS (ref 12.3–15)
TSH SERPL DL<=0.05 MIU/L-ACNC: 1.93 UIU/ML (ref 0.45–4.5)
VIT B12 SERPL-MCNC: 463 PG/ML (ref 180–914)

## 2025-03-05 PROCEDURE — 85610 PROTHROMBIN TIME: CPT

## 2025-03-05 PROCEDURE — 84443 ASSAY THYROID STIM HORMONE: CPT

## 2025-03-05 PROCEDURE — 82607 VITAMIN B-12: CPT

## 2025-03-05 PROCEDURE — 36415 COLL VENOUS BLD VENIPUNCTURE: CPT

## 2025-03-05 PROCEDURE — 80076 HEPATIC FUNCTION PANEL: CPT

## 2025-03-06 ENCOUNTER — TELEPHONE (OUTPATIENT)
Age: 80
End: 2025-03-06

## 2025-03-06 ENCOUNTER — RESULTS FOLLOW-UP (OUTPATIENT)
Dept: GASTROENTEROLOGY | Facility: AMBULARY SURGERY CENTER | Age: 80
End: 2025-03-06

## 2025-03-06 DIAGNOSIS — R17 ELEVATED BILIRUBIN: Primary | ICD-10-CM

## 2025-03-07 ENCOUNTER — ANTICOAG VISIT (OUTPATIENT)
Dept: INTERNAL MEDICINE CLINIC | Facility: CLINIC | Age: 80
End: 2025-03-07
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
## 2025-03-28 DIAGNOSIS — I10 ESSENTIAL HYPERTENSION: ICD-10-CM

## 2025-03-28 RX ORDER — AMLODIPINE BESYLATE 5 MG/1
5 TABLET ORAL DAILY
Qty: 90 TABLET | Refills: 1 | Status: SHIPPED | OUTPATIENT
Start: 2025-03-28

## 2025-04-01 ENCOUNTER — OFFICE VISIT (OUTPATIENT)
Dept: PSYCHIATRY | Facility: CLINIC | Age: 80
End: 2025-04-01
Payer: MEDICARE

## 2025-04-01 VITALS
SYSTOLIC BLOOD PRESSURE: 149 MMHG | DIASTOLIC BLOOD PRESSURE: 68 MMHG | HEART RATE: 84 BPM | WEIGHT: 151 LBS | HEIGHT: 65 IN | BODY MASS INDEX: 25.16 KG/M2

## 2025-04-01 DIAGNOSIS — G47.09 OTHER INSOMNIA: Chronic | ICD-10-CM

## 2025-04-01 DIAGNOSIS — F09 MILD COGNITIVE DISORDER: Chronic | ICD-10-CM

## 2025-04-01 DIAGNOSIS — F41.1 GAD (GENERALIZED ANXIETY DISORDER): Chronic | ICD-10-CM

## 2025-04-01 DIAGNOSIS — F33.42 MAJOR DEPRESSIVE DISORDER, RECURRENT EPISODE, IN FULL REMISSION (HCC): Primary | Chronic | ICD-10-CM

## 2025-04-01 DIAGNOSIS — F41.0 PANIC DISORDER WITHOUT AGORAPHOBIA: Chronic | ICD-10-CM

## 2025-04-01 DIAGNOSIS — Z79.899 LONG-TERM USE OF HIGH-RISK MEDICATION: Chronic | ICD-10-CM

## 2025-04-01 DIAGNOSIS — F50.82 AVOIDANT-RESTRICTIVE FOOD INTAKE DISORDER (ARFID): Chronic | ICD-10-CM

## 2025-04-01 PROCEDURE — 90833 PSYTX W PT W E/M 30 MIN: CPT | Performed by: PSYCHIATRY & NEUROLOGY

## 2025-04-01 PROCEDURE — 99214 OFFICE O/P EST MOD 30 MIN: CPT | Performed by: PSYCHIATRY & NEUROLOGY

## 2025-04-01 PROCEDURE — G2211 COMPLEX E/M VISIT ADD ON: HCPCS | Performed by: PSYCHIATRY & NEUROLOGY

## 2025-04-01 RX ORDER — OLANZAPINE 10 MG/1
10 TABLET ORAL
Qty: 90 TABLET | Refills: 2 | Status: SHIPPED | OUTPATIENT
Start: 2025-04-01 | End: 2025-12-27

## 2025-04-01 RX ORDER — SERTRALINE HYDROCHLORIDE 100 MG/1
150 TABLET, FILM COATED ORAL DAILY
Qty: 135 TABLET | Refills: 2 | Status: SHIPPED | OUTPATIENT
Start: 2025-04-01 | End: 2025-12-27

## 2025-04-01 NOTE — ASSESSMENT & PLAN NOTE
Chronic anxiety symptoms  Continue Zoloft 150 mg daily to control depressive symptoms and anxiety symptoms    Orders:    sertraline (ZOLOFT) 100 mg tablet; Take 1.5 tablets (150 mg total) by mouth daily

## 2025-04-01 NOTE — ASSESSMENT & PLAN NOTE
Mood has been fairly stable    Continue Zoloft 150 mg daily to control depressive symptoms and anxiety symptoms  Continue Zyprexa 10 mg at bedtime to help with mood  Orders:    sertraline (ZOLOFT) 100 mg tablet; Take 1.5 tablets (150 mg total) by mouth daily    OLANZapine (ZyPREXA) 10 mg tablet; Take 1 tablet (10 mg total) by mouth daily at bedtime

## 2025-04-01 NOTE — BH TREATMENT PLAN
"TREATMENT PLAN (Medication Management Only)        [facility]    Name/Date of Birth/MRN:  Lorenzo De Jesus 79 y.o. 1945 MRN: [id number]  Date of Treatment Plan: April 1, 2025  Diagnosis/Diagnoses:   1. Major depressive disorder, recurrent episode, in full remission (HCC)    2. JOSÉ LUIS (generalized anxiety disorder)    3. Panic disorder without agoraphobia    4. Avoidant-restrictive food intake disorder (ARFID)    5. Mild cognitive disorder    6. Other insomnia    7. Long-term use of high-risk medication      Strengths/Personal Resources for Self-Care: \"exercising\".  Area/Areas of need (in own words): \"my health\".  1. Long Term Goal:   improve control of anxiety, maintain stability of depression  Target Date: 3 months - 7/1/2025  Person/Persons responsible for completion of goal: Lorenzo and wife  2.  Short Term Objective (s) - How will we reach this goal?:   A.  Provider new recommended medication/dosage changes and/or continue medication(s): continue current medications as prescribed (Zoloft and Zyprexa).  B.  N/A.  C.  N/A.  Target Date: 3 months - 7/1/2025  Person/Persons Responsible for Completion of Goal: Lorenzo and wife   Progress Towards Goals: progressing  Treatment Modality: medication management every 3 months, continue psychotherapy with own therapist  Review due 180 days from date of this plan: 6 months - 10/1/2025  Expected length of service: ongoing treatment unless revised  My Physician/PA/NP and I have developed this plan together and I agree to work on the goals and objectives. I understand the treatment goals that were developed for my treatment.  Electronic Signatures: on file (unless signed below)    Kerri Mojica MD 04/01/25  "

## 2025-04-01 NOTE — BH CRISIS PLAN
"Client Name: Lorenzo De Jesus       Client YOB: 1945    [place] Safety Plan      Creation Date: 1/11/24 Update Date: 4/1/25   Created By: Kerri Mojica MD Last Updated By: NIKOLAI Lal      Step 1: Warning Signs:   Warning Signs   Feeling very anxious   Poor appetite   Increased sleep            Step 2: Internal Coping Strategies:   Internal Coping Strategies   \"walking\"   \"poker game\"   \"cleaning\"   \"talking to other people\"            Step 3: People and social settings that provide distraction:   Name Contact Information   Nextdoor neighbor (could not remember name) only would talk to her in passing    Places   His neighborhood           Step 4: People whom I can ask for help during a crisis:      Name Contact Information    Michelle (wife) in cell phone    Km (son) in cell phone      Step 5: Professionals or agencies I can contact during a crisis:      [facility]inican/Agency Name Phone Emergency Contact    [facility] [phone] Dr. Mojica or Roma Ta LCSW      Local Emergency Department Emergency Department Phone Emergency Department Address    [facility] [phone] [address] [place] [address]        Crisis Phone Numbers:   Suicide Prevention Lifeline: Call or Text  649 Crisis Text Line: Text HOME to 639-766   Please note: Some [facility] do not have a separate number for Child/Adolescent specific crisis. If your county is not listed under Child/Adolescent, please call the adult number for your county      Adult Crisis Numbers: Child/Adolescent Crisis Numbers   [facility]: [phone] [facility]: [phone]   [facility]: [phone] [facility]: [phone]   [facility]: [phone] [place]: [phone]   [facility]: [phone] [place]: [phone]   [place]: [phone]   [facility]: [phone]   [facility]: [phone] "   [facility]: [phone] (daytime) [phone] (after hours, weekends, holidays)      Step 6: Making the environment safer (plan for lethal means safety):   Patient did not identify any lethal methods: Yes     Optional: What is most important to me and worth living for?   My wife and my son     [place] Safety Plan. Isha Champion and Felix Rust. Used with permission of the authors.

## 2025-04-01 NOTE — ASSESSMENT & PLAN NOTE
Stable    Continue Zoloft 150 mg daily to control depressive symptoms and anxiety symptoms  Orders:    sertraline (ZOLOFT) 100 mg tablet; Take 1.5 tablets (150 mg total) by mouth daily

## 2025-04-02 ENCOUNTER — APPOINTMENT (OUTPATIENT)
Dept: LAB | Facility: AMBULARY SURGERY CENTER | Age: 80
End: 2025-04-02
Payer: MEDICARE

## 2025-04-02 DIAGNOSIS — R17 ELEVATED BILIRUBIN: ICD-10-CM

## 2025-04-02 DIAGNOSIS — I82.4Y9 DEEP VEIN THROMBOSIS (DVT) OF PROXIMAL LOWER EXTREMITY, UNSPECIFIED CHRONICITY, UNSPECIFIED LATERALITY (HCC): ICD-10-CM

## 2025-04-02 LAB
ALBUMIN SERPL BCG-MCNC: 4.4 G/DL (ref 3.5–5)
ALP SERPL-CCNC: 71 U/L (ref 34–104)
ALT SERPL W P-5'-P-CCNC: 15 U/L (ref 7–52)
AST SERPL W P-5'-P-CCNC: 23 U/L (ref 13–39)
BILIRUB DIRECT SERPL-MCNC: 0.23 MG/DL (ref 0–0.2)
BILIRUB SERPL-MCNC: 1.21 MG/DL (ref 0.2–1)
INR PPP: 2.45 (ref 0.85–1.19)
PROT SERPL-MCNC: 7 G/DL (ref 6.4–8.4)
PROTHROMBIN TIME: 26.5 SECONDS (ref 12.3–15)

## 2025-04-02 PROCEDURE — 36415 COLL VENOUS BLD VENIPUNCTURE: CPT

## 2025-04-02 PROCEDURE — 85610 PROTHROMBIN TIME: CPT

## 2025-04-02 PROCEDURE — 80076 HEPATIC FUNCTION PANEL: CPT

## 2025-04-03 ENCOUNTER — ANTICOAG VISIT (OUTPATIENT)
Dept: INTERNAL MEDICINE CLINIC | Facility: CLINIC | Age: 80
End: 2025-04-03

## 2025-04-04 ENCOUNTER — RESULTS FOLLOW-UP (OUTPATIENT)
Age: 80
End: 2025-04-04

## 2025-04-04 DIAGNOSIS — R17 ELEVATED BILIRUBIN: Primary | ICD-10-CM

## 2025-04-04 DIAGNOSIS — K50.919 CROHN'S DISEASE WITH COMPLICATION, UNSPECIFIED GASTROINTESTINAL TRACT LOCATION (HCC): ICD-10-CM

## 2025-04-04 DIAGNOSIS — Z11.59 ENCOUNTER FOR SCREENING FOR OTHER VIRAL DISEASES: ICD-10-CM

## 2025-04-08 ENCOUNTER — TELEPHONE (OUTPATIENT)
Age: 80
End: 2025-04-08

## 2025-04-08 NOTE — TELEPHONE ENCOUNTER
Patient's spouse called in to check on medication refill for crestor.  No updated communication consent in the chart.  Advised spouse to have patient call in to the office or call their pharmacy.

## 2025-04-10 ENCOUNTER — HOSPITAL ENCOUNTER (OUTPATIENT)
Dept: ULTRASOUND IMAGING | Facility: HOSPITAL | Age: 80
Discharge: HOME | End: 2025-04-10
Payer: MEDICARE

## 2025-04-10 DIAGNOSIS — K50.919 CROHN'S DISEASE WITH COMPLICATION, UNSPECIFIED GASTROINTESTINAL TRACT LOCATION (HCC): ICD-10-CM

## 2025-04-10 DIAGNOSIS — R17 ELEVATED BILIRUBIN: ICD-10-CM

## 2025-04-10 PROCEDURE — 76705 ECHO EXAM OF ABDOMEN: CPT

## 2025-04-11 ENCOUNTER — RESULTS FOLLOW-UP (OUTPATIENT)
Dept: GASTROENTEROLOGY | Facility: AMBULARY SURGERY CENTER | Age: 80
End: 2025-04-11

## 2025-04-11 DIAGNOSIS — R17 ELEVATED BILIRUBIN: Primary | ICD-10-CM

## 2025-04-11 DIAGNOSIS — K86.2 PANCREATIC CYST: ICD-10-CM

## 2025-04-11 DIAGNOSIS — F40.240 CLAUSTROPHOBIA: ICD-10-CM

## 2025-04-11 RX ORDER — LORAZEPAM 0.5 MG/1
0.5 TABLET ORAL ONCE
Qty: 1 TABLET | Refills: 0 | Status: SHIPPED | OUTPATIENT
Start: 2025-04-11 | End: 2025-04-11

## 2025-04-30 ENCOUNTER — RESULTS FOLLOW-UP (OUTPATIENT)
Dept: INTERNAL MEDICINE CLINIC | Facility: CLINIC | Age: 80
End: 2025-04-30

## 2025-04-30 ENCOUNTER — ANTICOAG VISIT (OUTPATIENT)
Dept: INTERNAL MEDICINE CLINIC | Facility: CLINIC | Age: 80
End: 2025-04-30

## 2025-04-30 ENCOUNTER — APPOINTMENT (OUTPATIENT)
Dept: LAB | Facility: AMBULARY SURGERY CENTER | Age: 80
End: 2025-04-30
Payer: MEDICARE

## 2025-04-30 ENCOUNTER — RESULTS FOLLOW-UP (OUTPATIENT)
Dept: GASTROENTEROLOGY | Facility: AMBULARY SURGERY CENTER | Age: 80
End: 2025-04-30

## 2025-04-30 DIAGNOSIS — K50.919 CROHN'S DISEASE WITH COMPLICATION, UNSPECIFIED GASTROINTESTINAL TRACT LOCATION (HCC): ICD-10-CM

## 2025-04-30 DIAGNOSIS — R17 ELEVATED BILIRUBIN: ICD-10-CM

## 2025-04-30 DIAGNOSIS — I82.4Y9 DEEP VEIN THROMBOSIS (DVT) OF PROXIMAL LOWER EXTREMITY, UNSPECIFIED CHRONICITY, UNSPECIFIED LATERALITY (HCC): ICD-10-CM

## 2025-04-30 DIAGNOSIS — Z11.59 ENCOUNTER FOR SCREENING FOR OTHER VIRAL DISEASES: ICD-10-CM

## 2025-04-30 LAB
ALBUMIN SERPL BCG-MCNC: 4.1 G/DL (ref 3.5–5)
ALP SERPL-CCNC: 67 U/L (ref 34–104)
ALT SERPL W P-5'-P-CCNC: 14 U/L (ref 7–52)
ANION GAP SERPL CALCULATED.3IONS-SCNC: 7 MMOL/L (ref 4–13)
AST SERPL W P-5'-P-CCNC: 22 U/L (ref 13–39)
BASOPHILS # BLD AUTO: 0.02 THOUSANDS/ÂΜL (ref 0–0.1)
BASOPHILS NFR BLD AUTO: 0 % (ref 0–1)
BILIRUB SERPL-MCNC: 1.24 MG/DL (ref 0.2–1)
BUN SERPL-MCNC: 17 MG/DL (ref 5–25)
CALCIUM SERPL-MCNC: 9.2 MG/DL (ref 8.4–10.2)
CHLORIDE SERPL-SCNC: 107 MMOL/L (ref 96–108)
CO2 SERPL-SCNC: 27 MMOL/L (ref 21–32)
CREAT SERPL-MCNC: 0.81 MG/DL (ref 0.6–1.3)
EOSINOPHIL # BLD AUTO: 0.04 THOUSAND/ÂΜL (ref 0–0.61)
EOSINOPHIL NFR BLD AUTO: 1 % (ref 0–6)
ERYTHROCYTE [DISTWIDTH] IN BLOOD BY AUTOMATED COUNT: 13.9 % (ref 11.6–15.1)
FERRITIN SERPL-MCNC: 107 NG/ML (ref 30–336)
GFR SERPL CREATININE-BSD FRML MDRD: 85 ML/MIN/1.73SQ M
GLUCOSE P FAST SERPL-MCNC: 95 MG/DL (ref 65–99)
HCT VFR BLD AUTO: 42.3 % (ref 36.5–49.3)
HGB BLD-MCNC: 14.3 G/DL (ref 12–17)
IGA SERPL-MCNC: 266 MG/DL (ref 66–433)
IGG SERPL-MCNC: 925 MG/DL (ref 635–1741)
IGM SERPL-MCNC: 76 MG/DL (ref 45–281)
IMM GRANULOCYTES # BLD AUTO: 0.03 THOUSAND/UL (ref 0–0.2)
IMM GRANULOCYTES NFR BLD AUTO: 1 % (ref 0–2)
INR PPP: 2.4 (ref 0.85–1.19)
IRON SATN MFR SERPL: 54 % (ref 15–50)
IRON SERPL-MCNC: 150 UG/DL (ref 50–212)
LYMPHOCYTES # BLD AUTO: 0.75 THOUSANDS/ÂΜL (ref 0.6–4.47)
LYMPHOCYTES NFR BLD AUTO: 14 % (ref 14–44)
MCH RBC QN AUTO: 30.6 PG (ref 26.8–34.3)
MCHC RBC AUTO-ENTMCNC: 33.8 G/DL (ref 31.4–37.4)
MCV RBC AUTO: 90 FL (ref 82–98)
MONOCYTES # BLD AUTO: 0.36 THOUSAND/ÂΜL (ref 0.17–1.22)
MONOCYTES NFR BLD AUTO: 7 % (ref 4–12)
NEUTROPHILS # BLD AUTO: 4.2 THOUSANDS/ÂΜL (ref 1.85–7.62)
NEUTS SEG NFR BLD AUTO: 77 % (ref 43–75)
NRBC BLD AUTO-RTO: 0 /100 WBCS
PLATELET # BLD AUTO: 149 THOUSANDS/UL (ref 149–390)
PMV BLD AUTO: 9.5 FL (ref 8.9–12.7)
POTASSIUM SERPL-SCNC: 4.5 MMOL/L (ref 3.5–5.3)
PROT SERPL-MCNC: 6.3 G/DL (ref 6.4–8.4)
PROTHROMBIN TIME: 26 SECONDS (ref 12.3–15)
RBC # BLD AUTO: 4.68 MILLION/UL (ref 3.88–5.62)
SODIUM SERPL-SCNC: 141 MMOL/L (ref 135–147)
TIBC SERPL-MCNC: 277.2 UG/DL (ref 250–450)
TRANSFERRIN SERPL-MCNC: 198 MG/DL (ref 203–362)
UIBC SERPL-MCNC: 127 UG/DL (ref 155–355)
WBC # BLD AUTO: 5.4 THOUSAND/UL (ref 4.31–10.16)

## 2025-04-30 PROCEDURE — 86704 HEP B CORE ANTIBODY TOTAL: CPT

## 2025-04-30 PROCEDURE — 83550 IRON BINDING TEST: CPT

## 2025-04-30 PROCEDURE — 86803 HEPATITIS C AB TEST: CPT

## 2025-04-30 PROCEDURE — 85025 COMPLETE CBC W/AUTO DIFF WBC: CPT

## 2025-04-30 PROCEDURE — 36415 COLL VENOUS BLD VENIPUNCTURE: CPT

## 2025-04-30 PROCEDURE — 82390 ASSAY OF CERULOPLASMIN: CPT

## 2025-04-30 PROCEDURE — 87340 HEPATITIS B SURFACE AG IA: CPT

## 2025-04-30 PROCEDURE — 86015 ACTIN ANTIBODY EACH: CPT

## 2025-04-30 PROCEDURE — 82103 ALPHA-1-ANTITRYPSIN TOTAL: CPT

## 2025-04-30 PROCEDURE — 80053 COMPREHEN METABOLIC PANEL: CPT

## 2025-04-30 PROCEDURE — 85610 PROTHROMBIN TIME: CPT

## 2025-04-30 PROCEDURE — 82728 ASSAY OF FERRITIN: CPT

## 2025-04-30 PROCEDURE — 82784 ASSAY IGA/IGD/IGG/IGM EACH: CPT

## 2025-04-30 PROCEDURE — 83540 ASSAY OF IRON: CPT

## 2025-04-30 PROCEDURE — 86705 HEP B CORE ANTIBODY IGM: CPT

## 2025-04-30 PROCEDURE — 86381 MITOCHONDRIAL ANTIBODY EACH: CPT

## 2025-05-01 LAB
HBV CORE AB SER QL: NORMAL
HBV CORE IGM SER QL: NORMAL
HBV SURFACE AG SER QL: NORMAL
HCV AB SER QL: NORMAL

## 2025-05-02 LAB
A1AT SERPL-MCNC: 150 MG/DL (ref 101–187)
ACTIN IGG SERPL-ACNC: 7 UNITS (ref 0–19)
CERULOPLASMIN SERPL-MCNC: 20.8 MG/DL (ref 16–31)
MITOCHONDRIA M2 IGG SER-ACNC: <20 UNITS (ref 0–20)

## 2025-05-07 ENCOUNTER — HOSPITAL ENCOUNTER (OUTPATIENT)
Dept: MRI IMAGING | Facility: HOSPITAL | Age: 80
Discharge: HOME | End: 2025-05-07
Attending: PHYSICIAN ASSISTANT
Payer: MEDICARE

## 2025-05-07 DIAGNOSIS — K86.2 PANCREATIC CYST: ICD-10-CM

## 2025-05-07 DIAGNOSIS — R17 ELEVATED BILIRUBIN: ICD-10-CM

## 2025-05-07 PROCEDURE — A9585 GADOBUTROL INJECTION: HCPCS | Performed by: INTERNAL MEDICINE

## 2025-05-07 PROCEDURE — 74183 MRI ABD W/O CNTR FLWD CNTR: CPT

## 2025-05-07 RX ORDER — GADOBUTROL 604.72 MG/ML
7 INJECTION INTRAVENOUS
Status: COMPLETED | OUTPATIENT
Start: 2025-05-07 | End: 2025-05-07

## 2025-05-07 RX ADMIN — GADOBUTROL 7 ML: 604.72 INJECTION INTRAVENOUS at 19:16

## 2025-05-13 ENCOUNTER — RESULTS FOLLOW-UP (OUTPATIENT)
Dept: GASTROENTEROLOGY | Facility: AMBULARY SURGERY CENTER | Age: 80
End: 2025-05-13

## 2025-05-28 ENCOUNTER — TELEPHONE (OUTPATIENT)
Dept: INTERNAL MEDICINE CLINIC | Facility: CLINIC | Age: 80
End: 2025-05-28

## 2025-05-28 ENCOUNTER — ANTICOAG VISIT (OUTPATIENT)
Dept: INTERNAL MEDICINE CLINIC | Facility: CLINIC | Age: 80
End: 2025-05-28

## 2025-05-28 ENCOUNTER — RESULTS FOLLOW-UP (OUTPATIENT)
Dept: INTERNAL MEDICINE CLINIC | Facility: CLINIC | Age: 80
End: 2025-05-28

## 2025-05-28 ENCOUNTER — APPOINTMENT (OUTPATIENT)
Dept: LAB | Facility: AMBULARY SURGERY CENTER | Age: 80
End: 2025-05-28
Attending: INTERNAL MEDICINE
Payer: MEDICARE

## 2025-05-28 DIAGNOSIS — I82.4Y9 DEEP VEIN THROMBOSIS (DVT) OF PROXIMAL LOWER EXTREMITY, UNSPECIFIED CHRONICITY, UNSPECIFIED LATERALITY (HCC): ICD-10-CM

## 2025-05-28 LAB
INR PPP: 2.63 (ref 0.85–1.19)
PROTHROMBIN TIME: 27.9 SECONDS (ref 12.3–15)

## 2025-05-28 PROCEDURE — 36415 COLL VENOUS BLD VENIPUNCTURE: CPT

## 2025-05-28 PROCEDURE — 85610 PROTHROMBIN TIME: CPT

## 2025-05-29 ENCOUNTER — TELEPHONE (OUTPATIENT)
Dept: INTERNAL MEDICINE CLINIC | Facility: CLINIC | Age: 80
End: 2025-05-29

## 2025-06-25 ENCOUNTER — APPOINTMENT (OUTPATIENT)
Dept: LAB | Facility: AMBULARY SURGERY CENTER | Age: 80
End: 2025-06-25
Attending: INTERNAL MEDICINE
Payer: MEDICARE

## 2025-06-25 DIAGNOSIS — I82.4Y9 DEEP VEIN THROMBOSIS (DVT) OF PROXIMAL LOWER EXTREMITY, UNSPECIFIED CHRONICITY, UNSPECIFIED LATERALITY (HCC): ICD-10-CM

## 2025-06-25 LAB
INR PPP: 2.86 (ref 0.85–1.19)
PROTHROMBIN TIME: 29.8 SECONDS (ref 12.3–15)

## 2025-06-25 PROCEDURE — 36415 COLL VENOUS BLD VENIPUNCTURE: CPT

## 2025-06-25 PROCEDURE — 85610 PROTHROMBIN TIME: CPT

## 2025-06-26 ENCOUNTER — ANTICOAG VISIT (OUTPATIENT)
Dept: INTERNAL MEDICINE CLINIC | Facility: CLINIC | Age: 80
End: 2025-06-26

## 2025-07-02 ENCOUNTER — APPOINTMENT (OUTPATIENT)
Dept: LAB | Facility: CLINIC | Age: 80
End: 2025-07-02
Attending: INTERNAL MEDICINE
Payer: MEDICARE

## 2025-07-02 DIAGNOSIS — I82.4Y9 DEEP VEIN THROMBOSIS (DVT) OF PROXIMAL LOWER EXTREMITY, UNSPECIFIED CHRONICITY, UNSPECIFIED LATERALITY (HCC): ICD-10-CM

## 2025-07-02 LAB
INR PPP: 2.56 (ref 0.85–1.19)
PROTHROMBIN TIME: 28.2 SECONDS (ref 12.3–15)

## 2025-07-02 PROCEDURE — 36415 COLL VENOUS BLD VENIPUNCTURE: CPT

## 2025-07-02 PROCEDURE — 85610 PROTHROMBIN TIME: CPT

## 2025-07-03 ENCOUNTER — ANTICOAG VISIT (OUTPATIENT)
Dept: INTERNAL MEDICINE CLINIC | Facility: CLINIC | Age: 80
End: 2025-07-03

## 2025-07-16 ENCOUNTER — APPOINTMENT (OUTPATIENT)
Dept: LAB | Facility: CLINIC | Age: 80
End: 2025-07-16
Attending: INTERNAL MEDICINE
Payer: MEDICARE

## 2025-07-16 ENCOUNTER — ANTICOAG VISIT (OUTPATIENT)
Dept: INTERNAL MEDICINE CLINIC | Facility: CLINIC | Age: 80
End: 2025-07-16

## 2025-07-16 DIAGNOSIS — I82.4Y9 DEEP VEIN THROMBOSIS (DVT) OF PROXIMAL LOWER EXTREMITY, UNSPECIFIED CHRONICITY, UNSPECIFIED LATERALITY (HCC): ICD-10-CM

## 2025-07-16 LAB
INR PPP: 2.51 (ref 0.85–1.19)
PROTHROMBIN TIME: 27.8 SECONDS (ref 12.3–15)

## 2025-07-16 PROCEDURE — 85610 PROTHROMBIN TIME: CPT

## 2025-07-16 PROCEDURE — 36415 COLL VENOUS BLD VENIPUNCTURE: CPT

## 2025-07-21 ENCOUNTER — OFFICE VISIT (OUTPATIENT)
Dept: PSYCHIATRY | Facility: CLINIC | Age: 80
End: 2025-07-21
Payer: MEDICARE

## 2025-07-21 VITALS
HEIGHT: 66 IN | BODY MASS INDEX: 22.34 KG/M2 | HEART RATE: 72 BPM | SYSTOLIC BLOOD PRESSURE: 137 MMHG | WEIGHT: 139 LBS | DIASTOLIC BLOOD PRESSURE: 70 MMHG

## 2025-07-21 DIAGNOSIS — F09 MILD COGNITIVE DISORDER: Chronic | ICD-10-CM

## 2025-07-21 DIAGNOSIS — F41.0 PANIC DISORDER WITHOUT AGORAPHOBIA: Chronic | ICD-10-CM

## 2025-07-21 DIAGNOSIS — F41.1 GAD (GENERALIZED ANXIETY DISORDER): Chronic | ICD-10-CM

## 2025-07-21 DIAGNOSIS — F50.82 AVOIDANT-RESTRICTIVE FOOD INTAKE DISORDER (ARFID): Chronic | ICD-10-CM

## 2025-07-21 DIAGNOSIS — G47.09 OTHER INSOMNIA: Chronic | ICD-10-CM

## 2025-07-21 DIAGNOSIS — F33.42 MAJOR DEPRESSIVE DISORDER, RECURRENT EPISODE, IN FULL REMISSION (HCC): Primary | Chronic | ICD-10-CM

## 2025-07-21 DIAGNOSIS — Z79.899 LONG-TERM USE OF HIGH-RISK MEDICATION: Chronic | ICD-10-CM

## 2025-07-21 PROCEDURE — 99214 OFFICE O/P EST MOD 30 MIN: CPT | Performed by: PSYCHIATRY & NEUROLOGY

## 2025-07-21 PROCEDURE — G2211 COMPLEX E/M VISIT ADD ON: HCPCS | Performed by: PSYCHIATRY & NEUROLOGY

## 2025-07-21 PROCEDURE — 90833 PSYTX W PT W E/M 30 MIN: CPT | Performed by: PSYCHIATRY & NEUROLOGY
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
## 2025-07-21 NOTE — ASSESSMENT & PLAN NOTE
Chronic on and off anxiety symptoms    Continue Zoloft 150 mg daily to control depressive symptoms and anxiety symptoms

## 2025-07-21 NOTE — BH TREATMENT PLAN
"TREATMENT PLAN (Medication Management Only)        [facility]    Name/Date of Birth/MRN:  Lorenzo De Jesus 79 y.o. 1945 MRN: [id number]  Date of Treatment Plan: July 21, 2025  Diagnosis/Diagnoses:   1. Major depressive disorder, recurrent episode, in full remission (HCC)    2. JOSÉ LUIS (generalized anxiety disorder)    3. Panic disorder without agoraphobia    4. Avoidant-restrictive food intake disorder (ARFID)    5. Mild cognitive disorder    6. Other insomnia    7. Long-term use of high-risk medication      Strengths/Personal Resources for Self-Care: \"I go for walks and do things around the house\".  Area/Areas of need (in own words): \"communication with my wife\".  1. Long Term Goal:   improve control of anxiety, maintain stability of depression  Target Date: 3 months - 10/21/2025  Person/Persons responsible for completion of goal: Lorenzo  2.  Short Term Objective (s) - How will we reach this goal?:   A.  Provider new recommended medication/dosage changes and/or continue medication(s): continue current medications as prescribed (Zoloft and Zyprexa).  B.  N/A.  C.  N/A.  Target Date: 3 months - 10/21/2025  Person/Persons Responsible for Completion of Goal: Lorenzo   Progress Towards Goals: progressing slowly  Treatment Modality: medication management every 3 months, continue psychotherapy with own therapist  Review due 180 days from date of this plan: 6 months - 1/21/2026  Expected length of service: ongoing treatment unless revised  My Physician/PA/NP and I have developed this plan together and I agree to work on the goals and objectives. I understand the treatment goals that were developed for my treatment.  Electronic Signatures: on file (unless signed below)    Kerri Mojica MD 07/21/25  "

## 2025-07-21 NOTE — ASSESSMENT & PLAN NOTE
Mood some sadness after recent sister's death, otherwise mood remains stable    Continue Zoloft 150 mg daily to control depressive symptoms and anxiety symptoms  Continue Zyprexa 10 mg at bedtime to help with mood

## 2025-07-22 ENCOUNTER — OFFICE VISIT (OUTPATIENT)
Dept: INTERNAL MEDICINE CLINIC | Facility: CLINIC | Age: 80
End: 2025-07-22
Payer: MEDICARE

## 2025-07-22 VITALS
TEMPERATURE: 97.4 F | HEART RATE: 74 BPM | SYSTOLIC BLOOD PRESSURE: 122 MMHG | WEIGHT: 138.6 LBS | BODY MASS INDEX: 22.71 KG/M2 | DIASTOLIC BLOOD PRESSURE: 64 MMHG | OXYGEN SATURATION: 95 %

## 2025-07-22 DIAGNOSIS — Z00.00 MEDICARE ANNUAL WELLNESS VISIT, SUBSEQUENT: ICD-10-CM

## 2025-07-22 DIAGNOSIS — F33.42 MAJOR DEPRESSIVE DISORDER, RECURRENT EPISODE, IN FULL REMISSION (HCC): Primary | Chronic | ICD-10-CM

## 2025-07-22 DIAGNOSIS — I82.4Y9 DEEP VEIN THROMBOSIS (DVT) OF PROXIMAL LOWER EXTREMITY, UNSPECIFIED CHRONICITY, UNSPECIFIED LATERALITY (HCC): ICD-10-CM

## 2025-07-22 DIAGNOSIS — Z12.5 SCREENING PSA (PROSTATE SPECIFIC ANTIGEN): ICD-10-CM

## 2025-07-22 DIAGNOSIS — I10 PRIMARY HYPERTENSION: ICD-10-CM

## 2025-07-22 DIAGNOSIS — E78.5 HLD (HYPERLIPIDEMIA): ICD-10-CM

## 2025-07-22 DIAGNOSIS — K50.00 CROHN'S DISEASE OF SMALL INTESTINE WITHOUT COMPLICATION (HCC): ICD-10-CM

## 2025-07-22 PROCEDURE — G2211 COMPLEX E/M VISIT ADD ON: HCPCS | Performed by: INTERNAL MEDICINE

## 2025-07-22 PROCEDURE — G0439 PPPS, SUBSEQ VISIT: HCPCS | Performed by: INTERNAL MEDICINE

## 2025-07-22 PROCEDURE — 99213 OFFICE O/P EST LOW 20 MIN: CPT | Performed by: INTERNAL MEDICINE

## 2025-07-22 RX ORDER — ROSUVASTATIN CALCIUM 5 MG/1
5 TABLET, COATED ORAL DAILY
Qty: 90 TABLET | Refills: 5 | Status: SHIPPED | OUTPATIENT
Start: 2025-07-22

## 2025-07-22 RX ORDER — WARFARIN SODIUM 2 MG/1
TABLET ORAL
Qty: 90 TABLET | Refills: 5 | Status: SHIPPED | OUTPATIENT
Start: 2025-07-22

## 2025-07-22 NOTE — PATIENT INSTRUCTIONS
Medicare Preventive Visit Patient Instructions  Thank you for completing your Welcome to Medicare Visit or Medicare Annual Wellness Visit today. Your next wellness visit will be due in one year (7/23/2026).  The screening/preventive services that you may require over the next 5-10 years are detailed below. Some tests may not apply to you based off risk factors and/or age. Screening tests ordered at today's visit but not completed yet may show as past due. Also, please note that scanned in results may not display below.  Preventive Screenings:  Service Recommendations Previous Testing/Comments   Colorectal Cancer Screening  Colonoscopy    Fecal Occult Blood Test (FOBT)/Fecal Immunochemical Test (FIT)  Fecal DNA/Cologuard Test  Flexible Sigmoidoscopy Age: 45-75 years old   Colonoscopy: every 10 years (May be performed more frequently if at higher risk)  OR  FOBT/FIT: every 1 year  OR  Cologuard: every 3 years  OR  Sigmoidoscopy: every 5 years  Screening may be recommended earlier than age 45 if at higher risk for colorectal cancer. Also, an individualized decision between you and your healthcare provider will decide whether screening between the ages of 76-85 would be appropriate. Colonoscopy: Not on file  FOBT/FIT: Not on file  Cologuard: Not on file  Sigmoidoscopy: Not on file          Prostate Cancer Screening Individualized decision between patient and health care provider in men between ages of 55-69   Medicare will cover every 12 months beginning on the day after your 50th birthday PSA: 2.407 ng/mL           Hepatitis C Screening Once for adults born between 1945 and 1965  More frequently in patients at high risk for Hepatitis C Hep C Antibody: 12/06/2018        Diabetes Screening 1-2 times per year if you're at risk for diabetes or have pre-diabetes Fasting glucose: 95 mg/dL (4/30/2025)  A1C: 5.0 % (7/11/2024)      Cholesterol Screening Once every 5 years if you don't have a lipid disorder. May order more often  based on risk factors. Lipid panel: 01/15/2025         Other Preventive Screenings Covered by Medicare:  Abdominal Aortic Aneurysm (AAA) Screening: covered once if your at risk. You're considered to be at risk if you have a family history of AAA or a male between the age of 65-75 who smoking at least 100 cigarettes in your lifetime.  Lung Cancer Screening: covers low dose CT scan once per year if you meet all of the following conditions: (1) Age 55-77; (2) No signs or symptoms of lung cancer; (3) Current smoker or have quit smoking within the last 15 years; (4) You have a tobacco smoking history of at least 20 pack years (packs per day x number of years you smoked); (5) You get a written order from a healthcare provider.  Glaucoma Screening: covered annually if you're considered high risk: (1) You have diabetes OR (2) Family history of glaucoma OR (3)  aged 50 and older OR (4)  American aged 65 and older  Osteoporosis Screening: covered every 2 years if you meet one of the following conditions: (1) Have a vertebral abnormality; (2) On glucocorticoid therapy for more than 3 months; (3) Have primary hyperparathyroidism; (4) On osteoporosis medications and need to assess response to drug therapy.  HIV Screening: covered annually if you're between the age of 15-65. Also covered annually if you are younger than 15 and older than 65 with risk factors for HIV infection. For pregnant patients, it is covered up to 3 times per pregnancy.    Immunizations:  Immunization Recommendations   Influenza Vaccine Annual influenza vaccination during flu season is recommended for all persons aged >= 6 months who do not have contraindications   Pneumococcal Vaccine   * Pneumococcal conjugate vaccine = PCV13 (Prevnar 13), PCV15 (Vaxneuvance), PCV20 (Prevnar 20)  * Pneumococcal polysaccharide vaccine = PPSV23 (Pneumovax) Adults 19-63 yo with certain risk factors or if 65+ yo  If never received any pneumonia vaccine:  recommend Prevnar 20 (PCV20)  Give PCV20 if previously received 1 dose of PCV13 or PPSV23   Hepatitis B Vaccine 3 dose series if at intermediate or high risk (ex: diabetes, end stage renal disease, liver disease)   Respiratory syncytial virus (RSV) Vaccine - COVERED BY MEDICARE PART D  * RSVPreF3 (Arexvy) CDC recommends that adults 60 years of age and older may receive a single dose of RSV vaccine using shared clinical decision-making (SCDM)   Tetanus (Td) Vaccine - COST NOT COVERED BY MEDICARE PART B Following completion of primary series, a booster dose should be given every 10 years to maintain immunity against tetanus. Td may also be given as tetanus wound prophylaxis.   Tdap Vaccine - COST NOT COVERED BY MEDICARE PART B Recommended at least once for all adults. For pregnant patients, recommended with each pregnancy.   Shingles Vaccine (Shingrix) - COST NOT COVERED BY MEDICARE PART B  2 shot series recommended in those 19 years and older who have or will have weakened immune systems or those 50 years and older     Health Maintenance Due:      Topic Date Due   • Hepatitis C Screening  Completed     Immunizations Due:      Topic Date Due   • COVID-19 Vaccine (4 - 2024-25 season) 09/01/2024   • Influenza Vaccine (1) 09/01/2025     Advance Directives   What are advance directives?  Advance directives are legal documents that state your wishes and plans for medical care. These plans are made ahead of time in case you lose your ability to make decisions for yourself. Advance directives can apply to any medical decision, such as the treatments you want, and if you want to donate organs.   What are the types of advance directives?  There are many types of advance directives, and each state has rules about how to use them. You may choose a combination of any of the following:  Living will:  This is a written record of the treatment you want. You can also choose which treatments you do not want, which to limit, and which  to stop at a certain time. This includes surgery, medicine, IV fluid, and tube feedings.   Durable power of  for healthcare (DPAHC):  This is a written record that states who you want to make healthcare choices for you when you are unable to make them for yourself. This person, called a proxy, is usually a family member or a friend. You may choose more than 1 proxy.  Do not resuscitate (DNR) order:  A DNR order is used in case your heart stops beating or you stop breathing. It is a request not to have certain forms of treatment, such as CPR. A DNR order may be included in other types of advance directives.  Medical directive:  This covers the care that you want if you are in a coma, near death, or unable to make decisions for yourself. You can list the treatments you want for each condition. Treatment may include pain medicine, surgery, blood transfusions, dialysis, IV or tube feedings, and a ventilator (breathing machine).  Values history:  This document has questions about your views, beliefs, and how you feel and think about life. This information can help others choose the care that you would choose.  Why are advance directives important?  An advance directive helps you control your care. Although spoken wishes may be used, it is better to have your wishes written down. Spoken wishes can be misunderstood, or not followed. Treatments may be given even if you do not want them. An advance directive may make it easier for your family to make difficult choices about your care.       © Copyright [employer] 2018 Information is for End User's use only and may not be sold, redistributed or otherwise used for commercial purposes. All illustrations and images included in CareNotes® are the copyrighted property of [employer] or [employer]

## 2025-07-22 NOTE — ASSESSMENT & PLAN NOTE
S/p colostomy clinically stable and doing well continue the current medical regiment will continue monitor.

## 2025-07-22 NOTE — ASSESSMENT & PLAN NOTE
Bp stable      Hypertension - controlled, I have counseled patient following healthy balance diet, I would like the patient reduce sodium, exercise routinely, I would like the patient continued the med current medical regiment and we will continue to monitor.

## 2025-07-22 NOTE — ASSESSMENT & PLAN NOTE
Orders:  •  warfarin (COUMADIN) 2 mg tablet; TAKE ONE AND ONE-HALF TABLETS BY MOUTH EVERY OTHER DAY ALTERNATING WITH ONE TABLET THE NEXT DAY. REPEAT SCHEDULE

## 2025-07-22 NOTE — PROGRESS NOTES
Name: Lorenzo De Jesus      : 1945      MRN: [id number]  Encounter Provider: Km Schultz DO  Encounter Date: 2025   [facility]  :Lorenzo De Jesus, a 79-year-old patient, had a follow-up visit on 2025, to manage primary hypertension, DVT, mild memory loss, subclinical hypothyroidism, and Crohn's disease. His hypertension was controlled with amlodipine, and DVT was stable with Coumadin [17]. On 2025, he reported back pain and mild elevation in bilirubin, with plans to taper Protonix and continue iron supplements [16]. His PT/INR results remained unchanged as of 2025, with a recheck advised in two weeks [21]. His wife inquired about lab results and medication refills multiple times, indicating active involvement in his care   Assessment & Plan  Major depressive disorder, recurrent episode, in full remission (HCC)  Mood doing ok   Clinically stable and doing well continue the current medical regiment will continue monitor.  Continue Zyprexa, Zoloft 150 mg once daily no SI continue usual follow-up with psychiatrist Dr. Mojica       Primary hypertension  Bp stable      Hypertension - controlled, I have counseled patient following healthy balance diet, I would like the patient reduce sodium, exercise routinely, I would like the patient continued the med current medical regiment and we will continue to monitor.  Crohn's disease of small intestine without complication (HCC)  S/p colostomy clinically stable and doing well continue the current medical regiment will continue monitor.       HLD (hyperlipidemia)    Orders:  •  Comprehensive metabolic panel; Future  •  Lipid Panel with Direct LDL reflex; Future  •  rosuvastatin (CRESTOR) 5 mg tablet; Take 1 tablet (5 mg total) by mouth daily    Deep vein thrombosis (DVT) of proximal lower extremity, unspecified chronicity, unspecified laterality (HCC)  Clinically stable and  doing well continue the current medical regiment will continue monitor.  INRs have been stable refill of Coumadin provided  Orders:  •  warfarin (COUMADIN) 2 mg tablet; TAKE ONE AND ONE-HALF TABLETS BY MOUTH EVERY OTHER DAY ALTERNATING WITH ONE TABLET THE NEXT DAY. REPEAT SCHEDULE    Screening PSA (prostate specific antigen)    Orders:  •  PSA, Total Screen; Future    Medicare annual wellness visit, subsequent  Assessment and plan 1.  Medicare subsequent annual wellness examination overall the patient is clinically stable and doing well, we encouraged the patient to follow a healthy and balanced diet.  We recommend that the patient exercise routinely approximately 30 minutes 5 times per week .  We have reviewed the patient's vaccines and have made recommendations for updates if necessary   flu shot in the fall   .  We will be ordering screening laboratories which are age appropriate.  Return to the office in   6 months   call if any problems.              History of Present Illness   HPI 79-year old male coming in for a follow up visit regarding depression, hypertension, Crohn's disease, hyperlipidemia, DVT; the patient reports me compliant taking medications without untoward side effects the.  The patient is here to review his medical condition, update me on the medical condition and the patient reports me no hospitalizations and no ER visits.  No injuries no illnesses he does a lot of walking but does not drink enough water his wife validates he is here to review his laboratories in detail his stools have been liquid he does have a colostomy bag unchanged and stable.  He takes Coumadin regularly and his INR has been stable he is here to review his overall condition the last 6 months his laboratories and test.  His anxiety levels have been stable and mood has been stable he is working with psychiatrist and continues to take Zoloft and Zyprexa.  Review of Systems   Constitutional:  Negative for activity change,  appetite change and unexpected weight change.   HENT:  Negative for congestion and postnasal drip.    Eyes:  Negative for visual disturbance.   Respiratory:  Negative for cough and shortness of breath.    Cardiovascular:  Negative for chest pain.   Gastrointestinal:  Negative for abdominal pain, diarrhea, nausea and vomiting.   Neurological:  Negative for dizziness, light-headedness and headaches.   Hematological:  Negative for adenopathy.       Objective   /64 (BP Location: Right arm, Patient Position: Sitting, Cuff Size: Standard)   Pulse 74   Temp (!) 97.4 °F (36.3 °C) (Tympanic)   Wt 62.9 kg (138 lb 9.6 oz)   SpO2 95%   BMI 22.71 kg/m²      Physical Exam  Vitals and nursing note reviewed.   Constitutional:       General: He is not in acute distress.     Appearance: Normal appearance. He is well-developed. He is not ill-appearing, toxic-appearing or diaphoretic.   HENT:      Head: Normocephalic and atraumatic.      Right Ear: External ear normal.      Left Ear: External ear normal.      Nose: Nose normal.     Eyes:      Pupils: Pupils are equal, round, and reactive to light.       Cardiovascular:      Rate and Rhythm: Normal rate and regular rhythm.      Heart sounds: Normal heart sounds. No murmur heard.  Pulmonary:      Effort: Pulmonary effort is normal.      Breath sounds: Normal breath sounds.   Abdominal:      General: There is no distension.      Palpations: Abdomen is soft.      Tenderness: There is no abdominal tenderness. There is no guarding.     Psychiatric:         Mood and Affect: Mood is anxious. Mood is not depressed.         Thought Content: Thought content does not include suicidal ideation.     Negative edema, distal pulses 2/2

## 2025-07-22 NOTE — PROGRESS NOTES
Name: Lorenzo De Jesus      : 1945      MRN: [id number]  Encounter Provider: Km Schultz DO  Encounter Date: 2025   [facility]: [facility]  :  Assessment & Plan  Major depressive disorder, recurrent episode, in full remission (HCC)           Primary hypertension         Crohn's disease of small intestine without complication (HCC)         HLD (hyperlipidemia)    Orders:  •  Comprehensive metabolic panel; Future  •  Lipid Panel with Direct LDL reflex; Future  •  rosuvastatin (CRESTOR) 5 mg tablet; Take 1 tablet (5 mg total) by mouth daily    Deep vein thrombosis (DVT) of proximal lower extremity, unspecified chronicity, unspecified laterality (HCC)    Orders:  •  warfarin (COUMADIN) 2 mg tablet; TAKE ONE AND ONE-HALF TABLETS BY MOUTH EVERY OTHER DAY ALTERNATING WITH ONE TABLET THE NEXT DAY. REPEAT SCHEDULE    Screening PSA (prostate specific antigen)    Orders:  •  PSA, Total Screen; Future    Medicare annual wellness visit, subsequent  Assessment and plan 1.  Medicare subsequent annual wellness examination overall the patient is clinically stable and doing well, we encouraged the patient to follow a healthy and balanced diet.  We recommend that the patient exercise routinely approximately 30 minutes 5 times per week .  We have reviewed the patient's vaccines and have made recommendations for updates if necessary annual flu shot in the fall    .  We will be ordering screening laboratories which are age appropriate.  Return to the office in   6 months   call if any problems.          Preventive health issues were discussed with patient, and age appropriate screening tests were ordered as noted in patient's After Visit Summary. Personalized health advice and appropriate referrals for health education or preventive services given if needed, as noted in patient's After Visit Summary.    History of Present Illness     HPI   Patient Care Team:  Km Schultz DO as  PCP - General  MD Antonino Leblanc MD Brett Gibson, MD Ayaz Matin, MD Sinan Kutty, MD (Gastroenterology)    Review of Systems  Medical History Reviewed by provider this encounter:  Tobacco  Allergies  Meds  Problems  Med Hx  Surg Hx  Fam Hx       Annual Wellness Visit Questionnaire   Lorenzo is here for his Subsequent Wellness visit.     Health Risk Assessment:   Patient rates overall health as good. Patient feels that their physical health rating is same. Patient is satisfied with their life. Eyesight was rated as same. Hearing was rated as same. Patient feels that their emotional and mental health rating is slightly worse. Patients states they are sometimes angry. Patient states they are sometimes unusually tired/fatigued. Pain experienced in the last 7 days has been a lot. Patient's pain rating has been 7/10. Patient states that he has experienced weight loss or gain in last 6 months.     Depression Screening:   PHQ-9 Score: 0      Fall Risk Screening:   In the past year, patient has experienced: history of falling in past year    Number of falls: 1  Injured during fall?: No    Feels unsteady when standing or walking?: No    Worried about falling?: Yes      Home Safety:  Patient does not have trouble with stairs inside or outside of their home. Patient has working smoke alarms and has working carbon monoxide detector. Home safety hazards include: none.     Nutrition:   Current diet is Regular and Frequent junk food.     Medications:   Patient is currently taking over-the-counter supplements. OTC medications include: see medication list. Patient is able to manage medications.     Activities of Daily Living (ADLs)/Instrumental Activities of Daily Living (IADLs):   Walk and transfer into and out of bed and chair?: Yes  Dress and groom yourself?: Yes    Bathe or shower yourself?: Yes    Feed yourself? Yes  Do your laundry/housekeeping?: Yes  Manage your money, pay your bills and track your  expenses?: Yes  Make your own meals?: Yes    Do your own shopping?: Yes    Previous Hospitalizations:   Any hospitalizations or ED visits within the last 12 months?: No      Advance Care Planning:   Living will: Yes    Durable POA for healthcare: Yes    Advanced directive: Yes      Preventive Screenings      Cardiovascular Screening:    General: Screening Not Indicated and History Lipid Disorder      Diabetes Screening:     General: Screening Current      Prostate Cancer Screening:    General: Screening Not Indicated      Abdominal Aortic Aneurysm (AAA) Screening:    Risk factors include: tobacco use        Lung Cancer Screening:     General: Screening Not Indicated      Hepatitis C Screening:    General: Screening Current    Screening, Brief Intervention, and Referral to Treatment (SBIRT)     Screening      Single Item Drug Screening:  How often have you used an illegal drug (including marijuana) or a prescription medication for non-medical reasons in the past year? never    Single Item Drug Screen Score: 0  Interpretation: Negative screen for possible drug use disorder    Social Drivers of Health     Financial Resource Strain: Low Risk  (7/21/2025)    Overall Financial Resource Strain (CARDIA)    • Difficulty of Paying Living Expenses: Not hard at all   Food Insecurity: No Food Insecurity (7/21/2025)    Hunger Vital Sign    • Worried About Running Out of Food in the Last Year: Never true    • Ran Out of Food in the Last Year: Never true   Transportation Needs: No Transportation Needs (7/21/2025)    PRAPARE - Transportation    • Lack of Transportation (Medical): No    • Lack of Transportation (Non-Medical): No   Housing Stability: Low Risk  (7/21/2025)    Housing Stability Vital Sign    • Unable to Pay for Housing in the Last Year: No    • Number of Times Moved in the Last Year: 0    • Homeless in the Last Year: No   Utilities: Not At Risk (7/21/2025)    [facility] Utilities    • Threatened with loss of utilities: No      No results found.    Objective   /64 (BP Location: Right arm, Patient Position: Sitting, Cuff Size: Standard)   Pulse 74   Temp (!) 97.4 °F (36.3 °C) (Tympanic)   Wt 62.9 kg (138 lb 9.6 oz)   SpO2 95%   BMI 22.71 kg/m²     Physical Exam

## 2025-07-22 NOTE — ASSESSMENT & PLAN NOTE
Orders:  •  Comprehensive metabolic panel; Future  •  Lipid Panel with Direct LDL reflex; Future  •  rosuvastatin (CRESTOR) 5 mg tablet; Take 1 tablet (5 mg total) by mouth daily

## 2025-07-22 NOTE — ASSESSMENT & PLAN NOTE
Clinically stable and doing well continue the current medical regiment will continue monitor.  INRs have been stable refill of Coumadin provided  Orders:  •  warfarin (COUMADIN) 2 mg tablet; TAKE ONE AND ONE-HALF TABLETS BY MOUTH EVERY OTHER DAY ALTERNATING WITH ONE TABLET THE NEXT DAY. REPEAT SCHEDULE

## 2025-07-22 NOTE — ASSESSMENT & PLAN NOTE
Mood doing ok   Clinically stable and doing well continue the current medical regiment will continue monitor.  Continue Zyprexa, Zoloft 150 mg once daily no SI continue usual follow-up with psychiatrist Dr. Mojica

## 2025-07-29 ENCOUNTER — OFFICE VISIT (OUTPATIENT)
Dept: INTERNAL MEDICINE CLINIC | Facility: CLINIC | Age: 80
End: 2025-07-29
Payer: MEDICARE

## 2025-07-29 VITALS
DIASTOLIC BLOOD PRESSURE: 80 MMHG | BODY MASS INDEX: 20.89 KG/M2 | WEIGHT: 130 LBS | HEART RATE: 74 BPM | SYSTOLIC BLOOD PRESSURE: 140 MMHG | HEIGHT: 66 IN | OXYGEN SATURATION: 95 %

## 2025-07-29 DIAGNOSIS — H93.8X9 IRRITATION OF EAR, UNSPECIFIED LATERALITY: Primary | ICD-10-CM

## 2025-07-29 PROCEDURE — G2211 COMPLEX E/M VISIT ADD ON: HCPCS

## 2025-07-29 PROCEDURE — 99213 OFFICE O/P EST LOW 20 MIN: CPT

## 2025-07-31 ENCOUNTER — APPOINTMENT (OUTPATIENT)
Dept: LAB | Facility: AMBULARY SURGERY CENTER | Age: 80
End: 2025-07-31
Payer: MEDICARE

## 2025-07-31 DIAGNOSIS — E78.5 HLD (HYPERLIPIDEMIA): ICD-10-CM

## 2025-07-31 LAB
ALBUMIN SERPL BCG-MCNC: 4 G/DL (ref 3.5–5)
ALP SERPL-CCNC: 60 U/L (ref 34–104)
ALT SERPL W P-5'-P-CCNC: 15 U/L (ref 7–52)
ANION GAP SERPL CALCULATED.3IONS-SCNC: 8 MMOL/L (ref 4–13)
AST SERPL W P-5'-P-CCNC: 22 U/L (ref 13–39)
BILIRUB SERPL-MCNC: 0.76 MG/DL (ref 0.2–1)
BUN SERPL-MCNC: 17 MG/DL (ref 5–25)
CALCIUM SERPL-MCNC: 9 MG/DL (ref 8.4–10.2)
CHLORIDE SERPL-SCNC: 107 MMOL/L (ref 96–108)
CHOLEST SERPL-MCNC: 131 MG/DL (ref ?–200)
CO2 SERPL-SCNC: 25 MMOL/L (ref 21–32)
CREAT SERPL-MCNC: 0.76 MG/DL (ref 0.6–1.3)
GFR SERPL CREATININE-BSD FRML MDRD: 87 ML/MIN/1.73SQ M
GLUCOSE P FAST SERPL-MCNC: 96 MG/DL (ref 65–99)
HDLC SERPL-MCNC: 66 MG/DL
LDLC SERPL CALC-MCNC: 48 MG/DL (ref 0–100)
POTASSIUM SERPL-SCNC: 4.1 MMOL/L (ref 3.5–5.3)
PROT SERPL-MCNC: 6.4 G/DL (ref 6.4–8.4)
SODIUM SERPL-SCNC: 140 MMOL/L (ref 135–147)
TRIGL SERPL-MCNC: 83 MG/DL (ref ?–150)

## 2025-07-31 PROCEDURE — 36415 COLL VENOUS BLD VENIPUNCTURE: CPT

## 2025-07-31 PROCEDURE — 80061 LIPID PANEL: CPT

## 2025-07-31 PROCEDURE — 80053 COMPREHEN METABOLIC PANEL: CPT

## (undated) DEVICE — SCD SEQUENTIAL COMPRESSION COMFORT SLEEVE MEDIUM KNEE LENGTH: Brand: KENDALL SCD

## (undated) DEVICE — PACK UNIVERSAL NECK

## (undated) DEVICE — REM POLYHESIVE ADULT PATIENT RETURN ELECTRODE: Brand: VALLEYLAB

## (undated) DEVICE — INSULATED BLADE ELECTRODE: Brand: EDGE

## (undated) DEVICE — 3M™ TEGADERM™ TRANSPARENT FILM DRESSING FRAME STYLE, 1624W, 2-3/8 IN X 2-3/4 IN (6 CM X 7 CM), 100/CT 4CT/CASE: Brand: 3M™ TEGADERM™

## (undated) DEVICE — JACKSON-PRATT 100CC BULB RESERVOIR: Brand: CARDINAL HEALTH

## (undated) DEVICE — BIPOLAR CORD DISP

## (undated) DEVICE — VIAL DECANTER

## (undated) DEVICE — SUT SILK 2-0 SH 30 IN K833H

## (undated) DEVICE — GLOVE SRG BIOGEL 7.5

## (undated) DEVICE — LIGACLIP MCA MULTIPLE CLIP APPLIERS, 20 MEDIUM CLIPS: Brand: LIGACLIP

## (undated) DEVICE — GELFOAM 100

## (undated) DEVICE — PROXIMATE SKIN STAPLERS (35 WIDE) CONTAINS 35 STAINLESS STEEL STAPLES (FIXED HEAD): Brand: PROXIMATE

## (undated) DEVICE — STERILE MANDIBLE PACK: Brand: CARDINAL HEALTH

## (undated) DEVICE — SUT VICRYL 3-0 SH 27 IN J416H

## (undated) DEVICE — X-RAY DETECTABLE SPONGES,16 PLY: Brand: VISTEC

## (undated) DEVICE — SUT ETHILON 2-0 PS 18 IN 585H

## (undated) DEVICE — ARISTA AH ABSORBABLE HEMOSTATIC PARTICLES: Brand: ARISTA™ AH

## (undated) DEVICE — DRAIN JACKSON PRATT 15FR: Brand: CARDINAL HEALTH

## (undated) DEVICE — CHLORAPREP HI-LITE 26ML ORANGE

## (undated) DEVICE — NEEDLE 25G X 1 1/2

## (undated) DEVICE — LIGHT HANDLE COVER SLEEVE DISP BLUE STELLAR

## (undated) DEVICE — TRAY FOLEY 16FR URIMETER SURESTEP

## (undated) DEVICE — 3000CC GUARDIAN II: Brand: GUARDIAN

## (undated) DEVICE — INTENDED FOR TISSUE SEPARATION, AND OTHER PROCEDURES THAT REQUIRE A SHARP SURGICAL BLADE TO PUNCTURE OR CUT.: Brand: BARD-PARKER SAFETY BLADES SIZE 15, STERILE

## (undated) DEVICE — TIBURON SPLIT SHEET: Brand: CONVERTORS

## (undated) DEVICE — SUT SILK 4-0 30 IN A303H

## (undated) DEVICE — SUT SILK 3-0 18 IN A184H

## (undated) DEVICE — 2000CC GUARDIAN II: Brand: GUARDIAN

## (undated) DEVICE — SUT SILK 3-0 SH 30 IN K832H

## (undated) DEVICE — SPONGE STICK WITH PVP-I: Brand: KENDALL

## (undated) DEVICE — GLOVE SRG BIOGEL ECLIPSE 6.5

## (undated) DEVICE — GLOVE INDICATOR PI UNDERGLOVE SZ 7 BLUE

## (undated) DEVICE — BULB SYRINGE,IRRIGATION WITH PROTECTIVE CAP: Brand: DOVER

## (undated) DEVICE — SUT PROLENE 5-0 P-3 18 IN 8698G

## (undated) DEVICE — SUT SILK 2-0 18 IN A185H

## (undated) DEVICE — ELECTRODE 8227410 PAIRED 2 CH SET ROHS

## (undated) DEVICE — STAYS ELASTIC 5MM SHARP HOOK LONE STAR